# Patient Record
Sex: FEMALE | Race: ASIAN | NOT HISPANIC OR LATINO | Employment: OTHER | URBAN - METROPOLITAN AREA
[De-identification: names, ages, dates, MRNs, and addresses within clinical notes are randomized per-mention and may not be internally consistent; named-entity substitution may affect disease eponyms.]

---

## 2017-01-09 ENCOUNTER — HOSPITAL ENCOUNTER (OUTPATIENT)
Dept: CARDIOLOGY | Facility: MEDICAL CENTER | Age: 82
End: 2017-01-09
Attending: INTERNAL MEDICINE
Payer: MEDICARE

## 2017-01-09 DIAGNOSIS — I35.0 AORTIC STENOSIS: ICD-10-CM

## 2017-01-09 PROCEDURE — 93306 TTE W/DOPPLER COMPLETE: CPT

## 2017-01-09 PROCEDURE — 93306 TTE W/DOPPLER COMPLETE: CPT | Mod: 26 | Performed by: INTERNAL MEDICINE

## 2017-01-10 LAB
LV EJECT FRACT MOD 2C 99903: 55.54
LV EJECT FRACT MOD 4C 99902: 70.36
LV EJECT FRACT MOD BP 99901: 66.2

## 2017-01-13 ENCOUNTER — TELEPHONE (OUTPATIENT)
Dept: CARDIOLOGY | Facility: MEDICAL CENTER | Age: 82
End: 2017-01-13

## 2017-01-14 NOTE — TELEPHONE ENCOUNTER
----- Message from Alex Sharp M.D. sent at 1/12/2017  2:31 PM PST -----  Aortic stenosis has become moderate (barely). No changes, progressing as expected. Ok FU 1 year.    TW  ----- Message -----     From: Satnam Silvestre L.P.N.     Sent: 1/10/2017   5:30 PM       To: Alex Sharp M.D.    No f/u    Called patient with echo results.  She would like a copy for her records.  Copy mailed to patient.  She will follow up in one year. meghan

## 2017-01-14 NOTE — TELEPHONE ENCOUNTER
----- Message from Alex Sharp M.D. sent at 1/12/2017  2:31 PM PST -----  Aortic stenosis has become moderate (barely). No changes, progressing as expected. Ok FU 1 year.    TW  ----- Message -----     From: Satnam Silvestre L.P.N.     Sent: 1/10/2017   5:30 PM       To: Alex Sharp M.D.    No f/u

## 2017-01-16 ENCOUNTER — HOSPITAL ENCOUNTER (OUTPATIENT)
Dept: LAB | Facility: MEDICAL CENTER | Age: 82
End: 2017-01-16
Attending: FAMILY MEDICINE
Payer: MEDICARE

## 2017-01-16 LAB
INR PPP: 2.72 (ref 0.87–1.13)
PROTHROMBIN TIME: 29.7 SEC (ref 12–14.6)

## 2017-01-16 PROCEDURE — 85610 PROTHROMBIN TIME: CPT

## 2017-01-16 PROCEDURE — 36415 COLL VENOUS BLD VENIPUNCTURE: CPT

## 2017-01-23 ENCOUNTER — APPOINTMENT (OUTPATIENT)
Dept: RADIOLOGY | Facility: MEDICAL CENTER | Age: 82
End: 2017-01-23
Attending: FAMILY MEDICINE
Payer: MEDICARE

## 2017-02-07 ENCOUNTER — HOSPITAL ENCOUNTER (OUTPATIENT)
Dept: RADIOLOGY | Facility: MEDICAL CENTER | Age: 82
End: 2017-02-07
Attending: FAMILY MEDICINE
Payer: MEDICARE

## 2017-02-07 DIAGNOSIS — Z12.31 SCREENING MAMMOGRAM, ENCOUNTER FOR: ICD-10-CM

## 2017-02-07 PROCEDURE — 77063 BREAST TOMOSYNTHESIS BI: CPT

## 2017-02-15 ENCOUNTER — HOSPITAL ENCOUNTER (OUTPATIENT)
Dept: LAB | Facility: MEDICAL CENTER | Age: 82
End: 2017-02-15
Attending: FAMILY MEDICINE
Payer: MEDICARE

## 2017-02-15 LAB
INR PPP: 2.58 (ref 0.87–1.13)
PROTHROMBIN TIME: 28.5 SEC (ref 12–14.6)

## 2017-02-15 PROCEDURE — 85610 PROTHROMBIN TIME: CPT

## 2017-02-15 PROCEDURE — 36415 COLL VENOUS BLD VENIPUNCTURE: CPT

## 2017-02-21 DIAGNOSIS — I10 ESSENTIAL HYPERTENSION: ICD-10-CM

## 2017-02-21 RX ORDER — TELMISARTAN 40 MG/1
40 TABLET ORAL 2 TIMES DAILY
Qty: 60 TAB | Refills: 6 | OUTPATIENT
Start: 2017-02-21 | End: 2017-09-21 | Stop reason: SDUPTHER

## 2017-03-20 ENCOUNTER — HOSPITAL ENCOUNTER (OUTPATIENT)
Dept: LAB | Facility: MEDICAL CENTER | Age: 82
End: 2017-03-20
Attending: FAMILY MEDICINE
Payer: MEDICARE

## 2017-03-20 LAB
INR PPP: 2.41 (ref 0.87–1.13)
PROTHROMBIN TIME: 27 SEC (ref 12–14.6)

## 2017-03-20 PROCEDURE — 36415 COLL VENOUS BLD VENIPUNCTURE: CPT

## 2017-03-20 PROCEDURE — 85610 PROTHROMBIN TIME: CPT

## 2017-04-05 DIAGNOSIS — I10 ESSENTIAL HYPERTENSION: ICD-10-CM

## 2017-04-06 RX ORDER — CARVEDILOL 25 MG/1
25 TABLET ORAL 2 TIMES DAILY
Qty: 180 TAB | Refills: 2 | Status: ON HOLD | OUTPATIENT
Start: 2017-04-06 | End: 2017-05-12

## 2017-04-20 ENCOUNTER — HOSPITAL ENCOUNTER (OUTPATIENT)
Dept: LAB | Facility: MEDICAL CENTER | Age: 82
End: 2017-04-20
Attending: FAMILY MEDICINE
Payer: MEDICARE

## 2017-04-20 LAB
INR PPP: 2.55 (ref 0.87–1.13)
PROTHROMBIN TIME: 28.2 SEC (ref 12–14.6)

## 2017-04-20 PROCEDURE — 36415 COLL VENOUS BLD VENIPUNCTURE: CPT

## 2017-04-20 PROCEDURE — 85610 PROTHROMBIN TIME: CPT

## 2017-05-11 ENCOUNTER — RESOLUTE PROFESSIONAL BILLING HOSPITAL PROF FEE (OUTPATIENT)
Dept: HOSPITALIST | Facility: MEDICAL CENTER | Age: 82
End: 2017-05-11
Payer: MEDICARE

## 2017-05-11 ENCOUNTER — HOSPITAL ENCOUNTER (OUTPATIENT)
Facility: MEDICAL CENTER | Age: 82
End: 2017-05-12
Attending: EMERGENCY MEDICINE | Admitting: HOSPITALIST
Payer: MEDICARE

## 2017-05-11 ENCOUNTER — APPOINTMENT (OUTPATIENT)
Dept: RADIOLOGY | Facility: MEDICAL CENTER | Age: 82
End: 2017-05-11
Attending: EMERGENCY MEDICINE
Payer: MEDICARE

## 2017-05-11 PROBLEM — I16.0 HYPERTENSIVE URGENCY: Status: ACTIVE | Noted: 2017-05-11

## 2017-05-11 LAB
ALBUMIN SERPL BCP-MCNC: 4.3 G/DL (ref 3.2–4.9)
ALBUMIN/GLOB SERPL: 1.6 G/DL
ALP SERPL-CCNC: 35 U/L (ref 30–99)
ALT SERPL-CCNC: 36 U/L (ref 2–50)
ANION GAP SERPL CALC-SCNC: 12 MMOL/L (ref 0–11.9)
AST SERPL-CCNC: 25 U/L (ref 12–45)
BASOPHILS # BLD AUTO: 1 % (ref 0–1.8)
BASOPHILS # BLD: 0.08 K/UL (ref 0–0.12)
BILIRUB SERPL-MCNC: 0.4 MG/DL (ref 0.1–1.5)
BNP SERPL-MCNC: 33 PG/ML (ref 0–100)
BUN SERPL-MCNC: 11 MG/DL (ref 8–22)
CALCIUM SERPL-MCNC: 10 MG/DL (ref 8.5–10.5)
CHLORIDE SERPL-SCNC: 97 MMOL/L (ref 96–112)
CO2 SERPL-SCNC: 25 MMOL/L (ref 20–33)
CREAT SERPL-MCNC: 0.73 MG/DL (ref 0.5–1.4)
EKG IMPRESSION: NORMAL
EOSINOPHIL # BLD AUTO: 0.25 K/UL (ref 0–0.51)
EOSINOPHIL NFR BLD: 3.1 % (ref 0–6.9)
ERYTHROCYTE [DISTWIDTH] IN BLOOD BY AUTOMATED COUNT: 40.6 FL (ref 35.9–50)
GFR SERPL CREATININE-BSD FRML MDRD: >60 ML/MIN/1.73 M 2
GLOBULIN SER CALC-MCNC: 2.7 G/DL (ref 1.9–3.5)
GLUCOSE SERPL-MCNC: 129 MG/DL (ref 65–99)
HCT VFR BLD AUTO: 39.7 % (ref 37–47)
HGB BLD-MCNC: 13.9 G/DL (ref 12–16)
IMM GRANULOCYTES # BLD AUTO: 0.04 K/UL (ref 0–0.11)
IMM GRANULOCYTES NFR BLD AUTO: 0.5 % (ref 0–0.9)
INR PPP: 2.39 (ref 0.87–1.13)
LYMPHOCYTES # BLD AUTO: 2.98 K/UL (ref 1–4.8)
LYMPHOCYTES NFR BLD: 36.9 % (ref 22–41)
MCH RBC QN AUTO: 31.3 PG (ref 27–33)
MCHC RBC AUTO-ENTMCNC: 35 G/DL (ref 33.6–35)
MCV RBC AUTO: 89.4 FL (ref 81.4–97.8)
MONOCYTES # BLD AUTO: 0.62 K/UL (ref 0–0.85)
MONOCYTES NFR BLD AUTO: 7.7 % (ref 0–13.4)
NEUTROPHILS # BLD AUTO: 4.1 K/UL (ref 2–7.15)
NEUTROPHILS NFR BLD: 50.8 % (ref 44–72)
NRBC # BLD AUTO: 0 K/UL
NRBC BLD AUTO-RTO: 0 /100 WBC
PLATELET # BLD AUTO: 229 K/UL (ref 164–446)
PMV BLD AUTO: 9.1 FL (ref 9–12.9)
POTASSIUM SERPL-SCNC: 4.1 MMOL/L (ref 3.6–5.5)
PROT SERPL-MCNC: 7 G/DL (ref 6–8.2)
PROTHROMBIN TIME: 26.8 SEC (ref 12–14.6)
RBC # BLD AUTO: 4.44 M/UL (ref 4.2–5.4)
SODIUM SERPL-SCNC: 134 MMOL/L (ref 135–145)
TROPONIN I SERPL-MCNC: <0.01 NG/ML (ref 0–0.04)
WBC # BLD AUTO: 8.1 K/UL (ref 4.8–10.8)

## 2017-05-11 PROCEDURE — 71010 DX-CHEST-PORTABLE (1 VIEW): CPT

## 2017-05-11 PROCEDURE — 85610 PROTHROMBIN TIME: CPT

## 2017-05-11 PROCEDURE — 93005 ELECTROCARDIOGRAM TRACING: CPT | Performed by: EMERGENCY MEDICINE

## 2017-05-11 PROCEDURE — 70450 CT HEAD/BRAIN W/O DYE: CPT

## 2017-05-11 PROCEDURE — 84484 ASSAY OF TROPONIN QUANT: CPT

## 2017-05-11 PROCEDURE — A9270 NON-COVERED ITEM OR SERVICE: HCPCS | Performed by: HOSPITALIST

## 2017-05-11 PROCEDURE — 85025 COMPLETE CBC W/AUTO DIFF WBC: CPT

## 2017-05-11 PROCEDURE — G0378 HOSPITAL OBSERVATION PER HR: HCPCS

## 2017-05-11 PROCEDURE — 99220 PR INITIAL OBSERVATION CARE,LEVL III: CPT | Performed by: HOSPITALIST

## 2017-05-11 PROCEDURE — A9270 NON-COVERED ITEM OR SERVICE: HCPCS | Performed by: EMERGENCY MEDICINE

## 2017-05-11 PROCEDURE — 36415 COLL VENOUS BLD VENIPUNCTURE: CPT

## 2017-05-11 PROCEDURE — 94760 N-INVAS EAR/PLS OXIMETRY 1: CPT

## 2017-05-11 PROCEDURE — 700102 HCHG RX REV CODE 250 W/ 637 OVERRIDE(OP): Performed by: HOSPITALIST

## 2017-05-11 PROCEDURE — 99285 EMERGENCY DEPT VISIT HI MDM: CPT

## 2017-05-11 PROCEDURE — 83880 ASSAY OF NATRIURETIC PEPTIDE: CPT

## 2017-05-11 PROCEDURE — 80053 COMPREHEN METABOLIC PANEL: CPT

## 2017-05-11 PROCEDURE — 700102 HCHG RX REV CODE 250 W/ 637 OVERRIDE(OP): Performed by: EMERGENCY MEDICINE

## 2017-05-11 RX ORDER — ACETAMINOPHEN 325 MG/1
1000 TABLET ORAL ONCE
Status: COMPLETED | OUTPATIENT
Start: 2017-05-11 | End: 2017-05-11

## 2017-05-11 RX ORDER — BISACODYL 10 MG
10 SUPPOSITORY, RECTAL RECTAL
Status: DISCONTINUED | OUTPATIENT
Start: 2017-05-11 | End: 2017-05-12 | Stop reason: HOSPADM

## 2017-05-11 RX ORDER — LEVOTHYROXINE SODIUM 0.05 MG/1
50 TABLET ORAL EVERY MORNING
Status: DISCONTINUED | OUTPATIENT
Start: 2017-05-12 | End: 2017-05-12 | Stop reason: HOSPADM

## 2017-05-11 RX ORDER — TELMISARTAN 80 MG/1
40 TABLET ORAL 2 TIMES DAILY
Status: DISCONTINUED | OUTPATIENT
Start: 2017-05-11 | End: 2017-05-12 | Stop reason: HOSPADM

## 2017-05-11 RX ORDER — SODIUM CHLORIDE 9 MG/ML
1000 INJECTION, SOLUTION INTRAVENOUS CONTINUOUS
Status: DISPENSED | OUTPATIENT
Start: 2017-05-11 | End: 2017-05-12

## 2017-05-11 RX ORDER — AMOXICILLIN 250 MG
2 CAPSULE ORAL 2 TIMES DAILY
Status: DISCONTINUED | OUTPATIENT
Start: 2017-05-11 | End: 2017-05-12 | Stop reason: HOSPADM

## 2017-05-11 RX ORDER — SUMATRIPTAN 25 MG/1
25 TABLET, FILM COATED ORAL
Status: DISCONTINUED | OUTPATIENT
Start: 2017-05-11 | End: 2017-05-12 | Stop reason: HOSPADM

## 2017-05-11 RX ORDER — CARVEDILOL 25 MG/1
25 TABLET ORAL 2 TIMES DAILY
Status: DISCONTINUED | OUTPATIENT
Start: 2017-05-11 | End: 2017-05-12

## 2017-05-11 RX ORDER — LABETALOL HYDROCHLORIDE 5 MG/ML
10 INJECTION, SOLUTION INTRAVENOUS ONCE
Status: DISCONTINUED | OUTPATIENT
Start: 2017-05-11 | End: 2017-05-12 | Stop reason: HOSPADM

## 2017-05-11 RX ORDER — ONDANSETRON 4 MG/1
4 TABLET, ORALLY DISINTEGRATING ORAL EVERY 4 HOURS PRN
Status: DISCONTINUED | OUTPATIENT
Start: 2017-05-11 | End: 2017-05-12 | Stop reason: HOSPADM

## 2017-05-11 RX ORDER — POLYETHYLENE GLYCOL 3350 17 G/17G
1 POWDER, FOR SOLUTION ORAL
Status: DISCONTINUED | OUTPATIENT
Start: 2017-05-11 | End: 2017-05-12 | Stop reason: HOSPADM

## 2017-05-11 RX ORDER — ENALAPRILAT 1.25 MG/ML
1.25 INJECTION INTRAVENOUS EVERY 6 HOURS PRN
Status: DISCONTINUED | OUTPATIENT
Start: 2017-05-11 | End: 2017-05-12 | Stop reason: HOSPADM

## 2017-05-11 RX ORDER — PROMETHAZINE HYDROCHLORIDE 25 MG/1
25 TABLET ORAL EVERY 6 HOURS PRN
Status: DISCONTINUED | OUTPATIENT
Start: 2017-05-11 | End: 2017-05-12 | Stop reason: HOSPADM

## 2017-05-11 RX ORDER — ONDANSETRON 2 MG/ML
4 INJECTION INTRAMUSCULAR; INTRAVENOUS EVERY 4 HOURS PRN
Status: DISCONTINUED | OUTPATIENT
Start: 2017-05-11 | End: 2017-05-12 | Stop reason: HOSPADM

## 2017-05-11 RX ORDER — DEXTROSE MONOHYDRATE 25 G/50ML
25 INJECTION, SOLUTION INTRAVENOUS
Status: DISCONTINUED | OUTPATIENT
Start: 2017-05-11 | End: 2017-05-12 | Stop reason: HOSPADM

## 2017-05-11 RX ORDER — LABETALOL HYDROCHLORIDE 5 MG/ML
10 INJECTION, SOLUTION INTRAVENOUS EVERY 4 HOURS PRN
Status: DISCONTINUED | OUTPATIENT
Start: 2017-05-11 | End: 2017-05-12 | Stop reason: HOSPADM

## 2017-05-11 RX ADMIN — ACETAMINOPHEN 975 MG: 325 TABLET, FILM COATED ORAL at 18:25

## 2017-05-11 RX ADMIN — TELMISARTAN 40 MG: 80 TABLET ORAL at 23:39

## 2017-05-11 RX ADMIN — CARVEDILOL 25 MG: 25 TABLET, FILM COATED ORAL at 23:39

## 2017-05-11 RX ADMIN — METFORMIN HYDROCHLORIDE 500 MG: 500 TABLET, FILM COATED ORAL at 23:41

## 2017-05-11 ASSESSMENT — LIFESTYLE VARIABLES
ON A TYPICAL DAY WHEN YOU DRINK ALCOHOL HOW MANY DRINKS DO YOU HAVE: 1
HOW MANY TIMES IN THE PAST YEAR HAVE YOU HAD 5 OR MORE DRINKS IN A DAY: 0
TOTAL SCORE: 0
EVER HAD A DRINK FIRST THING IN THE MORNING TO STEADY YOUR NERVES TO GET RID OF A HANGOVER: NO
AVERAGE NUMBER OF DAYS PER WEEK YOU HAVE A DRINK CONTAINING ALCOHOL: 0
CONSUMPTION TOTAL: NEGATIVE
EVER FELT BAD OR GUILTY ABOUT YOUR DRINKING: NO
HAVE YOU EVER FELT YOU SHOULD CUT DOWN ON YOUR DRINKING: NO
HAVE PEOPLE ANNOYED YOU BY CRITICIZING YOUR DRINKING: NO
EVER_SMOKED: YES
ALCOHOL_USE: YES

## 2017-05-11 ASSESSMENT — PAIN SCALES - GENERAL
PAINLEVEL_OUTOF10: 0
PAINLEVEL_OUTOF10: 0

## 2017-05-11 NOTE — IP AVS SNAPSHOT
" <p align=\"LEFT\"><IMG SRC=\"//[email or web address]$/[email or web address]\" alt=\"Image\" WIDTH=\"50%\" HEIGHT=\"200\" BORDER=\"\"></p>                   Name:Shereen Dale  Medical Record Number:[id number]  CSN: [id number]    YOB: 1936   Age: 80 y.o.  Sex: female  HT:1.6 m (5' 2.99\") WT: 73.5 kg (162 lb 0.6 oz)          Admit Date: 5/11/2017     Discharge Date:   Today's Date: 5/12/2017  Attending Doctor:  Eugenio Jama M.D.                  Allergies:  Darvon; Iodine; Latex; Penicillins; Propoxyphene hcl; and Tetracycline          Your appointments     May 24, 2017  1:40 PM   HOSPITAL FOLLOW UP with JORGE A Flores   [facility] (--)    [address]  [facility] [address]   [phone]            Jun 07, 2017 11:00 AM   30 Minute with Kristin Cornell M.D.   [facility] (--)    [address] [place] [address] [address] [place] [address]   [phone]              Follow-up Information     1. Follow up with Kristin Cornell M.D.. Go on 6/7/2017.    Specialty:  Family Medicine    Why:  PLEASE ARRIVE AT 10:45AM FOR AN 11:00AM APPOINTMENT. THANK YOU    Contact information    [address] [facility] [address]  [place] [id number] [phone]           Medication List      Take these Medications        Instructions    acetaminophen 500 MG Tabs   Commonly known as:  TYLENOL    Take 1,000 mg by mouth every 6 hours as needed.   Dose:  1000 mg       amlodipine 5 MG Tabs   Commonly known as:  NORVASC    Take 1 Tab by mouth every day.   Dose:  5 mg       CALCIUM 500 PO    1 Tab every morning.   Dose:  1 Tab       COSOPT OP    Place 1 Drop in both eyes 2 Times a Day.   Dose:  1 Drop       docosahexanoic acid 1000 MG Caps   Commonly known as:  OMEGA 3 FA    Take 1,000 mg by mouth every morning.   Dose:  1000 mg       GLUCOSAMINE PO    Take 1 Tab by mouth every morning. Pt unsure of dose   Dose:  1 Tab       levothyroxine 50 MCG Tabs   Commonly known as:  SYNTHROID    Take 50 mcg by mouth every morning.   Dose:  " 50 mcg       metformin 500 MG Tabs   Commonly known as:  GLUCOPHAGE    Take 500 mg by mouth 3 times a day.   Dose:  500 mg       metoprolol SR 50 MG Tb24   Commonly known as:  TOPROL XL    Take 1 Tab by mouth every day.   Dose:  50 mg       PEPCID 20 MG Tabs   Generic drug:  famotidine    Take 20 mg by mouth 2 times a day.   Dose:  20 mg       SYSTANE OP    1-2 Drops by Ophthalmic route 2 Times a Day.   Dose:  1-2 Drop       telmisartan 40 MG Tabs   Commonly known as:  MICARDIS    Doctor's comments:  Called to pharmacy   Take 1 Tab by mouth 2 Times a Day.   Dose:  40 mg       tramadol 50 MG Tabs   Commonly known as:  ULTRAM    Take 1 Tab by mouth every 6 hours as needed for Moderate Pain.   Dose:  50 mg       VITAMIN D PO    Take 2,000 Units by mouth every morning.   Dose:  2000 Units       warfarin 3 MG Tabs   Commonly known as:  COUMADIN    Take 3 mg by mouth every bedtime.   Dose:  3 mg

## 2017-05-11 NOTE — IP AVS SNAPSHOT
" Home Care Instructions                                                                                                                  Name:Shereen Dale  Medical Record Number:[id number]  CSN: [id number]    YOB: 1936   Age: 80 y.o.  Sex: female  HT:1.6 m (5' 2.99\") WT: 73.5 kg (162 lb 0.6 oz)          Admit Date: 5/11/2017     Discharge Date:   Today's Date: 5/12/2017  Attending Doctor:  Eugenio Jama M.D.                  Allergies:  Darvon; Iodine; Latex; Penicillins; Propoxyphene hcl; and Tetracycline            Discharge Instructions       Discharge Instructions    Discharged to home by car with relative. Discharged via wheelchair, hospital escort: Yes.  Special equipment needed: Not Applicable    Be sure to schedule a follow-up appointment with your primary care doctor or any specialists as instructed.     Discharge Plan:   Diet Plan: Discussed  Activity Level: Discussed  Confirmed Follow up Appointment: Appointment Scheduled  Confirmed Symptoms Management: Discussed  Medication Reconciliation Updated: Yes  Influenza Vaccine Indication: Not indicated: Previously immunized this influenza season and > 8 years of age    I understand that a diet low in cholesterol, fat, and sodium is recommended for good health. Unless I have been given specific instructions below for another diet, I accept this instruction as my diet prescription.   Other diet: DIABETIC    Special Instructions: None    · Is patient discharged on Warfarin / Coumadin?   Yes    You are receiving the drug warfarin. Please understand the importance of monitoring warfarin with scheduled PT/INR blood draws.  Follow-up with a call to your personal Doctor's office in 3 days to schedule a PT/INR. .    IMPORTANT: HOW TO USE THIS INFORMATION:  This is a summary and does NOT have all possible information about this product. This information does not assure that this product is safe, effective, or appropriate for you. This information is not " "individual medical advice and does not substitute for the advice of your health care professional. Always ask your health care professional for complete information about this product and your specific health needs.      WARFARIN - ORAL (WARF-uh-rin)      COMMON BRAND NAME(S): Coumadin      WARNING:  Warfarin can cause very serious (possibly fatal) bleeding. This is more likely to occur when you first start taking this medication or if you take too much warfarin. To decrease your risk for bleeding, your doctor or other health care provider will monitor you closely and check your lab results (INR test) to make sure you are not taking too much warfarin. Keep all medical and laboratory appointments. Tell your doctor right away if you notice any signs of serious bleeding. See also Side Effects section.      USES:  This medication is used to treat blood clots (such as in deep vein thrombosis-DVT or pulmonary embolus-PE) and/or to prevent new clots from forming in your body. Preventing harmful blood clots helps to reduce the risk of a stroke or heart attack. Conditions that increase your risk of developing blood clots include a certain type of irregular heart rhythm (atrial fibrillation), heart valve replacement, recent heart attack, and certain surgeries (such as hip/knee replacement). Warfarin is commonly called a \"blood thinner,\" but the more correct term is \"anticoagulant.\" It helps to keep blood flowing smoothly in your body by decreasing the amount of certain substances (clotting proteins) in your blood.      HOW TO USE:  Read the Medication Guide provided by your pharmacist before you start taking warfarin and each time you get a refill. If you have any questions, ask your doctor or pharmacist. Take this medication by mouth with or without food as directed by your doctor or other health care professional, usually once a day. It is very important to take it exactly as directed. Do not increase the dose, take it more " frequently, or stop using it unless directed by your doctor. Dosage is based on your medical condition, laboratory tests (such as INR), and response to treatment. Your doctor or other health care provider will monitor you closely while you are taking this medication to determine the right dose for you. Use this medication regularly to get the most benefit from it. To help you remember, take it at the same time each day. It is important to eat a balanced, consistent diet while taking warfarin. Some foods can affect how warfarin works in your body and may affect your treatment and dose. Avoid sudden large increases or decreases in your intake of foods high in vitamin K (such as broccoli, cauliflower, cabbage, brussels sprouts, kale, spinach, and other green leafy vegetables, liver, green tea, certain vitamin supplements). If you are trying to lose weight, check with your doctor before you try to go on a diet. Cranberry products may also affect how your warfarin works. Limit the amount of cranberry juice (16 ounces/480 milliliters a day) or other cranberry products you may drink or eat.      SIDE EFFECTS:  Nausea, loss of appetite, or stomach/abdominal pain may occur. If any of these effects persist or worsen, tell your doctor or pharmacist promptly. Remember that your doctor has prescribed this medication because he or she has judged that the benefit to you is greater than the risk of side effects. Many people using this medication do not have serious side effects. This medication can cause serious bleeding if it affects your blood clotting proteins too much (shown by unusually high INR lab results). Even if your doctor stops your medication, this risk of bleeding can continue for up to a week. Tell your doctor right away if you have any signs of serious bleeding, including: unusual pain/swelling/discomfort, unusual/easy bruising, prolonged bleeding from cuts or gums, persistent/frequent nosebleeds, unusually  heavy/prolonged menstrual flow, pink/dark urine, coughing up blood, vomit that is bloody or looks like coffee grounds, severe headache, dizziness/fainting, unusual or persistent tiredness/weakness, bloody/black/tarry stools, chest pain, shortness of breath, difficulty swallowing. Tell your doctor right away if any of these unlikely but serious side effects occur: persistent nausea/vomiting, severe stomach/abdominal pain, yellowing eyes/skin. This drug rarely has caused very serious (possibly fatal) problems if its effects lead to small blood clots (usually at the beginning of treatment). This can lead to severe skin/tissue damage that may require surgery or amputation if left untreated. Patients with certain blood conditions (protein C or S deficiency) may be at greater risk. Get medical help right away if any of these rare but serious side effects occur: painful/red/purplish patches on the skin (such as on the toe, breast, abdomen), change in the amount of urine, vision changes, confusion, slurred speech, weakness on one side of the body. A very serious allergic reaction to this drug is rare. However, get medical help right away if you notice any symptoms of a serious allergic reaction, including: rash, itching/swelling (especially of the face/tongue/throat), severe dizziness, trouble breathing. This is not a complete list of possible side effects. If you notice other effects not listed above, contact your doctor or pharmacist. In the [place] - Call your doctor for medical advice about side effects. You may report side effects to FDA at 7-090-ZKU-4515. In [place] - Call your doctor for medical advice about side effects. You may report side effects to [facility] [place] at 1-161.951.7930.      PRECAUTIONS:  Before taking warfarin, tell your doctor or pharmacist if you are allergic to it; or if you have any other allergies. This product may contain inactive ingredients, which can cause allergic reactions or other problems. Talk  to your pharmacist for more details. Before using this medication, tell your doctor or pharmacist your medical history, especially of: blood disorders (such as anemia, hemophilia), bleeding problems (such as bleeding of the stomach/intestines, bleeding in the brain), blood vessel disorders (such as aneurysms), recent major injury/surgery, liver disease, alcohol use, mental/mood disorders (including memory problems), frequent falls/injuries. It is important that all your doctors and dentists know that you take warfarin. Before having surgery or any medical/dental procedures, tell your doctor or dentist that you are taking this medication and about all the products you use (including prescription drugs, nonprescription drugs, and herbal products). Avoid getting injections into the muscles. If you must have an injection into a muscle (for example, a flu shot), it should be given in the arm. This way, it will be easier to check for bleeding and/or apply pressure bandages. This medication may cause stomach bleeding. Daily use of alcohol while using this medicine will increase your risk for stomach bleeding and may also affect how this medication works. Limit or avoid alcoholic beverages. If you have not been eating well, if you have an illness or infection that causes fever, vomiting, or diarrhea for more than 2 days, or if you start using any antibiotic medications, contact your doctor or pharmacist immediately because these conditions can affect how warfarin works. This medication can cause heavy bleeding. To lower the chance of getting cut, bruised, or injured, use great caution with sharp objects like safety razors and nail cutters. Use an electric razor when shaving and a soft toothbrush when brushing your teeth. Avoid activities such as contact sports. If you fall or injure yourself, especially if you hit your head, call your doctor immediately. Your doctor may need to check you. The Food & Drug Administration has  "stated that generic warfarin products are interchangeable. However, consult your doctor or pharmacist before switching warfarin products. Be careful not to take more than one medication that contains warfarin unless specifically directed by the doctor or health care provider who is monitoring your warfarin treatment. Older adults may be at greater risk for bleeding while using this drug. This medication is not recommended for use during pregnancy because of serious (possibly fatal) harm to an unborn baby. Discuss the use of reliable forms of birth control with your doctor. If you become pregnant or think you may be pregnant, tell your doctor immediately. If you are planning pregnancy, discuss a plan for managing your condition with your doctor before you become pregnant. Your doctor may switch the type of medication you use during pregnancy. Very small amounts of this medication may pass into breast milk but is unlikely to harm a nursing infant. Consult your doctor before breast-feeding.      DRUG INTERACTIONS:  Drug interactions may change how your medications work or increase your risk for serious side effects. This document does not contain all possible drug interactions. Keep a list of all the products you use (including prescription/nonprescription drugs and herbal products) and share it with your doctor and pharmacist. Do not start, stop, or change the dosage of any medicines without your doctor's approval. Warfarin interacts with many prescription, nonprescription, vitamin, and herbal products. This includes medications that are applied to the skin or inside the vagina or rectum. The interactions with warfarin usually result in an increase or decrease in the \"blood-thinning\" (anticoagulant) effect. Your doctor or other health care professional should closely monitor you to prevent serious bleeding or clotting problems. While taking warfarin, it is very important to tell your doctor or pharmacist of any " changes in medications, vitamins, or herbal products that you are taking. Some products that may interact with this drug include: capecitabine, imatinib, mifepristone. Aspirin, aspirin-like drugs (salicylates), and nonsteroidal anti-inflammatory drugs (NSAIDs such as ibuprofen, naproxen, celecoxib) may have effects similar to warfarin. These drugs may increase the risk of bleeding problems if taken during treatment with warfarin. Carefully check all prescription/nonprescription product labels (including drugs applied to the skin such as pain-relieving creams) since the products may contain NSAIDs or salicylates. Talk to your doctor about using a different medication (such as acetaminophen) to treat pain/fever. Low-dose aspirin and related drugs (such as clopidogrel, ticlopidine) should be continued if prescribed by your doctor for specific medical reasons such as heart attack or stroke prevention. Consult your doctor or pharmacist for more details. Many herbal products interact with warfarin. Tell your doctor before taking any herbal products, especially bromelains, coenzyme Q10, cranberry, danshen, dong quai, fenugreek, garlic, ginkgo biloba, ginseng, and Blessing's wort, among others. This medication may interfere with a certain laboratory test to measure theophylline levels, possibly causing false test results. Make sure laboratory personnel and all your doctors know you use this drug.      OVERDOSE:  If overdose is suspected, contact a poison control center or emergency room immediately. [place] residents can call the [place] National Poison Hotline at 1-644.642.9340. [place] residents can call a provincial poison control center. Symptoms of overdose may include: bloody/black/tarry stools, pink/dark urine, unusual/prolonged bleeding.      NOTES:  Do not share this medication with others. Laboratory and/or medical tests (such as INR, complete blood count) must be performed periodically to monitor your progress or check for  side effects. Consult your doctor for more details.      MISSED DOSE:  For the best possible benefit, do not miss any doses. If you do miss a dose and remember on the same day, take it as soon as you remember. If you remember on the next day, skip the missed dose and resume your usual dosing schedule. Do not double the dose to catch up because this could increase your risk for bleeding. Keep a record of missed doses to give to your doctor or pharmacist. Contact your doctor or pharmacist if you miss 2 or more doses in a row.      STORAGE:  Store at room temperature away from light and moisture. Do not store in the bathroom. Keep all medications away from children and pets. Do not flush medications down the toilet or pour them into a drain unless instructed to do so. Properly discard this product when it is  or no longer needed. Consult your pharmacist or local waste disposal company for more details about how to safely discard your product.      MEDICAL ALERT:  Your condition and medication can cause complications in a medical emergency. For information about enrolling in MedicAlert, call 1-678.122.1513 ([place]) or 1-611.275.5685 ([place]).      Information last revised 2010 Copyright(c) 2010 [employer]             · Is patient Post Blood Transfusion?  No    Depression / Suicide Risk    As you are discharged from this [facility] facility, it is important to learn how to keep safe from harming yourself.    Recognize the warning signs:  · Abrupt changes in personality, positive or negative- including increase in energy   · Giving away possessions  · Change in eating patterns- significant weight changes-  positive or negative  · Change in sleeping patterns- unable to sleep or sleeping all the time   · Unwillingness or inability to communicate  · Depression  · Unusual sadness, discouragement and loneliness  · Talk of wanting to die  · Neglect of personal appearance   · Rebelliousness- reckless  behavior  · Withdrawal from people/activities they love  · Confusion- inability to concentrate     If you or a loved one observes any of these behaviors or has concerns about self-harm, here's what you can do:  · Talk about it- your feelings and reasons for harming yourself  · Remove any means that you might use to hurt yourself (examples: pills, rope, extension cords, firearm)  · Get professional help from the community (Mental Health, Substance Abuse, psychological counseling)  · Do not be alone:Call your Safe Contact- someone whom you trust who will be there for you.  · Call your local CRISIS HOTLINE 174-3856 or 881-666-4032  · Call your local Children's Mobile Crisis Response Team [place] (606) 159-2133 or www.[employer]  · Call the toll free National Suicide Prevention Hotlines   · National Suicide Prevention Lifeline 303-082-UIZZ (1138)  · National [employer] Line Network 800-SUICIDE (860-6855)        Your appointments     May 24, 2017  1:40 PM   HOSPITAL FOLLOW UP with FIDELINA Flores.   [facility] (--)    [address]  [facility] [id number] [phone]            Jun 07, 2017 11:00 AM   30 Minute with [facility] (--)    [address] [place] [address]   [phone]              Follow-up Information     1. Follow up with Kristin Cornell M.D.. Go on 6/7/2017.    Specialty:  Family Medicine    Why:  PLEASE ARRIVE AT 10:45AM FOR AN 11:00AM APPOINTMENT. THANK YOU    Contact information    [address]  [place] [id number] [phone]           Discharge Medication Instructions:    Below are the medications your physician expects you to take upon discharge:    Review all your home medications and newly ordered medications with your doctor and/or pharmacist. Follow medication instructions as directed by your doctor and/or pharmacist.    Please keep your medication list with you and share with your  physician.               Medication List      START taking these medications        Instructions    Morning Afternoon Evening Bedtime    amlodipine 5 MG Tabs   Last time this was given:  5 mg on 5/12/2017 10:42 AM   Commonly known as:  NORVASC        Take 1 Tab by mouth every day.   Dose:  5 mg                        metoprolol SR 50 MG Tb24   Last time this was given:  50 mg on 5/12/2017 10:42 AM   Commonly known as:  TOPROL XL        Take 1 Tab by mouth every day.   Dose:  50 mg                        tramadol 50 MG Tabs   Last time this was given:  25 mg on 5/12/2017 10:43 AM   Commonly known as:  ULTRAM        Take 1 Tab by mouth every 6 hours as needed for Moderate Pain.   Dose:  50 mg                          CONTINUE taking these medications        Instructions    Morning Afternoon Evening Bedtime    acetaminophen 500 MG Tabs   Last time this was given:  975 mg on 5/11/2017  6:25 PM   Commonly known as:  TYLENOL        Take 1,000 mg by mouth every 6 hours as needed.   Dose:  1000 mg                        CALCIUM 500 PO        1 Tab every morning.   Dose:  1 Tab                        COSOPT OP        Place 1 Drop in both eyes 2 Times a Day.   Dose:  1 Drop                        docosahexanoic acid 1000 MG Caps   Commonly known as:  OMEGA 3 FA        Take 1,000 mg by mouth every morning.   Dose:  1000 mg                        GLUCOSAMINE PO        Take 1 Tab by mouth every morning. Pt unsure of dose   Dose:  1 Tab                        levothyroxine 50 MCG Tabs   Last time this was given:  50 mcg on 5/12/2017  8:02 AM   Commonly known as:  SYNTHROID        Take 50 mcg by mouth every morning.   Dose:  50 mcg                        metformin 500 MG Tabs   Last time this was given:  500 mg on 5/12/2017  2:49 PM   Commonly known as:  GLUCOPHAGE        Take 500 mg by mouth 3 times a day.   Dose:  500 mg                        PEPCID 20 MG Tabs   Generic drug:  famotidine        Take 20 mg by mouth 2 times a  day.   Dose:  20 mg                        SYSTANE OP        1-2 Drops by Ophthalmic route 2 Times a Day.   Dose:  1-2 Drop                        telmisartan 40 MG Tabs   Last time this was given:  40 mg on 5/12/2017  8:02 AM   Commonly known as:  MICARDIS        Doctor's comments:  Called to pharmacy   Take 1 Tab by mouth 2 Times a Day.   Dose:  40 mg                        VITAMIN D PO        Take 2,000 Units by mouth every morning.   Dose:  2000 Units                        warfarin 3 MG Tabs   Commonly known as:  COUMADIN        Take 3 mg by mouth every bedtime.   Dose:  3 mg                          STOP taking these medications     carvedilol 25 MG Tabs   Commonly known as:  COREG                    Where to Get Your Medications      Information about where to get these medications is not yet available     ! Ask your nurse or doctor about these medications    - amlodipine 5 MG Tabs  - metoprolol SR 50 MG Tb24  - tramadol 50 MG Tabs            Instructions           Diet / Nutrition:    Follow any diet instructions given to you by your doctor or the dietician, including how much salt (sodium) you are allowed each day.    If you are overweight, talk to your doctor about a weight reduction plan.    Activity:    Remain physically active following your doctor's instructions about exercise and activity.    Rest often.     Any time you become even a little tired or short of breath, SIT DOWN and rest.    Worsening Symptoms:    Report any of the following signs and symptoms to the doctor's office immediately:    *Pain of jaw, arm, or neck  *Chest pain not relieved by medication                               *Dizziness or loss of consciousness  *Difficulty breathing even when at rest   *More tired than usual                                       *Bleeding drainage or swelling of surgical site  *Swelling of feet, ankles, legs or stomach                 *Fever (>100ºF)  *Pink or blood tinged sputum  *Weight gain  (3lbs/day or 5lbs /week)           *Shock from internal defibrillator (if applicable)  *Palpitations or irregular heartbeats                *Cool and/or numb extremities    Stroke Awareness    Common Risk Factors for Stroke include:    Age  Atrial Fibrillation  Carotid Artery Stenosis  Diabetes Mellitus  Excessive alcohol consumption  High blood pressure  Overweight   Physical inactivity  Smoking    Warning signs and symptoms of a stroke include:    *Sudden numbness or weakness of the face, arm or leg (especially on one side of the body).  *Sudden confusion, trouble speaking or understanding.  *Sudden trouble seeing in one or both eyes.  *Sudden trouble walking, dizziness, loss of balance or coordination.Sudden severe headache with no known cause.    It is very important to get treatment quickly when a stroke occurs. If you experience any of the above warning signs, call 911 immediately.                   Disclaimer         Quit Smoking / Tobacco Use:    I understand the use of any tobacco products increases my chance of suffering from future heart disease or stroke and could cause other illnesses which may shorten my life. Quitting the use of tobacco products is the single most important thing I can do to improve my health. For further information on smoking / tobacco cessation call a Toll Free Quit Line at 1-892.140.6429 (*National Cancer [place]) or 1-880.637.5631 (American Lung Association) or you can access the web based program at www.lungTellyo.org.    [place] Tobacco Users Help Line:  (952) 413-9649       Toll Free: 1-494.674.5077  Quit Tobacco Program [facility] (451)010-0224    Crisis Hotline:    [place] Crisis Hotline:  0-829-RUQZIBA or 1-398.692.2404    [place] Crisis Hotline:    1-813.749.7035 or 257-752-1095    Discharge Survey:   Thank you for choosing [facility]. We hope we did everything we could to make your hospital stay a pleasant one. You may be receiving a phone survey  and we would appreciate your time and participation in answering the questions. Your input is very valuable to us in our efforts to improve our service to our patients and their families.        My signature on this form indicates that:    1. I have reviewed and understand the above information.  2. My questions regarding this information have been answered to my satisfaction.  3. I have formulated a plan with my discharge nurse to obtain my prescribed medications for home.                  Disclaimer         __________________________________                     __________       ________                       Patient Signature                                                 Date                    Time

## 2017-05-11 NOTE — IP AVS SNAPSHOT
5/12/2017    Shereen Dale  [address] [place] [address]    Dear Shereen:    [facility] wants to ensure your discharge home is safe and you or your loved ones have had all of your questions answered regarding your care after you leave the hospital.    Below is a list of resources and contact information should you have any questions regarding your hospital stay, follow-up instructions, or active medical symptoms.    Questions or Concerns Regarding… Contact   Medical Questions Related to Your Discharge  (7 days a week, 8am-5pm) Contact a Nurse Care Coordinator   [phone]   Medical Questions Not Related to Your Discharge  (24 hours a day / 7 days a week)  Contact the Nurse Health Line   [phone]    Medications or Discharge Instructions Refer to your discharge packet   or contact your [facility] Primary Care Provider   [phone]   Follow-up Appointment(s) Schedule your appointment via Spotie   or contact Scheduling [phone]   Billing Review your statement via Spotie  or contact Billing [phone]   Medical Records Review your records via Spotie   or contact Medical Records [phone]     You may receive a telephone call within two days of discharge. This call is to make certain you understand your discharge instructions and have the opportunity to have any questions answered. You can also easily access your medical information, test results and upcoming appointments via the Spotie free online health management tool. You can learn more and sign up at [employer]/Spotie. For assistance setting up your Spotie account, please call [phone].    Once again, we want to ensure your discharge home is safe and that you have a clear understanding of any next steps in your care. If you have any questions or concerns, please do not hesitate to contact us, we are here for you. Thank you for choosing [facility] for your healthcare needs.    Sincerely,    Your [facility] Healthcare Team

## 2017-05-11 NOTE — IP AVS SNAPSHOT
[employer] Access Code: Activation code not generated  Current [employer] Status: Patient Declined    Your email address is not on file at [facility].  Email Addresses are required for you to sign up for [employer], please contact [phone] to verify your personal information and to provide your email address prior to attempting to register for [employer].    Shereen Dale  [address] [place] [address]    [employer]  A secure, online tool to manage your health information     [facility] [employer]’s [employer]® is a secure, online tool that connects you to your personalized health information from the privacy of your home -- day or night - making it very easy for you to manage your healthcare. Once the activation process is completed, you can even access your medical information using the [employer] [place], which is available for free in the [employer] [place] store or Google Play store.     To learn more about [employer], visit [email or web address]    There are two levels of access available (as shown below):   My Chart Features  [facility] Primary Care Doctor [facility]  Specialists [facility]  Urgent  Care Non-[facility] Primary Care Doctor   Email your healthcare team securely and privately 24/7 X X X    Manage appointments: schedule your next appointment; view details of past/upcoming appointments X      Request prescription refills. X      View recent personal medical records, including lab and immunizations X X X X   View health record, including health history, allergies, medications X X X X   Read reports about your outpatient visits, procedures, consult and ER notes X X X X   See your discharge summary, which is a recap of your hospital and/or ER visit that includes your diagnosis, lab results, and care plan X X  X     How to register for [employer]:  Once your e-mail address has been verified, follow the following steps to sign up for [employer].     1. Go to  [email or web address]  2. Click on the Sign Up Now box, which takes you to the New Member  Sign Up page. You will need to provide the following information:  a. Enter your [employer] Access Code exactly as it appears at the top of this page. (You will not need to use this code after you’ve completed the sign-up process. If you do not sign up before the expiration date, you must request a new code.)   b. Enter your date of birth.   c. Enter your home email address.   d. Click Submit, and follow the next screen’s instructions.  3. Create a [employer] ID. This will be your [employer] login ID and cannot be changed, so think of one that is secure and easy to remember.  4. Create a [employer] password. You can change your password at any time.  5. Enter your Password Reset Question and Answer. This can be used at a later time if you forget your password.   6. Enter your e-mail address. This allows you to receive e-mail notifications when new information is available in [employer].  7. Click Sign Up. You can now view your health information.    For assistance activating your [employer] account, call [phone]

## 2017-05-12 ENCOUNTER — PATIENT OUTREACH (OUTPATIENT)
Dept: HEALTH INFORMATION MANAGEMENT | Facility: OTHER | Age: 82
End: 2017-05-12

## 2017-05-12 VITALS
OXYGEN SATURATION: 94 % | TEMPERATURE: 97.1 F | RESPIRATION RATE: 18 BRPM | SYSTOLIC BLOOD PRESSURE: 143 MMHG | HEIGHT: 63 IN | BODY MASS INDEX: 28.71 KG/M2 | WEIGHT: 162.04 LBS | HEART RATE: 73 BPM | DIASTOLIC BLOOD PRESSURE: 70 MMHG

## 2017-05-12 LAB
ANION GAP SERPL CALC-SCNC: 9 MMOL/L (ref 0–11.9)
BUN SERPL-MCNC: 12 MG/DL (ref 8–22)
CALCIUM SERPL-MCNC: 9.5 MG/DL (ref 8.5–10.5)
CHLORIDE SERPL-SCNC: 100 MMOL/L (ref 96–112)
CO2 SERPL-SCNC: 22 MMOL/L (ref 20–33)
CREAT SERPL-MCNC: 0.58 MG/DL (ref 0.5–1.4)
ERYTHROCYTE [DISTWIDTH] IN BLOOD BY AUTOMATED COUNT: 40.4 FL (ref 35.9–50)
GFR SERPL CREATININE-BSD FRML MDRD: >60 ML/MIN/1.73 M 2
GLUCOSE BLD-MCNC: 117 MG/DL (ref 65–99)
GLUCOSE BLD-MCNC: 151 MG/DL (ref 65–99)
GLUCOSE SERPL-MCNC: 132 MG/DL (ref 65–99)
HCT VFR BLD AUTO: 38.6 % (ref 37–47)
HGB BLD-MCNC: 13.3 G/DL (ref 12–16)
MCH RBC QN AUTO: 31.1 PG (ref 27–33)
MCHC RBC AUTO-ENTMCNC: 34.5 G/DL (ref 33.6–35)
MCV RBC AUTO: 90.4 FL (ref 81.4–97.8)
PLATELET # BLD AUTO: 256 K/UL (ref 164–446)
PMV BLD AUTO: 9.8 FL (ref 9–12.9)
POTASSIUM SERPL-SCNC: 3.9 MMOL/L (ref 3.6–5.5)
RBC # BLD AUTO: 4.27 M/UL (ref 4.2–5.4)
SODIUM SERPL-SCNC: 131 MMOL/L (ref 135–145)
TROPONIN I SERPL-MCNC: <0.01 NG/ML (ref 0–0.04)
WBC # BLD AUTO: 7.7 K/UL (ref 4.8–10.8)

## 2017-05-12 PROCEDURE — A9270 NON-COVERED ITEM OR SERVICE: HCPCS | Performed by: HOSPITALIST

## 2017-05-12 PROCEDURE — 700105 HCHG RX REV CODE 258: Performed by: HOSPITALIST

## 2017-05-12 PROCEDURE — 82962 GLUCOSE BLOOD TEST: CPT

## 2017-05-12 PROCEDURE — 85027 COMPLETE CBC AUTOMATED: CPT

## 2017-05-12 PROCEDURE — 84484 ASSAY OF TROPONIN QUANT: CPT

## 2017-05-12 PROCEDURE — 99217 PR OBSERVATION CARE DISCHARGE: CPT | Performed by: HOSPITALIST

## 2017-05-12 PROCEDURE — 700102 HCHG RX REV CODE 250 W/ 637 OVERRIDE(OP): Performed by: HOSPITALIST

## 2017-05-12 PROCEDURE — 80048 BASIC METABOLIC PNL TOTAL CA: CPT

## 2017-05-12 PROCEDURE — G0378 HOSPITAL OBSERVATION PER HR: HCPCS

## 2017-05-12 PROCEDURE — 36415 COLL VENOUS BLD VENIPUNCTURE: CPT

## 2017-05-12 RX ORDER — AMLODIPINE BESYLATE 5 MG/1
5 TABLET ORAL
Status: DISCONTINUED | OUTPATIENT
Start: 2017-05-12 | End: 2017-05-12 | Stop reason: HOSPADM

## 2017-05-12 RX ORDER — AMLODIPINE BESYLATE 5 MG/1
5 TABLET ORAL DAILY
Qty: 30 TAB | Refills: 2 | Status: SHIPPED | OUTPATIENT
Start: 2017-05-12 | End: 2017-05-12

## 2017-05-12 RX ORDER — METOPROLOL SUCCINATE 50 MG/1
50 TABLET, EXTENDED RELEASE ORAL DAILY
Qty: 30 TAB | Refills: 2 | Status: SHIPPED | OUTPATIENT
Start: 2017-05-12 | End: 2017-08-07

## 2017-05-12 RX ORDER — AMLODIPINE BESYLATE 5 MG/1
5 TABLET ORAL DAILY
Qty: 30 TAB | Refills: 2 | Status: SHIPPED | OUTPATIENT
Start: 2017-05-12 | End: 2017-08-07 | Stop reason: SDUPTHER

## 2017-05-12 RX ORDER — METOPROLOL SUCCINATE 50 MG/1
50 TABLET, EXTENDED RELEASE ORAL
Status: DISCONTINUED | OUTPATIENT
Start: 2017-05-12 | End: 2017-05-12 | Stop reason: HOSPADM

## 2017-05-12 RX ORDER — TRAMADOL HYDROCHLORIDE 50 MG/1
50 TABLET ORAL EVERY 6 HOURS PRN
Status: DISCONTINUED | OUTPATIENT
Start: 2017-05-12 | End: 2017-05-12 | Stop reason: HOSPADM

## 2017-05-12 RX ORDER — TRAMADOL HYDROCHLORIDE 50 MG/1
50 TABLET ORAL EVERY 6 HOURS PRN
Qty: 30 TAB | Refills: 0 | Status: SHIPPED | OUTPATIENT
Start: 2017-05-12 | End: 2018-05-21

## 2017-05-12 RX ADMIN — TRAMADOL HYDROCHLORIDE 25 MG: 50 TABLET, COATED ORAL at 10:43

## 2017-05-12 RX ADMIN — CARVEDILOL 25 MG: 25 TABLET, FILM COATED ORAL at 08:02

## 2017-05-12 RX ADMIN — METFORMIN HYDROCHLORIDE 500 MG: 500 TABLET, FILM COATED ORAL at 14:49

## 2017-05-12 RX ADMIN — SODIUM CHLORIDE 1000 ML: 9 INJECTION, SOLUTION INTRAVENOUS at 00:51

## 2017-05-12 RX ADMIN — METOPROLOL SUCCINATE 50 MG: 50 TABLET, EXTENDED RELEASE ORAL at 10:42

## 2017-05-12 RX ADMIN — TELMISARTAN 40 MG: 80 TABLET ORAL at 08:02

## 2017-05-12 RX ADMIN — LEVOTHYROXINE SODIUM 50 MCG: 50 TABLET ORAL at 08:02

## 2017-05-12 RX ADMIN — METFORMIN HYDROCHLORIDE 500 MG: 500 TABLET, FILM COATED ORAL at 08:01

## 2017-05-12 RX ADMIN — AMLODIPINE BESYLATE 5 MG: 5 TABLET ORAL at 10:42

## 2017-05-12 ASSESSMENT — PAIN SCALES - GENERAL
PAINLEVEL_OUTOF10: 2
PAINLEVEL_OUTOF10: 7
PAINLEVEL_OUTOF10: 0

## 2017-05-12 ASSESSMENT — LIFESTYLE VARIABLES
EVER_SMOKED: NEVER
EVER_SMOKED: NEVER

## 2017-05-12 NOTE — ED PROVIDER NOTES
ED Provider Note    HPI: Patient is a 80-year-old female who presented to the emergency department by embolus transfer May 11, 2017 at 4:53 PM with a chief complaint of headache and high blood pressure.    Patient also notes some tenderness. She states her blood pressures been very high for the last 2 days. Today she checked her blood pressure at home and got a reading of 214/140. The patient took Coreg without improvement. Paramedics were contacted and the patient received a dose of IV beta blocker with some improvement in blood pressure. No chest pain or shortness of breath. The patient is concerned because she has a headache and some neck pain and is on Coumadin. No fever no chills no cough. He has nausea but no vomiting. No change in bladder or bowel habits. No other somatic complaints    Review of Systems: Positive for head and neck pain nausea negative for vomiting fever chills cough. Review of systems reviewed with patient, all other systems negative    Past medical/surgical history: Hypertension and glaucoma chronic anticoagulation breast cancer atrial fibrillation cholecystectomy hysterectomy O2 dependent at night diabetes    Medications: Coreg Micardis Tylenol Coumadin glucosamine Synthroid metformin timolol Pepcid    Allergies: Darvon and iodine Lasix penicillin propoxyphene tetracycline    Social History: Previous history of smoking occasional use of alcohol      Physical exam: Constitutional: Pleasant female awake and alert appeared somewhat anxious  Vital signs: Temperature 97.3 pulse 66 respirations 16 blood pressure 180/76 pulse oximetry 98%  EYES: PERRL, EOMI, Conjunctivae and sclera normal, eyelids normal bilaterally.  Neck: Trachea midline. No cervical masses seen or palpated. Normal range of motion, supple. No meningeal signs elicited.  Cardiac: Regular rate and rhythm. S1-S2 present. No S3 or S4 present. No murmurs, rubs, or gallops heard. No edema or varicosities were seen.   Lungs: Clear to  auscultation with good aeration throughout. No wheezes, rales, or rhonchi heard. Patient's chest wall moved symmetrically with each respiratory effort. Patient was not making use of accessory muscles of respiration in breathing.  Abdomen: Soft nontender to palpation. No rebound or guarding elicited. No organomegaly identified. No pulsatile abdominal masses identified.   Musculoskeletal:  no  pain with palpitation or movement of muscle, bone or joint , no obvious musculoskeletal deformities identified.  Neurologic: alert and awake answers questions appropriately. Moves all four extremities independently, no gross focal abnormalities identified. Normal strength and motor.  Skin: no rash or lesion seen, no palpable dermatologic lesions identified.  Psychiatric: Patient appeared to be somewhat anxious. She was not delusional or hallucinating.    Medical decision making: EKG obtained (interpretation) 12-lead EKG sinus rhythm rate 65. Morphology P waves QRS complexes unremarkable. Flattened T waves noted in leads aVL and 3 of uncertain significance. No evidence of ST elevation or depression. Interpretation abnormal EKG but not indicating acute ischemia or dysrhythmia.    Nursing staff given HI and order for Normodyne for diastolic pressure above 95     CT scan of the head obtained; no acute abnormalities were seen    Laboratory studies were obtained (please see lab sheet for all results) significant findings included a therapeutic INR 2.39 unremarkable troponin and BNP minimal hyperglycemia blood sugar 129 unremarkable CBC    Chest x-ray obtained; no acute abnormalities were seen.    Recheck showed the patient feeling somewhat better but still dizzy and with a mild headache. She'll be admitted for observation by hospitalist service for hypertensive urgency. Patient is no evidence of acute CVA intracranial bleed or other abnormality at this time    Impression hypertensive urgency

## 2017-05-12 NOTE — DISCHARGE INSTRUCTIONS
Discharge Instructions    Discharged to home by car with relative. Discharged via wheelchair, hospital escort: Yes.  Special equipment needed: Not Applicable    Be sure to schedule a follow-up appointment with your primary care doctor or any specialists as instructed.     Discharge Plan:   Diet Plan: Discussed  Activity Level: Discussed  Confirmed Follow up Appointment: Appointment Scheduled  Confirmed Symptoms Management: Discussed  Medication Reconciliation Updated: Yes  Influenza Vaccine Indication: Not indicated: Previously immunized this influenza season and > 8 years of age    I understand that a diet low in cholesterol, fat, and sodium is recommended for good health. Unless I have been given specific instructions below for another diet, I accept this instruction as my diet prescription.   Other diet: DIABETIC    Special Instructions: None    · Is patient discharged on Warfarin / Coumadin?   Yes    You are receiving the drug warfarin. Please understand the importance of monitoring warfarin with scheduled PT/INR blood draws.  Follow-up with a call to your personal Doctor's office in 3 days to schedule a PT/INR. .    IMPORTANT: HOW TO USE THIS INFORMATION:  This is a summary and does NOT have all possible information about this product. This information does not assure that this product is safe, effective, or appropriate for you. This information is not individual medical advice and does not substitute for the advice of your health care professional. Always ask your health care professional for complete information about this product and your specific health needs.      WARFARIN - ORAL (WARF-uh-rin)      COMMON BRAND NAME(S): Coumadin      WARNING:  Warfarin can cause very serious (possibly fatal) bleeding. This is more likely to occur when you first start taking this medication or if you take too much warfarin. To decrease your risk for bleeding, your doctor or other health care provider will monitor you  "closely and check your lab results (INR test) to make sure you are not taking too much warfarin. Keep all medical and laboratory appointments. Tell your doctor right away if you notice any signs of serious bleeding. See also Side Effects section.      USES:  This medication is used to treat blood clots (such as in deep vein thrombosis-DVT or pulmonary embolus-PE) and/or to prevent new clots from forming in your body. Preventing harmful blood clots helps to reduce the risk of a stroke or heart attack. Conditions that increase your risk of developing blood clots include a certain type of irregular heart rhythm (atrial fibrillation), heart valve replacement, recent heart attack, and certain surgeries (such as hip/knee replacement). Warfarin is commonly called a \"blood thinner,\" but the more correct term is \"anticoagulant.\" It helps to keep blood flowing smoothly in your body by decreasing the amount of certain substances (clotting proteins) in your blood.      HOW TO USE:  Read the Medication Guide provided by your pharmacist before you start taking warfarin and each time you get a refill. If you have any questions, ask your doctor or pharmacist. Take this medication by mouth with or without food as directed by your doctor or other health care professional, usually once a day. It is very important to take it exactly as directed. Do not increase the dose, take it more frequently, or stop using it unless directed by your doctor. Dosage is based on your medical condition, laboratory tests (such as INR), and response to treatment. Your doctor or other health care provider will monitor you closely while you are taking this medication to determine the right dose for you. Use this medication regularly to get the most benefit from it. To help you remember, take it at the same time each day. It is important to eat a balanced, consistent diet while taking warfarin. Some foods can affect how warfarin works in your body and may " affect your treatment and dose. Avoid sudden large increases or decreases in your intake of foods high in vitamin K (such as broccoli, cauliflower, cabbage, brussels sprouts, kale, spinach, and other green leafy vegetables, liver, green tea, certain vitamin supplements). If you are trying to lose weight, check with your doctor before you try to go on a diet. Cranberry products may also affect how your warfarin works. Limit the amount of cranberry juice (16 ounces/480 milliliters a day) or other cranberry products you may drink or eat.      SIDE EFFECTS:  Nausea, loss of appetite, or stomach/abdominal pain may occur. If any of these effects persist or worsen, tell your doctor or pharmacist promptly. Remember that your doctor has prescribed this medication because he or she has judged that the benefit to you is greater than the risk of side effects. Many people using this medication do not have serious side effects. This medication can cause serious bleeding if it affects your blood clotting proteins too much (shown by unusually high INR lab results). Even if your doctor stops your medication, this risk of bleeding can continue for up to a week. Tell your doctor right away if you have any signs of serious bleeding, including: unusual pain/swelling/discomfort, unusual/easy bruising, prolonged bleeding from cuts or gums, persistent/frequent nosebleeds, unusually heavy/prolonged menstrual flow, pink/dark urine, coughing up blood, vomit that is bloody or looks like coffee grounds, severe headache, dizziness/fainting, unusual or persistent tiredness/weakness, bloody/black/tarry stools, chest pain, shortness of breath, difficulty swallowing. Tell your doctor right away if any of these unlikely but serious side effects occur: persistent nausea/vomiting, severe stomach/abdominal pain, yellowing eyes/skin. This drug rarely has caused very serious (possibly fatal) problems if its effects lead to small blood clots (usually at  the beginning of treatment). This can lead to severe skin/tissue damage that may require surgery or amputation if left untreated. Patients with certain blood conditions (protein C or S deficiency) may be at greater risk. Get medical help right away if any of these rare but serious side effects occur: painful/red/purplish patches on the skin (such as on the toe, breast, abdomen), change in the amount of urine, vision changes, confusion, slurred speech, weakness on one side of the body. A very serious allergic reaction to this drug is rare. However, get medical help right away if you notice any symptoms of a serious allergic reaction, including: rash, itching/swelling (especially of the face/tongue/throat), severe dizziness, trouble breathing. This is not a complete list of possible side effects. If you notice other effects not listed above, contact your doctor or pharmacist. In the [place] - Call your doctor for medical advice about side effects. You may report side effects to FDA at 3-532-SDZ-2581. In [place] - Call your doctor for medical advice about side effects. You may report side effects to [facility] [place] at 1-301.280.8517.      PRECAUTIONS:  Before taking warfarin, tell your doctor or pharmacist if you are allergic to it; or if you have any other allergies. This product may contain inactive ingredients, which can cause allergic reactions or other problems. Talk to your pharmacist for more details. Before using this medication, tell your doctor or pharmacist your medical history, especially of: blood disorders (such as anemia, hemophilia), bleeding problems (such as bleeding of the stomach/intestines, bleeding in the brain), blood vessel disorders (such as aneurysms), recent major injury/surgery, liver disease, alcohol use, mental/mood disorders (including memory problems), frequent falls/injuries. It is important that all your doctors and dentists know that you take warfarin. Before having surgery or any  medical/dental procedures, tell your doctor or dentist that you are taking this medication and about all the products you use (including prescription drugs, nonprescription drugs, and herbal products). Avoid getting injections into the muscles. If you must have an injection into a muscle (for example, a flu shot), it should be given in the arm. This way, it will be easier to check for bleeding and/or apply pressure bandages. This medication may cause stomach bleeding. Daily use of alcohol while using this medicine will increase your risk for stomach bleeding and may also affect how this medication works. Limit or avoid alcoholic beverages. If you have not been eating well, if you have an illness or infection that causes fever, vomiting, or diarrhea for more than 2 days, or if you start using any antibiotic medications, contact your doctor or pharmacist immediately because these conditions can affect how warfarin works. This medication can cause heavy bleeding. To lower the chance of getting cut, bruised, or injured, use great caution with sharp objects like safety razors and nail cutters. Use an electric razor when shaving and a soft toothbrush when brushing your teeth. Avoid activities such as contact sports. If you fall or injure yourself, especially if you hit your head, call your doctor immediately. Your doctor may need to check you. The Food & Drug Administration has stated that generic warfarin products are interchangeable. However, consult your doctor or pharmacist before switching warfarin products. Be careful not to take more than one medication that contains warfarin unless specifically directed by the doctor or health care provider who is monitoring your warfarin treatment. Older adults may be at greater risk for bleeding while using this drug. This medication is not recommended for use during pregnancy because of serious (possibly fatal) harm to an unborn baby. Discuss the use of reliable forms of birth  "control with your doctor. If you become pregnant or think you may be pregnant, tell your doctor immediately. If you are planning pregnancy, discuss a plan for managing your condition with your doctor before you become pregnant. Your doctor may switch the type of medication you use during pregnancy. Very small amounts of this medication may pass into breast milk but is unlikely to harm a nursing infant. Consult your doctor before breast-feeding.      DRUG INTERACTIONS:  Drug interactions may change how your medications work or increase your risk for serious side effects. This document does not contain all possible drug interactions. Keep a list of all the products you use (including prescription/nonprescription drugs and herbal products) and share it with your doctor and pharmacist. Do not start, stop, or change the dosage of any medicines without your doctor's approval. Warfarin interacts with many prescription, nonprescription, vitamin, and herbal products. This includes medications that are applied to the skin or inside the vagina or rectum. The interactions with warfarin usually result in an increase or decrease in the \"blood-thinning\" (anticoagulant) effect. Your doctor or other health care professional should closely monitor you to prevent serious bleeding or clotting problems. While taking warfarin, it is very important to tell your doctor or pharmacist of any changes in medications, vitamins, or herbal products that you are taking. Some products that may interact with this drug include: capecitabine, imatinib, mifepristone. Aspirin, aspirin-like drugs (salicylates), and nonsteroidal anti-inflammatory drugs (NSAIDs such as ibuprofen, naproxen, celecoxib) may have effects similar to warfarin. These drugs may increase the risk of bleeding problems if taken during treatment with warfarin. Carefully check all prescription/nonprescription product labels (including drugs applied to the skin such as pain-relieving " creams) since the products may contain NSAIDs or salicylates. Talk to your doctor about using a different medication (such as acetaminophen) to treat pain/fever. Low-dose aspirin and related drugs (such as clopidogrel, ticlopidine) should be continued if prescribed by your doctor for specific medical reasons such as heart attack or stroke prevention. Consult your doctor or pharmacist for more details. Many herbal products interact with warfarin. Tell your doctor before taking any herbal products, especially bromelains, coenzyme Q10, cranberry, danshen, dong quai, fenugreek, garlic, ginkgo biloba, ginseng, and Lupton's wort, among others. This medication may interfere with a certain laboratory test to measure theophylline levels, possibly causing false test results. Make sure laboratory personnel and all your doctors know you use this drug.      OVERDOSE:  If overdose is suspected, contact a poison control center or emergency room immediately.  residents can call the [place] Poison Hotline at 1-641.115.8599. [place] residents can call a provincial poison control center. Symptoms of overdose may include: bloody/black/tarry stools, pink/dark urine, unusual/prolonged bleeding.      NOTES:  Do not share this medication with others. Laboratory and/or medical tests (such as INR, complete blood count) must be performed periodically to monitor your progress or check for side effects. Consult your doctor for more details.      MISSED DOSE:  For the best possible benefit, do not miss any doses. If you do miss a dose and remember on the same day, take it as soon as you remember. If you remember on the next day, skip the missed dose and resume your usual dosing schedule. Do not double the dose to catch up because this could increase your risk for bleeding. Keep a record of missed doses to give to your doctor or pharmacist. Contact your doctor or pharmacist if you miss 2 or more doses in a row.      STORAGE:  Store at room  temperature away from light and moisture. Do not store in the bathroom. Keep all medications away from children and pets. Do not flush medications down the toilet or pour them into a drain unless instructed to do so. Properly discard this product when it is  or no longer needed. Consult your pharmacist or local waste disposal company for more details about how to safely discard your product.      MEDICAL ALERT:  Your condition and medication can cause complications in a medical emergency. For information about enrolling in MedicAlert, call 1-921.215.1853 ([place]) or 1-918.108.2320 ([place]).      Information last revised 2010 Copyright(c)  [employer]             · Is patient Post Blood Transfusion?  No    Depression / Suicide Risk    As you are discharged from this [facility] facility, it is important to learn how to keep safe from harming yourself.    Recognize the warning signs:  · Abrupt changes in personality, positive or negative- including increase in energy   · Giving away possessions  · Change in eating patterns- significant weight changes-  positive or negative  · Change in sleeping patterns- unable to sleep or sleeping all the time   · Unwillingness or inability to communicate  · Depression  · Unusual sadness, discouragement and loneliness  · Talk of wanting to die  · Neglect of personal appearance   · Rebelliousness- reckless behavior  · Withdrawal from people/activities they love  · Confusion- inability to concentrate     If you or a loved one observes any of these behaviors or has concerns about self-harm, here's what you can do:  · Talk about it- your feelings and reasons for harming yourself  · Remove any means that you might use to hurt yourself (examples: pills, rope, extension cords, firearm)  · Get professional help from the community (Mental Health, Substance Abuse, psychological counseling)  · Do not be alone:Call your Safe Contact- someone whom you trust who will be  there for you.  · Call your local CRISIS HOTLINE 100-4210 or 014-500-5183  · Call your local Children's Mobile Crisis Response Team [place] (915) 266-8040 or www.Flickme  · Call the toll free National Suicide Prevention Hotlines   · National Suicide Prevention Lifeline 497-635-IXKU (1319)  · National [place] Line Network 800-SUICIDE (808-0219)

## 2017-05-12 NOTE — H&P
CHIEF COMPLAINT:  Headache and dizziness.    PRIMARY MEDICAL PHYSICIAN:  Kristin Cornell MD    HISTORY OF PRESENT ILLNESS:  This is a 80-year-old female with a history of   hypertension, hypothyroidism, diabetes mellitus, atrial fibrillation,   obstructive sleep apnea, and a previous history of migraine headaches, who   comes in with complaints of headache and dizziness.  The patient began to have   symptoms today.  She initially thought that this was from stress and did not   do anything immediately.  However, her daughter who is also a pharmacist,   checked her blood pressure and was elevated for her.  She typically runs in   the 130s to the 80s.  She denies any chest pain or shortness of breath.  She   denies any numbness, tingling, or weakness.  However, she has had some   phonophobia and nausea, which she has had in the past with migraine headaches.    She states that she has not had any migraines since she retired in 1999.    Otherwise, no fevers or chills.  No abdominal pain, diarrhea, or dysuria.    REVIEW OF SYSTEMS:  A full review of systems was completed and all pertinent   positives and negatives are included in the HPI above.    PAST MEDICAL HISTORY:  1.  Hypertension.  2.  Hypothyroidism.  3.  Diabetes mellitus.  4.  Valvular disease.  5.  Atrial fibrillation on chronic anticoagulation.  6.  Glaucoma.  7.  Breast cancer.  8.  MARQUEZ.    PAST SURGICAL HISTORY:  1.  Lumpectomy.  2.  Hysterectomy.  3.  Cholecystectomy.  4.  Cataract surgery.  5.  Right knee scope and meniscectomy.    SOCIAL HISTORY:  Patient quit smoking in 1964, but she carried a less than   2-pack-year history prior to this.  She has a very rare alcoholic drink.  She   denies any illicit drugs.  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    FAMILY HISTORY:  Mother with MI.  Father with heart disease.    ALLERGIES:  DARVON, IODINE, LATEX, PENICILLIN, PROPOXYPHENE, AND TETRACYCLINE.    HOME MEDICATIONS:  1.  Coreg 25 mg p.o. b.i.d.  2.  Micardis  40 mg p.o. b.i.d.  3.  Tylenol over-the-counter as needed.  4.  Coumadin 3 mg p.o. daily.  5.  Glucosamine.  6.  Systane eyedrops.  7.  Omega-3 fatty acid.  8.  Synthroid 50 mcg p.o. daily.  9.  Vitamin D.  10.  Cosopt eyedrops.  11.  Metformin 500 mg p.o. t.i.d.  12.  Calcium.  13.  Pepcid.    PHYSICAL EXAMINATION:  VITAL SIGNS:  Temperature 97.3, heart rate 66, respirations 16, blood pressure   189/97.  Patient is saturating at 98% on room air.  GENERAL:  This is an older  female, who is in no acute distress.  HEENT:  Normocephalic, atraumatic, EOMI, moist mucous membranes.  NECK:  Supple.  There is no JVD.  There is no supraclavicular adenopathy.  CARDIOVASCULAR:  Positive S1, S2, regular rate and rhythm.  No murmurs, rubs,   or gallops appreciated.  PULMONARY:  Clear to auscultation bilaterally.  No wheezes, rubs, or rhonchi   heard.  ABDOMEN:  Soft, nontender, nondistended.  Positive bowel sounds.  No   hepatosplenomegaly.  EXTREMITIES:  Warm, well-perfused:  No clubbing, cyanosis, or edema.    Capillary refill less than 2 seconds.  Distal pulses are intact.  NEUROLOGIC:  A and O x3.  Cranial nerves II-XII grossly intact.    LABORATORY STUDIES:  WBC 8.1, hemoglobin 13.9, hematocrit 39.7, platelets 229.    Sodium 134, potassium 4.1, chloride 97, CO2 of 25, glucose 129, BUN 11,   creatinine 0.73, GFR greater than 60.  Troponin less than 0.01.  BNP 33.    INR 2.39.    IMAGING STUDIES:  Chest x-ray:  No radiographic evidence of acute   cardiopulmonary process.    ASSESSMENT AND PLAN:  This is a 80-year-old female with multiple medical   issues who comes in with complaints of dizziness and headache.  Upon   assessment in the emergency room, she is noted to have hypertensive urgency.  1.  Hypertensive urgency:  There is no evidence of end organ damage at this   point.  She has some mild improvement with metoprolol given en route by   Emergency Medical Services.  I will resume her Micardis and her Coreg, but I    will also start her on p.r.n. labetalol and enalapril.  Should she have   continued need for p.r.n. medications, then we will either adjust her home   medications or add a third agent.  I am also going to trend her troponin   levels and monitor her closely on telemetry.  2.  Headache and dizziness:  The patient also reports some photophobia and   nausea, which she has had in the past when she has a migraine headache.    However, her symptoms could also be related to the above.  She does also   report some neck pain, but her neck is supple.  I will give her a trial of   sumatriptan.  3.  Atrial fibrillation.  Continue home Coreg and Warfarin, currently in sinus   rhythm.  4.  Diabetes mellitus.  Continue home metformin, monitor with Accu-Cheks and   cover with insulin sliding scale.  5.  Hypothyroidism.  Continue home Synthroid.  6.  Obstructive sleep apnea:  Continue nocturnal oxygen at 3 liters.    DISPOSITION:  Telemetry.    PROPHYLAXIS:  Sequential compression devices for DVT prophylaxis, no PPI   indicated, stool softeners ordered.    CODE:  Full.       ____________________________________     JOSE CALI MD    DTC / NTS    DD:  05/11/2017 21:36:15  DT:  05/11/2017 23:01:12    D#:  [id number]  Job#:  [id number]

## 2017-05-12 NOTE — DIETARY
Nutrition Services: Pt seen for poor po PTA per admit screen    Pt is a 79 yo female with dx of hypertensive urgency. PMHx includes HTN, hypothyroidism, diabetes, valvular disease, atrial fibrillation, glaucoma, breast cancer, MARQUEZ.     Pt is currently on a Diabetic diet. Upon interview, pt states she has had a great appetite with no recent changes. Per patient, she ate greater than 50% of her breakfast this morning. Pt stated she was feeling nauseous around lunch time and had not consumed her lunch upon visit. Encouraged pt to consume at least 50% of meal trays to ensure adequate nutrition, pt verbalized understanding.    Plan/Recommend: Encourage PO intake. Nutrition Rep to see patient daily for meal preferences. Please document PO intake as percentage of meals consumed. RD to monitor per dept policy.

## 2017-05-12 NOTE — ED NOTES
Chief Complaint   Patient presents with   • Blood Pressure Problem     x2 days. pt checked own BP at home=214/140. took coreg for bp but still unable to control BP. was given metoprolol 5mg iv enroute   • Head Ache     c/o head ache and pain in the back of neck.   • Dizziness     describes as room spinning   • Ringing in Ear     Denies chest pain/sob. Neuro intact. Pt ambulatory per ems.

## 2017-05-12 NOTE — PROGRESS NOTES
2 RN skin check done with Leonel FIELDS  All skin intact. Skin discoloration on right cheek and left elbow.

## 2017-05-12 NOTE — CARE PLAN
Problem: Safety  Goal: Will remain free from injury  Outcome: PROGRESSING AS EXPECTED  Discussed with patient the importance of calling for assistance prior to getting out of bed in order to help prevent falls. Patient accepting of information. All questions answered at this time.     Problem: Infection  Goal: Will remain free from infection  Outcome: PROGRESSING AS EXPECTED  Discussed with patient the importance of washing hands before and after eating or using the bathroom in order to help prevent infections. Patient very accepting of the information. All questions answered at this time.

## 2017-05-12 NOTE — ED NOTES
Pt tried to ambulate to the restroom w/1 person assist but unable to make it d/t pt started feeling dizzy and unsteady,  Assisted back to room, assisted in using the bsc.

## 2017-05-12 NOTE — PROGRESS NOTES
Paged Dr Schneider regarding patient order for NS at 100 mL/hr to verify order. Dr Schneider verified order and gave okay to start fluids.

## 2017-05-12 NOTE — DISCHARGE PLANNING
Care Transition Team Assessment    Information Source  Orientation : Oriented x 4  Information Given By: Patient  Who is responsible for making decisions for patient? : Patient    Readmission Evaluation  Is this a readmission?: No    Elopement Risk  Legal Hold: No  Ambulatory or Self Mobile in Wheelchair: Yes  Disoriented: No  Psychiatric Symptoms: None  History of Wandering: No  Elopement this Admit: No  Vocalizing Wanting to Leave: No  Displays Behaviors, Body Language Wanting to Leave: No-Not at Risk for Elopement  Elopement Risk: Not at Risk for Elopement    Interdisciplinary Discharge Planning  Does Admitting Nurse Feel This Could be a Complex Discharge?: No  Primary Care Physician: Kristin Chatterjee  Lives with - Patient's Self Care Capacity: Spouse  Patient or legal guardian wants to designate a caregiver (see row info): No  Support Systems: Family Member(s), Friends / Neighbors  Housing / Facility: 2 Story House  Do You Take your Prescribed Medications Regularly: Yes  Able to Return to Previous ADL's: Yes  Mobility Issues: No  Prior Services: None  Patient Expects to be Discharged to:: home  Assistance Needed: No  Durable Medical Equipment: Home Oxygen (O2 2 - 3 L N/C at night)    Discharge Preparedness  What is your plan after discharge?: Other (comment) (home )  What are your discharge supports?: Spouse  Prior Functional Level: Drives Self, Independent with Activities of Daily Living, Independent with Medication Management    Functional Assesment  Prior Functional Level: Drives Self, Independent with Activities of Daily Living, Independent with Medication Management    Finances  Financial Barriers to Discharge: No    Vision / Hearing Impairment  Vision Impairment : Yes  Right Eye Vision: Wears Glasses  Left Eye Vision: Wears Glasses  Hearing Impairment : No    Values / Beliefs / Concerns  Values / Beliefs Concerns : No    Advance Directive  Advance Directive?: Living Will, DPOA for Health Care  Durable Power of   Name and Contact :  (Giuliana Yoder (dtr) [phone])    Domestic Abuse  Have you ever been the victim of abuse or violence?: No  Physical Abuse or Sexual Abuse: No  Verbal Abuse or Emotional Abuse: No  Possible Abuse Reported to:: Not Applicable    Psychological Assessment  History of Substance Abuse: None  History of Psychiatric Problems: No    Discharge Risks or Barriers  Discharge risks or barriers?: No    Anticipated Discharge Information  Anticipated discharge disposition: Home  Discharge Address:  ([address] [place] [address])  Discharge Contact Phone Number:  ([phone])

## 2017-05-12 NOTE — PROGRESS NOTES
Patient arrived on floor by [facility] with transport. Patient on tele monitor. Monitor room notified. Patient is SR on monitor. Patient has no belongings with her. Patient declines pain at this time. No family present at bedside. Plan of care discussed with patient. Strip alarm in place. Bed locked and in lowest position.

## 2017-05-14 NOTE — DISCHARGE SUMMARY
DATE OF ADMISSION:  05/11/2017.    DATE OF DISCHARGE:  05/12/2017.    ADMISSION DIAGNOSES:  1.  Hypertensive urgency.  2.  Headaches, dizziness, possibly attributed to symptomatic hypertension,   migraines.  3.  Atrial fibrillation, paroxysmal, currently sinus rhythm.  4.  Diabetes mellitus.  5.  Hypothyroidism.  6.  Obstructive sleep apnea, O2 dependent, 3 liters at night.    DISCHARGE DIAGNOSES:  1.  Hypertensive urgency, resolved.  2.  Headaches, attributed to above, improved.  3.  Mild hyponatremia, asymptomatic.  4.  Chronic atrial fibrillation with therapeutic INR.  5.  Prior history as above.    IMAGING TESTS:  CT of the head without contrast on 05/09/2017 revealed no   acute intracranial findings.    Chest x-ray on 05/09/2017 revealed no acute cardiopulmonary process.    HOSPITAL COURSE:  Patient is a 80-year-old female with history of   hypertension; hypothyroidism; diabetes; valvular heart disease; AFib, on   warfarin; glaucoma; obstructive sleep apnea, O2 dependent at night; breast   cancer post-lumpectomy, was admitted with headaches and dizziness.  Patient   was found to have elevated blood pressure poorly in the 200s/140s.  She   normally runs in the 130s/80s.  She has no chest pain or shortness breath.    Had no localized weakness or numbness.  Does have headaches.  Does report   history of migraines, although it is felt different.    Patient in the ER had improved blood pressure of 189/97.  She was admitted   under observation status.  Neurological exam was nonfocal.  Following day,   patient's blood pressure did improve to 130s-140s systolic over 70s-80s   diastolic.  She was afebrile without symptoms of acute infection.  Her kidney   function was normal.  Creatinine was 0.58.  Did have mild low sodium of 130   range, was felt asymptomatic.  She had much improvement in her symptoms.    Tolerating her diet.  Patient will be tried on Ultram in addition to okay to   resume outpatient Imitrex as needed  for migraine headaches.  We will change   her Coreg, which daughter, who is a pharmacist, knows it does not appear to be   working over to Toprol.  She has tried thiazides in the past, reportedly not   effective.  In addition, add low-dose Norvasc.    DISCHARGE INSTRUCTIONS:  Discharge home.    DIET:  ADA 1800 kilocalorie.    DISCHARGE MEDICATIONS:  1.  Norvasc 5 mg daily.  2.  Toprol-XL 50 mg daily.  3.  Tramadol 50 mg q. 6 hours p.r.n. moderate pain.  4.  Okay to continue with Tylenol as previously recommended.  5.  Calcium 500 mg in the morning.  6.  Cosopt 1 drop, both eyes 2 times a day.  7.  Omega-3 fatty acids 1000 mg daily.  8.  Glucosamine daily.  9.  Synthroid 50 mcg daily.  10.  Metformin 500 mg t.i.d.  11.  Pepcid 20 mg b.i.d.  12.  Systane eyedrops, 1-2 drops two times a day.  13.  Micardis 40 mg b.i.d.  14.  Vitamin D 2000 units every morning.  15.  Warfarin 3 mg at bedtime.  16.  Discontinue Coreg.    FOLLOWUP:  Follow up with Dr. Kristin Cornell as previously scheduled.    Total discharge time approximately 40 minutes.  Discussed with daughter, plan   of care at bedside.       ____________________________________     ROCK BENNETT MD    QBV / NTS    DD:  05/13/2017 07:55:26  DT:  05/13/2017 20:58:55    D#:  [id number]  Job#:  [id number]

## 2017-05-15 ENCOUNTER — HOSPITAL ENCOUNTER (EMERGENCY)
Facility: MEDICAL CENTER | Age: 82
End: 2017-05-15
Attending: EMERGENCY MEDICINE
Payer: MEDICARE

## 2017-05-15 ENCOUNTER — TELEPHONE (OUTPATIENT)
Dept: CARDIOLOGY | Facility: MEDICAL CENTER | Age: 82
End: 2017-05-15

## 2017-05-15 VITALS
DIASTOLIC BLOOD PRESSURE: 82 MMHG | HEART RATE: 84 BPM | BODY MASS INDEX: 28.56 KG/M2 | WEIGHT: 155.2 LBS | TEMPERATURE: 97.6 F | HEIGHT: 62 IN | SYSTOLIC BLOOD PRESSURE: 163 MMHG | OXYGEN SATURATION: 93 % | RESPIRATION RATE: 16 BRPM

## 2017-05-15 DIAGNOSIS — I10 ESSENTIAL HYPERTENSION: ICD-10-CM

## 2017-05-15 PROCEDURE — 99283 EMERGENCY DEPT VISIT LOW MDM: CPT

## 2017-05-15 RX ORDER — HYDROCODONE BITARTRATE AND ACETAMINOPHEN 5; 325 MG/1; MG/1
1-2 TABLET ORAL EVERY 4 HOURS PRN
Qty: 20 TAB | Refills: 0 | Status: SHIPPED | OUTPATIENT
Start: 2017-05-15 | End: 2017-08-05

## 2017-05-15 ASSESSMENT — LIFESTYLE VARIABLES: DO YOU DRINK ALCOHOL: NO

## 2017-05-15 ASSESSMENT — PAIN SCALES - GENERAL: PAINLEVEL_OUTOF10: 8

## 2017-05-15 NOTE — TELEPHONE ENCOUNTER
----- Message from Dari Lucas sent at 5/15/2017  8:38 AM PDT -----  Regarding: issues with blood pressure  TW/Jan      Patient had issues with blood pressure over the weekend. Patient was discharged from hospital on 05/12. She can be reached at [phone].     Called patient,  She states that she has been having headaches.   Patient states that on Friday afternoon at home BP  148/86,76  On Saturday 8:00 Am before meds  158/101,70  And at 5:00 pm  BP  207/127,70   She states that she took Coreg 25 mg   Micardis 40 mg as well as her Metoprolol Xl 50 mg and Norvasc 5 mgs.   On Sunday 14th  8:00 am   171/109,76 before meds ( took the Coreg 25, Micardis 40, Metoprolol XL 50 and Norvasc 5 mg   and in evening very high again.  She called nurse at [facility] for advise. Advised to call ambulance.  She states this am before meds  /95.     Her Discharge summary noted discharged on Metoprolol XL 50 qd.  Norvasc 4 mg qd Micardis 40 mg bid.         3:30 pm   I had patient take her blood pressure now.   196/113,70?   She states she has a headache as well.  She is advised to the emergency room now to evaluate.    Patient states understanding.  She will take all of her medications to the hospital with her.   meghan

## 2017-05-15 NOTE — ED NOTES
79 y/o female ambulatory to triage with c/o inability to control her blood pressure accompanied by a headache. Pt was advised by the nurse at her MD's office to come to the ED.

## 2017-05-15 NOTE — ED AVS SNAPSHOT
5/15/2017    Shereen Dale  [address] [place] [address]    Dear Shereen:    [facility] wants to ensure your discharge home is safe and you or your loved ones have had all of your questions answered regarding your care after you leave the hospital.    Below is a list of resources and contact information should you have any questions regarding your hospital stay, follow-up instructions, or active medical symptoms.    Questions or Concerns Regarding… Contact   Medical Questions Related to Your Discharge  (7 days a week, 8am-5pm) Contact a Nurse Care Coordinator   [phone]   Medical Questions Not Related to Your Discharge  (24 hours a day / 7 days a week)  Contact the Nurse Health Line   [phone]    Medications or Discharge Instructions Refer to your discharge packet   or contact your [facility] Primary Care Provider   [phone]   Follow-up Appointment(s) Schedule your appointment via [employer]   or contact Scheduling [phone]   Billing Review your statement via [employer]  or contact Billing [phone]   Medical Records Review your records via [employer]   or contact Medical Records [phone]     You may receive a telephone call within two days of discharge. This call is to make certain you understand your discharge instructions and have the opportunity to have any questions answered. You can also easily access your medical information, test results and upcoming appointments via the [employer] free online health management tool. You can learn more and sign up at [employer]. For assistance setting up your [employer] account, please call [phone].    Once again, we want to ensure your discharge home is safe and that you have a clear understanding of any next steps in your care. If you have any questions or concerns, please do not hesitate to contact us, we are here for you. Thank you for choosing [facility] for your healthcare needs.    Sincerely,    Your [facility] Healthcare Team

## 2017-05-15 NOTE — ED AVS SNAPSHOT
Home Care Instructions                                                                                                                Shereen Dale   MRN: [id number]    Department:  [facility], Emergency Dept   Date of Visit:  5/15/2017            [facility], Emergency Dept    [address] [address]    Phone:  [phone]      You were seen by     Cuba Ponce M.D.      Your Diagnosis Was     Essential hypertension     I10       Follow-up Information     1. Follow up with [facility], Emergency Dept.    Specialty:  Emergency Medicine    Why:  If symptoms worsen    Contact information    [address] [address] [phone]        2. Follow up with Kristin Cornell M.D..    Specialty:  Family Medicine    Contact information    [address] [place] [address] [address]  [place] [address] [phone]        Medication Information     Review all of your home medications and newly ordered medications with your primary doctor and/or pharmacist as soon as possible. Follow medication instructions as directed by your doctor and/or pharmacist.     Please keep your complete medication list with you and share with your physician. Update the information when medications are discontinued, doses are changed, or new medications (including over-the-counter products) are added; and carry medication information at all times in the event of emergency situations.               Medication List      START taking these medications        Instructions    Morning Afternoon Evening Bedtime    hydrocodone-acetaminophen 5-325 MG Tabs per tablet   Commonly known as:  NORCO        Take 1-2 Tabs by mouth every four hours as needed.   Dose:  1-2 Tab                          ASK your doctor about these medications        Instructions    Morning Afternoon Evening Bedtime    acetaminophen 500 MG Tabs   Commonly known as:  TYLENOL        Take 1,000 mg by mouth every 6  hours as needed.   Dose:  1000 mg                        amlodipine 5 MG Tabs   Commonly known as:  NORVASC        Take 1 Tab by mouth every day.   Dose:  5 mg                        CALCIUM 500 PO        1 Tab every morning.   Dose:  1 Tab                        COSOPT OP        Place 1 Drop in both eyes 2 Times a Day.   Dose:  1 Drop                        docosahexanoic acid 1000 MG Caps   Commonly known as:  OMEGA 3 FA        Take 1,000 mg by mouth every morning.   Dose:  1000 mg                        GLUCOSAMINE PO        Take 1 Tab by mouth every morning. Pt unsure of dose   Dose:  1 Tab                        levothyroxine 50 MCG Tabs   Commonly known as:  SYNTHROID        Take 50 mcg by mouth every morning.   Dose:  50 mcg                        metformin 500 MG Tabs   Commonly known as:  GLUCOPHAGE        Take 500 mg by mouth 3 times a day.   Dose:  500 mg                        metoprolol SR 50 MG Tb24   Commonly known as:  TOPROL XL        Take 1 Tab by mouth every day.   Dose:  50 mg                        PEPCID 20 MG Tabs   Generic drug:  famotidine        Take 20 mg by mouth 2 times a day.   Dose:  20 mg                        SYSTANE OP        1-2 Drops by Ophthalmic route 2 Times a Day.   Dose:  1-2 Drop                        telmisartan 40 MG Tabs   Commonly known as:  MICARDIS        Doctor's comments:  Called to pharmacy   Take 1 Tab by mouth 2 Times a Day.   Dose:  40 mg                        tramadol 50 MG Tabs   Commonly known as:  ULTRAM        Take 1 Tab by mouth every 6 hours as needed for Moderate Pain.   Dose:  50 mg                        VITAMIN D PO        Take 2,000 Units by mouth every morning.   Dose:  2000 Units                        warfarin 3 MG Tabs   Commonly known as:  COUMADIN        Take 3 mg by mouth every bedtime.   Dose:  3 mg                             Where to Get Your Medications      You can get these medications from any pharmacy     Bring a paper prescription  for each of these medications    - hydrocodone-acetaminophen 5-325 MG Tabs per tablet              Discharge Instructions       Arterial Hypertension  Arterial hypertension (high blood pressure) is a condition of elevated pressure in your blood vessels. Hypertension over a long period of time is a risk factor for strokes, heart attacks, and heart failure. It is also the leading cause of kidney (renal) failure.   CAUSES   · In Adults -- Over 90% of all hypertension has no known cause. This is called essential or primary hypertension. In the other 10% of people with hypertension, the increase in blood pressure is caused by another disorder. This is called secondary hypertension. Important causes of secondary hypertension are:  · Heavy alcohol use.  · Obstructive sleep apnea.  · Hyperaldosterosim (Conn's syndrome).  · Steroid use.  · Chronic kidney failure.  · Hyperparathyroidism.  · Medications.  · Renal artery stenosis.  · Pheochromocytoma.  · Cushing's disease.  · Coarctation of the aorta.  · Scleroderma renal crisis.  · Licorice (in excessive amounts).  · Drugs (cocaine, methamphetamine).  Your caregiver can explain any items above that apply to you.  · In Children -- Secondary hypertension is more common and should always be considered.  · Pregnancy -- Few women of childbearing age have high blood pressure. However, up to 10% of them develop hypertension of pregnancy. Generally, this will not harm the woman. It may be a sign of 3 complications of pregnancy: preeclampsia, HELLP syndrome, and eclampsia. Follow up and control with medication is necessary.  SYMPTOMS   · This condition normally does not produce any noticeable symptoms. It is usually found during a routine exam.  · Malignant hypertension is a late problem of high blood pressure. It may have the following symptoms:  · Headaches.  · Blurred vision.  · End-organ damage (this means your kidneys, heart, lungs, and other organs are being  "damaged).  · Stressful situations can increase the blood pressure. If a person with normal blood pressure has their blood pressure go up while being seen by their caregiver, this is often termed \"white coat hypertension.\" Its importance is not known. It may be related with eventually developing hypertension or complications of hypertension.  · Hypertension is often confused with mental tension, stress, and anxiety.  DIAGNOSIS   The diagnosis is made by 3 separate blood pressure measurements. They are taken at least 1 week apart from each other. If there is organ damage from hypertension, the diagnosis may be made without repeat measurements.  Hypertension is usually identified by having blood pressure readings:  · Above 140/90 mmHg measured in both arms, at 3 separate times, over a couple weeks.  · Over 130/80 mmHg should be considered a risk factor and may require treatment in patients with diabetes.  Blood pressure readings over 120/80 mmHg are called \"pre-hypertension\" even in non-diabetic patients.  To get a true blood pressure measurement, use the following guidelines. Be aware of the factors that can alter blood pressure readings.  · Take measurements at least 1 hour after caffeine.  · Take measurements 30 minutes after smoking and without any stress. This is another reason to quit smoking  it raises your blood pressure.  · Use a proper cuff size. Ask your caregiver if you are not sure about your cuff size.  · Most home blood pressure cuffs are automatic. They will measure systolic and diastolic pressures. The systolic pressure is the pressure reading at the start of sounds. Diastolic pressure is the pressure at which the sounds disappear. If you are elderly, measure pressures in multiple postures. Try sitting, lying or standing.  · Sit at rest for a minimum of 5 minutes before taking measurements.  · You should not be on any medications like decongestants. These are found in many cold medications.  · Record " "your blood pressure readings and review them with your caregiver.  If you have hypertension:  · Your caregiver may do tests to be sure you do not have secondary hypertension (see \"causes\" above).  · Your caregiver may also look for signs of metabolic syndrome. This is also called Syndrome X or Insulin Resistance Syndrome. You may have this syndrome if you have type 2 diabetes, abdominal obesity, and abnormal blood lipids in addition to hypertension.  · Your caregiver will take your medical and family history and perform a physical exam.  · Diagnostic tests may include blood tests (for glucose, cholesterol, potassium, and kidney function), a urinalysis, or an EKG. Other tests may also be necessary depending on your condition.  PREVENTION   There are important lifestyle issues that you can adopt to reduce your chance of developing hypertension:  · Maintain a normal weight.  · Limit the amount of salt (sodium) in your diet.  · Exercise often.  · Limit alcohol intake.  · Get enough potassium in your diet. Discuss specific advice with your caregiver.  · Follow a DASH diet (dietary approaches to stop hypertension). This diet is rich in fruits, vegetables, and low-fat dairy products, and avoids certain fats.  PROGNOSIS   Essential hypertension cannot be cured. Lifestyle changes and medical treatment can lower blood pressure and reduce complications. The prognosis of secondary hypertension depends on the underlying cause. Many people whose hypertension is controlled with medicine or lifestyle changes can live a normal, healthy life.   RISKS AND COMPLICATIONS   While high blood pressure alone is not an illness, it often requires treatment due to its short- and long-term effects on many organs. Hypertension increases your risk for:  · CVAs or strokes (cerebrovascular accident).  · Heart failure due to chronically high blood pressure (hypertensive cardiomyopathy).  · Heart attack (myocardial infarction).  · Damage to the " "retina (hypertensive retinopathy).  · Kidney failure (hypertensive nephropathy).  Your caregiver can explain list items above that apply to you. Treatment of hypertension can significantly reduce the risk of complications.  TREATMENT   · For overweight patients, weight loss and regular exercise are recommended. Physical fitness lowers blood pressure.  · Mild hypertension is usually treated with diet and exercise. A diet rich in fruits and vegetables, fat-free dairy products, and foods low in fat and salt (sodium) can help lower blood pressure. Decreasing salt intake decreases blood pressure in a 1/3 of people.  · Stop smoking if you are a smoker.  The steps above are highly effective in reducing blood pressure. While these actions are easy to suggest, they are difficult to achieve. Most patients with moderate or severe hypertension end up requiring medications to bring their blood pressure down to a normal level. There are several classes of medications for treatment. Blood pressure pills (antihypertensives) will lower blood pressure by their different actions. Lowering the blood pressure by 10 mmHg may decrease the risk of complications by as much as 25%.  The goal of treatment is effective blood pressure control. This will reduce your risk for complications. Your caregiver will help you determine the best treatment for you according to your lifestyle. What is excellent treatment for one person, may not be for you.  HOME CARE INSTRUCTIONS   · Do not smoke.  · Follow the lifestyle changes outlined in the \"Prevention\" section.  · If you are on medications, follow the directions carefully. Blood pressure medications must be taken as prescribed. Skipping doses reduces their benefit. It also puts you at risk for problems.  · Follow up with your caregiver, as directed.  · If you are asked to monitor your blood pressure at home, follow the guidelines in the \"Diagnosis\" section above.  SEEK MEDICAL CARE IF:   · You think " you are having medication side effects.  · You have recurrent headaches or lightheadedness.  · You have swelling in your ankles.  · You have trouble with your vision.  SEEK IMMEDIATE MEDICAL CARE IF:   · You have sudden onset of chest pain or pressure, difficulty breathing, or other symptoms of a heart attack.  · You have a severe headache.  · You have symptoms of a stroke (such as sudden weakness, difficulty speaking, difficulty walking).  MAKE SURE YOU:   · Understand these instructions.  · Will watch your condition.  · Will get help right away if you are not doing well or get worse.  Document Released: 12/18/2006 Document Revised: 03/11/2013 Document Reviewed: 07/17/2008  ExitCare® Patient Information ©2014 [employer].            Patient Information     Patient Information    Following emergency treatment: all patient requiring follow-up care must return either to a private physician or a clinic if your condition worsens before you are able to obtain further medical attention, please return to the emergency room.     Billing Information    At [facility], we work to make the billing process streamlined for our patients.  Our Representatives are here to answer any questions you may have regarding your hospital bill.  If you have insurance coverage and have supplied your insurance information to us, we will submit a claim to your insurer on your behalf.  Should you have any questions regarding your bill, we can be reached online or by phone as follows:  Online: You are able pay your bills online or live chat with our representatives about any billing questions you may have. We are here to help Monday - Friday from 8:00am to 7:30pm and 9:00am - 12:00pm on Saturdays.  Please visit [email or web address]  for more information.   Phone:  [phone] or [phone]    Please note that your emergency physician, surgeon, pathologist, radiologist, anesthesiologist, and other  specialists are not employed by [facility] and will therefore bill separately for their services.  Please contact them directly for any questions concerning their bills at the numbers below:     Emergency Physician Services:  [phone]  [facility]:  [phone]  [facility]:  [phone]  [facility]:  [phone]    1. Your final bill may vary from the amount quoted upon discharge if all procedures are not complete at that time, or if your doctor has additional procedures of which we are not aware. You will receive an additional bill if you return to the Emergency Department at [facility] for suture removal regardless of the facility of which the sutures were placed.     2. Please arrange for settlement of this account at the emergency registration.    3. All self-pay accounts are due in full at the time of treatment.  If you are unable to meet this obligation then payment is expected within 4-5 days.     4. If you have had radiology studies (CT, X-ray, Ultrasound, MRI), you have received a preliminary result during your emergency department visit. Please contact the radiology department [phone] to receive a copy of your final result. Please discuss the Final result with your primary physician or with the follow up physician provided.     Crisis Hotline:  [place] Crisis Hotline:  7-936-HQYIDES or 1-349.680.5714  [place] Crisis Hotline:    1-329.840.4081 or 567-822-8980         ED Discharge Follow Up Questions    1. In order to provide you with very good care, we would like to follow up with a phone call in the next few days.  May we have your permission to contact you?     YES /  NO    2. What is the best phone number to call you? (       )_____-__________    3. What is the best time to call you?      Morning  /  Afternoon  /  Evening                   Patient Signature:  ____________________________________________________________    Date:   ____________________________________________________________      Your appointments     May 24, 2017  1:40 PM   HOSPITAL FOLLOW UP with JORGE A Flores   [facility] (--)    [address]  [place] [address] [phone]            Jun 07, 2017 11:00 AM   30 Minute with [facility] (--)    [address] [place] [address]   [phone]

## 2017-05-15 NOTE — ED AVS SNAPSHOT
Projectioneering Access Code: Activation code not generated  Current Projectioneering Status: Patient Declined    Your email address is not on file at [facility].  Email Addresses are required for you to sign up for Projectioneering, please contact [phone] to verify your personal information and to provide your email address prior to attempting to register for Projectioneering.    Shereen Dale  [address] [place] [address]    [employer]  A secure, online tool to manage your health information     [employer]’s Projectioneering® is a secure, online tool that connects you to your personalized health information from the privacy of your home -- day or night - making it very easy for you to manage your healthcare. Once the activation process is completed, you can even access your medical information using the Projectioneering [place], which is available for free in the [employer] [place] store or Google Play store.     To learn more about Projectioneering, visit [email or web address]    There are two levels of access available (as shown below):   My Chart Features  [facility] Primary Care Doctor [facility]  Specialists [facility]  Urgent  Care [facility] Primary Care Doctor   Email your healthcare team securely and privately 24/7 X X X    Manage appointments: schedule your next appointment; view details of past/upcoming appointments X      Request prescription refills. X      View recent personal medical records, including lab and immunizations X X X X   View health record, including health history, allergies, medications X X X X   Read reports about your outpatient visits, procedures, consult and ER notes X X X X   See your discharge summary, which is a recap of your hospital and/or ER visit that includes your diagnosis, lab results, and care plan X X  X     How to register for [employer]:  Once your e-mail address has been verified, follow the following steps to sign up for [employer].     1. Go to  [email or web address]  2. Click on the Sign Up Now box, which takes you to the New Member  Sign Up page. You will need to provide the following information:  a. Enter your [email or web address] Access Code exactly as it appears at the top of this page. (You will not need to use this code after you’ve completed the sign-up process. If you do not sign up before the expiration date, you must request a new code.)   b. Enter your date of birth.   c. Enter your home email address.   d. Click Submit, and follow the next screen’s instructions.  3. Create a [email or web address] ID. This will be your [email or web address] login ID and cannot be changed, so think of one that is secure and easy to remember.  4. Create a [email or web address] password. You can change your password at any time.  5. Enter your Password Reset Question and Answer. This can be used at a later time if you forget your password.   6. Enter your e-mail address. This allows you to receive e-mail notifications when new information is available in [email or web address].  7. Click Sign Up. You can now view your health information.    For assistance activating your [email or web address] account, call [phone]

## 2017-05-16 NOTE — DISCHARGE INSTRUCTIONS
Arterial Hypertension  Arterial hypertension (high blood pressure) is a condition of elevated pressure in your blood vessels. Hypertension over a long period of time is a risk factor for strokes, heart attacks, and heart failure. It is also the leading cause of kidney (renal) failure.   CAUSES   · In Adults -- Over 90% of all hypertension has no known cause. This is called essential or primary hypertension. In the other 10% of people with hypertension, the increase in blood pressure is caused by another disorder. This is called secondary hypertension. Important causes of secondary hypertension are:  · Heavy alcohol use.  · Obstructive sleep apnea.  · Hyperaldosterosim (Conn's syndrome).  · Steroid use.  · Chronic kidney failure.  · Hyperparathyroidism.  · Medications.  · Renal artery stenosis.  · Pheochromocytoma.  · Cushing's disease.  · Coarctation of the aorta.  · Scleroderma renal crisis.  · Licorice (in excessive amounts).  · Drugs (cocaine, methamphetamine).  Your caregiver can explain any items above that apply to you.  · In Children -- Secondary hypertension is more common and should always be considered.  · Pregnancy -- Few women of childbearing age have high blood pressure. However, up to 10% of them develop hypertension of pregnancy. Generally, this will not harm the woman. It may be a sign of 3 complications of pregnancy: preeclampsia, HELLP syndrome, and eclampsia. Follow up and control with medication is necessary.  SYMPTOMS   · This condition normally does not produce any noticeable symptoms. It is usually found during a routine exam.  · Malignant hypertension is a late problem of high blood pressure. It may have the following symptoms:  · Headaches.  · Blurred vision.  · End-organ damage (this means your kidneys, heart, lungs, and other organs are being damaged).  · Stressful situations can increase the blood pressure. If a person with normal blood pressure has their blood pressure go up while being  "seen by their caregiver, this is often termed \"white coat hypertension.\" Its importance is not known. It may be related with eventually developing hypertension or complications of hypertension.  · Hypertension is often confused with mental tension, stress, and anxiety.  DIAGNOSIS   The diagnosis is made by 3 separate blood pressure measurements. They are taken at least 1 week apart from each other. If there is organ damage from hypertension, the diagnosis may be made without repeat measurements.  Hypertension is usually identified by having blood pressure readings:  · Above 140/90 mmHg measured in both arms, at 3 separate times, over a couple weeks.  · Over 130/80 mmHg should be considered a risk factor and may require treatment in patients with diabetes.  Blood pressure readings over 120/80 mmHg are called \"pre-hypertension\" even in non-diabetic patients.  To get a true blood pressure measurement, use the following guidelines. Be aware of the factors that can alter blood pressure readings.  · Take measurements at least 1 hour after caffeine.  · Take measurements 30 minutes after smoking and without any stress. This is another reason to quit smoking  it raises your blood pressure.  · Use a proper cuff size. Ask your caregiver if you are not sure about your cuff size.  · Most home blood pressure cuffs are automatic. They will measure systolic and diastolic pressures. The systolic pressure is the pressure reading at the start of sounds. Diastolic pressure is the pressure at which the sounds disappear. If you are elderly, measure pressures in multiple postures. Try sitting, lying or standing.  · Sit at rest for a minimum of 5 minutes before taking measurements.  · You should not be on any medications like decongestants. These are found in many cold medications.  · Record your blood pressure readings and review them with your caregiver.  If you have hypertension:  · Your caregiver may do tests to be sure you do not have " "secondary hypertension (see \"causes\" above).  · Your caregiver may also look for signs of metabolic syndrome. This is also called Syndrome X or Insulin Resistance Syndrome. You may have this syndrome if you have type 2 diabetes, abdominal obesity, and abnormal blood lipids in addition to hypertension.  · Your caregiver will take your medical and family history and perform a physical exam.  · Diagnostic tests may include blood tests (for glucose, cholesterol, potassium, and kidney function), a urinalysis, or an EKG. Other tests may also be necessary depending on your condition.  PREVENTION   There are important lifestyle issues that you can adopt to reduce your chance of developing hypertension:  · Maintain a normal weight.  · Limit the amount of salt (sodium) in your diet.  · Exercise often.  · Limit alcohol intake.  · Get enough potassium in your diet. Discuss specific advice with your caregiver.  · Follow a DASH diet (dietary approaches to stop hypertension). This diet is rich in fruits, vegetables, and low-fat dairy products, and avoids certain fats.  PROGNOSIS   Essential hypertension cannot be cured. Lifestyle changes and medical treatment can lower blood pressure and reduce complications. The prognosis of secondary hypertension depends on the underlying cause. Many people whose hypertension is controlled with medicine or lifestyle changes can live a normal, healthy life.   RISKS AND COMPLICATIONS   While high blood pressure alone is not an illness, it often requires treatment due to its short- and long-term effects on many organs. Hypertension increases your risk for:  · CVAs or strokes (cerebrovascular accident).  · Heart failure due to chronically high blood pressure (hypertensive cardiomyopathy).  · Heart attack (myocardial infarction).  · Damage to the retina (hypertensive retinopathy).  · Kidney failure (hypertensive nephropathy).  Your caregiver can explain list items above that apply to you. Treatment " "of hypertension can significantly reduce the risk of complications.  TREATMENT   · For overweight patients, weight loss and regular exercise are recommended. Physical fitness lowers blood pressure.  · Mild hypertension is usually treated with diet and exercise. A diet rich in fruits and vegetables, fat-free dairy products, and foods low in fat and salt (sodium) can help lower blood pressure. Decreasing salt intake decreases blood pressure in a 1/3 of people.  · Stop smoking if you are a smoker.  The steps above are highly effective in reducing blood pressure. While these actions are easy to suggest, they are difficult to achieve. Most patients with moderate or severe hypertension end up requiring medications to bring their blood pressure down to a normal level. There are several classes of medications for treatment. Blood pressure pills (antihypertensives) will lower blood pressure by their different actions. Lowering the blood pressure by 10 mmHg may decrease the risk of complications by as much as 25%.  The goal of treatment is effective blood pressure control. This will reduce your risk for complications. Your caregiver will help you determine the best treatment for you according to your lifestyle. What is excellent treatment for one person, may not be for you.  HOME CARE INSTRUCTIONS   · Do not smoke.  · Follow the lifestyle changes outlined in the \"Prevention\" section.  · If you are on medications, follow the directions carefully. Blood pressure medications must be taken as prescribed. Skipping doses reduces their benefit. It also puts you at risk for problems.  · Follow up with your caregiver, as directed.  · If you are asked to monitor your blood pressure at home, follow the guidelines in the \"Diagnosis\" section above.  SEEK MEDICAL CARE IF:   · You think you are having medication side effects.  · You have recurrent headaches or lightheadedness.  · You have swelling in your ankles.  · You have trouble with " your vision.  SEEK IMMEDIATE MEDICAL CARE IF:   · You have sudden onset of chest pain or pressure, difficulty breathing, or other symptoms of a heart attack.  · You have a severe headache.  · You have symptoms of a stroke (such as sudden weakness, difficulty speaking, difficulty walking).  MAKE SURE YOU:   · Understand these instructions.  · Will watch your condition.  · Will get help right away if you are not doing well or get worse.  Document Released: 12/18/2006 Document Revised: 03/11/2013 Document Reviewed: 07/17/2008  [employer]® Patient Information ©2014 [employer].

## 2017-05-16 NOTE — ED PROVIDER NOTES
ED Provider Note    CHIEF COMPLAINT  Chief Complaint   Patient presents with   • Hypertension       [facility]  Shereen Vanessa Dale is a 80 y.o. female who presents to the emergency department with hypertension. The patient states she was recently seen here and had her medications adjusted for her hypertension is have not been effective. She is placed on a long-acting beta blocker as well as Norvasc 5 milligrams. The patient is also on angiotensin receptor blocker. Today the patient's blood pressure significantly elevated she called her primary care doctor who told her to come to the emergency room. She occasionally does develop occipital tenderness and some neck pain. At this time she says she only has mild discomfort. She does not have any nausea or vomiting. She denies visual changes. The patient states that she was on Norco in the past which is effective for her neck and headaches. The patient states she been taking some tramadol with no relief.    REVIEW OF SYSTEMS  See HPI for further details. All other systems are negative.     PAST MEDICAL HISTORY  Past Medical History   Diagnosis Date   • Hypothyroid    • Glaucoma    • Hypertension    • Heart murmur    • Backpain      Lower back pain   • Chronic anticoagulation 5/2/2012   • Cough 5/2/2012   • Edema 5/2/2012   • Breast cancer (CMS-HCC)    • Heart valve disease    • Heart burn    • Cancer (CMS-HCC) 1993     right breast   • Diabetes      oral medication   • Arrhythmia      A-fib   • Paroxysmal atrial fibrillation (CMS-HCC)    • Dental disorder      dentures   • Sleep apnea      no cpap   • tia      TIA x2   • CATARACT      celestine IOL   • Unspecified hemorrhagic conditions (CMS-HCC)      takes coumadin   • Breath shortness      uses 2-3 L O2 at night   • Oxygen deficiency      uses 2.5-3 l at night   • AF (atrial fibrillation) (CMS-HCC)        SOCIAL HISTORY  Social History     Social History   • Marital Status:      Spouse Name: N/A   • Number of Children: N/A   •  "Years of Education: N/A     Social History Main Topics   • Smoking status: Former Smoker -- 0.50 packs/day for 2.5 years     Types: Cigarettes     Quit date: 01/01/1964   • Smokeless tobacco: Never Used   • Alcohol Use: 0.0 oz/week     0 Standard drinks or equivalent per week      Comment: rarely   • Drug Use: No   • Sexual Activity: Not on file     Other Topics Concern   • Not on file     Social History Narrative           PHYSICAL EXAM  VITAL SIGNS: /82 mmHg  Pulse 84  Temp(Src) 36.4 °C (97.6 °F)  Resp 16  Ht 1.575 m (5' 2\")  Wt 70.4 kg (155 lb 3.3 oz)  BMI 28.38 kg/m2  SpO2 93%  LMP 01/01/1985  Constitutional: Well developed, Well nourished, No acute distress, Non-toxic appearance.   HENT: Occipital tenderness, tympanic membranes are intact and nonerythematous bilaterally, Oropharynx moist without exudates or erythema, Nose normal.   Eyes: PERRLA, EOMI, Conjunctiva normal.  Neck: Supple without meningismus  Lymphatic: No lymphadenopathy noted.   Cardiovascular: Normal heart rate, Normal rhythm, No murmurs, No rubs, No gallops.   Thorax & Lungs: Normal breath sounds, No respiratory distress, No wheezing, No chest tenderness.   Abdomen: Bowel sounds normal, Soft, No tenderness, no rebound, no guarding, no distention, No masses, No pulsatile masses.   Skin: Warm, Dry, No erythema, No rash.   Back: No tenderness, No CVA tenderness.   Extremities: Atraumatic with symmetric distal pulses, No edema, No tenderness, No cyanosis, No clubbing.   Neurologic: GCS of 15  Psychiatric: Affect normal, Judgment normal, Mood normal.     COURSE & MEDICAL DECISION MAKING  Pertinent Labs & Imaging studies reviewed. (See chart for details)  This a 80-year-old female who presents to the emergency department with hypertension. Her blood pressure is elevated but not critical at this time. As for her headache she does have some reproducible occipital tenderness suspect she developed a tension headache from were not her blood " pressure. I will give her a couple of Norco tablets to help her out for pain control and should also help lower her blood pressure. I don't want making acute changes to her blood pressure regimen as this was recently adjusted. The patient will continue her current medications and chart her blood pressures and follow up with her primary care provider.    FINAL IMPRESSION  1. Hypertension  2. Tension headache     Disposition  The patient will be discharged in stable condition    Electronically signed by: Cuba Ponce, 5/15/2017 6:09 PM

## 2017-05-16 NOTE — ED NOTES
Provided patient with discharge instructions and verbal education on prescriptions. Patient verbalized understanding of follow up and home care.

## 2017-05-19 ENCOUNTER — HOSPITAL ENCOUNTER (OUTPATIENT)
Dept: LAB | Facility: MEDICAL CENTER | Age: 82
End: 2017-05-19
Attending: FAMILY MEDICINE
Payer: MEDICARE

## 2017-05-19 LAB
INR PPP: 2 (ref 0.87–1.13)
PROTHROMBIN TIME: 23.3 SEC (ref 12–14.6)

## 2017-05-19 PROCEDURE — 85610 PROTHROMBIN TIME: CPT

## 2017-05-19 PROCEDURE — 36415 COLL VENOUS BLD VENIPUNCTURE: CPT

## 2017-05-24 ENCOUNTER — OFFICE VISIT (OUTPATIENT)
Dept: CARDIOLOGY | Facility: MEDICAL CENTER | Age: 82
End: 2017-05-24
Payer: MEDICARE

## 2017-05-24 VITALS
DIASTOLIC BLOOD PRESSURE: 80 MMHG | OXYGEN SATURATION: 94 % | SYSTOLIC BLOOD PRESSURE: 142 MMHG | HEIGHT: 62 IN | BODY MASS INDEX: 29.08 KG/M2 | HEART RATE: 84 BPM | WEIGHT: 158 LBS

## 2017-05-24 DIAGNOSIS — Z79.01 CHRONIC ANTICOAGULATION: Chronic | ICD-10-CM

## 2017-05-24 DIAGNOSIS — I10 ESSENTIAL HYPERTENSION: ICD-10-CM

## 2017-05-24 DIAGNOSIS — I35.0 NONRHEUMATIC AORTIC VALVE STENOSIS: ICD-10-CM

## 2017-05-24 DIAGNOSIS — I48.0 PAF (PAROXYSMAL ATRIAL FIBRILLATION) (HCC): ICD-10-CM

## 2017-05-24 LAB — EKG IMPRESSION: NORMAL

## 2017-05-24 PROCEDURE — 1101F PT FALLS ASSESS-DOCD LE1/YR: CPT | Mod: 8P | Performed by: NURSE PRACTITIONER

## 2017-05-24 PROCEDURE — 4040F PNEUMOC VAC/ADMIN/RCVD: CPT | Performed by: NURSE PRACTITIONER

## 2017-05-24 PROCEDURE — G8432 DEP SCR NOT DOC, RNG: HCPCS | Performed by: NURSE PRACTITIONER

## 2017-05-24 PROCEDURE — G8417 CALC BMI ABV UP PARAM F/U: HCPCS | Performed by: NURSE PRACTITIONER

## 2017-05-24 PROCEDURE — 93000 ELECTROCARDIOGRAM COMPLETE: CPT | Performed by: NURSE PRACTITIONER

## 2017-05-24 PROCEDURE — 99214 OFFICE O/P EST MOD 30 MIN: CPT | Performed by: NURSE PRACTITIONER

## 2017-05-24 PROCEDURE — 1036F TOBACCO NON-USER: CPT | Performed by: NURSE PRACTITIONER

## 2017-05-24 RX ORDER — CLONIDINE HYDROCHLORIDE 0.1 MG/1
0.1 TABLET ORAL PRN
COMMUNITY
End: 2018-11-29 | Stop reason: SDUPTHER

## 2017-05-24 ASSESSMENT — ENCOUNTER SYMPTOMS
SPEECH CHANGE: 0
BLURRED VISION: 0
DIARRHEA: 0
FEVER: 0
HEMOPTYSIS: 0
WEAKNESS: 0
TINGLING: 0
ORTHOPNEA: 0
MUSCULOSKELETAL NEGATIVE: 1
TREMORS: 0
CHILLS: 0
FOCAL WEAKNESS: 0
DOUBLE VISION: 0
ABDOMINAL PAIN: 0
SORE THROAT: 0
SPUTUM PRODUCTION: 0
BLOOD IN STOOL: 0
VOMITING: 0
WEIGHT LOSS: 0
SHORTNESS OF BREATH: 0
SENSORY CHANGE: 0
LOSS OF CONSCIOUSNESS: 0
PALPITATIONS: 0
DIZZINESS: 0
WHEEZING: 0
HEADACHES: 0
HEARTBURN: 0
PND: 0
COUGH: 0
NAUSEA: 0

## 2017-05-24 NOTE — Clinical Note
[facility]   [address]  [place] [address]  Phone: [phone]  Fax: [phone]              Shereen Dale  12/21/1936    Encounter Date: 5/24/2017    JORGE A Flores          PROGRESS NOTE:  Subjective:   Shereen Dale is a 80 y.o. female who presents today for follow up hypertension.  She was recently admitted to [facility] for hypertensive urgency.  She was discharged and back to the ED for continued elevated blood pressures and headache.     She is followed by Dr. Sharp.  Past history significant for aortic stenosis, paroxysmal afib, hypertension, anticoagulation, DM.      Today in follow up she states that she has been feeling much better since she last left the ER.  She believes that her new medications have had an opportunity to start working.  She additionally has seen Dr. Cornell in the interm and was given PRN clonidine.  She has been checking her blood pressure at home and states SBP 140s or less and DBP <90.    She has not had any chest pain or pressure, dizziness, pre syncope, syncope, dyspnea, orthopnea, PND,or peripheral edema.     She verbalizes that she has been following low sodium diet.     Past Medical History   Diagnosis Date   • Hypothyroid    • Glaucoma    • Hypertension    • Heart murmur    • Backpain      Lower back pain   • Chronic anticoagulation 5/2/2012   • Cough 5/2/2012   • Edema 5/2/2012   • Breast cancer (CMS-HCC)    • Heart valve disease    • Heart burn    • Cancer (CMS-HCC) 1993     right breast   • Diabetes      oral medication   • Arrhythmia      A-fib   • Paroxysmal atrial fibrillation (CMS-HCC)    • Dental disorder      dentures   • Sleep apnea      no cpap   • tia      TIA x2   • CATARACT      celestine IOL   • Unspecified hemorrhagic conditions      takes coumadin   • Breath shortness      uses 2-3 L O2 at night   • Oxygen deficiency      uses 2.5-3 l at night   • AF (atrial fibrillation) (CMS-[facility])           Past Surgical History   Procedure Laterality Date   • Lumpectomy       R breast   • Cholecystectomy     • Hysterectomy radical     • Pr radiation therapy plan simple     • Cataract phaco with iol  9/23/2013     Performed by Dominick Fernando M.D. at SURGERY [facility]   • Cataract phaco with iol  10/21/2013     Performed by Dominick Fernando M.D. at [facility] ORS   • Knee arthroscopy Right 5/21/2015     Procedure: KNEE ARTHROSCOPY;  Surgeon: Emerson Garcia M.D.;  Location: SURGERY [facility];  Service:    • Meniscectomy Right 5/21/2015     Procedure: LATERAL MENISCECTOMY;  Surgeon: Emerson Garcia M.D.;  Location: SURGERY [facility];  Service:      Family History   Problem Relation Age of Onset   • Heart Attack Mother    • Heart Disease Father      History   Smoking status   • Former Smoker -- 0.50 packs/day for 2.5 years   • Types: Cigarettes   • Quit date: 01/01/1964   Smokeless tobacco   • Never Used     Allergies   Allergen Reactions   • Darvon [Propoxyphene Hcl]      shock   • Iodine      Shock  - IV   • Latex      Swelling = hands with glove use   • Penicillins Anaphylaxis   • Propoxyphene Hcl Anaphylaxis   • Tetracycline Anaphylaxis     Outpatient Encounter Prescriptions as of 5/24/2017   Medication Sig Dispense Refill   • clonidine (CATAPRES) 0.1 MG Tab Take 0.1 mg by mouth as needed.     • metoprolol SR (TOPROL XL) 50 MG TABLET SR 24 HR Take 1 Tab by mouth every day. 30 Tab 2   • amlodipine (NORVASC) 5 MG Tab Take 1 Tab by mouth every day. 30 Tab 2   • telmisartan (MICARDIS) 40 MG Tab Take 1 Tab by mouth 2 Times a Day. 60 Tab 6   • acetaminophen (TYLENOL) 500 MG TABS Take 1,000 mg by mouth every 6 hours as needed.     • warfarin (COUMADIN) 3 MG TABS Take 3 mg by mouth every bedtime.     • Glucosamine HCl (GLUCOSAMINE PO) Take 1 Tab by mouth every morning. Pt unsure of dose     • Polyethyl Glycol-Propyl Glycol (SYSTANE OP) 1-2 Drops by Ophthalmic route 2 Times a Day.    "  • docosahexanoic acid (OMEGA 3 FA) 1000 MG CAPS Take 1,000 mg by mouth every morning.     • levothyroxine (SYNTHROID) 50 MCG TABS Take 50 mcg by mouth every morning.     • VITAMIN D PO Take 2,000 Units by mouth every morning.     • Dorzolamide-Timolol (COSOPT OP) Place 1 Drop in both eyes 2 Times a Day.     • METFORMIN  MG PO TABS Take 500 mg by mouth 3 times a day.     • CALCIUM 500 PO 1 Tab every morning.     • PEPCID 20 MG PO TABS Take 20 mg by mouth 2 times a day.     • hydrocodone-acetaminophen (NORCO) 5-325 MG Tab per tablet Take 1-2 Tabs by mouth every four hours as needed. 20 Tab 0   • tramadol (ULTRAM) 50 MG Tab Take 1 Tab by mouth every 6 hours as needed for Moderate Pain. (Patient not taking: Reported on 5/24/2017) 30 Tab 0     No facility-administered encounter medications on file as of 5/24/2017.     Review of Systems   Constitutional: Negative for fever, chills, weight loss and malaise/fatigue.   HENT: Negative for congestion, nosebleeds, sore throat and tinnitus.    Eyes: Negative for blurred vision and double vision.   Respiratory: Negative for cough, hemoptysis, sputum production, shortness of breath and wheezing.    Cardiovascular: Negative for chest pain, palpitations, orthopnea, leg swelling and PND.   Gastrointestinal: Negative for heartburn, nausea, vomiting, abdominal pain, diarrhea, blood in stool and melena.   Genitourinary: Negative.    Musculoskeletal: Negative.    Skin: Negative for rash.   Neurological: Negative for dizziness, tingling, tremors, sensory change, speech change, focal weakness, loss of consciousness, weakness and headaches.        Objective:   /80 mmHg  Pulse 84  Ht 1.575 m (5' 2.01\")  Wt 71.668 kg (158 lb)  BMI 28.89 kg/m2  SpO2 94%  LMP 01/01/1985    Physical Exam   Constitutional: She is oriented to person, place, and time. She appears well-developed and well-nourished.   HENT:   Head: Normocephalic and atraumatic.   Eyes: Conjunctivae are normal. "   Neck: Normal range of motion. Neck supple. No JVD present.   Cardiovascular: Normal rate, regular rhythm and intact distal pulses.  Exam reveals no gallop and no friction rub.    Murmur heard.   Systolic murmur is present with a grade of 3/6   Pulmonary/Chest: Effort normal and breath sounds normal. No respiratory distress. She has no wheezes. She has no rales.   Abdominal: Soft. Bowel sounds are normal. There is no tenderness.   Musculoskeletal: She exhibits no edema.   Neurological: She is alert and oriented to person, place, and time.   Skin: Skin is warm and dry.   Psychiatric: She has a normal mood and affect. Her behavior is normal.       Assessment:     1. PAF (paroxysmal atrial fibrillation) (CMS-[facility])  EKG   2. Essential hypertension  EKG   3. Chronic anticoagulation     4. Nonrheumatic aortic valve stenosis         Medical Decision Making:  Today's Assessment / Status / Plan:   1. HTN:   - Blood pressure under improved control today and within acceptable parameters.  She does not have her BP log with her today, but sounds as if well controlled most of the time.   - Continue current medication regimen.     2. Aortic Stenosis:  - Measured to be moderate on last echo (1/2017).  She denies any significant lightheadedness, presyncope or syncope.  No dyspnea.   - Follow up with echo in a year likely, or if develops symptoms.     3. Chronic Anticoagulation:  - Denies s/s of bleeding.     4. Afib:  - No reports of recurrence.  - Continue Coumadin/Toprol.     RTC in Sept for review, sooner if needed.  Collaborating MD: Erasto.         Kristin Cornell M.D.  [address]  [place] [address]  VIA Facsimile: [phone]

## 2017-05-24 NOTE — PROGRESS NOTES
Subjective:   Shereen Dale is a 80 y.o. female who presents today for follow up hypertension.  She was recently admitted to [facility] for hypertensive urgency.  She was discharged and back to the ED for continued elevated blood pressures and headache.     She is followed by Dr. Sharp.  Past history significant for aortic stenosis, paroxysmal afib, hypertension, anticoagulation, DM.      Today in follow up she states that she has been feeling much better since she last left the ER.  She believes that her new medications have had an opportunity to start working.  She additionally has seen Dr. Cornell in the interm and was given PRN clonidine.  She has been checking her blood pressure at home and states SBP 140s or less and DBP <90.    She has not had any chest pain or pressure, dizziness, pre syncope, syncope, dyspnea, orthopnea, PND,or peripheral edema.     She verbalizes that she has been following low sodium diet.     Past Medical History   Diagnosis Date   • Hypothyroid    • Glaucoma    • Hypertension    • Heart murmur    • Backpain      Lower back pain   • Chronic anticoagulation 5/2/2012   • Cough 5/2/2012   • Edema 5/2/2012   • Breast cancer (CMS-HCC)    • Heart valve disease    • Heart burn    • Cancer (CMS-HCC) 1993     right breast   • Diabetes      oral medication   • Arrhythmia      A-fib   • Paroxysmal atrial fibrillation (CMS-HCC)    • Dental disorder      dentures   • Sleep apnea      no cpap   • tia      TIA x2   • CATARACT      celestine IOL   • Unspecified hemorrhagic conditions      takes coumadin   • Breath shortness      uses 2-3 L O2 at night   • Oxygen deficiency      uses 2.5-3 l at night   • AF (atrial fibrillation) (CMS-HCC)      Past Surgical History   Procedure Laterality Date   • Lumpectomy       R breast   • Cholecystectomy     • Hysterectomy radical     • Pr radiation therapy plan simple     • Cataract phaco with iol  9/23/2013     Performed by Dominick Fernando M.D. at SURGERY SURGICAL  [facility] ORS   • Cataract phaco with iol  10/21/2013     Performed by Dominick Fernando M.D. at SURGERY SURGICAL [facility] ORS   • Knee arthroscopy Right 5/21/2015     Procedure: KNEE ARTHROSCOPY;  Surgeon: Emerson Garcia M.D.;  Location: SURGERY [facility];  Service:    • Meniscectomy Right 5/21/2015     Procedure: LATERAL MENISCECTOMY;  Surgeon: Emerson Garcia M.D.;  Location: SURGERY [facility];  Service:      Family History   Problem Relation Age of Onset   • Heart Attack Mother    • Heart Disease Father      History   Smoking status   • Former Smoker -- 0.50 packs/day for 2.5 years   • Types: Cigarettes   • Quit date: 01/01/1964   Smokeless tobacco   • Never Used     Allergies   Allergen Reactions   • Darvon [Propoxyphene Hcl]      shock   • Iodine      Shock  - IV   • Latex      Swelling = hands with glove use   • Penicillins Anaphylaxis   • Propoxyphene Hcl Anaphylaxis   • Tetracycline Anaphylaxis     Outpatient Encounter Prescriptions as of 5/24/2017   Medication Sig Dispense Refill   • clonidine (CATAPRES) 0.1 MG Tab Take 0.1 mg by mouth as needed.     • metoprolol SR (TOPROL XL) 50 MG TABLET SR 24 HR Take 1 Tab by mouth every day. 30 Tab 2   • amlodipine (NORVASC) 5 MG Tab Take 1 Tab by mouth every day. 30 Tab 2   • telmisartan (MICARDIS) 40 MG Tab Take 1 Tab by mouth 2 Times a Day. 60 Tab 6   • acetaminophen (TYLENOL) 500 MG TABS Take 1,000 mg by mouth every 6 hours as needed.     • warfarin (COUMADIN) 3 MG TABS Take 3 mg by mouth every bedtime.     • Glucosamine HCl (GLUCOSAMINE PO) Take 1 Tab by mouth every morning. Pt unsure of dose     • Polyethyl Glycol-Propyl Glycol (SYSTANE OP) 1-2 Drops by Ophthalmic route 2 Times a Day.     • docosahexanoic acid (OMEGA 3 FA) 1000 MG CAPS Take 1,000 mg by mouth every morning.     • levothyroxine (SYNTHROID) 50 MCG TABS Take 50 mcg by mouth every morning.     • VITAMIN D PO Take 2,000 Units by mouth every morning.     • Dorzolamide-Timolol (COSOPT OP) Place  "1 Drop in both eyes 2 Times a Day.     • METFORMIN  MG PO TABS Take 500 mg by mouth 3 times a day.     • CALCIUM 500 PO 1 Tab every morning.     • PEPCID 20 MG PO TABS Take 20 mg by mouth 2 times a day.     • hydrocodone-acetaminophen (NORCO) 5-325 MG Tab per tablet Take 1-2 Tabs by mouth every four hours as needed. 20 Tab 0   • tramadol (ULTRAM) 50 MG Tab Take 1 Tab by mouth every 6 hours as needed for Moderate Pain. (Patient not taking: Reported on 5/24/2017) 30 Tab 0     No facility-administered encounter medications on file as of 5/24/2017.     Review of Systems   Constitutional: Negative for fever, chills, weight loss and malaise/fatigue.   HENT: Negative for congestion, nosebleeds, sore throat and tinnitus.    Eyes: Negative for blurred vision and double vision.   Respiratory: Negative for cough, hemoptysis, sputum production, shortness of breath and wheezing.    Cardiovascular: Negative for chest pain, palpitations, orthopnea, leg swelling and PND.   Gastrointestinal: Negative for heartburn, nausea, vomiting, abdominal pain, diarrhea, blood in stool and melena.   Genitourinary: Negative.    Musculoskeletal: Negative.    Skin: Negative for rash.   Neurological: Negative for dizziness, tingling, tremors, sensory change, speech change, focal weakness, loss of consciousness, weakness and headaches.        Objective:   /80 mmHg  Pulse 84  Ht 1.575 m (5' 2.01\")  Wt 71.668 kg (158 lb)  BMI 28.89 kg/m2  SpO2 94%  LMP 01/01/1985    Physical Exam   Constitutional: She is oriented to person, place, and time. She appears well-developed and well-nourished.   HENT:   Head: Normocephalic and atraumatic.   Eyes: Conjunctivae are normal.   Neck: Normal range of motion. Neck supple. No JVD present.   Cardiovascular: Normal rate, regular rhythm and intact distal pulses.  Exam reveals no gallop and no friction rub.    Murmur heard.   Systolic murmur is present with a grade of 3/6   Pulmonary/Chest: Effort " normal and breath sounds normal. No respiratory distress. She has no wheezes. She has no rales.   Abdominal: Soft. Bowel sounds are normal. There is no tenderness.   Musculoskeletal: She exhibits no edema.   Neurological: She is alert and oriented to person, place, and time.   Skin: Skin is warm and dry.   Psychiatric: She has a normal mood and affect. Her behavior is normal.       Assessment:     1. PAF (paroxysmal atrial fibrillation) (CMS-[facility])  EKG   2. Essential hypertension  EKG   3. Chronic anticoagulation     4. Nonrheumatic aortic valve stenosis         Medical Decision Making:  Today's Assessment / Status / Plan:   1. HTN:   - Blood pressure under improved control today and within acceptable parameters.  She does not have her BP log with her today, but sounds as if well controlled most of the time.   - Continue current medication regimen.     2. Aortic Stenosis:  - Measured to be moderate on last echo (1/2017).  She denies any significant lightheadedness, presyncope or syncope.  No dyspnea.   - Follow up with echo in a year likely, or if develops symptoms.     3. Chronic Anticoagulation:  - Denies s/s of bleeding.     4. Afib:  - No reports of recurrence.  - Continue Coumadin/Toprol.     RTC in Sept for review, sooner if needed.  Collaborating MD: Erasto.

## 2017-05-24 NOTE — MR AVS SNAPSHOT
"        Shereen Dale   2017 1:40 PM   Office Visit   MRN: [id number]    Department:  [facility]   Dept Phone:  [phone]    Description:  Female : 1936   Provider:  JORGE A Flores           Reason for Visit     Hospital Follow-up           Allergies as of 2017     Allergen Noted Reactions    Darvon [Propoxyphene Hcl] 2008       shock    Iodine 2015       Shock  - IV    Latex 2013       Swelling = hands with glove use    Penicillins 2008   Anaphylaxis    Propoxyphene Hcl 2008   Anaphylaxis    Tetracycline 2008   Anaphylaxis      You were diagnosed with     PAF (paroxysmal atrial fibrillation) (CMS-HCC)   [374514]       Essential hypertension   [4731235]       Chronic anticoagulation   [353312]       Nonrheumatic aortic valve stenosis   [794048]         Vital Signs     Blood Pressure Pulse Height Weight Body Mass Index Oxygen Saturation    142/80 mmHg 84 1.575 m (5' 2.01\") 71.668 kg (158 lb) 28.89 kg/m2 94%    Last Menstrual Period Smoking Status                1985 Former Smoker          Basic Information     Date Of Birth Sex Race Ethnicity Preferred Language    1936 Female  Non- English      Problem List              ICD-10-CM Priority Class Noted - Resolved    Acute, but ill-defined, cerebrovascular disease (CMS-HCC) I67.89 High  2012 - Present    Chronic anticoagulation (Chronic) Z79.01 High  2012 - Present    Cough (Chronic) R05 Low  2012 - Present    Edema (Chronic) R60.9 Low  2012 - Present    HTN (hypertension) I10 High  2012 - Present    Aortic stenosis I35.0   2013 - Present    Diabetes (CMS-HCC) E11.9   2013 - Present    Dyspnea on effort R06.09   2014 - Present    PAF (paroxysmal atrial fibrillation) (CMS-HCC) I48.0   2015 - Present    Tear of lateral cartilage or meniscus of knee, current S83.289A   2015 - Present    Diastolic dysfunction I51.9   2016 - " Present    Hypertensive urgency I16.0   5/11/2017 - Present      Health Maintenance        Date Due Completion Dates    DIABETES MONOFILAMENT / LE EXAM 6/21/1938 ---    RETINAL SCREENING 12/21/1955 ---    IMM DTaP/Tdap/Td Vaccine (1 - Tdap) 12/21/1956 ---    PAP SMEAR 12/21/1958 ---    COLONOSCOPY 12/21/1987 ---    IMM ZOSTER VACCINE 12/21/1997 ---    BONE DENSITY 3/17/2009 3/17/2004    IMM PNEUMOCOCCAL 65+ (ADULT) LOW/MEDIUM RISK SERIES (2 of 2 - PPSV23) 9/30/2016 9/30/2015    A1C SCREENING 6/14/2017 12/14/2016, 6/13/2016, 1/20/2016, 7/30/2015, 4/9/2015, 12/5/2014, 6/9/2014, 12/31/2013, 3/7/2013, 7/18/2012, 3/12/2012, 7/11/2011, 11/17/2009    FASTING LIPID PROFILE 12/14/2017 12/14/2016, 6/13/2016, 1/20/2016, 7/30/2015, 12/5/2014, 6/9/2014, 12/31/2013, 3/7/2013, 7/18/2012, 5/1/2012, 3/12/2012, 7/11/2011    URINE ACR / MICROALBUMIN 12/14/2017 12/14/2016, 6/13/2016, 1/20/2016, 7/30/2015, 12/5/2014, 6/9/2014, 3/7/2013, 7/18/2012, 3/12/2012, 7/11/2011    MAMMOGRAM 2/7/2018 2/7/2017, 12/14/2015, 12/8/2014, 12/5/2013, 12/3/2012, 12/2/2011, 12/1/2010, 11/25/2009, 11/25/2009, 11/18/2008, 11/18/2008, 3/11/2008, 3/11/2008, 9/10/2007, 9/10/2007, 9/7/2006, 9/6/2005, 8/31/2004, 8/26/2004    SERUM CREATININE 5/12/2018 5/12/2017, 5/11/2017, 12/14/2016, 6/13/2016, 1/20/2016, 7/30/2015, 5/11/2015, 4/9/2015, 12/5/2014, 6/9/2014, 12/31/2013, 3/7/2013, 7/18/2012, 4/30/2012, 3/12/2012, 7/11/2011, 11/16/2009, 8/2/2008, 8/3/2007            Results       Current Immunizations     13-VALENT PCV PREVNAR 9/30/2015  4:11 PM    Influenza TIV (IM) 10/1/2011    Influenza Vaccine 10/1/2009    Influenza Vaccine Adult HD 9/28/2016  4:16 PM, 9/30/2015  4:09 PM    Influenza Vaccine Quad Inj (Pf) 9/23/2014    Pneumococcal Vaccine (UF)Historical Data 5/20/2009      Below and/or attached are the medications your provider expects you to take. Review all of your home medications and newly ordered medications with your provider and/or pharmacist. Follow  medication instructions as directed by your provider and/or pharmacist. Please keep your medication list with you and share with your provider. Update the information when medications are discontinued, doses are changed, or new medications (including over-the-counter products) are added; and carry medication information at all times in the event of emergency situations     Allergies:  DARVON - (reactions not documented)     IODINE - (reactions not documented)     LATEX - (reactions not documented)     PENICILLINS - Anaphylaxis     PROPOXYPHENE HCL - Anaphylaxis     TETRACYCLINE - Anaphylaxis               Medications  Valid as of: May 24, 2017 -  2:06 PM    Generic Name Brand Name Tablet Size Instructions for use    Acetaminophen (Tab) TYLENOL 500 MG Take 1,000 mg by mouth every 6 hours as needed.        AmLODIPine Besylate (Tab) NORVASC 5 MG Take 1 Tab by mouth every day.        Calcium-Magnesium-Vitamin D   1 Tab every morning.        Cholecalciferol   Take 2,000 Units by mouth every morning.        CloNIDine HCl (Tab) CATAPRES 0.1 MG Take 0.1 mg by mouth as needed.        Dorzolamide HCl-Timolol Mal   Place 1 Drop in both eyes 2 Times a Day.        Famotidine (Tab) PEPCID 20 MG Take 20 mg by mouth 2 times a day.        Glucosamine HCl   Take 1 Tab by mouth every morning. Pt unsure of dose        Hydrocodone-Acetaminophen (Tab) NORCO 5-325 MG Take 1-2 Tabs by mouth every four hours as needed.        Levothyroxine Sodium (Tab) SYNTHROID 50 MCG Take 50 mcg by mouth every morning.        MetFORMIN HCl (Tab) GLUCOPHAGE 500 MG Take 500 mg by mouth 3 times a day.        Metoprolol Succinate (TABLET SR 24 HR) TOPROL XL 50 MG Take 1 Tab by mouth every day.        Omega-3 Fatty Acids (Cap) OMEGA 3 FA 1000 MG Take 1,000 mg by mouth every morning.        Polyethyl Glycol-Propyl Glycol   1-2 Drops by Ophthalmic route 2 Times a Day.        Telmisartan (Tab) MICARDIS 40 MG Take 1 Tab by mouth 2 Times a Day.        TraMADol HCl  (Tab) ULTRAM 50 MG Take 1 Tab by mouth every 6 hours as needed for Moderate Pain.        Warfarin Sodium (Tab) COUMADIN 3 MG Take 3 mg by mouth every bedtime.        .                 Medicines prescribed today were sent to:     [facility] [place], [place] - [address] [facility]    [address] [place] [address] [place] [address]    Phone: [phone] Fax: [phone]    Open 24 Hours?: No      Medication refill instructions:       If your prescription bottle indicates you have medication refills left, it is not necessary to call your provider’s office. Please contact your pharmacy and they will refill your medication.    If your prescription bottle indicates you do not have any refills left, you may request refills at any time through one of the following ways: The online [employer] system (except Urgent Care), by calling your provider’s office, or by asking your pharmacy to contact your provider’s office with a refill request. Medication refills are processed only during regular business hours and may not be available until the next business day. Your provider may request additional information or to have a follow-up visit with you prior to refilling your medication.   *Please Note: Medication refills are assigned a new Rx number when refilled electronically. Your pharmacy may indicate that no refills were authorized even though a new prescription for the same medication is available at the pharmacy. Please request the medicine by name with the pharmacy before contacting your provider for a refill.           MyChart Status: Patient Declined

## 2017-06-15 ENCOUNTER — HOSPITAL ENCOUNTER (OUTPATIENT)
Dept: LAB | Facility: MEDICAL CENTER | Age: 82
End: 2017-06-15
Attending: FAMILY MEDICINE
Payer: MEDICARE

## 2017-06-15 LAB
INR PPP: 2.01 (ref 0.87–1.13)
PROTHROMBIN TIME: 23.4 SEC (ref 12–14.6)

## 2017-06-15 PROCEDURE — 85610 PROTHROMBIN TIME: CPT

## 2017-06-15 PROCEDURE — 36415 COLL VENOUS BLD VENIPUNCTURE: CPT

## 2017-07-14 ENCOUNTER — HOSPITAL ENCOUNTER (OUTPATIENT)
Dept: LAB | Facility: MEDICAL CENTER | Age: 82
End: 2017-07-14
Attending: FAMILY MEDICINE
Payer: MEDICARE

## 2017-07-14 LAB
INR PPP: 2.59 (ref 0.87–1.13)
PROTHROMBIN TIME: 28.6 SEC (ref 12–14.6)

## 2017-07-14 PROCEDURE — 36415 COLL VENOUS BLD VENIPUNCTURE: CPT

## 2017-07-14 PROCEDURE — 85610 PROTHROMBIN TIME: CPT

## 2017-07-24 ENCOUNTER — TELEPHONE (OUTPATIENT)
Dept: PULMONOLOGY | Facility: HOSPICE | Age: 82
End: 2017-07-24

## 2017-07-24 DIAGNOSIS — G47.33 OBSTRUCTIVE SLEEP APNEA: ICD-10-CM

## 2017-08-02 ENCOUNTER — HOSPITAL ENCOUNTER (EMERGENCY)
Facility: MEDICAL CENTER | Age: 82
End: 2017-08-03
Payer: MEDICARE

## 2017-08-02 VITALS
OXYGEN SATURATION: 97 % | WEIGHT: 150 LBS | RESPIRATION RATE: 16 BRPM | HEART RATE: 84 BPM | BODY MASS INDEX: 26.58 KG/M2 | HEIGHT: 63 IN | SYSTOLIC BLOOD PRESSURE: 154 MMHG | DIASTOLIC BLOOD PRESSURE: 72 MMHG | TEMPERATURE: 97.6 F

## 2017-08-02 PROCEDURE — 302449 STATCHG TRIAGE ONLY (STATISTIC)

## 2017-08-03 NOTE — ED NOTES
"Chief Complaint   Patient presents with   • Hypertension     took own klonidine at 1700, BP not responding    • Migraine     posterior of head      Pt BIB EMS to triage for above, Pt states her BP usually responds to her home meds in the afternoon, states today her BP increased following her medication to ~190 systolic. Pt subsequently developed a headache, is worried about a head bleed due to taking coumadin. Pt is A/O x4, PERRLA, no focal neurologic deficits noted. Pt states her last INR was 2.5. Pt placed back in lobby w/ SO at this time, told to alert staff to any changes in condition    Blood pressure 154/72, pulse 84, temperature 36.4 °C (97.6 °F), resp. rate 16, height 1.6 m (5' 3\"), weight 68.04 kg (150 lb), last menstrual period 01/01/1985, SpO2 97 %.    "

## 2017-08-05 ENCOUNTER — HOSPITAL ENCOUNTER (EMERGENCY)
Facility: MEDICAL CENTER | Age: 82
End: 2017-08-05
Attending: EMERGENCY MEDICINE
Payer: MEDICARE

## 2017-08-05 ENCOUNTER — APPOINTMENT (OUTPATIENT)
Dept: RADIOLOGY | Facility: MEDICAL CENTER | Age: 82
End: 2017-08-05
Attending: EMERGENCY MEDICINE
Payer: MEDICARE

## 2017-08-05 VITALS
WEIGHT: 150 LBS | HEART RATE: 61 BPM | DIASTOLIC BLOOD PRESSURE: 75 MMHG | SYSTOLIC BLOOD PRESSURE: 141 MMHG | BODY MASS INDEX: 27.6 KG/M2 | HEIGHT: 62 IN | TEMPERATURE: 98.4 F | RESPIRATION RATE: 15 BRPM | OXYGEN SATURATION: 92 %

## 2017-08-05 DIAGNOSIS — S06.0X0A CONCUSSION, WITHOUT LOC, INITIAL ENCOUNTER: ICD-10-CM

## 2017-08-05 DIAGNOSIS — T50.905A MEDICATION REACTION, INITIAL ENCOUNTER: ICD-10-CM

## 2017-08-05 DIAGNOSIS — R09.89 LABILE BLOOD PRESSURE: ICD-10-CM

## 2017-08-05 LAB — TROPONIN I SERPL-MCNC: <0.01 NG/ML (ref 0–0.04)

## 2017-08-05 PROCEDURE — 84484 ASSAY OF TROPONIN QUANT: CPT

## 2017-08-05 PROCEDURE — 71020 DX-CHEST-2 VIEWS: CPT

## 2017-08-05 PROCEDURE — 70450 CT HEAD/BRAIN W/O DYE: CPT

## 2017-08-05 PROCEDURE — 36415 COLL VENOUS BLD VENIPUNCTURE: CPT

## 2017-08-05 PROCEDURE — 93005 ELECTROCARDIOGRAM TRACING: CPT | Performed by: EMERGENCY MEDICINE

## 2017-08-05 PROCEDURE — 99284 EMERGENCY DEPT VISIT MOD MDM: CPT

## 2017-08-05 RX ORDER — HYDROCODONE BITARTRATE AND ACETAMINOPHEN 5; 325 MG/1; MG/1
1 TABLET ORAL EVERY 4 HOURS PRN
Qty: 12 TAB | Refills: 0 | Status: SHIPPED | OUTPATIENT
Start: 2017-08-05 | End: 2018-05-21

## 2017-08-05 ASSESSMENT — ENCOUNTER SYMPTOMS
WEAKNESS: 0
LOSS OF CONSCIOUSNESS: 0
HEADACHES: 1
SHORTNESS OF BREATH: 0

## 2017-08-05 ASSESSMENT — LIFESTYLE VARIABLES: DO YOU DRINK ALCOHOL: NO

## 2017-08-05 ASSESSMENT — PAIN SCALES - GENERAL: PAINLEVEL_OUTOF10: 8

## 2017-08-05 NOTE — ED NOTES
PT arrives to triage via wheelchair with c/c headache & high blood pressure.  Pt states her BP = 222/122 this morning.  Pt states her BP decreased after taking her medication but still complains of a headache.  A&ox4.  Pt & friend to lobby & advised to inform RN of any changes.    Pt checked in Wednesday for same complaint but left before being seen.

## 2017-08-05 NOTE — ED AVS SNAPSHOT
DigiSynd Access Code: Activation code not generated  Current DigiSynd Status: Patient Declined    Your email address is not on file at [facility].  Email Addresses are required for you to sign up for DigiSynd, please contact [phone] to verify your personal information and to provide your email address prior to attempting to register for DigiSynd.    Shereen Dale  [address] [place] [address]    DragonWavet  A secure, online tool to manage your health information     [facility]’s DigiSynd® is a secure, online tool that connects you to your personalized health information from the privacy of your home -- day or night - making it very easy for you to manage your healthcare. Once the activation process is completed, you can even access your medical information using the DigiSynd [place], which is available for free in the Apple [place] store or Google Play store.     To learn more about DigiSynd, visit [email or web address]    There are two levels of access available (as shown below):   My Chart Features  [facility] Primary Care Doctor [facility]  Specialists [facility]  Urgent  Care Non-[facility] Primary Care Doctor   Email your healthcare team securely and privately 24/7 X X X    Manage appointments: schedule your next appointment; view details of past/upcoming appointments X      Request prescription refills. X      View recent personal medical records, including lab and immunizations X X X X   View health record, including health history, allergies, medications X X X X   Read reports about your outpatient visits, procedures, consult and ER notes X X X X   See your discharge summary, which is a recap of your hospital and/or ER visit that includes your diagnosis, lab results, and care plan X X  X     How to register for DragonWavet:  Once your e-mail address has been verified, follow the following steps to sign up for DragonWavet.     1. Go to  [email or web address]  2. Click on the Sign Up Now box, which takes you to the New Member  Sign Up page. You will need to provide the following information:  a. Enter your [employer] Access Code exactly as it appears at the top of this page. (You will not need to use this code after you’ve completed the sign-up process. If you do not sign up before the expiration date, you must request a new code.)   b. Enter your date of birth.   c. Enter your home email address.   d. Click Submit, and follow the next screen’s instructions.  3. Create a [employer] ID. This will be your [employer] login ID and cannot be changed, so think of one that is secure and easy to remember.  4. Create a [employer] password. You can change your password at any time.  5. Enter your Password Reset Question and Answer. This can be used at a later time if you forget your password.   6. Enter your e-mail address. This allows you to receive e-mail notifications when new information is available in [employer].  7. Click Sign Up. You can now view your health information.    For assistance activating your [employer] account, call [phone]

## 2017-08-05 NOTE — ED NOTES
"Pt states took her clonidine at 1130,  Brought her blood pressure down.  She is here primarily for her neck pain 2nd to hit by door several days ago, \"i'm on coumadin\".  "

## 2017-08-05 NOTE — ED AVS SNAPSHOT
8/5/2017    Shereen Dale  [address] [place] [address]    Dear Shereen:    [facility] wants to ensure your discharge home is safe and you or your loved ones have had all of your questions answered regarding your care after you leave the hospital.    Below is a list of resources and contact information should you have any questions regarding your hospital stay, follow-up instructions, or active medical symptoms.    Questions or Concerns Regarding… Contact   Medical Questions Related to Your Discharge  (7 days a week, 8am-5pm) Contact a Nurse Care Coordinator   [phone]   Medical Questions Not Related to Your Discharge  (24 hours a day / 7 days a week)  Contact the Nurse Health Line   [phone]    Medications or Discharge Instructions Refer to your discharge packet   or contact your [facility] Primary Care Provider   [phone]   Follow-up Appointment(s) Schedule your appointment via Polyheal   or contact Scheduling [phone]   Billing Review your statement via [employer]al  or contact Billing [phone]   Medical Records Review your records via [employer]al   or contact Medical Records [phone]     You may receive a telephone call within two days of discharge. This call is to make certain you understand your discharge instructions and have the opportunity to have any questions answered. You can also easily access your medical information, test results and upcoming appointments via the Polyheal free online health management tool. You can learn more and sign up at [email or web address]. For assistance setting up your Polyheal account, please call [phone].    Once again, we want to ensure your discharge home is safe and that you have a clear understanding of any next steps in your care. If you have any questions or concerns, please do not hesitate to contact us, we are here for you. Thank you for choosing [facility] for your healthcare needs.    Sincerely,    Your [facility] Healthcare Team
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
## 2017-08-05 NOTE — ED AVS SNAPSHOT
Home Care Instructions                                                                                                                Shereen Dale   MRN: [id number]    Department:  [facility], Emergency Dept   Date of Visit:  8/5/2017            [facility], Emergency Dept    [address]    [place] [address]    Phone:  [phone]      You were seen by     Gerard Noguera M.D.      Your Diagnosis Was     Labile blood pressure     R09.89       Follow-up Information     1. Follow up with Kristin Cornell M.D.. Call in 1 day.    Specialty:  Family Medicine    Why:  to discuss changing her clonidine medication.    Contact information    [facility] [address]  [place] [address] [phone]          2. Schedule an appointment as soon as possible for a visit with Your cardiologist.      Medication Information     Review all of your home medications and newly ordered medications with your primary doctor and/or pharmacist as soon as possible. Follow medication instructions as directed by your doctor and/or pharmacist.     Please keep your complete medication list with you and share with your physician. Update the information when medications are discontinued, doses are changed, or new medications (including over-the-counter products) are added; and carry medication information at all times in the event of emergency situations.               Medication List      START taking these medications        Instructions    Morning Afternoon Evening Bedtime    hydrocodone-acetaminophen 5-325 MG Tabs per tablet   Commonly known as:  NORCO        Take 1 Tab by mouth every four hours as needed.   Dose:  1 Tab                          ASK your doctor about these medications        Instructions    Morning Afternoon Evening Bedtime    amlodipine 5 MG Tabs   Commonly known as:  NORVASC        Take 1 Tab by mouth every day.   Dose:  5 mg                        CALCIUM 500 PO        1 Tab  every morning.   Dose:  1 Tab                        clonidine 0.1 MG Tabs   Commonly known as:  CATAPRES        Take 0.1 mg by mouth as needed.   Dose:  0.1 mg                        COSOPT OP        Place 1 Drop in both eyes 2 Times a Day.   Dose:  1 Drop                        docosahexanoic acid 1000 MG Caps   Commonly known as:  OMEGA 3 FA        Take 1,000 mg by mouth every morning.   Dose:  1000 mg                        GLUCOSAMINE PO        Take 1 Tab by mouth every morning. Pt unsure of dose   Dose:  1 Tab                        levothyroxine 50 MCG Tabs   Commonly known as:  SYNTHROID        Take 50 mcg by mouth every morning.   Dose:  50 mcg                        metformin 500 MG Tabs   Commonly known as:  GLUCOPHAGE        Take 500 mg by mouth 3 times a day.   Dose:  500 mg                        metoprolol SR 50 MG Tb24   Commonly known as:  TOPROL XL        Take 1 Tab by mouth every day.   Dose:  50 mg                        PEPCID 20 MG Tabs   Generic drug:  famotidine        Take 20 mg by mouth 2 times a day.   Dose:  20 mg                        SYSTANE OP        1-2 Drops by Ophthalmic route 2 Times a Day.   Dose:  1-2 Drop                        telmisartan 40 MG Tabs   Commonly known as:  MICARDIS        Doctor's comments:  Called to pharmacy   Take 1 Tab by mouth 2 Times a Day.   Dose:  40 mg                        tramadol 50 MG Tabs   Commonly known as:  ULTRAM        Take 1 Tab by mouth every 6 hours as needed for Moderate Pain.   Dose:  50 mg                        VITAMIN D PO        Take 2,000 Units by mouth every morning.   Dose:  2000 Units                        warfarin 3 MG Tabs   Commonly known as:  COUMADIN        Take 3 mg by mouth every bedtime.   Dose:  3 mg                             Where to Get Your Medications      You can get these medications from any pharmacy     Bring a paper prescription for each of these medications    - hydrocodone-acetaminophen 5-325 MG Tabs per  tablet            Procedures and tests performed during your visit     CT-HEAD W/O    DX-CHEST-2 VIEWS    EKG (ER)    TROPONIN        Discharge Instructions       Your head CT today was negative. You may be experiencing the effects of a minor concussion. Please continue to take Tylenol for your pain. As for your blood pressure this is likely due to the effect of clonidine. Clonidine regularly increases her blood pressure once at its effects wear off. I believe that you are probably better suited for another medication, however since then an emergency doctor and not your primary care doctor and cannot follow up with you if you have any adverse effects I usually do not start new blood pressure medications or change of blood from her pressure medications. I would like you to call your primary care doctor either tomorrow or Monday to remedy this. Please follow up with cardiology on your regularly scheduled appointment          Patient Information     Patient Information    Following emergency treatment: all patient requiring follow-up care must return either to a private physician or a clinic if your condition worsens before you are able to obtain further medical attention, please return to the emergency room.     Billing Information    At [facility], we work to make the billing process streamlined for our patients.  Our Representatives are here to answer any questions you may have regarding your hospital bill.  If you have insurance coverage and have supplied your insurance information to us, we will submit a claim to your insurer on your behalf.  Should you have any questions regarding your bill, we can be reached online or by phone as follows:  Online: You are able pay your bills online or live chat with our representatives about any billing questions you may have. We are here to help Monday - Friday from 8:00am to 7:30pm and 9:00am - 12:00pm on Saturdays.  Please visit  [email or web address]  for more information.   Phone:  [phone] or [phone]    Please note that your emergency physician, surgeon, pathologist, radiologist, anesthesiologist, and other specialists are not employed by [facility] and will therefore bill separately for their services.  Please contact them directly for any questions concerning their bills at the numbers below:     Emergency Physician Services:  [phone]  [facility]:  [phone]  [facility]:  [phone]  [facility]:  [phone]    1. Your final bill may vary from the amount quoted upon discharge if all procedures are not complete at that time, or if your doctor has additional procedures of which we are not aware. You will receive an additional bill if you return to the Emergency Department at [facility] for suture removal regardless of the facility of which the sutures were placed.     2. Please arrange for settlement of this account at the emergency registration.    3. All self-pay accounts are due in full at the time of treatment.  If you are unable to meet this obligation then payment is expected within 4-5 days.     4. If you have had radiology studies (CT, X-ray, Ultrasound, MRI), you have received a preliminary result during your emergency department visit. Please contact the radiology department [phone] to receive a copy of your final result. Please discuss the Final result with your primary physician or with the follow up physician provided.     Crisis Hotline:  [place] Crisis Hotline:  0-968-FGGJRFI or 1-744.408.1162  [place] Crisis Hotline:    1-184.184.2280 or 010-443-4464         ED Discharge Follow Up Questions    1. In order to provide you with very good care, we would like to follow up with a phone call in the next few days.  May we have your permission to contact you?     YES /  NO    2. What is the best phone number to call  you? (       )_____-__________    3. What is the best time to call you?      Morning  /  Afternoon  /  Evening                   Patient Signature:  ____________________________________________________________    Date:  ____________________________________________________________      Your appointments     Aug 07, 2017 12:40 PM   PREVIOUS PATIENT with Brenna Mcclellan M.D.   [facility] (--)    [address]  [place] [address]   [phone]            Sep 27, 2017  2:00 PM   FOLLOW UP with Alex Sharp M.D.   [facility] (--)    [address]  [place] [address]   [phone]

## 2017-08-05 NOTE — DISCHARGE INSTRUCTIONS
Your head CT today was negative. You may be experiencing the effects of a minor concussion. Please continue to take Tylenol for your pain. As for your blood pressure this is likely due to the effect of clonidine. Clonidine regularly increases her blood pressure once at its effects wear off. I believe that you are probably better suited for another medication, however since then an emergency doctor and not your primary care doctor and cannot follow up with you if you have any adverse effects I usually do not start new blood pressure medications or change of blood from her pressure medications. I would like you to call your primary care doctor either tomorrow or Monday to remedy this. Please follow up with cardiology on your regularly scheduled appointment

## 2017-08-06 ENCOUNTER — PATIENT OUTREACH (OUTPATIENT)
Dept: HEALTH INFORMATION MANAGEMENT | Facility: OTHER | Age: 82
End: 2017-08-06

## 2017-08-06 NOTE — ED NOTES
Verbalizes understanding of dc and f/u instructions.  Will f/u w/ cardio.  rx given by ERP.  Released per w/c to self. Out w/ .

## 2017-08-07 ENCOUNTER — OFFICE VISIT (OUTPATIENT)
Dept: CARDIOLOGY | Facility: MEDICAL CENTER | Age: 82
End: 2017-08-07
Payer: MEDICARE

## 2017-08-07 VITALS
WEIGHT: 158 LBS | BODY MASS INDEX: 29.08 KG/M2 | HEART RATE: 76 BPM | SYSTOLIC BLOOD PRESSURE: 150 MMHG | HEIGHT: 62 IN | DIASTOLIC BLOOD PRESSURE: 78 MMHG | OXYGEN SATURATION: 95 %

## 2017-08-07 DIAGNOSIS — I48.0 PAF (PAROXYSMAL ATRIAL FIBRILLATION) (HCC): ICD-10-CM

## 2017-08-07 DIAGNOSIS — I10 ESSENTIAL HYPERTENSION: ICD-10-CM

## 2017-08-07 DIAGNOSIS — I35.0 NONRHEUMATIC AORTIC VALVE STENOSIS: ICD-10-CM

## 2017-08-07 PROCEDURE — 99215 OFFICE O/P EST HI 40 MIN: CPT | Performed by: INTERNAL MEDICINE

## 2017-08-07 RX ORDER — CARVEDILOL 25 MG/1
25 TABLET ORAL 2 TIMES DAILY
Qty: 60 TAB | Refills: 11 | Status: SHIPPED | OUTPATIENT
Start: 2017-08-07 | End: 2017-08-07 | Stop reason: SDUPTHER

## 2017-08-07 RX ORDER — AMLODIPINE BESYLATE 5 MG/1
TABLET ORAL
Qty: 45 TAB | Refills: 11 | Status: SHIPPED | OUTPATIENT
Start: 2017-08-07 | End: 2017-08-07 | Stop reason: SDUPTHER

## 2017-08-07 ASSESSMENT — ENCOUNTER SYMPTOMS
ABDOMINAL PAIN: 0
SHORTNESS OF BREATH: 1
DEPRESSION: 0
PND: 0
DIZZINESS: 0
DIAPHORESIS: 1
NERVOUS/ANXIOUS: 1
ORTHOPNEA: 0
PALPITATIONS: 0
BRUISES/BLEEDS EASILY: 0
LOSS OF CONSCIOUSNESS: 0
FALLS: 0

## 2017-08-07 NOTE — MR AVS SNAPSHOT
"        Shereen Dale   2017 12:40 PM   Office Visit   MRN: [id number]    Department:  [facility]   Dept Phone:  [phone]    Description:  Female : 1936   Provider:  Brenna Mcclellan M.D.           Reason for Visit     Follow-Up PP      Allergies as of 2017     Allergen Noted Reactions    Darvon [Propoxyphene Hcl] 2008       shock    Iodine 2015       Shock  - IV    Latex 2013       Swelling = hands with glove use    Penicillins 2008   Anaphylaxis    Propoxyphene Hcl 2008   Anaphylaxis    Tetracycline 2008   Anaphylaxis      You were diagnosed with     Essential hypertension   [1858467]       PAF (paroxysmal atrial fibrillation) (CMS-[facility])   [877958]       Nonrheumatic aortic valve stenosis   [289916]         Vital Signs     Blood Pressure Pulse Height Weight Body Mass Index Oxygen Saturation    150/78 mmHg 76 1.575 m (5' 2\") 71.668 kg (158 lb) 28.89 kg/m2 95%    Last Menstrual Period Smoking Status                1985 Former Smoker          Basic Information     Date Of Birth Sex Race Ethnicity Preferred Language    1936 Female  Non- English      Your appointments     Aug 11, 2017  9:15 AM   BLOOD PRESSURE CHECK with BLOOD PRESSURE [facility]   [facility] (--)    [address] [facility] [address]  [facility] [address]   [phone]            Sep 27, 2017  2:00 PM   FOLLOW UP with Alex Sharp M.D.   [facility] (--)    [address] [facility] [address]  [facility] [address]   [phone]              Problem List              ICD-10-CM Priority Class Noted - Resolved    Acute, but ill-defined, cerebrovascular disease (CMS-[facility]) I67.89 High  2012 - Present    Chronic anticoagulation (Chronic) Z79.01 High  2012 - Present    Cough (Chronic) R05 Low  2012 - Present    Edema (Chronic) R60.9 Low  2012 - Present    HTN (hypertension) I10 High  2012 - " Present    Aortic stenosis I35.0   7/2/2013 - Present    Diabetes (CMS-HCC) E11.9   12/26/2013 - Present    Dyspnea on effort R06.09   1/2/2014 - Present    PAF (paroxysmal atrial fibrillation) (CMS-HCC) I48.0   1/12/2015 - Present    Tear of lateral cartilage or meniscus of knee, current S83.289A   5/21/2015 - Present    Diastolic dysfunction I51.9   2/2/2016 - Present    Hypertensive urgency I16.0   5/11/2017 - Present      Health Maintenance        Date Due Completion Dates    DIABETES MONOFILAMENT / LE EXAM 6/21/1937 ---    RETINAL SCREENING 12/21/1955 ---    IMM DTaP/Tdap/Td Vaccine (1 - Tdap) 12/21/1956 ---    PAP SMEAR 12/21/1958 ---    COLONOSCOPY 12/21/1987 ---    IMM ZOSTER VACCINE 12/21/1997 ---    BONE DENSITY 3/17/2009 3/17/2004    IMM PNEUMOCOCCAL 65+ (ADULT) LOW/MEDIUM RISK SERIES (2 of 2 - PPSV23) 9/30/2016 9/30/2015    A1C SCREENING 6/14/2017 12/14/2016, 6/13/2016, 1/20/2016, 7/30/2015, 4/9/2015, 12/5/2014, 6/9/2014, 12/31/2013, 3/7/2013, 7/18/2012, 3/12/2012, 7/11/2011, 11/17/2009    IMM INFLUENZA (1) 9/1/2017 9/28/2016, 9/30/2015, 9/23/2014, 10/1/2011    FASTING LIPID PROFILE 12/14/2017 12/14/2016, 6/13/2016, 1/20/2016, 7/30/2015, 12/5/2014, 6/9/2014, 12/31/2013, 3/7/2013, 7/18/2012, 5/1/2012, 3/12/2012, 7/11/2011    URINE ACR / MICROALBUMIN 12/14/2017 12/14/2016, 6/13/2016, 1/20/2016, 7/30/2015, 12/5/2014, 6/9/2014, 3/7/2013, 7/18/2012, 3/12/2012, 7/11/2011    MAMMOGRAM 2/7/2018 2/7/2017, 12/14/2015, 12/8/2014, 12/5/2013, 12/3/2012, 12/2/2011, 12/1/2010, 11/25/2009, 11/25/2009, 11/18/2008, 11/18/2008, 3/11/2008, 3/11/2008, 9/10/2007, 9/10/2007, 9/7/2006, 9/6/2005, 8/31/2004, 8/26/2004    SERUM CREATININE 5/12/2018 5/12/2017, 5/11/2017, 12/14/2016, 6/13/2016, 1/20/2016, 7/30/2015, 5/11/2015, 4/9/2015, 12/5/2014, 6/9/2014, 12/31/2013, 3/7/2013, 7/18/2012, 4/30/2012, 3/12/2012, 7/11/2011, 11/16/2009, 8/2/2008, 8/3/2007            Current Immunizations     13-VALENT PCV PREVNAR 9/30/2015  4:11 PM     Influenza TIV (IM) 10/1/2011    Influenza Vaccine 10/1/2009    Influenza Vaccine Adult HD 9/28/2016  4:16 PM, 9/30/2015  4:09 PM    Influenza Vaccine Quad Inj (Pf) 9/23/2014    Pneumococcal Vaccine (UF)Historical Data 5/20/2009      Below and/or attached are the medications your provider expects you to take. Review all of your home medications and newly ordered medications with your provider and/or pharmacist. Follow medication instructions as directed by your provider and/or pharmacist. Please keep your medication list with you and share with your provider. Update the information when medications are discontinued, doses are changed, or new medications (including over-the-counter products) are added; and carry medication information at all times in the event of emergency situations     Allergies:  DARVON - (reactions not documented)     IODINE - (reactions not documented)     LATEX - (reactions not documented)     PENICILLINS - Anaphylaxis     PROPOXYPHENE HCL - Anaphylaxis     TETRACYCLINE - Anaphylaxis               Medications  Valid as of: August 07, 2017 -  1:52 PM    Generic Name Brand Name Tablet Size Instructions for use    AmLODIPine Besylate (Tab) NORVASC 5 MG Take 5mg in the morning and 2.5mg in the evening.        Calcium-Magnesium-Vitamin D   1 Tab every morning.        Carvedilol (Tab) COREG 25 MG Take 1 Tab by mouth 2 times a day.        Cholecalciferol   Take 2,000 Units by mouth every morning.        CloNIDine HCl (Tab) CATAPRES 0.1 MG Take 0.1 mg by mouth as needed.        Dorzolamide HCl-Timolol Mal   Place 1 Drop in both eyes 2 Times a Day.        Famotidine (Tab) PEPCID 20 MG Take 20 mg by mouth 2 times a day.        Glucosamine HCl   Take 1 Tab by mouth every morning. Pt unsure of dose        Hydrocodone-Acetaminophen (Tab) NORCO 5-325 MG Take 1 Tab by mouth every four hours as needed.        Levothyroxine Sodium (Tab) SYNTHROID 50 MCG Take 50 mcg by mouth every morning.        MetFORMIN HCl  (Tab) GLUCOPHAGE 500 MG Take 500 mg by mouth 3 times a day.        Omega-3 Fatty Acids (Cap) OMEGA 3 FA 1000 MG Take 1,000 mg by mouth every morning.        Polyethyl Glycol-Propyl Glycol   1-2 Drops by Ophthalmic route 2 Times a Day.        Telmisartan (Tab) MICARDIS 40 MG Take 1 Tab by mouth 2 Times a Day.        TraMADol HCl (Tab) ULTRAM 50 MG Take 1 Tab by mouth every 6 hours as needed for Moderate Pain.        Warfarin Sodium (Tab) COUMADIN 3 MG Take 3 mg by mouth every bedtime.        .                 Medicines prescribed today were sent to:     [id number] - [place], [place] - [address] [facility]    [address] [place] [address] [place] [address]    Phone: [phone] Fax: [phone]    Open 24 Hours?: No      Medication refill instructions:       If your prescription bottle indicates you have medication refills left, it is not necessary to call your provider’s office. Please contact your pharmacy and they will refill your medication.    If your prescription bottle indicates you do not have any refills left, you may request refills at any time through one of the following ways: The online [employer] system (except Urgent Care), by calling your provider’s office, or by asking your pharmacy to contact your provider’s office with a refill request. Medication refills are processed only during regular business hours and may not be available until the next business day. Your provider may request additional information or to have a follow-up visit with you prior to refilling your medication.   *Please Note: Medication refills are assigned a new Rx number when refilled electronically. Your pharmacy may indicate that no refills were authorized even though a new prescription for the same medication is available at the pharmacy. Please request the medicine by name with the pharmacy before contacting your provider for a refill.        Instructions    Please stop taking metoprolol  Start taking carvedilol 25mg twice a day.   Please increase  amlodipine to 5mg in AM and 2.5mg in PM.   Please take clonidine ONLY if systolic BP >180 or diastolic BP>110. This is the most important.  Please cut down on your salt intake and avoid eating out.   Please return for a blood pressure check in 1 week. Bring your blood pressure log with you.           MyChart Status: Patient Declined

## 2017-08-07 NOTE — PROGRESS NOTES
Subjective:   Shereen Dale is a 80 y.o. female with past medical history of hypertension, paroxysmal atrial fibrillation and moderate aortic stenosis who is presenting to clinic for follow-up. She is usually followed in clinic by Dr. Sharp. She was scheduled for this appointment due to concerns for her uncontrolled hypertension. In May 2017 she was hospitalized for hypertensive urgency and was seen in the urgent care a couple of days ago also for uncontrolled hypertension. She reports being well controlled on her blood pressure regimen of Micardis and carvedilol until she presented to the hospital in May 2017 at which time her carvedilol was discontinued and she was started on metoprolol XL and amlodipine.    She brings today with her an extensive log of her blood pressures. Her blood pressures have been extremely labile however trend suggests that in the morning her blood pressure is usually low with systolics about 100s and in the afternoon/evening her blood pressure is usually significantly higher, mostly over 160s systolic and she usually takes her clonidine when necessary in the afternoon. She also notes that when her blood pressure is less than 100 systolic, she holds her medications until later in the day. Her clonidine as currently prescribed to be taken if her systolic blood pressure is over 160 or diastolic blood pressures over 90.    When her blood pressure is uncontrolled, she reports having severe chest discomfort and shortness of breath. Her symptoms resolved when her blood pressure improves.    Patient is concerned that her uncontrolled hypertension is due to her ileus and that she needs aortic valve replacement.    Past Medical History   Diagnosis Date   • Hypothyroid    • Glaucoma    • Hypertension    • Heart murmur    • Backpain      Lower back pain   • Chronic anticoagulation 5/2/2012   • Cough 5/2/2012   • Edema 5/2/2012   • Breast cancer (CMS-HCC)    • Heart valve disease    • Heart  burn    • Cancer (CMS-HCC) 1993     right breast   • Diabetes      oral medication   • Arrhythmia      A-fib   • Paroxysmal atrial fibrillation (CMS-HCC)    • Dental disorder      dentures   • Sleep apnea      no cpap   • tia      TIA x2   • CATARACT      celestine IOL   • Unspecified hemorrhagic conditions      takes coumadin   • Breath shortness      uses 2-3 L O2 at night   • Oxygen deficiency      uses 2.5-3 l at night   • AF (atrial fibrillation) (CMS-HCC)      Past Surgical History   Procedure Laterality Date   • Lumpectomy       R breast   • Cholecystectomy     • Hysterectomy radical     • Pr radiation therapy plan simple     • Cataract phaco with iol  9/23/2013     Performed by Dominick [facility] M.D. at [facility]   • Cataract phaco with iol  10/21/2013     Performed by Dominick Fernando M.D. at [facility]   • Knee arthroscopy Right 5/21/2015     Procedure: KNEE ARTHROSCOPY;  Surgeon: Emerson Garcia M.D.;  Location: [facility];  Service:    • Meniscectomy Right 5/21/2015     Procedure: LATERAL MENISCECTOMY;  Surgeon: Emerson Garcia M.D.;  Location: SURGERY [facility];  Service:      Family History   Problem Relation Age of Onset   • Heart Attack Mother    • Heart Disease Father      History   Smoking status   • Former Smoker -- 0.50 packs/day for 2.5 years   • Types: Cigarettes   • Quit date: 01/01/1964   Smokeless tobacco   • Never Used     Allergies   Allergen Reactions   • Darvon [Propoxyphene Hcl]      shock   • Iodine      Shock  - IV   • Latex      Swelling = hands with glove use   • Penicillins Anaphylaxis   • Propoxyphene Hcl Anaphylaxis   • Tetracycline Anaphylaxis     Outpatient Encounter Prescriptions as of 8/7/2017   Medication Sig Dispense Refill   • carvedilol (COREG) 25 MG Tab Take 1 Tab by mouth 2 times a day. 60 Tab 11   • amlodipine (NORVASC) 5 MG Tab Take 5mg in the morning and 2.5mg in the evening. 45 Tab 11   • clonidine (CATAPRES) 0.1 MG  Tab Take 0.1 mg by mouth as needed.     • telmisartan (MICARDIS) 40 MG Tab Take 1 Tab by mouth 2 Times a Day. 60 Tab 6   • warfarin (COUMADIN) 3 MG TABS Take 3 mg by mouth every bedtime.     • Glucosamine HCl (GLUCOSAMINE PO) Take 1 Tab by mouth every morning. Pt unsure of dose     • Polyethyl Glycol-Propyl Glycol (SYSTANE OP) 1-2 Drops by Ophthalmic route 2 Times a Day.     • docosahexanoic acid (OMEGA 3 FA) 1000 MG CAPS Take 1,000 mg by mouth every morning.     • levothyroxine (SYNTHROID) 50 MCG TABS Take 50 mcg by mouth every morning.     • VITAMIN D PO Take 2,000 Units by mouth every morning.     • Dorzolamide-Timolol (COSOPT OP) Place 1 Drop in both eyes 2 Times a Day.     • METFORMIN  MG PO TABS Take 500 mg by mouth 3 times a day.     • CALCIUM 500 PO 1 Tab every morning.     • PEPCID 20 MG PO TABS Take 20 mg by mouth 2 times a day.     • hydrocodone-acetaminophen (NORCO) 5-325 MG Tab per tablet Take 1 Tab by mouth every four hours as needed. 12 Tab 0   • tramadol (ULTRAM) 50 MG Tab Take 1 Tab by mouth every 6 hours as needed for Moderate Pain. (Patient not taking: Reported on 5/24/2017) 30 Tab 0   • [DISCONTINUED] metoprolol SR (TOPROL XL) 50 MG TABLET SR 24 HR Take 1 Tab by mouth every day. 30 Tab 2   • [DISCONTINUED] amlodipine (NORVASC) 5 MG Tab Take 1 Tab by mouth every day. 30 Tab 2     No facility-administered encounter medications on file as of 8/7/2017.     Review of Systems   Constitutional: Positive for diaphoresis. Negative for malaise/fatigue.   HENT: Negative.    Respiratory: Positive for shortness of breath.    Cardiovascular: Positive for chest pain. Negative for palpitations, orthopnea, leg swelling and PND.   Gastrointestinal: Negative for abdominal pain.   Musculoskeletal: Negative for falls.   Skin: Negative.    Neurological: Negative for dizziness and loss of consciousness.   Endo/Heme/Allergies: Does not bruise/bleed easily.   Psychiatric/Behavioral: Negative for depression. The  "patient is nervous/anxious.    All other systems reviewed and are negative.       Objective:   /78 mmHg  Pulse 76  Ht 1.575 m (5' 2\")  Wt 71.668 kg (158 lb)  BMI 28.89 kg/m2  SpO2 95%  LMP 01/01/1985    Physical Exam   Constitutional: She is oriented to person, place, and time. No distress.   HENT:   Head: Normocephalic and atraumatic.   Eyes: Conjunctivae are normal.   Neck: Normal range of motion. Neck supple. No JVD present.   Cardiovascular: Normal rate and regular rhythm.  Exam reveals no gallop and no friction rub.    Murmur heard.   Crescendo decrescendo systolic murmur is present with a grade of 3/6   mid peaking   Pulmonary/Chest: Effort normal and breath sounds normal. No respiratory distress. She has no wheezes. She has no rales.   Abdominal: Soft. There is no tenderness.   Musculoskeletal: She exhibits no edema.   Neurological: She is alert and oriented to person, place, and time.   Skin: Skin is warm and dry. She is not diaphoretic.   Psychiatric: She has a normal mood and affect.   Nursing note and vitals reviewed.    Echocardiogram from January 2017 was reviewed  CONCLUSIONS  Compared to the images of the prior study done on 01/29/15 - The AS is   now moderate.  Normal left ventricular size and systolic function.  Left ventricular ejection fraction is visually estimated to be 65%.  Grade I diastolic dysfunction.  Moderate aortic stenosis.  Transvalvular gradients are - Peak: 37 mmHg, Mean: 21 mmHg.  Normal inferior vena cava size and inspiratory collapse.    Labs from May 2017 was reviewed. Normal hemoglobin. Normal creatinine. Sodium 131.    Assessment:     1. Essential hypertension  carvedilol (COREG) 25 MG Tab    amlodipine (NORVASC) 5 MG Tab   2. PAF (paroxysmal atrial fibrillation) (CMS-[facility])     3. Nonrheumatic aortic valve stenosis         Medical Decision Making:  Today's Assessment / Status / Plan:     Labile blood pressure-unclear why her blood pressures have been elevated since " "May, possibly due to her excessive salt intake. But her recent labile numbers are likely a combination of clonidine prn and holding medications when she is \"hypotensive\". She takes her clonidine in the evening and the next day she is \"hypotensive\".  I have strongly advised the patient to avoid excessive salt intake. She should avoid eating out as much as she does right now.  Unclear why her carvedilol was switched to metoprolol. Carvedilol is a better drug for blood pressure control. I will discontinue her metoprolol and restart her on carvedilol 25 mg twice a day.  Continue Micardis at current dose.  Increase amlodipine from 5 mg daily to 5 mg in the morning and 2.5 mg in the evening.  Patient will come back for a blood pressure and heart rate check in one week. We will adjust her medications as needed.  I have changed her clonidine instructions. She should only take her clonidine if her systolic blood pressures over 180 or diastolic is over 110. Currently her blood pressure readings are hard to interpret due to very consistent clonidine use in the evening followed by hypotension in the morning. She should also avoid holding her medications unless she is actually symptomatic with her \"hypotension\".    Patient is concerned that she does not want to go to the ER again. I advised her that there is no guarantee that her blood pressure will be controlled and she'll be asymptomatic. With the above medication changes her blood pressure should be more normalized however if she has severe symptoms she should go to the ER for reevaluation.    Patient is already scheduled to follow-up with Dr. Sharp in about 6 weeks.    Total 60 minutes face-to-face time spent with patient, with greater than 50% of the total time discussing patient's issues and symptoms as listed above in assessment and plan, as well as managing coordination of care for future evaluation and treatment.    Thank you for allowing me to participate in the " care of this patient. Please do not hesitate to contact me with any questions.    Brenna Mcclellan MD  Cardiologist  [facility]      PLEASE NOTE: This dictation was created using voice recognition software.

## 2017-08-07 NOTE — PATIENT INSTRUCTIONS
Please stop taking metoprolol  Start taking carvedilol 25mg twice a day.   Please increase amlodipine to 5mg in AM and 2.5mg in PM.   Please take clonidine ONLY if systolic BP >180 or diastolic BP>110. This is the most important.  Please cut down on your salt intake and avoid eating out.   Please return for a blood pressure check in 1 week. Bring your blood pressure log with you.

## 2017-08-07 NOTE — Clinical Note
[facility]   [address] [place] [address] [address]  MICHAEL Rodriguez [address]  Phone: [phone]  Fax: [phone]              Shereen Dale  12/21/1936    Encounter Date: 8/7/2017    Brenna Mcclellan M.D.          PROGRESS NOTE:  Subjective:   Shereen Dale is a 80 y.o. female with past medical history of hypertension, paroxysmal atrial fibrillation and moderate aortic stenosis who is presenting to clinic for follow-up. She is usually followed in clinic by Dr. Sharp. She was scheduled for this appointment due to concerns for her uncontrolled hypertension. In May 2017 she was hospitalized for hypertensive urgency and was seen in the urgent care a couple of days ago also for uncontrolled hypertension. She reports being well controlled on her blood pressure regimen of Micardis and carvedilol until she presented to the hospital in May 2017 at which time her carvedilol was discontinued and she was started on metoprolol XL and amlodipine.    She brings today with her an extensive log of her blood pressures. Her blood pressures have been extremely labile however trend suggests that in the morning her blood pressure is usually low with systolics about 100s and in the afternoon/evening her blood pressure is usually significantly higher, mostly over 160s systolic and she usually takes her clonidine when necessary in the afternoon. She also notes that when her blood pressure is less than 100 systolic, she holds her medications until later in the day. Her clonidine as currently prescribed to be taken if her systolic blood pressure is over 160 or diastolic blood pressures over 90.    When her blood pressure is uncontrolled, she reports having severe chest discomfort and shortness of breath. Her symptoms resolved when her blood pressure improves.    Patient is concerned that her uncontrolled hypertension is due to her ileus and that she needs aortic valve replacement.    Past Medical  History   Diagnosis Date   • Hypothyroid    • Glaucoma    • Hypertension    • Heart murmur    • Backpain      Lower back pain   • Chronic anticoagulation 5/2/2012   • Cough 5/2/2012   • Edema 5/2/2012   • Breast cancer (CMS-HCC)    • Heart valve disease    • Heart burn    • Cancer (CMS-HCC) 1993     right breast   • Diabetes      oral medication   • Arrhythmia      A-fib   • Paroxysmal atrial fibrillation (CMS-HCC)    • Dental disorder      dentures   • Sleep apnea      no cpap   • tia      TIA x2   • CATARACT      celestine IOL   • Unspecified hemorrhagic conditions      takes coumadin   • Breath shortness      uses 2-3 L O2 at night   • Oxygen deficiency      uses 2.5-3 l at night   • AF (atrial fibrillation) (CMS-HCC)      Past Surgical History   Procedure Laterality Date   • Lumpectomy       R breast   • Cholecystectomy     • Hysterectomy radical     • Pr radiation therapy plan simple     • Cataract phaco with iol  9/23/2013     Performed by Dominick Fernando M.D. at [facility]   • Cataract phaco with iol  10/21/2013     Performed by Dominick Fernando M.D. at [facility]   • Knee arthroscopy Right 5/21/2015     Procedure: KNEE ARTHROSCOPY;  Surgeon: Emerson Garcia M.D.;  Location: SURGERY [facility];  Service:    • Meniscectomy Right 5/21/2015     Procedure: LATERAL MENISCECTOMY;  Surgeon: Emerson Garcia M.D.;  Location: [facility];  Service:      Family History   Problem Relation Age of Onset   • Heart Attack Mother    • Heart Disease Father      History   Smoking status   • Former Smoker -- 0.50 packs/day for 2.5 years   • Types: Cigarettes   • Quit date: 01/01/1964   Smokeless tobacco   • Never Used     Allergies   Allergen Reactions   • Darvon [Propoxyphene Hcl]      shock   • Iodine      Shock  - IV   • Latex      Swelling = hands with glove use   • Penicillins Anaphylaxis   • Propoxyphene Hcl Anaphylaxis   • Tetracycline Anaphylaxis     Outpatient Encounter  Prescriptions as of 8/7/2017   Medication Sig Dispense Refill   • carvedilol (COREG) 25 MG Tab Take 1 Tab by mouth 2 times a day. 60 Tab 11   • amlodipine (NORVASC) 5 MG Tab Take 5mg in the morning and 2.5mg in the evening. 45 Tab 11   • clonidine (CATAPRES) 0.1 MG Tab Take 0.1 mg by mouth as needed.     • telmisartan (MICARDIS) 40 MG Tab Take 1 Tab by mouth 2 Times a Day. 60 Tab 6   • warfarin (COUMADIN) 3 MG TABS Take 3 mg by mouth every bedtime.     • Glucosamine HCl (GLUCOSAMINE PO) Take 1 Tab by mouth every morning. Pt unsure of dose     • Polyethyl Glycol-Propyl Glycol (SYSTANE OP) 1-2 Drops by Ophthalmic route 2 Times a Day.     • docosahexanoic acid (OMEGA 3 FA) 1000 MG CAPS Take 1,000 mg by mouth every morning.     • levothyroxine (SYNTHROID) 50 MCG TABS Take 50 mcg by mouth every morning.     • VITAMIN D PO Take 2,000 Units by mouth every morning.     • Dorzolamide-Timolol (COSOPT OP) Place 1 Drop in both eyes 2 Times a Day.     • METFORMIN  MG PO TABS Take 500 mg by mouth 3 times a day.     • CALCIUM 500 PO 1 Tab every morning.     • PEPCID 20 MG PO TABS Take 20 mg by mouth 2 times a day.     • hydrocodone-acetaminophen (NORCO) 5-325 MG Tab per tablet Take 1 Tab by mouth every four hours as needed. 12 Tab 0   • tramadol (ULTRAM) 50 MG Tab Take 1 Tab by mouth every 6 hours as needed for Moderate Pain. (Patient not taking: Reported on 5/24/2017) 30 Tab 0   • [DISCONTINUED] metoprolol SR (TOPROL XL) 50 MG TABLET SR 24 HR Take 1 Tab by mouth every day. 30 Tab 2   • [DISCONTINUED] amlodipine (NORVASC) 5 MG Tab Take 1 Tab by mouth every day. 30 Tab 2     No facility-administered encounter medications on file as of 8/7/2017.     Review of Systems   Constitutional: Positive for diaphoresis. Negative for malaise/fatigue.   HENT: Negative.    Respiratory: Positive for shortness of breath.    Cardiovascular: Positive for chest pain. Negative for palpitations, orthopnea, leg swelling and PND.    "  Gastrointestinal: Negative for abdominal pain.   Musculoskeletal: Negative for falls.   Skin: Negative.    Neurological: Negative for dizziness and loss of consciousness.   Endo/Heme/Allergies: Does not bruise/bleed easily.   Psychiatric/Behavioral: Negative for depression. The patient is nervous/anxious.    All other systems reviewed and are negative.       Objective:   /78 mmHg  Pulse 76  Ht 1.575 m (5' 2\")  Wt 71.668 kg (158 lb)  BMI 28.89 kg/m2  SpO2 95%  LMP 01/01/1985    Physical Exam   Constitutional: She is oriented to person, place, and time. No distress.   HENT:   Head: Normocephalic and atraumatic.   Eyes: Conjunctivae are normal.   Neck: Normal range of motion. Neck supple. No JVD present.   Cardiovascular: Normal rate and regular rhythm.  Exam reveals no gallop and no friction rub.    Murmur heard.   Crescendo decrescendo systolic murmur is present with a grade of 3/6   mid peaking   Pulmonary/Chest: Effort normal and breath sounds normal. No respiratory distress. She has no wheezes. She has no rales.   Abdominal: Soft. There is no tenderness.   Musculoskeletal: She exhibits no edema.   Neurological: She is alert and oriented to person, place, and time.   Skin: Skin is warm and dry. She is not diaphoretic.   Psychiatric: She has a normal mood and affect.   Nursing note and vitals reviewed.    Echocardiogram from January 2017 was reviewed  CONCLUSIONS  Compared to the images of the prior study done on 01/29/15 - The AS is   now moderate.  Normal left ventricular size and systolic function.  Left ventricular ejection fraction is visually estimated to be 65%.  Grade I diastolic dysfunction.  Moderate aortic stenosis.  Transvalvular gradients are - Peak: 37 mmHg, Mean: 21 mmHg.  Normal inferior vena cava size and inspiratory collapse.    Labs from May 2017 was reviewed. Normal hemoglobin. Normal creatinine. Sodium 131.    Assessment:     1. Essential hypertension  carvedilol (COREG) 25 MG Tab " "   amlodipine (NORVASC) 5 MG Tab   2. PAF (paroxysmal atrial fibrillation) (CMS-HCC)     3. Nonrheumatic aortic valve stenosis         Medical Decision Making:  Today's Assessment / Status / Plan:     Labile blood pressure-unclear why her blood pressures have been elevated since May, possibly due to her excessive salt intake. But her recent labile numbers are likely a combination of clonidine prn and holding medications when she is \"hypotensive\". She takes her clonidine in the evening and the next day she is \"hypotensive\".  I have strongly advised the patient to avoid excessive salt intake. She should avoid eating out as much as she does right now.  Unclear why her carvedilol was switched to metoprolol. Carvedilol is a better drug for blood pressure control. I will discontinue her metoprolol and restart her on carvedilol 25 mg twice a day.  Continue Micardis at current dose.  Increase amlodipine from 5 mg daily to 5 mg in the morning and 2.5 mg in the evening.  Patient will come back for a blood pressure and heart rate check in one week. We will adjust her medications as needed.  I have changed her clonidine instructions. She should only take her clonidine if her systolic blood pressures over 180 or diastolic is over 110. Currently her blood pressure readings are hard to interpret due to very consistent clonidine use in the evening followed by hypotension in the morning. She should also avoid holding her medications unless she is actually symptomatic with her \"hypotension\".    Patient is concerned that she does not want to go to the ER again. I advised her that there is no guarantee that her blood pressure will be controlled and she'll be asymptomatic. With the above medication changes her blood pressure should be more normalized however if she has severe symptoms she should go to the ER for reevaluation.    Patient is already scheduled to follow-up with Dr. Sharp in about 6 weeks.    Total 60 minutes " face-to-face time spent with patient, with greater than 50% of the total time discussing patient's issues and symptoms as listed above in assessment and plan, as well as managing coordination of care for future evaluation and treatment.    Thank you for allowing me to participate in the care of this patient. Please do not hesitate to contact me with any questions.    Brenna Mcclellan MD  Cardiologist  [facility]      PLEASE NOTE: This dictation was created using voice recognition software.         Kristin Cornell M.D.  [address] [facility] [address]  [facility] [address]  VIA Facsimile: [phone]

## 2017-08-08 LAB — EKG IMPRESSION: NORMAL

## 2017-08-10 ENCOUNTER — HOSPITAL ENCOUNTER (OUTPATIENT)
Dept: LAB | Facility: MEDICAL CENTER | Age: 82
End: 2017-08-10
Attending: FAMILY MEDICINE
Payer: MEDICARE

## 2017-08-10 LAB
BASOPHILS # BLD AUTO: 0.8 % (ref 0–1.8)
BASOPHILS # BLD: 0.08 K/UL (ref 0–0.12)
EOSINOPHIL # BLD AUTO: 0.32 K/UL (ref 0–0.51)
EOSINOPHIL NFR BLD: 3.3 % (ref 0–6.9)
ERYTHROCYTE [DISTWIDTH] IN BLOOD BY AUTOMATED COUNT: 39.8 FL (ref 35.9–50)
HCT VFR BLD AUTO: 36 % (ref 37–47)
HGB BLD-MCNC: 12.5 G/DL (ref 12–16)
IMM GRANULOCYTES # BLD AUTO: 0.06 K/UL (ref 0–0.11)
IMM GRANULOCYTES NFR BLD AUTO: 0.6 % (ref 0–0.9)
LYMPHOCYTES # BLD AUTO: 2.64 K/UL (ref 1–4.8)
LYMPHOCYTES NFR BLD: 27.6 % (ref 22–41)
MCH RBC QN AUTO: 30.3 PG (ref 27–33)
MCHC RBC AUTO-ENTMCNC: 34.7 G/DL (ref 33.6–35)
MCV RBC AUTO: 87.4 FL (ref 81.4–97.8)
MONOCYTES # BLD AUTO: 0.79 K/UL (ref 0–0.85)
MONOCYTES NFR BLD AUTO: 8.3 % (ref 0–13.4)
NEUTROPHILS # BLD AUTO: 5.68 K/UL (ref 2–7.15)
NEUTROPHILS NFR BLD: 59.4 % (ref 44–72)
NRBC # BLD AUTO: 0 K/UL
NRBC BLD AUTO-RTO: 0 /100 WBC
PLATELET # BLD AUTO: 263 K/UL (ref 164–446)
PMV BLD AUTO: 8.8 FL (ref 9–12.9)
RBC # BLD AUTO: 4.12 M/UL (ref 4.2–5.4)
WBC # BLD AUTO: 9.6 K/UL (ref 4.8–10.8)

## 2017-08-10 PROCEDURE — 36415 COLL VENOUS BLD VENIPUNCTURE: CPT

## 2017-08-10 PROCEDURE — 85025 COMPLETE CBC W/AUTO DIFF WBC: CPT

## 2017-08-11 ENCOUNTER — HOSPITAL ENCOUNTER (OUTPATIENT)
Dept: RADIOLOGY | Facility: MEDICAL CENTER | Age: 82
End: 2017-08-11
Attending: FAMILY MEDICINE
Payer: MEDICARE

## 2017-08-11 ENCOUNTER — NON-PROVIDER VISIT (OUTPATIENT)
Dept: CARDIOLOGY | Facility: MEDICAL CENTER | Age: 82
End: 2017-08-11

## 2017-08-11 VITALS — SYSTOLIC BLOOD PRESSURE: 128 MMHG | HEART RATE: 80 BPM | RESPIRATION RATE: 18 BRPM | DIASTOLIC BLOOD PRESSURE: 74 MMHG

## 2017-08-11 DIAGNOSIS — N64.4 BREAST PAIN: ICD-10-CM

## 2017-08-11 PROCEDURE — 76642 ULTRASOUND BREAST LIMITED: CPT | Mod: LT

## 2017-08-11 PROCEDURE — G0279 TOMOSYNTHESIS, MAMMO: HCPCS | Mod: LT

## 2017-08-11 NOTE — NON-PROVIDER
Patient came in today for a blood pressure check.  See vitals section for the results.  Notes to the nurse.

## 2017-08-11 NOTE — MR AVS SNAPSHOT
Shereen Dale   2017 9:15 AM   Non-Provider Visit   MRN: [id number]    Department:  [facility]   Dept Phone:  [phone]    Description:  Female : 1936   Provider:  [facility]           Reason for Visit     Hypertension BP check per AA      Allergies as of 2017     Allergen Noted Reactions    Darvon [Propoxyphene Hcl] 2008       shock    Iodine 2015       Shock  - IV    Latex 2013       Swelling = hands with glove use    Penicillins 2008   Anaphylaxis    Propoxyphene Hcl 2008   Anaphylaxis    Tetracycline 2008   Anaphylaxis      Vital Signs     Blood Pressure Pulse Respirations Last Menstrual Period Smoking Status       128/74 mmHg 80 18 1985 Former Smoker       Basic Information     Date Of Birth Sex Race Ethnicity Preferred Language    1936 Female  Non- English      Your appointments     Aug 11, 2017  2:30 PM   MA DX30 with S [place] MG 1   [facility] IMAGING [facility] ([place])    [address]  [place] [address]   [phone]            Sep 27, 2017  2:00 PM   FOLLOW UP with Alex Sharp M.D.   [facility] (--)    [address]  [place] [address]   [phone]              Problem List              ICD-10-CM Priority Class Noted - Resolved    Acute, but ill-defined, cerebrovascular disease (CMS-HCC) I67.89 High  2012 - Present    Chronic anticoagulation (Chronic) Z79.01 High  2012 - Present    Cough (Chronic) R05 Low  2012 - Present    Edema (Chronic) R60.9 Low  2012 - Present    HTN (hypertension) I10 High  2012 - Present    Aortic stenosis I35.0   2013 - Present    Diabetes (CMS-HCC) E11.9   2013 - Present    Dyspnea on effort R06.09   2014 - Present    PAF (paroxysmal atrial fibrillation) (CMS-HCC) I48.0   2015 - Present    Tear of lateral cartilage or meniscus of  knee, current S83.289A   5/21/2015 - Present    Diastolic dysfunction I51.9   2/2/2016 - Present    Hypertensive urgency I16.0   5/11/2017 - Present      Health Maintenance        Date Due Completion Dates    DIABETES MONOFILAMENT / LE EXAM 6/21/1937 ---    RETINAL SCREENING 12/21/1955 ---    IMM DTaP/Tdap/Td Vaccine (1 - Tdap) 12/21/1956 ---    PAP SMEAR 12/21/1958 ---    COLONOSCOPY 12/21/1987 ---    IMM ZOSTER VACCINE 12/21/1997 ---    BONE DENSITY 3/17/2009 3/17/2004    IMM PNEUMOCOCCAL 65+ (ADULT) LOW/MEDIUM RISK SERIES (2 of 2 - PPSV23) 9/30/2016 9/30/2015    A1C SCREENING 6/14/2017 12/14/2016, 6/13/2016, 1/20/2016, 7/30/2015, 4/9/2015, 12/5/2014, 6/9/2014, 12/31/2013, 3/7/2013, 7/18/2012, 3/12/2012, 7/11/2011, 11/17/2009    IMM INFLUENZA (1) 9/1/2017 9/28/2016, 9/30/2015, 9/23/2014, 10/1/2011    FASTING LIPID PROFILE 12/14/2017 12/14/2016, 6/13/2016, 1/20/2016, 7/30/2015, 12/5/2014, 6/9/2014, 12/31/2013, 3/7/2013, 7/18/2012, 5/1/2012, 3/12/2012, 7/11/2011    URINE ACR / MICROALBUMIN 12/14/2017 12/14/2016, 6/13/2016, 1/20/2016, 7/30/2015, 12/5/2014, 6/9/2014, 3/7/2013, 7/18/2012, 3/12/2012, 7/11/2011    MAMMOGRAM 2/7/2018 2/7/2017, 12/14/2015, 12/8/2014, 12/5/2013, 12/3/2012, 12/2/2011, 12/1/2010, 11/25/2009, 11/25/2009, 11/18/2008, 11/18/2008, 3/11/2008, 3/11/2008, 9/10/2007, 9/10/2007, 9/7/2006, 9/6/2005, 8/31/2004, 8/26/2004    SERUM CREATININE 5/12/2018 5/12/2017, 5/11/2017, 12/14/2016, 6/13/2016, 1/20/2016, 7/30/2015, 5/11/2015, 4/9/2015, 12/5/2014, 6/9/2014, 12/31/2013, 3/7/2013, 7/18/2012, 4/30/2012, 3/12/2012, 7/11/2011, 11/16/2009, 8/2/2008, 8/3/2007            Current Immunizations     13-VALENT PCV PREVNAR 9/30/2015  4:11 PM    Influenza TIV (IM) 10/1/2011    Influenza Vaccine 10/1/2009    Influenza Vaccine Adult HD 9/28/2016  4:16 PM, 9/30/2015  4:09 PM    Influenza Vaccine Quad Inj (Pf) 9/23/2014    Pneumococcal Vaccine (UF)Historical Data 5/20/2009      Below and/or attached are the medications  your provider expects you to take. Review all of your home medications and newly ordered medications with your provider and/or pharmacist. Follow medication instructions as directed by your provider and/or pharmacist. Please keep your medication list with you and share with your provider. Update the information when medications are discontinued, doses are changed, or new medications (including over-the-counter products) are added; and carry medication information at all times in the event of emergency situations     Allergies:  DARVON - (reactions not documented)     IODINE - (reactions not documented)     LATEX - (reactions not documented)     PENICILLINS - Anaphylaxis     PROPOXYPHENE HCL - Anaphylaxis     TETRACYCLINE - Anaphylaxis               Medications  Valid as of: August 11, 2017 -  9:38 AM    Generic Name Brand Name Tablet Size Instructions for use    AmLODIPine Besylate (Tab) NORVASC 5 MG Take 5mg in the morning and 2.5mg in the evening.        Calcium-Magnesium-Vitamin D   1 Tab every morning.        Carvedilol (Tab) COREG 25 MG Take 1 Tab by mouth 2 times a day.        Cholecalciferol   Take 2,000 Units by mouth every morning.        CloNIDine HCl (Tab) CATAPRES 0.1 MG Take 0.1 mg by mouth as needed.        Dorzolamide HCl-Timolol Mal   Place 1 Drop in both eyes 2 Times a Day.        Famotidine (Tab) PEPCID 20 MG Take 20 mg by mouth 2 times a day.        Glucosamine HCl   Take 1 Tab by mouth every morning. Pt unsure of dose        Hydrocodone-Acetaminophen (Tab) NORCO 5-325 MG Take 1 Tab by mouth every four hours as needed.        Levothyroxine Sodium (Tab) SYNTHROID 50 MCG Take 50 mcg by mouth every morning.        MetFORMIN HCl (Tab) GLUCOPHAGE 500 MG Take 500 mg by mouth 3 times a day.        Omega-3 Fatty Acids (Cap) OMEGA 3 FA 1000 MG Take 1,000 mg by mouth every morning.        Polyethyl Glycol-Propyl Glycol   1-2 Drops by Ophthalmic route 2 Times a Day.        Telmisartan (Tab) MICARDIS 40 MG  Take 1 Tab by mouth 2 Times a Day.        TraMADol HCl (Tab) ULTRAM 50 MG Take 1 Tab by mouth every 6 hours as needed for Moderate Pain.        Warfarin Sodium (Tab) COUMADIN 3 MG Take 3 mg by mouth every bedtime.        .                 Medicines prescribed today were sent to:     [facility] [address] [place], [place] - [address] [facility]    [address] [place] [address] [place] [address]    Phone: [phone] Fax: [phone]    Open 24 Hours?: No      Medication refill instructions:       If your prescription bottle indicates you have medication refills left, it is not necessary to call your provider’s office. Please contact your pharmacy and they will refill your medication.    If your prescription bottle indicates you do not have any refills left, you may request refills at any time through one of the following ways: The online [employer] system (except Urgent Care), by calling your provider’s office, or by asking your pharmacy to contact your provider’s office with a refill request. Medication refills are processed only during regular business hours and may not be available until the next business day. Your provider may request additional information or to have a follow-up visit with you prior to refilling your medication.   *Please Note: Medication refills are assigned a new Rx number when refilled electronically. Your pharmacy may indicate that no refills were authorized even though a new prescription for the same medication is available at the pharmacy. Please request the medicine by name with the pharmacy before contacting your provider for a refill.           [employer] Status: Patient Declined

## 2017-08-14 RX ORDER — AMLODIPINE BESYLATE 5 MG/1
TABLET ORAL
Qty: 45 TAB | Refills: 11 | OUTPATIENT
Start: 2017-08-14 | End: 2018-01-23 | Stop reason: SDUPTHER

## 2017-08-14 RX ORDER — CARVEDILOL 25 MG/1
25 TABLET ORAL 2 TIMES DAILY
Qty: 60 TAB | Refills: 11 | OUTPATIENT
Start: 2017-08-14 | End: 2018-01-23 | Stop reason: SDUPTHER

## 2017-09-20 ENCOUNTER — TELEPHONE (OUTPATIENT)
Dept: CARDIOLOGY | Facility: MEDICAL CENTER | Age: 82
End: 2017-09-20

## 2017-09-20 DIAGNOSIS — I35.0 NONRHEUMATIC AORTIC VALVE STENOSIS: ICD-10-CM

## 2017-09-20 DIAGNOSIS — E78.5 OTHER AND UNSPECIFIED HYPERLIPIDEMIA: ICD-10-CM

## 2017-09-20 DIAGNOSIS — I10 ESSENTIAL HYPERTENSION: ICD-10-CM

## 2017-09-20 DIAGNOSIS — I48.0 PAF (PAROXYSMAL ATRIAL FIBRILLATION) (HCC): ICD-10-CM

## 2017-09-20 DIAGNOSIS — Z79.01 CHRONIC ANTICOAGULATION: Chronic | ICD-10-CM

## 2017-09-20 NOTE — TELEPHONE ENCOUNTER
S/w pt about labs. Educated her that there are recent blood work from May 2016. Pt is requesting new orders be placed.   Okay per TW for CMP and Lipid.   Labs ordered and mailed to pt's home address per request.     ----- Message from Dari Lucas sent at 9/20/2017 11:33 AM PDT -----  Regarding: patient calling about labwork for 09/27 appt  TW/Hortensia    Patient has annual appt next week and wants to know if she needs lab work. She can be reached at [phone].

## 2017-09-21 DIAGNOSIS — I10 ESSENTIAL HYPERTENSION: ICD-10-CM

## 2017-09-22 RX ORDER — TELMISARTAN 40 MG/1
40 TABLET ORAL 2 TIMES DAILY
Qty: 60 TAB | Refills: 6 | Status: SHIPPED | OUTPATIENT
Start: 2017-09-22 | End: 2018-01-23 | Stop reason: SDUPTHER

## 2017-09-23 ENCOUNTER — APPOINTMENT (OUTPATIENT)
Dept: LAB | Facility: MEDICAL CENTER | Age: 82
End: 2017-09-23
Attending: FAMILY MEDICINE
Payer: MEDICARE

## 2017-09-23 ENCOUNTER — HOSPITAL ENCOUNTER (OUTPATIENT)
Dept: LAB | Facility: MEDICAL CENTER | Age: 82
End: 2017-09-23
Attending: INTERNAL MEDICINE
Payer: MEDICARE

## 2017-09-23 DIAGNOSIS — E78.5 OTHER AND UNSPECIFIED HYPERLIPIDEMIA: ICD-10-CM

## 2017-09-23 DIAGNOSIS — I48.0 PAF (PAROXYSMAL ATRIAL FIBRILLATION) (HCC): ICD-10-CM

## 2017-09-23 DIAGNOSIS — Z79.01 CHRONIC ANTICOAGULATION: Chronic | ICD-10-CM

## 2017-09-23 DIAGNOSIS — I35.0 NONRHEUMATIC AORTIC VALVE STENOSIS: ICD-10-CM

## 2017-09-23 DIAGNOSIS — I10 ESSENTIAL HYPERTENSION: ICD-10-CM

## 2017-09-23 LAB
ALBUMIN SERPL BCP-MCNC: 4.1 G/DL (ref 3.2–4.9)
ALBUMIN/GLOB SERPL: 1.4 G/DL
ALP SERPL-CCNC: 40 U/L (ref 30–99)
ALT SERPL-CCNC: 55 U/L (ref 2–50)
ANION GAP SERPL CALC-SCNC: 9 MMOL/L (ref 0–11.9)
AST SERPL-CCNC: 36 U/L (ref 12–45)
BILIRUB SERPL-MCNC: 0.5 MG/DL (ref 0.1–1.5)
BUN SERPL-MCNC: 13 MG/DL (ref 8–22)
CALCIUM SERPL-MCNC: 9.5 MG/DL (ref 8.5–10.5)
CHLORIDE SERPL-SCNC: 106 MMOL/L (ref 96–112)
CHOLEST SERPL-MCNC: 164 MG/DL (ref 100–199)
CO2 SERPL-SCNC: 23 MMOL/L (ref 20–33)
CREAT SERPL-MCNC: 0.68 MG/DL (ref 0.5–1.4)
GFR SERPL CREATININE-BSD FRML MDRD: >60 ML/MIN/1.73 M 2
GLOBULIN SER CALC-MCNC: 2.9 G/DL (ref 1.9–3.5)
GLUCOSE SERPL-MCNC: 147 MG/DL (ref 65–99)
HDLC SERPL-MCNC: 45 MG/DL
LDLC SERPL CALC-MCNC: 93 MG/DL
POTASSIUM SERPL-SCNC: 4.3 MMOL/L (ref 3.6–5.5)
PROT SERPL-MCNC: 7 G/DL (ref 6–8.2)
SODIUM SERPL-SCNC: 138 MMOL/L (ref 135–145)
TRIGL SERPL-MCNC: 129 MG/DL (ref 0–149)

## 2017-09-23 PROCEDURE — 80053 COMPREHEN METABOLIC PANEL: CPT

## 2017-09-23 PROCEDURE — 80061 LIPID PANEL: CPT

## 2017-09-23 PROCEDURE — 36415 COLL VENOUS BLD VENIPUNCTURE: CPT

## 2017-09-27 ENCOUNTER — OFFICE VISIT (OUTPATIENT)
Dept: CARDIOLOGY | Facility: MEDICAL CENTER | Age: 82
End: 2017-09-27
Payer: MEDICARE

## 2017-09-27 VITALS
WEIGHT: 151 LBS | BODY MASS INDEX: 27.79 KG/M2 | OXYGEN SATURATION: 92 % | HEART RATE: 82 BPM | DIASTOLIC BLOOD PRESSURE: 62 MMHG | HEIGHT: 62 IN | SYSTOLIC BLOOD PRESSURE: 102 MMHG

## 2017-09-27 DIAGNOSIS — I35.0 MODERATE AORTIC STENOSIS: ICD-10-CM

## 2017-09-27 DIAGNOSIS — I48.0 PAF (PAROXYSMAL ATRIAL FIBRILLATION) (HCC): ICD-10-CM

## 2017-09-27 DIAGNOSIS — I10 ESSENTIAL HYPERTENSION: ICD-10-CM

## 2017-09-27 DIAGNOSIS — Z79.01 CHRONIC ANTICOAGULATION: Chronic | ICD-10-CM

## 2017-09-27 DIAGNOSIS — I51.89 DIASTOLIC DYSFUNCTION: ICD-10-CM

## 2017-09-27 PROCEDURE — 99214 OFFICE O/P EST MOD 30 MIN: CPT | Performed by: INTERNAL MEDICINE

## 2017-09-27 NOTE — PROGRESS NOTES
Subjective:   Shereen Dale is a 80 y.o. female who presents today for routine follow-up of PAF, HTN and AS. She also has newly diagnosed sleep apnea intolerant of CPAP. She is on chronic oral anti-coagulation. She is a former patient of Dr. Cardona. She was told she has mild to moderate pulmonary hypertension, her RVSP is 40 which is mild best.     In the interim since her last visit she has had her blood pressure medications adjusted for intermittent hypertension, and now finds her blood pressures are symptomatically low in the 90s with lightheadedness and postural intolerance. Otherwise, she feels her dyspnea on exertion has improved after implementing exercise regimen discussed previously. Periodic stenosis has progressed to moderate.    Denies any other cardiovascular symptoms including chest pain, lightheadedness, syncope or presyncope, lower extremity edema, PND, orthopnea or palpitations.      Past Medical History:   Diagnosis Date   • AF (atrial fibrillation) (CMS-[facility])    • Arrhythmia     A-fib   • Backpain     Lower back pain   • Breast cancer (CMS-[facility])    • Breath shortness     uses 2-3 L O2 at night   • Cancer (CMS-[facility]) 1993    right breast   • CATARACT     celestine IOL   • Chronic anticoagulation 5/2/2012   • Cough 5/2/2012   • Dental disorder     dentures   • Diabetes     oral medication   • Edema 5/2/2012   • Glaucoma    • Heart burn    • Heart murmur    • Heart valve disease    • Hypertension    • Hypothyroid    • Oxygen deficiency     uses 2.5-3 l at night   • Paroxysmal atrial fibrillation (CMS-HCC)    • Sleep apnea     no cpap   • tia     TIA x2   • Unspecified hemorrhagic conditions     takes coumadin     Past Surgical History:   Procedure Laterality Date   • CATARACT PHACO WITH IOL  9/23/2013    Performed by Dominick Fernando M.D. at [facility]   • CATARACT PHACO WITH IOL  10/21/2013    Performed by Dominick Fernando M.D. at [facility]   • CHOLECYSTECTOMY     •  HYSTERECTOMY RADICAL     • KNEE ARTHROSCOPY Right 5/21/2015    Procedure: KNEE ARTHROSCOPY;  Surgeon: Emerson Garcia M.D.;  Location: SURGERY [facility];  Service:    • LUMPECTOMY      R breast   • MENISCECTOMY Right 5/21/2015    Procedure: LATERAL MENISCECTOMY;  Surgeon: Emerson Garcia M.D.;  Location: SURGERY [facility];  Service:    • PB RADIATION THERAPY PLAN SIMPLE       Family History   Problem Relation Age of Onset   • Heart Attack Mother    • Heart Disease Father      History   Smoking Status   • Former Smoker   • Packs/day: 0.50   • Years: 2.50   • Types: Cigarettes   • Quit date: 1/1/1964   Smokeless Tobacco   • Never Used     Allergies   Allergen Reactions   • Darvon [Propoxyphene Hcl]      shock   • Iodine      Shock  - IV   • Latex      Swelling = hands with glove use   • Penicillins Anaphylaxis   • Propoxyphene Hcl Anaphylaxis   • Tetracycline Anaphylaxis     Outpatient Encounter Prescriptions as of 9/27/2017   Medication Sig Dispense Refill   • telmisartan (MICARDIS) 40 MG Tab Take 1 Tab by mouth 2 Times a Day. 60 Tab 6   • amlodipine (NORVASC) 5 MG Tab Take 5mg in the morning and 2.5mg in the evening. 45 Tab 11   • carvedilol (COREG) 25 MG Tab Take 1 Tab by mouth 2 times a day. 60 Tab 11   • hydrocodone-acetaminophen (NORCO) 5-325 MG Tab per tablet Take 1 Tab by mouth every four hours as needed. 12 Tab 0   • clonidine (CATAPRES) 0.1 MG Tab Take 0.1 mg by mouth as needed.     • warfarin (COUMADIN) 3 MG TABS Take 3 mg by mouth every bedtime.     • Glucosamine HCl (GLUCOSAMINE PO) Take 1 Tab by mouth every morning. Pt unsure of dose     • Polyethyl Glycol-Propyl Glycol (SYSTANE OP) 1-2 Drops by Ophthalmic route 2 Times a Day.     • docosahexanoic acid (OMEGA 3 FA) 1000 MG CAPS Take 1,000 mg by mouth every morning.     • levothyroxine (SYNTHROID) 50 MCG TABS Take 50 mcg by mouth every morning.     • VITAMIN D PO Take 2,000 Units by mouth every morning.     • Dorzolamide-Timolol (COSOPT  "OP) Place 1 Drop in both eyes 2 Times a Day.     • METFORMIN  MG PO TABS Take 500 mg by mouth 3 times a day.     • CALCIUM 500 PO 1 Tab every morning.     • PEPCID 20 MG PO TABS Take 20 mg by mouth 2 times a day.     • tramadol (ULTRAM) 50 MG Tab Take 1 Tab by mouth every 6 hours as needed for Moderate Pain. (Patient not taking: Reported on 5/24/2017) 30 Tab 0     No facility-administered encounter medications on file as of 9/27/2017.      Review of Systems   All other systems reviewed and are negative.    Today I reviewed the medical, surgical, social and family histories with the patient. As per HPI, otherwise noncontributory.     Objective:   /62   Pulse 82   Ht 1.575 m (5' 2\")   Wt 68.5 kg (151 lb)   LMP 01/01/1985   SpO2 92%   BMI 27.62 kg/m²     Physical Exam   Constitutional: She is oriented to person, place, and time. She appears well-developed and well-nourished. No distress.   HENT:   Head: Normocephalic and atraumatic.   Mouth/Throat: Oropharynx is clear and moist. No oropharyngeal exudate.   Eyes: Conjunctivae are normal. Pupils are equal, round, and reactive to light. No scleral icterus.   Neck: Normal range of motion. Neck supple. No JVD present. No thyromegaly present.   Cardiovascular: Normal rate, regular rhythm, normal heart sounds and intact distal pulses.  Exam reveals no gallop and no friction rub.    No murmur heard.  Pulses:       Carotid pulses are 2+ on the right side, and 2+ on the left side.       Radial pulses are 2+ on the right side, and 2+ on the left side.        Popliteal pulses are 2+ on the right side, and 2+ on the left side.        Dorsalis pedis pulses are 2+ on the right side, and 2+ on the left side.        Posterior tibial pulses are 2+ on the right side, and 2+ on the left side.   Pulmonary/Chest: Effort normal and breath sounds normal. She has no wheezes. She has no rales.   Abdominal: Soft. Bowel sounds are normal. She exhibits no distension. There is no " tenderness.   Musculoskeletal: She exhibits no edema or tenderness.   Neurological: She is alert and oriented to person, place, and time. No cranial nerve deficit.   Skin: Skin is warm and dry. No rash noted. She is not diaphoretic. No erythema.   Psychiatric: She has a normal mood and affect. Her behavior is normal.   Vitals reviewed.    ECHO CONCLUSIONS (1/19/2017):  Compared to the images of the prior study done on 01/29/15 - The AS is   now moderate.  Normal left ventricular size and systolic function.  Left ventricular ejection fraction is visually estimated to be 65%.  Grade I diastolic dysfunction.  Moderate aortic stenosis.  Transvalvular gradients are - Peak: 37 mmHg, Mean: 21 mmHg.  Normal inferior vena cava size and inspiratory collapse.      Assessment:     1. Essential hypertension     2. Moderate aortic stenosis     3. PAF (paroxysmal atrial fibrillation) (CMS-HCC)     4. Chronic anticoagulation     5. Diastolic dysfunction         Medical Decision Making:  Today's Assessment / Status / Plan:     She is doing well. She hasModerate AS, mild diastolic dysfunction, mild pulmonary hypertension, and chronic deconditioning which accumulate to cause some exertional shortness of breath. Her symptoms have improved with improved exercise regimen at home. Medical therapy is good. She is in sinus rhythm. She is tolerating anticoagulation well. She is increasing her physical activity as directed going well.  Her blood pressure is somewhat low I recommended a gentle reduction of her amlodipine.    1. Reduce amlodipine to 2.5 mg twice a day from 5 mg a.m./2.5 mg p.m.  2. She can reduce further her blood pressure does not improve  3. She would require an echocardiogram in 1 year to follow-up her stenosis.    She would like to increase her follow-up to every 6 months.

## 2017-10-10 ENCOUNTER — APPOINTMENT (OUTPATIENT)
Dept: SOCIAL WORK | Facility: CLINIC | Age: 82
End: 2017-10-10
Payer: MEDICARE

## 2017-10-10 PROCEDURE — 90662 IIV NO PRSV INCREASED AG IM: CPT | Performed by: REGISTERED NURSE

## 2017-10-10 PROCEDURE — G0008 ADMIN INFLUENZA VIRUS VAC: HCPCS | Performed by: REGISTERED NURSE

## 2017-10-14 ENCOUNTER — HOSPITAL ENCOUNTER (OUTPATIENT)
Dept: LAB | Facility: MEDICAL CENTER | Age: 82
End: 2017-10-14
Attending: FAMILY MEDICINE
Payer: MEDICARE

## 2017-10-14 LAB
INR PPP: 2.58 (ref 0.87–1.13)
PROTHROMBIN TIME: 28.5 SEC (ref 12–14.6)

## 2017-10-14 PROCEDURE — 85610 PROTHROMBIN TIME: CPT

## 2017-10-14 PROCEDURE — 36415 COLL VENOUS BLD VENIPUNCTURE: CPT

## 2017-10-16 ENCOUNTER — HOSPITAL ENCOUNTER (OUTPATIENT)
Dept: RADIOLOGY | Facility: MEDICAL CENTER | Age: 82
End: 2017-10-16
Attending: SPECIALIST
Payer: MEDICARE

## 2017-10-16 DIAGNOSIS — Z85.3 PERSONAL HISTORY OF MALIGNANT NEOPLASM OF BREAST: ICD-10-CM

## 2017-10-16 PROCEDURE — 700117 HCHG RX CONTRAST REV CODE 255: Performed by: SPECIALIST

## 2017-10-16 PROCEDURE — A9579 GAD-BASE MR CONTRAST NOS,1ML: HCPCS | Performed by: SPECIALIST

## 2017-10-16 PROCEDURE — C8908 MRI W/O FOL W/CONT, BREAST,: HCPCS

## 2017-10-16 RX ADMIN — GADODIAMIDE 15 ML: 287 INJECTION INTRAVENOUS at 11:46

## 2017-11-08 ENCOUNTER — HOSPITAL ENCOUNTER (OUTPATIENT)
Dept: LAB | Facility: MEDICAL CENTER | Age: 82
End: 2017-11-08
Attending: FAMILY MEDICINE
Payer: MEDICARE

## 2017-11-08 LAB
INR PPP: 3.53 (ref 0.87–1.13)
PROTHROMBIN TIME: 35.1 SEC (ref 12–14.6)

## 2017-11-08 PROCEDURE — 85610 PROTHROMBIN TIME: CPT

## 2017-11-08 PROCEDURE — 36415 COLL VENOUS BLD VENIPUNCTURE: CPT

## 2017-11-19 ENCOUNTER — HOSPITAL ENCOUNTER (EMERGENCY)
Facility: MEDICAL CENTER | Age: 82
End: 2017-11-19
Attending: EMERGENCY MEDICINE
Payer: MEDICARE

## 2017-11-19 ENCOUNTER — APPOINTMENT (OUTPATIENT)
Dept: RADIOLOGY | Facility: MEDICAL CENTER | Age: 82
End: 2017-11-19
Attending: EMERGENCY MEDICINE
Payer: MEDICARE

## 2017-11-19 VITALS
DIASTOLIC BLOOD PRESSURE: 67 MMHG | HEART RATE: 85 BPM | WEIGHT: 155.2 LBS | SYSTOLIC BLOOD PRESSURE: 138 MMHG | OXYGEN SATURATION: 96 % | HEIGHT: 62 IN | BODY MASS INDEX: 28.56 KG/M2 | RESPIRATION RATE: 17 BRPM | TEMPERATURE: 98.5 F

## 2017-11-19 DIAGNOSIS — R09.A2 SENSATION OF FOREIGN BODY IN LARYNX: ICD-10-CM

## 2017-11-19 DIAGNOSIS — I65.23 ATHEROSCLEROSIS OF BOTH CAROTID ARTERIES: ICD-10-CM

## 2017-11-19 PROCEDURE — 70360 X-RAY EXAM OF NECK: CPT

## 2017-11-19 PROCEDURE — 99283 EMERGENCY DEPT VISIT LOW MDM: CPT

## 2017-11-19 PROCEDURE — 71010 DX-CHEST-PORTABLE (1 VIEW): CPT

## 2017-11-19 ASSESSMENT — PAIN SCALES - GENERAL: PAINLEVEL_OUTOF10: 4

## 2017-11-20 ENCOUNTER — PATIENT OUTREACH (OUTPATIENT)
Dept: HEALTH INFORMATION MANAGEMENT | Facility: OTHER | Age: 82
End: 2017-11-20

## 2017-11-20 ASSESSMENT — ENCOUNTER SYMPTOMS
SHORTNESS OF BREATH: 0
FATIGUE: 0
CHEST TIGHTNESS: 0
BACK PAIN: 0
FEVER: 0
ABDOMINAL PAIN: 0

## 2017-11-20 NOTE — ED NOTES
All lines and monitors DC'd.  Discharge instructions given, questions answered.  Ambulatory out of ER, escorted by RN.  Pt reports all belongings in possession.  Instructed not to drive after taking pain meds and pt verbalizes understanding.  Rx x Zero given.   Patient instructed to follow up with Dr. Cornell as soon as possible

## 2017-11-20 NOTE — ED PROVIDER NOTES
ED Provider Note    ED Provider Note          CHIEF COMPLAINT  Chief Complaint   Patient presents with   • Foreign Body     stuck in throat       HPI  Shereen Dale is a 80 y.o. female who presents to the Emergency DepartmentFor a foreign body sensation in her throat. She says earlier for lunch she was eating crab legs and cracking the crab and thinks that maybe she either has an injury to the back of her throat or something stuck. She says it's worse when she swallows. However she said she's eaten cereal since then and tolerated that just fine. She can drink water and eat without any difficulty. She does have a little bit of pain on the posterior aspect of her left side of her throat. No fevers or chills. No chest pain.    REVIEW OF SYSTEMS  Review of Systems   Constitutional: Negative for fatigue and fever.   HENT: Negative for drooling.    Respiratory: Negative for chest tightness and shortness of breath.    Cardiovascular: Negative for chest pain.   Gastrointestinal: Negative for abdominal pain.   Musculoskeletal: Negative for back pain.       PAST MEDICAL HISTORY   has a past medical history of AF (atrial fibrillation) (CMS-HCC); Arrhythmia; Backpain; Breast cancer (CMS-HCC); Breath shortness; Cancer (CMS-HCC) (1993); CATARACT; Chronic anticoagulation (5/2/2012); Cough (5/2/2012); Dental disorder; Diabetes; Edema (5/2/2012); Glaucoma; Heart burn; Heart murmur; Heart valve disease; Hypertension; Hypothyroid; Oxygen deficiency; Paroxysmal atrial fibrillation (CMS-HCC); Sleep apnea; tia; and Unspecified hemorrhagic conditions.    SURGICAL HISTORY   has a past surgical history that includes lumpectomy; cholecystectomy; hysterectomy radical; radiation therapy plan simple; cataract phaco with iol (9/23/2013); cataract phaco with iol (10/21/2013); knee arthroscopy (Right, 5/21/2015); and meniscectomy (Right, 5/21/2015).    SOCIAL HISTORY  Social History   Substance Use Topics   • Smoking status: Former Smoker      "Packs/day: 0.50     Years: 2.50     Types: Cigarettes     Quit date: 1/1/1964   • Smokeless tobacco: Never Used   • Alcohol use 0.0 oz/week      Comment: rarely      History   Drug Use No       FAMILY HISTORY  Family History   Problem Relation Age of Onset   • Heart Attack Mother    • Heart Disease Father        CURRENT MEDICATIONS  Reviewed.  See Encounter Summary.     ALLERGIES  Allergies   Allergen Reactions   • Darvon [Propoxyphene Hcl]      shock   • Iodine      Shock  - IV   • Latex      Swelling = hands with glove use   • Penicillins Anaphylaxis   • Propoxyphene Hcl Anaphylaxis   • Tetanus Antitoxin Myalgia   • Tetracycline Anaphylaxis       PHYSICAL EXAM  VITAL SIGNS: /67   Pulse 85   Temp 36.9 °C (98.5 °F)   Resp 17   Ht 1.575 m (5' 2\")   Wt 70.4 kg (155 lb 3.3 oz)   LMP 01/01/1985   SpO2 96%   BMI 28.39 kg/m²   Physical Exam   Constitutional: She is oriented to person, place, and time. No distress.   HENT:   Head: Normocephalic and atraumatic.   Mouth/Throat: Oropharynx is clear and moist. No oropharyngeal exudate.   Airway appears patent, no obvious foreign body or trauma   Eyes: Conjunctivae are normal. Pupils are equal, round, and reactive to light.   Neck: Normal range of motion.   Cardiovascular: Normal rate.    Pulmonary/Chest: Effort normal.   Abdominal: Soft.   Musculoskeletal: Normal range of motion.   Neurological: She is alert and oriented to person, place, and time.   Skin: Skin is warm. She is not diaphoretic.   Psychiatric: She has a normal mood and affect.           DIAGNOSTIC STUDIES / PROCEDURES       RADIOLOGY  DX-CHEST-PORTABLE (1 VIEW)   Final Result      No acute cardiopulmonary abnormality.      DX-NECK FOR SOFT TISSUE   Final Result      Unremarkable views of the cervical soft tissues. Incidental atherosclerotic calcification in both carotid bifurcations.        The radiologist's interpretation of all radiological studies have been reviewed by me.    COURSE & MEDICAL " DECISION MAKING  Pertinent Labs & Imaging studies reviewed. (See chart for details)    8:36 PM - Patient seen and examined at bedside.    Decision Making:  This is a 80 y.o. year old female who presents with concern of a foreign body sensation in the back of her throat. She presents here speaking full sentences, tolerating her secretions and no change is in her voice. On inspection again did not see any significant trauma, airway swelling or obvious foreign body. If she did in fact get a shell part of the crab leg stuck in the back of her throat this should she will but x-ray showed no foreign body and airway patent. She has been eating and drinking and tolerating her secretions. She does not seem to be having any sort of food impaction. I don't think given the risks, benefits that doing any further invasive inspections or endoscopy at this time is appropriate because aside from a little bit of pain in the back of her throat when she swallows she can eat, drink and tolerate secretions just fine. She was discharged home at this time however she has any difficulty with eating, drinking or tolerate her secretions, fever or worsening symptoms she is to return to emergency room. I did inform her of the atherosclerosis seen on x-ray as well as her carotids.    FINAL IMPRESSION  1. Sensation of foreign body in larynx    2. Atherosclerosis of both carotid arteries

## 2017-11-20 NOTE — PROGRESS NOTES
Placed discharge outreach phone call to patient s/p ER discharge 11/19/17.  Left voicemail providing my contact information and instructions to call with any questions or concerns.

## 2017-11-20 NOTE — ED NOTES
Pt aox4, breathing even and unlabored, nad. Pt c/o intermittent painful swallowing after eating a seafood. Pt is managing secretions well, denies sob.

## 2017-11-20 NOTE — ED NOTES
Shereen Dale  80 y.o.  female  Chief Complaint   Patient presents with   • Foreign Body     stuck in throat     Present to triage c/o possible food stuck in her throat after eating crab around 3 pm. Patient stated that every time she swallow she can feel something on her left side of the throat. No drooling noted. Airway clear.

## 2017-11-27 ENCOUNTER — HOSPITAL ENCOUNTER (OUTPATIENT)
Dept: LAB | Facility: MEDICAL CENTER | Age: 82
End: 2017-11-27
Attending: FAMILY MEDICINE
Payer: MEDICARE

## 2017-11-27 LAB
INR PPP: 2.93 (ref 0.87–1.13)
PROTHROMBIN TIME: 30.3 SEC (ref 12–14.6)

## 2017-11-27 PROCEDURE — 36415 COLL VENOUS BLD VENIPUNCTURE: CPT

## 2017-11-27 PROCEDURE — 85610 PROTHROMBIN TIME: CPT

## 2017-12-21 ENCOUNTER — HOSPITAL ENCOUNTER (OUTPATIENT)
Dept: LAB | Facility: MEDICAL CENTER | Age: 82
End: 2017-12-21
Attending: FAMILY MEDICINE
Payer: MEDICARE

## 2017-12-21 LAB
INR PPP: 3.2 (ref 0.87–1.13)
PROTHROMBIN TIME: 32.5 SEC (ref 12–14.6)

## 2017-12-21 PROCEDURE — 85610 PROTHROMBIN TIME: CPT

## 2017-12-21 PROCEDURE — 36415 COLL VENOUS BLD VENIPUNCTURE: CPT

## 2018-01-12 ENCOUNTER — HOSPITAL ENCOUNTER (OUTPATIENT)
Dept: LAB | Facility: MEDICAL CENTER | Age: 83
End: 2018-01-12
Attending: FAMILY MEDICINE
Payer: MEDICARE

## 2018-01-12 LAB
INR PPP: 2.38 (ref 0.87–1.13)
PROTHROMBIN TIME: 25.7 SEC (ref 12–14.6)

## 2018-01-12 PROCEDURE — 85610 PROTHROMBIN TIME: CPT

## 2018-01-12 PROCEDURE — 36415 COLL VENOUS BLD VENIPUNCTURE: CPT

## 2018-01-23 DIAGNOSIS — I10 ESSENTIAL HYPERTENSION: ICD-10-CM

## 2018-01-29 RX ORDER — AMLODIPINE BESYLATE 5 MG/1
2.5 TABLET ORAL 2 TIMES DAILY
Qty: 90 TAB | Refills: 2 | Status: SHIPPED | OUTPATIENT
Start: 2018-01-29 | End: 2018-11-09 | Stop reason: SDUPTHER

## 2018-01-29 RX ORDER — TELMISARTAN 40 MG/1
40 TABLET ORAL 2 TIMES DAILY
Qty: 180 TAB | Refills: 2 | Status: SHIPPED | OUTPATIENT
Start: 2018-01-29 | End: 2018-11-15 | Stop reason: SDUPTHER

## 2018-01-29 RX ORDER — CARVEDILOL 25 MG/1
25 TABLET ORAL 2 TIMES DAILY
Qty: 180 TAB | Refills: 2 | Status: SHIPPED | OUTPATIENT
Start: 2018-01-29 | End: 2018-12-14 | Stop reason: SDUPTHER

## 2018-02-08 ENCOUNTER — HOSPITAL ENCOUNTER (OUTPATIENT)
Dept: RADIOLOGY | Facility: MEDICAL CENTER | Age: 83
End: 2018-02-08
Attending: FAMILY MEDICINE
Payer: MEDICARE

## 2018-02-08 DIAGNOSIS — Z12.31 SCREENING MAMMOGRAM, ENCOUNTER FOR: ICD-10-CM

## 2018-02-08 PROCEDURE — 77067 SCR MAMMO BI INCL CAD: CPT

## 2018-02-22 ENCOUNTER — HOSPITAL ENCOUNTER (OUTPATIENT)
Dept: LAB | Facility: MEDICAL CENTER | Age: 83
End: 2018-02-22
Attending: FAMILY MEDICINE
Payer: MEDICARE

## 2018-02-22 LAB
ALBUMIN SERPL BCP-MCNC: 4.9 G/DL (ref 3.2–4.9)
ALBUMIN/GLOB SERPL: 1.8 G/DL
ALP SERPL-CCNC: 41 U/L (ref 30–99)
ALT SERPL-CCNC: 37 U/L (ref 2–50)
ANION GAP SERPL CALC-SCNC: 6 MMOL/L (ref 0–11.9)
AST SERPL-CCNC: 27 U/L (ref 12–45)
BASOPHILS # BLD AUTO: 1.1 % (ref 0–1.8)
BASOPHILS # BLD: 0.09 K/UL (ref 0–0.12)
BILIRUB SERPL-MCNC: 0.7 MG/DL (ref 0.1–1.5)
BUN SERPL-MCNC: 9 MG/DL (ref 8–22)
CALCIUM SERPL-MCNC: 9.6 MG/DL (ref 8.5–10.5)
CHLORIDE SERPL-SCNC: 103 MMOL/L (ref 96–112)
CO2 SERPL-SCNC: 28 MMOL/L (ref 20–33)
CREAT SERPL-MCNC: 0.65 MG/DL (ref 0.5–1.4)
CREAT UR-MCNC: 133 MG/DL
EOSINOPHIL # BLD AUTO: 0.22 K/UL (ref 0–0.51)
EOSINOPHIL NFR BLD: 2.6 % (ref 0–6.9)
ERYTHROCYTE [DISTWIDTH] IN BLOOD BY AUTOMATED COUNT: 41.8 FL (ref 35.9–50)
EST. AVERAGE GLUCOSE BLD GHB EST-MCNC: 151 MG/DL
GLOBULIN SER CALC-MCNC: 2.7 G/DL (ref 1.9–3.5)
GLUCOSE SERPL-MCNC: 152 MG/DL (ref 65–99)
HBA1C MFR BLD: 6.9 % (ref 0–5.6)
HCT VFR BLD AUTO: 41.7 % (ref 37–47)
HGB BLD-MCNC: 13.9 G/DL (ref 12–16)
IMM GRANULOCYTES # BLD AUTO: 0.04 K/UL (ref 0–0.11)
IMM GRANULOCYTES NFR BLD AUTO: 0.5 % (ref 0–0.9)
INR PPP: 1.87 (ref 0.87–1.13)
LYMPHOCYTES # BLD AUTO: 2.12 K/UL (ref 1–4.8)
LYMPHOCYTES NFR BLD: 25.4 % (ref 22–41)
MCH RBC QN AUTO: 30.5 PG (ref 27–33)
MCHC RBC AUTO-ENTMCNC: 33.3 G/DL (ref 33.6–35)
MCV RBC AUTO: 91.4 FL (ref 81.4–97.8)
MICROALBUMIN UR-MCNC: 1.3 MG/DL
MICROALBUMIN/CREAT UR: 10 MG/G (ref 0–30)
MONOCYTES # BLD AUTO: 0.65 K/UL (ref 0–0.85)
MONOCYTES NFR BLD AUTO: 7.8 % (ref 0–13.4)
NEUTROPHILS # BLD AUTO: 5.22 K/UL (ref 2–7.15)
NEUTROPHILS NFR BLD: 62.6 % (ref 44–72)
NRBC # BLD AUTO: 0 K/UL
NRBC BLD-RTO: 0 /100 WBC
PLATELET # BLD AUTO: 269 K/UL (ref 164–446)
PMV BLD AUTO: 9.5 FL (ref 9–12.9)
POTASSIUM SERPL-SCNC: 4 MMOL/L (ref 3.6–5.5)
PROT SERPL-MCNC: 7.6 G/DL (ref 6–8.2)
PROTHROMBIN TIME: 21.2 SEC (ref 12–14.6)
RBC # BLD AUTO: 4.56 M/UL (ref 4.2–5.4)
SODIUM SERPL-SCNC: 137 MMOL/L (ref 135–145)
T4 FREE SERPL-MCNC: 1.19 NG/DL (ref 0.53–1.43)
TSH SERPL DL<=0.005 MIU/L-ACNC: 2.1 UIU/ML (ref 0.38–5.33)
WBC # BLD AUTO: 8.3 K/UL (ref 4.8–10.8)

## 2018-02-22 PROCEDURE — 82043 UR ALBUMIN QUANTITATIVE: CPT

## 2018-02-22 PROCEDURE — 84439 ASSAY OF FREE THYROXINE: CPT

## 2018-02-22 PROCEDURE — 82570 ASSAY OF URINE CREATININE: CPT

## 2018-02-22 PROCEDURE — 85610 PROTHROMBIN TIME: CPT

## 2018-02-22 PROCEDURE — 84443 ASSAY THYROID STIM HORMONE: CPT

## 2018-02-22 PROCEDURE — 83036 HEMOGLOBIN GLYCOSYLATED A1C: CPT

## 2018-02-22 PROCEDURE — 36415 COLL VENOUS BLD VENIPUNCTURE: CPT

## 2018-02-22 PROCEDURE — 80053 COMPREHEN METABOLIC PANEL: CPT

## 2018-02-22 PROCEDURE — 85025 COMPLETE CBC W/AUTO DIFF WBC: CPT

## 2018-03-21 ENCOUNTER — OFFICE VISIT (OUTPATIENT)
Dept: CARDIOLOGY | Facility: MEDICAL CENTER | Age: 83
End: 2018-03-21
Payer: MEDICARE

## 2018-03-21 VITALS
WEIGHT: 150 LBS | DIASTOLIC BLOOD PRESSURE: 76 MMHG | HEIGHT: 62 IN | SYSTOLIC BLOOD PRESSURE: 104 MMHG | HEART RATE: 72 BPM | OXYGEN SATURATION: 96 % | BODY MASS INDEX: 27.6 KG/M2

## 2018-03-21 DIAGNOSIS — E11.9 TYPE 2 DIABETES MELLITUS WITHOUT COMPLICATION, WITHOUT LONG-TERM CURRENT USE OF INSULIN (HCC): ICD-10-CM

## 2018-03-21 DIAGNOSIS — Z79.01 CHRONIC ANTICOAGULATION: Chronic | ICD-10-CM

## 2018-03-21 DIAGNOSIS — I48.0 PAF (PAROXYSMAL ATRIAL FIBRILLATION) (HCC): ICD-10-CM

## 2018-03-21 DIAGNOSIS — I10 ESSENTIAL HYPERTENSION: ICD-10-CM

## 2018-03-21 DIAGNOSIS — I35.0 MODERATE AORTIC STENOSIS: ICD-10-CM

## 2018-03-21 DIAGNOSIS — I51.89 DIASTOLIC DYSFUNCTION: ICD-10-CM

## 2018-03-21 PROCEDURE — 99214 OFFICE O/P EST MOD 30 MIN: CPT | Performed by: INTERNAL MEDICINE

## 2018-03-21 RX ORDER — GLIPIZIDE 2.5 MG/1
2.5 TABLET, EXTENDED RELEASE ORAL PRN
COMMUNITY
End: 2018-11-27 | Stop reason: SDUPTHER

## 2018-03-21 NOTE — PROGRESS NOTES
Subjective:   Shereen Dale is a 81 y.o. female who presents today for follow-up of PAF, HTN and AS. She also has newly diagnosed sleep apnea intolerant of CPAP. She is on chronic oral anti-coagulation. She is a former patient of Dr. Cardona. She was told she has mild to moderate pulmonary hypertension, her RVSP is 40 which is mild best.     Since last visit we decreased one of her blood pressure medications for symptomatic hypotension. Blood pressure remains well-controlled but her orthostasis has resolved. She has had several episodes of palpitations which were brief and not associated with any other symptoms consistent with her PAF. She remains anticoagulated without any bleeding issues, and continues on her Coreg.    Denies any other cardiovascular symptoms including chest pain, lightheadedness, syncope or presyncope, lower extremity edema, PND, orthopnea.      Past Medical History:   Diagnosis Date   • AF (atrial fibrillation) (CMS-[facility])    • Arrhythmia     A-fib   • Backpain     Lower back pain   • Breast cancer (CMS-[facility])    • Breath shortness     uses 2-3 L O2 at night   • Cancer (CMS-HCC) 1993    right breast   • CATARACT     celestine IOL   • Chronic anticoagulation 5/2/2012   • Cough 5/2/2012   • Dental disorder     dentures   • Diabetes     oral medication   • Edema 5/2/2012   • Glaucoma    • Heart burn    • Heart murmur    • Heart valve disease    • Hypertension    • Hypothyroid    • Oxygen deficiency     uses 2.5-3 l at night   • Paroxysmal atrial fibrillation (CMS-HCC)    • Sleep apnea     no cpap   • tia     TIA x2   • Unspecified hemorrhagic conditions     takes coumadin     Past Surgical History:   Procedure Laterality Date   • CATARACT PHACO WITH IOL  9/23/2013    Performed by Dominick Fernando M.D. at [facility]   • CATARACT PHACO WITH IOL  10/21/2013    Performed by Dominick Fernando M.D. at [facility]   • CHOLECYSTECTOMY     • HYSTERECTOMY RADICAL     • KNEE ARTHROSCOPY  Right 5/21/2015    Procedure: KNEE ARTHROSCOPY;  Surgeon: Emerson Garcia M.D.;  Location: SURGERY [facility];  Service:    • LUMPECTOMY      R breast   • MENISCECTOMY Right 5/21/2015    Procedure: LATERAL MENISCECTOMY;  Surgeon: Emerson Garcia M.D.;  Location: SURGERY [facility];  Service:    • PB RADIATION THERAPY PLAN SIMPLE       Family History   Problem Relation Age of Onset   • Heart Attack Mother    • Heart Disease Father      History   Smoking Status   • Former Smoker   • Packs/day: 0.50   • Years: 2.50   • Types: Cigarettes   • Quit date: 1/1/1964   Smokeless Tobacco   • Never Used     Allergies   Allergen Reactions   • Darvon [Propoxyphene Hcl]      shock   • Iodine      Shock  - IV   • Latex      Swelling = hands with glove use   • Penicillins Anaphylaxis   • Propoxyphene Hcl Anaphylaxis   • Tetanus Antitoxin Myalgia   • Tetracycline Anaphylaxis     Outpatient Encounter Prescriptions as of 3/21/2018   Medication Sig Dispense Refill   • glipiZIDE SR (GLUCOTROL) 2.5 MG TABLET SR 24 HR Take 2.5 mg by mouth as needed.     • amlodipine (NORVASC) 5 MG Tab Take 0.5 Tabs by mouth 2 Times a Day. 90 Tab 2   • carvedilol (COREG) 25 MG Tab Take 1 Tab by mouth 2 times a day. 180 Tab 2   • telmisartan (MICARDIS) 40 MG Tab Take 1 Tab by mouth 2 Times a Day. 180 Tab 2   • clonidine (CATAPRES) 0.1 MG Tab Take 0.1 mg by mouth as needed.     • warfarin (COUMADIN) 3 MG TABS Take 3 mg by mouth every bedtime.     • Glucosamine HCl (GLUCOSAMINE PO) Take 1 Tab by mouth every morning. Pt unsure of dose     • Polyethyl Glycol-Propyl Glycol (SYSTANE OP) 1-2 Drops by Ophthalmic route 2 Times a Day.     • docosahexanoic acid (OMEGA 3 FA) 1000 MG CAPS Take 1,000 mg by mouth every morning.     • levothyroxine (SYNTHROID) 50 MCG TABS Take 50 mcg by mouth every morning.     • VITAMIN D PO Take 2,000 Units by mouth every morning.     • Dorzolamide-Timolol (COSOPT OP) Place 1 Drop in both eyes 2 Times a Day.     •  "METFORMIN  MG PO TABS Take 500 mg by mouth 3 times a day.     • CALCIUM 500 PO 1 Tab every morning.     • PEPCID 20 MG PO TABS Take 20 mg by mouth 2 times a day.     • hydrocodone-acetaminophen (NORCO) 5-325 MG Tab per tablet Take 1 Tab by mouth every four hours as needed. (Patient not taking: Reported on 3/21/2018) 12 Tab 0   • tramadol (ULTRAM) 50 MG Tab Take 1 Tab by mouth every 6 hours as needed for Moderate Pain. (Patient not taking: Reported on 5/24/2017) 30 Tab 0     No facility-administered encounter medications on file as of 3/21/2018.      Review of Systems   All other systems reviewed and are negative.    Today I reviewed the medical, surgical, social and family histories with the patient. As per HPI, otherwise noncontributory.     Objective:   /76   Pulse 72   Ht 1.575 m (5' 2\")   Wt 68 kg (150 lb)   LMP 01/01/1985   SpO2 96%   BMI 27.44 kg/m²     Physical Exam   Constitutional: She is oriented to person, place, and time. She appears well-developed and well-nourished. No distress.   HENT:   Head: Normocephalic and atraumatic.   Mouth/Throat: Oropharynx is clear and moist. No oropharyngeal exudate.   Eyes: Conjunctivae are normal. Pupils are equal, round, and reactive to light. No scleral icterus.   Neck: Normal range of motion. Neck supple. No JVD present. No thyromegaly present.   Cardiovascular: Normal rate, regular rhythm, normal heart sounds and intact distal pulses.  Exam reveals no gallop and no friction rub.    No murmur heard.  Pulses:       Carotid pulses are 2+ on the right side, and 2+ on the left side.       Radial pulses are 2+ on the right side, and 2+ on the left side.        Popliteal pulses are 2+ on the right side, and 2+ on the left side.        Dorsalis pedis pulses are 2+ on the right side, and 2+ on the left side.        Posterior tibial pulses are 2+ on the right side, and 2+ on the left side.   Pulmonary/Chest: Effort normal and breath sounds normal. She has no " wheezes. She has no rales.   Abdominal: Soft. Bowel sounds are normal. She exhibits no distension. There is no tenderness.   Musculoskeletal: She exhibits no edema or tenderness.   Neurological: She is alert and oriented to person, place, and time. No cranial nerve deficit.   Skin: Skin is warm and dry. No rash noted. She is not diaphoretic. No erythema.   Psychiatric: She has a normal mood and affect. Her behavior is normal.   Vitals reviewed.    ECHO CONCLUSIONS (1/19/2017):  Compared to the images of the prior study done on 01/29/15 - The AS is   now moderate.  Normal left ventricular size and systolic function.  Left ventricular ejection fraction is visually estimated to be 65%.  Grade I diastolic dysfunction.  Moderate aortic stenosis.  Transvalvular gradients are - Peak: 37 mmHg, Mean: 21 mmHg.  Normal inferior vena cava size and inspiratory collapse.      Assessment:     1. PAF (paroxysmal atrial fibrillation) (CMS-HCC)     2. Type 2 diabetes mellitus without complication, without long-term current use of insulin (CMS-HCC)     3. Diastolic dysfunction     4. Essential hypertension     5. Chronic anticoagulation     6. Moderate aortic stenosis  ECHOCARDIOGRAM COMP W/O CONT       Medical Decision Making:  Today's Assessment / Status / Plan:     She is doing well. She has moderate AS, mild diastolic dysfunction, mild pulmonary hypertension, and chronic deconditioning which accumulate to cause some exertional shortness of breath. She's been having more frequent episodes of paroxysmal atrial fibrillation but still infrequent. We discussed antiarrhythmic therapy and I think Multaq would be a reasonable choice for her.    1. Echocardiogram to follow aortic stenosis  2. Consider Multaq if she has an increase in prevalence of symptomatic PAF  3. Continue chronic oral anticoagulation with Coumadin    She would like to increase her follow-up to every 6 months.                                        Physical Exam    Constitutional: She is oriented to person, place, and time. She appears well-developed and well-nourished. No distress.   HENT:   Head: Normocephalic and atraumatic.   Mouth/Throat: Oropharynx is clear and moist. No oropharyngeal exudate.   Eyes: Conjunctivae are normal. Pupils are equal, round, and reactive to light. No scleral icterus.   Neck: Normal range of motion. Neck supple. No JVD present. No thyromegaly present.   Cardiovascular: Normal rate, regular rhythm, normal heart sounds and intact distal pulses.  Exam reveals no gallop and no friction rub.    No murmur heard.  Pulses:       Carotid pulses are 2+ on the right side, and 2+ on the left side.       Radial pulses are 2+ on the right side, and 2+ on the left side.        Popliteal pulses are 2+ on the right side, and 2+ on the left side.        Dorsalis pedis pulses are 2+ on the right side, and 2+ on the left side.        Posterior tibial pulses are 2+ on the right side, and 2+ on the left side.   Pulmonary/Chest: Effort normal and breath sounds normal. She has no wheezes. She has no rales.   Abdominal: Soft. Bowel sounds are normal. She exhibits no distension. There is no tenderness.   Musculoskeletal: She exhibits no edema or tenderness.   Neurological: She is alert and oriented to person, place, and time. No cranial nerve deficit.   Skin: Skin is warm and dry. No rash noted. She is not diaphoretic. No erythema.   Psychiatric: She has a normal mood and affect. Her behavior is normal.   Vitals reviewed.

## 2018-03-23 ENCOUNTER — HOSPITAL ENCOUNTER (OUTPATIENT)
Dept: LAB | Facility: MEDICAL CENTER | Age: 83
End: 2018-03-23
Attending: ORTHOPAEDIC SURGERY
Payer: MEDICARE

## 2018-03-23 LAB
INR PPP: 2.85 (ref 0.87–1.13)
PROTHROMBIN TIME: 29.6 SEC (ref 12–14.6)

## 2018-03-23 PROCEDURE — 36415 COLL VENOUS BLD VENIPUNCTURE: CPT

## 2018-03-23 PROCEDURE — 85610 PROTHROMBIN TIME: CPT

## 2018-04-04 ENCOUNTER — HOSPITAL ENCOUNTER (OUTPATIENT)
Dept: CARDIOLOGY | Facility: MEDICAL CENTER | Age: 83
End: 2018-04-04
Attending: INTERNAL MEDICINE
Payer: MEDICARE

## 2018-04-04 DIAGNOSIS — I35.0 MODERATE AORTIC STENOSIS: ICD-10-CM

## 2018-04-04 PROCEDURE — 93306 TTE W/DOPPLER COMPLETE: CPT | Mod: 26 | Performed by: INTERNAL MEDICINE

## 2018-04-04 PROCEDURE — 93306 TTE W/DOPPLER COMPLETE: CPT

## 2018-04-05 LAB
LV EJECT FRACT  99904: 75
LV EJECT FRACT MOD 2C 99903: 76.49
LV EJECT FRACT MOD 4C 99902: 82.02
LV EJECT FRACT MOD BP 99901: 81.67

## 2018-04-10 ENCOUNTER — TELEPHONE (OUTPATIENT)
Dept: CARDIOLOGY | Facility: MEDICAL CENTER | Age: 83
End: 2018-04-10

## 2018-05-01 ENCOUNTER — HOSPITAL ENCOUNTER (OUTPATIENT)
Dept: LAB | Facility: MEDICAL CENTER | Age: 83
End: 2018-05-01
Attending: FAMILY MEDICINE
Payer: MEDICARE

## 2018-05-01 LAB
INR PPP: 2.81 (ref 0.87–1.13)
PROTHROMBIN TIME: 29.3 SEC (ref 12–14.6)

## 2018-05-01 PROCEDURE — 36415 COLL VENOUS BLD VENIPUNCTURE: CPT

## 2018-05-01 PROCEDURE — 85610 PROTHROMBIN TIME: CPT

## 2018-05-21 ENCOUNTER — OFFICE VISIT (OUTPATIENT)
Dept: MEDICAL GROUP | Facility: MEDICAL CENTER | Age: 83
End: 2018-05-21
Payer: MEDICARE

## 2018-05-21 ENCOUNTER — TELEPHONE (OUTPATIENT)
Dept: VASCULAR LAB | Facility: MEDICAL CENTER | Age: 83
End: 2018-05-21

## 2018-05-21 VITALS
BODY MASS INDEX: 27.79 KG/M2 | OXYGEN SATURATION: 95 % | HEIGHT: 62 IN | SYSTOLIC BLOOD PRESSURE: 112 MMHG | DIASTOLIC BLOOD PRESSURE: 70 MMHG | TEMPERATURE: 96.7 F | HEART RATE: 71 BPM | WEIGHT: 151.01 LBS | RESPIRATION RATE: 16 BRPM

## 2018-05-21 DIAGNOSIS — Z00.00 HEALTHCARE MAINTENANCE: ICD-10-CM

## 2018-05-21 DIAGNOSIS — E11.9 DIABETES MELLITUS TYPE 2 IN NONOBESE (HCC): ICD-10-CM

## 2018-05-21 DIAGNOSIS — I48.0 PAF (PAROXYSMAL ATRIAL FIBRILLATION) (HCC): ICD-10-CM

## 2018-05-21 DIAGNOSIS — E03.4 HYPOTHYROIDISM DUE TO ACQUIRED ATROPHY OF THYROID: ICD-10-CM

## 2018-05-21 DIAGNOSIS — I51.89 DIASTOLIC DYSFUNCTION: ICD-10-CM

## 2018-05-21 DIAGNOSIS — I10 ESSENTIAL HYPERTENSION: ICD-10-CM

## 2018-05-21 DIAGNOSIS — I35.0 AORTIC VALVE STENOSIS, ETIOLOGY OF CARDIAC VALVE DISEASE UNSPECIFIED: ICD-10-CM

## 2018-05-21 PROCEDURE — 99204 OFFICE O/P NEW MOD 45 MIN: CPT | Performed by: FAMILY MEDICINE

## 2018-05-21 RX ORDER — LEVOTHYROXINE SODIUM 0.05 MG/1
50 TABLET ORAL EVERY MORNING
Qty: 90 TAB | Refills: 3 | Status: SHIPPED | OUTPATIENT
Start: 2018-05-21 | End: 2019-05-15 | Stop reason: SDUPTHER

## 2018-05-21 NOTE — PROGRESS NOTES
CC:New patient ( DM, HTN, A fib, AS, thyroid hypofunction)    HPI:  Shereen presents today to establish a new PCP. Was a patient of Dr Cornell.    Patient has been active and independent with all ADLs. Has the following medical issues:    Diabetes mellitus type 2 in nonobese ([facility])  Has been adequately controlled.her last A1C was 6.9 ( in 2/2018).Patient has been asymptomatic.Has been on Glipizide 2.5 mg daily.    Essential hypertension  BP has been adequately controlled on current medication. Denies headache, chest pain, and SOB.has been on Amlodipine 5 mg, Telmisartan 40 mg daily, Carvedilol 25 mg bid, and Clonidine as needed.    PAF (paroxysmal atrial fibrillation) ([facility])  Has beenasymptomatic.Has normal rate and rhythm.Has been on BB, and Coumadin.INR has been checked by previous PCP.    Diastolic dysfunction  She has been stable, and asymptomatic. Her last echo showed diastolic dysfunction grade one.    Aortic valve stenosis, etiology of cardiac valve disease unspecified  Last echo showed moderate AS, with normal EF. Denies SOB, and syncopal episodes.    Hypothyroidism due to acquired atrophy of thyroid  She has been tolerating Levothyroxine, no palpitation, no cold or heat intolerance, has been on levothyroxine 50 mcg daily    As per patient pneumonia vaccine is UTD.    Will discuss diabetes HM next visit.    Patient Active Problem List    Diagnosis Date Noted   • HTN (hypertension) 05/04/2012     Priority: High   • Chronic anticoagulation 05/02/2012     Priority: High   • Acute, but ill-defined, cerebrovascular disease 04/30/2012     Priority: High   • Cough 05/02/2012     Priority: Low   • Edema 05/02/2012     Priority: Low   • Hypertensive urgency 05/11/2017   • Diastolic dysfunction 02/02/2016   • Tear of lateral cartilage or meniscus of knee, current 05/21/2015   • PAF (paroxysmal atrial fibrillation) ([facility]) 01/12/2015   • Dyspnea on effort 01/02/2014   • Diabetes ([facility]) 12/26/2013   • Aortic stenosis 07/02/2013        Current Outpatient Prescriptions   Medication Sig Dispense Refill   • glipiZIDE SR (GLUCOTROL) 2.5 MG TABLET SR 24 HR Take 2.5 mg by mouth as needed.     • amlodipine (NORVASC) 5 MG Tab Take 0.5 Tabs by mouth 2 Times a Day. 90 Tab 2   • carvedilol (COREG) 25 MG Tab Take 1 Tab by mouth 2 times a day. 180 Tab 2   • telmisartan (MICARDIS) 40 MG Tab Take 1 Tab by mouth 2 Times a Day. 180 Tab 2   • clonidine (CATAPRES) 0.1 MG Tab Take 0.1 mg by mouth as needed.     • warfarin (COUMADIN) 3 MG TABS Take 3 mg by mouth every bedtime.     • Glucosamine HCl (GLUCOSAMINE PO) Take 1 Tab by mouth every morning. Pt unsure of dose     • Polyethyl Glycol-Propyl Glycol (SYSTANE OP) 1-2 Drops by Ophthalmic route 2 Times a Day.     • docosahexanoic acid (OMEGA 3 FA) 1000 MG CAPS Take 1,000 mg by mouth every morning.     • levothyroxine (SYNTHROID) 50 MCG TABS Take 50 mcg by mouth every morning.     • VITAMIN D PO Take 2,000 Units by mouth every morning.     • Dorzolamide-Timolol (COSOPT OP) Place 1 Drop in both eyes 2 Times a Day.     • METFORMIN  MG PO TABS Take 500 mg by mouth 3 times a day.     • CALCIUM 500 PO 1 Tab every morning.     • PEPCID 20 MG PO TABS Take 20 mg by mouth 2 times a day.       No current facility-administered medications for this visit.          Allergies as of 05/21/2018 - Reviewed 05/21/2018   Allergen Reaction Noted   • Darvon [propoxyphene hcl]  03/18/2008   • Iodine  05/11/2015   • Latex  09/19/2013   • Penicillins Anaphylaxis 08/02/2008   • Propoxyphene hcl Anaphylaxis 08/02/2008   • Tetanus antitoxin Myalgia 11/19/2017   • Tetracycline Anaphylaxis 08/02/2008        Social History     Social History   • Marital status:      Spouse name: N/A   • Number of children: N/A   • Years of education: N/A     Occupational History   • Not on file.     Social History Main Topics   • Smoking status: Former Smoker     Packs/day: 0.50     Years: 2.50     Types: Cigarettes     Quit date: 1/1/1964   •  "Smokeless tobacco: Never Used   • Alcohol use 0.0 oz/week      Comment: rarely   • Drug use: No   • Sexual activity: Not on file     Other Topics Concern   • Not on file     Social History Narrative   • No narrative on file       Family History   Problem Relation Age of Onset   • Heart Attack Mother    • Heart Disease Father        Past Surgical History:   Procedure Laterality Date   • KNEE ARTHROSCOPY Right 5/21/2015    Procedure: KNEE ARTHROSCOPY;  Surgeon: Emerson Garcia M.D.;  Location: SURGERY [facility];  Service:    • MENISCECTOMY Right 5/21/2015    Procedure: LATERAL MENISCECTOMY;  Surgeon: Emerson Garcia M.D.;  Location: SURGERY [facility];  Service:    • CATARACT PHACO WITH IOL  10/21/2013    Performed by Dominick Fernando M.D. at [facility]   • CATARACT PHACO WITH IOL  9/23/2013    Performed by Dominick Fernando M.D. at [facility]S   • CHOLECYSTECTOMY     • HYSTERECTOMY RADICAL     • LUMPECTOMY      R breast   • PB RADIATION THERAPY PLAN SIMPLE         ROS:  Denies any Headache, Blurred Vision, Confusion Chest pain,  Shortness of breath,  Abdominal pain, Changes of bowel or bladder, Lower ext edema, Fevers, Nights sweats, Weight Changes, Focal weakness or numbness.  All other systems are negative.    /70   Pulse 71   Temp 35.9 °C (96.7 °F)   Resp 16   Ht 1.575 m (5' 2\")   Wt 68.5 kg (151 lb 0.2 oz)   LMP 01/01/1985   SpO2 95%   BMI 27.62 kg/m²     Physical Exam:  Gen:         Alert and oriented, No apparent distress.  HEENT:   Perrla, TM clear,  Oralpharynx no erythema or exudates.  Neck:       No Jugular venous distension, Lymphadenopathy, Thyromegaly, Bruits.  Lungs:     Clear to auscultation bilaterally  CV:          Regular rate and rhythm. No murmurs, rubs or gallops.  Abd:         Soft non tender, non distended. Normal active bowel sounds. No                                        Hepatosplenomegaly, No pulsatile masses.  Ext:          No " clubbing, cyanosis, edema.      Assessment and Plan.   81 y.o. female     1. Diabetes mellitus type 2 in nonobese ([facility])  Adequately controlled.  Last A1C was 6.9 ( in 2/2018).  Continue on Glipizide 2.5 mg daily.    2. Essential hypertension  Has been adequately controlled on current medication. Denies headache, chest pain, and SOB.  Continue on Amlodipine 5 mg, Telmisartan 40 mg daily, Carvedilol 25 mg bid, and Clonidine as needed.    3. PAF (paroxysmal atrial fibrillation) ([facility])  Stable, asymptomatic.  Continue on BB, and Coumadin.  Will refer to coumadin clinic.    - REFERRAL TO ANTICOAGULATION MONITORING  - PROTHROMBIN TIME; Future    4. Diastolic dysfunction  Stable. Last echo showed diastolic dysfunction grade one.  Will continue watch.    5. Aortic valve stenosis, etiology of cardiac valve disease unspecified  Moderate AS  Asymptomatic.  Will continue watch.  Continue follow up with cardiology.    6. Hypothyroidism due to acquired atrophy of thyroid  He has been tolerating Levothyroxine, no palpitation, no cold or heat intolerance  Continue on levothyroxine 50 cg daily    7. Healthcare maintenance  As per patient pneumonia vaccine is UTD.    Will discuss diabetes HM next visit.

## 2018-05-22 ENCOUNTER — TELEPHONE (OUTPATIENT)
Dept: VASCULAR LAB | Facility: MEDICAL CENTER | Age: 83
End: 2018-05-22

## 2018-05-22 NOTE — TELEPHONE ENCOUNTER
Pt was returning call from CC to establish care. The pt was able to be scheduled for Friday, 25.    Apolinar Camilo. [facility]

## 2018-05-25 ENCOUNTER — ANTICOAGULATION VISIT (OUTPATIENT)
Dept: VASCULAR LAB | Facility: MEDICAL CENTER | Age: 83
End: 2018-05-25
Attending: INTERNAL MEDICINE
Payer: MEDICARE

## 2018-05-25 VITALS — HEART RATE: 78 BPM | SYSTOLIC BLOOD PRESSURE: 116 MMHG | DIASTOLIC BLOOD PRESSURE: 63 MMHG

## 2018-05-25 DIAGNOSIS — I48.0 PAF (PAROXYSMAL ATRIAL FIBRILLATION) (HCC): ICD-10-CM

## 2018-05-25 DIAGNOSIS — Z79.01 CHRONIC ANTICOAGULATION: ICD-10-CM

## 2018-05-25 LAB — INR PPP: 3.9 (ref 2–3.5)

## 2018-05-25 PROCEDURE — 85610 PROTHROMBIN TIME: CPT

## 2018-05-25 PROCEDURE — 99213 OFFICE O/P EST LOW 20 MIN: CPT

## 2018-05-25 NOTE — PROGRESS NOTES
Anticoagulation Summary  As of 5/25/2018    INR goal:   2.0-3.0   TTR:   --   Today's INR:   3.9!   Warfarin maintenance plan:   3 mg (3 mg x 1) every day   Weekly warfarin total:   21 mg   Plan last modified:   Ayush Pérez, PharmD (5/25/2018)   Next INR check:      Target end date:   Indefinite    Indications    Chronic anticoagulation [Z79.01]  PAF (paroxysmal atrial fibrillation) (HCC) [I48.0]             Anticoagulation Episode Summary     INR check location:       Preferred lab:       Send INR reminders to:       Comments:         Anticoagulation Care Providers     Provider Role Specialty Phone number    Ganesh Mckeon M.D. Referring Geriatrics [phone]    [facility] Responsible  [phone]        Anticoagulation Patient Findings    Pt is new to [facility] but not new to warfarin.  Discussed indication for warfarin therapy and INR goal range. Explained our services, hours of operation, warfarin therapy, potential SE, potential DI. Discussed diet at length, with an emphasis on foods rich in vitamin K.  Discussed monitoring parameters, such as blood in urine, blood in stool, discussed what to do if a dose is missed, or suspected as missed.  Emphasized importance of compliance including follow up. Discussed lifestyle choices of ETOH & smoking and its impact on therapy.      Pt confirms history of TIA x2.     Discussed DOAC's at length, pt does not want to switch because she is more familiar with warfarin despite advantages of DOAC therapy.      Pt denies any unusual s/s of bleeding, bruising, clotting or any changes to diet or medications.    CHADSVASC of at least 8 (CHF, HTN, Age, DMII, TIA, Gender)    Added [facility] to care team  Please have pt sign new patient contract at next visit.  Copy will be scanned into media.     Pt cannot return for 1 week.  Last INR was 3 weeks ago and was therapeutic.  Hold today then Pt is to continue with current warfarin dosing  regimen. Follow up in 1 week.    Ayush Pérez, PharmD     CC Dr Michael Bloch

## 2018-05-25 NOTE — Clinical Note
Pt hx significant for TIA x2.  Not on ASA?  Thoughts? She denies GIB or other serious bleeding.  She is unclear if TIA was when sub-therapeutic.

## 2018-05-29 ENCOUNTER — TELEPHONE (OUTPATIENT)
Dept: VASCULAR LAB | Facility: MEDICAL CENTER | Age: 83
End: 2018-05-29

## 2018-05-29 LAB — INR BLD: 3.9 (ref 0.9–1.2)

## 2018-05-29 NOTE — TELEPHONE ENCOUNTER
Initial anticoag note and most recent cards note reviewed.  Patient with afib and chads vasc = approx 8 including tia    Pending further recommendations, we will continue with indefinite anticoagulation with warfarin as directed by cards  Will defer all management of rhythm, rate, and other cv issues, aside from anticoagulation, to cards Michael Bloch, MD  Anticoagulation Clinic    Cc:  RADHA Mckeon

## 2018-06-01 ENCOUNTER — ANTICOAGULATION VISIT (OUTPATIENT)
Dept: VASCULAR LAB | Facility: MEDICAL CENTER | Age: 83
End: 2018-06-01
Attending: INTERNAL MEDICINE
Payer: MEDICARE

## 2018-06-01 DIAGNOSIS — I48.0 PAF (PAROXYSMAL ATRIAL FIBRILLATION) (HCC): ICD-10-CM

## 2018-06-01 DIAGNOSIS — Z79.01 CHRONIC ANTICOAGULATION: ICD-10-CM

## 2018-06-01 LAB
INR BLD: 2.7 (ref 0.9–1.2)
INR PPP: 2.7 (ref 2–3.5)

## 2018-06-01 PROCEDURE — 99211 OFF/OP EST MAY X REQ PHY/QHP: CPT | Performed by: PHARMACIST

## 2018-06-01 PROCEDURE — 85610 PROTHROMBIN TIME: CPT

## 2018-06-01 NOTE — PROGRESS NOTES
Anticoagulation Summary  As of 6/1/2018    INR goal:   2.0-3.0   TTR:   --   Today's INR:   2.7   Warfarin maintenance plan:   3 mg (3 mg x 1) every day   Weekly warfarin total:   21 mg   Plan last modified:   Ayush Pérez, PharmD (5/25/2018)   Next INR check:   6/29/2018   Target end date:   Indefinite    Indications    Chronic anticoagulation [Z79.01]  PAF (paroxysmal atrial fibrillation) (HCC) [I48.0]             Anticoagulation Episode Summary     INR check location:       Preferred lab:       Send INR reminders to:       Comments:         Anticoagulation Care Providers     Provider Role Specialty Phone number    Ganesh Mckeon M.D. Referring Geriatrics [phone]    [facility] Responsible  [phone]        Anticoagulation Patient Findings      HPI:  Shereen Dale seen in clinic today, on anticoagulation therapy with warfarin for PAF and TIA  Changes to current medical/health status since last appt: pt's been eating more greens again.  Denies signs/symptoms of bleeding and/or thrombosis since the last appt.    Denies any interval changes to diet  Denies any interval changes to medications since last appt.   Denies any complications or cost restrictions with current therapy.   BP declined      A/P   INR  is-therapeutic.   Will have pt continue with her same amount of greens and continue with the same warfarin dosing.     Follow up appointment in 4 week(s) per pt preference.     Suellen Benitez, PharmD

## 2018-06-29 ENCOUNTER — ANTICOAGULATION VISIT (OUTPATIENT)
Dept: VASCULAR LAB | Facility: MEDICAL CENTER | Age: 83
End: 2018-06-29
Attending: INTERNAL MEDICINE
Payer: MEDICARE

## 2018-06-29 VITALS — DIASTOLIC BLOOD PRESSURE: 67 MMHG | HEART RATE: 77 BPM | OXYGEN SATURATION: 96 % | SYSTOLIC BLOOD PRESSURE: 114 MMHG

## 2018-06-29 DIAGNOSIS — I48.0 PAF (PAROXYSMAL ATRIAL FIBRILLATION) (HCC): ICD-10-CM

## 2018-06-29 DIAGNOSIS — Z79.01 CHRONIC ANTICOAGULATION: ICD-10-CM

## 2018-06-29 LAB
INR BLD: 4.4 (ref 0.9–1.2)
INR PPP: 4.4 (ref 2–3.5)

## 2018-06-29 PROCEDURE — 99212 OFFICE O/P EST SF 10 MIN: CPT | Performed by: PHARMACIST

## 2018-06-29 PROCEDURE — 85610 PROTHROMBIN TIME: CPT

## 2018-06-29 NOTE — PROGRESS NOTES
Anticoagulation Summary  As of 6/29/2018    INR goal:   2.0-3.0   TTR:   7.8 % (3.6 wk)   Today's INR:   4.4!   Warfarin maintenance plan:   1.5 mg (3 mg x 0.5) on Mon, Fri; 3 mg (3 mg x 1) all other days   Weekly warfarin total:   18 mg   Plan last modified:   Suellen Benitez, PharmD (6/29/2018)   Next INR check:   7/6/2018   Target end date:   Indefinite    Indications    Chronic anticoagulation [Z79.01]  PAF (paroxysmal atrial fibrillation) (HCC) [I48.0]             Anticoagulation Episode Summary     INR check location:       Preferred lab:       Send INR reminders to:       Comments:         Anticoagulation Care Providers     Provider Role Specialty Phone number    Ganesh Mckeon M.D. Referring Geriatrics [phone]    [facility] Responsible  [phone]        Anticoagulation Patient Findings      HPI:  Shereen Dale seen in clinic today, on anticoagulation therapy with warfarin for PAF  Changes to current medical/health status since last appt: pt has been noticing more bruising  Denies signs/symptoms of bleeding and/or thrombosis since the last appt.    Denies any interval changes to diet  Denies any interval changes to medications since last appt.   Denies any complications or cost restrictions with current therapy.   BP recorded in vitals.      A/P   INR  is supra-therapeutic.   Will hold dose for 2 days and then reduce her weekly warfarin dosing.     Follow up appointment in 1 week(s).    Suellen Benitez, PharmD

## 2018-07-06 ENCOUNTER — ANTICOAGULATION VISIT (OUTPATIENT)
Dept: VASCULAR LAB | Facility: MEDICAL CENTER | Age: 83
End: 2018-07-06
Attending: INTERNAL MEDICINE
Payer: MEDICARE

## 2018-07-06 VITALS — HEART RATE: 86 BPM | SYSTOLIC BLOOD PRESSURE: 125 MMHG | DIASTOLIC BLOOD PRESSURE: 61 MMHG

## 2018-07-06 DIAGNOSIS — Z79.01 CHRONIC ANTICOAGULATION: ICD-10-CM

## 2018-07-06 DIAGNOSIS — I48.0 PAF (PAROXYSMAL ATRIAL FIBRILLATION) (HCC): ICD-10-CM

## 2018-07-06 LAB
INR BLD: 2.3 (ref 0.9–1.2)
INR PPP: 2.3 (ref 2–3.5)

## 2018-07-06 PROCEDURE — 85610 PROTHROMBIN TIME: CPT

## 2018-07-06 PROCEDURE — 99211 OFF/OP EST MAY X REQ PHY/QHP: CPT

## 2018-07-06 NOTE — PROGRESS NOTES
Anticoagulation Summary  As of 7/6/2018    INR goal:   2.0-3.0   TTR:   13.4 % (1.1 mo)   Today's INR:   2.3   Warfarin maintenance plan:   1.5 mg (3 mg x 0.5) on Mon, Fri; 3 mg (3 mg x 1) all other days   Weekly warfarin total:   18 mg   No change documented:   Emily Coulter   Plan last modified:   Suellen Benitez, PharmD (6/29/2018)   Next INR check:   7/20/2018   Target end date:   Indefinite    Indications    Chronic anticoagulation [Z79.01]  PAF (paroxysmal atrial fibrillation) (HCC) [I48.0]             Anticoagulation Episode Summary     INR check location:       Preferred lab:       Send INR reminders to:       Comments:         Anticoagulation Care Providers     Provider Role Specialty Phone number    Ganesh Mckeon M.D. Referring Geriatrics [phone]    [facility] Responsible  [phone]        Anticoagulation Patient Findings  Patient Findings     Negatives:   Signs/symptoms of thrombosis, Signs/symptoms of bleeding, Laboratory test error suspected, Change in health, Change in alcohol use, Change in activity, Upcoming invasive procedure, Emergency department visit, Upcoming dental procedure, Missed doses, Extra doses, Change in medications, Change in diet/appetite, Hospital admission, Bruising, Other complaints          HPI:  Shereen Dale seen in clinic today, on anticoagulation therapy with warfarin for PAF.  Changes to current medical/health status since last appt: none  Denies signs/symptoms of bleeding and/or thrombosis since the last appt.    Denies any interval changes to diet  Denies any interval changes to medications since last appt.   Denies any complications or cost restrictions with current therapy.   BP recorded in vitals.        A/P   INR is therapeutic today at 2.3.   Patient will continue with the current warfarin dosing regimen, and will follow up again in 2 weeks.    Next appt: Friday July 20, 2018 10:45am    Samson EspinozaD

## 2018-07-17 ENCOUNTER — TELEPHONE (OUTPATIENT)
Dept: CARDIOLOGY | Facility: MEDICAL CENTER | Age: 83
End: 2018-07-17

## 2018-07-20 ENCOUNTER — ANTICOAGULATION VISIT (OUTPATIENT)
Dept: VASCULAR LAB | Facility: MEDICAL CENTER | Age: 83
End: 2018-07-20
Attending: INTERNAL MEDICINE
Payer: MEDICARE

## 2018-07-20 VITALS — DIASTOLIC BLOOD PRESSURE: 73 MMHG | SYSTOLIC BLOOD PRESSURE: 119 MMHG | HEART RATE: 76 BPM

## 2018-07-20 DIAGNOSIS — I48.0 PAF (PAROXYSMAL ATRIAL FIBRILLATION) (HCC): ICD-10-CM

## 2018-07-20 DIAGNOSIS — Z79.01 CHRONIC ANTICOAGULATION: ICD-10-CM

## 2018-07-20 LAB — INR PPP: 2.6 (ref 2–3.5)

## 2018-07-20 PROCEDURE — 99211 OFF/OP EST MAY X REQ PHY/QHP: CPT

## 2018-07-20 PROCEDURE — 85610 PROTHROMBIN TIME: CPT

## 2018-07-20 NOTE — PROGRESS NOTES
Anticoagulation Summary  As of 7/20/2018    INR goal:   2.0-3.0   TTR:   13.4 % (1.1 mo)   Today's INR:      Plan last modified:   Suellen Benitez, PharmD (6/29/2018)   Next INR check:      Target end date:   Indefinite    Indications    Chronic anticoagulation [Z79.01]  PAF (paroxysmal atrial fibrillation) (HCC) [I48.0]             Anticoagulation Episode Summary     INR check location:       Preferred lab:       Send INR reminders to:       Comments:         Anticoagulation Care Providers     Provider Role Specialty Phone number    Ganesh Mckoen M.D. Referring Geriatrics [phone]    [facility] Responsible  [phone]        Anticoagulation Patient Findings    HPI:  Shereen Dale seen in clinic today, on anticoagulation therapy with warfarin for AF  Changes to current medical/health status since last appt: None  Denies signs/symptoms of bleeding and/or thrombosis since the last appt.    Denies any interval changes to diet  Denies any interval changes to medications since last appt.   Denies any complications or cost restrictions with current therapy.   BP recorded in vitals.    A/P   INR  therapeutic.   Pt is to continue with current warfarin dosing regimen.    Follow up appointment in 2 week(s) per pt preference.    Yahaira Lilly

## 2018-07-30 ENCOUNTER — HOSPITAL ENCOUNTER (OUTPATIENT)
Dept: LAB | Facility: MEDICAL CENTER | Age: 83
End: 2018-07-30
Attending: FAMILY MEDICINE
Payer: MEDICARE

## 2018-07-30 LAB
ALBUMIN SERPL BCP-MCNC: 4.6 G/DL (ref 3.2–4.9)
ALBUMIN/GLOB SERPL: 1.8 G/DL
ALP SERPL-CCNC: 36 U/L (ref 30–99)
ALT SERPL-CCNC: 38 U/L (ref 2–50)
ANION GAP SERPL CALC-SCNC: 12 MMOL/L (ref 0–11.9)
AST SERPL-CCNC: 27 U/L (ref 12–45)
BASOPHILS # BLD AUTO: 0.7 % (ref 0–1.8)
BASOPHILS # BLD: 0.04 K/UL (ref 0–0.12)
BILIRUB SERPL-MCNC: 0.4 MG/DL (ref 0.1–1.5)
BUN SERPL-MCNC: 16 MG/DL (ref 8–22)
CALCIUM SERPL-MCNC: 9.7 MG/DL (ref 8.5–10.5)
CHLORIDE SERPL-SCNC: 103 MMOL/L (ref 96–112)
CHOLEST SERPL-MCNC: 173 MG/DL (ref 100–199)
CO2 SERPL-SCNC: 21 MMOL/L (ref 20–33)
CREAT SERPL-MCNC: 0.64 MG/DL (ref 0.5–1.4)
CREAT UR-MCNC: 81.3 MG/DL
EOSINOPHIL # BLD AUTO: 0.26 K/UL (ref 0–0.51)
EOSINOPHIL NFR BLD: 4.3 % (ref 0–6.9)
ERYTHROCYTE [DISTWIDTH] IN BLOOD BY AUTOMATED COUNT: 42.2 FL (ref 35.9–50)
GLOBULIN SER CALC-MCNC: 2.6 G/DL (ref 1.9–3.5)
GLUCOSE SERPL-MCNC: 136 MG/DL (ref 65–99)
HCT VFR BLD AUTO: 39.6 % (ref 37–47)
HDLC SERPL-MCNC: 47 MG/DL
HGB BLD-MCNC: 13.4 G/DL (ref 12–16)
IMM GRANULOCYTES # BLD AUTO: 0.02 K/UL (ref 0–0.11)
IMM GRANULOCYTES NFR BLD AUTO: 0.3 % (ref 0–0.9)
INR PPP: 1.7 (ref 0.87–1.13)
LDLC SERPL CALC-MCNC: 99 MG/DL
LYMPHOCYTES # BLD AUTO: 1.89 K/UL (ref 1–4.8)
LYMPHOCYTES NFR BLD: 31.3 % (ref 22–41)
MCH RBC QN AUTO: 30.8 PG (ref 27–33)
MCHC RBC AUTO-ENTMCNC: 33.8 G/DL (ref 33.6–35)
MCV RBC AUTO: 91 FL (ref 81.4–97.8)
MICROALBUMIN UR-MCNC: <0.7 MG/DL
MICROALBUMIN/CREAT UR: NORMAL MG/G (ref 0–30)
MONOCYTES # BLD AUTO: 0.54 K/UL (ref 0–0.85)
MONOCYTES NFR BLD AUTO: 8.9 % (ref 0–13.4)
NEUTROPHILS # BLD AUTO: 3.29 K/UL (ref 2–7.15)
NEUTROPHILS NFR BLD: 54.5 % (ref 44–72)
NRBC # BLD AUTO: 0 K/UL
NRBC BLD-RTO: 0 /100 WBC
PLATELET # BLD AUTO: 247 K/UL (ref 164–446)
PMV BLD AUTO: 9.5 FL (ref 9–12.9)
POTASSIUM SERPL-SCNC: 4.5 MMOL/L (ref 3.6–5.5)
PROT SERPL-MCNC: 7.2 G/DL (ref 6–8.2)
PROTHROMBIN TIME: 19.7 SEC (ref 12–14.6)
RBC # BLD AUTO: 4.35 M/UL (ref 4.2–5.4)
SODIUM SERPL-SCNC: 136 MMOL/L (ref 135–145)
T4 FREE SERPL-MCNC: 1.16 NG/DL (ref 0.53–1.43)
TRIGL SERPL-MCNC: 134 MG/DL (ref 0–149)
TSH SERPL DL<=0.005 MIU/L-ACNC: 1.91 UIU/ML (ref 0.38–5.33)
WBC # BLD AUTO: 6 K/UL (ref 4.8–10.8)

## 2018-07-30 PROCEDURE — 85025 COMPLETE CBC W/AUTO DIFF WBC: CPT

## 2018-07-30 PROCEDURE — 84443 ASSAY THYROID STIM HORMONE: CPT

## 2018-07-30 PROCEDURE — 85610 PROTHROMBIN TIME: CPT

## 2018-07-30 PROCEDURE — 80053 COMPREHEN METABOLIC PANEL: CPT

## 2018-07-30 PROCEDURE — 36415 COLL VENOUS BLD VENIPUNCTURE: CPT

## 2018-07-30 PROCEDURE — 84439 ASSAY OF FREE THYROXINE: CPT

## 2018-07-30 PROCEDURE — 82570 ASSAY OF URINE CREATININE: CPT

## 2018-07-30 PROCEDURE — 82043 UR ALBUMIN QUANTITATIVE: CPT

## 2018-07-30 PROCEDURE — 83036 HEMOGLOBIN GLYCOSYLATED A1C: CPT

## 2018-07-30 PROCEDURE — 80061 LIPID PANEL: CPT

## 2018-07-31 LAB
EST. AVERAGE GLUCOSE BLD GHB EST-MCNC: 148 MG/DL
HBA1C MFR BLD: 6.8 % (ref 0–5.6)

## 2018-08-03 ENCOUNTER — ANTICOAGULATION VISIT (OUTPATIENT)
Dept: VASCULAR LAB | Facility: MEDICAL CENTER | Age: 83
End: 2018-08-03
Attending: INTERNAL MEDICINE
Payer: MEDICARE

## 2018-08-03 VITALS
WEIGHT: 151 LBS | BODY MASS INDEX: 27.62 KG/M2 | SYSTOLIC BLOOD PRESSURE: 100 MMHG | HEART RATE: 75 BPM | DIASTOLIC BLOOD PRESSURE: 64 MMHG

## 2018-08-03 DIAGNOSIS — I48.0 PAF (PAROXYSMAL ATRIAL FIBRILLATION) (HCC): ICD-10-CM

## 2018-08-03 DIAGNOSIS — Z79.01 CHRONIC ANTICOAGULATION: ICD-10-CM

## 2018-08-03 LAB
INR BLD: 1.2 (ref 0.9–1.2)
INR PPP: 1.2 (ref 2–3.5)

## 2018-08-03 PROCEDURE — 85610 PROTHROMBIN TIME: CPT

## 2018-08-03 PROCEDURE — 99212 OFFICE O/P EST SF 10 MIN: CPT

## 2018-08-03 NOTE — PROGRESS NOTES
Anticoagulation Summary  As of 8/3/2018    INR goal:   2.0-3.0   TTR:   41.9 % (2 mo)   Today's INR:   1.2!   Warfarin maintenance plan:   3 mg (3 mg x 1) every day   Weekly warfarin total:   21 mg   Plan last modified:   Guru Baldwin, PharmD (8/3/2018)   Next INR check:   8/9/2018   Target end date:   Indefinite    Indications    Chronic anticoagulation [Z79.01]  PAF (paroxysmal atrial fibrillation) (HCC) [I48.0]             Anticoagulation Episode Summary     INR check location:       Preferred lab:       Send INR reminders to:       Comments:         Anticoagulation Care Providers     Provider Role Specialty Phone number    Ganesh Mckeon M.D. Referring Geriatrics [phone]    [facility] Responsible  [phone]        Anticoagulation Patient Findings     INR  sub-therapeutic.   She declined the shots today and will get to the ER for any s/s of a tia  Denies signs/symptoms of bleeding and/or thrombosis.   Denies changes to diet or medications.   Follow up appointment in 6 days     6mg today then increase weekly warfarin dose as noted      Guru Baldwin, PharmD

## 2018-08-09 ENCOUNTER — ANTICOAGULATION VISIT (OUTPATIENT)
Dept: VASCULAR LAB | Facility: MEDICAL CENTER | Age: 83
End: 2018-08-09
Attending: INTERNAL MEDICINE
Payer: MEDICARE

## 2018-08-09 VITALS — HEART RATE: 76 BPM | DIASTOLIC BLOOD PRESSURE: 75 MMHG | SYSTOLIC BLOOD PRESSURE: 130 MMHG

## 2018-08-09 DIAGNOSIS — Z79.01 CHRONIC ANTICOAGULATION: ICD-10-CM

## 2018-08-09 DIAGNOSIS — I48.0 PAF (PAROXYSMAL ATRIAL FIBRILLATION) (HCC): ICD-10-CM

## 2018-08-09 LAB
INR BLD: 3.7 (ref 0.9–1.2)
INR PPP: 3.7 (ref 2–3.5)

## 2018-08-09 PROCEDURE — 85610 PROTHROMBIN TIME: CPT

## 2018-08-09 PROCEDURE — 99212 OFFICE O/P EST SF 10 MIN: CPT | Performed by: NURSE PRACTITIONER

## 2018-08-09 NOTE — PROGRESS NOTES
Anticoagulation Summary  As of 8/9/2018    INR goal:   2.0-3.0   TTR:   41.7 % (2.2 mo)   Today's INR:   3.7!   Warfarin maintenance plan:   3 mg (3 mg x 1) every day   Weekly warfarin total:   21 mg   Plan last modified:   Guru Baldwin, PharmD (8/3/2018)   Next INR check:   8/14/2018   Target end date:   Indefinite    Indications    Chronic anticoagulation [Z79.01]  PAF (paroxysmal atrial fibrillation) (HCC) [I48.0]             Anticoagulation Episode Summary     INR check location:       Preferred lab:       Send INR reminders to:       Comments:         Anticoagulation Care Providers     Provider Role Specialty Phone number    Ganesh Mckeon M.D. Referring Geriatrics [phone]    [facility] Responsible  [phone]        Anticoagulation Patient Findings      HPI:  Shereen Dale seen in clinic today for follow up on anticoagulation therapy in the presence of AF. She ate no greens this past week to help her INR. Noticed more bruising to her extremities. Denies any medication changes. No actual bleeding.    A/P:   INR supratherapeutic. She will resume her normal eating habits. Will HOLD tonight and decrease tomorrow. Pt is anxious her INR will continue to increase but I assured her it should lower with dose adjustment. BP recorded in vitals.    Follow up appointment on Tuesday.    Next Appointment: Tuesday, August 14, 2018 at 10:00 am.     Laura HAYES

## 2018-08-14 ENCOUNTER — ANTICOAGULATION VISIT (OUTPATIENT)
Dept: VASCULAR LAB | Facility: MEDICAL CENTER | Age: 83
End: 2018-08-14
Attending: INTERNAL MEDICINE
Payer: MEDICARE

## 2018-08-14 VITALS
SYSTOLIC BLOOD PRESSURE: 117 MMHG | BODY MASS INDEX: 27.62 KG/M2 | HEART RATE: 70 BPM | DIASTOLIC BLOOD PRESSURE: 69 MMHG | WEIGHT: 151 LBS

## 2018-08-14 DIAGNOSIS — Z79.01 CHRONIC ANTICOAGULATION: ICD-10-CM

## 2018-08-14 DIAGNOSIS — I48.0 PAF (PAROXYSMAL ATRIAL FIBRILLATION) (HCC): ICD-10-CM

## 2018-08-14 LAB — INR PPP: 2.2 (ref 2–3.5)

## 2018-08-14 PROCEDURE — 85610 PROTHROMBIN TIME: CPT

## 2018-08-14 PROCEDURE — 99211 OFF/OP EST MAY X REQ PHY/QHP: CPT

## 2018-08-14 NOTE — PROGRESS NOTES
Anticoagulation Summary  As of 8/14/2018    INR goal:   2.0-3.0   TTR:   42.5 % (2.4 mo)   Today's INR:   2.2   Warfarin maintenance plan:   1.5 mg (3 mg x 0.5) on Mon, Fri; 3 mg (3 mg x 1) all other days   Weekly warfarin total:   18 mg   Plan last modified:   Guru Baldwin PharmD (8/14/2018)   Next INR check:   8/21/2018   Target end date:   Indefinite    Indications    Chronic anticoagulation [Z79.01]  PAF (paroxysmal atrial fibrillation) (HCC) [I48.0]             Anticoagulation Episode Summary     INR check location:       Preferred lab:       Send INR reminders to:       Comments:         Anticoagulation Care Providers     Provider Role Specialty Phone number    Ganesh Mckeon M.D. Referring Geriatrics [phone]    [facility] Responsible  [phone]        Anticoagulation Patient Findings      Spoke to patient on the phone.   INR  therapeutic.   Denies signs/symptoms of bleeding and/or thrombosis.   Denies changes to diet or medications.   Follow up appointment in 1 week(s).    Continue weekly warfarin dose as noted      Guru Baldwin, Nikki

## 2018-08-21 ENCOUNTER — ANTICOAGULATION VISIT (OUTPATIENT)
Dept: VASCULAR LAB | Facility: MEDICAL CENTER | Age: 83
End: 2018-08-21
Attending: INTERNAL MEDICINE
Payer: MEDICARE

## 2018-08-21 VITALS
DIASTOLIC BLOOD PRESSURE: 77 MMHG | BODY MASS INDEX: 27.62 KG/M2 | WEIGHT: 151 LBS | SYSTOLIC BLOOD PRESSURE: 116 MMHG | HEART RATE: 78 BPM

## 2018-08-21 DIAGNOSIS — Z79.01 CHRONIC ANTICOAGULATION: ICD-10-CM

## 2018-08-21 DIAGNOSIS — I48.0 PAF (PAROXYSMAL ATRIAL FIBRILLATION) (HCC): ICD-10-CM

## 2018-08-21 LAB
INR BLD: 2.4 (ref 0.9–1.2)
INR PPP: 2.4 (ref 2–3.5)

## 2018-08-21 PROCEDURE — 85610 PROTHROMBIN TIME: CPT

## 2018-08-21 PROCEDURE — 99211 OFF/OP EST MAY X REQ PHY/QHP: CPT

## 2018-08-21 NOTE — PROGRESS NOTES
Anticoagulation Summary  As of 8/21/2018    INR goal:   2.0-3.0   TTR:   47.7 % (2.6 mo)   Today's INR:   2.4   Warfarin maintenance plan:   1.5 mg (3 mg x 0.5) on Mon, Fri; 3 mg (3 mg x 1) all other days   Weekly warfarin total:   18 mg   Plan last modified:   Guru Baldwin PharmD (8/14/2018)   Next INR check:   8/28/2018   Target end date:   Indefinite    Indications    Chronic anticoagulation [Z79.01]  PAF (paroxysmal atrial fibrillation) (HCC) [I48.0]             Anticoagulation Episode Summary     INR check location:       Preferred lab:       Send INR reminders to:       Comments:         Anticoagulation Care Providers     Provider Role Specialty Phone number    Ganesh Mckeon M.D. Referring Geriatrics [phone]    [facility] Responsible  [phone]        Anticoagulation Patient Findings    INR  -therapeutic.   Denies signs/symptoms of bleeding and/or thrombosis.   Denies changes to diet or medications.   Follow up appointment in 1 week(s).    Continue weekly warfarin dose as noted      Guru Baldwin, PharmD

## 2018-08-28 ENCOUNTER — ANTICOAGULATION VISIT (OUTPATIENT)
Dept: VASCULAR LAB | Facility: MEDICAL CENTER | Age: 83
End: 2018-08-28
Attending: INTERNAL MEDICINE
Payer: MEDICARE

## 2018-08-28 VITALS — DIASTOLIC BLOOD PRESSURE: 67 MMHG | HEART RATE: 75 BPM | SYSTOLIC BLOOD PRESSURE: 112 MMHG

## 2018-08-28 DIAGNOSIS — Z79.01 CHRONIC ANTICOAGULATION: ICD-10-CM

## 2018-08-28 DIAGNOSIS — I48.0 PAF (PAROXYSMAL ATRIAL FIBRILLATION) (HCC): ICD-10-CM

## 2018-08-28 LAB
INR BLD: 3 (ref 0.9–1.2)
INR PPP: 3 (ref 2–3.5)

## 2018-08-28 PROCEDURE — 85610 PROTHROMBIN TIME: CPT

## 2018-08-28 PROCEDURE — 99212 OFFICE O/P EST SF 10 MIN: CPT

## 2018-08-28 NOTE — PROGRESS NOTES
Anticoagulation Summary  As of 8/28/2018    INR goal:   2.0-3.0   TTR:   52.0 % (2.8 mo)   Today's INR:   3.0   Warfarin maintenance plan:   1.5 mg (3 mg x 0.5) on Mon, Fri; 3 mg (3 mg x 1) all other days   Weekly warfarin total:   18 mg   Plan last modified:   Guru Baldwin, PharmD (8/14/2018)   Next INR check:   9/4/2018   Target end date:   Indefinite    Indications    Chronic anticoagulation [Z79.01]  PAF (paroxysmal atrial fibrillation) (HCC) [I48.0]             Anticoagulation Episode Summary     INR check location:       Preferred lab:       Send INR reminders to:       Comments:         Anticoagulation Care Providers     Provider Role Specialty Phone number    Ganesh Mckeon M.D. Referring Geriatrics [phone]    [facility] Responsible  [phone]        Anticoagulation Patient Findings  Patient Findings     Negatives:   Signs/symptoms of thrombosis, Signs/symptoms of bleeding, Laboratory test error suspected, Change in health, Change in alcohol use, Change in activity, Upcoming invasive procedure, Emergency department visit, Upcoming dental procedure, Missed doses, Extra doses, Change in medications, Change in diet/appetite, Hospital admission, Bruising, Other complaints          HPI:  Shereen Dale seen in clinic today, on anticoagulation therapy with warfarin for PAF.  Changes to current medical/health status since last appt: none  Denies signs/symptoms of bleeding and/or thrombosis since the last appt.    Denies any interval changes to diet  Denies any interval changes to medications since last appt.   Denies any complications or cost restrictions with current therapy.   BP recorded in vitals.    A/P   INR was therapeutic at 3.0.    Patient does not want to get supratherapeutic so will have her take 1.5mg tonight, then resume her current regimen. Will consider making it a change in regimen depending on next reading.  Patient will follow up again in 1 week. (per pt  preference)    Next appt: Tuesday Sept 4, 2018 10am    Samson EspinozaD

## 2018-09-04 ENCOUNTER — ANTICOAGULATION VISIT (OUTPATIENT)
Dept: VASCULAR LAB | Facility: MEDICAL CENTER | Age: 83
End: 2018-09-04
Attending: INTERNAL MEDICINE
Payer: MEDICARE

## 2018-09-04 VITALS
DIASTOLIC BLOOD PRESSURE: 72 MMHG | WEIGHT: 151 LBS | HEART RATE: 77 BPM | BODY MASS INDEX: 27.62 KG/M2 | SYSTOLIC BLOOD PRESSURE: 125 MMHG

## 2018-09-04 DIAGNOSIS — Z79.01 CHRONIC ANTICOAGULATION: ICD-10-CM

## 2018-09-04 DIAGNOSIS — I48.0 PAF (PAROXYSMAL ATRIAL FIBRILLATION) (HCC): ICD-10-CM

## 2018-09-04 LAB
INR BLD: 2.1 (ref 0.9–1.2)
INR PPP: 2.1 (ref 2–3.5)

## 2018-09-04 PROCEDURE — 99211 OFF/OP EST MAY X REQ PHY/QHP: CPT

## 2018-09-04 PROCEDURE — 85610 PROTHROMBIN TIME: CPT

## 2018-09-04 NOTE — PROGRESS NOTES
Anticoagulation Summary  As of 9/4/2018    INR goal:   2.0-3.0   TTR:   55.7 % (3.1 mo)   Today's INR:   2.1   Warfarin maintenance plan:   1.5 mg (3 mg x 0.5) on Mon, Fri; 3 mg (3 mg x 1) all other days   Weekly warfarin total:   18 mg   Plan last modified:   Guru Baldwin PharmD (9/4/2018)   Next INR check:   9/18/2018   Target end date:   Indefinite    Indications    Chronic anticoagulation [Z79.01]  PAF (paroxysmal atrial fibrillation) (HCC) [I48.0]             Anticoagulation Episode Summary     INR check location:       Preferred lab:       Send INR reminders to:       Comments:         Anticoagulation Care Providers     Provider Role Specialty Phone number    Ganesh Mckeon M.D. Referring Geriatrics [phone]    [facility] Anticoagulation Services Responsible  [phone]        Anticoagulation Patient Findings      INR  therapeutic.   Denies signs/symptoms of bleeding and/or thrombosis.   Denies changes to diet or medications.   Follow up appointment in 2 week(s).    Continue weekly warfarin dose as noted    Guru Baldwin, PharmD

## 2018-09-12 ENCOUNTER — OFFICE VISIT (OUTPATIENT)
Dept: MEDICAL GROUP | Facility: MEDICAL CENTER | Age: 83
End: 2018-09-12
Payer: MEDICARE

## 2018-09-12 VITALS
SYSTOLIC BLOOD PRESSURE: 110 MMHG | RESPIRATION RATE: 16 BRPM | HEIGHT: 62 IN | WEIGHT: 154.1 LBS | OXYGEN SATURATION: 93 % | TEMPERATURE: 97.7 F | HEART RATE: 79 BPM | BODY MASS INDEX: 28.36 KG/M2 | DIASTOLIC BLOOD PRESSURE: 60 MMHG

## 2018-09-12 DIAGNOSIS — E11.9 DIABETES MELLITUS TYPE 2 IN NONOBESE (HCC): ICD-10-CM

## 2018-09-12 DIAGNOSIS — I10 ESSENTIAL HYPERTENSION: ICD-10-CM

## 2018-09-12 DIAGNOSIS — E03.4 HYPOTHYROIDISM DUE TO ACQUIRED ATROPHY OF THYROID: ICD-10-CM

## 2018-09-12 DIAGNOSIS — I48.0 PAF (PAROXYSMAL ATRIAL FIBRILLATION) (HCC): ICD-10-CM

## 2018-09-12 PROCEDURE — 99214 OFFICE O/P EST MOD 30 MIN: CPT | Performed by: FAMILY MEDICINE

## 2018-09-12 ASSESSMENT — PATIENT HEALTH QUESTIONNAIRE - PHQ9: CLINICAL INTERPRETATION OF PHQ2 SCORE: 0

## 2018-09-12 NOTE — PROGRESS NOTES
CC: DM, HTN, A fib, low thyroid function.    HPI:   Shereen presents today to discuss the following medical issues:    Diabetes mellitus type 2 in nonobese ([facility])  Has been adequately controlled.her last A1C was 6.8.Patient has been asymptomatic.Has been on Glipizide 2.5 mg daily.She is due for monofilament foot exam.     Essential hypertension  BP has been adequately controlled on current medication. Denies headache, chest pain, and SOB.has been on Amlodipine 5 mg, Telmisartan 40 mg daily, Carvedilol 25 mg bid, and Clonidine as needed.     PAF (paroxysmal atrial fibrillation) ([facility])  Has beenasymptomatic.Has normal rate and rhythm.Has been on BB, and Coumadin.She follows up with coumadin clinic.      Hypothyroidism due to acquired atrophy of thyroid  She has been tolerating Levothyroxine, no palpitation, no cold or heat intolerance, has been on levothyroxine 50 mcg daily.    Patient Active Problem List    Diagnosis Date Noted   • HTN (hypertension) 05/04/2012     Priority: High   • Chronic anticoagulation 05/02/2012     Priority: High   • Acute, but ill-defined, cerebrovascular disease 04/30/2012     Priority: High   • Cough 05/02/2012     Priority: Low   • Edema 05/02/2012     Priority: Low   • Hypertensive urgency 05/11/2017   • Diastolic dysfunction 02/02/2016   • Tear of lateral cartilage or meniscus of knee, current 05/21/2015   • PAF (paroxysmal atrial fibrillation) ([facility]) 01/12/2015   • Dyspnea on effort 01/02/2014   • Diabetes ([facility]) 12/26/2013   • Aortic stenosis 07/02/2013       Current Outpatient Prescriptions   Medication Sig Dispense Refill   • metFORMIN (GLUCOPHAGE) 500 MG Tab Take 1 Tab by mouth 3 times a day. 180 Tab 3   • levothyroxine (SYNTHROID) 50 MCG Tab Take 1 Tab by mouth every morning. 90 Tab 3   • glipiZIDE SR (GLUCOTROL) 2.5 MG TABLET SR 24 HR Take 2.5 mg by mouth as needed.     • amlodipine (NORVASC) 5 MG Tab Take 0.5 Tabs by mouth 2 Times a Day. 90 Tab 2   • carvedilol (COREG) 25 MG Tab Take 1  "Tab by mouth 2 times a day. 180 Tab 2   • telmisartan (MICARDIS) 40 MG Tab Take 1 Tab by mouth 2 Times a Day. 180 Tab 2   • clonidine (CATAPRES) 0.1 MG Tab Take 0.1 mg by mouth as needed.     • warfarin (COUMADIN) 3 MG TABS Take 3 mg by mouth every bedtime.     • Glucosamine HCl (GLUCOSAMINE PO) Take 1 Tab by mouth every morning. Pt unsure of dose     • Polyethyl Glycol-Propyl Glycol (SYSTANE OP) 1-2 Drops by Ophthalmic route 2 Times a Day.     • docosahexanoic acid (OMEGA 3 FA) 1000 MG CAPS Take 1,000 mg by mouth every morning.     • VITAMIN D PO Take 2,000 Units by mouth every morning.     • Dorzolamide-Timolol (COSOPT OP) Place 1 Drop in both eyes 2 Times a Day.     • CALCIUM 500 PO 1 Tab every morning.     • PEPCID 20 MG PO TABS Take 20 mg by mouth 2 times a day.       No current facility-administered medications for this visit.          Allergies as of 09/12/2018 - Reviewed 09/12/2018   Allergen Reaction Noted   • Darvon [propoxyphene hcl]  03/18/2008   • Iodine  05/11/2015   • Latex  09/19/2013   • Penicillins Anaphylaxis 08/02/2008   • Propoxyphene hcl Anaphylaxis 08/02/2008   • Tetanus antitoxin Myalgia 11/19/2017   • Tetracycline Anaphylaxis 08/02/2008        ROS: Denies any chest pain, Shortness of breath, Changes bowel or bladder, Lower extremity edema.    Physical Exam:  /60   Pulse 79   Temp 36.5 °C (97.7 °F)   Resp 16   Ht 1.575 m (5' 2\") Comment: patient stated   Wt 69.9 kg (154 lb 1.6 oz)   LMP 01/01/1985   SpO2 93%   BMI 28.19 kg/m²   Gen.: Well-developed, well-nourished, no apparent distress,pleasant and cooperative with the examination  Skin:  Warm and dry with good turgor. No rashes or suspicious lesions in visible areas  HEENT:Sinuses nontender with palpation, TMs clear, nares patent with pink mucosa and clear rhinorrhea,no septal deviation ,polyps or lesions. lips without lesions, oropharynx clear.  Neck: Trachea midline,no masses or adenopathy. No JVD.  Cor: irregular irregular " rate and rhythm , with systolic murmur, no gallop or rub.  Lungs: Respirations unlabored.Clear to auscultation with equal breath sounds bilaterally. No wheezes, rhonchi.  Extremities: No cyanosis, clubbing or edema.    Monofilament: done  Monofilament testing with a 10 gram force: sensation intact: intact bilaterally  Visual Inspection: Feet without maceration, ulcers, fissures.  Pedal pulses: intact bilaterally    Assessment and Plan.   81 y.o. female     1. Diabetes mellitus type 2 in nonobese ([facility])  Adequately controlled.  Last A1C was 6.8   Continue on Glipizide 2.5 mg daily.   Monofilament foot exam showed no sign of neuropathy.    - Diabetic Monofilament Lower Extremity Exam    2. Essential hypertension  Has been adequately controlled on current medication. Denies headache, chest pain, and SOB.  Continue on Amlodipine 5 mg, Telmisartan 40 mg daily, Carvedilol 25 mg bid, and Clonidine as needed.     3. PAF (paroxysmal atrial fibrillation) ([facility])  Stable, asymptomatic.  Continue on BB, and Coumadin.  Will refer to coumadin clinic.    4. Hypothyroidism due to acquired atrophy of thyroid  He has been tolerating Levothyroxine, no palpitation, no cold or heat intolerance  Continue on levothyroxine 50 cg daily

## 2018-09-18 ENCOUNTER — ANTICOAGULATION VISIT (OUTPATIENT)
Dept: VASCULAR LAB | Facility: MEDICAL CENTER | Age: 83
End: 2018-09-18
Attending: INTERNAL MEDICINE
Payer: MEDICARE

## 2018-09-18 VITALS
SYSTOLIC BLOOD PRESSURE: 119 MMHG | DIASTOLIC BLOOD PRESSURE: 74 MMHG | BODY MASS INDEX: 28.17 KG/M2 | WEIGHT: 154 LBS | HEART RATE: 75 BPM

## 2018-09-18 DIAGNOSIS — Z79.01 CHRONIC ANTICOAGULATION: ICD-10-CM

## 2018-09-18 DIAGNOSIS — I48.0 PAF (PAROXYSMAL ATRIAL FIBRILLATION) (HCC): ICD-10-CM

## 2018-09-18 LAB
INR BLD: 2.2 (ref 0.9–1.2)
INR PPP: 2.2 (ref 2–3.5)

## 2018-09-18 PROCEDURE — 85610 PROTHROMBIN TIME: CPT

## 2018-09-18 PROCEDURE — 99211 OFF/OP EST MAY X REQ PHY/QHP: CPT

## 2018-09-18 NOTE — PROGRESS NOTES
Anticoagulation Summary  As of 9/18/2018    INR goal:   2.0-3.0   TTR:   61.5 % (3.5 mo)   Today's INR:   2.2   Warfarin maintenance plan:   1.5 mg (3 mg x 0.5) on Mon, Fri; 3 mg (3 mg x 1) all other days   Weekly warfarin total:   18 mg   Plan last modified:   Guru Baldwin PharmD (9/4/2018)   Next INR check:   10/2/2018   Target end date:   Indefinite    Indications    Chronic anticoagulation [Z79.01]  PAF (paroxysmal atrial fibrillation) (HCC) [I48.0]             Anticoagulation Episode Summary     INR check location:       Preferred lab:       Send INR reminders to:       Comments:         Anticoagulation Care Providers     Provider Role Specialty Phone number    Ganesh Mckeon M.D. Referring Geriatrics [phone]    [facility] Anticoagulation Services Responsible  [phone]        Anticoagulation Patient Findings      INR  -therapeutic.   Denies signs/symptoms of bleeding and/or thrombosis.   Denies changes to diet or medications.   Follow up appointment in 2 week(s) per pt    Continue weekly warfarin dose as noted    Guru Baldwin, PharmD

## 2018-09-25 ENCOUNTER — IMMUNIZATION (OUTPATIENT)
Dept: SOCIAL WORK | Facility: CLINIC | Age: 83
End: 2018-09-25
Payer: MEDICARE

## 2018-09-25 DIAGNOSIS — Z23 NEED FOR VACCINATION: ICD-10-CM

## 2018-09-25 PROCEDURE — G0008 ADMIN INFLUENZA VIRUS VAC: HCPCS | Performed by: REGISTERED NURSE

## 2018-09-25 PROCEDURE — 90662 IIV NO PRSV INCREASED AG IM: CPT | Performed by: REGISTERED NURSE

## 2018-09-26 ENCOUNTER — OFFICE VISIT (OUTPATIENT)
Dept: CARDIOLOGY | Facility: MEDICAL CENTER | Age: 83
End: 2018-09-26
Payer: MEDICARE

## 2018-09-26 VITALS
SYSTOLIC BLOOD PRESSURE: 120 MMHG | HEART RATE: 76 BPM | DIASTOLIC BLOOD PRESSURE: 80 MMHG | OXYGEN SATURATION: 94 % | HEIGHT: 62 IN | RESPIRATION RATE: 14 BRPM | BODY MASS INDEX: 28.16 KG/M2 | WEIGHT: 153 LBS

## 2018-09-26 DIAGNOSIS — R06.09 DYSPNEA ON EFFORT: ICD-10-CM

## 2018-09-26 DIAGNOSIS — Z71.89 ADVANCED DIRECTIVES, COUNSELING/DISCUSSION: ICD-10-CM

## 2018-09-26 DIAGNOSIS — I48.0 PAF (PAROXYSMAL ATRIAL FIBRILLATION) (HCC): ICD-10-CM

## 2018-09-26 DIAGNOSIS — I10 ESSENTIAL HYPERTENSION: ICD-10-CM

## 2018-09-26 DIAGNOSIS — I35.0 NONRHEUMATIC AORTIC VALVE STENOSIS: ICD-10-CM

## 2018-09-26 DIAGNOSIS — Z79.01 CHRONIC ANTICOAGULATION: Chronic | ICD-10-CM

## 2018-09-26 PROCEDURE — 99214 OFFICE O/P EST MOD 30 MIN: CPT | Performed by: INTERNAL MEDICINE

## 2018-09-26 NOTE — PROGRESS NOTES
Subjective:   Shereen Dale is a 81 y.o. female who presents today for follow-up of PAF, HTN and AS. She also has newly diagnosed sleep apnea intolerant of CPAP. She is on chronic oral anti-coagulation.    Blood pressure remains on the lower side and she is decreased her medication slightly further.  Her echocardiogram reveals that her aortic stenosis is approaching severe status.  Indeed over the past 6 months she has noticed slowly progressive dyspnea on exertion and worsening fatigue.  She has no syncope or chest pain.      Past Medical History:   Diagnosis Date   • AF (atrial fibrillation) (HCC)    • Arrhythmia     A-fib   • Backpain     Lower back pain   • Breast cancer (HCC)    • Breath shortness     uses 2-3 L O2 at night   • Cancer (HCC) 1993    right breast   • CATARACT     celestine IOL   • Chronic anticoagulation 5/2/2012   • Cough 5/2/2012   • Dental disorder     dentures   • Diabetes     oral medication   • Edema 5/2/2012   • Glaucoma    • Heart burn    • Heart murmur    • Heart valve disease    • Hypertension    • Hypothyroid    • Oxygen deficiency     uses 2.5-3 l at night   • Paroxysmal atrial fibrillation (HCC)    • Sleep apnea     no cpap   • tia     TIA x2   • Unspecified hemorrhagic conditions     takes coumadin     Past Surgical History:   Procedure Laterality Date   • KNEE ARTHROSCOPY Right 5/21/2015    Procedure: KNEE ARTHROSCOPY;  Surgeon: Emerson Garcia M.D.;  Location: [facility];  Service:    • MENISCECTOMY Right 5/21/2015    Procedure: LATERAL MENISCECTOMY;  Surgeon: Emerson Garcia M.D.;  Location: [facility];  Service:    • CATARACT PHACO WITH IOL  10/21/2013    Performed by Dominick Fernando M.D. at [facility]   • CATARACT PHACO WITH IOL  9/23/2013    Performed by Dominick Fernando M.D. at [facility]   • CHOLECYSTECTOMY     • HYSTERECTOMY RADICAL     • LUMPECTOMY      R breast   • PB RADIATION THERAPY PLAN SIMPLE       Family  History   Problem Relation Age of Onset   • Heart Attack Mother    • Heart Disease Father      History   Smoking Status   • Former Smoker   • Packs/day: 0.50   • Years: 2.50   • Types: Cigarettes   • Quit date: 1/1/1964   Smokeless Tobacco   • Never Used     Allergies   Allergen Reactions   • Darvon [Propoxyphene Hcl]      shock   • Iodine      Shock  - IV   • Latex      Swelling = hands with glove use   • Penicillins Anaphylaxis   • Propoxyphene Hcl Anaphylaxis   • Tetanus Antitoxin Myalgia   • Tetracycline Anaphylaxis     Outpatient Encounter Prescriptions as of 9/26/2018   Medication Sig Dispense Refill   • metFORMIN (GLUCOPHAGE) 500 MG Tab Take 1 Tab by mouth 3 times a day. 180 Tab 3   • levothyroxine (SYNTHROID) 50 MCG Tab Take 1 Tab by mouth every morning. 90 Tab 3   • glipiZIDE SR (GLUCOTROL) 2.5 MG TABLET SR 24 HR Take 2.5 mg by mouth as needed.     • amlodipine (NORVASC) 5 MG Tab Take 0.5 Tabs by mouth 2 Times a Day. 90 Tab 2   • carvedilol (COREG) 25 MG Tab Take 1 Tab by mouth 2 times a day. 180 Tab 2   • telmisartan (MICARDIS) 40 MG Tab Take 1 Tab by mouth 2 Times a Day. 180 Tab 2   • clonidine (CATAPRES) 0.1 MG Tab Take 0.1 mg by mouth as needed.     • warfarin (COUMADIN) 3 MG TABS Take 3 mg by mouth every bedtime. 9/26/48 current dose 1 tab all days, except Wed/Fri 1/2 tab.     • Glucosamine HCl (GLUCOSAMINE PO) Take 1 Tab by mouth every morning. Pt unsure of dose     • Polyethyl Glycol-Propyl Glycol (SYSTANE OP) 1-2 Drops by Ophthalmic route 2 Times a Day.     • docosahexanoic acid (OMEGA 3 FA) 1000 MG CAPS Take 1,000 mg by mouth every morning.     • VITAMIN D PO Take 2,000 Units by mouth every morning.     • Dorzolamide-Timolol (COSOPT OP) Place 1 Drop in both eyes 2 Times a Day.     • CALCIUM 500 PO 1 Tab every morning.     • PEPCID 20 MG PO TABS Take 20 mg by mouth 2 times a day.       No facility-administered encounter medications on file as of 9/26/2018.      Review of Systems   All other systems  "reviewed and are negative.  Today I reviewed the medical, surgical, social and family histories with the patient. As per HPI, otherwise noncontributory.       Objective:   /80 (BP Location: Left arm, Patient Position: Sitting, BP Cuff Size: Adult)   Pulse 76   Resp 14   Ht 1.575 m (5' 2\")   Wt 69.4 kg (153 lb)   LMP 01/01/1985   SpO2 94%   BMI 27.98 kg/m²     Physical Exam   Constitutional: She is oriented to person, place, and time. She appears well-developed and well-nourished. No distress.   HENT:   Head: Normocephalic and atraumatic.   Mouth/Throat: Oropharynx is clear and moist. No oropharyngeal exudate.   Eyes: Pupils are equal, round, and reactive to light. Conjunctivae are normal. No scleral icterus.   Neck: Normal range of motion. Neck supple. No JVD present. No thyromegaly present.   Cardiovascular: Normal rate, regular rhythm and intact distal pulses.  Exam reveals no gallop and no friction rub.    Murmur ( 3/6 systolic ejection murmur impinging upon S2) heard.  Pulses:       Carotid pulses are 2+ on the right side, and 2+ on the left side.       Radial pulses are 2+ on the right side, and 2+ on the left side.        Popliteal pulses are 2+ on the right side, and 2+ on the left side.        Dorsalis pedis pulses are 2+ on the right side, and 2+ on the left side.        Posterior tibial pulses are 2+ on the right side, and 2+ on the left side.   Pulmonary/Chest: Effort normal and breath sounds normal. She has no wheezes. She has no rales.   Abdominal: Soft. Bowel sounds are normal. She exhibits no distension. There is no tenderness.   Musculoskeletal: She exhibits no edema or tenderness.   Neurological: She is alert and oriented to person, place, and time. No cranial nerve deficit.   Skin: Skin is warm and dry. No rash noted. She is not diaphoretic. No erythema.   Psychiatric: She has a normal mood and affect. Her behavior is normal.   Vitals reviewed.    LABS:  Lab Results   Component Value " Date/Time    CHOLSTRLTOT 173 07/30/2018 06:53 AM    LDL 99 07/30/2018 06:53 AM    HDL 47 07/30/2018 06:53 AM    TRIGLYCERIDE 134 07/30/2018 06:53 AM       Lab Results   Component Value Date/Time    WBC 6.0 07/30/2018 06:53 AM    RBC 4.35 07/30/2018 06:53 AM    HEMOGLOBIN 13.4 07/30/2018 06:53 AM    HEMATOCRIT 39.6 07/30/2018 06:53 AM    MCV 91.0 07/30/2018 06:53 AM    NEUTSPOLYS 54.50 07/30/2018 06:53 AM    LYMPHOCYTES 31.30 07/30/2018 06:53 AM    MONOCYTES 8.90 07/30/2018 06:53 AM    EOSINOPHILS 4.30 07/30/2018 06:53 AM    BASOPHILS 0.70 07/30/2018 06:53 AM     Lab Results   Component Value Date/Time    SODIUM 136 07/30/2018 06:53 AM    POTASSIUM 4.5 07/30/2018 06:53 AM    CHLORIDE 103 07/30/2018 06:53 AM    CO2 21 07/30/2018 06:53 AM    GLUCOSE 136 (H) 07/30/2018 06:53 AM    BUN 16 07/30/2018 06:53 AM    CREATININE 0.64 07/30/2018 06:53 AM    CREATININE 0.7 08/02/2008 08:30 AM     Lab Results   Component Value Date    HBA1C 6.8 (H) 07/30/2018      Lab Results   Component Value Date/Time    ALKPHOSPHAT 36 07/30/2018 06:53 AM    ASTSGOT 27 07/30/2018 06:53 AM    ALTSGPT 38 07/30/2018 06:53 AM    TBILIRUBIN 0.4 07/30/2018 06:53 AM      Lab Results   Component Value Date/Time    BNPBTYPENAT 33 05/11/2017 05:05 PM      No results found for: TSH  Lab Results   Component Value Date/Time    PROTHROMBTM 19.7 (H) 07/30/2018 06:51 AM    INR 2.2 09/18/2018        ECHO CONCLUSIONS (4/4/2018):  Prior echo 1/9/2017.  Moderate left ventricular hypertrophy.  Left ventricular ejection fraction is visually estimated to be greater   than 75%.  Grade I diastolic dysfunction.  Moderate aortic stenosis.  Vmax is 3.79  m/s. Transvalvular gradients are - Peak: 58 mmHg, Mean:    35 mmHg.  Compared to the report of the study done - there has been a slight   progression of aortic stenosis, still moderate in severity.     ECHO CONCLUSIONS (1/19/2017):  Compared to the images of the prior study done on 01/29/15 - The AS is   now  moderate.  Normal left ventricular size and systolic function.  Left ventricular ejection fraction is visually estimated to be 65%.  Grade I diastolic dysfunction.  Moderate aortic stenosis.  Transvalvular gradients are - Peak: 37 mmHg, Mean: 21 mmHg.  Normal inferior vena cava size and inspiratory collapse.      Assessment:     1. PAF (paroxysmal atrial fibrillation) ([facility])  COMP METABOLIC PANEL    LIPID PROFILE    CBC WITHOUT DIFFERENTIAL    FREE THYROXINE    TSH    HEMOGLOBIN A1C   2. Nonrheumatic aortic valve stenosis  EC-ECHOCARDIOGRAM COMPLETE W/O CONT   3. Dyspnea on effort     4. Essential hypertension  LIPID PROFILE   5. Chronic anticoagulation  LIPID PROFILE   6. Advanced directives, counseling/discussion         Medical Decision Making:  Today's Assessment / Status / Plan:     Overall she is doing well but does have progressive dyspnea on exertion is NYHA class II now with progressive fatigue.  Her aortic stenosis is nearly severe as of last check.  She has not had any symptomatic atrial fibrillation.  She is tolerating her anticoagulation well.  Her A1c is reasonable.  We discussed consideration of aortic valve replacement and she expressed an upfront preference for conservative treatment without intervention even if it should become severe.  If it does become severe she is willing to listen to the options including T AVR.    1. Echocardiogram to follow aortic stenosis  2. Consider Multaq if she has an increase in prevalence of symptomatic PAF  3. Continue chronic oral anticoagulation with Coumadin  4.  Advanced directive counseling and discussion were had was had today.  She filled out a [employer] [facility].    She would like to increase her follow-up to every 6 months.                                        Physical Exam   Constitutional: She is oriented to person, place, and time. She appears well-developed and well-nourished. No distress.   HENT:   Head: Normocephalic and atraumatic.   Mouth/Throat: Oropharynx is  clear and moist. No oropharyngeal exudate.   Eyes: Pupils are equal, round, and reactive to light. Conjunctivae are normal. No scleral icterus.   Neck: Normal range of motion. Neck supple. No JVD present. No thyromegaly present.   Cardiovascular: Normal rate, regular rhythm and intact distal pulses.  Exam reveals no gallop and no friction rub.    Murmur ( 3/6 systolic ejection murmur impinging upon S2) heard.  Pulses:       Carotid pulses are 2+ on the right side, and 2+ on the left side.       Radial pulses are 2+ on the right side, and 2+ on the left side.        Popliteal pulses are 2+ on the right side, and 2+ on the left side.        Dorsalis pedis pulses are 2+ on the right side, and 2+ on the left side.        Posterior tibial pulses are 2+ on the right side, and 2+ on the left side.   Pulmonary/Chest: Effort normal and breath sounds normal. She has no wheezes. She has no rales.   Abdominal: Soft. Bowel sounds are normal. She exhibits no distension. There is no tenderness.   Musculoskeletal: She exhibits no edema or tenderness.   Neurological: She is alert and oriented to person, place, and time. No cranial nerve deficit.   Skin: Skin is warm and dry. No rash noted. She is not diaphoretic. No erythema.   Psychiatric: She has a normal mood and affect. Her behavior is normal.   Vitals reviewed.

## 2018-10-02 ENCOUNTER — ANTICOAGULATION VISIT (OUTPATIENT)
Dept: VASCULAR LAB | Facility: MEDICAL CENTER | Age: 83
End: 2018-10-02
Attending: INTERNAL MEDICINE
Payer: MEDICARE

## 2018-10-02 VITALS — SYSTOLIC BLOOD PRESSURE: 105 MMHG | HEART RATE: 73 BPM | DIASTOLIC BLOOD PRESSURE: 65 MMHG

## 2018-10-02 DIAGNOSIS — I48.0 PAF (PAROXYSMAL ATRIAL FIBRILLATION) (HCC): ICD-10-CM

## 2018-10-02 DIAGNOSIS — Z79.01 CHRONIC ANTICOAGULATION: ICD-10-CM

## 2018-10-02 LAB — INR PPP: 2.7 (ref 2–3.5)

## 2018-10-02 PROCEDURE — 85610 PROTHROMBIN TIME: CPT

## 2018-10-02 PROCEDURE — 99211 OFF/OP EST MAY X REQ PHY/QHP: CPT

## 2018-10-02 NOTE — PROGRESS NOTES
Anticoagulation Summary  As of 10/2/2018    INR goal:   2.0-3.0   TTR:   66.0 % (4 mo)   Today's INR:   2.7   Warfarin maintenance plan:   1.5 mg (3 mg x 0.5) on Mon, Fri; 3 mg (3 mg x 1) all other days   Weekly warfarin total:   18 mg   No change documented:   Emily Coulter   Plan last modified:   Guru Baldwin, PharmD (9/4/2018)   Next INR check:   10/16/2018   Target end date:   Indefinite    Indications    Chronic anticoagulation [Z79.01]  PAF (paroxysmal atrial fibrillation) (HCC) [I48.0]             Anticoagulation Episode Summary     INR check location:       Preferred lab:       Send INR reminders to:       Comments:         Anticoagulation Care Providers     Provider Role Specialty Phone number    Ganesh Mckeon M.D. Referring Geriatrics [phone]    [facility]tico[facility] Responsible  [phone]        Anticoagulation Patient Findings  Patient Findings     Negatives:   Signs/symptoms of thrombosis, Signs/symptoms of bleeding, Laboratory test error suspected, Change in health, Change in alcohol use, Change in activity, Upcoming invasive procedure, Emergency department visit, Upcoming dental procedure, Missed doses, Extra doses, Change in medications, Change in diet/appetite, Hospital admission, Bruising, Other complaints          HPI:  Shereen Dlae seen in clinic today, on anticoagulation therapy with warfarin for PAF.  Changes to current medical/health status since last appt: none  Denies signs/symptoms of bleeding and/or thrombosis since the last appt.    Denies any interval changes to diet  Denies any interval changes to medications since last appt.   Denies any complications or cost restrictions with current therapy.   BP recorded in vitals.    A/P   INR is therapeutic today at 2.7.  Patient instructed to continue with the current warfarin dosing regimen, and asked to follow up again in 2 weeks (per pt preference).    Next appt: Tues Oct 16, 2018 10:15am    Samson Coulter   PharmD

## 2018-10-04 LAB — INR BLD: 2.7 (ref 0.9–1.2)

## 2018-10-16 ENCOUNTER — ANTICOAGULATION VISIT (OUTPATIENT)
Dept: VASCULAR LAB | Facility: MEDICAL CENTER | Age: 83
End: 2018-10-16
Attending: INTERNAL MEDICINE
Payer: MEDICARE

## 2018-10-16 VITALS — DIASTOLIC BLOOD PRESSURE: 73 MMHG | HEART RATE: 74 BPM | SYSTOLIC BLOOD PRESSURE: 119 MMHG

## 2018-10-16 DIAGNOSIS — Z79.01 CHRONIC ANTICOAGULATION: ICD-10-CM

## 2018-10-16 DIAGNOSIS — I48.0 PAF (PAROXYSMAL ATRIAL FIBRILLATION) (HCC): ICD-10-CM

## 2018-10-16 LAB — INR PPP: 1.9 (ref 2–3.5)

## 2018-10-16 PROCEDURE — 85610 PROTHROMBIN TIME: CPT

## 2018-10-16 PROCEDURE — 99212 OFFICE O/P EST SF 10 MIN: CPT

## 2018-10-16 NOTE — PROGRESS NOTES
Anticoagulation Summary  As of 10/16/2018    INR goal:   2.0-3.0   TTR:   68.2 % (4.5 mo)   Today's INR:   1.9!   Warfarin maintenance plan:   1.5 mg (3 mg x 0.5) on Mon, Fri; 3 mg (3 mg x 1) all other days   Weekly warfarin total:   18 mg   Plan last modified:   Guru Baldwin, PharmD (9/4/2018)   Next INR check:   10/30/2018   Target end date:   Indefinite    Indications    Chronic anticoagulation [Z79.01]  PAF (paroxysmal atrial fibrillation) (HCC) [I48.0]             Anticoagulation Episode Summary     INR check location:       Preferred lab:       Send INR reminders to:       Comments:         Anticoagulation Care Providers     Provider Role Specialty Phone number    Ganesh Mckeon M.D. Referring Geriatrics [phone]    [facility] Anticoagulation Services Responsible  [phone]        Anticoagulation Patient Findings  Patient Findings     Negatives:   Signs/symptoms of thrombosis, Signs/symptoms of bleeding, Laboratory test error suspected, Change in health, Change in alcohol use, Change in activity, Upcoming invasive procedure, Emergency department visit, Upcoming dental procedure, Missed doses, Extra doses, Change in medications, Change in diet/appetite, Hospital admission, Bruising, Other complaints          HPI:  Shereen Starken Chito seen in clinic today, on anticoagulation therapy with warfarin for PAF.  Changes to current medical/health status since last appt: none  Patient reports that she has been eating more greens (Vit K) in diet than per her usual.  Denies signs/symptoms of bleeding and/or thrombosis since the last appt.    Denies any interval changes to medications since last appt.   Denies any complications or cost restrictions with current therapy.   BP recorded in vitals.    Patient's previous INR was therapeutic at 2.7 on 10/2/18, at which time patient was instructed to continue with current warfarin regimen. She returns to clinic today to recheck INR to ensure it is therapeutic and thus  preventing possible clotting and/or bleeding/bruising complications.    A/P   INR is SUB-therapeutic today at 1.9.  Patient will bolus with 4.5mg TONIGHT ONLY, then will resume her current dosing regimen.   Patient will follow up again in 2 weeks.    Next appt: Tues Oct 30, 2018 10am    Samson Coulter PharmD

## 2018-10-17 LAB — INR BLD: 1.9 (ref 0.9–1.2)

## 2018-10-18 ENCOUNTER — HOSPITAL ENCOUNTER (OUTPATIENT)
Dept: CARDIOLOGY | Facility: MEDICAL CENTER | Age: 83
End: 2018-10-18
Attending: INTERNAL MEDICINE
Payer: MEDICARE

## 2018-10-18 DIAGNOSIS — I35.0 NONRHEUMATIC AORTIC VALVE STENOSIS: ICD-10-CM

## 2018-10-18 PROCEDURE — 93308 TTE F-UP OR LMTD: CPT | Mod: 26 | Performed by: INTERNAL MEDICINE

## 2018-10-18 PROCEDURE — 93306 TTE W/DOPPLER COMPLETE: CPT

## 2018-10-19 ENCOUNTER — TELEPHONE (OUTPATIENT)
Dept: CARDIOLOGY | Facility: MEDICAL CENTER | Age: 83
End: 2018-10-19

## 2018-10-19 LAB
LV EJECT FRACT  99904: 65
LV EJECT FRACT MOD 2C 99903: 74.5
LV EJECT FRACT MOD 4C 99902: 67.45
LV EJECT FRACT MOD BP 99901: 69.2

## 2018-10-19 NOTE — TELEPHONE ENCOUNTER
Pt called back. Echo results explained to pt. Instructions given to pt on s/s to watch for and call with. Pt states understanding.

## 2018-10-19 NOTE — TELEPHONE ENCOUNTER
Echocardiography Laboratory    CONCLUSIONS  Prior echo 04/04/18.  Left ventricle is small in size.  Left ventricular ejection fraction is visually estimated to be 65%.  Moderate concentric left ventricular hypertrophy.  Calcified aortic valve leaflets.  Moderate aortic stenosis.  Vmax is 3.7 m/s.  Normal inferior vena cava size and inspiratory collapse.  Compared to the report of the study done - there has been no   significant change.   ========================    Attempted to call pt with echo results. No answer, voicemail left to call back.

## 2018-10-30 ENCOUNTER — ANTICOAGULATION VISIT (OUTPATIENT)
Dept: VASCULAR LAB | Facility: MEDICAL CENTER | Age: 83
End: 2018-10-30
Attending: INTERNAL MEDICINE
Payer: MEDICARE

## 2018-10-30 VITALS — SYSTOLIC BLOOD PRESSURE: 117 MMHG | DIASTOLIC BLOOD PRESSURE: 67 MMHG | HEART RATE: 75 BPM

## 2018-10-30 DIAGNOSIS — Z79.01 CHRONIC ANTICOAGULATION: ICD-10-CM

## 2018-10-30 DIAGNOSIS — I48.0 PAF (PAROXYSMAL ATRIAL FIBRILLATION) (HCC): ICD-10-CM

## 2018-10-30 LAB
INR BLD: 1.7 (ref 0.9–1.2)
INR PPP: 1.7 (ref 2–3.5)

## 2018-10-30 PROCEDURE — 85610 PROTHROMBIN TIME: CPT

## 2018-10-30 PROCEDURE — 99212 OFFICE O/P EST SF 10 MIN: CPT | Performed by: PHARMACIST

## 2018-10-30 NOTE — PROGRESS NOTES
"Anticoagulation Summary  As of 10/30/2018    INR goal:   2.0-3.0   TTR:   61.8 % (4.9 mo)   Today's INR:   1.7!   Warfarin maintenance plan:   1.5 mg (3 mg x 0.5) on Mon; 3 mg (3 mg x 1) all other days   Weekly warfarin total:   19.5 mg   Plan last modified:   Suellen Benitez, PharmD (10/30/2018)   Next INR check:   11/2/2018   Target end date:   Indefinite    Indications    Chronic anticoagulation [Z79.01]  PAF (paroxysmal atrial fibrillation) (HCC) [I48.0]             Anticoagulation Episode Summary     INR check location:       Preferred lab:       Send INR reminders to:       Comments:         Anticoagulation Care Providers     Provider Role Specialty Phone number    Ganesh Mckeon M.D. Referring Geriatrics [phone]    [facility] Responsible  [phone]        Anticoagulation Patient Findings      HPI:  Shereen Wattsa seen in clinic today, on anticoagulation therapy with warfarin for Hx TIA  Changes to current medical/health status since last appt: denies, has some bruising on arms  Denies signs/symptoms of bleeding and/or thrombosis since the last appt.    Denies any interval changes to diet  Denies any interval changes to medications since last appt.   Denies any complications or cost restrictions with current therapy.   BP recorded in vitals.      A/P   INR  is sub-therapeutic.   Discussed using lovenox with pt, which I strongly recommended as pt even noted \" I had TIA when my INR was 1.7 before\", however pt wishes to increase warfarin dose and check INR sooner. Will have pt take a boost dose of 4.5mg x1 today and then start a 8% increased warfarin dose.    Follow up appointment in 3 day(s).    Suellen Benitez, PharmD         "

## 2018-11-02 ENCOUNTER — ANTICOAGULATION VISIT (OUTPATIENT)
Dept: VASCULAR LAB | Facility: MEDICAL CENTER | Age: 83
End: 2018-11-02
Attending: INTERNAL MEDICINE
Payer: MEDICARE

## 2018-11-02 VITALS — SYSTOLIC BLOOD PRESSURE: 111 MMHG | DIASTOLIC BLOOD PRESSURE: 68 MMHG | HEART RATE: 70 BPM

## 2018-11-02 DIAGNOSIS — I48.0 PAF (PAROXYSMAL ATRIAL FIBRILLATION) (HCC): ICD-10-CM

## 2018-11-02 DIAGNOSIS — Z79.01 CHRONIC ANTICOAGULATION: ICD-10-CM

## 2018-11-02 LAB — INR PPP: 2.4 (ref 2–3.5)

## 2018-11-02 PROCEDURE — 99211 OFF/OP EST MAY X REQ PHY/QHP: CPT

## 2018-11-02 PROCEDURE — 85610 PROTHROMBIN TIME: CPT

## 2018-11-02 NOTE — PROGRESS NOTES
Anticoagulation Summary  As of 11/2/2018    INR goal:   2.0-3.0   TTR:   61.7 % (5 mo)   Today's INR:   2.4   Warfarin maintenance plan:   3 mg (3 mg x 1) every day   Weekly warfarin total:   21 mg   Plan last modified:   Emily Coulter (11/2/2018)   Next INR check:   11/13/2018   Target end date:   Indefinite    Indications    Chronic anticoagulation [Z79.01]  PAF (paroxysmal atrial fibrillation) (HCC) [I48.0]             Anticoagulation Episode Summary     INR check location:       Preferred lab:       Send INR reminders to:       Comments:         Anticoagulation Care Providers     Provider Role Specialty Phone number    Ganesh Mckeon M.D. Referring Geriatrics [phone]    [facility] Responsible  [phone]        Anticoagulation Patient Findings      HPI:  Shereen Dale seen in clinic today, on anticoagulation therapy with warfarin for PAF.  Changes to current medical/health status since last appt: none  Denies signs/symptoms of bleeding and/or thrombosis since the last appt.    Denies any interval changes to diet  Denies any interval changes to medications since last appt.   Denies any complications or cost restrictions with current therapy.   BP recorded in vitals.    Patient's previous INR was subtherapeutic at 1.7 on 10/30/18, at which time patient was instructed to bolus, then continue with current warfarin regimen. She returns to clinic today to recheck INR to ensure it is therapeutic and thus preventing possible clotting and/or bleeding/bruising complications.    A/P   INR was therapeutic at 2.4.    Patient will continue with 3mg QD to keep her from falling below her therapeutic range. Patient will follow up again in 2 weeks.    Next appt: Tues Nov 13, 2018 10:30am    Samson Coulter  PharmD

## 2018-11-05 LAB — INR BLD: 2.4 (ref 0.9–1.2)

## 2018-11-09 DIAGNOSIS — I10 ESSENTIAL HYPERTENSION: ICD-10-CM

## 2018-11-09 RX ORDER — AMLODIPINE BESYLATE 5 MG/1
2.5 TABLET ORAL 2 TIMES DAILY
Qty: 90 TAB | Refills: 2 | Status: SHIPPED | OUTPATIENT
Start: 2018-11-09 | End: 2019-03-25 | Stop reason: CLARIF

## 2018-11-13 ENCOUNTER — ANTICOAGULATION VISIT (OUTPATIENT)
Dept: VASCULAR LAB | Facility: MEDICAL CENTER | Age: 83
End: 2018-11-13
Attending: INTERNAL MEDICINE
Payer: MEDICARE

## 2018-11-13 VITALS — HEART RATE: 75 BPM | SYSTOLIC BLOOD PRESSURE: 117 MMHG | DIASTOLIC BLOOD PRESSURE: 65 MMHG

## 2018-11-13 DIAGNOSIS — I48.0 PAF (PAROXYSMAL ATRIAL FIBRILLATION) (HCC): ICD-10-CM

## 2018-11-13 DIAGNOSIS — Z79.01 CHRONIC ANTICOAGULATION: ICD-10-CM

## 2018-11-13 LAB
INR BLD: 2.6 (ref 0.9–1.2)
INR PPP: 2.6 (ref 2–3.5)

## 2018-11-13 PROCEDURE — 85610 PROTHROMBIN TIME: CPT

## 2018-11-13 PROCEDURE — 99211 OFF/OP EST MAY X REQ PHY/QHP: CPT

## 2018-11-13 NOTE — PROGRESS NOTES
Anticoagulation Summary  As of 11/13/2018    INR goal:   2.0-3.0   TTR:   64.3 % (5.4 mo)   Today's INR:   2.6   Warfarin maintenance plan:   3 mg (3 mg x 1) every day   Weekly warfarin total:   21 mg   Plan last modified:   Emily Coulter (11/2/2018)   Next INR check:   11/27/2018   Target end date:   Indefinite    Indications    Chronic anticoagulation [Z79.01]  PAF (paroxysmal atrial fibrillation) (HCC) [I48.0]             Anticoagulation Episode Summary     INR check location:       Preferred lab:       Send INR reminders to:       Comments:         Anticoagulation Care Providers     Provider Role Specialty Phone number    Ganesh Mckeon M.D. Referring Geriatrics [phone]    [facility] Responsible  [phone]        Anticoagulation Patient Findings      HPI:  Shereen Dale seen in clinic today, on anticoagulation therapy with warfarin for PAF>   Changes to current medical/health status since last appt: none  Pt reports more bruising, but it is improving.  She did take Aleve, understands to avoid this medication in the future.  .    Denies any interval changes to diet  Denies any interval changes to medications since last appt.   Denies any complications or cost restrictions with current therapy.   BP recorded in vitals.  Confirmed dosing regimen.     A/P   INR  therapeutic.   Pt is to continue with current warfarin dosing regimen.     Follow up appointment in 2 week(s).    Ayush Pérez, PharmD

## 2018-11-15 DIAGNOSIS — I10 ESSENTIAL HYPERTENSION: ICD-10-CM

## 2018-11-15 RX ORDER — TELMISARTAN 40 MG/1
40 TABLET ORAL 2 TIMES DAILY
Qty: 180 TAB | Refills: 2 | Status: SHIPPED | OUTPATIENT
Start: 2018-11-15 | End: 2019-08-23 | Stop reason: SDUPTHER

## 2018-11-27 ENCOUNTER — ANTICOAGULATION VISIT (OUTPATIENT)
Dept: VASCULAR LAB | Facility: MEDICAL CENTER | Age: 83
End: 2018-11-27
Attending: INTERNAL MEDICINE
Payer: MEDICARE

## 2018-11-27 DIAGNOSIS — Z79.01 CHRONIC ANTICOAGULATION: ICD-10-CM

## 2018-11-27 DIAGNOSIS — I48.0 PAF (PAROXYSMAL ATRIAL FIBRILLATION) (HCC): ICD-10-CM

## 2018-11-27 LAB — INR PPP: 3 (ref 2–3.5)

## 2018-11-27 PROCEDURE — 99211 OFF/OP EST MAY X REQ PHY/QHP: CPT

## 2018-11-27 PROCEDURE — 85610 PROTHROMBIN TIME: CPT

## 2018-11-27 RX ORDER — GLIPIZIDE 2.5 MG/1
TABLET, EXTENDED RELEASE ORAL
Qty: 90 TAB | Refills: 3 | Status: SHIPPED | OUTPATIENT
Start: 2018-11-27 | End: 2019-03-25 | Stop reason: CLARIF

## 2018-11-29 RX ORDER — FAMOTIDINE 20 MG/1
20 TABLET, FILM COATED ORAL 2 TIMES DAILY
Qty: 60 TAB | Status: CANCELLED | OUTPATIENT
Start: 2018-11-29

## 2018-11-29 RX ORDER — CLONIDINE HYDROCHLORIDE 0.1 MG/1
0.1 TABLET ORAL PRN
Qty: 60 TAB | Refills: 3 | Status: SHIPPED | OUTPATIENT
Start: 2018-11-29 | End: 2018-11-30 | Stop reason: SDUPTHER

## 2018-11-29 RX ORDER — FAMOTIDINE 20 MG/1
20 TABLET, FILM COATED ORAL 2 TIMES DAILY
Qty: 60 TAB | Refills: 3 | Status: SHIPPED | OUTPATIENT
Start: 2018-11-29 | End: 2019-03-25 | Stop reason: CLARIF

## 2018-11-30 RX ORDER — CLONIDINE HYDROCHLORIDE 0.1 MG/1
0.1 TABLET ORAL 2 TIMES DAILY
Qty: 60 TAB | Refills: 1 | Status: SHIPPED | OUTPATIENT
Start: 2018-11-30 | End: 2019-03-25 | Stop reason: CLARIF

## 2018-12-06 ENCOUNTER — OFFICE VISIT (OUTPATIENT)
Dept: URGENT CARE | Facility: CLINIC | Age: 83
End: 2018-12-06
Payer: MEDICARE

## 2018-12-06 VITALS
RESPIRATION RATE: 16 BRPM | HEIGHT: 62 IN | OXYGEN SATURATION: 94 % | HEART RATE: 79 BPM | WEIGHT: 147 LBS | BODY MASS INDEX: 27.05 KG/M2 | TEMPERATURE: 98.3 F | DIASTOLIC BLOOD PRESSURE: 70 MMHG | SYSTOLIC BLOOD PRESSURE: 124 MMHG

## 2018-12-06 DIAGNOSIS — K11.20 SALIVARY GLAND INFECTION: ICD-10-CM

## 2018-12-06 PROCEDURE — 99214 OFFICE O/P EST MOD 30 MIN: CPT | Performed by: PHYSICIAN ASSISTANT

## 2018-12-06 RX ORDER — CEPHALEXIN 500 MG/1
500 CAPSULE ORAL 4 TIMES DAILY
Qty: 28 CAP | Refills: 0 | Status: SHIPPED | OUTPATIENT
Start: 2018-12-06 | End: 2018-12-13

## 2018-12-06 ASSESSMENT — ENCOUNTER SYMPTOMS
SORE THROAT: 0
FEVER: 0
WHEEZING: 0
SPUTUM PRODUCTION: 0
COUGH: 0
CHILLS: 0
SHORTNESS OF BREATH: 0
PALPITATIONS: 0
HEMOPTYSIS: 0

## 2018-12-06 NOTE — PATIENT INSTRUCTIONS
Sialadenitis  Sialadenitis is an inflammation (soreness) of the salivary glands. The parotid is the main salivary gland. It lies behind the angle of the jaw below the ear. The saliva produced comes out of a tiny opening (duct) inside the cheek on either side. This is usually at the level of the upper back teeth. If it is swollen, the ear is pushed up and out. This helps tell this condition apart from a simple lymph gland infection (swollen glands) in the same area. Mumps has mostly disappeared since the start of immunization against mumps. Now the most common cause of parotitis is germ (bacterial) infection or inflammation of the lymphatics (the lymph channels). The other major salivary gland is located in the floor of the mouth. Smaller salivary glands are located in the mouth. This includes the:  · Lips.   · Lining of the mouth.   · Pharynx.   · Hard palate (front part of the roof of the mouth).   The salivary glands do many things, including:  · Lubrication.   · Breaking down food.   · Production of hormones and antibodies (to protect against germs which may cause illness).   · Help with the sense of taste.   ACUTE BACTERIAL SIALADENITIS  This is a sudden inflammatory response to bacterial infection. This causes redness, pain, swelling and tenderness over the infected gland. In the past, it was common in dehydrated and debilitated patients often following an operation. It is now more commonly seen:  · After radiotherapy.   · In patients with poor immune systems.   Treatment is:  · The correction of fluid balance (rehydration).   · Medicine that kill germs (antibiotics).   · Pain relief.   CHRONIC RECURRENT SIALADENITIS  This refers to repeated episodes of discomfort and swelling of one of the salivary glands. It often occurs after eating. Chronic sialadenitis is usually less painful. It is associated with recurrent enlargement of a salivary gland, often following meals, and typically with an absence of redness.  "The chronic form of the disease often is associated with conditions linked to decreased salivary flow, rather than dehydration (loss of body fluids). These conditions include:  · A stone, or concretion, formed in the gallbladder, kidneys, or other parts of the body (calculi).   · Salivary stasis.   · A change in the fluid and electrolyte (the salts in your body fluids) makeup of the gland.   It is treated with:  · Gland massage.   · Methods to stimulate the flow of saliva, (for example, lemon juice).   · Antibiotics if required.   Surgery to remove the gland is possible, but its benefits need to be balanced against risks.   VIRAL SIALADENITIS  Several viruses infect the salivary glands. Some of these include the mumps virus that commonly infects the parotid gland. Other viruses causing problems are:  · The HIV virus.   · Herpes.   · Some of the influenza (\"flu\") viruses.   RECURRENT SIALADENITIS IN CHILDREN  This condition is thought to be due to swelling or ballooning of the ducts. It results in the same symptoms as acute bacterial parotitis. It is usually caused by germs (bacteria). It is often treated using penicillin. It may get well without treatment. Surgery is usually not required.  TUBERCULOUS SIALADENITIS  The salivary glands may become infected with the same bacteria causing tuberculosis (\"TB\"). Treatment is with anti-tuberculous antibiotic therapy.  OTHER UNCOMMON CAUSES OF SIALADENITIS   · Sjogren's syndrome is a condition in which arthritis is associated with a decrease in activity of the glands of the body that produce saliva and tears. The diagnosis is made with blood tests or by examination of a piece of tissue from the inside of the lip. Some people with this condition are bothered by:   · A dry mouth.   · Intermittent salivary gland enlargement.   · Atypical mycobacteria is a germ similar to tuberculosis. It often infects children. It is often resistant to antibiotic treatment. It may require " surgical treatment to remove the infected salivary gland.   · Actinomycosis is an infection of the parotid gland that may also involve the overlying skin. The diagnosis is made by detecting granules of sulphur produced by the bacteria on microscopic examination. Treatment is a prolonged course of penicillin for up to one year.   · Nutritional causes include vitamin deficiencies and bulimia.   · Diabetes and problems with your thyroid.   · Obesity, cirrhosis, and malabsorption are some metabolic causes.   HOME CARE INSTRUCTIONS   · Apply ice bags every 2 hours for 15-20 minutes, while awake, to the sore gland for 24 hours, then as directed by your caregiver. Place the ice in a plastic bag with a towel around it to prevent frostbite to the skin.   · Only take over-the-counter or prescription medicines for pain, discomfort, or fever as directed by your caregiver.   SEEK IMMEDIATE MEDICAL CARE IF:   · There is increased pain or swelling in your gland that is not controlled with medicine.   · An oral temperature above 102° F (38.9° C) develops, not controlled by medicine.   · You develop difficulty opening your mouth, swallowing, or speaking.   Document Released: 06/09/2003 Document Revised: 03/11/2013 Document Reviewed: 08/03/2009  [employer]® Patient Information ©2013 [employer].

## 2018-12-07 NOTE — PROGRESS NOTES
Subjective:              HPI  Shereen Dale is a 81 y.o. female who states she has a nodule under tounge and glands feel swollen for several days.  Pus is coming out of her salivary gland.    Review of Systems   Constitutional: Negative for chills, fever and malaise/fatigue.   HENT: Negative for congestion, ear pain and sore throat.         Mouth pain   Respiratory: Negative for cough, hemoptysis, sputum production, shortness of breath and wheezing.    Cardiovascular: Negative for chest pain and palpitations.   All other systems reviewed and are negative.    PMH:  has a past medical history of AF (atrial fibrillation) (HCC); Arrhythmia; Backpain; Breast cancer (HCC); Breath shortness; Cancer (HCC) (1993); CATARACT; Chronic anticoagulation (5/2/2012); Cough (5/2/2012); Dental disorder; Diabetes; Edema (5/2/2012); Glaucoma; Heart burn; Heart murmur; Heart valve disease; Hypertension; Hypothyroid; Oxygen deficiency; Paroxysmal atrial fibrillation (HCC); Sleep apnea; tia; and Unspecified hemorrhagic conditions.  MEDS:   Current Outpatient Prescriptions:   •  cephALEXin (KEFLEX) 500 MG Cap, Take 1 Cap by mouth 4 times a day for 7 days., Disp: 28 Cap, Rfl: 0  •  cloNIDine (CATAPRES) 0.1 MG Tab, Take 1 Tab by mouth 2 times a day., Disp: 60 Tab, Rfl: 1  •  famotidine (PEPCID) 20 MG Tab, Take 1 Tab by mouth 2 times a day., Disp: 60 Tab, Rfl: 3  •  glipiZIDE SR (GLUCOTROL) 2.5 MG TABLET SR 24 HR, Take 2.5 mg daily., Disp: 90 Tab, Rfl: 3  •  telmisartan (MICARDIS) 40 MG Tab, Take 1 Tab by mouth 2 Times a Day., Disp: 180 Tab, Rfl: 2  •  amLODIPine (NORVASC) 5 MG Tab, Take 0.5 Tabs by mouth 2 Times a Day., Disp: 90 Tab, Rfl: 2  •  metFORMIN (GLUCOPHAGE) 500 MG Tab, Take 1 Tab by mouth 3 times a day., Disp: 180 Tab, Rfl: 3  •  levothyroxine (SYNTHROID) 50 MCG Tab, Take 1 Tab by mouth every morning., Disp: 90 Tab, Rfl: 3  •  carvedilol (COREG) 25 MG Tab, Take 1 Tab by mouth 2 times a day., Disp: 180 Tab, Rfl: 2  •  warfarin  (COUMADIN) 3 MG TABS, Take 3 mg by mouth every bedtime. 9/26/48 current dose 1 tab all days, except Wed/Fri 1/2 tab., Disp: , Rfl:   •  Glucosamine HCl (GLUCOSAMINE PO), Take 1 Tab by mouth every morning. Pt unsure of dose, Disp: , Rfl:   •  Polyethyl Glycol-Propyl Glycol (SYSTANE OP), 1-2 Drops by Ophthalmic route 2 Times a Day., Disp: , Rfl:   •  docosahexanoic acid (OMEGA 3 FA) 1000 MG CAPS, Take 1,000 mg by mouth every morning., Disp: , Rfl:   •  VITAMIN D PO, Take 2,000 Units by mouth every morning., Disp: , Rfl:   •  Dorzolamide-Timolol (COSOPT OP), Place 1 Drop in both eyes 2 Times a Day., Disp: , Rfl:   •  CALCIUM 500 PO, 1 Tab every morning., Disp: , Rfl:   ALLERGIES:   Allergies   Allergen Reactions   • Darvon [Propoxyphene Hcl]      shock   • Iodine      Shock  - IV   • Latex      Swelling = hands with glove use   • Penicillins Anaphylaxis   • Propoxyphene Hcl Anaphylaxis   • Tetanus Antitoxin Myalgia   • Tetracycline Anaphylaxis     SURGHX:   Past Surgical History:   Procedure Laterality Date   • KNEE ARTHROSCOPY Right 5/21/2015    Procedure: KNEE ARTHROSCOPY;  Surgeon: Emerson Garcia M.D.;  Location: [facility];  Service:    • MENISCECTOMY Right 5/21/2015    Procedure: LATERAL MENISCECTOMY;  Surgeon: Emerson Garcia M.D.;  Location: [facility];  Service:    • CATARACT PHACO WITH IOL  10/21/2013    Performed by Dominick Fernando M.D. at SURGERY [facility] ORS   • CATARACT PHACO WITH IOL  9/23/2013    Performed by Dominick Fernando M.D. at SURGERY [facility]   • CHOLECYSTECTOMY     • HYSTERECTOMY RADICAL     • LUMPECTOMY      R breast   • PB RADIATION THERAPY PLAN SIMPLE       SOCHX:  reports that she quit smoking about 54 years ago. Her smoking use included Cigarettes. She has a 1.25 pack-year smoking history. She has never used smokeless tobacco. She reports that she drinks alcohol. She reports that she does not use drugs.  FH: Family history was reviewed, no  "pertinent findings to report  Medications, Allergies, and current problem list reviewed today in Epic       Objective:     /70 (BP Location: Left arm, Patient Position: Sitting, BP Cuff Size: Adult)   Pulse 79   Temp 36.8 °C (98.3 °F) (Temporal)   Resp 16   Ht 1.575 m (5' 2\")   Wt 66.7 kg (147 lb)   LMP 01/01/1985   SpO2 94%   BMI 26.89 kg/m²      Physical Exam   Constitutional: She is oriented to person, place, and time. She appears well-developed and well-nourished. She is active.  Non-toxic appearance. She does not have a sickly appearance. She does not appear ill. No distress. She is not intubated.   HENT:   Head: Normocephalic and atraumatic.   Right Ear: Hearing, tympanic membrane, external ear and ear canal normal.   Left Ear: Hearing, tympanic membrane, external ear and ear canal normal.   Nose: Nose normal.   Mouth/Throat: Uvula is midline, oropharynx is clear and moist and mucous membranes are normal.       Eyes: Conjunctivae, EOM and lids are normal.   Neck: Normal range of motion and full passive range of motion without pain. Neck supple.   Cardiovascular: Regular rhythm, S1 normal, S2 normal and normal heart sounds.  Exam reveals no gallop and no friction rub.    No murmur heard.  Pulmonary/Chest: Effort normal and breath sounds normal. No accessory muscle usage. No apnea, no tachypnea and no bradypnea. She is not intubated. No respiratory distress. She has no decreased breath sounds. She has no wheezes. She has no rhonchi. She has no rales. She exhibits no tenderness.   Musculoskeletal: Normal range of motion.   Neurological: She is alert and oriented to person, place, and time.   Skin: Skin is warm and dry.   Psychiatric: She has a normal mood and affect. Her speech is normal and behavior is normal. Judgment and thought content normal.   Vitals reviewed.              Assessment/Plan:     1. Salivary gland infection  - sour candies/warm salt water gargle  - cephALEXin (KEFLEX) 500 MG Cap; " Take 1 Cap by mouth 4 times a day for 7 days.  Dispense: 28 Cap; Refill: 0    Differential diagnosis, natural history, supportive care discussed. Follow-up with primary care provider within 7-10 days, emergency room precautions discussed.  Patient and/or family appears understanding of information.  Handout and review of patients diagnosis and treatment was discussed extensively.

## 2018-12-12 ENCOUNTER — OFFICE VISIT (OUTPATIENT)
Dept: MEDICAL GROUP | Facility: MEDICAL CENTER | Age: 83
End: 2018-12-12
Payer: MEDICARE

## 2018-12-12 ENCOUNTER — HOSPITAL ENCOUNTER (OUTPATIENT)
Dept: RADIOLOGY | Facility: MEDICAL CENTER | Age: 83
End: 2018-12-12
Attending: FAMILY MEDICINE
Payer: MEDICARE

## 2018-12-12 VITALS
BODY MASS INDEX: 28.89 KG/M2 | HEIGHT: 62 IN | SYSTOLIC BLOOD PRESSURE: 118 MMHG | TEMPERATURE: 97.9 F | RESPIRATION RATE: 16 BRPM | OXYGEN SATURATION: 97 % | DIASTOLIC BLOOD PRESSURE: 70 MMHG | WEIGHT: 157 LBS | HEART RATE: 74 BPM

## 2018-12-12 DIAGNOSIS — E11.9 DIABETES MELLITUS TYPE 2 IN NONOBESE (HCC): ICD-10-CM

## 2018-12-12 DIAGNOSIS — K21.9 GASTROESOPHAGEAL REFLUX DISEASE WITHOUT ESOPHAGITIS: ICD-10-CM

## 2018-12-12 DIAGNOSIS — M79.601 ARM PAIN, ANTERIOR, RIGHT: ICD-10-CM

## 2018-12-12 DIAGNOSIS — I48.20 CHRONIC ATRIAL FIBRILLATION (HCC): ICD-10-CM

## 2018-12-12 DIAGNOSIS — I35.0 NONRHEUMATIC AORTIC VALVE STENOSIS: ICD-10-CM

## 2018-12-12 DIAGNOSIS — I10 ESSENTIAL HYPERTENSION: ICD-10-CM

## 2018-12-12 DIAGNOSIS — B37.31 VAGINAL CANDIDIASIS: ICD-10-CM

## 2018-12-12 DIAGNOSIS — E03.9 ACQUIRED HYPOTHYROIDISM: ICD-10-CM

## 2018-12-12 PROCEDURE — 99214 OFFICE O/P EST MOD 30 MIN: CPT | Performed by: FAMILY MEDICINE

## 2018-12-12 PROCEDURE — 72040 X-RAY EXAM NECK SPINE 2-3 VW: CPT

## 2018-12-12 RX ORDER — FLUCONAZOLE 150 MG/1
TABLET ORAL
Qty: 1 TAB | Refills: 0 | Status: SHIPPED | OUTPATIENT
Start: 2018-12-12 | End: 2019-03-25 | Stop reason: CLARIF

## 2018-12-12 NOTE — PROGRESS NOTES
CC: Diabetes, hypertension, A. fib, hypothyroidism, GERD, vaginal itching and discharge, right arm pain, aortic valve stenosis.    HPI:   Shereen presents today to discuss the following medical issues:    Diabetes mellitus type 2 in nonobese (HCC)  Has been adequately controlled.her last A1C was 6.8.Patient has been asymptomatic.Has been on Glipizide 2.5 mg daily.She is due for monofilament foot exam.     Essential hypertension  BP has been adequately controlled on current medication. Denies headache, chest pain, and SOB.has been on Amlodipine 5 mg, Telmisartan 40 mg daily, Carvedilol 25 mg bid, and Clonidine as needed.     Chronic atrial fibrillation  Has beenasymptomatic.Has normal rate and rhythm.Has been on BB, and Coumadin.She follows up with coumadin clinic.    Acquired hypothyroidism  She has been tolerating Levothyroxine, no palpitation, no cold or heat intolerance, has been on levothyroxine 50 mcg daily.    Gastroesophageal reflux disease without esophagitis  Denies epigastric pain, heartburn, nausea, and vomiting.  Patient has been doing fine on famotidine 20 mg daily.    Vaginal candidiasis  Patient was treated recently for infection of salivary gland with oral antibiotics, she still on the antibiotics(she was given Keflex for a week).  She develops vaginal itching and white cheese like discharge.  Denies any pelvic pain.    Arm pain, anterior, right  Patient has been having pain in the right arm and goes down all the way to the hand.  Denies any neck pain.  Pain increase this when she lay on the right side of her body.  Has been associated with numbness.  Denies any lower extremity pain or numbness.  Has been having normal control of her urine and bowel movement.    Nonrheumatic aortic valve stenosis  Last echocardiogram was done in October 2018 showed moderate aortic stenosis.  However patient has been asymptomatic.  Denies any chest pain, shortness of breath, syncopal episodes, and dizziness.  She follows  up with cardiology.      Patient Active Problem List    Diagnosis Date Noted   • HTN (hypertension) 05/04/2012     Priority: High   • Chronic anticoagulation 05/02/2012     Priority: High   • Acute, but ill-defined, cerebrovascular disease 04/30/2012     Priority: High   • Cough 05/02/2012     Priority: Low   • Edema 05/02/2012     Priority: Low   • Advanced directives, counseling/discussion 09/26/2018   • Hypertensive urgency 05/11/2017   • Diastolic dysfunction 02/02/2016   • Tear of lateral cartilage or meniscus of knee, current 05/21/2015   • PAF (paroxysmal atrial fibrillation) ([facility]) 01/12/2015   • Dyspnea on effort 01/02/2014   • Diabetes ([facility]) 12/26/2013   • Aortic stenosis 07/02/2013       Current Outpatient Prescriptions   Medication Sig Dispense Refill   • cephALEXin (KEFLEX) 500 MG Cap Take 1 Cap by mouth 4 times a day for 7 days. 28 Cap 0   • cloNIDine (CATAPRES) 0.1 MG Tab Take 1 Tab by mouth 2 times a day. 60 Tab 1   • famotidine (PEPCID) 20 MG Tab Take 1 Tab by mouth 2 times a day. 60 Tab 3   • glipiZIDE SR (GLUCOTROL) 2.5 MG TABLET SR 24 HR Take 2.5 mg daily. 90 Tab 3   • telmisartan (MICARDIS) 40 MG Tab Take 1 Tab by mouth 2 Times a Day. 180 Tab 2   • amLODIPine (NORVASC) 5 MG Tab Take 0.5 Tabs by mouth 2 Times a Day. 90 Tab 2   • metFORMIN (GLUCOPHAGE) 500 MG Tab Take 1 Tab by mouth 3 times a day. 180 Tab 3   • levothyroxine (SYNTHROID) 50 MCG Tab Take 1 Tab by mouth every morning. 90 Tab 3   • carvedilol (COREG) 25 MG Tab Take 1 Tab by mouth 2 times a day. 180 Tab 2   • warfarin (COUMADIN) 3 MG TABS Take 3 mg by mouth every bedtime. 9/26/48 current dose 1 tab all days, except Wed/Fri 1/2 tab.     • Glucosamine HCl (GLUCOSAMINE PO) Take 1 Tab by mouth every morning. Pt unsure of dose     • Polyethyl Glycol-Propyl Glycol (SYSTANE OP) 1-2 Drops by Ophthalmic route 2 Times a Day.     • docosahexanoic acid (OMEGA 3 FA) 1000 MG CAPS Take 1,000 mg by mouth every morning.     • VITAMIN D PO Take 2,000  "Units by mouth every morning.     • Dorzolamide-Timolol (COSOPT OP) Place 1 Drop in both eyes 2 Times a Day.     • CALCIUM 500 PO 1 Tab every morning.       No current facility-administered medications for this visit.          Allergies as of 12/12/2018 - Reviewed 12/06/2018   Allergen Reaction Noted   • Darvon [propoxyphene hcl]  03/18/2008   • Iodine  05/11/2015   • Latex  09/19/2013   • Penicillins Anaphylaxis 08/02/2008   • Propoxyphene hcl Anaphylaxis 08/02/2008   • Tetanus antitoxin Myalgia 11/19/2017   • Tetracycline Anaphylaxis 08/02/2008        ROS: Denies any chest pain, Shortness of breath, Changes bowel or bladder, Lower extremity edema.    Physical Exam:  /70 (BP Location: Right arm, Patient Position: Sitting, BP Cuff Size: Adult)   Pulse 74   Temp 36.6 °C (97.9 °F)   Resp 16   Ht 1.575 m (5' 2\")   Wt 71.2 kg (157 lb)   LMP 01/01/1985   SpO2 97%   BMI 28.72 kg/m²   Gen.: Well-developed, well-nourished, no apparent distress,pleasant and cooperative with the examination  Skin:  Warm and dry with good turgor. No rashes or suspicious lesions in visible areas  HEENT:Sinuses nontender with palpation, TMs clear, nares patent with pink mucosa and clear rhinorrhea,no septal deviation ,polyps or lesions. lips without lesions, oropharynx clear.  Neck: Trachea midline,no masses or adenopathy. No JVD.  Cor: Regular rate and rhythm with systolic murmur murmur, gallop or rub.  Lungs: Respirations unlabored.Clear to auscultation with equal breath sounds bilaterally. No wheezes, rhonchi.  Extremities: No cyanosis, clubbing or edema.      Assessment and Plan.   81 y.o. female     1. Diabetes mellitus type 2 in nonobese (HCC)  Adequately controlled.  Last A1c was 6.8.  Continue on glipizide 2.5 mg daily.    2. Essential hypertension  Adequately controlled.  Continue on amlodipine 5 mg, telmisartan 40 mg daily, carvedilol 25 mg twice daily, and clonidine as needed.    3. Chronic atrial fibrillation " (HCC)  Asymptomatic.  Continue carvedilol, and Coumadin.  Continue follow-up with Coumadin clinic.    4. Acquired hypothyroidism  He has been tolerating Levothyroxine, no palpitation, no cold or heat intolerance  Continue on levothyroxine 50 mcg daily.    5. Gastroesophageal reflux disease without esophagitis  Patient has been doing fine on famotidine 20 mg daily.    6. Vaginal candidiasis  Patient was treated recently for infection of salivary gland with oral antibiotics, she still on the antibiotics.  She develops vaginal itching and discharge.  Which is probably fungal infection.  Will treat patient with Diflucan 150 mg once.    7. Arm pain, anterior, right  Rule out cervical radiculopathy.    - DX-CERVICAL SPINE-2 OR 3 VIEWS; Future    8. Nonrheumatic aortic valve stenosis  Last echocardiogram showed moderate aortic stenosis.  However patient has been asymptomatic.  Continue follow-up with cardiology.

## 2018-12-13 ENCOUNTER — TELEPHONE (OUTPATIENT)
Dept: MEDICAL GROUP | Facility: MEDICAL CENTER | Age: 83
End: 2018-12-13

## 2018-12-13 NOTE — PROGRESS NOTES
Patient requesting to see Dr Dali Fontanez first. She has seen him in the past and would like to see him first before neurosurgery

## 2018-12-14 DIAGNOSIS — M79.2 NEUROPATHIC PAIN: ICD-10-CM

## 2018-12-14 DIAGNOSIS — I10 ESSENTIAL HYPERTENSION: ICD-10-CM

## 2018-12-14 RX ORDER — CARVEDILOL 25 MG/1
25 TABLET ORAL 2 TIMES DAILY
Qty: 180 TAB | Refills: 3 | Status: SHIPPED | OUTPATIENT
Start: 2018-12-14 | End: 2020-03-03

## 2018-12-14 NOTE — PROGRESS NOTES
Patient wanted to inquire about her referral she would like to see Dr Fontanez since she has seen him in the past

## 2018-12-18 ENCOUNTER — ANTICOAGULATION VISIT (OUTPATIENT)
Dept: VASCULAR LAB | Facility: MEDICAL CENTER | Age: 83
End: 2018-12-18
Attending: INTERNAL MEDICINE
Payer: MEDICARE

## 2018-12-18 DIAGNOSIS — Z79.01 CHRONIC ANTICOAGULATION: ICD-10-CM

## 2018-12-18 DIAGNOSIS — I48.0 PAF (PAROXYSMAL ATRIAL FIBRILLATION) (HCC): ICD-10-CM

## 2018-12-18 LAB
INR BLD: 2.5 (ref 0.9–1.2)
INR PPP: 2.5 (ref 2–3.5)

## 2018-12-18 PROCEDURE — 85610 PROTHROMBIN TIME: CPT

## 2018-12-18 PROCEDURE — 99211 OFF/OP EST MAY X REQ PHY/QHP: CPT

## 2018-12-18 NOTE — PROGRESS NOTES
Anticoagulation Summary  As of 12/18/2018    INR goal:   2.0-3.0   TTR:   70.6 % (6.6 mo)   Today's INR:   2.5   Warfarin maintenance plan:   3 mg (3 mg x 1) every day   Weekly warfarin total:   21 mg   No change documented:   Emily Coulter   Plan last modified:   Emily Coulter (11/2/2018)   Next INR check:   1/8/2019   Target end date:   Indefinite    Indications    Chronic anticoagulation [Z79.01]  PAF (paroxysmal atrial fibrillation) ([facility]) [I48.0]             Anticoagulation Episode Summary     INR check location:       Preferred lab:       Send INR reminders to:       Comments:         Anticoagulation Care Providers     Provider Role Specialty Phone number    Ganesh Mckeon M.D. Referring Geriatrics [phone]    [facility] Responsible  [phone]        Anticoagulation Patient Findings  Patient Findings     Negatives:   Signs/symptoms of thrombosis, Signs/symptoms of bleeding, Laboratory test error suspected, Change in health, Change in alcohol use, Change in activity, Upcoming invasive procedure, Emergency department visit, Upcoming dental procedure, Missed doses, Extra doses, Change in medications, Change in diet/appetite, Hospital admission, Bruising, Other complaints          HPI:  Shereen Starken Chito seen in clinic today, on anticoagulation therapy with warfarin for PAF.  Changes to current medical/health status since last appt: none  Denies signs/symptoms of bleeding and/or thrombosis since the last appt.    Denies any interval changes to diet  Denies any interval changes to medications since last appt.   Denies any complications or cost restrictions with current therapy.   BP declined today.  Confirmed current dosing regimen.     Patient's previous INR was therapeutic at 3.0 on 11/27/18, at which time patient was instructed to continue with current warfarin regimen. She returns to clinic today to recheck INR to ensure it is therapeutic and thus preventing  possible clotting and/or bleeding/bruising complications.    A/P   INR is therapeutic today at 2.5.  Patient instructed to continue with the current warfarin dosing regimen, and asked to follow up again in 3 weeks.    Next appt: Tuesday, Jan 8, 2018  10:15am    Samson Coulter PharmD

## 2019-01-08 ENCOUNTER — ANTICOAGULATION VISIT (OUTPATIENT)
Dept: VASCULAR LAB | Facility: MEDICAL CENTER | Age: 84
End: 2019-01-08
Attending: INTERNAL MEDICINE
Payer: MEDICARE

## 2019-01-08 VITALS — DIASTOLIC BLOOD PRESSURE: 62 MMHG | HEART RATE: 75 BPM | SYSTOLIC BLOOD PRESSURE: 98 MMHG

## 2019-01-08 DIAGNOSIS — I48.0 PAF (PAROXYSMAL ATRIAL FIBRILLATION) (HCC): ICD-10-CM

## 2019-01-08 DIAGNOSIS — Z79.01 CHRONIC ANTICOAGULATION: ICD-10-CM

## 2019-01-08 LAB — INR PPP: 2.7 (ref 2–3.5)

## 2019-01-08 PROCEDURE — 99211 OFF/OP EST MAY X REQ PHY/QHP: CPT

## 2019-01-08 PROCEDURE — 85610 PROTHROMBIN TIME: CPT

## 2019-01-08 NOTE — PROGRESS NOTES
Anticoagulation Summary  As of 1/8/2019    INR goal:   2.0-3.0   TTR:   73.5 % (7.3 mo)   Today's INR:   2.7   Warfarin maintenance plan:   3 mg (3 mg x 1) every day   Weekly warfarin total:   21 mg   Plan last modified:   Emily Coulter (11/2/2018)   Next INR check:   1/29/2019   Target end date:   Indefinite    Indications    Chronic anticoagulation [Z79.01]  PAF (paroxysmal atrial fibrillation) ([facility]) [I48.0]             Anticoagulation Episode Summary     INR check location:       Preferred lab:       Send INR reminders to:       Comments:         Anticoagulation Care Providers     Provider Role Specialty Phone number    Ganesh Mckeon M.D. Referring Geriatrics [phone]    [facility] Responsible  [phone]        Anticoagulation Patient Findings  Patient Findings     Negatives:   Signs/symptoms of thrombosis, Signs/symptoms of bleeding, Laboratory test error suspected, Change in health, Change in alcohol use, Change in activity, Upcoming invasive procedure, Emergency department visit, Upcoming dental procedure, Missed doses, Extra doses, Change in medications, Change in diet/appetite, Hospital admission, Bruising, Other complaints          HPI:  Shereen Vanessa Chito seen in clinic today, on anticoagulation therapy with warfarin for PAF.  Changes to current medical/health status since last appt: none  Denies signs/symptoms of bleeding and/or thrombosis since the last appt.    Denies any interval changes to diet  Denies any interval changes to medications since last appt.   Denies any complications or cost restrictions with current therapy.   BP recorded in vitals.  Confirmed current dosing regimen.     Patient's previous INR was therapeutic at 2.5 on 12/18/18, at which time patient was instructed to continue with current warfarin regimen. She returns to clinic today to recheck INR to ensure it is therapeutic and thus preventing possible clotting and/or bleeding/bruising  complications.    A/P   INR is therapeutic today at 2.7.  Patient instructed to continue with the current warfarin dosing regimen, and asked to follow up again in 3 weeks.    Next appt: Tues, Jan 29, 2019  10:30am    Samson Coulter PharmD

## 2019-01-09 LAB — INR BLD: 2.7 (ref 0.9–1.2)

## 2019-01-23 ENCOUNTER — ANTICOAGULATION VISIT (OUTPATIENT)
Dept: VASCULAR LAB | Facility: MEDICAL CENTER | Age: 84
End: 2019-01-23
Attending: INTERNAL MEDICINE
Payer: MEDICARE

## 2019-01-23 VITALS — HEART RATE: 79 BPM | SYSTOLIC BLOOD PRESSURE: 117 MMHG | DIASTOLIC BLOOD PRESSURE: 65 MMHG

## 2019-01-23 DIAGNOSIS — Z79.01 CHRONIC ANTICOAGULATION: ICD-10-CM

## 2019-01-23 DIAGNOSIS — I48.0 PAF (PAROXYSMAL ATRIAL FIBRILLATION) (HCC): ICD-10-CM

## 2019-01-23 LAB — INR PPP: 3.2 (ref 2–3.5)

## 2019-01-23 PROCEDURE — 85610 PROTHROMBIN TIME: CPT

## 2019-01-23 PROCEDURE — 99212 OFFICE O/P EST SF 10 MIN: CPT | Performed by: NURSE PRACTITIONER

## 2019-01-23 NOTE — PROGRESS NOTES
Anticoagulation Summary  As of 1/23/2019    INR goal:   2.0-3.0   TTR:   72.6 % (7.8 mo)   Today's INR:   3.2!   Warfarin maintenance plan:   3 mg (3 mg x 1) every day   Weekly warfarin total:   21 mg   Plan last modified:   Emily Coulter (11/2/2018)   Next INR check:   1/29/2019   Target end date:   Indefinite    Indications    Chronic anticoagulation [Z79.01]  PAF (paroxysmal atrial fibrillation) ([facility]) [I48.0]             Anticoagulation Episode Summary     INR check location:       Preferred lab:       Send INR reminders to:       Comments:         Anticoagulation Care Providers     Provider Role Specialty Phone number    Ganesh Mckeon M.D. Referring Geriatrics [phone]    [facility] Responsible  [phone]        Anticoagulation Patient Findings      HPI:  Shereen Mendez Dale seen in clinic today for follow up on anticoagulation therapy in the presence of AF. Denies any changes to current medical/health status since last appointment. Denies any medication or diet changes. No current symptoms of bleeding or thrombosis reported. Scheduled for a dental procedure on 1/31/19 but doesn't need to stop warfarin. She is worried her INR may be high.    A/P:   INR supratherapeutic. HOLD tonight then continue current regimen. Will recheck INR 48 hours prior to dental work. BP recorded in vitals.    Follow up appointment in 1 week(s).    Next Appointment: Tuesday, January 29, 2019 at 10:30 am.     Laura HAYES

## 2019-01-24 LAB — INR BLD: 3.2 (ref 0.9–1.2)

## 2019-01-29 ENCOUNTER — ANTICOAGULATION VISIT (OUTPATIENT)
Dept: VASCULAR LAB | Facility: MEDICAL CENTER | Age: 84
End: 2019-01-29
Attending: INTERNAL MEDICINE
Payer: MEDICARE

## 2019-01-29 DIAGNOSIS — Z79.01 CHRONIC ANTICOAGULATION: ICD-10-CM

## 2019-01-29 DIAGNOSIS — I48.0 PAF (PAROXYSMAL ATRIAL FIBRILLATION) (HCC): ICD-10-CM

## 2019-01-29 LAB — INR PPP: 1.6 (ref 2–3.5)

## 2019-01-29 PROCEDURE — 99212 OFFICE O/P EST SF 10 MIN: CPT

## 2019-01-29 PROCEDURE — 85610 PROTHROMBIN TIME: CPT

## 2019-01-29 NOTE — PROGRESS NOTES
Anticoagulation Summary  As of 1/29/2019    INR goal:   2.0-3.0   TTR:   72.3 % (8 mo)   Today's INR:   1.6!   Warfarin maintenance plan:   3 mg (3 mg x 1) every day   Weekly warfarin total:   21 mg   Plan last modified:   Emily Coulter (11/2/2018)   Next INR check:   2/5/2019   Target end date:   Indefinite    Indications    Chronic anticoagulation [Z79.01]  PAF (paroxysmal atrial fibrillation) ([facility]) [I48.0]             Anticoagulation Episode Summary     INR check location:       Preferred lab:       Send INR reminders to:       Comments:         Anticoagulation Care Providers     Provider Role Specialty Phone number    Ganesh Mckeon M.D. Referring Geriatrics [phone]    [facility] Responsible  [phone]        Anticoagulation Patient Findings      HPI:  Shereen Dale seen in clinic today, on anticoagulation therapy with warfarin for Afib  Changes to current medical/health status since last appt: Patient reports being on clindamycin and probiotics for the last week, and is getting a tooth removed tomorrow 1/30/19  Denies signs/symptoms of bleeding and/or thrombosis since the last appt.    Denies any interval changes to diet  Denies any interval changes to medications since last appt.   Denies any complications or cost restrictions with current therapy.   BP denied in vitals.  Confirmed current dosing regimen.       A/P   INR is SUB-therapeutic today at 1.6    Patient to take 4.5 mg total bolus dose tonight 1/29/19. Although procedure the next day, patient concerned with low INR and appropriate to bolus with this dose day before procedure.    Next appt: 1 week, 2/5/19    Vipin Alvarez pharmacy intern  Samson Coulter  PharmD

## 2019-01-30 LAB — INR BLD: 1.6 (ref 0.9–1.2)

## 2019-01-31 ENCOUNTER — ANTICOAGULATION VISIT (OUTPATIENT)
Dept: VASCULAR LAB | Facility: MEDICAL CENTER | Age: 84
End: 2019-01-31
Attending: INTERNAL MEDICINE
Payer: MEDICARE

## 2019-01-31 VITALS — DIASTOLIC BLOOD PRESSURE: 61 MMHG | HEART RATE: 66 BPM | SYSTOLIC BLOOD PRESSURE: 123 MMHG

## 2019-01-31 DIAGNOSIS — Z79.01 CHRONIC ANTICOAGULATION: ICD-10-CM

## 2019-01-31 DIAGNOSIS — I48.0 PAF (PAROXYSMAL ATRIAL FIBRILLATION) (HCC): ICD-10-CM

## 2019-01-31 LAB
INR BLD: 2.3 (ref 0.9–1.2)
INR PPP: 2.3 (ref 2–3.5)

## 2019-01-31 PROCEDURE — 85610 PROTHROMBIN TIME: CPT

## 2019-01-31 PROCEDURE — 99211 OFF/OP EST MAY X REQ PHY/QHP: CPT | Performed by: NURSE PRACTITIONER

## 2019-01-31 NOTE — PROGRESS NOTES
Anticoagulation Summary  As of 2019    INR goal:   2.0-3.0   TTR:   72.1 % (8 mo)   INR used for dosin.3 (2019)   Warfarin maintenance plan:   3 mg (3 mg x 1) every day   Weekly warfarin total:   21 mg   Plan last modified:   Emily Coulter (2018)   Next INR check:   2019   Target end date:   Indefinite    Indications    Chronic anticoagulation [Z79.01]  PAF (paroxysmal atrial fibrillation) ([facility]) [I48.0]             Anticoagulation Episode Summary     INR check location:       Preferred lab:       Send INR reminders to:       Comments:         Anticoagulation Care Providers     Provider Role Specialty Phone number    Ganesh Mckeon M.D. Referring Geriatrics [phone]    [facility] Responsible  [phone]        Anticoagulation Patient Findings      HPI:  Shereen Vanessa Chito seen in clinic today for follow up on anticoagulation therapy in the presence of AF. Denies any changes to current medical/health status since last appointment. Denies any medication or diet changes. No current symptoms of bleeding or thrombosis reported.    A/P:   INR therapeutic. INR up from 1.6 with loading dose. Dental work rescheduled for 19. Continue current regimen. BP recorded in vitals.    Follow up appointment in 1 week(s).    Next Appointment:  at 2:00 pm.     Laura HAYES

## 2019-02-07 ENCOUNTER — ANTICOAGULATION VISIT (OUTPATIENT)
Dept: VASCULAR LAB | Facility: MEDICAL CENTER | Age: 84
End: 2019-02-07
Attending: INTERNAL MEDICINE
Payer: MEDICARE

## 2019-02-07 DIAGNOSIS — Z79.01 CHRONIC ANTICOAGULATION: ICD-10-CM

## 2019-02-07 DIAGNOSIS — I48.0 PAF (PAROXYSMAL ATRIAL FIBRILLATION) (HCC): ICD-10-CM

## 2019-02-07 LAB
INR BLD: 2.5 (ref 0.9–1.2)
INR PPP: 2.5 (ref 2–3.5)

## 2019-02-07 PROCEDURE — 99211 OFF/OP EST MAY X REQ PHY/QHP: CPT | Performed by: NURSE PRACTITIONER

## 2019-02-07 PROCEDURE — 85610 PROTHROMBIN TIME: CPT

## 2019-02-07 NOTE — PROGRESS NOTES
Anticoagulation Summary  As of 2019    INR goal:   2.0-3.0   TTR:   72.9 % (8.3 mo)   INR used for dosin.5 (2019)   Warfarin maintenance plan:   3 mg (3 mg x 1) every day   Weekly warfarin total:   21 mg   Plan last modified:   Emily Coulter (2018)   Next INR check:   2019   Target end date:   Indefinite    Indications    Chronic anticoagulation [Z79.01]  PAF (paroxysmal atrial fibrillation) ([facility]) [I48.0]             Anticoagulation Episode Summary     INR check location:       Preferred lab:       Send INR reminders to:       Comments:         Anticoagulation Care Providers     Provider Role Specialty Phone number    Ganesh Mckeon M.D. Referring Geriatrics [phone]    [facility] Responsible  [phone]        Anticoagulation Patient Findings      HPI:  Shereen Starken Chito seen in clinic today for follow up on anticoagulation therapy in the presence of AF. Uneventful tooth extraction last week. Mild bleeding. Denies any medication or diet changes. No current symptoms of bleeding or thrombosis reported.    A/P:   INR therapeutic. Continue current regimen. BP declined.    Follow up appointment in 2.5 week(s).    Next Appointment: 2019 at 11:00 am.    Laura HAYES

## 2019-02-11 ENCOUNTER — HOSPITAL ENCOUNTER (OUTPATIENT)
Dept: RADIOLOGY | Facility: MEDICAL CENTER | Age: 84
End: 2019-02-11
Attending: FAMILY MEDICINE
Payer: MEDICARE

## 2019-02-11 DIAGNOSIS — Z12.31 VISIT FOR SCREENING MAMMOGRAM: ICD-10-CM

## 2019-02-11 PROCEDURE — 77063 BREAST TOMOSYNTHESIS BI: CPT

## 2019-02-26 ENCOUNTER — ANTICOAGULATION VISIT (OUTPATIENT)
Dept: VASCULAR LAB | Facility: MEDICAL CENTER | Age: 84
End: 2019-02-26
Attending: INTERNAL MEDICINE
Payer: MEDICARE

## 2019-02-26 VITALS — HEART RATE: 77 BPM | DIASTOLIC BLOOD PRESSURE: 55 MMHG | SYSTOLIC BLOOD PRESSURE: 101 MMHG

## 2019-02-26 DIAGNOSIS — I48.0 PAF (PAROXYSMAL ATRIAL FIBRILLATION) (HCC): ICD-10-CM

## 2019-02-26 DIAGNOSIS — Z79.01 CHRONIC ANTICOAGULATION: ICD-10-CM

## 2019-02-26 LAB
INR BLD: 2.4 (ref 0.9–1.2)
INR PPP: 2.4 (ref 2–3.5)

## 2019-02-26 PROCEDURE — 85610 PROTHROMBIN TIME: CPT

## 2019-02-26 PROCEDURE — 99211 OFF/OP EST MAY X REQ PHY/QHP: CPT

## 2019-02-26 NOTE — PROGRESS NOTES
Anticoagulation Summary  As of 2019    INR goal:   2.0-3.0   TTR:   74.8 % (8.9 mo)   INR used for dosin.4 (2019)   Warfarin maintenance plan:   3 mg (3 mg x 1) every day   Weekly warfarin total:   21 mg   Plan last modified:   Emily Couletr (2018)   Next INR check:   3/12/2019   Target end date:   Indefinite    Indications    Chronic anticoagulation [Z79.01]  PAF (paroxysmal atrial fibrillation) ([facility]) [I48.0]             Anticoagulation Episode Summary     INR check location:       Preferred lab:       Send INR reminders to:       Comments:         Anticoagulation Care Providers     Provider Role Specialty Phone number    Ganesh Mckeon M.D. Referring Geriatrics [phone]    [facility] Responsible  [phone]        Anticoagulation Patient Findings  Patient Findings     Negatives:   Signs/symptoms of thrombosis, Signs/symptoms of bleeding, Laboratory test error suspected, Change in health, Change in alcohol use, Change in activity, Upcoming invasive procedure, Emergency department visit, Upcoming dental procedure, Missed doses, Extra doses, Change in medications, Change in diet/appetite, Hospital admission, Bruising, Other complaints          HPI:  Shereen Xavieroza seen in clinic today, on anticoagulation therapy with warfarin for AF.  Changes to current medical/health status since last appt: none  Denies signs/symptoms of bleeding and/or thrombosis since the last appt.    Denies any interval changes to diet  Denies any interval changes to medications since last appt.   Denies any complications or cost restrictions with current therapy.   BP recorded in vitals.  Confirmed current dosing regimen.     Patient's previous INR was therapeutic at 2.5 on 19, at which time patient was instructed to continue with current warfarin regimen. She returns to clinic today to recheck INR to ensure it is therapeutic and thus preventing possible clotting and/or  bleeding/bruising complications.    A/P   INR is therapeutic today at 2.4.  Patient instructed to continue with the current warfarin dosing regimen, and asked to follow up again in 2 weeks (per pt preference).    Next appt: Tuesday, Feb 26, 2019  10:45am    Samson Coulter PharmD

## 2019-03-11 ENCOUNTER — OFFICE VISIT (OUTPATIENT)
Dept: MEDICAL GROUP | Facility: MEDICAL CENTER | Age: 84
End: 2019-03-11
Payer: MEDICARE

## 2019-03-11 VITALS
WEIGHT: 157.19 LBS | TEMPERATURE: 97.3 F | HEART RATE: 69 BPM | BODY MASS INDEX: 28.93 KG/M2 | SYSTOLIC BLOOD PRESSURE: 110 MMHG | HEIGHT: 62 IN | RESPIRATION RATE: 16 BRPM | DIASTOLIC BLOOD PRESSURE: 60 MMHG | OXYGEN SATURATION: 98 %

## 2019-03-11 DIAGNOSIS — E11.9 CONTROLLED TYPE 2 DIABETES MELLITUS WITHOUT COMPLICATION, WITHOUT LONG-TERM CURRENT USE OF INSULIN (HCC): ICD-10-CM

## 2019-03-11 DIAGNOSIS — I51.89 DIASTOLIC DYSFUNCTION: ICD-10-CM

## 2019-03-11 DIAGNOSIS — I10 ESSENTIAL HYPERTENSION: ICD-10-CM

## 2019-03-11 DIAGNOSIS — I35.0 MODERATE AORTIC STENOSIS: ICD-10-CM

## 2019-03-11 DIAGNOSIS — I48.0 PAF (PAROXYSMAL ATRIAL FIBRILLATION) (HCC): ICD-10-CM

## 2019-03-11 PROCEDURE — 99214 OFFICE O/P EST MOD 30 MIN: CPT | Performed by: FAMILY MEDICINE

## 2019-03-11 ASSESSMENT — PATIENT HEALTH QUESTIONNAIRE - PHQ9: CLINICAL INTERPRETATION OF PHQ2 SCORE: 0

## 2019-03-11 NOTE — PROGRESS NOTES
CC: Diabetes, hypertension, A. fib, diastolic dysfunction, aortic stenosis, hypothyroidism    HPI:   Shereen presents today discuss the following medical issues:    Diabetes mellitus type 2 in nonobese ([facility])  Has been adequately controlled.A1c today 6.4.  Patient has been asymptomatic.Has been on Glipizide 2.5 mg daily.     Essential hypertension  BP has been adequately controlled on current medication. Denies headache, chest pain, and SOB.has been on Amlodipine 5 mg, Telmisartan 40 mg daily, Carvedilol 25 mg bid, and Clonidine as needed.     PAF (paroxysmal atrial fibrillation) ([facility])  Has been asymptomatic.Has normal rate and rhythm.Has been on BB, and Coumadin.     Diastolic dysfunction  She has been stable, and asymptomatic.  Previous echo in 4/2018 ,showed diastolic dysfunction grade one.     Aortic valve stenosis,   Last echo showed moderate AS, with normal EF.  Echocardiogram showed   Left ventricular ejection fraction is visually estimated to be 65%.  Moderate concentric left ventricular hypertrophy.  Calcified aortic valve leaflets.  Moderate aortic stenosis.  Vmax is 3.7 m/s.    However patient denies SOB, and syncopal episodes.     Hypothyroidism due to acquired atrophy of thyroid  She has been tolerating Levothyroxine, no palpitation, no cold or heat intolerance, has been on levothyroxine 50 mcg daily       Patient Active Problem List    Diagnosis Date Noted   • HTN (hypertension) 05/04/2012     Priority: High   • Chronic anticoagulation 05/02/2012     Priority: High   • Acute, but ill-defined, cerebrovascular disease 04/30/2012     Priority: High   • Cough 05/02/2012     Priority: Low   • Edema 05/02/2012     Priority: Low   • Advanced directives, counseling/discussion 09/26/2018   • Hypertensive urgency 05/11/2017   • Diastolic dysfunction 02/02/2016   • Tear of lateral cartilage or meniscus of knee, current 05/21/2015   • PAF (paroxysmal atrial fibrillation) ([facility]) 01/12/2015   • Dyspnea on effort  01/02/2014   • Diabetes ([facility]CC) 12/26/2013   • Aortic stenosis 07/02/2013       Current Outpatient Prescriptions   Medication Sig Dispense Refill   • metFORMIN (GLUCOPHAGE) 500 MG Tab Take 1 Tab by mouth 3 times a day. 180 Tab 3   • carvedilol (COREG) 25 MG Tab Take 1 Tab by mouth 2 times a day. 180 Tab 3   • fluconazole (DIFLUCAN) 150 MG tablet Take 1 tablet(150 mg) once 1 Tab 0   • cloNIDine (CATAPRES) 0.1 MG Tab Take 1 Tab by mouth 2 times a day. 60 Tab 1   • famotidine (PEPCID) 20 MG Tab Take 1 Tab by mouth 2 times a day. 60 Tab 3   • glipiZIDE SR (GLUCOTROL) 2.5 MG TABLET SR 24 HR Take 2.5 mg daily. 90 Tab 3   • telmisartan (MICARDIS) 40 MG Tab Take 1 Tab by mouth 2 Times a Day. 180 Tab 2   • amLODIPine (NORVASC) 5 MG Tab Take 0.5 Tabs by mouth 2 Times a Day. 90 Tab 2   • levothyroxine (SYNTHROID) 50 MCG Tab Take 1 Tab by mouth every morning. 90 Tab 3   • warfarin (COUMADIN) 3 MG TABS Take 3 mg by mouth every bedtime. 9/26/48 current dose 1 tab all days, except Wed/Fri 1/2 tab.     • Glucosamine HCl (GLUCOSAMINE PO) Take 1 Tab by mouth every morning. Pt unsure of dose     • Polyethyl Glycol-Propyl Glycol (SYSTANE OP) 1-2 Drops by Ophthalmic route 2 Times a Day.     • docosahexanoic acid (OMEGA 3 FA) 1000 MG CAPS Take 1,000 mg by mouth every morning.     • VITAMIN D PO Take 2,000 Units by mouth every morning.     • Dorzolamide-Timolol (COSOPT OP) Place 1 Drop in both eyes 2 Times a Day.     • CALCIUM 500 PO 1 Tab every morning.       No current facility-administered medications for this visit.          Allergies as of 03/11/2019 - Reviewed 03/11/2019   Allergen Reaction Noted   • Darvon [propoxyphene hcl]  03/18/2008   • Iodine  05/11/2015   • Latex  09/19/2013   • Penicillins Anaphylaxis 08/02/2008   • Propoxyphene hcl Anaphylaxis 08/02/2008   • Tetanus antitoxin Myalgia 11/19/2017   • Tetracycline Anaphylaxis 08/02/2008        ROS: Denies any chest pain, Shortness of breath, Changes bowel or bladder, Lower  extremity edema.    Physical Exam:  Gen.: Well-developed, well-nourished, no apparent distress,pleasant and cooperative with the examination  Skin:  Warm and dry with good turgor. No rashes or suspicious lesions in visible areas  Eye: PERRLA, conjunctiva and sclera clear, lids normal  HEENT: Normocephalic/atraumatic, sinuses nontender with palpation, TMs clear, nares patent with pink mucosa and clear rhinorrhea, lips without lesions, oropharynx clear.  Neck: Trachea midline,no masses or adenopathy  Thyroid: normal consistency and size. No masses or nodules. Not tender with palpation.  Cor: Regular rate and rhythm without murmur, gallop or rub.  Lungs: Respirations unlabored.Clear to auscultation with equal breath sounds bilaterally. No wheezes, rhonchi.  Abdomen: Soft nontender without hepatosplenomegaly or masses appreciated, normoactive bowel sounds. No hernias.  Extremities: No cyanosis, clubbing or edema, Symmetrical without deformities or malformations. Pulses 2+ and symmetrical both upper and lower extremities  Lymphatic: No abnormal adenopathy of the neck groin or axillae.  Psych: Alert and oriented x 3.Normal affect, judgement,insight and memory.        Assessment and Plan.   82 y.o. female     1. Essential hypertension  Has been adequately controlled on current medication. Denies headache, chest pain, and SOB.  Continue on Amlodipine 5 mg, Telmisartan 40 mg daily, Carvedilol 25 mg bid, and Clonidine as needed.    2. Controlled type 2 diabetes mellitus without complication, without long-term current use of insulin ([facility])  Adequately controlled.  A1c today 6.4.  Continue on Glipizide 2.5 mg daily.     3. PAF (paroxysmal atrial fibrillation) ([facility])  Stable, asymptomatic.  Continue on BB, and Coumadin.  Continue follow-up with Coumadin clinic.    4. Moderate aortic stenosis  Moderate AS  Asymptomatic.  Will continue watch.  Continue follow up with cardiology.     5. Diastolic dysfunction  Stable. Last echo showed  diastolic dysfunction grade one.  Will continue watch.     6. Hypothyroidism due to acquired atrophy of thyroid  He has been tolerating Levothyroxine, no palpitation, no cold or heat intolerance  Continue on levothyroxine 50 cg daily

## 2019-03-12 ENCOUNTER — ANTICOAGULATION VISIT (OUTPATIENT)
Dept: VASCULAR LAB | Facility: MEDICAL CENTER | Age: 84
End: 2019-03-12
Attending: INTERNAL MEDICINE
Payer: MEDICARE

## 2019-03-12 VITALS — HEART RATE: 79 BPM | SYSTOLIC BLOOD PRESSURE: 134 MMHG | DIASTOLIC BLOOD PRESSURE: 68 MMHG

## 2019-03-12 DIAGNOSIS — Z79.01 CHRONIC ANTICOAGULATION: ICD-10-CM

## 2019-03-12 DIAGNOSIS — I48.0 PAF (PAROXYSMAL ATRIAL FIBRILLATION) (HCC): ICD-10-CM

## 2019-03-12 LAB
INR BLD: 2.5 (ref 0.9–1.2)
INR PPP: 2.5 (ref 2–3.5)

## 2019-03-12 PROCEDURE — 85610 PROTHROMBIN TIME: CPT

## 2019-03-12 PROCEDURE — 99211 OFF/OP EST MAY X REQ PHY/QHP: CPT

## 2019-03-12 NOTE — PROGRESS NOTES
Anticoagulation Summary  As of 3/12/2019    INR goal:   2.0-3.0   TTR:   76.1 % (9.4 mo)   INR used for dosin.5 (3/12/2019)   Warfarin maintenance plan:   3 mg (3 mg x 1) every day   Weekly warfarin total:   21 mg   Plan last modified:   Emily Coulter (2018)   Next INR check:      Target end date:   Indefinite    Indications    Chronic anticoagulation [Z79.01]  PAF (paroxysmal atrial fibrillation) ([facility]) [I48.0]             Anticoagulation Episode Summary     INR check location:       Preferred lab:       Send INR reminders to:       Comments:         Anticoagulation Care Providers     Provider Role Specialty Phone number    Ganesh Mckeon M.D. Referring Geriatrics [phone]    [facility] Responsible  [phone]        Anticoagulation Patient Findings    History of Present Illness: follow up appointment for chronic anticoagulation with the high risk medication, warfarin for atrial fibrillation    Pt remains therapeutic today, however does not wish to increase the interval to recheck INR. Continue current dosing regimen.  Follow up in 2 weeks, to reduce the risk of adverse events related to this high risk medication, warfarin.    Alysa Molina, Clinical Pharmacist

## 2019-03-22 ENCOUNTER — TELEPHONE (OUTPATIENT)
Dept: MEDICAL GROUP | Facility: MEDICAL CENTER | Age: 84
End: 2019-03-22

## 2019-03-22 DIAGNOSIS — E11.9 TYPE 2 DIABETES MELLITUS WITHOUT COMPLICATION, WITHOUT LONG-TERM CURRENT USE OF INSULIN (HCC): ICD-10-CM

## 2019-03-22 NOTE — TELEPHONE ENCOUNTER
1. Caller Name: Shereen Dale                        Call Back Number: [phone] (home)     2. Message: pt called would like [employer] meter     3. Patient approves office to leave a detailed voicemail/MyChart message: yes

## 2019-03-25 ENCOUNTER — APPOINTMENT (OUTPATIENT)
Dept: RADIOLOGY | Facility: MEDICAL CENTER | Age: 84
DRG: 813 | End: 2019-03-25
Attending: HOSPITALIST
Payer: MEDICARE

## 2019-03-25 ENCOUNTER — HOSPITAL ENCOUNTER (INPATIENT)
Facility: MEDICAL CENTER | Age: 84
LOS: 1 days | DRG: 813 | End: 2019-03-28
Attending: EMERGENCY MEDICINE | Admitting: INTERNAL MEDICINE
Payer: MEDICARE

## 2019-03-25 DIAGNOSIS — R04.0 EPISTAXIS: ICD-10-CM

## 2019-03-25 DIAGNOSIS — E87.1 HYPONATREMIA: ICD-10-CM

## 2019-03-25 DIAGNOSIS — I48.0 PAF (PAROXYSMAL ATRIAL FIBRILLATION) (HCC): ICD-10-CM

## 2019-03-25 LAB
ANION GAP SERPL CALC-SCNC: 8 MMOL/L (ref 0–11.9)
APPEARANCE UR: CLEAR
APTT PPP: 43.2 SEC (ref 24.7–36)
BACTERIA #/AREA URNS HPF: NEGATIVE /HPF
BASOPHILS # BLD AUTO: 1 % (ref 0–1.8)
BASOPHILS # BLD: 0.07 K/UL (ref 0–0.12)
BILIRUB UR QL STRIP.AUTO: NEGATIVE
BUN SERPL-MCNC: 14 MG/DL (ref 8–22)
CALCIUM SERPL-MCNC: 9 MG/DL (ref 8.5–10.5)
CHLORIDE SERPL-SCNC: 95 MMOL/L (ref 96–112)
CHLORIDE UR-SCNC: 60 MMOL/L
CO2 SERPL-SCNC: 23 MMOL/L (ref 20–33)
COLOR UR: YELLOW
CREAT SERPL-MCNC: 0.69 MG/DL (ref 0.5–1.4)
CREAT UR-MCNC: 22.9 MG/DL
EKG IMPRESSION: NORMAL
EOSINOPHIL # BLD AUTO: 0.23 K/UL (ref 0–0.51)
EOSINOPHIL NFR BLD: 3.3 % (ref 0–6.9)
EPI CELLS #/AREA URNS HPF: NEGATIVE /HPF
ERYTHROCYTE [DISTWIDTH] IN BLOOD BY AUTOMATED COUNT: 40.3 FL (ref 35.9–50)
GLUCOSE BLD-MCNC: 149 MG/DL (ref 65–99)
GLUCOSE SERPL-MCNC: 205 MG/DL (ref 65–99)
GLUCOSE UR STRIP.AUTO-MCNC: 250 MG/DL
HCT VFR BLD AUTO: 37 % (ref 37–47)
HGB BLD-MCNC: 13 G/DL (ref 12–16)
HYALINE CASTS #/AREA URNS LPF: ABNORMAL /LPF
IMM GRANULOCYTES # BLD AUTO: 0.04 K/UL (ref 0–0.11)
IMM GRANULOCYTES NFR BLD AUTO: 0.6 % (ref 0–0.9)
INR PPP: 3.36 (ref 0.87–1.13)
KETONES UR STRIP.AUTO-MCNC: NEGATIVE MG/DL
LEUKOCYTE ESTERASE UR QL STRIP.AUTO: NEGATIVE
LYMPHOCYTES # BLD AUTO: 1.79 K/UL (ref 1–4.8)
LYMPHOCYTES NFR BLD: 25.5 % (ref 22–41)
MCH RBC QN AUTO: 32.1 PG (ref 27–33)
MCHC RBC AUTO-ENTMCNC: 35.1 G/DL (ref 33.6–35)
MCV RBC AUTO: 91.4 FL (ref 81.4–97.8)
MICRO URNS: ABNORMAL
MONOCYTES # BLD AUTO: 0.6 K/UL (ref 0–0.85)
MONOCYTES NFR BLD AUTO: 8.6 % (ref 0–13.4)
NEUTROPHILS # BLD AUTO: 4.28 K/UL (ref 2–7.15)
NEUTROPHILS NFR BLD: 61 % (ref 44–72)
NITRITE UR QL STRIP.AUTO: NEGATIVE
NRBC # BLD AUTO: 0 K/UL
NRBC BLD-RTO: 0 /100 WBC
OSMOLALITY SERPL: 275 MOSM/KG H2O (ref 278–298)
OSMOLALITY UR: 253 MOSM/KG H2O (ref 300–900)
PH UR STRIP.AUTO: 6.5 [PH]
PLATELET # BLD AUTO: 223 K/UL (ref 164–446)
PMV BLD AUTO: 8.9 FL (ref 9–12.9)
POTASSIUM SERPL-SCNC: 4.4 MMOL/L (ref 3.6–5.5)
POTASSIUM UR-SCNC: 29.1 MMOL/L
PROT UR QL STRIP: NEGATIVE MG/DL
PROT UR-MCNC: <4 MG/DL (ref 0–15)
PROTHROMBIN TIME: 34 SEC (ref 12–14.6)
RBC # BLD AUTO: 4.05 M/UL (ref 4.2–5.4)
RBC # URNS HPF: ABNORMAL /HPF
RBC UR QL AUTO: ABNORMAL
SODIUM SERPL-SCNC: 126 MMOL/L (ref 135–145)
SODIUM SERPL-SCNC: 131 MMOL/L (ref 135–145)
SODIUM UR-SCNC: 46 MMOL/L
SP GR UR STRIP.AUTO: 1.01
TROPONIN I SERPL-MCNC: <0.01 NG/ML (ref 0–0.04)
TSH SERPL DL<=0.005 MIU/L-ACNC: 1.39 UIU/ML (ref 0.38–5.33)
UROBILINOGEN UR STRIP.AUTO-MCNC: 0.2 MG/DL
WBC # BLD AUTO: 7 K/UL (ref 4.8–10.8)
WBC #/AREA URNS HPF: ABNORMAL /HPF

## 2019-03-25 PROCEDURE — A9270 NON-COVERED ITEM OR SERVICE: HCPCS | Performed by: HOSPITALIST

## 2019-03-25 PROCEDURE — 36415 COLL VENOUS BLD VENIPUNCTURE: CPT

## 2019-03-25 PROCEDURE — G0378 HOSPITAL OBSERVATION PER HR: HCPCS

## 2019-03-25 PROCEDURE — 85610 PROTHROMBIN TIME: CPT

## 2019-03-25 PROCEDURE — 82570 ASSAY OF URINE CREATININE: CPT

## 2019-03-25 PROCEDURE — 82436 ASSAY OF URINE CHLORIDE: CPT

## 2019-03-25 PROCEDURE — 84156 ASSAY OF PROTEIN URINE: CPT

## 2019-03-25 PROCEDURE — 83935 ASSAY OF URINE OSMOLALITY: CPT

## 2019-03-25 PROCEDURE — 82962 GLUCOSE BLOOD TEST: CPT

## 2019-03-25 PROCEDURE — 84443 ASSAY THYROID STIM HORMONE: CPT

## 2019-03-25 PROCEDURE — 700105 HCHG RX REV CODE 258: Performed by: EMERGENCY MEDICINE

## 2019-03-25 PROCEDURE — 83930 ASSAY OF BLOOD OSMOLALITY: CPT

## 2019-03-25 PROCEDURE — 84300 ASSAY OF URINE SODIUM: CPT

## 2019-03-25 PROCEDURE — 99285 EMERGENCY DEPT VISIT HI MDM: CPT

## 2019-03-25 PROCEDURE — 84484 ASSAY OF TROPONIN QUANT: CPT

## 2019-03-25 PROCEDURE — 700102 HCHG RX REV CODE 250 W/ 637 OVERRIDE(OP): Performed by: HOSPITALIST

## 2019-03-25 PROCEDURE — 99220 PR INITIAL OBSERVATION CARE,LEVL III: CPT | Performed by: HOSPITALIST

## 2019-03-25 PROCEDURE — 84133 ASSAY OF URINE POTASSIUM: CPT

## 2019-03-25 PROCEDURE — 700102 HCHG RX REV CODE 250 W/ 637 OVERRIDE(OP): Performed by: EMERGENCY MEDICINE

## 2019-03-25 PROCEDURE — 71045 X-RAY EXAM CHEST 1 VIEW: CPT

## 2019-03-25 PROCEDURE — 85730 THROMBOPLASTIN TIME PARTIAL: CPT

## 2019-03-25 PROCEDURE — 81001 URINALYSIS AUTO W/SCOPE: CPT

## 2019-03-25 PROCEDURE — 84295 ASSAY OF SERUM SODIUM: CPT

## 2019-03-25 PROCEDURE — 700105 HCHG RX REV CODE 258: Performed by: HOSPITALIST

## 2019-03-25 PROCEDURE — 93005 ELECTROCARDIOGRAM TRACING: CPT | Performed by: EMERGENCY MEDICINE

## 2019-03-25 PROCEDURE — 85025 COMPLETE CBC W/AUTO DIFF WBC: CPT

## 2019-03-25 PROCEDURE — 80048 BASIC METABOLIC PNL TOTAL CA: CPT

## 2019-03-25 RX ORDER — DORZOLAMIDE HYDROCHLORIDE AND TIMOLOL MALEATE 20; 5 MG/ML; MG/ML
1 SOLUTION/ DROPS OPHTHALMIC 2 TIMES DAILY
Status: DISCONTINUED | OUTPATIENT
Start: 2019-03-25 | End: 2019-03-28 | Stop reason: HOSPADM

## 2019-03-25 RX ORDER — TELMISARTAN 40 MG/1
40 TABLET ORAL 2 TIMES DAILY
Status: DISCONTINUED | OUTPATIENT
Start: 2019-03-25 | End: 2019-03-28 | Stop reason: HOSPADM

## 2019-03-25 RX ORDER — HYDROCODONE BITARTRATE AND ACETAMINOPHEN 5; 325 MG/1; MG/1
1 TABLET ORAL EVERY 4 HOURS PRN
COMMUNITY
End: 2019-04-01

## 2019-03-25 RX ORDER — AMLODIPINE BESYLATE 5 MG/1
2.5 TABLET ORAL
Status: DISCONTINUED | OUTPATIENT
Start: 2019-03-25 | End: 2019-03-28 | Stop reason: HOSPADM

## 2019-03-25 RX ORDER — BISACODYL 10 MG
10 SUPPOSITORY, RECTAL RECTAL
Status: DISCONTINUED | OUTPATIENT
Start: 2019-03-25 | End: 2019-03-28 | Stop reason: HOSPADM

## 2019-03-25 RX ORDER — SODIUM CHLORIDE 9 MG/ML
500 INJECTION, SOLUTION INTRAVENOUS ONCE
Status: COMPLETED | OUTPATIENT
Start: 2019-03-25 | End: 2019-03-25

## 2019-03-25 RX ORDER — ACETAMINOPHEN 325 MG/1
650 TABLET ORAL EVERY 6 HOURS PRN
Status: DISCONTINUED | OUTPATIENT
Start: 2019-03-25 | End: 2019-03-28 | Stop reason: HOSPADM

## 2019-03-25 RX ORDER — CLONIDINE HYDROCHLORIDE 0.1 MG/1
0.1 TABLET ORAL 2 TIMES DAILY PRN
COMMUNITY
End: 2019-12-02 | Stop reason: SDUPTHER

## 2019-03-25 RX ORDER — CARVEDILOL 25 MG/1
25 TABLET ORAL 2 TIMES DAILY
Status: DISCONTINUED | OUTPATIENT
Start: 2019-03-25 | End: 2019-03-28 | Stop reason: HOSPADM

## 2019-03-25 RX ORDER — GLIPIZIDE 2.5 MG/1
2.5 TABLET, EXTENDED RELEASE ORAL
Status: DISCONTINUED | OUTPATIENT
Start: 2019-03-26 | End: 2019-03-28 | Stop reason: HOSPADM

## 2019-03-25 RX ORDER — FAMOTIDINE 20 MG/1
20 TABLET, FILM COATED ORAL DAILY
Status: DISCONTINUED | OUTPATIENT
Start: 2019-03-26 | End: 2019-03-28 | Stop reason: HOSPADM

## 2019-03-25 RX ORDER — LEVOTHYROXINE SODIUM 0.05 MG/1
50 TABLET ORAL EVERY MORNING
Status: DISCONTINUED | OUTPATIENT
Start: 2019-03-26 | End: 2019-03-28 | Stop reason: HOSPADM

## 2019-03-25 RX ORDER — DEXTROSE MONOHYDRATE 25 G/50ML
25 INJECTION, SOLUTION INTRAVENOUS
Status: DISCONTINUED | OUTPATIENT
Start: 2019-03-25 | End: 2019-03-28 | Stop reason: HOSPADM

## 2019-03-25 RX ORDER — AMLODIPINE BESYLATE 5 MG/1
5 TABLET ORAL DAILY
Status: DISCONTINUED | OUTPATIENT
Start: 2019-03-26 | End: 2019-03-28 | Stop reason: HOSPADM

## 2019-03-25 RX ORDER — AMOXICILLIN 250 MG
2 CAPSULE ORAL 2 TIMES DAILY
Status: DISCONTINUED | OUTPATIENT
Start: 2019-03-25 | End: 2019-03-28 | Stop reason: HOSPADM

## 2019-03-25 RX ORDER — SODIUM CHLORIDE 9 MG/ML
INJECTION, SOLUTION INTRAVENOUS CONTINUOUS
Status: DISCONTINUED | OUTPATIENT
Start: 2019-03-25 | End: 2019-03-28 | Stop reason: HOSPADM

## 2019-03-25 RX ORDER — HYDROCODONE BITARTRATE AND ACETAMINOPHEN 5; 325 MG/1; MG/1
1 TABLET ORAL EVERY 4 HOURS PRN
Status: DISCONTINUED | OUTPATIENT
Start: 2019-03-25 | End: 2019-03-28 | Stop reason: HOSPADM

## 2019-03-25 RX ORDER — EUCALYPTUS/PEPPERMINT OIL
10 SOLUTION, NON-ORAL NASAL 3 TIMES DAILY
Status: DISCONTINUED | OUTPATIENT
Start: 2019-03-25 | End: 2019-03-28 | Stop reason: HOSPADM

## 2019-03-25 RX ORDER — WARFARIN SODIUM 3 MG/1
3 TABLET ORAL
Status: DISCONTINUED | OUTPATIENT
Start: 2019-03-26 | End: 2019-03-26

## 2019-03-25 RX ORDER — POLYETHYLENE GLYCOL 3350 17 G/17G
1 POWDER, FOR SOLUTION ORAL
Status: DISCONTINUED | OUTPATIENT
Start: 2019-03-25 | End: 2019-03-28 | Stop reason: HOSPADM

## 2019-03-25 RX ORDER — OXYMETAZOLINE HYDROCHLORIDE 0.05 G/100ML
2 SPRAY NASAL ONCE
Status: COMPLETED | OUTPATIENT
Start: 2019-03-25 | End: 2019-03-25

## 2019-03-25 RX ORDER — OXYMETAZOLINE HYDROCHLORIDE 0.05 G/100ML
2 SPRAY NASAL 2 TIMES DAILY
Status: DISPENSED | OUTPATIENT
Start: 2019-03-25 | End: 2019-03-27

## 2019-03-25 RX ORDER — FAMOTIDINE 20 MG/1
20 TABLET, FILM COATED ORAL DAILY
COMMUNITY
End: 2019-06-17 | Stop reason: SDUPTHER

## 2019-03-25 RX ORDER — AMLODIPINE BESYLATE 5 MG/1
2.5-5 TABLET ORAL 2 TIMES DAILY
COMMUNITY
End: 2019-08-07

## 2019-03-25 RX ORDER — DORZOLAMIDE HYDROCHLORIDE AND TIMOLOL MALEATE 20; 5 MG/ML; MG/ML
1 SOLUTION/ DROPS OPHTHALMIC 2 TIMES DAILY
COMMUNITY
End: 2021-04-04

## 2019-03-25 RX ORDER — WARFARIN SODIUM 1 MG/1
1 TABLET ORAL
Status: DISCONTINUED | OUTPATIENT
Start: 2019-03-25 | End: 2019-03-26

## 2019-03-25 RX ORDER — GLIPIZIDE 2.5 MG/1
2.5 TABLET, EXTENDED RELEASE ORAL DAILY
COMMUNITY
End: 2019-09-11 | Stop reason: SDUPTHER

## 2019-03-25 RX ADMIN — AMLODIPINE BESYLATE 2.5 MG: 5 TABLET ORAL at 21:17

## 2019-03-25 RX ADMIN — SODIUM CHLORIDE 500 ML: 9 INJECTION, SOLUTION INTRAVENOUS at 14:20

## 2019-03-25 RX ADMIN — DORZOLAMIDE HYDROCHLORIDE AND TIMOLOL MALEATE 1 DROP: 20; 5 SOLUTION/ DROPS OPHTHALMIC at 19:03

## 2019-03-25 RX ADMIN — OXYMETAZOLINE HYDROCHLORIDE 2 SPRAY: 5 SPRAY NASAL at 11:24

## 2019-03-25 RX ADMIN — TELMISARTAN 40 MG: 40 TABLET ORAL at 19:02

## 2019-03-25 RX ADMIN — SODIUM CHLORIDE: 9 INJECTION, SOLUTION INTRAVENOUS at 17:57

## 2019-03-25 RX ADMIN — CARVEDILOL 25 MG: 25 TABLET, FILM COATED ORAL at 19:01

## 2019-03-25 ASSESSMENT — COGNITIVE AND FUNCTIONAL STATUS - GENERAL
MOBILITY SCORE: 24
SUGGESTED CMS G CODE MODIFIER DAILY ACTIVITY: CH
DAILY ACTIVITIY SCORE: 24
SUGGESTED CMS G CODE MODIFIER MOBILITY: CH

## 2019-03-25 ASSESSMENT — PATIENT HEALTH QUESTIONNAIRE - PHQ9
1. LITTLE INTEREST OR PLEASURE IN DOING THINGS: NOT AT ALL
2. FEELING DOWN, DEPRESSED, IRRITABLE, OR HOPELESS: NOT AT ALL
SUM OF ALL RESPONSES TO PHQ9 QUESTIONS 1 AND 2: 0

## 2019-03-25 NOTE — ED TRIAGE NOTES
84 y/o female ambulatory to triage with c/o intermittent nosebleed x 1 week. Pt states that when she holds pressure and places an ice pack on her nose, the bleeding will stop for a while but then restarts. Pt states she takes coumadin, her last INR was 2.5. Pt also c/o headache and dizzy feeling that began today.

## 2019-03-25 NOTE — ED PROVIDER NOTES
ED Provider Note        CHIEF COMPLAINT  Chief Complaint   Patient presents with   • Nose Bleed       HPI    Shereen Dale is a 83 y.o. female presenting with epistaxis.  Has had intermittent nosebleeds that resolved with simple pressure over the last week.  Today's nosebleed lasted 1 hour.  She is concerned as she is on Coumadin, once her INR checked.  She denies any recent trauma.  Has been slightly dizzy with this.    REVIEW OF SYSTEMS  Positives as above. Pertinent negatives include no headache, chest pain, shortness of breath, abdominal pain, blood in stool  All other review of systems are negative    PAST MEDICAL HISTORY   has a past medical history of AF (atrial fibrillation) (HCC); Arrhythmia; Backpain; Breast cancer (HCC); Breath shortness; Cancer (HCC) (1993); CATARACT; Chronic anticoagulation (5/2/2012); Cough (5/2/2012); Dental disorder; Diabetes; Edema (5/2/2012); Glaucoma; Heart burn; Heart murmur; Heart valve disease; Hypertension; Hypothyroid; Oxygen deficiency; Paroxysmal atrial fibrillation (HCC); Sleep apnea; tia; and Unspecified hemorrhagic conditions.    SOCIAL HISTORY  Social History     Social History Main Topics   • Smoking status: Former Smoker     Packs/day: 0.50     Years: 2.50     Types: Cigarettes     Quit date: 1/1/1964   • Smokeless tobacco: Never Used   • Alcohol use 0.0 oz/week      Comment: rarely   • Drug use: No   • Sexual activity: Not on file       SURGICAL HISTORY   has a past surgical history that includes lumpectomy; cholecystectomy; hysterectomy radical; radiation therapy plan simple; cataract phaco with iol (9/23/2013); cataract phaco with iol (10/21/2013); knee arthroscopy (Right, 5/21/2015); and meniscectomy (Right, 5/21/2015).    CURRENT MEDICATIONS    Current Facility-Administered Medications:   •  [START ON 3/26/2019] amLODIPine (NORVASC) tablet 5 mg, 5 mg, Oral, DAILY, Praveen Jeffries M.D.  •  carvedilol (COREG) tablet 25 mg, 25 mg, Oral, BID, Praveen Nguyen  ADITHYA Jeffries  •  dorzolamide-timolol (COSOPT) 22.3-6.8 MG/ML ophthalmic drops 1 Drop, 1 Drop, Both Eyes, BID, Praveen Jeffries M.D.  •  [START ON 3/26/2019] famotidine (PEPCID) tablet 20 mg, 20 mg, Oral, DAILY, Praveen Jeffries M.D.  •  [START ON 3/26/2019] glipiZIDE SR (GLUCOTROL) tablet 2.5 mg, 2.5 mg, Oral, QAM AC, Praveen Jeffries M.D.  •  HYDROcodone-acetaminophen (NORCO) 5-325 MG per tablet 1 Tab, 1 Tab, Oral, Q4HRS PRN, Praveen Jeffries M.D.  •  [START ON 3/26/2019] levothyroxine (SYNTHROID) tablet 50 mcg, 50 mcg, Oral, QAM, Praveen Jeffries M.D.  •  telmisartan (MICARDIS) tablet 40 mg, 40 mg, Oral, BID, Praveen Jeffries M.D.  •  MD Alert...Warfarin per Pharmacy, , Other, PHARMACY TO DOSE, Praveen Jeffries M.D.  •  oxymetazoline (AFRIN) 0.05 % nasal spray 2 Spray, 2 Spray, Nasal, BID, Praveen Jeffries M.D.  •  eucalyptus/peppermint oil (PONARIS NASAL) nasal emollient 0.5 mL, 10 Drop, Nasal, TID, Praveen Jeffries M.D.  •  senna-docusate (PERICOLACE or SENOKOT S) 8.6-50 MG per tablet 2 Tab, 2 Tab, Oral, BID **AND** polyethylene glycol/lytes (MIRALAX) PACKET 1 Packet, 1 Packet, Oral, QDAY PRN **AND** magnesium hydroxide (MILK OF MAGNESIA) suspension 30 mL, 30 mL, Oral, QDAY PRN **AND** bisacodyl (DULCOLAX) suppository 10 mg, 10 mg, Rectal, QDAY PRN, Praveen Jeffries M.D.  •  NS infusion, , Intravenous, Continuous, Praveen Jeffries M.D., Last Rate: 50 mL/hr at 03/25/19 5747  •  acetaminophen (TYLENOL) tablet 650 mg, 650 mg, Oral, Q6HRS PRN, Praveen Jeffries M.D.  •  insulin regular (HUMULIN R) injection 2-9 Units, 2-9 Units, Subcutaneous, 4X/DAY ACHS **AND** Accu-Chek ACHS, , , Q AC AND BEDTIME(S) **AND** NOTIFY MD and PharmD, , , Once **AND** glucose 4 g chewable tablet 16 g, 16 g, Oral, Q15 MIN PRN **AND** dextrose 50% (D50W) injection 25 mL, 25 mL, Intravenous, Q15 MIN PRN, Praveen Jeffries M.D.  •  amLODIPine (NORVASC) tablet 2.5 mg, 2.5 mg, Oral, QHS, Seng  GENET Amaral M.D.  •  warfarin (COUMADIN) tablet 1 mg, 1 mg, Oral, COUMADIN-ONCE **FOLLOWED BY** [START ON 3/26/2019] warfarin (COUMADIN) tablet 3 mg, 3 mg, Oral, COUMADIN-DAILY, Seng Amaral M.D.    Current Outpatient Prescriptions:   •  amLODIPine (NORVASC) 5 MG Tab, Take 2.5-5 mg by mouth 2 Times a Day. 5 mg in the AM 2.5 mg in the PM, Disp: , Rfl:   •  cloNIDine (CATAPRES) 0.1 MG Tab, Take 0.1 mg by mouth 2 times a day as needed., Disp: , Rfl:   •  glipiZIDE SR (GLUCOTROL) 2.5 MG TABLET SR 24 HR, Take 2.5 mg by mouth every day., Disp: , Rfl:   •  HYDROcodone-acetaminophen (NORCO) 5-325 MG Tab per tablet, Take 1 Tab by mouth every four hours as needed., Disp: , Rfl:   •  dorzolamide-timolol (COSOPT) 22.3-6.8 MG/ML Solution, Place 1 Drop in both eyes 2 times a day., Disp: , Rfl:   •  famotidine (PEPCID) 20 MG Tab, Take 20 mg by mouth every day., Disp: , Rfl:   •  metFORMIN (GLUCOPHAGE) 500 MG Tab, Take 1 Tab by mouth 3 times a day., Disp: 180 Tab, Rfl: 3  •  carvedilol (COREG) 25 MG Tab, Take 1 Tab by mouth 2 times a day., Disp: 180 Tab, Rfl: 3  •  telmisartan (MICARDIS) 40 MG Tab, Take 1 Tab by mouth 2 Times a Day., Disp: 180 Tab, Rfl: 2  •  levothyroxine (SYNTHROID) 50 MCG Tab, Take 1 Tab by mouth every morning., Disp: 90 Tab, Rfl: 3  •  warfarin (COUMADIN) 3 MG TABS, Take 3 mg by mouth every day., Disp: , Rfl:   •  Glucosamine HCl (GLUCOSAMINE PO), Take 1 Tab by mouth every morning. Pt unsure of dose, Disp: , Rfl:   •  Polyethyl Glycol-Propyl Glycol (SYSTANE OP), 1-2 Drops by Ophthalmic route 4 times a day as needed., Disp: , Rfl:   •  docosahexanoic acid (OMEGA 3 FA) 1000 MG CAPS, Take 4,000 mg by mouth every morning., Disp: , Rfl:   •  VITAMIN D PO, Take 2,000 Units by mouth every morning., Disp: , Rfl:   •  CALCIUM 500 PO, Take 1 Tab by mouth every morning., Disp: , Rfl:     ALLERGIES  Allergies   Allergen Reactions   • Darvon [Propoxyphene Hcl]      shock   • Iodine      Shock  - IV   • Latex       Swelling = hands with glove use   • Penicillins Anaphylaxis   • Propoxyphene Hcl Anaphylaxis   • Tetanus Antitoxin Myalgia   • Tetracycline Anaphylaxis         PHYSICAL EXAM  VITAL SIGNS: /83   Pulse 79   Temp 36.8 °C (98.2 °F) (Temporal)   Resp 18   Wt 71.8 kg (158 lb 4.6 oz)   LMP 01/01/1985   SpO2 96%   BMI 28.95 kg/m²    SpO2: I interpret this pulse oximetry as normal  Constitutional: Alert in no apparent distress.  HENT: Normocephalic, Atraumatic, MMM.  No blood in posterior oropharynx.  Very slight oozing in left nares, otherwise clear.  Right nares clear.  Eyes: PERRL. Conjunctiva normal, non-icteric.   Heart: Regular rate and rhythm, systolic murmur noted.  Lungs: Clear to auscultation bilaterally. No resp distress, breath sounds equal  Abdomen: Soft, No tenderness, No pulsatile masses. No peritoneal signs.  Skin: Warm, Dry, No erythema, No rash.   Neurologic: Alert and oriented, Grossly non-focal.     DIAGNOSTIC STUDIES / PROCEDURES    LABORATORY  Labs Reviewed   CBC WITH DIFFERENTIAL - Abnormal; Notable for the following:        Result Value    RBC 4.05 (*)     MCHC 35.1 (*)     MPV 8.9 (*)     All other components within normal limits    Narrative:     Indicate which anticoagulants the patient is on:->UNKNOWN   BASIC METABOLIC PANEL - Abnormal; Notable for the following:     Sodium 126 (*)     Chloride 95 (*)     Glucose 205 (*)     All other components within normal limits    Narrative:     Indicate which anticoagulants the patient is on:->UNKNOWN   APTT - Abnormal; Notable for the following:     APTT 43.2 (*)     All other components within normal limits    Narrative:     Indicate which anticoagulants the patient is on:->UNKNOWN   PROTHROMBIN TIME - Abnormal; Notable for the following:     PT 34.0 (*)     INR 3.36 (*)     All other components within normal limits    Narrative:     Indicate which anticoagulants the patient is on:->UNKNOWN   URINALYSIS,CULTURE IF INDICATED - Abnormal;  Notable for the following:     Glucose 250 (*)     Occult Blood Small (*)     All other components within normal limits   OSMOLALITY URINE - Abnormal; Notable for the following:     Osmolality Urine 253 (*)     All other components within normal limits    Narrative:     Urine Test:->Random   OSMOLALITY SERUM - Abnormal; Notable for the following:     Osmolality Serum 275 (*)     All other components within normal limits   URINE MICROSCOPIC (W/UA) - Abnormal; Notable for the following:     RBC 5-10 (*)     All other components within normal limits   TROPONIN    Narrative:     Indicate which anticoagulants the patient is on:->UNKNOWN   ESTIMATED GFR    Narrative:     Indicate which anticoagulants the patient is on:->UNKNOWN   URINE SODIUM RANDOM    Narrative:     Urine Test:->Random   URINE POTASSIUM RANDOM    Narrative:     Urine Test:->Random   URINE CHLORIDE RANDOM    Narrative:     Urine Test:->Random   URINE CREATININE RANDOM    Narrative:     Urine Test:->Random   URINE PROTEIN    Narrative:     Urine Test:->Random   TSH   SODIUM SERUM (NA)       EKG  Results for orders placed or performed during the hospital encounter of 19   EKG (NOW)   Result Value Ref Range    Report       [facility] Emergency Dept.    Test Date:  2019  Pt Name:    LIAM ROTH                 Department: ER  MRN:        [id number]                      Room:       [facility]  Gender:     Female                       Technician: EMT STUDENT  :        [id number]                   Requested By:FLOWER OH  Order #:    [id number]                    Reading MD: Flower Oh MD    Measurements  Intervals                                Axis  Rate:       73                           P:          42  PA:         184                          QRS:        21  QRSD:       92                           T:          51  QT:         368  QTc:        406    Interpretive Statements  EKG (my interpretation): Normal sinus  rhythm, rate 73, axis normal, no ST or  T-wave abnormalities,  normal EKG, is similar to prior.  Relevant clinical issues: Dizziness  Electronically Signed On 3- 13:24:55 PDT by Reggie Page MD                 CHART REVIEW  The patient's previous medical records were reviewed and revealed the following pertinent information: No recent pertinent encounters    Medications   amLODIPine (NORVASC) tablet 5 mg (not administered)   carvedilol (COREG) tablet 25 mg (not administered)   dorzolamide-timolol (COSOPT) 22.3-6.8 MG/ML ophthalmic drops 1 Drop (not administered)   famotidine (PEPCID) tablet 20 mg (not administered)   glipiZIDE SR (GLUCOTROL) tablet 2.5 mg (not administered)   HYDROcodone-acetaminophen (NORCO) 5-325 MG per tablet 1 Tab (not administered)   levothyroxine (SYNTHROID) tablet 50 mcg (not administered)   telmisartan (MICARDIS) tablet 40 mg (not administered)   MD Alert...Warfarin per Pharmacy (not administered)   oxymetazoline (AFRIN) 0.05 % nasal spray 2 Spray (not administered)   eucalyptus/peppermint oil (PONARIS NASAL) nasal emollient 0.5 mL (not administered)   senna-docusate (PERICOLACE or SENOKOT S) 8.6-50 MG per tablet 2 Tab (not administered)     And   polyethylene glycol/lytes (MIRALAX) PACKET 1 Packet (not administered)     And   magnesium hydroxide (MILK OF MAGNESIA) suspension 30 mL (not administered)     And   bisacodyl (DULCOLAX) suppository 10 mg (not administered)   NS infusion ( Intravenous New Bag 3/25/19 2762)   acetaminophen (TYLENOL) tablet 650 mg (not administered)   insulin regular (HUMULIN R) injection 2-9 Units (not administered)     And   glucose 4 g chewable tablet 16 g (not administered)     And   dextrose 50% (D50W) injection 25 mL (not administered)   amLODIPine (NORVASC) tablet 2.5 mg (not administered)   warfarin (COUMADIN) tablet 1 mg (not administered)     Followed by   warfarin (COUMADIN) tablet 3 mg (not administered)   oxymetazoline (AFRIN) 0.05 % nasal  spray 2 Spray (2 Sprays Nasal Patient/Family Admin 3/25/19 1124)   NS infusion 500 mL (0 mL Intravenous Stopped 3/25/19 0634)         COURSE & MEDICAL DECISION MAKING  Pertinent Labs & Imaging studies reviewed. (See chart for details)    1:26 PM Paged hospitalist as [facility] is capped.     1:49 PM I discussed the patient's case and the above findings with Dr. Patrick Jeffries (hospitalist) who agrees to admit the patient. He also would like that she be treated with 500 mL NS infusion.     Differential diagnosis includes, but not limited to:  Supratherapeutic INR, simple epistaxis, mass, arrhythmia, electrolyte abnormality    Vitals:    03/25/19 1500 03/25/19 1600 03/25/19 1630 03/25/19 1700   BP:       Pulse: 81 79 85 80   Resp: 16 16 16 16   Temp:       TempSrc:       SpO2: 94% 95% 97% 95%   Weight:             HYDRATION: Based on the patient's presentation of Other Hyponatremia the patient was given IV fluids. IV Hydration was used because oral hydration was not adequate alone. Upon recheck following hydration, the patient was Stable, repeat labs pending.    83-year-old female with history of atrial fibrillation on Coumadin presenting for epistaxis.  Multiple episodes this week that resolved with simple pressure, today lasting 1 hour.  On arrival, patient appears to have resolved her nosebleed on her own, given Afrin and now without any bleeding.  Exam is otherwise unremarkable, vital signs reassuring.  With dizziness associated, lab work sent.  INR slightly elevated however within therapeutic range.  Hemoglobin hematocrit stable.  Noted to have hyponatremia with sodium of 126, does not correct for low level hyperglycemia, previous labs show normal sodium levels.  Urine electrolytes sent, indicate euvolemic cause such as SIADH or possibly hypervolemic cause.  Patient however has no evidence of renal, liver, heart failure and no concerning medications for SIADH.  Patient without any neurological symptoms or  other symptoms of hypernatremia.  Consulted medicine for admission, recommended small fluid bolus in the meantime.  Patient hemodynamically stable and comfortable at time of admission.    DISPOSITION  Patient will be admitted to Dr. Patrick Jeffries in guarded condition.     FINAL IMPRESSION  Visit Diagnoses     ICD-10-CM   1. Epistaxis R04.0   2. Hyponatremia E87.1            Electronically signed by: Reggie Page MD, BENNETT 3/25/2019    This dictation has been created using voice recognition software and/or scribes. The accuracy of the dictation is limited by the abilities of the software and the expertise of the scribes. I expect there may be some errors of grammar and possibly content. I made every attempt to manually correct the errors within my dictation. However, errors related to voice recognition software and/or scribes may still exist and should be interpreted within the appropriate context.

## 2019-03-25 NOTE — ED NOTES
Med Rec completed per patient and med list (returned)  Allergies reviewed  No ORAL antibiotics in last 30 days    Pt takes 3 mg of Warfarin daily

## 2019-03-25 NOTE — PROGRESS NOTES
Inpatient Anticoagulation Service Note    Date: 3/25/2019  Reason for Anticoagulation: Atrial Fibrillation   Hemoglobin Value: 13  Hematocrit Value: 37  Lab Platelet Value: 223  Target INR: 2.0 to 3.0    INR from last 7 days     Date/Time INR Value    03/25/19 1058 (!)  3.36        Dose from last 7 days     Date/Time Dose (mg)    03/25/19 1600  1        Average Dose (mg):  (Home dose: 3 mg PO daily)  Significant Interactions: Not Applicable  Bridge Therapy: No     Comments: Patient being admitted for observation for hyponatremia. No new DDI. No bleeding. INBR high today, will give reduced dose today then restart home dose tomorrow. INR in AM.    Education Material Provided?: No (chronic therapy)  Pharmacist suggested discharge dosing: likely restart home dose of Warfarin 3 mg PO daily     Zeyad Duong, PharmD, BCPS

## 2019-03-26 DIAGNOSIS — E11.9 TYPE 2 DIABETES MELLITUS WITHOUT COMPLICATION, WITHOUT LONG-TERM CURRENT USE OF INSULIN (HCC): ICD-10-CM

## 2019-03-26 LAB
ANION GAP SERPL CALC-SCNC: 7 MMOL/L (ref 0–11.9)
BUN SERPL-MCNC: 13 MG/DL (ref 8–22)
CALCIUM SERPL-MCNC: 9.1 MG/DL (ref 8.5–10.5)
CHLORIDE SERPL-SCNC: 101 MMOL/L (ref 96–112)
CO2 SERPL-SCNC: 23 MMOL/L (ref 20–33)
CREAT SERPL-MCNC: 0.61 MG/DL (ref 0.5–1.4)
GLUCOSE BLD-MCNC: 103 MG/DL (ref 65–99)
GLUCOSE BLD-MCNC: 153 MG/DL (ref 65–99)
GLUCOSE BLD-MCNC: 162 MG/DL (ref 65–99)
GLUCOSE BLD-MCNC: 172 MG/DL (ref 65–99)
GLUCOSE SERPL-MCNC: 100 MG/DL (ref 65–99)
HCT VFR BLD AUTO: 39.2 % (ref 37–47)
HGB BLD-MCNC: 13.6 G/DL (ref 12–16)
INR PPP: 3.6 (ref 0.87–1.13)
POTASSIUM SERPL-SCNC: 4.4 MMOL/L (ref 3.6–5.5)
PROTHROMBIN TIME: 35.9 SEC (ref 12–14.6)
SODIUM SERPL-SCNC: 126 MMOL/L (ref 135–145)
SODIUM SERPL-SCNC: 129 MMOL/L (ref 135–145)
SODIUM SERPL-SCNC: 131 MMOL/L (ref 135–145)
SODIUM SERPL-SCNC: 133 MMOL/L (ref 135–145)

## 2019-03-26 PROCEDURE — 700102 HCHG RX REV CODE 250 W/ 637 OVERRIDE(OP): Performed by: HOSPITALIST

## 2019-03-26 PROCEDURE — A9270 NON-COVERED ITEM OR SERVICE: HCPCS | Performed by: HOSPITALIST

## 2019-03-26 PROCEDURE — 99226 PR SUBSEQUENT OBSERVATION CARE,LEVEL III: CPT | Performed by: INTERNAL MEDICINE

## 2019-03-26 PROCEDURE — 700105 HCHG RX REV CODE 258: Performed by: HOSPITALIST

## 2019-03-26 PROCEDURE — 85018 HEMOGLOBIN: CPT

## 2019-03-26 PROCEDURE — 84295 ASSAY OF SERUM SODIUM: CPT | Mod: 91

## 2019-03-26 PROCEDURE — 700102 HCHG RX REV CODE 250 W/ 637 OVERRIDE(OP): Performed by: INTERNAL MEDICINE

## 2019-03-26 PROCEDURE — 80048 BASIC METABOLIC PNL TOTAL CA: CPT

## 2019-03-26 PROCEDURE — 85014 HEMATOCRIT: CPT

## 2019-03-26 PROCEDURE — 36415 COLL VENOUS BLD VENIPUNCTURE: CPT

## 2019-03-26 PROCEDURE — G0378 HOSPITAL OBSERVATION PER HR: HCPCS

## 2019-03-26 PROCEDURE — 85610 PROTHROMBIN TIME: CPT

## 2019-03-26 PROCEDURE — 82962 GLUCOSE BLOOD TEST: CPT | Mod: 91

## 2019-03-26 PROCEDURE — A9270 NON-COVERED ITEM OR SERVICE: HCPCS | Performed by: INTERNAL MEDICINE

## 2019-03-26 RX ORDER — GABAPENTIN 100 MG/1
200 CAPSULE ORAL EVERY EVENING
Status: DISCONTINUED | OUTPATIENT
Start: 2019-03-26 | End: 2019-03-28 | Stop reason: HOSPADM

## 2019-03-26 RX ORDER — GABAPENTIN 100 MG/1
100 CAPSULE ORAL DAILY
Status: DISCONTINUED | OUTPATIENT
Start: 2019-03-27 | End: 2019-03-28 | Stop reason: HOSPADM

## 2019-03-26 RX ADMIN — SENNOSIDES AND DOCUSATE SODIUM 2 TABLET: 8.6; 5 TABLET ORAL at 09:41

## 2019-03-26 RX ADMIN — CARVEDILOL 25 MG: 25 TABLET, FILM COATED ORAL at 20:00

## 2019-03-26 RX ADMIN — DORZOLAMIDE HYDROCHLORIDE AND TIMOLOL MALEATE 1 DROP: 20; 5 SOLUTION/ DROPS OPHTHALMIC at 18:47

## 2019-03-26 RX ADMIN — AMLODIPINE BESYLATE 2.5 MG: 5 TABLET ORAL at 21:00

## 2019-03-26 RX ADMIN — AMLODIPINE BESYLATE 5 MG: 5 TABLET ORAL at 09:40

## 2019-03-26 RX ADMIN — TELMISARTAN 40 MG: 40 TABLET ORAL at 22:24

## 2019-03-26 RX ADMIN — LEVOTHYROXINE SODIUM 50 MCG: 50 TABLET ORAL at 07:54

## 2019-03-26 RX ADMIN — GABAPENTIN 200 MG: 100 CAPSULE ORAL at 21:00

## 2019-03-26 RX ADMIN — SODIUM CHLORIDE: 9 INJECTION, SOLUTION INTRAVENOUS at 10:52

## 2019-03-26 RX ADMIN — TELMISARTAN 40 MG: 40 TABLET ORAL at 09:43

## 2019-03-26 RX ADMIN — DORZOLAMIDE HYDROCHLORIDE AND TIMOLOL MALEATE 1 DROP: 20; 5 SOLUTION/ DROPS OPHTHALMIC at 09:41

## 2019-03-26 RX ADMIN — CARVEDILOL 25 MG: 25 TABLET, FILM COATED ORAL at 09:41

## 2019-03-26 RX ADMIN — FAMOTIDINE 20 MG: 20 TABLET ORAL at 09:41

## 2019-03-26 ASSESSMENT — ENCOUNTER SYMPTOMS
ABDOMINAL PAIN: 0
FALLS: 0
NAUSEA: 0
DIARRHEA: 0
TREMORS: 0
SHORTNESS OF BREATH: 0
WHEEZING: 0
VOMITING: 0
HEADACHES: 0
LOSS OF CONSCIOUSNESS: 0
SEIZURES: 0
EYE REDNESS: 0
CONSTIPATION: 0
WEAKNESS: 0
PALPITATIONS: 0
INSOMNIA: 0
EYE PAIN: 0
COUGH: 0
BLOOD IN STOOL: 0
HEMOPTYSIS: 0
CHILLS: 0
NERVOUS/ANXIOUS: 0
DIZZINESS: 0
FEVER: 0
FOCAL WEAKNESS: 0
MYALGIAS: 0

## 2019-03-26 NOTE — PROGRESS NOTES
Received bedside report from RN, pt care assumed, VSS, pt assessment complete. Pt AAOx4, pt  c/o doesn't pain at this time. No signs of acute distress noted at this time. POC discussed with pt and verbalizes no questions. Pt denies any additional needs at this time. Bed in lowest position, bed alarm on, pt educated on fall risk and verbalized understanding, call light within reach, hourly rounding initiated.

## 2019-03-26 NOTE — DISCHARGE PLANNING
Care Transition Team Assessment    Information Source  Orientation : Oriented x 4  Information Given By: Patient, Spouse, Relative  Informant's Name: Nick Regan dtr Christina  Who is responsible for making decisions for patient? : Patient         Elopement Risk  Legal Hold: No  Ambulatory or Self Mobile in Wheelchair: Yes  Disoriented: No  Psychiatric Symptoms: None  History of Wandering: No  Elopement this Admit: No  Vocalizing Wanting to Leave: No  Displays Behaviors, Body Language Wanting to Leave: No-Not at Risk for Elopement  Elopement Risk: Not at Risk for Elopement    Interdisciplinary Discharge Planning  Primary Care Physician: Dr. Mckeon  Lives with - Patient's Self Care Capacity: Spouse  Patient or legal guardian wants to designate a caregiver (see row info): No  Support Systems: Children, Spouse / Significant Other  Housing / Facility: [address]  Do You Take your Prescribed Medications Regularly: Yes  Able to Return to Previous ADL's: Other (unknown at this time)  Mobility Issues: Yes  Prior Services: None  Patient Expects to be Discharged to:: home  Assistance Needed: Unknown at this Time  Durable Medical Equipment: Walker, Home Oxygen  DME Provider / Phone: Preferred    Discharge Preparedness  What is your plan after discharge?: Home with help  What are your discharge supports?: Spouse  Prior Functional Level: Ambulatory, Independent with Activities of Daily Living, Independent with Medication Management, Uses Walker  Difficulity with ADLs: Walking    Functional Assesment  Prior Functional Level: Ambulatory, Independent with Activities of Daily Living, Independent with Medication Management, Uses Walker    Finances  Financial Barriers to Discharge: No  Prescription Coverage: Yes         Values / Beliefs / Concerns  Values / Beliefs Concerns : No         Domestic Abuse  Have you ever been the victim of abuse or violence?: No         Discharge Risks or Barriers  Discharge risks or barriers?:  No    Anticipated Discharge Information  Anticipated discharge disposition: Home, [facility]

## 2019-03-26 NOTE — PROGRESS NOTES
Pt transported from ED to floor on Zoll monitor. Pt is AOx4, denies pain, SOB, N/V, dizziness at this time. Pt ambulated to bed with steady gait. Pt placed on tele monitor, monitor room notified, pt is currently SR 80. Pt oriented to unit, POC updated and questions answered. Safety precautions in place and call light in reach. Will continue to monitor.

## 2019-03-26 NOTE — ASSESSMENT & PLAN NOTE
Resolved  We will start her on topical Afrin for 4 doses  We will start her on Ponaris nasal spray  ON 3/26, she still has recurrent epistaxis  Patient pretty anxious.  I called and consulted Dr. Rhoades ENT  I educated patient that excessive changing of packing will make the bleeding worse especially if INR elevated  Urged her to use the Afrin spray as she is declining.  Prefer to have INR 2-2.5.  On 3/27, Dr. Rhoades ENT to see. Since bleeding has stopped no indication for acute cautery.

## 2019-03-26 NOTE — PROGRESS NOTES
RN notified Dr. Oates that pt is getting more anxious. Pt c/o oozing nose bleed to both nostrils and choking down her throat. Pt still refusing Afrin stating that the ED MD doesn't want her to use it. Pt requesting to speak with Dr. Oates. MD Sincere, to stop changing the tissue paper in her nose to disrupt the clot, keeping holding pressure, and MD will consult with ENT.

## 2019-03-26 NOTE — PROGRESS NOTES
Inpatient Anticoagulation Service Note    Date: 3/26/2019  Reason for Anticoagulation: Atrial Fibrillation            Hemoglobin Value: 13.6  Hematocrit Value: 39.2  Lab Platelet Value: 223  Target INR: 2.0 to 3.0    INR from last 7 days     Date/Time INR Value    03/26/19 0224 (!)  3.6    03/25/19 1058 (!)  3.36        Dose from last 7 days     Date/Time Dose (mg)    03/26/19 0821  0    03/25/19 1600  0        Average Dose (mg):  (Home dose: 3 mg PO daily)  Significant Interactions: Not Applicable  Bridge Therapy: No    Reversal Agent Administered: Not Applicable  Comments: RN held dose of warfarin last night. Epistaxis has resolved. INR still trending up. Will hold dose tonight and check INR again in AM. No overt S/S bleeding noted. Will follow.    Plan:  Hold dose tonight, INR tomorrow  Education Material Provided?: No (chronic therapy)  Pharmacist suggested discharge dosing: likely home dose of 3 mg daily     Vanita Allen, PHARMd

## 2019-03-26 NOTE — PROGRESS NOTES
2 RN Skin check complete with DIAMOND Marin.  Devices in place none.  Skin assessed under devices N/A.  Confirmed pressure ulcers found on N/A.   New potential pressure ulcers noted on N/A.   The following interventions in place pillows in place for support and positioning.     Pt skin is intact. Bruise on left elbow and right wrist/forarm.

## 2019-03-26 NOTE — PROGRESS NOTES
MS at bedside for rounds. RN notified MD that pt is requesting to be DNR/DNI. MD states he will enter in orders.

## 2019-03-26 NOTE — PROGRESS NOTES
Dr. Oates's RN at bedside to speak with pt. Right nostril stopped bleeding. A layer of gauze placed under nose and secured with tape. Staff instructed pt to not changed it herself. The RN will retrieve a nose clamp for pt to use. Ice pack placed over nose again. Staff educated pt again on using Afrin to stop bleeding. Will continue to monitor.

## 2019-03-26 NOTE — ASSESSMENT & PLAN NOTE
Continue warfarin and monitor INR  On 3/26, currently supratherapeutic   Trend down to INR 2-2.5  On 3/27 discussed with Pharmacy., INR goal 1.8-2.5  Discussed with family

## 2019-03-26 NOTE — H&P
Hospital Medicine History & Physical Note    Date of Service  3/25/2019    Primary Care Physician  Ganesh Mckeon M.D.    Consultants  None    Code Status  Full    Chief Complaint  Epistaxis and dizziness    History of Presenting Illness  83 y.o. female who presented 3/25/2019 with history of atrial fibrillation on chronic anticoagulation with warfarin presented with epistaxis and dizziness.  She was noted to have low sodium of 126.  No loss of consciousness no vertigo.  She denies any fever or chills.  No melena or rectal bleeding.  No nausea vomiting no diarrhea.  She does not take any diuretics.  She reports that she drinks 3-4 glasses of water before each meal and is on a low-sodium diet for history of hypertension.  She has a known history of aortic stenosis.  She denies chest pain or loss of consciousness    Review of Systems  Review of Systems   All other systems reviewed and are negative.      Past Medical History   has a past medical history of AF (atrial fibrillation) (HCC); Arrhythmia; Backpain; Breast cancer (HCC); Breath shortness; Cancer ([facility]) (1993); CATARACT; Chronic anticoagulation (5/2/2012); Cough (5/2/2012); Dental disorder; Diabetes; Edema (5/2/2012); Glaucoma; Heart burn; Heart murmur; Heart valve disease; Hypertension; Hypothyroid; Oxygen deficiency; Paroxysmal atrial fibrillation (HCC); Sleep apnea; tia; and Unspecified hemorrhagic conditions.    Surgical History   has a past surgical history that includes lumpectomy; cholecystectomy; hysterectomy radical; pr radiation therapy plan simple; cataract phaco with iol (9/23/2013); cataract phaco with iol (10/21/2013); knee arthroscopy (Right, 5/21/2015); and meniscectomy (Right, 5/21/2015).     Family History  family history includes Heart Attack in her mother; Heart Disease in her father.     Social History   reports that she quit smoking about 55 years ago. Her smoking use included Cigarettes. She has a 1.25 pack-year smoking history.  She has never used smokeless tobacco. She reports that she drinks alcohol. She reports that she does not use drugs.    Allergies  Allergies   Allergen Reactions   • Darvon [Propoxyphene Hcl]      shock   • Iodine      Shock  - IV   • Latex      Swelling = hands with glove use   • Penicillins Anaphylaxis   • Propoxyphene Hcl Anaphylaxis   • Tetanus Antitoxin Myalgia   • Tetracycline Anaphylaxis       Medications  Prior to Admission Medications   Prescriptions Last Dose Informant Patient Reported? Taking?   CALCIUM 500 PO 3/25/2019 at AM Patient Yes No   Sig: Take 1 Tab by mouth every morning.   Glucosamine HCl (GLUCOSAMINE PO) 3/25/2019 at AM Patient Yes No   Sig: Take 1 Tab by mouth every morning. Pt unsure of dose   HYDROcodone-acetaminophen (NORCO) 5-325 MG Tab per tablet PRN at PRN Patient Yes Yes   Sig: Take 1 Tab by mouth every four hours as needed.   Polyethyl Glycol-Propyl Glycol (SYSTANE OP) PRN at PRN Patient Yes No   Si-2 Drops by Ophthalmic route 4 times a day as needed.   VITAMIN D PO 3/25/2019 at AM Patient Yes No   Sig: Take 2,000 Units by mouth every morning.   amLODIPine (NORVASC) 5 MG Tab 3/25/2019 at AM Patient Yes Yes   Sig: Take 2.5-5 mg by mouth 2 Times a Day. 5 mg in the AM  2.5 mg in the PM   carvedilol (COREG) 25 MG Tab 3/25/2019 at AM Patient No No   Sig: Take 1 Tab by mouth 2 times a day.   cloNIDine (CATAPRES) 0.1 MG Tab PRN at PRN Patient Yes Yes   Sig: Take 0.1 mg by mouth 2 times a day as needed.   docosahexanoic acid (OMEGA 3 FA) 1000 MG CAPS 3/25/2019 at AM Patient Yes No   Sig: Take 4,000 mg by mouth every morning.   dorzolamide-timolol (COSOPT) 22.3-6.8 MG/ML Solution 3/25/2019 at AM Patient Yes Yes   Sig: Place 1 Drop in both eyes 2 times a day.   famotidine (PEPCID) 20 MG Tab 3/25/2019 at AM Patient Yes Yes   Sig: Take 20 mg by mouth every day.   glipiZIDE SR (GLUCOTROL) 2.5 MG TABLET SR 24 HR 3/25/2019 at AM Patient Yes Yes   Sig: Take 2.5 mg by mouth every day.    levothyroxine (SYNTHROID) 50 MCG Tab 3/25/2019 at AM Patient No No   Sig: Take 1 Tab by mouth every morning.   metFORMIN (GLUCOPHAGE) 500 MG Tab 3/25/2019 at AM Patient No No   Sig: Take 1 Tab by mouth 3 times a day.   telmisartan (MICARDIS) 40 MG Tab 3/25/2019 at AM Patient No No   Sig: Take 1 Tab by mouth 2 Times a Day.   warfarin (COUMADIN) 3 MG TABS 3/24/2019 at PM Patient Yes No   Sig: Take 3 mg by mouth every day.      Facility-Administered Medications: None       Physical Exam  Temp:  [36.8 °C (98.2 °F)] 36.8 °C (98.2 °F)  Pulse:  [72-87] 80  Resp:  [16-18] 16  BP: (147)/(83) 147/83  SpO2:  [92 %-100 %] 95 %    Physical Exam   Constitutional: She is oriented to person, place, and time. She appears well-developed and well-nourished.   HENT:   Head: Normocephalic and atraumatic.   Right Ear: External ear normal.   Left Ear: External ear normal.   Mouth/Throat: No oropharyngeal exudate.   Eyes: Conjunctivae are normal. Right eye exhibits no discharge. Left eye exhibits no discharge. No scleral icterus.   Neck: Neck supple. No JVD present. No tracheal deviation present.   Cardiovascular: Normal rate and regular rhythm.  Exam reveals no gallop and no friction rub.    Murmur heard.  Pulmonary/Chest: Effort normal and breath sounds normal. No stridor. No respiratory distress. She has no wheezes. She has no rales. She exhibits no tenderness.   Abdominal: Soft. Bowel sounds are normal. She exhibits no distension and no mass. There is no tenderness. There is no rebound and no guarding.   Musculoskeletal: She exhibits no edema or tenderness.   Neurological: She is alert and oriented to person, place, and time. No cranial nerve deficit. She exhibits normal muscle tone.   Skin: Skin is warm and dry. She is not diaphoretic. No cyanosis. Nails show no clubbing.   Psychiatric: She has a normal mood and affect. Her behavior is normal. Thought content normal.   Nursing note and vitals reviewed.      Laboratory:  Recent Labs       03/25/19   1058   WBC  7.0   RBC  4.05*   HEMOGLOBIN  13.0   HEMATOCRIT  37.0   MCV  91.4   MCH  32.1   MCHC  35.1*   RDW  40.3   PLATELETCT  223   MPV  8.9*     Recent Labs      03/25/19   1058   SODIUM  126*   POTASSIUM  4.4   CHLORIDE  95*   CO2  23   GLUCOSE  205*   BUN  14   CREATININE  0.69   CALCIUM  9.0     Recent Labs      03/25/19   1058   GLUCOSE  205*     Recent Labs      03/25/19   1058   APTT  43.2*   INR  3.36*             Recent Labs      03/25/19   1058   TROPONINI  <0.01       Urinalysis:    Recent Labs      03/25/19   1225   SPECGRAVITY  1.008   GLUCOSEUR  250*   KETONES  Negative   NITRITE  Negative   LEUKESTERAS  Negative   WBCURINE  0-2   RBCURINE  5-10*   BACTERIA  Negative   EPITHELCELL  Negative        Imaging:  DX-CHEST-PORTABLE (1 VIEW)    (Results Pending)         Assessment/Plan:  I anticipate this patient is appropriate for observation status at this time.    * Hyponatremia   Assessment & Plan    Her dizziness could be related to her hyponatremia she otherwise has no symptoms  Likely SIADH  We will restrict her free fluids  We will start on low-dose normal saline and monitor her sodium levels  We will check chest x-ray   Monitor sodium levels to avoid rapid correction  Discussed with patient restricting her free fluid     HTN (hypertension)- (present on admission)   Assessment & Plan    Continue amlodipine and carvedilol and Micardis  Monitor blood pressure and adjust according     Chronic anticoagulation- (present on admission)   Assessment & Plan    Continue warfarin and monitor INR     Epistaxis   Assessment & Plan    Resolved  We will start her on topical Afrin for 4 doses  We will start her on Ponaris nasal spray     PAF (paroxysmal atrial fibrillation) (HCC)- (present on admission)   Assessment & Plan    Stable continue carvedilol and anticoagulation and adjust warfarin dose     Diabetes (HCC)- (present on admission)   Assessment & Plan    Continue glipizide  We will hold her  metformin  Sliding scale insulin monitor CBGs     Aortic stenosis- (present on admission)   Assessment & Plan    Chronic and asymptomatic         VTE prophylaxis: warfarin

## 2019-03-26 NOTE — PROGRESS NOTES
Pt refusing medications at this time. Pt states she will only take her medications right before breakfast. Pt offered snack along with medications now. Pt still declined.

## 2019-03-26 NOTE — PROGRESS NOTES
"RN notified Dr. Oates of pt's current nose bleed at this time. Pt described it as \"continuously oozing small amounts. I can't stop it completely.\" MD to see pt shortly.     "

## 2019-03-26 NOTE — ASSESSMENT & PLAN NOTE
Her dizziness could be related to her hyponatremia she otherwise has no symptoms  Likely SIADH  We will restrict her free fluids  We will start on low-dose normal saline and monitor her sodium levels  We will check chest x-ray   Monitor sodium levels to avoid rapid correction  Discussed with patient restricting her free fluid  On 3/26, hyponatremia is at mild category. Discussed with her to not drink too much free and hypotnic fluids (such as soda, which she admitted drinking excessively).

## 2019-03-26 NOTE — CARE PLAN
Problem: Communication  Goal: The ability to communicate needs accurately and effectively will improve  Outcome: PROGRESSING AS EXPECTED    Intervention: [place] patient and significant other/support system to call light to alert staff of needs   03/25/19 2108   OTHER   Oriented to: All of the Following : Location of Bathroom, Visiting Policy, Unit Routine, Call Light and Bedside Controls, Bedside Rail Policy, Smoking Policy, Rights and Responsibilities, Bedside Report, and Patient Education Notebook         Problem: Safety  Goal: Will remain free from falls  Outcome: PROGRESSING AS EXPECTED  Pt fall risk assessed and appropriate interventions in place. Pt educated on fall risk and precautions. Pt verbalized understanding.

## 2019-03-26 NOTE — THERAPY
Physical Therapy Contact Note    PT consult received. Per RN, patient up self but asked to hold PT eval for today given patient with nosebleed all morning and pending ENT consult. Will continue to monitor for appropriateness and re attempt as able.    Catrina Mccormick, PT, DPT  [phone]

## 2019-03-27 LAB
ANION GAP SERPL CALC-SCNC: 7 MMOL/L (ref 0–11.9)
BUN SERPL-MCNC: 14 MG/DL (ref 8–22)
CALCIUM SERPL-MCNC: 8.9 MG/DL (ref 8.5–10.5)
CHLORIDE SERPL-SCNC: 101 MMOL/L (ref 96–112)
CO2 SERPL-SCNC: 24 MMOL/L (ref 20–33)
CREAT SERPL-MCNC: 0.59 MG/DL (ref 0.5–1.4)
ERYTHROCYTE [DISTWIDTH] IN BLOOD BY AUTOMATED COUNT: 39.8 FL (ref 35.9–50)
GLUCOSE BLD-MCNC: 110 MG/DL (ref 65–99)
GLUCOSE BLD-MCNC: 138 MG/DL (ref 65–99)
GLUCOSE SERPL-MCNC: 136 MG/DL (ref 65–99)
HCT VFR BLD AUTO: 35.3 % (ref 37–47)
HGB BLD-MCNC: 12.2 G/DL (ref 12–16)
INR PPP: 1.82 (ref 0.87–1.13)
MCH RBC QN AUTO: 31.4 PG (ref 27–33)
MCHC RBC AUTO-ENTMCNC: 34.6 G/DL (ref 33.6–35)
MCV RBC AUTO: 90.7 FL (ref 81.4–97.8)
PLATELET # BLD AUTO: 225 K/UL (ref 164–446)
PMV BLD AUTO: 8.7 FL (ref 9–12.9)
POTASSIUM SERPL-SCNC: 4.3 MMOL/L (ref 3.6–5.5)
PROTHROMBIN TIME: 21.1 SEC (ref 12–14.6)
RBC # BLD AUTO: 3.89 M/UL (ref 4.2–5.4)
SODIUM SERPL-SCNC: 132 MMOL/L (ref 135–145)
WBC # BLD AUTO: 8.2 K/UL (ref 4.8–10.8)

## 2019-03-27 PROCEDURE — 770020 HCHG ROOM/CARE - TELE (206)

## 2019-03-27 PROCEDURE — 770006 HCHG ROOM/CARE - MED/SURG/GYN SEMI*

## 2019-03-27 PROCEDURE — 700102 HCHG RX REV CODE 250 W/ 637 OVERRIDE(OP): Performed by: INTERNAL MEDICINE

## 2019-03-27 PROCEDURE — 96372 THER/PROPH/DIAG INJ SC/IM: CPT

## 2019-03-27 PROCEDURE — 700102 HCHG RX REV CODE 250 W/ 637 OVERRIDE(OP): Performed by: HOSPITALIST

## 2019-03-27 PROCEDURE — 82962 GLUCOSE BLOOD TEST: CPT

## 2019-03-27 PROCEDURE — 80048 BASIC METABOLIC PNL TOTAL CA: CPT

## 2019-03-27 PROCEDURE — 36415 COLL VENOUS BLD VENIPUNCTURE: CPT

## 2019-03-27 PROCEDURE — A9270 NON-COVERED ITEM OR SERVICE: HCPCS | Performed by: HOSPITALIST

## 2019-03-27 PROCEDURE — 85610 PROTHROMBIN TIME: CPT

## 2019-03-27 PROCEDURE — 99232 SBSQ HOSP IP/OBS MODERATE 35: CPT | Performed by: INTERNAL MEDICINE

## 2019-03-27 PROCEDURE — A9270 NON-COVERED ITEM OR SERVICE: HCPCS | Performed by: INTERNAL MEDICINE

## 2019-03-27 PROCEDURE — 85027 COMPLETE CBC AUTOMATED: CPT

## 2019-03-27 PROCEDURE — 700105 HCHG RX REV CODE 258: Performed by: HOSPITALIST

## 2019-03-27 RX ORDER — WARFARIN SODIUM 3 MG/1
3 TABLET ORAL
Status: DISCONTINUED | OUTPATIENT
Start: 2019-03-27 | End: 2019-03-28 | Stop reason: HOSPADM

## 2019-03-27 RX ADMIN — LEVOTHYROXINE SODIUM 50 MCG: 50 TABLET ORAL at 10:19

## 2019-03-27 RX ADMIN — WARFARIN SODIUM 3 MG: 3 TABLET ORAL at 17:43

## 2019-03-27 RX ADMIN — CARVEDILOL 25 MG: 25 TABLET, FILM COATED ORAL at 10:19

## 2019-03-27 RX ADMIN — DORZOLAMIDE HYDROCHLORIDE AND TIMOLOL MALEATE 1 DROP: 20; 5 SOLUTION/ DROPS OPHTHALMIC at 10:20

## 2019-03-27 RX ADMIN — TELMISARTAN 40 MG: 40 TABLET ORAL at 18:38

## 2019-03-27 RX ADMIN — GABAPENTIN 100 MG: 100 CAPSULE ORAL at 10:21

## 2019-03-27 RX ADMIN — GLIPIZIDE 2.5 MG: 2.5 TABLET, EXTENDED RELEASE ORAL at 10:21

## 2019-03-27 RX ADMIN — TELMISARTAN 40 MG: 40 TABLET ORAL at 10:22

## 2019-03-27 RX ADMIN — GABAPENTIN 200 MG: 100 CAPSULE ORAL at 17:43

## 2019-03-27 RX ADMIN — AMLODIPINE BESYLATE 5 MG: 5 TABLET ORAL at 10:19

## 2019-03-27 RX ADMIN — INSULIN HUMAN 3 UNITS: 100 INJECTION, SOLUTION PARENTERAL at 13:04

## 2019-03-27 RX ADMIN — SODIUM CHLORIDE: 9 INJECTION, SOLUTION INTRAVENOUS at 07:53

## 2019-03-27 RX ADMIN — FAMOTIDINE 20 MG: 20 TABLET ORAL at 10:20

## 2019-03-27 RX ADMIN — DORZOLAMIDE HYDROCHLORIDE AND TIMOLOL MALEATE 1 DROP: 20; 5 SOLUTION/ DROPS OPHTHALMIC at 17:43

## 2019-03-27 ASSESSMENT — ENCOUNTER SYMPTOMS
NERVOUS/ANXIOUS: 0
TREMORS: 0
EYE PAIN: 0
BLOOD IN STOOL: 0
DIARRHEA: 0
CHILLS: 0
HEADACHES: 0
INSOMNIA: 0
NAUSEA: 0
DIZZINESS: 0
CONSTIPATION: 0
FOCAL WEAKNESS: 0
LOSS OF CONSCIOUSNESS: 0
WHEEZING: 0
PALPITATIONS: 0
ABDOMINAL PAIN: 0
MYALGIAS: 0
FALLS: 0
VOMITING: 0
SEIZURES: 0
FEVER: 0
SHORTNESS OF BREATH: 0
EYE REDNESS: 0
WEAKNESS: 0
COUGH: 0
HEMOPTYSIS: 0

## 2019-03-27 NOTE — CARE PLAN
Problem: Safety  Goal: Will remain free from injury    Intervention: Provide assistance with mobility  Help pt get up to bedside commode safely.      Problem: Infection  Goal: Will remain free from infection  Outcome: PROGRESSING AS EXPECTED      Problem: Knowledge Deficit  Goal: Knowledge of disease process/condition, treatment plan, diagnostic tests, and medications will improve  Outcome: PROGRESSING AS EXPECTED  Pt is aware that INR is high and that we are changing med doses to help lower that on its own without giving vitamin K

## 2019-03-27 NOTE — PROGRESS NOTES
Bedside report received from NOC RN. Pt resting comfortably, states no pain. POC was discussed with pt, call light within reach, educated when to call.

## 2019-03-27 NOTE — PROGRESS NOTES
Hospital Medicine Daily Progress Note    Date of Service  3/26/2019    Chief Complaint  83 y.o. female admitted 3/25/2019 with Nose Bleed    Hospital Course    History of atrial fibrillation on anticoagulation.   Presents with recurrent epistaxis.  Hb currently stable.  Supratherapeutic INR on labs  Dr. Rhoades, ENT consulted       Interval Problem Update  3/26. Anxious Keeps changing the packing herself. Feels she is swallowing mor eblood With the packing, bleeding is not profuse.  at bedside.    Consultants/Specialty  ENT    Code Status  Full    Disposition  Pretty functional for her age, likely home or [facility]    Review of Systems  Review of Systems   Constitutional: Negative for chills and fever.   HENT: Positive for nosebleeds. Negative for congestion and hearing loss.    Eyes: Negative for pain and redness.   Respiratory: Negative for cough, hemoptysis, shortness of breath and wheezing.    Cardiovascular: Negative for chest pain and palpitations.   Gastrointestinal: Negative for abdominal pain, blood in stool, constipation, diarrhea, nausea and vomiting.   Genitourinary: Negative for dysuria, frequency and hematuria.   Musculoskeletal: Negative for falls, joint pain and myalgias.   Skin: Negative for rash.   Neurological: Negative for dizziness, tremors, focal weakness, seizures, loss of consciousness, weakness and headaches.   Psychiatric/Behavioral: The patient is not nervous/anxious and does not have insomnia.    All other systems reviewed and are negative.       Physical Exam  Temp:  [36.3 °C (97.3 °F)-36.4 °C (97.6 °F)] 36.4 °C (97.5 °F)  Pulse:  [68-81] 81  Resp:  [17-18] 18  BP: (107-140)/(52-89) 140/89  SpO2:  [94 %-95 %] 94 %    Physical Exam   Constitutional: She appears well-developed.   HENT:   Head: Normocephalic and atraumatic.   Nasal packing in place, soaked with blood   Eyes: Conjunctivae are normal. No scleral icterus.   Neck: Normal range of motion. Neck supple.   Cardiovascular: Normal  rate and regular rhythm.  Exam reveals no gallop and no friction rub.    No murmur heard.  Currently sinus   Pulmonary/Chest: Effort normal and breath sounds normal. No respiratory distress. She has no wheezes. She has no rales.   Abdominal: Soft. Bowel sounds are normal. She exhibits no distension. There is no tenderness. There is no rebound and no guarding.   Musculoskeletal: She exhibits no edema or tenderness.   Neurological: She is alert.   Skin: Skin is warm.   Psychiatric: She has a normal mood and affect. Her behavior is normal.   Anxious       Fluids    Intake/Output Summary (Last 24 hours) at 03/26/19 2208  Last data filed at 03/26/19 1300   Gross per 24 hour   Intake              360 ml   Output              200 ml   Net              160 ml       Laboratory  Recent Labs      03/25/19   1058  03/26/19   0821   WBC  7.0   --    RBC  4.05*   --    HEMOGLOBIN  13.0  13.6   HEMATOCRIT  37.0  39.2   MCV  91.4   --    MCH  32.1   --    MCHC  35.1*   --    RDW  40.3   --    PLATELETCT  223   --    MPV  8.9*   --      Recent Labs      03/25/19   1058 03/26/19 0224 03/26/19   0652  03/26/19   1429   SODIUM  126*   < >  131*  133*  126*   POTASSIUM  4.4   --   4.4   --    --    CHLORIDE  95*   --   101   --    --    CO2  23   --   23   --    --    GLUCOSE  205*   --   100*   --    --    BUN  14   --   13   --    --    CREATININE  0.69   --   0.61   --    --    CALCIUM  9.0   --   9.1   --    --     < > = values in this interval not displayed.     Recent Labs      03/25/19 1058 03/26/19 0224   APTT  43.2*   --    INR  3.36*  3.60*               Imaging  DX-CHEST-PORTABLE (1 VIEW)   Final Result      No acute cardiopulmonary abnormality identified.           Assessment/Plan  * Epistaxis   Assessment & Plan    Resolved  We will start her on topical Afrin for 4 doses  We will start her on Ponaris nasal spray  ON 3/26, she still has recurrent epistaxis  Patient pretty anxious.  I called and consulted Dr. Rhoades  ENT  I educated patient that excessive changing of packing will make the bleeding worse especially if INR elevated  Urged her to use the Afrin spray as she is declining.  Prefer to have INR 2-2.5.     HTN (hypertension)- (present on admission)   Assessment & Plan    Continue amlodipine and carvedilol and Micardis  Monitor blood pressure and adjust according     Hemorrhagic disorder due to extrinsic circulating anticoagulants (HCC)- (present on admission)   Assessment & Plan    Continue warfarin and monitor INR  On 3/26, currently supratherapeutic   Trend down to INR 2-2.5     Hyponatremia   Assessment & Plan    Her dizziness could be related to her hyponatremia she otherwise has no symptoms  Likely SIADH  We will restrict her free fluids  We will start on low-dose normal saline and monitor her sodium levels  We will check chest x-ray   Monitor sodium levels to avoid rapid correction  Discussed with patient restricting her free fluid  On 3/26, hyponatremia is at mild category.     PAF (paroxysmal atrial fibrillation) (HCC)- (present on admission)   Assessment & Plan    Stable continue carvedilol and anticoagulation and adjust warfarin dose     Diabetes (HCC)- (present on admission)   Assessment & Plan    Continue glipizide  We will hold her metformin  Sliding scale insulin monitor CBGs     Aortic stenosis- (present on admission)   Assessment & Plan    Chronic and asymptomatic          VTE prophylaxis: SCDs, was on warfarin

## 2019-03-27 NOTE — DISCHARGE PLANNING
Per rounds this afternoon, pt will most likely be discharged tomorrow. MD states that he does not anticipate pt will have any needs.

## 2019-03-27 NOTE — THERAPY
Physical Therapy Contact Note    PT eval attempted. Per RN and patient, patient up self and steady without AD. Patient reported no concerns regarding mobility and no concerns regarding return home following medical clearance. All questions answered. No acute PT indicated.    Catrina Mccormick, PT, DPT  [phone]

## 2019-03-27 NOTE — CONSULTS
DATE OF SERVICE:  03/27/2019    HISTORY OF PRESENT ILLNESS:  The patient presented to the emergency department   last night with nasal epistaxis.  She was noted at that time to have an   elevated INR, so was admitted for further treatment of this.  She was clamped   in the emergency department and apparently was hemostatic from this and so, no   packing was placed.  I was called because she had some continued oozing last   night, but since then, she has had her INR brought back into the normal range.    She has not had any difficulty with bleeding since last night and is doing   well at this time.  She does have occasional difficulty with her INR   increasing and decreasing as well as a history of hypertension, but she has   not had nosebleed like this in the past.    PAST MEDICAL HISTORY:  1.  Atrial fibrillation.  2.  Back pain.  3.  Diabetes.  4.  Heart murmur.  5.  Hypertension.  6.  Hypothyroidism.  7.  Transient ischemic attack.  8.  Sleep apnea.    PAST SURGICAL HISTORY:  1.  Cholecystectomy.  2.  Hysterectomy.  3.  Orthopedic surgery on her knee.  4.  Cataract surgery.    SOCIAL HISTORY:  Quit smoking 55 years ago.  She does drink occasional ETOH.    ALLERGIES:  1.  DARVON.  2.  IODINE.  3.  LATEX.  4.  PENICILLIN.  5.  TETANUS.  6.  TETRACYCLINE.  7.  PROPOXYPHENE.    HOME MEDICATIONS:  1.  Calcium.  2.  Norco.  3.  Polyethylene glycol.  4.  Vitamin D.  5.  Amlodipine.  6.  Carvedilol.  7.  Clonidine.  8.  Cosopt.  9.  Pepcid.  10.  Levothyroxine.  11.  Metformin.  12.  Telmisartan.  13.  Coumadin.    PHYSICAL EXAMINATION:  VITAL SIGNS:  Patient is afebrile with stable vital signs.  GENERAL:  No acute distress, sitting in bed, talking with her .  HEAD:  Normocephalic, atraumatic.  EYES:  PERRLA, EOMI.  EARS:  External canals and pinna without drainage discount charge.  NOSE:  Nasal cavity has a small amount of crusting in the left nostril, but no   active epistaxis.  Oral cavity and oropharynx is  clear without any posterior   epistaxis.  NECK:  Without any masses or lymphadenopathy.  Trachea is in the midline.  RESPIRATORY:  Normal quiet respirations, no stridor, no stertor, normal voice.  ABDOMEN:  Soft, nontender, nondistended.  LYMPHATICS:  No pedal edema.  EXTREMITIES:  Moving all extremities well.    ASSESSMENT:  Left nasal epistaxis.  With elevated INR, responded well to   treatment with a current INR at 1.82.    PLAN:  Recommendation is for continued monitoring of INR.  If she does   continue with any epistaxis, I have offered my clinic in case cautery is   needed.  Otherwise, she should have represented to the emergency department.    If it is after hours, consideration for nasal packing.  I think as long as her   INR is in good condition and her blood pressure is controlled, she will   likely not have any significant events.       ____________________________________     MD GERALDO Tate / TEMITOPE    DD:  03/27/2019 16:05:07  DT:  03/27/2019 16:25:06    D#:  [id number]  Job#:  [id number]

## 2019-03-27 NOTE — PROGRESS NOTES
Pt sitting in bed with call light in hand. Knows to call for help when she needs the bedside commode. Pt anxious about nose bleed and how much blood she is losing. Nursing staff instructed to dress her nose and wait for it to clot. ENT also called for consult and waiting for them to round on the patient. INR 3.6 and will recheck on morning labs.

## 2019-03-27 NOTE — PROGRESS NOTES
Inpatient Anticoagulation Service Note    Date: 3/27/2019  Reason for Anticoagulation: Atrial Fibrillation            Hemoglobin Value: 12.2  Hematocrit Value: (!) 35.3  Lab Platelet Value: 225  Target INR: 2.0 to 3.0    INR from last 7 days     Date/Time INR Value    03/27/19 0641 (!)  1.82    03/26/19 0224 (!)  3.6    03/25/19 1058 (!)  3.36        Dose from last 7 days     Date/Time Dose (mg)    03/27/19 1100  3    03/26/19 0821  0    03/25/19 1600  0        Average Dose (mg):  (Home dose: 3 mg PO daily)  Significant Interactions: Not Applicable  Bridge Therapy: No     Reversal Agent Administered: Not Applicable  Comments: INR now slightly below therapeutic range. Okay to resume home dose. No further instances of epistaxis. Will continue to follow.    Plan:  3 mg tonight, INR tomorrow  Education Material Provided?: No (chronic therapy)  Pharmacist suggested discharge dosing: home dose of 3 mg daily     Vanita Allen, ABAD

## 2019-03-28 ENCOUNTER — PATIENT OUTREACH (OUTPATIENT)
Dept: HEALTH INFORMATION MANAGEMENT | Facility: OTHER | Age: 84
End: 2019-03-28

## 2019-03-28 ENCOUNTER — ANTICOAGULATION MONITORING (OUTPATIENT)
Dept: VASCULAR LAB | Facility: MEDICAL CENTER | Age: 84
End: 2019-03-28

## 2019-03-28 VITALS
SYSTOLIC BLOOD PRESSURE: 153 MMHG | OXYGEN SATURATION: 91 % | BODY MASS INDEX: 26.09 KG/M2 | DIASTOLIC BLOOD PRESSURE: 75 MMHG | HEIGHT: 63 IN | RESPIRATION RATE: 18 BRPM | WEIGHT: 147.27 LBS | TEMPERATURE: 97 F | HEART RATE: 94 BPM

## 2019-03-28 DIAGNOSIS — I48.0 PAF (PAROXYSMAL ATRIAL FIBRILLATION) (HCC): ICD-10-CM

## 2019-03-28 DIAGNOSIS — D68.32 HEMORRHAGIC DISORDER DUE TO EXTRINSIC CIRCULATING ANTICOAGULANTS (HCC): ICD-10-CM

## 2019-03-28 LAB
GLUCOSE BLD-MCNC: 140 MG/DL (ref 65–99)
INR PPP: 1.64 (ref 0.87–1.13)
PROTHROMBIN TIME: 19.5 SEC (ref 12–14.6)

## 2019-03-28 PROCEDURE — 99239 HOSP IP/OBS DSCHRG MGMT >30: CPT | Performed by: INTERNAL MEDICINE

## 2019-03-28 PROCEDURE — A9270 NON-COVERED ITEM OR SERVICE: HCPCS | Performed by: INTERNAL MEDICINE

## 2019-03-28 PROCEDURE — 82962 GLUCOSE BLOOD TEST: CPT

## 2019-03-28 PROCEDURE — A9270 NON-COVERED ITEM OR SERVICE: HCPCS | Performed by: HOSPITALIST

## 2019-03-28 PROCEDURE — 36415 COLL VENOUS BLD VENIPUNCTURE: CPT

## 2019-03-28 PROCEDURE — 85610 PROTHROMBIN TIME: CPT

## 2019-03-28 PROCEDURE — 700105 HCHG RX REV CODE 258: Performed by: HOSPITALIST

## 2019-03-28 PROCEDURE — 700102 HCHG RX REV CODE 250 W/ 637 OVERRIDE(OP): Performed by: INTERNAL MEDICINE

## 2019-03-28 PROCEDURE — 700102 HCHG RX REV CODE 250 W/ 637 OVERRIDE(OP): Performed by: HOSPITALIST

## 2019-03-28 RX ORDER — GABAPENTIN 100 MG/1
200 CAPSULE ORAL EVERY EVENING
Qty: 90 CAP | Refills: 0
Start: 2019-03-28 | End: 2020-10-07

## 2019-03-28 RX ORDER — GABAPENTIN 100 MG/1
100 CAPSULE ORAL DAILY
Qty: 90 CAP | Refills: 0
Start: 2019-03-28 | End: 2021-04-04 | Stop reason: SDUPTHER

## 2019-03-28 RX ORDER — EUCALYPTUS/PEPPERMINT OIL
10 SOLUTION, NON-ORAL NASAL 3 TIMES DAILY
COMMUNITY
Start: 2019-03-28 | End: 2019-04-01

## 2019-03-28 RX ORDER — POLYETHYLENE GLYCOL 3350 17 G/17G
POWDER, FOR SOLUTION ORAL
Refills: 3 | COMMUNITY
Start: 2019-03-28 | End: 2019-04-01

## 2019-03-28 RX ORDER — ACETAMINOPHEN 325 MG/1
650 TABLET ORAL EVERY 6 HOURS PRN
Qty: 30 TAB | Refills: 0 | Status: SHIPPED | OUTPATIENT
Start: 2019-03-28 | End: 2021-04-04

## 2019-03-28 RX ORDER — AMOXICILLIN 250 MG
2 CAPSULE ORAL 2 TIMES DAILY
Qty: 30 TAB | Refills: 0 | Status: SHIPPED | OUTPATIENT
Start: 2019-03-28 | End: 2019-04-01

## 2019-03-28 RX ADMIN — GLIPIZIDE 2.5 MG: 2.5 TABLET, EXTENDED RELEASE ORAL at 09:27

## 2019-03-28 RX ADMIN — SODIUM CHLORIDE: 9 INJECTION, SOLUTION INTRAVENOUS at 04:33

## 2019-03-28 RX ADMIN — FAMOTIDINE 20 MG: 20 TABLET ORAL at 09:28

## 2019-03-28 RX ADMIN — TELMISARTAN 40 MG: 40 TABLET ORAL at 09:27

## 2019-03-28 RX ADMIN — AMLODIPINE BESYLATE 5 MG: 5 TABLET ORAL at 09:28

## 2019-03-28 RX ADMIN — DORZOLAMIDE HYDROCHLORIDE AND TIMOLOL MALEATE 1 DROP: 20; 5 SOLUTION/ DROPS OPHTHALMIC at 09:29

## 2019-03-28 RX ADMIN — GABAPENTIN 100 MG: 100 CAPSULE ORAL at 09:27

## 2019-03-28 RX ADMIN — CARVEDILOL 25 MG: 25 TABLET, FILM COATED ORAL at 09:29

## 2019-03-28 RX ADMIN — LEVOTHYROXINE SODIUM 50 MCG: 50 TABLET ORAL at 07:04

## 2019-03-28 NOTE — DISCHARGE SUMMARY
Discharge Summary    CHIEF COMPLAINT ON ADMISSION  Chief Complaint   Patient presents with   • Nose Bleed       Reason for Admission  Nose Bleed     Admission Date  3/25/2019    CODE STATUS  Full Code    HPI & HOSPITAL COURSE  This is a 83 y.o. female here with Nose Bleed      History of atrial fibrillation on anticoagulation.   Presents with recurrent epistaxis.  Hb currently stable.  Supratherapeutic INR on labs  Dr. Rhoades, ENT consulted     Please review Dr. Patrick Jin. notes for further details of history of present illness, past medical/social/family histories, allergies and medications. Please review Dr. Rhoades consultation notes.    Nasal packing was done. Her INR trended down to therapeutic range. She was restarted on anticoagulation with INR target 2-2.5. She no longer had epistaxis. Dr. Rhoades, ENT came by and recommended follow up.     Blood pressures remained stable. Advise Shereen Dale to check blood pressure at home at least twice a day and have a log for primary provider to review. Hyponatremia is mild and this can be followed up by primary provider. Continue her glipizide and restart metformin. Advise Shereen Dale to check blood glucoses at home and have a log for primary provider to review    At discharge date, Shereen Dale afebrile and hemodynamically stable.  Shereen Vanessa Wattsa wanted to be discharged today.    Discharge Physical Exam  Constitutional: She appears well-developed.   HENT:   Head: Normocephalic and atraumatic.   Nasal packing in place, no bleeding.  Eyes: Conjunctivae are normal. No scleral icterus.   Neck: Normal range of motion. Neck supple.   Cardiovascular: Normal rate and regular rhythm.  Exam reveals no gallop and no friction rub.    No murmur heard.  Currently sinus   Pulmonary/Chest: Effort normal and breath sounds normal. No respiratory distress. She has no wheezes. She has no rales.   Abdominal: Soft. Bowel sounds are normal. She exhibits no distension. There is  no tenderness. There is no rebound and no guarding.   Musculoskeletal: She exhibits no edema or tenderness.   Neurological: She is alert.   Skin: Skin is warm.   Psychiatric: She has a normal mood and affect. Her behavior is normal.        Imaging  DX-CHEST-PORTABLE (1 VIEW)   Final Result      No acute cardiopulmonary abnormality identified.                Therefore, she is discharged in good and stable condition to home with close outpatient follow-up.    The patient met 2-midnight criteria for an inpatient stay at the time of discharge.    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 [facility] None   6/12/2019 2:00 PM Ganesh Mckeon M.D. [address]     No follow-up provider specified.  Follow up to Ganesh Mckeon M.D. In 1 week  Follow up with Dr. Rhoades, ENT if nosebleeds recur... PRN  Follow up to Dr. Mabry in 1 week  Arrange for her to see anticoagulation clinic  MEDICATIONS ON DISCHARGE     Medication List      START taking these medications      Instructions   acetaminophen 325 MG Tabs  Commonly known as:  TYLENOL   Take 2 Tabs by mouth every 6 hours as needed (Mild Pain; (Pain scale 1-3); Temp greater than 100.5 F).  Dose:  650 mg     eucalyptus/peppermint oil Soln   Spray 10 Drops in nose 3 times a day.  Dose:  10 Drop     * gabapentin 100 MG Caps  Commonly known as:  NEURONTIN   Doctor's comments:  This is a home med that she has per patient.  Take 1 Cap by mouth every day.  Dose:  100 mg      * gabapentin 100 MG Caps  Commonly known as:  NEURONTIN   Doctor's comments:  This is a home med that she has per patient  Take 2 Caps by mouth every evening.  Dose:  200 mg     polyethylene glycol/lytes Pack  Commonly known as:  MIRALAX   Take  by mouth 1 time daily as needed (if sennosides and docusate ineffective after 24 hours).     senna-docusate 8.6-50 MG Tabs  Commonly known as:  PERICOLACE or SENOKOT S   Doctor's comments:  Stop if diarrhea  Take 2 Tabs by mouth 2 Times a Day.  Dose:  2 Tab        * This list has 2 medication(s) that are the same as other medications prescribed for you. Read the directions carefully, and ask your doctor or other care provider to review them with you.            CHANGE how you take these medications      Instructions   Blood Glucose Monitoring Suppl Teresa  What changed:  additional instructions   Meter: Dispense free style meter. Sig. Use 2 times daily as directed for blood sugar monitoring. dx e11.9        CONTINUE taking these medications      Instructions   amLODIPine 5 MG Tabs  Commonly known as:  NORVASC   Take 2.5-5 mg by mouth 2 Times a Day. 5 mg in the AM 2.5 mg in the PM  Dose:  2.5-5 mg     CALCIUM 500 PO   Take 1 Tab by mouth every morning.  Dose:  1 Tab     carvedilol 25 MG Tabs  Commonly known as:  COREG   Doctor's comments:  Requesting a 90 day supply  Take 1 Tab by mouth 2 times a day.  Dose:  25 mg     cloNIDine 0.1 MG Tabs  Commonly known as:  CATAPRES   Take 0.1 mg by mouth 2 times a day as needed.  Dose:  0.1 mg     docosahexanoic acid 1000 MG Caps  Commonly known as:  OMEGA 3 FA   Take 4,000 mg by mouth every morning.  Dose:  4000 mg     dorzolamide-timolol 22.3-6.8 MG/ML Soln  Commonly known as:  COSOPT   Place 1 Drop in both eyes 2 times a day.  Dose:  1 Drop     famotidine 20 MG Tabs  Commonly known as:  PEPCID   Take 20 mg by mouth every day.  Dose:  20 mg     glipiZIDE SR 2.5 MG Tb24  Commonly known as:  GLUCOTROL   Take 2.5 mg by mouth every  day.  Dose:  2.5 mg     GLUCOSAMINE PO   Take 1 Tab by mouth every morning. Pt unsure of dose  Dose:  1 Tab     HYDROcodone-acetaminophen 5-325 MG Tabs per tablet  Commonly known as:  NORCO   Take 1 Tab by mouth every four hours as needed.  Dose:  1 Tab     levothyroxine 50 MCG Tabs  Commonly known as:  SYNTHROID   Take 1 Tab by mouth every morning.  Dose:  50 mcg     metFORMIN 500 MG Tabs  Commonly known as:  GLUCOPHAGE   Take 1 Tab by mouth 3 times a day.  Dose:  500 mg     SYSTANE OP   1-2 Drops by Ophthalmic route 4 times a day as needed.  Dose:  1-2 Drop     telmisartan 40 MG Tabs  Commonly known as:  MICARDIS   Take 1 Tab by mouth 2 Times a Day.  Dose:  40 mg     VITAMIN D PO   Take 2,000 Units by mouth every morning.  Dose:  2000 Units     warfarin 3 MG Tabs  Commonly known as:  COUMADIN   Take 3 mg by mouth every day.  Dose:  3 mg            Allergies  Allergies   Allergen Reactions   • Darvon [Propoxyphene Hcl]      shock   • Iodine      Shock  - IV   • Latex      Swelling = hands with glove use   • Penicillins Anaphylaxis   • Propoxyphene Hcl Anaphylaxis   • Tetanus Antitoxin Myalgia   • Tetracycline Anaphylaxis       DIET  Orders Placed This Encounter   Procedures   • Diet Order Diabetic     Standing Status:   Standing     Number of Occurrences:   1     Order Specific Question:   Diet:     Answer:   Diabetic [3]     Order Specific Question:   Consistency/Fluid modifications:     Answer:   1800 ml Fluid Restriction [10]       ACTIVITY  No heavy lifting or strenuous activity.    CONSULTATIONS  ENT    PROCEDURES  Dx-chest-portable (1 View)    Result Date: 3/25/2019  3/25/2019 7:16 PM HISTORY/REASON FOR EXAM:  Hyponatremia. Dizziness TECHNIQUE/EXAM DESCRIPTION AND NUMBER OF VIEWS: Single portable view of the chest. COMPARISON: 1 view chest 11/19/2017 FINDINGS: The lungs are clear. Cardiac silhouette: enlarged. Pleura: There are no pleural effusion or pneumothoraces. Osseous structures: No significant bony  abnormality is present.     No acute cardiopulmonary abnormality identified.      LABORATORY  Lab Results   Component Value Date    SODIUM 132 (L) 03/27/2019    POTASSIUM 4.3 03/27/2019    CHLORIDE 101 03/27/2019    CO2 24 03/27/2019    GLUCOSE 136 (H) 03/27/2019    BUN 14 03/27/2019    CREATININE 0.59 03/27/2019    CREATININE 0.7 08/02/2008        Lab Results   Component Value Date    WBC 8.2 03/27/2019    HEMOGLOBIN 12.2 03/27/2019    HEMATOCRIT 35.3 (L) 03/27/2019    PLATELETCT 225 03/27/2019        Total time of the discharge process exceeds 30 minutes.

## 2019-03-28 NOTE — CARE PLAN
Problem: Communication  Goal: The ability to communicate needs accurately and effectively will improve    Intervention: Educate patient and significant other/support system about the plan of care, procedures, treatments, medications and allow for questions  Pt aware of plan of care for the day.       Problem: Infection  Goal: Will remain free from infection    Intervention: Implement standard precautions and perform hand washing before and after patient contact  Standard precautions in place.

## 2019-03-28 NOTE — PROGRESS NOTES
Hospital Medicine Daily Progress Note    Date of Service  3/27/2019    Chief Complaint  83 y.o. female admitted 3/25/2019 with Nose Bleed    Hospital Course    History of atrial fibrillation on anticoagulation.   Presents with recurrent epistaxis.  Hb currently stable.  Supratherapeutic INR on labs  Dr. Rhoades, ENT consulted       Interval Problem Update  3/26. Anxious Keeps changing the packing herself. Feels she is swallowing mor eblood With the packing, bleeding is not profuse.  at bedside.  3/27. Bleeding has stopped. She however wants to see Dr. Rhoades ENT.     Consultants/Specialty  ENT    Code Status  Full    Disposition  Pretty functional for her age, likely home    Review of Systems  Review of Systems   Constitutional: Negative for chills and fever.   HENT: Positive for nosebleeds. Negative for congestion and hearing loss.    Eyes: Negative for pain and redness.   Respiratory: Negative for cough, hemoptysis, shortness of breath and wheezing.    Cardiovascular: Negative for chest pain and palpitations.   Gastrointestinal: Negative for abdominal pain, blood in stool, constipation, diarrhea, nausea and vomiting.   Genitourinary: Negative for dysuria, frequency and hematuria.   Musculoskeletal: Negative for falls, joint pain and myalgias.   Skin: Negative for rash.   Neurological: Negative for dizziness, tremors, focal weakness, seizures, loss of consciousness, weakness and headaches.   Psychiatric/Behavioral: The patient is not nervous/anxious and does not have insomnia.    All other systems reviewed and are negative.       Physical Exam  Temp:  [35.9 °C (96.6 °F)-36.6 °C (97.8 °F)] 36.6 °C (97.8 °F)  Pulse:  [72-85] 74  Resp:  [18] 18  BP: (102-148)/(51-82) 109/59  SpO2:  [92 %-97 %] 97 %    Physical Exam   Constitutional: She appears well-developed.   HENT:   Head: Normocephalic and atraumatic.   Nasal packing in place, no bleeding, less soaked.   Eyes: Conjunctivae are normal. No scleral icterus.    Neck: Normal range of motion. Neck supple.   Cardiovascular: Normal rate and regular rhythm.  Exam reveals no gallop and no friction rub.    No murmur heard.  Currently sinus   Pulmonary/Chest: Effort normal and breath sounds normal. No respiratory distress. She has no wheezes. She has no rales.   Abdominal: Soft. Bowel sounds are normal. She exhibits no distension. There is no tenderness. There is no rebound and no guarding.   Musculoskeletal: She exhibits no edema or tenderness.   Neurological: She is alert.   Skin: Skin is warm.   Psychiatric: She has a normal mood and affect. Her behavior is normal.   Less anxious       Fluids    Intake/Output Summary (Last 24 hours) at 03/27/19 1916  Last data filed at 03/27/19 1657   Gross per 24 hour   Intake           2312.5 ml   Output              750 ml   Net           1562.5 ml       Laboratory  Recent Labs      03/25/19   1058  03/26/19   0821  03/27/19   0641   WBC  7.0   --   8.2   RBC  4.05*   --   3.89*   HEMOGLOBIN  13.0  13.6  12.2   HEMATOCRIT  37.0  39.2  35.3*   MCV  91.4   --   90.7   MCH  32.1   --   31.4   MCHC  35.1*   --   34.6   RDW  40.3   --   39.8   PLATELETCT  223   --   225   MPV  8.9*   --   8.7*     Recent Labs      03/25/19   1058   03/26/19   0224   03/26/19   1429  03/26/19   2218  03/27/19   0641   SODIUM  126*   < >  131*   < >  126*  129*  132*   POTASSIUM  4.4   --   4.4   --    --    --   4.3   CHLORIDE  95*   --   101   --    --    --   101   CO2  23   --   23   --    --    --   24   GLUCOSE  205*   --   100*   --    --    --   136*   BUN  14   --   13   --    --    --   14   CREATININE  0.69   --   0.61   --    --    --   0.59   CALCIUM  9.0   --   9.1   --    --    --   8.9    < > = values in this interval not displayed.     Recent Labs      03/25/19   1058  03/26/19   0224 03/27/19   0641   APTT  43.2*   --    --    INR  3.36*  3.60*  1.82*               Imaging  DX-CHEST-PORTABLE (1 VIEW)   Final Result      No acute cardiopulmonary  abnormality identified.           Assessment/Plan  * Epistaxis   Assessment & Plan    Resolved  We will start her on topical Afrin for 4 doses  We will start her on Ponaris nasal spray  ON 3/26, she still has recurrent epistaxis  Patient pretty anxious.  I called and consulted Dr. Rhoades ENT  I educated patient that excessive changing of packing will make the bleeding worse especially if INR elevated  Urged her to use the Afrin spray as she is declining.  Prefer to have INR 2-2.5.  On 3/27, Dr. Rhoades ENT to see. Since bleeding has stopped no indication for acute cautery.     HTN (hypertension)- (present on admission)   Assessment & Plan    Continue amlodipine and carvedilol and Micardis  Monitor blood pressure and adjust according     Hemorrhagic disorder due to extrinsic circulating anticoagulants (HCC)- (present on admission)   Assessment & Plan    Continue warfarin and monitor INR  On 3/26, currently supratherapeutic   Trend down to INR 2-2.5  On 3/27 discussed with Pharmacy., INR goal 1.8-2.5  Discussed with family     Hyponatremia   Assessment & Plan    Her dizziness could be related to her hyponatremia she otherwise has no symptoms  Likely SIADH  We will restrict her free fluids  We will start on low-dose normal saline and monitor her sodium levels  We will check chest x-ray   Monitor sodium levels to avoid rapid correction  Discussed with patient restricting her free fluid  On 3/26, hyponatremia is at mild category. Discussed with her to not drink too much free and hypotnic fluids (such as soda, which she admitted drinking excessively).     PAF (paroxysmal atrial fibrillation) (HCC)- (present on admission)   Assessment & Plan    Stable continue carvedilol and anticoagulation and adjust warfarin dose     Diabetes (HCC)- (present on admission)   Assessment & Plan    Continue glipizide  We will hold her metformin  Sliding scale insulin monitor CBGs     Aortic stenosis- (present on admission)   Assessment & Plan     Chronic and asymptomatic          VTE prophylaxis: SCDs, was on warfarin  Reviewed vitals, labs, imaging, staff notes.  Discussed assessment and plan with Shereen Dale  Discussed with Dr. Rhoades, RN, SW.

## 2019-03-28 NOTE — PROGRESS NOTES
Pt discharged to home with daughter. Discharge instructions reviewed and signed. Pt escorted out in wheelchair with RNs.

## 2019-03-28 NOTE — CARE PLAN
Problem: Communication  Goal: The ability to communicate needs accurately and effectively will improve    Intervention: Reorient patient to environment as needed   03/28/19 0137   OTHER   Oriented to: All of the Following : Location of Bathroom, Visiting Policy, Unit Routine, Call Light and Bedside Controls, Bedside Rail Policy, Smoking Policy, Rights and Responsibilities, Bedside Report, and Patient Education Notebook         Problem: Bowel/Gastric:  Goal: Normal bowel function is maintained or improved  Outcome: PROGRESSING AS EXPECTED   03/27/19 6973   OTHER   Last BM 03/26/19   Number of Times Stooled 1   Pt reports no issues with bowel function. Normoactive bowel sounds heard in all four quadrants.

## 2019-03-28 NOTE — DISCHARGE INSTRUCTIONS
Discharge Instructions    Discharged to home by car with relative. Discharged via wheelchair, hospital escort: Yes.  Special equipment needed: Not Applicable    Be sure to schedule a follow-up appointment with your primary care doctor or any specialists as instructed.     Discharge Plan:   Diet Plan: Discussed  Activity Level: Discussed  Confirmed Follow up Appointment: Appointment Scheduled  Confirmed Symptoms Management: Discussed  Medication Reconciliation Updated: Yes  Influenza Vaccine Indication: Not indicated: Previously immunized this influenza season and > 8 years of age    I understand that a diet low in cholesterol, fat, and sodium is recommended for good health. Unless I have been given specific instructions below for another diet, I accept this instruction as my diet prescription.   Other diet: heart healthy    Special Instructions: None    · Is patient discharged on Warfarin / Coumadin?   Yes    You are receiving the drug warfarin. Please understand the importance of monitoring warfarin with scheduled PT/INR blood draws.  Follow-up with the [facility] in one week for INR lab.    IMPORTANT: HOW TO USE THIS INFORMATION:  This is a summary and does NOT have all possible information about this product. This information does not assure that this product is safe, effective, or appropriate for you. This information is not individual medical advice and does not substitute for the advice of your health care professional. Always ask your health care professional for complete information about this product and your specific health needs.      WARFARIN - ORAL (WARF-uh-rin)      COMMON BRAND NAME(S): Coumadin      WARNING:  Warfarin can cause very serious (possibly fatal) bleeding. This is more likely to occur when you first start taking this medication or if you take too much warfarin. To decrease your risk for bleeding, your doctor or other health care provider will monitor you closely and check your lab  "results (INR test) to make sure you are not taking too much warfarin. Keep all medical and laboratory appointments. Tell your doctor right away if you notice any signs of serious bleeding. See also Side Effects section.      USES:  This medication is used to treat blood clots (such as in deep vein thrombosis-DVT or pulmonary embolus-PE) and/or to prevent new clots from forming in your body. Preventing harmful blood clots helps to reduce the risk of a stroke or heart attack. Conditions that increase your risk of developing blood clots include a certain type of irregular heart rhythm (atrial fibrillation), heart valve replacement, recent heart attack, and certain surgeries (such as hip/knee replacement). Warfarin is commonly called a \"blood thinner,\" but the more correct term is \"anticoagulant.\" It helps to keep blood flowing smoothly in your body by decreasing the amount of certain substances (clotting proteins) in your blood.      HOW TO USE:  Read the Medication Guide provided by your pharmacist before you start taking warfarin and each time you get a refill. If you have any questions, ask your doctor or pharmacist. Take this medication by mouth with or without food as directed by your doctor or other health care professional, usually once a day. It is very important to take it exactly as directed. Do not increase the dose, take it more frequently, or stop using it unless directed by your doctor. Dosage is based on your medical condition, laboratory tests (such as INR), and response to treatment. Your doctor or other health care provider will monitor you closely while you are taking this medication to determine the right dose for you. Use this medication regularly to get the most benefit from it. To help you remember, take it at the same time each day. It is important to eat a balanced, consistent diet while taking warfarin. Some foods can affect how warfarin works in your body and may affect your treatment and " dose. Avoid sudden large increases or decreases in your intake of foods high in vitamin K (such as broccoli, cauliflower, cabbage, brussels sprouts, kale, spinach, and other green leafy vegetables, liver, green tea, certain vitamin supplements). If you are trying to lose weight, check with your doctor before you try to go on a diet. Cranberry products may also affect how your warfarin works. Limit the amount of cranberry juice (16 ounces/480 milliliters a day) or other cranberry products you may drink or eat.      SIDE EFFECTS:  Nausea, loss of appetite, or stomach/abdominal pain may occur. If any of these effects persist or worsen, tell your doctor or pharmacist promptly. Remember that your doctor has prescribed this medication because he or she has judged that the benefit to you is greater than the risk of side effects. Many people using this medication do not have serious side effects. This medication can cause serious bleeding if it affects your blood clotting proteins too much (shown by unusually high INR lab results). Even if your doctor stops your medication, this risk of bleeding can continue for up to a week. Tell your doctor right away if you have any signs of serious bleeding, including: unusual pain/swelling/discomfort, unusual/easy bruising, prolonged bleeding from cuts or gums, persistent/frequent nosebleeds, unusually heavy/prolonged menstrual flow, pink/dark urine, coughing up blood, vomit that is bloody or looks like coffee grounds, severe headache, dizziness/fainting, unusual or persistent tiredness/weakness, bloody/black/tarry stools, chest pain, shortness of breath, difficulty swallowing. Tell your doctor right away if any of these unlikely but serious side effects occur: persistent nausea/vomiting, severe stomach/abdominal pain, yellowing eyes/skin. This drug rarely has caused very serious (possibly fatal) problems if its effects lead to small blood clots (usually at the beginning of treatment).  This can lead to severe skin/tissue damage that may require surgery or amputation if left untreated. Patients with certain blood conditions (protein C or S deficiency) may be at greater risk. Get medical help right away if any of these rare but serious side effects occur: painful/red/purplish patches on the skin (such as on the toe, breast, abdomen), change in the amount of urine, vision changes, confusion, slurred speech, weakness on one side of the body. A very serious allergic reaction to this drug is rare. However, get medical help right away if you notice any symptoms of a serious allergic reaction, including: rash, itching/swelling (especially of the face/tongue/throat), severe dizziness, trouble breathing. This is not a complete list of possible side effects. If you notice other effects not listed above, contact your doctor or pharmacist. In the [place] - Call your doctor for medical advice about side effects. You may report side effects to FDA at 5-847-AGR-5860. In [place] - Call your doctor for medical advice about side effects. You may report side effects to [facility] [place] at [phone].      PRECAUTIONS:  Before taking warfarin, tell your doctor or pharmacist if you are allergic to it; or if you have any other allergies. This product may contain inactive ingredients, which can cause allergic reactions or other problems. Talk to your pharmacist for more details. Before using this medication, tell your doctor or pharmacist your medical history, especially of: blood disorders (such as anemia, hemophilia), bleeding problems (such as bleeding of the stomach/intestines, bleeding in the brain), blood vessel disorders (such as aneurysms), recent major injury/surgery, liver disease, alcohol use, mental/mood disorders (including memory problems), frequent falls/injuries. It is important that all your doctors and dentists know that you take warfarin. Before having surgery or any medical/dental procedures, tell your  doctor or dentist that you are taking this medication and about all the products you use (including prescription drugs, nonprescription drugs, and herbal products). Avoid getting injections into the muscles. If you must have an injection into a muscle (for example, a flu shot), it should be given in the arm. This way, it will be easier to check for bleeding and/or apply pressure bandages. This medication may cause stomach bleeding. Daily use of alcohol while using this medicine will increase your risk for stomach bleeding and may also affect how this medication works. Limit or avoid alcoholic beverages. If you have not been eating well, if you have an illness or infection that causes fever, vomiting, or diarrhea for more than 2 days, or if you start using any antibiotic medications, contact your doctor or pharmacist immediately because these conditions can affect how warfarin works. This medication can cause heavy bleeding. To lower the chance of getting cut, bruised, or injured, use great caution with sharp objects like safety razors and nail cutters. Use an electric razor when shaving and a soft toothbrush when brushing your teeth. Avoid activities such as contact sports. If you fall or injure yourself, especially if you hit your head, call your doctor immediately. Your doctor may need to check you. The Food & Drug Administration has stated that generic warfarin products are interchangeable. However, consult your doctor or pharmacist before switching warfarin products. Be careful not to take more than one medication that contains warfarin unless specifically directed by the doctor or health care provider who is monitoring your warfarin treatment. Older adults may be at greater risk for bleeding while using this drug. This medication is not recommended for use during pregnancy because of serious (possibly fatal) harm to an unborn baby. Discuss the use of reliable forms of birth control with your doctor. If you  "become pregnant or think you may be pregnant, tell your doctor immediately. If you are planning pregnancy, discuss a plan for managing your condition with your doctor before you become pregnant. Your doctor may switch the type of medication you use during pregnancy. Very small amounts of this medication may pass into breast milk but is unlikely to harm a nursing infant. Consult your doctor before breast-feeding.      DRUG INTERACTIONS:  Drug interactions may change how your medications work or increase your risk for serious side effects. This document does not contain all possible drug interactions. Keep a list of all the products you use (including prescription/nonprescription drugs and herbal products) and share it with your doctor and pharmacist. Do not start, stop, or change the dosage of any medicines without your doctor's approval. Warfarin interacts with many prescription, nonprescription, vitamin, and herbal products. This includes medications that are applied to the skin or inside the vagina or rectum. The interactions with warfarin usually result in an increase or decrease in the \"blood-thinning\" (anticoagulant) effect. Your doctor or other health care professional should closely monitor you to prevent serious bleeding or clotting problems. While taking warfarin, it is very important to tell your doctor or pharmacist of any changes in medications, vitamins, or herbal products that you are taking. Some products that may interact with this drug include: capecitabine, imatinib, mifepristone. Aspirin, aspirin-like drugs (salicylates), and nonsteroidal anti-inflammatory drugs (NSAIDs such as ibuprofen, naproxen, celecoxib) may have effects similar to warfarin. These drugs may increase the risk of bleeding problems if taken during treatment with warfarin. Carefully check all prescription/nonprescription product labels (including drugs applied to the skin such as pain-relieving creams) since the products may " contain NSAIDs or salicylates. Talk to your doctor about using a different medication (such as acetaminophen) to treat pain/fever. Low-dose aspirin and related drugs (such as clopidogrel, ticlopidine) should be continued if prescribed by your doctor for specific medical reasons such as heart attack or stroke prevention. Consult your doctor or pharmacist for more details. Many herbal products interact with warfarin. Tell your doctor before taking any herbal products, especially bromelains, coenzyme Q10, cranberry, danshen, dong quai, fenugreek, garlic, ginkgo biloba, ginseng, and Zephyr's wort, among others. This medication may interfere with a certain laboratory test to measure theophylline levels, possibly causing false test results. Make sure laboratory personnel and all your doctors know you use this drug.      OVERDOSE:  If overdose is suspected, contact a poison control center or emergency room immediately. [place] residents can call the [place] Poison Hotline at 1-553.125.8318. [place] residents can call a provincial poison control center. Symptoms of overdose may include: bloody/black/tarry stools, pink/dark urine, unusual/prolonged bleeding.      NOTES:  Do not share this medication with others. Laboratory and/or medical tests (such as INR, complete blood count) must be performed periodically to monitor your progress or check for side effects. Consult your doctor for more details.      MISSED DOSE:  For the best possible benefit, do not miss any doses. If you do miss a dose and remember on the same day, take it as soon as you remember. If you remember on the next day, skip the missed dose and resume your usual dosing schedule. Do not double the dose to catch up because this could increase your risk for bleeding. Keep a record of missed doses to give to your doctor or pharmacist. Contact your doctor or pharmacist if you miss 2 or more doses in a row.      STORAGE:  Store at room temperature away from light  and moisture. Do not store in the bathroom. Keep all medications away from children and pets. Do not flush medications down the toilet or pour them into a drain unless instructed to do so. Properly discard this product when it is  or no longer needed. Consult your pharmacist or local waste disposal company for more details about how to safely discard your product.      MEDICAL ALERT:  Your condition and medication can cause complications in a medical emergency. For information about enrolling in MedicAlert, call 1-878.214.5876 ([place]) or 1-629.631.9557 ([place]).      Information last revised 2010 Copyright(c)  [employer]             Depression / Suicide Risk    As you are discharged from this [facility] facility, it is important to learn how to keep safe from harming yourself.    Recognize the warning signs:  · Abrupt changes in personality, positive or negative- including increase in energy   · Giving away possessions  · Change in eating patterns- significant weight changes-  positive or negative  · Change in sleeping patterns- unable to sleep or sleeping all the time   · Unwillingness or inability to communicate  · Depression  · Unusual sadness, discouragement and loneliness  · Talk of wanting to die  · Neglect of personal appearance   · Rebelliousness- reckless behavior  · Withdrawal from people/activities they love  · Confusion- inability to concentrate     If you or a loved one observes any of these behaviors or has concerns about self-harm, here's what you can do:  · Talk about it- your feelings and reasons for harming yourself  · Remove any means that you might use to hurt yourself (examples: pills, rope, extension cords, firearm)  · Get professional help from the community (Mental Health, Substance Abuse, psychological counseling)  · Do not be alone:Call your Safe Contact- someone whom you trust who will be there for you.  · Call your local CRISIS HOTLINE 571-0876 or  524-350-8131  · Call your local Children's Mobile Crisis Response Team [place] (571) 723-1551 or www.[employer] [place].[employer]  · Call the toll free National Suicide Prevention Hotlines   · National Suicide Prevention Lifeline 965-061-MGLW (8170)  · National [facility] Line Network 800-SUICIDE (153-8297)

## 2019-03-28 NOTE — PROGRESS NOTES
Bedside report received, assumed care of pt. Pt sitting EOB, no s/s of distress, and denies pain. Pt is AOx4, updated on POC. Pt verbalizes no additional needs at this time. Safety precautions in place and call light in reach, will continue to monitor.

## 2019-03-28 NOTE — DOCUMENTATION QUERY
"                                                                         [facility]                                                                         Query Response Note      PATIENT:               LIAM ROTH  ACCT #:                  [id number]  MRN:                       [id number]  :                       1935  ADMIT DATE:       3/25/2019 9:58 AM  DISCH DATE:        3/28/2019 10:25 AM  RESPONDING  PROVIDER #:        [id number]           RESPONSE TEXT:    Epistaxis is due to or associated with Hemorrhagic Disorder d/t Extrinsic Circulating Anticoagulants] [[Unrelated to each other    QUERY TEXT:    Cause and Effect Relationship [id number]    Please clarify in documentation the relationship, if any, between Epistaxis and Hemorrhagic Disorder d/t Extrinsic Circulating Anticoagulants.    The patient's Clinical Indicators include:  PT/INR: 34.0/3.36 w/aPTT of 43.2 on admission. \"Supratherapeutic INR on labs\" noted in the progress notes.     Treatments include: ENT Consultation, Afrin, Ponaris, and PT/INR draws.    Risk factors include: hx PAF on Long term use of anticoagulants, dx L-Nasal Epistaxis, and dx Hemorrhagic disorder d/t extrinsic circulating anticoagulants  Query created by: Ayush Mccurdy on 3/28/2019 9:08 AM        Electronically signed by:  MARLI BARNETT MD 3/28/2019 11:27 AM         "

## 2019-03-28 NOTE — PROGRESS NOTES
Inpatient Anticoagulation Service Note    Date: 3/28/2019  Reason for Anticoagulation: Atrial Fibrillation            Hemoglobin Value: 12.2  Hematocrit Value: (!) 35.3  Lab Platelet Value: 225  Target INR:  (1.8-2.5 per MD due to epistaxis )    INR from last 7 days     Date/Time INR Value    03/28/19 0241 (!)  1.64    03/27/19 0641 (!)  1.82    03/26/19 0224 (!)  3.6    03/25/19 1058 (!)  3.36        Dose from last 7 days     Date/Time Dose (mg)    03/28/19 0800  3    03/27/19 1100  3    03/26/19 0821  0    03/25/19 1600  0        Average Dose (mg):  (Home dose: 3 mg PO daily)  Significant Interactions: Not Applicable  Bridge Therapy: No     Reversal Agent Administered: Not Applicable  Comments: INR still trending down. No further instances of bleeding. Will continue with home dose tonight. ENT has consulted, continuation of warfarin appropriate at this time. Will continue to follow.    Plan:  3 mg tonight, INR tomorrow  Education Material Provided?: No (chronic therapy)  Pharmacist suggested discharge dosing: home dose of 3 mg daily, INR within 72 hours of discharge     Vanita Allen, PHARMD

## 2019-03-28 NOTE — PROGRESS NOTES
Anticoagulation Summary  As of 3/28/2019    INR goal:   2.0-3.0   TTR:   74.8 % (9.9 mo)   INR used for dosin.64! 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 Referring Geriatrics [phone]    [facility] Anticoagulation Services Responsible  [phone]        Anticoagulation Patient Findings    Spoke with Shereen to report a sub therapeutic INR of 1.6.  Will bolus dose with 6mg tonight, and recheck at regular appointment in rene Molina Clinical Pharmacist

## 2019-03-28 NOTE — PROGRESS NOTES
Bedside report received. Assumed care of pt. Pt sitting up in bed, A&O X4. No signs of distress, no complaints of pain at this time.Call light and belongings within reach. Safety precautions in place.

## 2019-03-29 ENCOUNTER — PATIENT OUTREACH (OUTPATIENT)
Dept: HEALTH INFORMATION MANAGEMENT | Facility: OTHER | Age: 84
End: 2019-03-29

## 2019-03-29 ENCOUNTER — ANTICOAGULATION VISIT (OUTPATIENT)
Dept: VASCULAR LAB | Facility: MEDICAL CENTER | Age: 84
End: 2019-03-29
Attending: INTERNAL MEDICINE
Payer: MEDICARE

## 2019-03-29 VITALS — DIASTOLIC BLOOD PRESSURE: 59 MMHG | HEART RATE: 80 BPM | SYSTOLIC BLOOD PRESSURE: 106 MMHG

## 2019-03-29 DIAGNOSIS — I48.0 PAF (PAROXYSMAL ATRIAL FIBRILLATION) (HCC): ICD-10-CM

## 2019-03-29 DIAGNOSIS — D68.32 HEMORRHAGIC DISORDER DUE TO EXTRINSIC CIRCULATING ANTICOAGULANTS (HCC): ICD-10-CM

## 2019-03-29 LAB — INR PPP: 2.2 (ref 2–3.5)

## 2019-03-29 PROCEDURE — 85610 PROTHROMBIN TIME: CPT

## 2019-03-29 PROCEDURE — 99211 OFF/OP EST MAY X REQ PHY/QHP: CPT

## 2019-03-29 NOTE — PROGRESS NOTES
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 Referring Geriatrics [phone]    [facility] Responsible  [phone]        Anticoagulation Patient Findings  Patient Findings     Positives:   Hospital admission (Admitted 3/25-28 for epistaxis)    Negatives:   Signs/symptoms of thrombosis, Signs/symptoms of bleeding, Laboratory test error suspected, Change in health, Change in alcohol use, Change in activity, Upcoming invasive procedure, Emergency department visit, Upcoming dental procedure, Missed doses, Extra doses, Change in medications, Change in diet/appetite, Bruising, Other complaints          HPI:  Shereen Dale seen in clinic today, on anticoagulation therapy with warfarin for pAF/TIA  Changes to current medical/health status since last appt: none  Denies signs/symptoms of bleeding and/or thrombosis since the last appt.    Denies any interval changes to diet  Denies any interval changes to medications since last appt.   Denies any complications or cost restrictions with current therapy.   BP recorded in vitals.      A/P   INR  therapeutic.   Pt is to continue with current warfarin dosing regimen.    Follow up appointment in 1 week.    Yahaira Lilly, AlexisD

## 2019-04-01 ENCOUNTER — PATIENT OUTREACH (OUTPATIENT)
Dept: HEALTH INFORMATION MANAGEMENT | Facility: OTHER | Age: 84
End: 2019-04-01

## 2019-04-01 ENCOUNTER — TELEPHONE (OUTPATIENT)
Dept: MEDICAL GROUP | Facility: MEDICAL CENTER | Age: 84
End: 2019-04-01

## 2019-04-01 LAB — INR BLD: 2.2 (ref 0.9–1.2)

## 2019-04-01 NOTE — PROGRESS NOTES
"Outbound call to Shereen for post discharge medication review.[facility] post discharge med rec completed. No clinically significant medication issues noted. Reviewed indication, dose, and timing of each medication. Reconciled med list in [facility]. Patient denies any barriers to accessing medications or trouble with adherence.     Patient reports that she experienced another nose bleed on Herm[place] 3/31/19 and this has been happening \"on and off.\" She is trying to reach coumadin clinic.  Patient reports having some confusion this morning that has since resolved. She reports blood sugar of 125 this morning and BP of 116/73. She states she was told by ENT to use ocean nasal saline spray; she is not currently using Afrin or Ponaris. She has follow-up with ENT on 4/5/19 and has follow-up with coumadin clinic 4/5/19.     Patient reports taking clonidine when her SBP> 160. She states she took a dose yesterday as her BP was 175/118. She reports reading of 116/73 this morning. Patient reports keeping log of both blood sugars and blood pressures to bring to provider visits.     Patient denies further medication questions/ concerns but would like to speak with coumadin clinic pharmacist regarding INR and recent nose bleed. Left VM with coumadin clinic requesting return call to patient.    Yamel Ding, PharmD             "

## 2019-04-01 NOTE — TELEPHONE ENCOUNTER
Spoke with pt on the phone. She had another nose bleed and got it stopped. She reports she has a hx of TIA and is concerned this will happen again. She also has appt on 4-5-2019 and would like to schedule to  Be se seen sooner, scheduled for tomorrow AM for pt/INR    Suzanne Ortiz, Pharm D

## 2019-04-02 ENCOUNTER — ANTICOAGULATION VISIT (OUTPATIENT)
Dept: VASCULAR LAB | Facility: MEDICAL CENTER | Age: 84
End: 2019-04-02
Attending: INTERNAL MEDICINE
Payer: MEDICARE

## 2019-04-02 VITALS — HEART RATE: 79 BPM | SYSTOLIC BLOOD PRESSURE: 116 MMHG | DIASTOLIC BLOOD PRESSURE: 72 MMHG

## 2019-04-02 DIAGNOSIS — D68.32 HEMORRHAGIC DISORDER DUE TO EXTRINSIC CIRCULATING ANTICOAGULANTS (HCC): ICD-10-CM

## 2019-04-02 DIAGNOSIS — I48.0 PAF (PAROXYSMAL ATRIAL FIBRILLATION) (HCC): ICD-10-CM

## 2019-04-02 LAB — INR PPP: 2.3 (ref 2–3.5)

## 2019-04-02 PROCEDURE — 85610 PROTHROMBIN TIME: CPT

## 2019-04-02 PROCEDURE — 99211 OFF/OP EST MAY X REQ PHY/QHP: CPT

## 2019-04-02 NOTE — PROGRESS NOTES
Anticoagulation Summary  As of 2019    INR goal:   2.0-3.0   TTR:   75.0 % (10 mo)   INR used for dosin.3 (2019)   Warfarin maintenance plan:   3 mg (3 mg x 1) every day   Weekly warfarin total:   21 mg   Plan last modified:   Emily Coulter (2018)   Next INR check:   2019   Target end date:   Indefinite    Indications    Hemorrhagic disorder due to extrinsic circulating anticoagulants (HCC) [D68.32]  PAF (paroxysmal atrial fibrillation) (HCC) [I48.0]             Anticoagulation Episode Summary     INR check location:       Preferred lab:       Send INR reminders to:       Comments:         Anticoagulation Care Providers     Provider Role Specialty Phone number    Ganesh Mckeon M.D. Referring Geriatrics [phone]    [facility] Anticoagulation Services Responsible  [phone]        Anticoagulation Patient Findings    History of Present Illness: follow up appointment for chronic anticoagulation with the high risk medication, warfarin for atrial fibrillation.  Pt was recently hospitalized for epistaxis.    Last INR was out of range, dosage adjusted: pt is at goal.  Will increase the interval to recheck INR.  Follow up in 1 weeks, to reduce the risk of adverse events related to this high risk medication, warfarin.    Alysa Molina, Clinical Pharmacist

## 2019-04-03 LAB — INR BLD: 2.3 (ref 0.9–1.2)

## 2019-04-03 RX ORDER — WARFARIN SODIUM 3 MG/1
3 TABLET ORAL DAILY
Qty: 90 TAB | Refills: 3 | Status: SHIPPED | OUTPATIENT
Start: 2019-04-03 | End: 2020-06-01

## 2019-04-04 ENCOUNTER — TELEPHONE (OUTPATIENT)
Dept: CARDIOLOGY | Facility: MEDICAL CENTER | Age: 84
End: 2019-04-04

## 2019-04-05 ENCOUNTER — HOSPITAL ENCOUNTER (OUTPATIENT)
Dept: LAB | Facility: MEDICAL CENTER | Age: 84
End: 2019-04-05
Attending: INTERNAL MEDICINE
Payer: MEDICARE

## 2019-04-05 DIAGNOSIS — I48.0 PAF (PAROXYSMAL ATRIAL FIBRILLATION) (HCC): ICD-10-CM

## 2019-04-05 DIAGNOSIS — I35.0 NONRHEUMATIC AORTIC VALVE STENOSIS: ICD-10-CM

## 2019-04-05 DIAGNOSIS — I10 ESSENTIAL HYPERTENSION: ICD-10-CM

## 2019-04-05 DIAGNOSIS — Z79.01 CHRONIC ANTICOAGULATION: Chronic | ICD-10-CM

## 2019-04-05 LAB
ALBUMIN SERPL BCP-MCNC: 4.4 G/DL (ref 3.2–4.9)
ALBUMIN/GLOB SERPL: 1.6 G/DL
ALP SERPL-CCNC: 37 U/L (ref 30–99)
ALT SERPL-CCNC: 25 U/L (ref 2–50)
ANION GAP SERPL CALC-SCNC: 7 MMOL/L (ref 0–11.9)
AST SERPL-CCNC: 20 U/L (ref 12–45)
BASOPHILS # BLD AUTO: 0.8 % (ref 0–1.8)
BASOPHILS # BLD: 0.06 K/UL (ref 0–0.12)
BILIRUB SERPL-MCNC: 0.5 MG/DL (ref 0.1–1.5)
BUN SERPL-MCNC: 12 MG/DL (ref 8–22)
CALCIUM SERPL-MCNC: 9.3 MG/DL (ref 8.5–10.5)
CHLORIDE SERPL-SCNC: 97 MMOL/L (ref 96–112)
CHOLEST SERPL-MCNC: 160 MG/DL (ref 100–199)
CO2 SERPL-SCNC: 25 MMOL/L (ref 20–33)
CREAT SERPL-MCNC: 0.71 MG/DL (ref 0.5–1.4)
EOSINOPHIL # BLD AUTO: 0.27 K/UL (ref 0–0.51)
EOSINOPHIL NFR BLD: 3.7 % (ref 0–6.9)
ERYTHROCYTE [DISTWIDTH] IN BLOOD BY AUTOMATED COUNT: 45.2 FL (ref 35.9–50)
EST. AVERAGE GLUCOSE BLD GHB EST-MCNC: 143 MG/DL
FASTING STATUS PATIENT QL REPORTED: NORMAL
GLOBULIN SER CALC-MCNC: 2.7 G/DL (ref 1.9–3.5)
GLUCOSE SERPL-MCNC: 126 MG/DL (ref 65–99)
HBA1C MFR BLD: 6.6 % (ref 0–5.6)
HCT VFR BLD AUTO: 37 % (ref 37–47)
HDLC SERPL-MCNC: 54 MG/DL
HGB BLD-MCNC: 12.5 G/DL (ref 12–16)
IMM GRANULOCYTES # BLD AUTO: 0.06 K/UL (ref 0–0.11)
IMM GRANULOCYTES NFR BLD AUTO: 0.8 % (ref 0–0.9)
LDLC SERPL CALC-MCNC: 86 MG/DL
LYMPHOCYTES # BLD AUTO: 2.03 K/UL (ref 1–4.8)
LYMPHOCYTES NFR BLD: 28 % (ref 22–41)
MCH RBC QN AUTO: 31.7 PG (ref 27–33)
MCHC RBC AUTO-ENTMCNC: 33.8 G/DL (ref 33.6–35)
MCV RBC AUTO: 93.9 FL (ref 81.4–97.8)
MONOCYTES # BLD AUTO: 0.68 K/UL (ref 0–0.85)
MONOCYTES NFR BLD AUTO: 9.4 % (ref 0–13.4)
NEUTROPHILS # BLD AUTO: 4.14 K/UL (ref 2–7.15)
NEUTROPHILS NFR BLD: 57.3 % (ref 44–72)
NRBC # BLD AUTO: 0 K/UL
NRBC BLD-RTO: 0 /100 WBC
PLATELET # BLD AUTO: 273 K/UL (ref 164–446)
PMV BLD AUTO: 9.3 FL (ref 9–12.9)
POTASSIUM SERPL-SCNC: 4.4 MMOL/L (ref 3.6–5.5)
PROT SERPL-MCNC: 7.1 G/DL (ref 6–8.2)
RBC # BLD AUTO: 3.94 M/UL (ref 4.2–5.4)
SODIUM SERPL-SCNC: 129 MMOL/L (ref 135–145)
TRIGL SERPL-MCNC: 102 MG/DL (ref 0–149)
TSH SERPL DL<=0.005 MIU/L-ACNC: 2.31 UIU/ML (ref 0.38–5.33)
WBC # BLD AUTO: 7.2 K/UL (ref 4.8–10.8)

## 2019-04-05 PROCEDURE — 80061 LIPID PANEL: CPT

## 2019-04-05 PROCEDURE — 85025 COMPLETE CBC W/AUTO DIFF WBC: CPT

## 2019-04-05 PROCEDURE — 83036 HEMOGLOBIN GLYCOSYLATED A1C: CPT

## 2019-04-05 PROCEDURE — 80053 COMPREHEN METABOLIC PANEL: CPT

## 2019-04-05 PROCEDURE — 36415 COLL VENOUS BLD VENIPUNCTURE: CPT

## 2019-04-05 PROCEDURE — 84443 ASSAY THYROID STIM HORMONE: CPT

## 2019-04-08 ENCOUNTER — ANTICOAGULATION VISIT (OUTPATIENT)
Dept: VASCULAR LAB | Facility: MEDICAL CENTER | Age: 84
End: 2019-04-08
Attending: INTERNAL MEDICINE
Payer: MEDICARE

## 2019-04-08 DIAGNOSIS — D68.32 HEMORRHAGIC DISORDER DUE TO EXTRINSIC CIRCULATING ANTICOAGULANTS (HCC): ICD-10-CM

## 2019-04-08 DIAGNOSIS — I48.0 PAF (PAROXYSMAL ATRIAL FIBRILLATION) (HCC): ICD-10-CM

## 2019-04-08 LAB — INR PPP: 3.3 (ref 2–3.5)

## 2019-04-08 PROCEDURE — 99212 OFFICE O/P EST SF 10 MIN: CPT | Performed by: PHARMACIST

## 2019-04-08 PROCEDURE — 85610 PROTHROMBIN TIME: CPT

## 2019-04-08 NOTE — PROGRESS NOTES
Anticoagulation Summary  As of 4/8/2019    INR goal:   2.0-3.0   TTR:   74.9 % (10.2 mo)   INR used for dosing:   3.3! (4/8/2019)   Warfarin maintenance plan:   3 mg (3 mg x 1) every day   Weekly warfarin total:   21 mg   Plan last modified:   Emily Coulter (11/2/2018)   Next INR check:   4/15/2019   Target end date:   Indefinite    Indications    Hemorrhagic disorder due to extrinsic circulating anticoagulants (HCC) [D68.32]  PAF (paroxysmal atrial fibrillation) (HCC) [I48.0]             Anticoagulation Episode Summary     INR check location:       Preferred lab:       Send INR reminders to:       Comments:         Anticoagulation Care Providers     Provider Role Specialty Phone number    Ganesh Mckeon M.D. Referring Geriatrics [phone]    [facility]ticoagulation [facility] Responsible  [phone]        Anticoagulation Patient Findings    HPI:  Shereen Dale seen in clinic today, on anticoagulation therapy with warfarin for afib and hemorrhagic disorder.  Changes to current medical/health status since last appt: none  Denies signs/symptoms of bleeding and/or thrombosis since the last appt.    Denies any interval changes to:   -Diet   OR   -Medications since last visit  Denies any complications or cost restrictions with current therapy.   BP declined.    Nose bleed cauterized on 4/5/19 by ENT.    Patient had some cranberry juice this weekend.    A/P   INR SUPRA-therapeutic.     Patient will HOLD tonight then continue weekly regimen.    Follow up appointment in 1 week(s).    Naima Rowland, AlexisD

## 2019-04-09 ENCOUNTER — OFFICE VISIT (OUTPATIENT)
Dept: CARDIOLOGY | Facility: MEDICAL CENTER | Age: 84
End: 2019-04-09
Payer: MEDICARE

## 2019-04-09 VITALS
SYSTOLIC BLOOD PRESSURE: 120 MMHG | HEART RATE: 80 BPM | DIASTOLIC BLOOD PRESSURE: 64 MMHG | OXYGEN SATURATION: 96 % | BODY MASS INDEX: 27.18 KG/M2 | WEIGHT: 153.4 LBS | HEIGHT: 63 IN

## 2019-04-09 DIAGNOSIS — I48.0 PAF (PAROXYSMAL ATRIAL FIBRILLATION) (HCC): ICD-10-CM

## 2019-04-09 DIAGNOSIS — I10 ESSENTIAL HYPERTENSION: ICD-10-CM

## 2019-04-09 DIAGNOSIS — I35.0 NONRHEUMATIC AORTIC VALVE STENOSIS: ICD-10-CM

## 2019-04-09 DIAGNOSIS — Z79.01 ON CONTINUOUS ORAL ANTICOAGULATION: ICD-10-CM

## 2019-04-09 PROCEDURE — 99215 OFFICE O/P EST HI 40 MIN: CPT | Performed by: INTERNAL MEDICINE

## 2019-04-09 NOTE — PROGRESS NOTES
Subjective:   Shereen Dale is a 83 y.o. female who presents today for follow-up of PAF, HTN and moderately severe AS. She also has newly diagnosed sleep apnea intolerant of CPAP. She is on chronic oral anti-coagulation.    Pressures have been less labile recently.  Echocardiogram shows progression of her aortic stenosis last visit.  She does get dyspneic climbing stairs and is NYHA class II bordering on class III.  She has no chest pain syncope or presyncope.  She presents with her  again today.  She has hyponatremia likely related to free water intake and sodium restriction.    Past Medical History:   Diagnosis Date   • AF (atrial fibrillation) (HCC)    • Arrhythmia     A-fib   • Backpain     Lower back pain   • Breast cancer (HCC)    • Breath shortness     uses 2-3 L O2 at night   • Cancer (HCC) 1993    right breast   • CATARACT     celestine IOL   • Chronic anticoagulation 5/2/2012   • Cough 5/2/2012   • Dental disorder     dentures   • Diabetes     oral medication   • Edema 5/2/2012   • Glaucoma    • Heart burn    • Heart murmur    • Heart valve disease    • Hypertension    • Hypothyroid    • Oxygen deficiency     uses 2.5-3 l at night   • Paroxysmal atrial fibrillation (HCC)    • Sleep apnea     no cpap   • tia     TIA x2   • Unspecified hemorrhagic conditions     takes coumadin     Past Surgical History:   Procedure Laterality Date   • KNEE ARTHROSCOPY Right 5/21/2015    Procedure: KNEE ARTHROSCOPY;  Surgeon: Emerson Garcia M.D.;  Location: [facility];  Service:    • MENISCECTOMY Right 5/21/2015    Procedure: LATERAL MENISCECTOMY;  Surgeon: Emerson Garcia M.D.;  Location: [facility];  Service:    • CATARACT PHACO WITH IOL  10/21/2013    Performed by Dominick Fernando M.D. at [facility]   • CATARACT PHACO WITH IOL  9/23/2013    Performed by Dominick Fernando M.D. at [facility]   • CHOLECYSTECTOMY     • HYSTERECTOMY RADICAL     • LUMPECTOMY       R breast   • PB RADIATION THERAPY PLAN SIMPLE       Family History   Problem Relation Age of Onset   • Heart Attack Mother    • Heart Disease Father      History   Smoking Status   • Former Smoker   • Packs/day: 0.50   • Years: 2.50   • Types: Cigarettes   • Quit date: 1/1/1964   Smokeless Tobacco   • Never Used     Allergies   Allergen Reactions   • Darvon [Propoxyphene Hcl]      shock   • Iodine      Shock  - IV   • Latex      Swelling = hands with glove use   • Penicillins Anaphylaxis   • Propoxyphene Hcl Anaphylaxis   • Tetanus Antitoxin Myalgia   • Tetracycline Anaphylaxis     Outpatient Encounter Prescriptions as of 4/9/2019   Medication Sig Dispense Refill   • warfarin (COUMADIN) 3 MG Tab Take 1 Tab by mouth every day. 90 Tab 3   • gabapentin (NEURONTIN) 100 MG Cap Take 1 Cap by mouth every day. 90 Cap 0   • Blood Glucose Monitoring Suppl Device Meter: Dispense free style meter. Sig. Use 2 times daily as directed for blood sugar monitoring. dx e11.9 1 Device 0   • amLODIPine (NORVASC) 5 MG Tab Take 2.5-5 mg by mouth 2 Times a Day. 5 mg in the AM  2.5 mg in the PM     • cloNIDine (CATAPRES) 0.1 MG Tab Take 0.1 mg by mouth 2 times a day as needed. If SBP > 160     • glipiZIDE SR (GLUCOTROL) 2.5 MG TABLET SR 24 HR Take 2.5 mg by mouth every day.     • dorzolamide-timolol (COSOPT) 22.3-6.8 MG/ML Solution Place 1 Drop in both eyes 2 times a day.     • famotidine (PEPCID) 20 MG Tab Take 20 mg by mouth every day.     • metFORMIN (GLUCOPHAGE) 500 MG Tab Take 1 Tab by mouth 3 times a day. 180 Tab 3   • carvedilol (COREG) 25 MG Tab Take 1 Tab by mouth 2 times a day. 180 Tab 3   • telmisartan (MICARDIS) 40 MG Tab Take 1 Tab by mouth 2 Times a Day. 180 Tab 2   • levothyroxine (SYNTHROID) 50 MCG Tab Take 1 Tab by mouth every morning. 90 Tab 3   • Glucosamine HCl (GLUCOSAMINE PO) Take 1 Tab by mouth every morning. Pt unsure of dose     • Polyethyl Glycol-Propyl Glycol (SYSTANE OP) 1-2 Drops by Ophthalmic route 4 times a  "day as needed.     • docosahexanoic acid (OMEGA 3 FA) 1000 MG CAPS Take 1 Cap by mouth every morning.     • VITAMIN D PO Take 2,000 Units by mouth every morning.     • CALCIUM 500 PO Take 1 Tab by mouth every morning.     • acetaminophen (TYLENOL) 325 MG Tab Take 2 Tabs by mouth every 6 hours as needed (Mild Pain; (Pain scale 1-3); Temp greater than 100.5 F). 30 Tab 0   • gabapentin (NEURONTIN) 100 MG Cap Take 2 Caps by mouth every evening. 90 Cap 0     No facility-administered encounter medications on file as of 4/9/2019.      Review of Systems   All other systems reviewed and are negative.  Today I reviewed the medical, surgical, social and family histories with the patient. As per HPI, otherwise noncontributory.       Objective:   /64 (BP Location: Left arm, Patient Position: Sitting, BP Cuff Size: Adult)   Pulse 80   Ht 1.6 m (5' 3\")   Wt 69.6 kg (153 lb 6.4 oz)   LMP 01/01/1985   SpO2 96%   BMI 27.17 kg/m²     Physical Exam   Constitutional: She is oriented to person, place, and time. She appears well-developed and well-nourished. No distress.   HENT:   Head: Normocephalic and atraumatic.   Mouth/Throat: Oropharynx is clear and moist. No oropharyngeal exudate.   Eyes: Pupils are equal, round, and reactive to light. Conjunctivae are normal. No scleral icterus.   Neck: Normal range of motion. Neck supple. No JVD present. No thyromegaly present.   Cardiovascular: Normal rate, regular rhythm and intact distal pulses.  Exam reveals no gallop and no friction rub.    Murmur ( 3/6 systolic ejection murmur impinging upon S2) heard.  Pulses:       Carotid pulses are 2+ on the right side, and 2+ on the left side.       Radial pulses are 2+ on the right side, and 2+ on the left side.        Popliteal pulses are 2+ on the right side, and 2+ on the left side.        Dorsalis pedis pulses are 2+ on the right side, and 2+ on the left side.        Posterior tibial pulses are 2+ on the right side, and 2+ on the left " side.   Pulmonary/Chest: Effort normal and breath sounds normal. She has no wheezes. She has no rales.   Abdominal: Soft. Bowel sounds are normal. She exhibits no distension. There is no tenderness.   Musculoskeletal: She exhibits no edema or tenderness.   Neurological: She is alert and oriented to person, place, and time. No cranial nerve deficit.   Skin: Skin is warm and dry. No rash noted. She is not diaphoretic. No erythema.   Psychiatric: She has a normal mood and affect. Her behavior is normal.   Vitals reviewed.    LABS:  Lab Results   Component Value Date/Time    CHOLSTRLTOT 160 04/05/2019 07:42 AM    LDL 86 04/05/2019 07:42 AM    HDL 54 04/05/2019 07:42 AM    TRIGLYCERIDE 102 04/05/2019 07:42 AM       Lab Results   Component Value Date/Time    WBC 7.2 04/05/2019 07:42 AM    RBC 3.94 (L) 04/05/2019 07:42 AM    HEMOGLOBIN 12.5 04/05/2019 07:42 AM    HEMATOCRIT 37.0 04/05/2019 07:42 AM    MCV 93.9 04/05/2019 07:42 AM    NEUTSPOLYS 57.30 04/05/2019 07:42 AM    LYMPHOCYTES 28.00 04/05/2019 07:42 AM    MONOCYTES 9.40 04/05/2019 07:42 AM    EOSINOPHILS 3.70 04/05/2019 07:42 AM    BASOPHILS 0.80 04/05/2019 07:42 AM     Lab Results   Component Value Date/Time    SODIUM 129 (L) 04/05/2019 07:42 AM    POTASSIUM 4.4 04/05/2019 07:42 AM    CHLORIDE 97 04/05/2019 07:42 AM    CO2 25 04/05/2019 07:42 AM    GLUCOSE 126 (H) 04/05/2019 07:42 AM    BUN 12 04/05/2019 07:42 AM    CREATININE 0.71 04/05/2019 07:42 AM    CREATININE 0.7 08/02/2008 08:30 AM     Lab Results   Component Value Date    HBA1C 6.6 (H) 04/05/2019      Lab Results   Component Value Date/Time    ALKPHOSPHAT 37 04/05/2019 07:42 AM    ASTSGOT 20 04/05/2019 07:42 AM    ALTSGPT 25 04/05/2019 07:42 AM    TBILIRUBIN 0.5 04/05/2019 07:42 AM      Lab Results   Component Value Date/Time    BNPBTYPENAT 33 05/11/2017 05:05 PM      No results found for: TSH  Lab Results   Component Value Date/Time    PROTHROMBTM 19.5 (H) 03/28/2019 02:41 AM    INR 3.3 04/08/2019       ECHO CONCLUSIONS (10/18/2018):  Prior echo 04/04/18.  Left ventricle is small in size.  Left ventricular ejection fraction is visually estimated to be 65%.  Moderate concentric left ventricular hypertrophy.  Calcified aortic valve leaflets.  Moderate aortic stenosis.  Vmax is 3.7 m/s.  Normal inferior vena cava size and inspiratory collapse.  Compared to the report of the study done - there has been no   significant change.     ECHO CONCLUSIONS (4/4/2018):  Prior echo 1/9/2017.  Moderate left ventricular hypertrophy.  Left ventricular ejection fraction is visually estimated to be greater   than 75%.  Grade I diastolic dysfunction.  Moderate aortic stenosis.  Vmax is 3.79  m/s. Transvalvular gradients are - Peak: 58 mmHg, Mean:    35 mmHg.  Compared to the report of the study done - there has been a slight   progression of aortic stenosis, still moderate in severity.     ECHO CONCLUSIONS (1/19/2017):  Compared to the images of the prior study done on 01/29/15 - The AS is   now moderate.  Normal left ventricular size and systolic function.  Left ventricular ejection fraction is visually estimated to be 65%.  Grade I diastolic dysfunction.  Moderate aortic stenosis.  Transvalvular gradients are - Peak: 37 mmHg, Mean: 21 mmHg.  Normal inferior vena cava size and inspiratory collapse.      Assessment:     1. Nonrheumatic aortic valve stenosis  EC-ECHOCARDIOGRAM COMPLETE W/O CONT   2. PAF (paroxysmal atrial fibrillation) ([facility])     3. Essential hypertension     4. On continuous oral anticoagulation         Medical Decision Making:  Today's Assessment / Status / Plan:     She is doing well but continues to have progressive dyspnea on exertion most likely related to a combination of deconditioning weight age and of course her progressive aortic stenosis.  She does not quite meet criteria for severity and she is DNR but would reverse this for procedure she will consider it.  We discussed transcatheter aortic valve  replacement at length today.  Recommend following aortic stenosis and should it become severe proceeding with valve replacement evaluation.  Consider her other medical therapy including her anticoagulation which she is tolerating well.  She is having no symptoms with her PAF.  Blood pressure is well controlled.    Follow up in 6 months or sooner if her echocardiogram shows progression of her AS.                                  Physical Exam   Constitutional: She is oriented to person, place, and time. She appears well-developed and well-nourished. No distress.   HENT:   Head: Normocephalic and atraumatic.   Mouth/Throat: Oropharynx is clear and moist. No oropharyngeal exudate.   Eyes: Pupils are equal, round, and reactive to light. Conjunctivae are normal. No scleral icterus.   Neck: Normal range of motion. Neck supple. No JVD present. No thyromegaly present.   Cardiovascular: Normal rate, regular rhythm and intact distal pulses.  Exam reveals no gallop and no friction rub.    Murmur ( 3/6 systolic ejection murmur impinging upon S2) heard.  Pulses:       Carotid pulses are 2+ on the right side, and 2+ on the left side.       Radial pulses are 2+ on the right side, and 2+ on the left side.        Popliteal pulses are 2+ on the right side, and 2+ on the left side.        Dorsalis pedis pulses are 2+ on the right side, and 2+ on the left side.        Posterior tibial pulses are 2+ on the right side, and 2+ on the left side.   Pulmonary/Chest: Effort normal and breath sounds normal. She has no wheezes. She has no rales.   Abdominal: Soft. Bowel sounds are normal. She exhibits no distension. There is no tenderness.   Musculoskeletal: She exhibits no edema or tenderness.   Neurological: She is alert and oriented to person, place, and time. No cranial nerve deficit.   Skin: Skin is warm and dry. No rash noted. She is not diaphoretic. No erythema.   Psychiatric: She has a normal mood and affect. Her behavior is normal.    Vitals reviewed.

## 2019-04-10 ENCOUNTER — OFFICE VISIT (OUTPATIENT)
Dept: MEDICAL GROUP | Facility: MEDICAL CENTER | Age: 84
End: 2019-04-10
Payer: MEDICARE

## 2019-04-10 VITALS
OXYGEN SATURATION: 95 % | DIASTOLIC BLOOD PRESSURE: 60 MMHG | TEMPERATURE: 97.4 F | RESPIRATION RATE: 16 BRPM | HEIGHT: 63 IN | SYSTOLIC BLOOD PRESSURE: 98 MMHG | BODY MASS INDEX: 27.7 KG/M2 | WEIGHT: 156.31 LBS | HEART RATE: 74 BPM

## 2019-04-10 DIAGNOSIS — G47.33 OBSTRUCTIVE SLEEP APNEA SYNDROME: ICD-10-CM

## 2019-04-10 DIAGNOSIS — E87.1 HYPONATREMIA: ICD-10-CM

## 2019-04-10 DIAGNOSIS — J96.11 CHRONIC RESPIRATORY FAILURE WITH HYPOXIA (HCC): ICD-10-CM

## 2019-04-10 DIAGNOSIS — I48.0 PAROXYSMAL ATRIAL FIBRILLATION (HCC): ICD-10-CM

## 2019-04-10 DIAGNOSIS — R04.0 EPISTAXIS: ICD-10-CM

## 2019-04-10 DIAGNOSIS — Z09 HOSPITAL DISCHARGE FOLLOW-UP: ICD-10-CM

## 2019-04-10 DIAGNOSIS — E11.9 TYPE 2 DIABETES MELLITUS WITHOUT COMPLICATION, WITHOUT LONG-TERM CURRENT USE OF INSULIN (HCC): ICD-10-CM

## 2019-04-10 LAB — INR BLD: 3.3 (ref 0.9–1.2)

## 2019-04-10 PROCEDURE — 99495 TRANSJ CARE MGMT MOD F2F 14D: CPT | Performed by: FAMILY MEDICINE

## 2019-04-10 NOTE — PROGRESS NOTES
Annual Health Assessment Questions:    1.  Are you currently engaging in any exercise or physical activity? Yes    2.  How would you describe your mood or emotional well-being today? good    3.  Have you had any falls in the last year? No    4.  Have you noticed any problems with your balance or had difficulty walking? No    5.  In the last six months have you experienced any leakage of urine? No    6. DPA/Advanced Directive: Patient has Advanced Directive on file.      POST DISCHARGE CALL:  Discharge Date:3/28/2019   Date of Outreach Call: 4/1/2019  4:33 PM  Now that you're home, how are you doing? Good  Comment:Patient's spouse answered telephone. Reports  patient is scheduled to see ENT provider this week. Spouse  states pt currently in the shower.  Do you have questions about your medications? No    Did you fill your medications? No  Comment:CM didn't speak to patient. SPouse states they  will  meds tomorrow.    Do you have a follow-up appointment scheduled?Yes    Discharging Department: Telemetry 7    Number of Attempts: 1  Current or previous attempts completed within two business days of discharge? Yes  Provided education regarding treatment plan, medication, self-management, ADLs? Yes  Has patient completed Advance Directive? If yes, advise them to bring to appointment. Yes  Care Manager phone number provided? Yes  Is there anything else I can help you with? No    CC: Hospital discharge follow-up    HPI:   Shereen presents today posthospitalization follow-up, 3/25-3/28/2019.  Patient presented to ER with epistaxis, was found to have stable hemoglobin, and supratherapeutic INR.  ENT was consulted.  Nasal packing was done. Her INR trended down to therapeutic range. She was restarted on anticoagulation with INR target 2-2.5. She no longer had epistaxis. Dr. Rhoades, ENT came by and recommended follow up.  Patient was found to have hyponatremia, however she was a symptomatic.  Patient was eventually discharged  on 3/28/2019 in good condition.    Came today for follow-up.  Denies any nasal bleeds since discharge from the hospital.  Has been following up with Coumadin clinic for INR and Coumadin adjustment.  However last sodium level was 129.  Denies headache, nausea, vomiting.  Blood glucose has been adequately controlled, last A1c was 6.6, patient has been on glipizide 2.5 mg daily.  He has been having slightly low blood sugar pressure today, however she has been asymptomatic.  She is currently on Amlodipine 5 mg, Telmisartan 40 mg daily, Carvedilol 25 mg bid.  Has history of A. fib, currently has normal heart rate and rhythm, she has been on carvedilol and Coumadin she follows up with Coumadin clinic.  Of thyroid functions has been adequately controlled levothyroxine 50 mcg.      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) 05/02/2012     Priority: High   • Acute, but ill-defined, cerebrovascular disease 04/30/2012     Priority: High   • Cough 05/02/2012     Priority: Low   • Edema 05/02/2012     Priority: Low   • Hyponatremia 03/25/2019   • Advanced directives, counseling/discussion 09/26/2018   • Hypertensive urgency 05/11/2017   • Diastolic dysfunction 02/02/2016   • Tear of lateral cartilage or meniscus of knee, current 05/21/2015   • PAF (paroxysmal atrial fibrillation) ([facility]) 01/12/2015   • Dyspnea on effort 01/02/2014   • Diabetes ([facility]) 12/26/2013   • Aortic stenosis 07/02/2013       Current Outpatient Prescriptions   Medication Sig Dispense Refill   • warfarin (COUMADIN) 3 MG Tab Take 1 Tab by mouth every day. 90 Tab 3   • acetaminophen (TYLENOL) 325 MG Tab Take 2 Tabs by mouth every 6 hours as needed (Mild Pain; (Pain scale 1-3); Temp greater than 100.5 F). 30 Tab 0   • gabapentin (NEURONTIN) 100 MG Cap Take 1 Cap by mouth every day. 90  Cap 0   • gabapentin (NEURONTIN) 100 MG Cap Take 2 Caps by mouth every evening. 90 Cap 0   • Blood Glucose Monitoring Suppl Device Meter: Dispense free style meter. Sig. Use 2 times daily as directed for blood sugar monitoring. dx e11.9 1 Device 0   • amLODIPine (NORVASC) 5 MG Tab Take 2.5-5 mg by mouth 2 Times a Day. 5 mg in the AM  2.5 mg in the PM     • cloNIDine (CATAPRES) 0.1 MG Tab Take 0.1 mg by mouth 2 times a day as needed. If SBP > 160     • glipiZIDE SR (GLUCOTROL) 2.5 MG TABLET SR 24 HR Take 2.5 mg by mouth every day.     • dorzolamide-timolol (COSOPT) 22.3-6.8 MG/ML Solution Place 1 Drop in both eyes 2 times a day.     • famotidine (PEPCID) 20 MG Tab Take 20 mg by mouth every day.     • metFORMIN (GLUCOPHAGE) 500 MG Tab Take 1 Tab by mouth 3 times a day. 180 Tab 3   • carvedilol (COREG) 25 MG Tab Take 1 Tab by mouth 2 times a day. 180 Tab 3   • telmisartan (MICARDIS) 40 MG Tab Take 1 Tab by mouth 2 Times a Day. 180 Tab 2   • levothyroxine (SYNTHROID) 50 MCG Tab Take 1 Tab by mouth every morning. 90 Tab 3   • Glucosamine HCl (GLUCOSAMINE PO) Take 1 Tab by mouth every morning. Pt unsure of dose     • Polyethyl Glycol-Propyl Glycol (SYSTANE OP) 1-2 Drops by Ophthalmic route 4 times a day as needed.     • docosahexanoic acid (OMEGA 3 FA) 1000 MG CAPS Take 1 Cap by mouth every morning.     • VITAMIN D PO Take 2,000 Units by mouth every morning.     • CALCIUM 500 PO Take 1 Tab by mouth every morning.       No current facility-administered medications for this visit.          Allergies as of 04/10/2019 - Reviewed 04/10/2019   Allergen Reaction Noted   • Darvon [propoxyphene hcl]  03/18/2008   • Iodine  05/11/2015   • Latex  09/19/2013   • Penicillins Anaphylaxis 08/02/2008   • Propoxyphene hcl Anaphylaxis 08/02/2008   • Tetanus antitoxin Myalgia 11/19/2017   • Tetracycline Anaphylaxis 08/02/2008        ROS: Denies any chest pain, Shortness of breath, Changes bowel or bladder, Lower extremity edema.    Physical  "Exam:  BP (!) 98/60 (BP Location: Right arm, Patient Position: Sitting, BP Cuff Size: Adult)   Pulse 74   Temp 36.3 °C (97.4 °F) (Temporal)   Resp 16   Ht 1.6 m (5' 3\")   Wt 70.9 kg (156 lb 4.9 oz)   LMP 01/01/1985   SpO2 95%   BMI 27.69 kg/m²   Gen.: Well-developed, well-nourished, no apparent distress,pleasant and cooperative with the examination  Skin:  Warm and dry with good turgor. No rashes or suspicious lesions in visible areas  HEENT:Sinuses nontender with palpation, TMs clear, nares patent with pink mucosa and clear rhinorrhea,no septal deviation ,polyps or lesions. lips without lesions, oropharynx clear.  Neck: Trachea midline,no masses or adenopathy. No JVD.  Cor: Regular rate and rhythm without murmur, gallop or rub.  Lungs: Respirations unlabored.Clear to auscultation with equal breath sounds bilaterally. No wheezes, rhonchi.  Extremities: No cyanosis, clubbing or edema.      Assessment and Plan.   83 y.o. female     1. Hospital discharge follow-up  Hospital discharge summary reviewed.    2. Epistaxis  Resolved.  On Coumadin, she currently has supratherapeutic INR(3.3), continue follow-up with Coumadin clinic for Coumadin adjustment.    3. Diabetes mellitus type 2 in nonobese ([facility])  Adequately controlled.  Last A1c was 6.6.  Continue current medication as mentioned above.    4. PAF (paroxysmal atrial fibrillation) ([facility])  Stable.  Asymptomatic.  Continue on carvedilol, and Coumadin.  Lately her INR is slightly supratherapeutic (3.3 ), continue follow-up with Coumadin clinic for Coumadin adjustment.    6. Obstructive sleep apnea syndrome  Patient has sleep apnea could not tolerate CPAP however he is using oxygen while sleep.    7. Chronic respiratory failure with hypoxia ([facility])  No history of COPD.  Could not tolerate CPAP for sleep apnea, has been using oxygen while asleep.        "

## 2019-04-15 ENCOUNTER — ANTICOAGULATION VISIT (OUTPATIENT)
Dept: VASCULAR LAB | Facility: MEDICAL CENTER | Age: 84
End: 2019-04-15
Attending: INTERNAL MEDICINE
Payer: MEDICARE

## 2019-04-15 VITALS — SYSTOLIC BLOOD PRESSURE: 116 MMHG | HEART RATE: 80 BPM | DIASTOLIC BLOOD PRESSURE: 73 MMHG

## 2019-04-15 DIAGNOSIS — I48.0 PAF (PAROXYSMAL ATRIAL FIBRILLATION) (HCC): ICD-10-CM

## 2019-04-15 DIAGNOSIS — D68.32 HEMORRHAGIC DISORDER DUE TO EXTRINSIC CIRCULATING ANTICOAGULANTS (HCC): ICD-10-CM

## 2019-04-15 LAB — INR PPP: 2.9 (ref 2–3.5)

## 2019-04-15 PROCEDURE — 85610 PROTHROMBIN TIME: CPT

## 2019-04-15 PROCEDURE — 99211 OFF/OP EST MAY X REQ PHY/QHP: CPT

## 2019-04-15 NOTE — PROGRESS NOTES
Anticoagulation Summary  As of 4/15/2019    INR goal:   2.0-3.0   TTR:   73.8 % (10.5 mo)   INR used for dosin.9 (4/15/2019)   Warfarin maintenance plan:   3 mg (3 mg x 1) every day   Weekly warfarin total:   21 mg   Plan last modified:   Emily Coulter (2018)   Next INR check:   2019   Target end date:   Indefinite    Indications    Hemorrhagic disorder due to extrinsic circulating anticoagulants (HCC) [D68.32]  PAF (paroxysmal atrial fibrillation) (HCC) [I48.0]             Anticoagulation Episode Summary     INR check location:       Preferred lab:       Send INR reminders to:       Comments:         Anticoagulation Care Providers     Provider Role Specialty Phone number    Ganesh Mckeon M.D. Referring Geriatrics [phone]    [facility] Responsible  [phone]        Anticoagulation Patient Findings      HPI:  Shereen Mendez Chito seen in clinic today, on anticoagulation therapy with warfarin for afib  Changes to current medical/health status since last appt: none  Patient had one episode of nose bleed on , unsure of how long bled but self-resolved  Denies any interval changes to diet  Denies any interval changes to medications since last appt.   Denies any complications or cost restrictions with current therapy.   BP recorded in vitals. 116/73, 80      A/P   INR  -therapeutic.   Instructed pt to continue current dose    Follow up appointment in 1 week(s).    Nat Law, PharmD

## 2019-04-18 ENCOUNTER — HOSPITAL ENCOUNTER (OUTPATIENT)
Dept: LAB | Facility: MEDICAL CENTER | Age: 84
End: 2019-04-18
Attending: FAMILY MEDICINE
Payer: MEDICARE

## 2019-04-18 DIAGNOSIS — E87.1 HYPONATREMIA: ICD-10-CM

## 2019-04-18 LAB
ANION GAP SERPL CALC-SCNC: 10 MMOL/L (ref 0–11.9)
BUN SERPL-MCNC: 11 MG/DL (ref 8–22)
CALCIUM SERPL-MCNC: 9.4 MG/DL (ref 8.5–10.5)
CHLORIDE SERPL-SCNC: 99 MMOL/L (ref 96–112)
CO2 SERPL-SCNC: 26 MMOL/L (ref 20–33)
CREAT SERPL-MCNC: 0.71 MG/DL (ref 0.5–1.4)
FASTING STATUS PATIENT QL REPORTED: NORMAL
GLUCOSE SERPL-MCNC: 112 MG/DL (ref 65–99)
POTASSIUM SERPL-SCNC: 4.2 MMOL/L (ref 3.6–5.5)
SODIUM SERPL-SCNC: 135 MMOL/L (ref 135–145)

## 2019-04-18 PROCEDURE — 80048 BASIC METABOLIC PNL TOTAL CA: CPT

## 2019-04-18 PROCEDURE — 36415 COLL VENOUS BLD VENIPUNCTURE: CPT

## 2019-04-19 LAB — INR BLD: 2.9 (ref 0.9–1.2)

## 2019-04-22 ENCOUNTER — ANTICOAGULATION VISIT (OUTPATIENT)
Dept: VASCULAR LAB | Facility: MEDICAL CENTER | Age: 84
End: 2019-04-22
Attending: INTERNAL MEDICINE
Payer: MEDICARE

## 2019-04-22 VITALS — DIASTOLIC BLOOD PRESSURE: 58 MMHG | HEART RATE: 80 BPM | SYSTOLIC BLOOD PRESSURE: 112 MMHG

## 2019-04-22 DIAGNOSIS — D68.32 HEMORRHAGIC DISORDER DUE TO EXTRINSIC CIRCULATING ANTICOAGULANTS (HCC): ICD-10-CM

## 2019-04-22 DIAGNOSIS — I48.0 PAF (PAROXYSMAL ATRIAL FIBRILLATION) (HCC): ICD-10-CM

## 2019-04-22 LAB
INR BLD: 3 (ref 0.9–1.2)
INR PPP: 3 (ref 2–3.5)

## 2019-04-22 PROCEDURE — 99212 OFFICE O/P EST SF 10 MIN: CPT | Performed by: NURSE PRACTITIONER

## 2019-04-22 PROCEDURE — 85610 PROTHROMBIN TIME: CPT

## 2019-04-22 NOTE — PROGRESS NOTES
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 Referring Geriatrics [phone]    [facility] Responsible  [phone]        Anticoagulation Patient Findings      HPI:  Shereen Dale seen in clinic today for follow up on anticoagulation therapy in the presence of AF. Denies any changes to current medical/health status since last appointment. In the hospital last month with epistaxis. Denies any medication or diet changes. No current symptoms of bleeding or thrombosis reported.    A/P:   INR therapeutic. INR trending upper limit of therapeutic. Will decrease regimen. BP recorded in vitals.    Follow up appointment in 1 week(s).    Next Appointment: Monday, April 29, 2019 at 11:000 am.     Laura HAYES

## 2019-04-26 ENCOUNTER — PATIENT OUTREACH (OUTPATIENT)
Dept: HEALTH INFORMATION MANAGEMENT | Facility: OTHER | Age: 84
End: 2019-04-26

## 2019-04-29 ENCOUNTER — ANTICOAGULATION VISIT (OUTPATIENT)
Dept: VASCULAR LAB | Facility: MEDICAL CENTER | Age: 84
End: 2019-04-29
Attending: INTERNAL MEDICINE
Payer: MEDICARE

## 2019-04-29 DIAGNOSIS — D68.32 HEMORRHAGIC DISORDER DUE TO EXTRINSIC CIRCULATING ANTICOAGULANTS (HCC): ICD-10-CM

## 2019-04-29 DIAGNOSIS — I48.0 PAF (PAROXYSMAL ATRIAL FIBRILLATION) (HCC): ICD-10-CM

## 2019-04-29 LAB — INR PPP: 2.4 (ref 2–3.5)

## 2019-04-29 PROCEDURE — 85610 PROTHROMBIN TIME: CPT

## 2019-04-29 PROCEDURE — 99211 OFF/OP EST MAY X REQ PHY/QHP: CPT

## 2019-04-29 NOTE — PROGRESS NOTES
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 Referring Geriatrics [phone]    [facility] Responsible  [phone]        Anticoagulation Patient Findings      HPI:  Shereen Starken Chito seen in clinic today, on anticoagulation therapy with warfrain for Afib  Changes to current medical/health status since last appt: none  Denies signs/symptoms of bleeding and/or thrombosis since the last appt.    Denies any interval changes to diet  Denies any interval changes to medications since last appt.   Denies any complications or cost restrictions with current therapy.   Pt denied      A/P   INR  therapeutic.   Instructed pt to continue current dose    Follow up appointment in 1 week(s) per pt preference    Nat Law, PharmD

## 2019-04-30 LAB — INR BLD: 2.4 (ref 0.9–1.2)

## 2019-05-06 ENCOUNTER — ANTICOAGULATION VISIT (OUTPATIENT)
Dept: VASCULAR LAB | Facility: MEDICAL CENTER | Age: 84
End: 2019-05-06
Attending: INTERNAL MEDICINE
Payer: MEDICARE

## 2019-05-06 VITALS — SYSTOLIC BLOOD PRESSURE: 108 MMHG | HEART RATE: 75 BPM | DIASTOLIC BLOOD PRESSURE: 56 MMHG

## 2019-05-06 DIAGNOSIS — D68.32 HEMORRHAGIC DISORDER DUE TO EXTRINSIC CIRCULATING ANTICOAGULANTS (HCC): ICD-10-CM

## 2019-05-06 DIAGNOSIS — I48.0 PAF (PAROXYSMAL ATRIAL FIBRILLATION) (HCC): ICD-10-CM

## 2019-05-06 LAB — INR PPP: 2.6 (ref 2–3.5)

## 2019-05-06 PROCEDURE — 85610 PROTHROMBIN TIME: CPT

## 2019-05-06 PROCEDURE — 99212 OFFICE O/P EST SF 10 MIN: CPT | Performed by: NURSE PRACTITIONER

## 2019-05-06 NOTE — PROGRESS NOTES
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 Referring Geriatrics [phone]    [facility] Responsible  [phone]        Anticoagulation Patient Findings      HPI:  Shereen Dale seen in clinic today for follow up on anticoagulation therapy in the presence of AF. Denies any changes to current medical/health status since last appointment. Starting bactrim for 3-5 days per her urologist for UTI. Denies any diet changes. No current symptoms of bleeding or thrombosis reported.    A/P:   INR therapeutic. Will decrease regimen to account for DDI. BP recorded in vitals.    Follow up appointment in 1 week(s).    Next Appointment: Monday, May 13, 2019 at 10:30 am.     Laura HAYES

## 2019-05-07 LAB — INR BLD: 2.6 (ref 0.9–1.2)

## 2019-05-09 ENCOUNTER — HOSPITAL ENCOUNTER (OUTPATIENT)
Dept: CARDIOLOGY | Facility: MEDICAL CENTER | Age: 84
End: 2019-05-09
Attending: INTERNAL MEDICINE
Payer: MEDICARE

## 2019-05-09 DIAGNOSIS — I35.0 NONRHEUMATIC AORTIC VALVE STENOSIS: ICD-10-CM

## 2019-05-09 PROCEDURE — 93306 TTE W/DOPPLER COMPLETE: CPT | Mod: 26 | Performed by: INTERNAL MEDICINE

## 2019-05-09 PROCEDURE — 93306 TTE W/DOPPLER COMPLETE: CPT

## 2019-05-10 ENCOUNTER — TELEPHONE (OUTPATIENT)
Dept: CARDIOLOGY | Facility: MEDICAL CENTER | Age: 84
End: 2019-05-10

## 2019-05-10 DIAGNOSIS — E78.5 HYPERLIPIDEMIA, UNSPECIFIED HYPERLIPIDEMIA TYPE: ICD-10-CM

## 2019-05-10 DIAGNOSIS — I48.0 PAF (PAROXYSMAL ATRIAL FIBRILLATION) (HCC): ICD-10-CM

## 2019-05-10 DIAGNOSIS — E87.1 HYPONATREMIA: ICD-10-CM

## 2019-05-10 LAB
LV EJECT FRACT  99904: 65
LV EJECT FRACT MOD 2C 99903: 62.13
LV EJECT FRACT MOD 4C 99902: 62.67
LV EJECT FRACT MOD BP 99901: 62.81

## 2019-05-10 NOTE — TELEPHONE ENCOUNTER
No Reading Provider Prelim  Alex Sharp M.D. 5/9/2019  5/10/2019    Narrative     Transthoracic  Echo Report      Echocardiography Laboratory    CONCLUSIONS  Compared to the prior study done 10/18/18 - the aortic stenosis is now   severe.  Normal left ventricular chamber size.  Left ventricular ejection fraction is visually estimated to be 65%.  Grade I diastolic dysfunction.  Moderate left ventricular hypertrophy.  Severe aortic stenosis. Vmax is 4.04 m/s. Aortic valve area calculated   from the continuity equation is 0.47cm2.  Right ventricular systolic pressure is estimated to be 30 mmHg.     =======================================  Do you want a valve referral or any other changes?

## 2019-05-13 ENCOUNTER — ANTICOAGULATION VISIT (OUTPATIENT)
Dept: VASCULAR LAB | Facility: MEDICAL CENTER | Age: 84
End: 2019-05-13
Attending: INTERNAL MEDICINE
Payer: MEDICARE

## 2019-05-13 VITALS — SYSTOLIC BLOOD PRESSURE: 107 MMHG | HEART RATE: 80 BPM | DIASTOLIC BLOOD PRESSURE: 52 MMHG

## 2019-05-13 DIAGNOSIS — D68.32 HEMORRHAGIC DISORDER DUE TO EXTRINSIC CIRCULATING ANTICOAGULANTS (HCC): ICD-10-CM

## 2019-05-13 DIAGNOSIS — I48.0 PAF (PAROXYSMAL ATRIAL FIBRILLATION) (HCC): ICD-10-CM

## 2019-05-13 LAB — INR PPP: 2 (ref 2–3.5)

## 2019-05-13 PROCEDURE — 99211 OFF/OP EST MAY X REQ PHY/QHP: CPT | Performed by: NURSE PRACTITIONER

## 2019-05-13 PROCEDURE — 85610 PROTHROMBIN TIME: CPT

## 2019-05-13 NOTE — PROGRESS NOTES
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 Referring Geriatrics [phone]    [facility] Responsible  [phone]        Anticoagulation Patient Findings      HPI:  Shereen Dale seen in clinic today for follow up on anticoagulation therapy in the presence of AF. Denies any changes to current medical/health status since last appointment. Denies any medication or diet changes. No current symptoms of bleeding or thrombosis reported.    A/P:   INR therapeutic. Continue current regimen. BP recorded in vitals.    Follow up appointment in 1 week(s).    Next Appointment: Monday, May 20, 2019 at 10:45 am.      Laura HAYES

## 2019-05-13 NOTE — TELEPHONE ENCOUNTER
Alex Sharp M.D.   You 2 hours ago (11:38 AM)      Please inform her that her valve has gotten to the severe stage and refer her to the valve program for consideration of TAVR.  If she would like to discuss this with me first she is welcome to come back to an office visit with me prior. (Routing comment)      =================================  Called pt. She would not like to be referred to the valve program at this time. She prefers to discuss

## 2019-05-14 LAB — INR BLD: 2 (ref 0.9–1.2)

## 2019-05-15 RX ORDER — LEVOTHYROXINE SODIUM 0.05 MG/1
50 TABLET ORAL EVERY MORNING
Qty: 90 TAB | Refills: 3 | Status: SHIPPED | OUTPATIENT
Start: 2019-05-15 | End: 2020-05-04 | Stop reason: SDUPTHER

## 2019-05-20 ENCOUNTER — ANTICOAGULATION VISIT (OUTPATIENT)
Dept: VASCULAR LAB | Facility: MEDICAL CENTER | Age: 84
End: 2019-05-20
Attending: INTERNAL MEDICINE
Payer: MEDICARE

## 2019-05-20 DIAGNOSIS — I48.0 PAF (PAROXYSMAL ATRIAL FIBRILLATION) (HCC): ICD-10-CM

## 2019-05-20 DIAGNOSIS — D68.32 HEMORRHAGIC DISORDER DUE TO EXTRINSIC CIRCULATING ANTICOAGULANTS (HCC): ICD-10-CM

## 2019-05-20 LAB
INR BLD: 2.8 (ref 0.9–1.2)
INR PPP: 2.8 (ref 2–3.5)

## 2019-05-20 PROCEDURE — 99211 OFF/OP EST MAY X REQ PHY/QHP: CPT

## 2019-05-20 PROCEDURE — 85610 PROTHROMBIN TIME: CPT

## 2019-05-20 NOTE — PROGRESS NOTES
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 Referring Geriatrics [phone]    [facility] Responsible  [phone]        Anticoagulation Patient Findings  Patient Findings     Negatives:   Signs/symptoms of thrombosis, Signs/symptoms of bleeding, Laboratory test error suspected, Change in health, Change in alcohol use, Change in activity, Upcoming invasive procedure, Emergency department visit, Upcoming dental procedure, Missed doses, Extra doses, Change in medications, Change in diet/appetite, Hospital admission, Bruising, Other complaints          HPI:  Shereen Dale seen in clinic today, on anticoagulation therapy with warfarin for PAF.  Changes to current medical/health status since last appt: none  Denies signs/symptoms of bleeding and/or thrombosis since the last appt.    Denies any interval changes to diet  Denies any interval changes to medications since last appt.   Denies any complications or cost restrictions with current therapy.   BP declined today.  Confirmed current dosing regimen.     Patient's previous INR was therapeutic at 2.0 on 19, at which time patient was instructed to continue with current warfarin regimen. She returns to clinic today to  recheck INR to ensure it is therapeutic and thus preventing possible clotting and/or bleeding/bruising complications.    A/P   INR is therapeutic today at 2.8.  Patient will reduce regimen slightly to 1.5mg on Wed and 3mg ROW. Patient will follow up again in 1 week.     Next appt: Thursday, May 30, 2019  10:30am    Samson EspinozaD

## 2019-05-28 ENCOUNTER — OFFICE VISIT (OUTPATIENT)
Dept: CARDIOLOGY | Facility: MEDICAL CENTER | Age: 84
End: 2019-05-28
Payer: MEDICARE

## 2019-05-28 VITALS
DIASTOLIC BLOOD PRESSURE: 70 MMHG | HEIGHT: 63 IN | SYSTOLIC BLOOD PRESSURE: 124 MMHG | OXYGEN SATURATION: 96 % | HEART RATE: 80 BPM | WEIGHT: 155 LBS | BODY MASS INDEX: 27.46 KG/M2

## 2019-05-28 DIAGNOSIS — I35.0 SEVERE AORTIC STENOSIS: ICD-10-CM

## 2019-05-28 DIAGNOSIS — R06.09 DYSPNEA ON EFFORT: ICD-10-CM

## 2019-05-28 DIAGNOSIS — I10 ESSENTIAL HYPERTENSION: ICD-10-CM

## 2019-05-28 DIAGNOSIS — I16.0 HYPERTENSIVE URGENCY: ICD-10-CM

## 2019-05-28 DIAGNOSIS — D68.32 HEMORRHAGIC DISORDER DUE TO EXTRINSIC CIRCULATING ANTICOAGULANTS (HCC): ICD-10-CM

## 2019-05-28 DIAGNOSIS — I48.0 PAF (PAROXYSMAL ATRIAL FIBRILLATION) (HCC): ICD-10-CM

## 2019-05-28 PROCEDURE — 99215 OFFICE O/P EST HI 40 MIN: CPT | Performed by: INTERNAL MEDICINE

## 2019-05-28 NOTE — PROGRESS NOTES
Subjective:   Shereen Dale is a 83 y.o. female who presents today for follow-up of PAF, HTN and Severe AS. She also has newly diagnosed sleep apnea intolerant of CPAP. She is on chronic oral anti-coagulation.    She returns today to discuss her aortic stenosis which has progressed to severe.  She has NYHA class II-III dyspnea on exertion which is multifactorial and at least partially related to her aortic stenosis.  She is very reluctant to undergo any procedures.  No syncope or heart failure.    Past Medical History:   Diagnosis Date   • AF (atrial fibrillation) (HCC)    • Arrhythmia     A-fib   • Backpain     Lower back pain   • Breast cancer (HCC)    • Breath shortness     uses 2-3 L O2 at night   • Cancer (HCC) 1993    right breast   • CATARACT     celestine IOL   • Chronic anticoagulation 5/2/2012   • Cough 5/2/2012   • Dental disorder     dentures   • Diabetes     oral medication   • Edema 5/2/2012   • Glaucoma    • Heart burn    • Heart murmur    • Heart valve disease    • Hypertension    • Hypothyroid    • Oxygen deficiency     uses 2.5-3 l at night   • Paroxysmal atrial fibrillation (HCC)    • Sleep apnea     no cpap   • tia     TIA x2   • Unspecified hemorrhagic conditions     takes coumadin     Past Surgical History:   Procedure Laterality Date   • KNEE ARTHROSCOPY Right 5/21/2015    Procedure: KNEE ARTHROSCOPY;  Surgeon: Emerson Garcia M.D.;  Location: [facility];  Service:    • MENISCECTOMY Right 5/21/2015    Procedure: LATERAL MENISCECTOMY;  Surgeon: Emerson Garcia M.D.;  Location: [facility];  Service:    • CATARACT PHACO WITH IOL  10/21/2013    Performed by Dominick Fernando M.D. at [facility]   • CATARACT PHACO WITH IOL  9/23/2013    Performed by Dominick Fernando M.D. at [facility]   • CHOLECYSTECTOMY     • HYSTERECTOMY RADICAL     • LUMPECTOMY      R breast   • PB RADIATION THERAPY PLAN SIMPLE       Family History   Problem Relation Age  of Onset   • Heart Attack Mother    • Heart Disease Father      History   Smoking Status   • Former Smoker   • Packs/day: 0.50   • Years: 2.50   • Types: Cigarettes   • Quit date: 1/1/1964   Smokeless Tobacco   • Never Used     Allergies   Allergen Reactions   • Darvon [Propoxyphene Hcl]      shock   • Iodine      Shock  - IV   • Latex      Swelling = hands with glove use   • Penicillins Anaphylaxis   • Propoxyphene Hcl Anaphylaxis   • Tetanus Antitoxin Myalgia   • Tetracycline Anaphylaxis     Outpatient Encounter Prescriptions as of 5/28/2019   Medication Sig Dispense Refill   • levothyroxine (SYNTHROID) 50 MCG Tab Take 1 Tab by mouth every morning. 90 Tab 3   • metFORMIN (GLUCOPHAGE) 500 MG Tab Take 1 Tab by mouth 3 times a day. 300 Tab 3   • warfarin (COUMADIN) 3 MG Tab Take 1 Tab by mouth every day. 90 Tab 3   • gabapentin (NEURONTIN) 100 MG Cap Take 1 Cap by mouth every day. 90 Cap 0   • Blood Glucose Monitoring Suppl Device Meter: Dispense free style meter. Sig. Use 2 times daily as directed for blood sugar monitoring. dx e11.9 1 Device 0   • amLODIPine (NORVASC) 5 MG Tab Take 2.5-5 mg by mouth 2 Times a Day. 5 mg in the AM  2.5 mg in the PM     • glipiZIDE SR (GLUCOTROL) 2.5 MG TABLET SR 24 HR Take 2.5 mg by mouth every day.     • dorzolamide-timolol (COSOPT) 22.3-6.8 MG/ML Solution Place 1 Drop in both eyes 2 times a day.     • carvedilol (COREG) 25 MG Tab Take 1 Tab by mouth 2 times a day. 180 Tab 3   • telmisartan (MICARDIS) 40 MG Tab Take 1 Tab by mouth 2 Times a Day. 180 Tab 2   • Glucosamine HCl (GLUCOSAMINE PO) Take 1 Tab by mouth every morning. Pt unsure of dose     • Polyethyl Glycol-Propyl Glycol (SYSTANE OP) 1-2 Drops by Ophthalmic route 4 times a day as needed.     • docosahexanoic acid (OMEGA 3 FA) 1000 MG CAPS Take 1 Cap by mouth every morning.     • VITAMIN D PO Take 2,000 Units by mouth every morning.     • CALCIUM 500 PO Take 1 Tab by mouth every morning.     • acetaminophen (TYLENOL) 325 MG  "Tab Take 2 Tabs by mouth every 6 hours as needed (Mild Pain; (Pain scale 1-3); Temp greater than 100.5 F). 30 Tab 0   • gabapentin (NEURONTIN) 100 MG Cap Take 2 Caps by mouth every evening. 90 Cap 0   • cloNIDine (CATAPRES) 0.1 MG Tab Take 0.1 mg by mouth 2 times a day as needed. If SBP > 160     • famotidine (PEPCID) 20 MG Tab Take 20 mg by mouth every day.       No facility-administered encounter medications on file as of 5/28/2019.      Review of Systems   All other systems reviewed and are negative.  Today I reviewed the medical, surgical, social and family histories with the patient. As per HPI, otherwise noncontributory.       Objective:   /70 (BP Location: Left arm, Patient Position: Sitting, BP Cuff Size: Adult)   Pulse 80   Ht 1.6 m (5' 3\")   Wt 70.3 kg (155 lb)   LMP 01/01/1985   SpO2 96%   BMI 27.46 kg/m²     Physical Exam   Constitutional: She is oriented to person, place, and time. She appears well-developed and well-nourished. No distress.   HENT:   Head: Normocephalic and atraumatic.   Mouth/Throat: Oropharynx is clear and moist. No oropharyngeal exudate.   Eyes: Pupils are equal, round, and reactive to light. Conjunctivae are normal. No scleral icterus.   Neck: Normal range of motion. Neck supple. No JVD present. No thyromegaly present.   Cardiovascular: Normal rate, regular rhythm and intact distal pulses.  Exam reveals no gallop and no friction rub.    Murmur ( 3/6 systolic ejection murmur impinging upon S2) heard.  Pulses:       Carotid pulses are 2+ on the right side, and 2+ on the left side.       Radial pulses are 2+ on the right side, and 2+ on the left side.        Popliteal pulses are 2+ on the right side, and 2+ on the left side.        Dorsalis pedis pulses are 2+ on the right side, and 2+ on the left side.        Posterior tibial pulses are 2+ on the right side, and 2+ on the left side.   Pulmonary/Chest: Effort normal and breath sounds normal. She has no wheezes. She has no " rales.   Abdominal: Soft. Bowel sounds are normal. She exhibits no distension. There is no tenderness.   Musculoskeletal: She exhibits no edema or tenderness.   Neurological: She is alert and oriented to person, place, and time. No cranial nerve deficit.   Skin: Skin is warm and dry. No rash noted. She is not diaphoretic. No erythema.   Psychiatric: She has a normal mood and affect. Her behavior is normal.   Vitals reviewed.    LABS:  Lab Results   Component Value Date/Time    CHOLSTRLTOT 160 04/05/2019 07:42 AM    LDL 86 04/05/2019 07:42 AM    HDL 54 04/05/2019 07:42 AM    TRIGLYCERIDE 102 04/05/2019 07:42 AM       Lab Results   Component Value Date/Time    WBC 7.2 04/05/2019 07:42 AM    RBC 3.94 (L) 04/05/2019 07:42 AM    HEMOGLOBIN 12.5 04/05/2019 07:42 AM    HEMATOCRIT 37.0 04/05/2019 07:42 AM    MCV 93.9 04/05/2019 07:42 AM    NEUTSPOLYS 57.30 04/05/2019 07:42 AM    LYMPHOCYTES 28.00 04/05/2019 07:42 AM    MONOCYTES 9.40 04/05/2019 07:42 AM    EOSINOPHILS 3.70 04/05/2019 07:42 AM    BASOPHILS 0.80 04/05/2019 07:42 AM     Lab Results   Component Value Date/Time    SODIUM 135 04/18/2019 07:37 AM    POTASSIUM 4.2 04/18/2019 07:37 AM    CHLORIDE 99 04/18/2019 07:37 AM    CO2 26 04/18/2019 07:37 AM    GLUCOSE 112 (H) 04/18/2019 07:37 AM    BUN 11 04/18/2019 07:37 AM    CREATININE 0.71 04/18/2019 07:37 AM    CREATININE 0.7 08/02/2008 08:30 AM     Lab Results   Component Value Date    HBA1C 6.6 (H) 04/05/2019      Lab Results   Component Value Date/Time    ALKPHOSPHAT 37 04/05/2019 07:42 AM    ASTSGOT 20 04/05/2019 07:42 AM    ALTSGPT 25 04/05/2019 07:42 AM    TBILIRUBIN 0.5 04/05/2019 07:42 AM      Lab Results   Component Value Date/Time    BNPBTYPENAT 33 05/11/2017 05:05 PM      No results found for: TSH  Lab Results   Component Value Date/Time    PROTHROMBTM 19.5 (H) 03/28/2019 02:41 AM    INR 2.80 05/20/2019      ECHO CONCLUSIONS (5/9/2019):  Compared to the prior study done 10/18/18 - the aortic stenosis is now    severe.  Normal left ventricular chamber size.  Left ventricular ejection fraction is visually estimated to be 65%.  Grade I diastolic dysfunction.  Moderate left ventricular hypertrophy.  Severe aortic stenosis. Vmax is 4.04 m/s. Aortic valve area calculated   from the continuity equation is 0.47cm2.  Right ventricular systolic pressure is estimated to be 30 mmHg.    ECHO CONCLUSIONS (10/18/2018):  Prior echo 04/04/18.  Left ventricle is small in size.  Left ventricular ejection fraction is visually estimated to be 65%.  Moderate concentric left ventricular hypertrophy.  Calcified aortic valve leaflets.  Moderate aortic stenosis.  Vmax is 3.7 m/s.  Normal inferior vena cava size and inspiratory collapse.  Compared to the report of the study done - there has been no   significant change.     ECHO CONCLUSIONS (4/4/2018):  Prior echo 1/9/2017.  Moderate left ventricular hypertrophy.  Left ventricular ejection fraction is visually estimated to be greater   than 75%.  Grade I diastolic dysfunction.  Moderate aortic stenosis.  Vmax is 3.79  m/s. Transvalvular gradients are - Peak: 58 mmHg, Mean:    35 mmHg.  Compared to the report of the study done - there has been a slight   progression of aortic stenosis, still moderate in severity.     ECHO CONCLUSIONS (1/19/2017):  Compared to the images of the prior study done on 01/29/15 - The AS is   now moderate.  Normal left ventricular size and systolic function.  Left ventricular ejection fraction is visually estimated to be 65%.  Grade I diastolic dysfunction.  Moderate aortic stenosis.  Transvalvular gradients are - Peak: 37 mmHg, Mean: 21 mmHg.  Normal inferior vena cava size and inspiratory collapse.      Assessment:     1. Severe aortic stenosis     2. Essential hypertension     3. Hemorrhagic disorder due to extrinsic circulating anticoagulants (HCC)  CBC WITHOUT DIFFERENTIAL   4. Hypertensive urgency     5. PAF (paroxysmal atrial fibrillation) (HCC)     6. Dyspnea on  effort         Medical Decision Making:  Today's Assessment / Status / Plan:     She has severe symptomatic aortic stenosis.  She also has multiple other medical comorbidities which contribute to her dyspnea on exertion.  We had a carol discussion regarding severe aortic stenosis, the reduction in life expectancy associated with medical management versus mechanical replacement.  I feel she would be an excellent transcatheter aortic valve replacement candidate and we discussed this and materials were administered.  She does not wish to see the TAVR team at this time were discussed with the surgeon.  She would like to think about this.  I did discuss that it is much preferable to proceed in elective fashion rather than an urgent fashion when she becomes more symptomatic and we discussed the expected progression and that there is no medical therapy to delay or alter the natural history of her aortic stenosis.  She understands this.  Otherwise she should continue her oral anticoagulation for atrial fibrillation and other medical treatment.  She will follow-up in October.                    Physical Exam   Constitutional: She is oriented to person, place, and time. She appears well-developed and well-nourished. No distress.   HENT:   Head: Normocephalic and atraumatic.   Mouth/Throat: Oropharynx is clear and moist. No oropharyngeal exudate.   Eyes: Pupils are equal, round, and reactive to light. Conjunctivae are normal. No scleral icterus.   Neck: Normal range of motion. Neck supple. No JVD present. No thyromegaly present.   Cardiovascular: Normal rate, regular rhythm and intact distal pulses.  Exam reveals no gallop and no friction rub.    Murmur ( 3/6 systolic ejection murmur impinging upon S2) heard.  Pulses:       Carotid pulses are 2+ on the right side, and 2+ on the left side.       Radial pulses are 2+ on the right side, and 2+ on the left side.        Popliteal pulses are 2+ on the right side, and 2+ on the left  side.        Dorsalis pedis pulses are 2+ on the right side, and 2+ on the left side.        Posterior tibial pulses are 2+ on the right side, and 2+ on the left side.   Pulmonary/Chest: Effort normal and breath sounds normal. She has no wheezes. She has no rales.   Abdominal: Soft. Bowel sounds are normal. She exhibits no distension. There is no tenderness.   Musculoskeletal: She exhibits no edema or tenderness.   Neurological: She is alert and oriented to person, place, and time. No cranial nerve deficit.   Skin: Skin is warm and dry. No rash noted. She is not diaphoretic. No erythema.   Psychiatric: She has a normal mood and affect. Her behavior is normal.   Vitals reviewed.

## 2019-05-30 ENCOUNTER — ANTICOAGULATION VISIT (OUTPATIENT)
Dept: VASCULAR LAB | Facility: MEDICAL CENTER | Age: 84
End: 2019-05-30
Attending: INTERNAL MEDICINE
Payer: MEDICARE

## 2019-05-30 DIAGNOSIS — I48.0 PAF (PAROXYSMAL ATRIAL FIBRILLATION) (HCC): ICD-10-CM

## 2019-05-30 DIAGNOSIS — D68.32 HEMORRHAGIC DISORDER DUE TO EXTRINSIC CIRCULATING ANTICOAGULANTS (HCC): ICD-10-CM

## 2019-05-30 LAB — INR PPP: 2 (ref 2–3.5)

## 2019-05-30 PROCEDURE — 99211 OFF/OP EST MAY X REQ PHY/QHP: CPT

## 2019-05-30 PROCEDURE — 85610 PROTHROMBIN TIME: CPT

## 2019-05-30 NOTE — PROGRESS NOTES
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 Referring Geriatrics [phone]    [facility] Responsible  [phone]        Anticoagulation Patient Findings      HPI:  Shereen Dale seen in clinic today, on anticoagulation therapy with warfarin for PAF.   Changes to current medical/health status since last appt: none  Denies signs/symptoms of bleeding and/or thrombosis since the last appt.    Denies any interval changes to diet  Denies any interval changes to medications since last appt.   Denies any complications or cost restrictions with current therapy.   Declines vitals.     A/P   INR  therapeutic.   Pt is to continue with current warfarin dosing regimen.     Follow up appointment in 1 week(s).    Ayush Pérez, AlexisD

## 2019-05-31 LAB — INR BLD: 2 (ref 0.9–1.2)

## 2019-06-07 ENCOUNTER — ANTICOAGULATION VISIT (OUTPATIENT)
Dept: VASCULAR LAB | Facility: MEDICAL CENTER | Age: 84
End: 2019-06-07
Attending: INTERNAL MEDICINE
Payer: MEDICARE

## 2019-06-07 DIAGNOSIS — D68.32 HEMORRHAGIC DISORDER DUE TO EXTRINSIC CIRCULATING ANTICOAGULANTS (HCC): ICD-10-CM

## 2019-06-07 DIAGNOSIS — I48.0 PAF (PAROXYSMAL ATRIAL FIBRILLATION) (HCC): ICD-10-CM

## 2019-06-07 LAB — INR PPP: 2.7 (ref 2–3.5)

## 2019-06-07 PROCEDURE — 85610 PROTHROMBIN TIME: CPT

## 2019-06-07 PROCEDURE — 99211 OFF/OP EST MAY X REQ PHY/QHP: CPT

## 2019-06-07 NOTE — PROGRESS NOTES
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 Referring Geriatrics [phone]    [facility] Responsible  [phone]        Anticoagulation Patient Findings  Patient Findings     Negatives:   Signs/symptoms of thrombosis, Signs/symptoms of bleeding, Laboratory test error suspected, Change in health, Change in alcohol use, Change in activity, Upcoming invasive procedure, Emergency department visit, Upcoming dental procedure, Missed doses, Extra doses, Change in medications, Change in diet/appetite, Hospital admission, Bruising, Other complaints              History of Present Illness: follow up appointment for chronic anticoagulation with the high risk medication, warfarin for atrial fibrillatoin    Pt remains therapeutic today. Continue current dosing regimen.  Follow up in 1 weeks, to reduce the risk of adverse events related to this high risk medication, warfarin.    Alysa Molina Clinical Pharmacist  Pt remains therapeutic today.

## 2019-06-10 LAB — INR BLD: 2.7 (ref 0.9–1.2)

## 2019-06-12 ENCOUNTER — OFFICE VISIT (OUTPATIENT)
Dept: MEDICAL GROUP | Facility: MEDICAL CENTER | Age: 84
End: 2019-06-12
Payer: MEDICARE

## 2019-06-12 VITALS
OXYGEN SATURATION: 94 % | DIASTOLIC BLOOD PRESSURE: 64 MMHG | SYSTOLIC BLOOD PRESSURE: 130 MMHG | WEIGHT: 156.09 LBS | HEIGHT: 60 IN | HEART RATE: 77 BPM | BODY MASS INDEX: 30.64 KG/M2 | TEMPERATURE: 96.8 F

## 2019-06-12 DIAGNOSIS — Z00.00 MEDICARE ANNUAL WELLNESS VISIT, INITIAL: ICD-10-CM

## 2019-06-12 DIAGNOSIS — E03.9 ACQUIRED HYPOTHYROIDISM: ICD-10-CM

## 2019-06-12 DIAGNOSIS — I48.0 PAROXYSMAL ATRIAL FIBRILLATION (HCC): ICD-10-CM

## 2019-06-12 DIAGNOSIS — E11.69 DIABETES MELLITUS TYPE 2 IN OBESE (HCC): ICD-10-CM

## 2019-06-12 DIAGNOSIS — E66.9 DIABETES MELLITUS TYPE 2 IN OBESE (HCC): ICD-10-CM

## 2019-06-12 DIAGNOSIS — I10 ESSENTIAL HYPERTENSION: ICD-10-CM

## 2019-06-12 DIAGNOSIS — I35.0 SEVERE AORTIC STENOSIS: ICD-10-CM

## 2019-06-12 DIAGNOSIS — Z12.11 COLON CANCER SCREENING: ICD-10-CM

## 2019-06-12 DIAGNOSIS — Z00.00 HEALTH CARE MAINTENANCE: ICD-10-CM

## 2019-06-12 DIAGNOSIS — E66.9 OBESITY (BMI 30.0-34.9): ICD-10-CM

## 2019-06-12 PROCEDURE — G0439 PPPS, SUBSEQ VISIT: HCPCS | Performed by: FAMILY MEDICINE

## 2019-06-12 ASSESSMENT — ACTIVITIES OF DAILY LIVING (ADL): BATHING_REQUIRES_ASSISTANCE: 0

## 2019-06-12 ASSESSMENT — ENCOUNTER SYMPTOMS: GENERAL WELL-BEING: FAIR

## 2019-06-12 ASSESSMENT — PATIENT HEALTH QUESTIONNAIRE - PHQ9: CLINICAL INTERPRETATION OF PHQ2 SCORE: 0

## 2019-06-12 NOTE — PROGRESS NOTES
Chief Complaint   Patient presents with   • Annual Wellness Visit         HPI:  Shereen is a 83 y.o. here for Medicare Annual Wellness Visit    Medicare annual wellness visit, initial  Preventive measures and chronic medical issues reviewed    Essential hypertension  Has been adequately controlled on current medication. Denies headache, chest pain, and SOB.  Patient has been on amlodipine 5 mg daily, Micardis 40 mg daily, carvedilol 25 mg twice a day.    Paroxysmal atrial fibrillation (HCC)  Patient has been asymptomatic.  Denies chest pain, and palpitation.  Has been on carvedilol 25 mg twice a day,  Coumadin, she follows up regularly with his Coumadin clinic.    Severe aortic stenosis  Denies dizziness, chest pain, syncopal episodes.Patient declined theTVAR procedure(patient discussed it with cardiologist).     Acquired hypothyroidism  She has been tolerating Levothyroxine, no palpitation, no cold or heat intolerance. Patient  levothyroxine 50 mcg daily.    Diabetes mellitus type 2 in obese (HCC)  Blood glucose has been adequate controlled.  Last A1c was 6.6. on metformin 500 mg 3 times a day, and glipizide SR 0.5 mg daily.  Denies polyuria, polydipsia and hypoglycemic episodes.    Obesity (BMI 30.0-34.9)  BMI is 30. Patient is counseled about the medical rationale for weight loss in obese individuals is that obesity is associated with a significant increase in mortality, and many health risks including type 2 diabetes mellitus, hypertension, dyslipidemia, and coronary heart disease. The benefits of weight loss include a reduction in the rate of progression from impaired glucose tolerance to diabetes, blood pressure in hypertensive patients, and lipid levels in higher risk patients. Other noncardiac benefits of weight loss include reductions in urinary incontinence, sleep apnea, and depression, and improvements in quality of life, physical functioning, and mobility. Recommend lifestyle modification: exercise 30  minutes per day 5 days per week. Recommend also portion control.    Patient stated that are all vaccination up-to-date, however daughter is a pharmacist she has been taking care of that.      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) 05/02/2012     Priority: High   • Acute, but ill-defined, cerebrovascular disease 04/30/2012     Priority: High   • Cough 05/02/2012     Priority: Low   • Edema 05/02/2012     Priority: Low   • Hyponatremia 03/25/2019   • Advanced directives, counseling/discussion 09/26/2018   • Hypertensive urgency 05/11/2017   • Diastolic dysfunction 02/02/2016   • Tear of lateral cartilage or meniscus of knee, current 05/21/2015   • PAF (paroxysmal atrial fibrillation) ([facility]) 01/12/2015   • Dyspnea on effort 01/02/2014   • Diabetes ([facility]) 12/26/2013   • Aortic stenosis 07/02/2013       Current Outpatient Prescriptions   Medication Sig Dispense Refill   • levothyroxine (SYNTHROID) 50 MCG Tab Take 1 Tab by mouth every morning. 90 Tab 3   • metFORMIN (GLUCOPHAGE) 500 MG Tab Take 1 Tab by mouth 3 times a day. 300 Tab 3   • warfarin (COUMADIN) 3 MG Tab Take 1 Tab by mouth every day. 90 Tab 3   • acetaminophen (TYLENOL) 325 MG Tab Take 2 Tabs by mouth every 6 hours as needed (Mild Pain; (Pain scale 1-3); Temp greater than 100.5 F). 30 Tab 0   • gabapentin (NEURONTIN) 100 MG Cap Take 1 Cap by mouth every day. 90 Cap 0   • Blood Glucose Monitoring Suppl Device Meter: Dispense free style meter. Sig. Use 2 times daily as directed for blood sugar monitoring. dx e11.9 1 Device 0   • amLODIPine (NORVASC) 5 MG Tab Take 2.5-5 mg by mouth 2 Times a Day. 5 mg in the AM  2.5 mg in the PM     • cloNIDine (CATAPRES) 0.1 MG Tab Take 0.1 mg by mouth 2 times a day as needed. If SBP > 160     • glipiZIDE SR (GLUCOTROL) 2.5 MG TABLET SR 24 HR Take  2.5 mg by mouth every day.     • dorzolamide-timolol (COSOPT) 22.3-6.8 MG/ML Solution Place 1 Drop in both eyes 2 times a day.     • famotidine (PEPCID) 20 MG Tab Take 20 mg by mouth every day.     • carvedilol (COREG) 25 MG Tab Take 1 Tab by mouth 2 times a day. 180 Tab 3   • telmisartan (MICARDIS) 40 MG Tab Take 1 Tab by mouth 2 Times a Day. 180 Tab 2   • Glucosamine HCl (GLUCOSAMINE PO) Take 1 Tab by mouth every morning. Pt unsure of dose     • Polyethyl Glycol-Propyl Glycol (SYSTANE OP) 1-2 Drops by Ophthalmic route 4 times a day as needed.     • docosahexanoic acid (OMEGA 3 FA) 1000 MG CAPS Take 1 Cap by mouth every morning.     • VITAMIN D PO Take 2,000 Units by mouth every morning.     • CALCIUM 500 PO Take 1 Tab by mouth every morning.     • gabapentin (NEURONTIN) 100 MG Cap Take 2 Caps by mouth every evening. (Patient not taking: Reported on 6/12/2019) 90 Cap 0     No current facility-administered medications for this visit.         Patient is taking medications as noted in medication list.  Current supplements as per medication list.     Allergies: Darvon [propoxyphene hcl]; Iodine; Latex; Penicillins; Propoxyphene hcl; Tetanus antitoxin; and Tetracycline    Current social contact/activities: PT reports living with wife, visits with family and friends, Jewish, casino      Is patient current with immunizations? No, due for HEPATITIS B, PNEUMOVAX (PPSV23) and TDAP. Patient is interested in receiving NONE today.    She  reports that she quit smoking about 55 years ago. Her smoking use included Cigarettes. She has a 5.50 pack-year smoking history. She has never used smokeless tobacco. She reports that she drinks alcohol. She reports that she does not use drugs.  Counseling given: Not Answered        DPA/Advanced directive: Patient has Advanced Directive on file.     ROS:    Gait: Uses no assistive device   Ostomy: No   Other tubes: No   Amputations: No   Chronic oxygen use Yes   Last eye exam 6/10/19   Wears  hearing aids: No   : Reports urinary leakage during the last 6 months that has not interfered at all with their daily activities or sleep.     Screening:    DIABETES    Has patient ever had diabetes education? Yes, and is NOT interested in more at this time.    Depression Screening    Little interest or pleasure in doing things?  0 - not at all  Feeling down, depressed, or hopeless? 0 - not at all  Patient Health Questionnaire Score: 0    If depressive symptoms identified deferred to follow up visit unless specifically addressed in assessment and plan.    Interpretation of PHQ-9 Total Score   Score Severity   1-4 No Depression   5-9 Mild Depression   10-14 Moderate Depression   15-19 Moderately Severe Depression   20-27 Severe Depression    Screening for Cognitive Impairment    Three Minute Recall (village, kitchen, baby)  2/3    Mark Anthony clock face with all 12 numbers and set the hands to show 10 past 10.  Yes 5/5  If cognitive concerns identified, deferred for follow up unless specifically addressed in assessment and plan.    Fall Risk Assessment    Has the patient had two or more falls in the last year or any fall with injury in the last year?  No  If fall risk identified, deferred for follow up unless specifically addressed in assessment and plan.    Safety Assessment    Throw rugs on floor.  No  Handrails on all stairs.  Yes  Good lighting in all hallways.  Yes  Difficulty hearing.  No  Patient counseled about all safety risks that were identified.    Functional Assessment ADLs    Are there any barriers preventing you from cooking for yourself or meeting nutritional needs?  No.    Are there any barriers preventing you from driving safely or obtaining transportation?  No.    Are there any barriers preventing you from using a telephone or calling for help?  No.    Are there any barriers preventing you from shopping?  No.    Are there any barriers preventing you from taking care of your own finances?  No.    Are  there any barriers preventing you from managing your medications?  No.    Are there any barriers preventing you from showering, bathing or dressing yourself?  No.    Are you currently engaging in any exercise or physical activity?  Yes.     What is your perception of your health?  Fair.    Health Maintenance Summary                Annual Wellness Visit Overdue 1935     IMM HEP B VACCINE Overdue 12/21/1955     IMM DTaP/Tdap/Td Vaccine Overdue 12/21/1955     PAP SMEAR Overdue 12/21/1957     COLONOSCOPY Overdue 12/21/1986     BONE DENSITY Overdue 3/17/2009      Done 3/17/2004 DS-BONE DENSITY STUDY (DEXA)    IMM PNEUMOCOCCAL 65+ (ADULT) LOW/MEDIUM RISK SERIES Overdue 9/30/2016      Done 9/30/2015 Imm Admin: Pneumococcal Conjugate Vaccine (Prevnar/PCV-13)    RETINAL SCREENING Next Due 9/10/2019      Done 9/10/2018 REFERRAL FOR RETINAL SCREENING EXAM    DIABETES MONOFILAMENT / LE EXAM Next Due 9/12/2019      Done 9/12/2018 AMB DIABETIC MONOFILAMENT LOWER EXTREMITY EXAM    A1C SCREENING Next Due 10/5/2019      Done 4/5/2019 HEMOGLOBIN A1C      Patient has more history with this topic...    MAMMOGRAM Next Due 2/11/2020      Done 2/11/2019 MA-SCREENING MAMMO BILAT W/TOMOSYNTHESIS W/CAD     Patient has more history with this topic...    URINE ACR / MICROALBUMIN Next Due 3/25/2020      Done 3/25/2019 URINE PROTEIN     Patient has more history with this topic...    FASTING LIPID PROFILE Next Due 4/5/2020      Done 4/5/2019 LIPID PROFILE     Patient has more history with this topic...    SERUM CREATININE Next Due 4/18/2020      Done 4/18/2019 BASIC METABOLIC PANEL      Patient has more history with this topic...          Patient Care Team:  Ganesh Mckeon M.D. as PCP - General (Geriatrics)  [facility] as Pharmacist  Kristin Cornell M.D. as Consulting Physician (Family Medicine)    Social History   Substance Use Topics   • Smoking status: Former Smoker     Packs/day: 0.50     Years: 11.00      "Types: Cigarettes     Quit date: 1/1/1964   • Smokeless tobacco: Never Used      Comment: Started at    • Alcohol use 0.0 oz/week      Comment: rarely     Family History   Problem Relation Age of Onset   • Heart Attack Mother    • Hypertension Mother    • Heart Disease Father    • Hypertension Father    • No Known Problems Sister    • No Known Problems Brother    • Leukemia Sister    • Sleep Apnea Sister    • No Known Problems Sister    • No Known Problems Sister      She  has a past medical history of AF (atrial fibrillation) (HCC); Arrhythmia; Backpain; Breast cancer (HCC); Breath shortness; Cancer (HCC) (1993); CATARACT; Chronic anticoagulation (5/2/2012); Cough (5/2/2012); Dental disorder; Diabetes; Edema (5/2/2012); Glaucoma; Heart burn; Heart murmur; Heart valve disease; Hypertension; Hypothyroid; Oxygen deficiency; Paroxysmal atrial fibrillation (HCC); Sleep apnea; tia; and Unspecified hemorrhagic conditions.   Past Surgical History:   Procedure Laterality Date   • KNEE ARTHROSCOPY Right 5/21/2015    Procedure: KNEE ARTHROSCOPY;  Surgeon: Emerson Garcia M.D.;  Location: SURGERY [facility];  Service:    • MENISCECTOMY Right 5/21/2015    Procedure: LATERAL MENISCECTOMY;  Surgeon: Emerson Garcia M.D.;  Location: SURGERY [facility];  Service:    • CATARACT PHACO WITH IOL  10/21/2013    Performed by Dominick Fernando M.D. at [facility]   • CATARACT PHACO WITH IOL  9/23/2013    Performed by Dominick Fernando M.D. at [facility]   • CHOLECYSTECTOMY     • HYSTERECTOMY RADICAL     • LUMPECTOMY      R breast   • PB RADIATION THERAPY PLAN SIMPLE             Exam:     /64 (BP Location: Left arm, Patient Position: Sitting, BP Cuff Size: Adult)   Pulse 77   Temp 36 °C (96.8 °F) (Temporal)   Ht 1.535 m (5' 0.43\")   Wt 70.8 kg (156 lb 1.4 oz)   SpO2 94%  Body mass index is 30.05 kg/m².    Hearing, good.    Dentition, good  Alert, oriented in no acute distress.  Eye " contact is good, speech goal directed, affect calm      Assessment and Plan. The following treatment and monitoring plan is recommended:    83 y.o. female     1. Medicare annual wellness visit, initial  Preventive measures and chronic medical issues reviewed    - Initial Annual Wellness Visit - Includes PPPS ()    2. Essential hypertension  Adequately controlled.  Continue on amlodipine 5 mg daily, Micardis 40 mg daily, carvedilol 25 mg twice a day.    - Initial Annual Wellness Visit - Includes PPPS ()    3. Paroxysmal atrial fibrillation (HCC)  Stable.  Asymptomatic.  Continue on carvedilol 25 mg twice a day, continue Coumadin, continue follow-up with his Coumadin clinic.    - Initial Annual Wellness Visit - Includes PPPS ()    4. Severe aortic stenosis  Patient declined theTVAR procedure.  Denies dizziness, chest pain, syncopal episodes.    - Initial Annual Wellness Visit - Includes PPPS ()    5. Acquired hypothyroidism  He has been tolerating Levothyroxine, no palpitation, no cold or heat intolerance  Continue levothyroxine 50 mcg daily.    - Initial Annual Wellness Visit - Includes PPPS ()    6. Diabetes mellitus type 2 in obese (HCC)  Adequate controlled.  Last A1c was 6.6.  Continue on metformin 500 mg 3 times a day, and glipizide SR 0.5 mg daily.    - Initial Annual Wellness Visit - Includes PPPS ()    7. Obesity (BMI 30.0-34.9)  BMI is 30.  Patient is counseled about lifestyle modification    - Initial Annual Wellness Visit - Includes PPPS ()  - Patient identified as having weight management issue.  Appropriate orders and counseling given.    8. Colon cancer screening    - OCCULT BLOOD FECES IMMUNOASSAY (FIT); Future  - Initial Annual Wellness Visit - Includes PPPS ()    9. Health care maintenance  Patient stated that are all vaccination up-to-date, however daughter is a pharmacist she has been taking care of that.    - Initial Annual Wellness Visit - Includes PPPS  ()        Services suggested:   Health Care Screening recommendations as per orders if indicated.  Referrals offered: PT/OT/Nutrition counseling/Behavioral Health/Smoking cessation as per orders if indicated.    Discussion today about general wellness and lifestyle habits:    · Prevent falls and reduce trip hazards; Cautioned about securing or removing rugs.  · Have a working fire alarm and carbon monoxide detector;   · Engage in regular physical activity and social activities       Follow-up: No Follow-up on file.

## 2019-06-14 ENCOUNTER — ANTICOAGULATION VISIT (OUTPATIENT)
Dept: VASCULAR LAB | Facility: MEDICAL CENTER | Age: 84
End: 2019-06-14
Attending: INTERNAL MEDICINE
Payer: MEDICARE

## 2019-06-14 DIAGNOSIS — I48.0 PAF (PAROXYSMAL ATRIAL FIBRILLATION) (HCC): ICD-10-CM

## 2019-06-14 DIAGNOSIS — D68.32 HEMORRHAGIC DISORDER DUE TO EXTRINSIC CIRCULATING ANTICOAGULANTS (HCC): ICD-10-CM

## 2019-06-14 LAB — INR PPP: 2.2 (ref 2–3.5)

## 2019-06-14 PROCEDURE — 85610 PROTHROMBIN TIME: CPT

## 2019-06-14 PROCEDURE — 99211 OFF/OP EST MAY X REQ PHY/QHP: CPT

## 2019-06-14 NOTE — PROGRESS NOTES
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 Referring Geriatrics [phone]    [facility] Responsible  [phone]        Anticoagulation Patient Findings  Patient Findings     Negatives:   Signs/symptoms of thrombosis, Signs/symptoms of bleeding, Laboratory test error suspected, Change in health, Change in alcohol use, Change in activity, Upcoming invasive procedure, Emergency department visit, Upcoming dental procedure, Missed doses, Extra doses, Change in medications, Change in diet/appetite, Hospital admission, Bruising, Other complaints          HPI:  Shereen Dale seen in clinic today, on anticoagulation therapy with warfarin for PAF.  Changes to current medical/health status since last appt: none  Denies signs/symptoms of bleeding and/or thrombosis since the last appt.    Denies any interval changes to diet  Denies any interval changes to medications since last appt.   Denies any complications or cost restrictions with current therapy.   BP declined today.  Confirmed current dosing regimen.     Patient's previous INR was therapeutic at 2.7 on 19, at which time patient was instructed to continue with current  warfarin regimen. She returns to clinic today to recheck INR to ensure it is therapeutic and thus preventing possible clotting and/or bleeding/bruising complications.    A/P   INR is therapeutic today at 2.2.   Patient instructed to continue with the current warfarin dosing regimen, and asked to follow up again in 1 week.    Next appt: Friday, June 21, 2019  10:15    aSmson EspinozaD

## 2019-06-17 RX ORDER — FAMOTIDINE 20 MG/1
20 TABLET, FILM COATED ORAL DAILY
Qty: 90 TAB | Refills: 3 | Status: SHIPPED | OUTPATIENT
Start: 2019-06-17 | End: 2020-04-13 | Stop reason: SDUPTHER

## 2019-06-17 RX ORDER — FAMOTIDINE 20 MG/1
TABLET, FILM COATED ORAL
Qty: 60 TAB | Refills: 3 | Status: SHIPPED | OUTPATIENT
Start: 2019-06-17 | End: 2021-03-31

## 2019-06-21 ENCOUNTER — ANTICOAGULATION VISIT (OUTPATIENT)
Dept: VASCULAR LAB | Facility: MEDICAL CENTER | Age: 84
End: 2019-06-21
Attending: INTERNAL MEDICINE
Payer: MEDICARE

## 2019-06-21 DIAGNOSIS — D68.32 HEMORRHAGIC DISORDER DUE TO EXTRINSIC CIRCULATING ANTICOAGULANTS (HCC): ICD-10-CM

## 2019-06-21 DIAGNOSIS — I48.0 PAF (PAROXYSMAL ATRIAL FIBRILLATION) (HCC): ICD-10-CM

## 2019-06-21 LAB
INR BLD: 2.5 (ref 0.9–1.2)
INR PPP: 2.5 (ref 2–3.5)

## 2019-06-21 PROCEDURE — 99211 OFF/OP EST MAY X REQ PHY/QHP: CPT

## 2019-06-21 PROCEDURE — 85610 PROTHROMBIN TIME: CPT

## 2019-06-21 NOTE — PROGRESS NOTES
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 Referring Geriatrics [phone]    [facility] Responsible  [phone]        Anticoagulation Patient Findings  Patient Findings     Negatives:   Signs/symptoms of thrombosis, Signs/symptoms of bleeding, Laboratory test error suspected, Change in health, Change in alcohol use, Change in activity, Upcoming invasive procedure, Emergency department visit, Upcoming dental procedure, Missed doses, Extra doses, Change in medications, Change in diet/appetite, Hospital admission, Bruising, Other complaints          HPI:  Shereen Dale seen in clinic today, on anticoagulation therapy with warfarin for PAF.  Changes to current medical/health status since last appt: none  Denies signs/symptoms of bleeding and/or thrombosis since the last appt.    Denies any interval changes to diet  Denies any interval changes to medications since last appt.   Denies any complications or cost restrictions with current therapy.   BP declined today.  Confirmed current dosing regimen.     Patient's previous INR was therapeutic at 2.2 on 19, at which time patient was instructed to continue with current  warfarin regimen. She returns to clinic today to recheck INR to ensure it is therapeutic and thus preventing possible clotting and/or bleeding/bruising complications.    A/P   INR is therapeutic today at 2.5.  Patient instructed to continue with the current warfarin dosing regimen, and asked to follow up again in 1 week.    Next appt: Friday, June 28, 2019  1:15pm    Samson Coulter PharmD

## 2019-06-28 ENCOUNTER — ANTICOAGULATION VISIT (OUTPATIENT)
Dept: VASCULAR LAB | Facility: MEDICAL CENTER | Age: 84
End: 2019-06-28
Attending: INTERNAL MEDICINE
Payer: MEDICARE

## 2019-06-28 DIAGNOSIS — D68.32 HEMORRHAGIC DISORDER DUE TO EXTRINSIC CIRCULATING ANTICOAGULANTS (HCC): ICD-10-CM

## 2019-06-28 DIAGNOSIS — I48.0 PAF (PAROXYSMAL ATRIAL FIBRILLATION) (HCC): ICD-10-CM

## 2019-06-28 LAB — INR PPP: 3.3 (ref 2–3.5)

## 2019-06-28 PROCEDURE — 85610 PROTHROMBIN TIME: CPT

## 2019-06-28 PROCEDURE — 99212 OFFICE O/P EST SF 10 MIN: CPT

## 2019-06-28 NOTE — PROGRESS NOTES
Anticoagulation Summary  As of 6/28/2019    INR goal:   2.0-3.0   TTR:   78.1 % (1.1 y)   INR used for dosing:   3.30! 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 Referring Geriatrics [phone]    [facility] Responsible  [phone]        Anticoagulation Patient Findings      HPI:  Shereen Dale seen in clinic today for follow up on anticoagulation therapy in the presence of Afib.   Denies any changes to current medical/health status since last appointment.   Denies any medication or diet changes.   No current symptoms of bleeding or thrombosis reported.    A/P:   INR is supra-therapeutic at 3.3.   Pt to take 1.5 mg tonight only then return continue current regimen.     Follow up appointment in 5 day(s).    Next Appointment: 07/03/2019, 10.15am    Paulette Rowe, Pharm.D

## 2019-07-03 ENCOUNTER — ANTICOAGULATION VISIT (OUTPATIENT)
Dept: VASCULAR LAB | Facility: MEDICAL CENTER | Age: 84
End: 2019-07-03
Attending: INTERNAL MEDICINE
Payer: MEDICARE

## 2019-07-03 VITALS — DIASTOLIC BLOOD PRESSURE: 77 MMHG | SYSTOLIC BLOOD PRESSURE: 120 MMHG | HEART RATE: 83 BPM

## 2019-07-03 DIAGNOSIS — I48.0 PAF (PAROXYSMAL ATRIAL FIBRILLATION) (HCC): ICD-10-CM

## 2019-07-03 DIAGNOSIS — D68.32 HEMORRHAGIC DISORDER DUE TO EXTRINSIC CIRCULATING ANTICOAGULANTS (HCC): ICD-10-CM

## 2019-07-03 LAB
INR BLD: 2.5 (ref 0.9–1.2)
INR PPP: 2.5 (ref 2–3.5)

## 2019-07-03 PROCEDURE — 99211 OFF/OP EST MAY X REQ PHY/QHP: CPT

## 2019-07-03 PROCEDURE — 85610 PROTHROMBIN TIME: CPT

## 2019-07-03 NOTE — PROGRESS NOTES
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 Referring Geriatrics [phone]    [facility] Responsible  [phone]        Anticoagulation Patient Findings      HPI:  Shereen Dale seen in clinic today, on anticoagulation therapy with warfarin for PAF.   Changes to current medical/health status since last appt: none  Denies signs/symptoms of bleeding and/or thrombosis since the last appt.    Denies any interval changes to diet  Denies any interval changes to medications since last appt.   Denies any complications or cost restrictions with current therapy.   BP recorded in vitals.  Confirmed dosing regimen.     A/P   INR  therapeutic.   Pt is to continue with current warfarin dosing regimen.     Follow up appointment in 2 week(s).    Ayush Pérez, PharmD

## 2019-07-15 DIAGNOSIS — I10 ESSENTIAL HYPERTENSION: ICD-10-CM

## 2019-07-16 RX ORDER — AMLODIPINE BESYLATE 5 MG/1
TABLET ORAL
Qty: 100 TAB | Refills: 3 | Status: SHIPPED | OUTPATIENT
Start: 2019-07-16 | End: 2019-08-07

## 2019-07-19 ENCOUNTER — ANTICOAGULATION VISIT (OUTPATIENT)
Dept: VASCULAR LAB | Facility: MEDICAL CENTER | Age: 84
End: 2019-07-19
Attending: INTERNAL MEDICINE
Payer: MEDICARE

## 2019-07-19 VITALS — DIASTOLIC BLOOD PRESSURE: 59 MMHG | HEART RATE: 70 BPM | SYSTOLIC BLOOD PRESSURE: 104 MMHG

## 2019-07-19 DIAGNOSIS — D68.32 HEMORRHAGIC DISORDER DUE TO EXTRINSIC CIRCULATING ANTICOAGULANTS (HCC): ICD-10-CM

## 2019-07-19 DIAGNOSIS — I48.0 PAF (PAROXYSMAL ATRIAL FIBRILLATION) (HCC): ICD-10-CM

## 2019-07-19 LAB
INR BLD: 2.7 (ref 0.9–1.2)
INR PPP: 2.7 (ref 2–3.5)

## 2019-07-19 PROCEDURE — 85610 PROTHROMBIN TIME: CPT

## 2019-07-19 PROCEDURE — 99211 OFF/OP EST MAY X REQ PHY/QHP: CPT

## 2019-07-19 NOTE — PROGRESS NOTES
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 Referring Geriatrics [phone]    [facility] Anticoagulation Services Responsible  [phone]        Anticoagulation Patient Findings      HPI:  Shereen Dale seen in clinic today, on anticoagulation therapy with warfarin for PAF.   Changes to current medical/health status since last appt: none  Denies signs/symptoms of bleeding and/or thrombosis since the last appt.    Denies any interval changes to diet  Reports she used Bactrim but stopped it many days ago.    Denies any complications or cost restrictions with current therapy.   BP recorded in vitals.  Confirmed dosing regimen.     A/P   INR  therapeutic.   Pt is to continue with current warfarin dosing regimen.     Follow up appointment in 2 week(s).    Ayush Pérez, PharmD

## 2019-08-02 ENCOUNTER — ANTICOAGULATION VISIT (OUTPATIENT)
Dept: MEDICAL GROUP | Facility: PHYSICIAN GROUP | Age: 84
End: 2019-08-02
Payer: MEDICARE

## 2019-08-02 DIAGNOSIS — D68.32 HEMORRHAGIC DISORDER DUE TO EXTRINSIC CIRCULATING ANTICOAGULANTS (HCC): ICD-10-CM

## 2019-08-02 DIAGNOSIS — Z79.01 CHRONIC ANTICOAGULATION: Primary | ICD-10-CM

## 2019-08-02 DIAGNOSIS — I48.0 PAF (PAROXYSMAL ATRIAL FIBRILLATION) (HCC): ICD-10-CM

## 2019-08-02 LAB — INR PPP: 3.1 (ref 2–3.5)

## 2019-08-02 PROCEDURE — 85610 PROTHROMBIN TIME: CPT | Performed by: INTERNAL MEDICINE

## 2019-08-02 PROCEDURE — 99211 OFF/OP EST MAY X REQ PHY/QHP: CPT | Performed by: INTERNAL MEDICINE

## 2019-08-02 NOTE — PROGRESS NOTES
Anticoagulation Summary  As of 8/2/2019    INR goal:   2.0-3.0   TTR:   78.7 % (1.2 y)   INR used for dosing:   3.10! (8/2/2019)   Warfarin maintenance plan:   1.5 mg (3 mg x 0.5) every Wed; 3 mg (3 mg x 1) all other days   Weekly warfarin total:   19.5 mg   Plan last modified:   Emily Coulter (5/20/2019)   Next INR check:   8/9/2019   Target end date:   Indefinite    Indications    Chronic anticoagulation [Z79.01]  PAF (paroxysmal atrial fibrillation) ([facility]) [I48.0]             Anticoagulation Episode Summary     INR check location:       Preferred lab:       Send INR reminders to:       Comments:         Anticoagulation Care Providers     Provider Role Specialty Phone number    Ganesh Mckeon M.D. Referring Geriatrics [phone]    [facility] Anticoagulation Services Responsible  [phone]        Anticoagulation Patient Findings  Patient Findings     Negatives:   Signs/symptoms of thrombosis, Signs/symptoms of bleeding, Laboratory test error suspected, Change in health, Change in alcohol use, Change in activity, Upcoming invasive procedure, Emergency department visit, Upcoming dental procedure, Missed doses, Extra doses, Change in medications, Change in diet/appetite, Hospital admission, Bruising, Other complaints        HPI:   Shereen Dale seen in clinic today, on anticoagulation therapy with warfarin for stroke prevention due to history of PAF.    Patient's previous INR was therapeutic at 2.7 on 7-19-19, at which time patient was instructed to continue with current warfarin regimen.  She returns to clinic today to recheck INR to ensure it is therapeutic and thus preventing possible clotting and/or bleeding/bruising complications.    CHADS-VASc = at least 5  (unadjusted ischemic stroke risk/year:  7.2%, which is moderate risk)    Does patient have any changes to current medical/health status since last appt (Y/N):  NO  Does patient have any signs/symptoms of bleeding and/or thrombosis since  the last appt (Y/N):  NO  Does patient have any interval changes to diet or medications since last appt (Y/N):  NO  Are there any complications or cost restrictions with current therapy (Y/N):  NO     Does patient have [facility] PCP? YES, Dr Ganesh Mckeon (If not, please document discussion that patient must be seen at [facility])       Vitals:  BP check declined today     Weight  declines   Height   declines     Asssessment:      INR slightly supratherapeutic at 3.1, therefore increasing patient's risk of bleeding complications.   Reason(s) for out of range INR today:  Etiology unknown      Plan:  Instructed patient to decrease today's dose to 1.5mg, then resume current warfarin regimen in order to bring INR to therapeutic range.     Follow up:  Because warfarin is a high risk medication and current CHEST guidelines recommend regular monitoring intervals (few days up to 12 weeks), will have patient return to clinic in 2 weeks to recheck INR.    Brady Piña, PharmD, BCACP

## 2019-08-07 ENCOUNTER — TELEPHONE (OUTPATIENT)
Dept: CARDIOLOGY | Facility: MEDICAL CENTER | Age: 84
End: 2019-08-07

## 2019-08-07 DIAGNOSIS — I10 ESSENTIAL HYPERTENSION: ICD-10-CM

## 2019-08-07 RX ORDER — AMLODIPINE BESYLATE 5 MG/1
7.5 TABLET ORAL DAILY
Qty: 150 TAB | Refills: 2 | Status: SHIPPED | OUTPATIENT
Start: 2019-08-07 | End: 2019-08-07

## 2019-08-07 RX ORDER — AMLODIPINE BESYLATE 5 MG/1
TABLET ORAL
Qty: 150 TAB | Refills: 2 | Status: SHIPPED | OUTPATIENT
Start: 2019-08-07 | End: 2020-07-28

## 2019-08-09 ENCOUNTER — ANTICOAGULATION VISIT (OUTPATIENT)
Dept: MEDICAL GROUP | Facility: PHYSICIAN GROUP | Age: 84
End: 2019-08-09
Payer: MEDICARE

## 2019-08-09 VITALS — SYSTOLIC BLOOD PRESSURE: 111 MMHG | HEART RATE: 74 BPM | DIASTOLIC BLOOD PRESSURE: 72 MMHG

## 2019-08-09 DIAGNOSIS — Z79.01 CHRONIC ANTICOAGULATION: Primary | ICD-10-CM

## 2019-08-09 DIAGNOSIS — I48.0 PAF (PAROXYSMAL ATRIAL FIBRILLATION) (HCC): ICD-10-CM

## 2019-08-09 LAB — INR PPP: 2.6 (ref 2–3.5)

## 2019-08-09 PROCEDURE — 93793 ANTICOAG MGMT PT WARFARIN: CPT | Performed by: INTERNAL MEDICINE

## 2019-08-09 PROCEDURE — 85610 PROTHROMBIN TIME: CPT | Performed by: INTERNAL MEDICINE

## 2019-08-09 NOTE — PROGRESS NOTES
Anticoagulation Summary  As of 2019    INR goal:   2.0-3.0   TTR:   78.7 % (1.2 y)   INR used for dosin.60 (2019)   Warfarin maintenance plan:   1.5 mg (3 mg x 0.5) every Mon, Fri; 3 mg (3 mg x 1) all other days   Weekly warfarin total:   18 mg   Plan last modified:   Brady Piña, PharmD (2019)   Next INR check:   2019   Target end date:   Indefinite    Indications    Chronic anticoagulation [Z79.01]  PAF (paroxysmal atrial fibrillation) ([facility]) [I48.0]             Anticoagulation Episode Summary     INR check location:       Preferred lab:       Send INR reminders to:       Comments:         Anticoagulation Care Providers     Provider Role Specialty Phone number    Ganesh Mckeon M.D. Referring Geriatrics [phone]    [facility] Anticoagulation Services Responsible  [phone]        Anticoagulation Patient Findings  Patient Findings     Positives:   Bruising    Negatives:   Signs/symptoms of thrombosis, Signs/symptoms of bleeding, Laboratory test error suspected, Change in health, Change in alcohol use, Change in activity, Upcoming invasive procedure, Emergency department visit, Upcoming dental procedure, Missed doses, Extra doses, Change in medications, Change in diet/appetite, Hospital admission, Other complaints        HPI:   Shereen Dale seen in clinic today, on anticoagulation therapy with warfarin for stroke prevention due to history of PAF.    Patient's previous INR was supratherapeutic at 3.1 on 19, at which time patient was instructed to decrease one dose, then resume current warfarin regimen.  She returns to clinic today to recheck INR to ensure it is therapeutic and thus preventing possible clotting and/or bleeding/bruising complications.    CHADS-VASc = at least 5  (unadjusted ischemic stroke risk/year:  7.2%, which is moderate risk)    Does patient have any changes to current medical/health status since last appt (Y/N):  No  Does patient have any signs/symptoms  "of bleeding and/or thrombosis since the last appt (Y/N):  Mild bruises on arm, possible bumped into something   Does patient have any interval changes to diet or medications since last appt (Y/N):  NO  Are there any complications or cost restrictions with current therapy (Y/N):  NO     Does patient have [facility] PCP? YES, Dr Ganesh Mckeon (If not, please document discussion that patient must be seen at [facility])       Vitals:  /72  HR 74    Weight  declines   Height   5' 4\"     Asssessment:      INR therapeutic at 2.6, therefore decreasing patient's risk of stroke and/or bleeding complications.   Reason(s) for out of range INR today:  n/a      Plan:  Instructed patient to decrease weekly warfarin regimen to reflect dosing over the last week as patient is more comfortable with INR toward bottom end of range.     Follow up:  Because warfarin is a high risk medication and current CHEST guidelines recommend regular monitoring intervals (few days up to 12 weeks), will have patient return to clinic in 1 weeks to recheck INR.    Brady Piña, PharmD, BCACP    "

## 2019-08-14 ENCOUNTER — HOSPITAL ENCOUNTER (OUTPATIENT)
Dept: RADIOLOGY | Facility: MEDICAL CENTER | Age: 84
End: 2019-08-14
Attending: PHYSICIAN ASSISTANT
Payer: MEDICARE

## 2019-08-14 ENCOUNTER — OFFICE VISIT (OUTPATIENT)
Dept: URGENT CARE | Facility: PHYSICIAN GROUP | Age: 84
End: 2019-08-14
Payer: MEDICARE

## 2019-08-14 VITALS
SYSTOLIC BLOOD PRESSURE: 144 MMHG | RESPIRATION RATE: 14 BRPM | OXYGEN SATURATION: 94 % | TEMPERATURE: 98.5 F | DIASTOLIC BLOOD PRESSURE: 86 MMHG | HEART RATE: 84 BPM

## 2019-08-14 DIAGNOSIS — L03.116 CELLULITIS OF LEFT LOWER EXTREMITY: ICD-10-CM

## 2019-08-14 DIAGNOSIS — S89.92XA INJURY OF LEFT KNEE, INITIAL ENCOUNTER: ICD-10-CM

## 2019-08-14 PROCEDURE — 99214 OFFICE O/P EST MOD 30 MIN: CPT | Performed by: PHYSICIAN ASSISTANT

## 2019-08-14 PROCEDURE — 73560 X-RAY EXAM OF KNEE 1 OR 2: CPT | Mod: LT

## 2019-08-14 RX ORDER — FLUCONAZOLE 100 MG/1
100 TABLET ORAL DAILY
Qty: 1 TAB | Refills: 0 | Status: SHIPPED | OUTPATIENT
Start: 2019-08-14 | End: 2020-10-09

## 2019-08-14 RX ORDER — HYDROCODONE BITARTRATE AND ACETAMINOPHEN 5; 325 MG/1; MG/1
1 TABLET ORAL EVERY 8 HOURS PRN
Qty: 2 TAB | Refills: 0 | Status: SHIPPED | OUTPATIENT
Start: 2019-08-14 | End: 2019-08-15

## 2019-08-14 RX ORDER — CEPHALEXIN 500 MG/1
500 CAPSULE ORAL 4 TIMES DAILY
Qty: 20 CAP | Refills: 0 | Status: SHIPPED | OUTPATIENT
Start: 2019-08-14 | End: 2019-08-19

## 2019-08-14 RX ORDER — FLUCONAZOLE 100 MG/1
100 TABLET ORAL DAILY
Qty: 1 TAB | Refills: 0 | Status: SHIPPED | OUTPATIENT
Start: 2019-08-14 | End: 2019-08-14

## 2019-08-14 RX ORDER — CEPHALEXIN 500 MG/1
500 CAPSULE ORAL 4 TIMES DAILY
Qty: 20 CAP | Refills: 0 | Status: SHIPPED | OUTPATIENT
Start: 2019-08-14 | End: 2019-08-14

## 2019-08-14 ASSESSMENT — ENCOUNTER SYMPTOMS
BRUISES/BLEEDS EASILY: 1
DIZZINESS: 0
COUGH: 0
NECK PAIN: 0
CHILLS: 0
NAUSEA: 0
SHORTNESS OF BREATH: 0
FALLS: 1
HEADACHES: 0
ABDOMINAL PAIN: 0
FEVER: 0
EYE PAIN: 0
VOMITING: 0
BACK PAIN: 0
MYALGIAS: 1

## 2019-08-14 NOTE — PROGRESS NOTES
Subjective:      Shereen Dale is a 83 y.o. female who presents with Knee Injury (bilat knee injury x2 days ago)            83-year-old female patient presents to urgent care complaining of left knee pain, redness and swelling.  Patient injured knee 2 days ago secondary to fall.  Patient states she landed in gravel. FB noted in wound, patient states she did not wash out her wound very well after fall.  The pain is worse with ambulation.  Patient also complaining of abrasion over her left knee.  No deformities.  Mild swelling left lower extremity.  Patient denies fevers.  She has taken over-the-counter medications for pain with some relief.  Left knee pain does not radiate.  Mild bruising over left lateral lower leg.  Patient is on blood thinners.  Denies head injury.      Review of Systems   Constitutional: Negative for chills and fever.   HENT: Negative for tinnitus.    Eyes: Negative for pain.   Respiratory: Negative for cough and shortness of breath.    Cardiovascular: Positive for leg swelling. Negative for chest pain.   Gastrointestinal: Negative for abdominal pain, nausea and vomiting.   Genitourinary: Negative for dysuria.   Musculoskeletal: Positive for falls, joint pain and myalgias. Negative for back pain and neck pain.   Skin: Negative for rash.   Neurological: Negative for dizziness and headaches.   Endo/Heme/Allergies: Negative for environmental allergies. Bruises/bleeds easily.       Past Medical History:   Diagnosis Date   • AF (atrial fibrillation) (HCC)    • Arrhythmia     A-fib   • Backpain     Lower back pain   • Breast cancer (HCC)    • Breath shortness     uses 2-3 L O2 at night   • Cancer (HCC) 1993    right breast   • CATARACT     celestine IOL   • Chronic anticoagulation 5/2/2012   • Cough 5/2/2012   • Dental disorder     dentures   • Diabetes     oral medication   • Edema 5/2/2012   • Glaucoma    • Heart burn    • Heart murmur    • Heart valve disease    • Hypertension    • Hypothyroid    •  Oxygen deficiency     uses 2.5-3 l at night   • Paroxysmal atrial fibrillation (HCC)    • Sleep apnea     no cpap   • tia     TIA x2   • Unspecified hemorrhagic conditions     takes coumadin     Current Outpatient Medications on File Prior to Visit   Medication Sig Dispense Refill   • amLODIPine (NORVASC) 5 MG Tab Take 1 Tab by mouth every morning AND 0.5 Tabs every evening. Take 5 mg in the AM and 2.5 mg in the  Tab 2   • metFORMIN (GLUCOPHAGE) 500 MG Tab Take 1 Tab by mouth 3 times a day. 300 Tab 3   • famotidine (PEPCID) 20 MG Tab TAKE ONE TABLET BY MOUTH TWICE A DAY 60 Tab 3   • famotidine (PEPCID) 20 MG Tab Take 1 Tab by mouth every day. 90 Tab 3   • levothyroxine (SYNTHROID) 50 MCG Tab Take 1 Tab by mouth every morning. 90 Tab 3   • warfarin (COUMADIN) 3 MG Tab Take 1 Tab by mouth every day. 90 Tab 3   • acetaminophen (TYLENOL) 325 MG Tab Take 2 Tabs by mouth every 6 hours as needed (Mild Pain; (Pain scale 1-3); Temp greater than 100.5 F). 30 Tab 0   • gabapentin (NEURONTIN) 100 MG Cap Take 1 Cap by mouth every day. 90 Cap 0   • gabapentin (NEURONTIN) 100 MG Cap Take 2 Caps by mouth every evening. (Patient not taking: Reported on 6/12/2019) 90 Cap 0   • Blood Glucose Monitoring Suppl Device Meter: Dispense free style meter. Sig. Use 2 times daily as directed for blood sugar monitoring. dx e11.9 1 Device 0   • cloNIDine (CATAPRES) 0.1 MG Tab Take 0.1 mg by mouth 2 times a day as needed. If SBP > 160     • glipiZIDE SR (GLUCOTROL) 2.5 MG TABLET SR 24 HR Take 2.5 mg by mouth every day.     • dorzolamide-timolol (COSOPT) 22.3-6.8 MG/ML Solution Place 1 Drop in both eyes 2 times a day.     • carvedilol (COREG) 25 MG Tab Take 1 Tab by mouth 2 times a day. 180 Tab 3   • telmisartan (MICARDIS) 40 MG Tab Take 1 Tab by mouth 2 Times a Day. 180 Tab 2   • Glucosamine HCl (GLUCOSAMINE PO) Take 1 Tab by mouth every morning. Pt unsure of dose     • Polyethyl Glycol-Propyl Glycol (SYSTANE OP) 1-2 Drops by Ophthalmic  route 4 times a day as needed.     • docosahexanoic acid (OMEGA 3 FA) 1000 MG CAPS Take 1 Cap by mouth every morning.     • VITAMIN D PO Take 2,000 Units by mouth every morning.     • CALCIUM 500 PO Take 1 Tab by mouth every morning.       No current facility-administered medications on file prior to visit.      Allergies   Allergen Reactions   • Darvon [Propoxyphene Hcl]      shock   • Iodine      Shock  - IV   • Latex      Swelling = hands with glove use   • Penicillins Anaphylaxis   • Propoxyphene Hcl Anaphylaxis   • Tetanus Antitoxin Myalgia   • Tetracycline Anaphylaxis     Social History     Tobacco Use   • Smoking status: Former Smoker     Packs/day: 0.50     Years: 11.00     Pack years: 5.50     Types: Cigarettes     Last attempt to quit: 1964     Years since quittin.6   • Smokeless tobacco: Never Used   • Tobacco comment: Started at    Substance Use Topics   • Alcohol use: Yes     Alcohol/week: 0.0 oz     Comment: rarely        Objective:     /86 (BP Location: Right arm, Patient Position: Sitting, BP Cuff Size: Small adult)   Pulse 84   Temp 36.9 °C (98.5 °F) (Temporal)   Resp 14   LMP 1985   SpO2 94%      Physical Exam   Constitutional: She is oriented to person, place, and time.   Cardiovascular: Normal rate. An irregularly irregular rhythm present.   Murmur heard.   Systolic murmur is present.  Pulmonary/Chest: Effort normal and breath sounds normal. No respiratory distress.   Abdominal: Soft.   Neurological: She is alert and oriented to person, place, and time.   Skin:        Procedure:   2% lidocaine used for local anesthetic.  Wound debrided using normal saline. Wound cleaned and dressed. Patient tolerated well            Assessment/Plan:     1. Cellulitis of left lower extremity  fluconazole (DIFLUCAN) 100 MG Tab    cephALEXin (KEFLEX) 500 MG Cap           2. Injury of left knee, initial encounter  DX-KNEE 2- LEFT    HYDROcodone-acetaminophen (NORCO) 5-325 MG Tab per tablet              Impression       Tricompartment degenerative change. No evidence of fracture.          P should follow up with PCP in 1-2 days for re-evaluation if symptoms have not improved.  Discussed red flags and reasons to return to UC or ED including worsening of symptoms or development of fevers.  Pt and family verbalized understanding of diagnosis and follow up instructions and was offered informational handout on diagnosis.  PT discharged.     Patient given #2 tabs of norco for pain.   In prescribing controlled substances to this patient, I certify that I have obtained and reviewed the medical history of substance abuse. I have also made a good lizett effort to obtain applicable records from other providers who have treated the patient and records did not demonstrate any increased risk of substance abuse that would prevent me from prescribing controlled substances.   I have conducted a physical exam and documented it. I have reviewed her prescription history as maintained by the [place] Prescription Monitoring Program.   I have assessed the patient’s risk for abuse, dependency, and addiction using the validated Opioid Risk Tool available at https://www.mdcalc.com/coxorz-ejux-pvri-ort-narcotic-abuse.   Given the above, I believe the benefits of controlled substance therapy outweigh the risks. The reasons for prescribing controlled substances include non-narcotic, oral analgesic alternatives have been inadequate for pain control and in my professional opinion, controlled substances are the only reasonable choice for this patient because of risk of other medications. She is tolerating relatively low doses of opioids without side effects and allowing for palliative management that allows her to maintain her current level of function. Accordingly, I have discussed the risk and benefits, treatment plan, and alternative therapies with the patient.

## 2019-08-16 ENCOUNTER — ANTICOAGULATION VISIT (OUTPATIENT)
Dept: MEDICAL GROUP | Facility: PHYSICIAN GROUP | Age: 84
End: 2019-08-16
Payer: MEDICARE

## 2019-08-16 DIAGNOSIS — Z79.01 CHRONIC ANTICOAGULATION: Primary | ICD-10-CM

## 2019-08-16 DIAGNOSIS — I48.0 PAF (PAROXYSMAL ATRIAL FIBRILLATION) (HCC): ICD-10-CM

## 2019-08-16 LAB — INR PPP: 2.8 (ref 2–3.5)

## 2019-08-16 PROCEDURE — 93793 ANTICOAG MGMT PT WARFARIN: CPT | Performed by: FAMILY MEDICINE

## 2019-08-16 PROCEDURE — 85610 PROTHROMBIN TIME: CPT | Performed by: FAMILY MEDICINE

## 2019-08-16 NOTE — PROGRESS NOTES
Anticoagulation Summary  As of 2019    INR goal:   2.0-3.0   TTR:   79.0 % (1.2 y)   INR used for dosin.80 (2019)   Warfarin maintenance plan:   1.5 mg (3 mg x 0.5) every Mon, Fri; 3 mg (3 mg x 1) all other days   Weekly warfarin total:   18 mg   Plan last modified:   Brady Piña, PharmD (2019)   Next INR check:   2019   Target end date:   Indefinite    Indications    Chronic anticoagulation [Z79.01]  PAF (paroxysmal atrial fibrillation) (HCC) [I48.0]             Anticoagulation Episode Summary     INR check location:       Preferred lab:       Send INR reminders to:       Comments:         Anticoagulation Care Providers     Provider Role Specialty Phone number    aGnesh Mckeon M.D. Referring Geriatrics [phone]    [facility] Anticoagulation Services Responsible  [phone]        Anticoagulation Patient Findings  Patient Findings     Positives:   Change in medications    Negatives:   Signs/symptoms of thrombosis, Signs/symptoms of bleeding, Laboratory test error suspected, Change in health, Change in alcohol use, Change in activity, Upcoming invasive procedure, Emergency department visit, Upcoming dental procedure, Missed doses, Extra doses, Change in diet/appetite, Hospital admission, Bruising, Other complaints    Comments:   Five day course of cephalexin   One dose of fluconazole        HPI:   Shereen Dale seen in clinic today, on anticoagulation therapy with warfarin for stroke prevention due to history of PAF.    Patient's previous INR was therapeutic at 2.6 on 19, at which time patient was instructed to decrease weekly warfarin regimen.  She returns to clinic today to recheck INR to ensure it is therapeutic and thus preventing possible clotting and/or bleeding/bruising complications.    CHADS-VASc = at least 5  (unadjusted ischemic stroke risk/year:  7.2%, which is moderate risk)    Does patient have any changes to current medical/health status since last appt  "(Y/N):  NO  Does patient have any signs/symptoms of bleeding and/or thrombosis since the last appt (Y/N):  NO  Does patient have any interval changes to diet or medications since last appt (Y/N):  Started five day course of cephalexin, took one dose of fluconazole   Are there any complications or cost restrictions with current therapy (Y/N):  NO     Does patient have [facility] PCP? YES, Dr Ganesh Mckeon (If not, please document discussion that patient must be seen at [facility])       Vitals:  BP check declined today    Weight  declines   Height   5' 4\"     Asssessment:      INR remains therapeutic at 2.8, therefore decreasing patient's risk of stroke and/or bleeding complications.   Reason(s) for out of range INR today:  n/a      Plan:  Pt is to continue with current warfarin dosing regimen in order to maintain INR in therapeutic range.     Follow up:  Because warfarin is a high risk medication and current CHEST guidelines recommend regular monitoring intervals (few days up to 12 weeks), will have patient return to clinic in 1 weeks to recheck INR.    Brady Piña, PharmD, BCACP    "

## 2019-08-17 ENCOUNTER — HOSPITAL ENCOUNTER (EMERGENCY)
Facility: MEDICAL CENTER | Age: 84
End: 2019-08-17
Attending: EMERGENCY MEDICINE
Payer: MEDICARE

## 2019-08-17 VITALS
BODY MASS INDEX: 28.07 KG/M2 | SYSTOLIC BLOOD PRESSURE: 120 MMHG | OXYGEN SATURATION: 94 % | HEIGHT: 62 IN | RESPIRATION RATE: 16 BRPM | DIASTOLIC BLOOD PRESSURE: 73 MMHG | WEIGHT: 152.56 LBS | TEMPERATURE: 97.5 F | HEART RATE: 82 BPM

## 2019-08-17 DIAGNOSIS — M79.605 LEFT LEG PAIN: ICD-10-CM

## 2019-08-17 PROCEDURE — 99282 EMERGENCY DEPT VISIT SF MDM: CPT

## 2019-08-17 RX ORDER — SULFAMETHOXAZOLE AND TRIMETHOPRIM 800; 160 MG/1; MG/1
1 TABLET ORAL 2 TIMES DAILY
Qty: 14 TAB | Refills: 0 | Status: SHIPPED | OUTPATIENT
Start: 2019-08-17 | End: 2019-08-24

## 2019-08-17 ASSESSMENT — LIFESTYLE VARIABLES
DO YOU DRINK ALCOHOL: NO
DOES PATIENT WANT TO STOP DRINKING: NO

## 2019-08-17 NOTE — ED TRIAGE NOTES
Pt ambulated to triage with   Chief Complaint   Patient presents with   • Leg Pain     seen at  on wednesday, pt had fall with left knee pain, wound cleaned and started on Abx, has been taking for 3 days now, and now having swelling to left leg, h/o dm -  at home      Pt Informed regarding triage process and verbalized understanding to inform triage tech or RN for any changes in condition. Placed in lobby.

## 2019-08-17 NOTE — ED PROVIDER NOTES
ED Provider Note    Scribed for Hillary Montaño M.D. by Isidro Lemons. 8/17/2019, 11:30 AM.    Primary care provider: Ganesh Mckeon M.D.  Means of arrival: walk in  History obtained from: Patient  History limited by: None     CHIEF COMPLAINT  Chief Complaint   Patient presents with   • Leg Pain     seen at  on wednesday, pt had fall with left knee pain, wound cleaned and started on Abx, has been taking for 3 days now, and now having swelling to left leg, h/o dm -  at home       HPI  Shereen Dale is a 83 y.o. female who presents to the Emergency Department for left leg pain onset 3 days ago. The patient states that she was walking when she fell into gravel causing an abrasion to her left knee. She then went to urgent care when they debrided the wound and gave her Keflex. She was prompted to come into the ED as she though she might have Gangrene.The patient endorses pain to the area, but denies any fever. She has a history of diabetes and is anticoagulated on Coumadin.     REVIEW OF SYSTEMS  Pertinent positives include left leg pain. Pertinent negatives include no fever.     PAST MEDICAL HISTORY   has a past medical history of AF (atrial fibrillation) (HCC), Arrhythmia, Backpain, Breast cancer (HCC), Breath shortness, Cancer (HCC) (1993), CATARACT, Chronic anticoagulation (5/2/2012), Cough (5/2/2012), Dental disorder, Diabetes, Edema (5/2/2012), Glaucoma, Heart burn, Heart murmur, Heart valve disease, Hypertension, Hypothyroid, Oxygen deficiency, Paroxysmal atrial fibrillation (HCC), Sleep apnea, tia, and Unspecified hemorrhagic conditions.    SURGICAL HISTORY   has a past surgical history that includes lumpectomy; cholecystectomy; hysterectomy radical; radiation therapy plan simple; cataract phaco with iol (9/23/2013); cataract phaco with iol (10/21/2013); knee arthroscopy (Right, 5/21/2015); and meniscectomy (Right, 5/21/2015).    SOCIAL HISTORY  Social History     Tobacco Use   • Smoking  status: Former Smoker     Packs/day: 0.50     Years: 11.00     Pack years: 5.50     Types: Cigarettes     Last attempt to quit: 1964     Years since quittin.6   • Smokeless tobacco: Never Used   • Tobacco comment: Started at    Substance Use Topics   • Alcohol use: Yes     Alcohol/week: 0.0 oz     Comment: rarely   • Drug use: No      Social History     Substance and Sexual Activity   Drug Use No       FAMILY HISTORY  Family History   Problem Relation Age of Onset   • Heart Attack Mother    • Hypertension Mother    • Heart Disease Father    • Hypertension Father    • No Known Problems Sister    • No Known Problems Brother    • Leukemia Sister    • Sleep Apnea Sister    • No Known Problems Sister    • No Known Problems Sister        CURRENT MEDICATIONS  Home Medications     Reviewed by Enedelia Bennett R.N. (Registered Nurse) on 19 at 1050  Med List Status: Partial   Medication Last Dose Status   acetaminophen (TYLENOL) 325 MG Tab prn Active   amLODIPine (NORVASC) 5 MG Tab 2019 Active   Blood Glucose Monitoring Suppl Device  Active   CALCIUM 500 PO 2019 Active   carvedilol (COREG) 25 MG Tab 2019 Active   cephALEXin (KEFLEX) 500 MG Cap 2019 Active   cloNIDine (CATAPRES) 0.1 MG Tab prn Active   docosahexanoic acid (OMEGA 3 FA) 1000 MG CAPS 2019 Active   dorzolamide-timolol (COSOPT) 22.3-6.8 MG/ML Solution 2019 Active   famotidine (PEPCID) 20 MG Tab 2019 Active   famotidine (PEPCID) 20 MG Tab 2019 Active   fluconazole (DIFLUCAN) 100 MG Tab  Active   gabapentin (NEURONTIN) 100 MG Cap 2019 Active   gabapentin (NEURONTIN) 100 MG Cap 2019 Active   glipiZIDE SR (GLUCOTROL) 2.5 MG TABLET SR 24 HR 2019 Active   Glucosamine HCl (GLUCOSAMINE PO) 2019 Active   levothyroxine (SYNTHROID) 50 MCG Tab 2019 Active   metFORMIN (GLUCOPHAGE) 500 MG Tab 2019 Active   Polyethyl Glycol-Propyl Glycol (SYSTANE OP) 2019 Active   telmisartan (MICARDIS) 40  "MG Tab 8/17/2019 Active   VITAMIN D PO 8/17/2019 Active   warfarin (COUMADIN) 3 MG Tab 8/17/2019 Active                ALLERGIES  Allergies   Allergen Reactions   • Darvon [Propoxyphene Hcl]      shock   • Iodine      Shock  - IV   • Latex      Swelling = hands with glove use   • Penicillins Anaphylaxis   • Propoxyphene Hcl Anaphylaxis   • Tetanus Antitoxin Myalgia   • Tetracycline Anaphylaxis       PHYSICAL EXAM  VITAL SIGNS: /73   Pulse 82   Temp 36.4 °C (97.5 °F) (Temporal)   Resp 16   Ht 1.575 m (5' 2\")   Wt 69.2 kg (152 lb 8.9 oz)   LMP 01/01/1985   SpO2 94%   BMI 27.90 kg/m²      Constitutional: Elderly appearing, Alert and in no apparent distress.  HENT: Normocephalic, Bilateral external ears normal. Nose normal.   Eyes: Pupils are equal and reactive. Conjunctiva normal, non-icteric.   Cardiovascular:  Good Perfusion  Thorax & Lungs: Easy unlabored respirations  Abdomen:  No gross signs of peritonitis, no pain with movement   Skin: Peeling wound under the knee with 1 cm of erythema and ecchymosis down to her ankle.  Visualized skin is  Dry, No erythema, No rash.   Extremities:   Moves all extremities   Neurologic: Alert, Grossly non-focal.   Psychiatric: Appropriate Mood     COURSE & MEDICAL DECISION MAKING  Nursing notes and vital signs were reviewed. (See chart for details)  The patient's  records were reviewed  history was obtained from the patient and her ;     The patient presents with led wound and pain in LE , and the differential diagnosis includes but is not limited to healing wound with tracking eccymosis.       11:30 AM I informed that patient that I will I will be prescribing her another antibiotic since it does appear to be red and infected around wound. She would also need to watch her Coumadin levels with the antibiotic prescribed, bactrim can elevate IRN. Patient verbally understands and agrees to plan of care.  I do not think this represents significant cellulitis and " rather represents tracking of the blood secondary to gravity.  She is concerned about gangrene and there is no evidence of gangrene on exam    The patient was discharged home with an information sheet on wound care and told to return immediately for any signs or symptoms listed, but specifically if fever increased erythema or drainage.  The patient agreed to the discharge precautions and follow-up plan which is documented in EPIC.        DISPOSITION:  Patient will be discharged home in stable condition.    FOLLOW UP:  Ganesh Mckeon M.D.  [address] [place] [id number]  [phone]            OUTPATIENT MEDICATIONS:  Discharge Medication List as of 8/17/2019 11:43 AM      START taking these medications    Details   sulfamethoxazole-trimethoprim (BACTRIM DS) 800-160 MG tablet Take 1 Tab by mouth 2 times a day for 7 days., Disp-14 Tab, R-0, Print Rx Paper               FINAL IMPRESSION  1. Left leg pain     2.     Left knee wound       IIsidro (Scribe), am scribing for, and in the presence of, Hillary Montaño M.D..    Electronically signed by: Isidro Lemons (Scribe), 8/17/2019    IHillary M.D. personally performed the services described in this documentation, as scribed by Isidro Lemons in my presence, and it is both accurate and complete.  E  The note accurately reflects work and decisions made by me.  Hillary Montaño  8/17/2019  3:33 PM

## 2019-08-19 ENCOUNTER — TELEPHONE (OUTPATIENT)
Dept: VASCULAR LAB | Facility: MEDICAL CENTER | Age: 84
End: 2019-08-19

## 2019-08-19 NOTE — TELEPHONE ENCOUNTER
[facility]    Spoke with Shereen today and she reported that she went to ED over the weekend because the wound on her knee appeared to be worsening. Patient was prescribed Bactrim DS BID x 7 days and will begin that medication today.  Due to DDI with warfarin, patient will decrease dose on Wednesday to 1.5mg. And will follow up with close monitoring. Patient has appt scheduled for clinic visit on Friday, Aug 23, 2019 and further adjustments can be made at that time.     Samson Coulter  PharmD

## 2019-08-20 ENCOUNTER — OFFICE VISIT (OUTPATIENT)
Dept: MEDICAL GROUP | Facility: MEDICAL CENTER | Age: 84
End: 2019-08-20
Payer: MEDICARE

## 2019-08-20 ENCOUNTER — PATIENT OUTREACH (OUTPATIENT)
Dept: HEALTH INFORMATION MANAGEMENT | Facility: OTHER | Age: 84
End: 2019-08-20

## 2019-08-20 VITALS
WEIGHT: 156 LBS | TEMPERATURE: 98.7 F | RESPIRATION RATE: 16 BRPM | BODY MASS INDEX: 30.63 KG/M2 | HEART RATE: 78 BPM | HEIGHT: 60 IN | OXYGEN SATURATION: 98 % | SYSTOLIC BLOOD PRESSURE: 124 MMHG | DIASTOLIC BLOOD PRESSURE: 72 MMHG

## 2019-08-20 DIAGNOSIS — T14.8XXA SKIN ABRASION: ICD-10-CM

## 2019-08-20 DIAGNOSIS — M79.89 LEG SWELLING: ICD-10-CM

## 2019-08-20 PROCEDURE — 99213 OFFICE O/P EST LOW 20 MIN: CPT | Performed by: FAMILY MEDICINE

## 2019-08-20 NOTE — PROGRESS NOTES
CC: Skin abrasion/infected wound    HPI:   Shereen presents today discuss the following  Left leg skin abrasion/swelling  Patient came in with left leg pain for a week . The patient states that she was walking when she fell into gravel causing an abrasion to her left knee. She then went to urgent care when they debrided the wound and gave her Keflex.   Her legs get swollen and the wound getting infected so she went to the ER where she was started on Bactrim.  Stated that she gets a little bit better and swelling has improved, the wound now is covered with a black scab and has been very itchy which is probably due to healing.  Denies any calf tenderness has been having mild slightly black skin anterior left leg and the left foot.  However she has been walking normally.      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) 05/02/2012     Priority: High   • Acute, but ill-defined, cerebrovascular disease 04/30/2012     Priority: High   • Cough 05/02/2012     Priority: Low   • Edema 05/02/2012     Priority: Low   • Skin abrasion 08/20/2019   • Chronic anticoagulation 08/02/2019   • Hyponatremia 03/25/2019   • Advanced directives, counseling/discussion 09/26/2018   • Hypertensive urgency 05/11/2017   • Diastolic dysfunction 02/02/2016   • Tear of lateral cartilage or meniscus of knee, current 05/21/2015   • PAF (paroxysmal atrial fibrillation) ([facility]) 01/12/2015   • Dyspnea on effort 01/02/2014   • Diabetes ([facility]) 12/26/2013   • Aortic stenosis 07/02/2013       Current Outpatient Medications   Medication Sig Dispense Refill   • sulfamethoxazole-trimethoprim (BACTRIM DS) 800-160 MG tablet Take 1 Tab by mouth 2 times a day for 7 days. 14 Tab 0   • fluconazole (DIFLUCAN) 100 MG Tab Take 1 Tab by mouth every day. 1 Tab 0   • amLODIPine (NORVASC) 5 MG Tab Take  1 Tab by mouth every morning AND 0.5 Tabs every evening. Take 5 mg in the AM and 2.5 mg in the  Tab 2   • metFORMIN (GLUCOPHAGE) 500 MG Tab Take 1 Tab by mouth 3 times a day. 300 Tab 3   • famotidine (PEPCID) 20 MG Tab TAKE ONE TABLET BY MOUTH TWICE A DAY 60 Tab 3   • levothyroxine (SYNTHROID) 50 MCG Tab Take 1 Tab by mouth every morning. 90 Tab 3   • warfarin (COUMADIN) 3 MG Tab Take 1 Tab by mouth every day. 90 Tab 3   • acetaminophen (TYLENOL) 325 MG Tab Take 2 Tabs by mouth every 6 hours as needed (Mild Pain; (Pain scale 1-3); Temp greater than 100.5 F). 30 Tab 0   • gabapentin (NEURONTIN) 100 MG Cap Take 1 Cap by mouth every day. 90 Cap 0   • gabapentin (NEURONTIN) 100 MG Cap Take 2 Caps by mouth every evening. 90 Cap 0   • Blood Glucose Monitoring Suppl Device Meter: Dispense free style meter. Sig. Use 2 times daily as directed for blood sugar monitoring. dx e11.9 1 Device 0   • cloNIDine (CATAPRES) 0.1 MG Tab Take 0.1 mg by mouth 2 times a day as needed. If SBP > 160     • glipiZIDE SR (GLUCOTROL) 2.5 MG TABLET SR 24 HR Take 2.5 mg by mouth every day.     • dorzolamide-timolol (COSOPT) 22.3-6.8 MG/ML Solution Place 1 Drop in both eyes 2 times a day.     • carvedilol (COREG) 25 MG Tab Take 1 Tab by mouth 2 times a day. 180 Tab 3   • telmisartan (MICARDIS) 40 MG Tab Take 1 Tab by mouth 2 Times a Day. 180 Tab 2   • Glucosamine HCl (GLUCOSAMINE PO) Take 1 Tab by mouth every morning. Pt unsure of dose     • Polyethyl Glycol-Propyl Glycol (SYSTANE OP) 1-2 Drops by Ophthalmic route 4 times a day as needed.     • docosahexanoic acid (OMEGA 3 FA) 1000 MG CAPS Take 1 Cap by mouth every morning.     • VITAMIN D PO Take 2,000 Units by mouth every morning.     • CALCIUM 500 PO Take 1 Tab by mouth every morning.     • famotidine (PEPCID) 20 MG Tab Take 1 Tab by mouth every day. 90 Tab 3     No current facility-administered medications for this visit.          Allergies as of 08/20/2019 - Reviewed 08/20/2019    Allergen Reaction Noted   • Darvon [propoxyphene hcl]  03/18/2008   • Iodine  05/11/2015   • Latex  09/19/2013   • Penicillins Anaphylaxis 08/02/2008   • Propoxyphene hcl Anaphylaxis 08/02/2008   • Tetanus antitoxin Myalgia 11/19/2017   • Tetracycline Anaphylaxis 08/02/2008        ROS: Denies any chest pain, Shortness of breath, Changes bowel or bladder, Lower extremity edema.    Physical Exam:  /72 (BP Location: Left arm)   Pulse 78   Temp 37.1 °C (98.7 °F)   Resp 16   Ht 1.524 m (5')   Wt 70.8 kg (156 lb)   LMP 01/01/1985   SpO2 98%   BMI 30.47 kg/m²   Gen.: Well-developed, well-nourished, no apparent distress,pleasant and cooperative with the examination  Skin:  Warm and dry with good turgor. No rashes or suspicious lesions in visible areas  Extremities: Mild swelling of the left leg, with mild hematoma, no calf tenderness, normal peripheral pulses.    Assessment and Plan.   83 y.o. female     1. Skin abrasion  With infected wound that has improved on antibiotics.  Continue on Bactrim twice a day for a week.    2. Leg swelling  Mild hematoma the leg in the feet.  As a result of the fall.  No cough tenderness  Continue watch.  Advised to raise the neck when she is in sitting position.  Advised to return to the clinic if swelling gets worse .

## 2019-08-20 NOTE — PROGRESS NOTES
Outcome: Intro to SCP PA but pt requested call back tomorrow to complete intro. Pt requested call back at 10 AM.     Please transfer to Patient Outreach Team at [phone] when patient returns call.      [facility] Verified: yes    Attempt # 1

## 2019-08-21 NOTE — PROGRESS NOTES
Outcome: Returned pt's call as requested yesterday. I completed the introduction to SCP . Pt did not have any questions or concerns at the moment. I gave pt my contact information.     Please transfer to Patient Outreach Team at [phone] when patient returns call.      [facility] Verified: yes    Attempt # 1

## 2019-08-23 ENCOUNTER — ANTICOAGULATION VISIT (OUTPATIENT)
Dept: MEDICAL GROUP | Facility: PHYSICIAN GROUP | Age: 84
End: 2019-08-23
Payer: MEDICARE

## 2019-08-23 DIAGNOSIS — I10 ESSENTIAL HYPERTENSION: ICD-10-CM

## 2019-08-23 DIAGNOSIS — I48.0 PAF (PAROXYSMAL ATRIAL FIBRILLATION) (HCC): ICD-10-CM

## 2019-08-23 DIAGNOSIS — Z79.01 CHRONIC ANTICOAGULATION: Primary | ICD-10-CM

## 2019-08-23 LAB — INR PPP: 2.3 (ref 2–3.5)

## 2019-08-23 PROCEDURE — 93793 ANTICOAG MGMT PT WARFARIN: CPT | Performed by: FAMILY MEDICINE

## 2019-08-23 PROCEDURE — 85610 PROTHROMBIN TIME: CPT | Performed by: FAMILY MEDICINE

## 2019-08-23 NOTE — PROGRESS NOTES
Anticoagulation Summary  As of 2019    INR goal:   2.0-3.0   TTR:   79.4 % (1.2 y)   INR used for dosin.30 (2019)   Warfarin maintenance plan:   1.5 mg (3 mg x 0.5) every Mon, Fri; 3 mg (3 mg x 1) all other days   Weekly warfarin total:   18 mg   Plan last modified:   Brady Piña, PharmD (2019)   Next INR check:   2019   Target end date:   Indefinite    Indications    Chronic anticoagulation [Z79.01]  PAF (paroxysmal atrial fibrillation) (HCC) [I48.0]             Anticoagulation Episode Summary     INR check location:       Preferred lab:       Send INR reminders to:       Comments:         Anticoagulation Care Providers     Provider Role Specialty Phone number    Ganesh Mckeon M.D. Referring Geriatrics [phone]    [facility] Responsible  [phone]        Anticoagulation Patient Findings  Patient Findings     Positives:   Emergency department visit, Change in medications    Negatives:   Signs/symptoms of thrombosis, Signs/symptoms of bleeding, Laboratory test error suspected, Change in health, Change in alcohol use, Change in activity, Upcoming invasive procedure, Upcoming dental procedure, Missed doses, Extra doses, Change in diet/appetite, Hospital admission, Bruising, Other complaints    Comments:   ED visit as cellulitis was not resolving  Prescribed seven day course of bactrim        HPI:   Shereen Dale seen in clinic today, on anticoagulation therapy with warfarin for stroke prevention due to history of PAF.    Patient's previous INR was therapeutic at 2.8 on 19, at which time patient was instructed to continue with current warfarin regimen.  She returns to clinic today to recheck INR to ensure it is therapeutic and thus preventing possible clotting and/or bleeding/bruising complications.    CHADS-VASc = at least 5  (unadjusted ischemic stroke risk/year:  7.2%, which is moderate risk)    Does patient have any changes to current  "medical/health status since last appt (Y/N):  No  Does patient have any signs/symptoms of bleeding and/or thrombosis since the last appt (Y/N):  NO  Does patient have any interval changes to diet or medications since last appt (Y/N):  Seven day course of bactrim, started four days ago, dose of warfarin adjusted accordingly.  Are there any complications or cost restrictions with current therapy (Y/N):  NO     Does patient have [facility] PCP? YES, Dr Ganesh Mckeon (If not, please document discussion that patient must be seen at [facility])       Vitals:  BP check declined today    Weight  declines   Height   5' 4\"     Asssessment:      INR remains therapeutic at 2.3, therefore decreasing patient's risk of stroke and/or bleeding complications.   Reason(s) for out of range INR today:  n/a      Plan:  Patient has three days left of bactrim dosing, will have her continue with decreased regimen as dosed this week in order to maintain INR in therapeutic range.     Follow up:  Because warfarin is a high risk medication and current CHEST guidelines recommend regular monitoring intervals (few days up to 12 weeks), will have patient return to clinic in 1 weeks to recheck INR.    Brady Piña, PharmD, BCACP    "

## 2019-08-26 RX ORDER — TELMISARTAN 40 MG/1
TABLET ORAL
Qty: 200 TAB | Refills: 3 | Status: SHIPPED | OUTPATIENT
Start: 2019-08-26 | End: 2020-09-03

## 2019-08-29 ENCOUNTER — OFFICE VISIT (OUTPATIENT)
Dept: MEDICAL GROUP | Facility: MEDICAL CENTER | Age: 84
End: 2019-08-29
Payer: MEDICARE

## 2019-08-29 VITALS
HEIGHT: 60 IN | WEIGHT: 154 LBS | RESPIRATION RATE: 16 BRPM | SYSTOLIC BLOOD PRESSURE: 124 MMHG | DIASTOLIC BLOOD PRESSURE: 82 MMHG | BODY MASS INDEX: 30.23 KG/M2 | HEART RATE: 75 BPM | OXYGEN SATURATION: 93 % | TEMPERATURE: 97.8 F

## 2019-08-29 DIAGNOSIS — T14.8XXA INFECTED WOUND: ICD-10-CM

## 2019-08-29 DIAGNOSIS — L08.9 INFECTED WOUND: ICD-10-CM

## 2019-08-29 PROCEDURE — 99213 OFFICE O/P EST LOW 20 MIN: CPT | Performed by: FAMILY MEDICINE

## 2019-08-29 NOTE — PROGRESS NOTES
CC: Wound over the left knee    HPI:   Shereen presents today because she is concerned about the wound over her left knee.  Patient has history of a fall 2 weeks ago, she has had hematoma skin abrasion.  Hematomas on her legs has already resolved.  The wound over the left knee is covered by a black scab, but lately it has been a little bit painful but she describes it as when she was showering.  However annual black scab formed, there is no sign of infection, she has mild serosanguinous discharge comes out with pressure.  No tenderness, skin around the wound is peeling out as a sign of a healing process.  Denies any fever, chills, redness of tenderness of the leg.      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) 01/12/2015   • Dyspnea on effort 01/02/2014   • Diabetes ([facility]) 12/26/2013   • Aortic stenosis 07/02/2013       Current Outpatient Medications   Medication Sig Dispense Refill   • telmisartan (MICARDIS) 40 MG Tab TAKE ONE TABLET BY MOUTH TWICE A  Tab 3   • fluconazole (DIFLUCAN) 100 MG Tab Take 1 Tab by mouth every day. 1 Tab 0   • amLODIPine (NORVASC) 5 MG Tab Take 1 Tab by mouth every morning AND 0.5 Tabs every evening. Take 5 mg in the AM and 2.5 mg in the  Tab 2   • metFORMIN (GLUCOPHAGE) 500 MG  Tab Take 1 Tab by mouth 3 times a day. 300 Tab 3   • famotidine (PEPCID) 20 MG Tab TAKE ONE TABLET BY MOUTH TWICE A DAY 60 Tab 3   • famotidine (PEPCID) 20 MG Tab Take 1 Tab by mouth every day. 90 Tab 3   • levothyroxine (SYNTHROID) 50 MCG Tab Take 1 Tab by mouth every morning. 90 Tab 3   • warfarin (COUMADIN) 3 MG Tab Take 1 Tab by mouth every day. 90 Tab 3   • acetaminophen (TYLENOL) 325 MG Tab Take 2 Tabs by mouth every 6 hours as needed (Mild Pain; (Pain scale 1-3); Temp greater than 100.5 F). 30 Tab 0   • gabapentin (NEURONTIN) 100 MG Cap Take 1 Cap by mouth every day. 90 Cap 0   • gabapentin (NEURONTIN) 100 MG Cap Take 2 Caps by mouth every evening. 90 Cap 0   • Blood Glucose Monitoring Suppl Device Meter: Dispense free style meter. Sig. Use 2 times daily as directed for blood sugar monitoring. dx e11.9 1 Device 0   • cloNIDine (CATAPRES) 0.1 MG Tab Take 0.1 mg by mouth 2 times a day as needed. If SBP > 160     • glipiZIDE SR (GLUCOTROL) 2.5 MG TABLET SR 24 HR Take 2.5 mg by mouth every day.     • dorzolamide-timolol (COSOPT) 22.3-6.8 MG/ML Solution Place 1 Drop in both eyes 2 times a day.     • carvedilol (COREG) 25 MG Tab Take 1 Tab by mouth 2 times a day. 180 Tab 3   • Glucosamine HCl (GLUCOSAMINE PO) Take 1 Tab by mouth every morning. Pt unsure of dose     • Polyethyl Glycol-Propyl Glycol (SYSTANE OP) 1-2 Drops by Ophthalmic route 4 times a day as needed.     • docosahexanoic acid (OMEGA 3 FA) 1000 MG CAPS Take 1 Cap by mouth every morning.     • VITAMIN D PO Take 2,000 Units by mouth every morning.     • CALCIUM 500 PO Take 1 Tab by mouth every morning.       No current facility-administered medications for this visit.          Allergies as of 08/29/2019 - Reviewed 08/29/2019   Allergen Reaction Noted   • Darvon [propoxyphene hcl]  03/18/2008   • Iodine  05/11/2015   • Latex  09/19/2013   • Penicillins Anaphylaxis 08/02/2008   • Propoxyphene hcl Anaphylaxis 08/02/2008   • Tetanus antitoxin Myalgia  11/19/2017   • Tetracycline Anaphylaxis 08/02/2008        ROS: Denies any chest pain, Shortness of breath, Changes bowel or bladder, Lower extremity edema.    Physical Exam:  /82 (BP Location: Right arm, Patient Position: Sitting, BP Cuff Size: Adult)   Pulse 75   Temp 36.6 °C (97.8 °F) (Temporal)   Resp 16   Ht 1.524 m (5')   Wt 69.9 kg (154 lb)   LMP 01/01/1985   SpO2 93%   BMI 30.08 kg/m²   Gen.: Well-developed, well-nourished, no apparent distress,pleasant and cooperative with the examination  Skin:  Warm and dry with good turgor.  A wound covered with a black scab on the skin over the left knee.  It oozes serosanguinous discharge.            Assessment and Plan.   83 y.o. female     1. Infected wound  With normal healing process, however it she has mild serosanguineous discharge, which is a part of her healing process.  Patient advised the black scab over the wound and do not scrub it out .  For now she does not need antibiotics, however she can apply topical Polysporin 1 to twice a day.  Return to the clinic if it gets worse.

## 2019-08-30 ENCOUNTER — ANTICOAGULATION VISIT (OUTPATIENT)
Dept: MEDICAL GROUP | Facility: PHYSICIAN GROUP | Age: 84
End: 2019-08-30
Payer: MEDICARE

## 2019-08-30 VITALS — DIASTOLIC BLOOD PRESSURE: 82 MMHG | SYSTOLIC BLOOD PRESSURE: 115 MMHG | HEART RATE: 71 BPM

## 2019-08-30 DIAGNOSIS — I48.0 PAF (PAROXYSMAL ATRIAL FIBRILLATION) (HCC): ICD-10-CM

## 2019-08-30 DIAGNOSIS — Z79.01 CHRONIC ANTICOAGULATION: Primary | ICD-10-CM

## 2019-08-30 LAB — INR PPP: 1.8 (ref 2–3.5)

## 2019-08-30 PROCEDURE — 99211 OFF/OP EST MAY X REQ PHY/QHP: CPT | Performed by: FAMILY MEDICINE

## 2019-08-30 PROCEDURE — 85610 PROTHROMBIN TIME: CPT | Performed by: FAMILY MEDICINE

## 2019-08-30 NOTE — PROGRESS NOTES
Anticoagulation Summary  As of 2019    INR goal:   2.0-3.0   TTR:   79.1 % (1.2 y)   INR used for dosin.80! (2019)   Warfarin maintenance plan:   1.5 mg (3 mg x 0.5) every Mon, Wed; 3 mg (3 mg x 1) all other days   Weekly warfarin total:   18 mg   Plan last modified:   Brady Piña, PharmD (2019)   Next INR check:   2019   Target end date:   Indefinite    Indications    Chronic anticoagulation [Z79.01]  PAF (paroxysmal atrial fibrillation) ([facility]) [I48.0]             Anticoagulation Episode Summary     INR check location:       Preferred lab:       Send INR reminders to:       Comments:         Anticoagulation Care Providers     Provider Role Specialty Phone number    Ganesh Mckeon M.D. Referring Geriatrics [phone]    [facility] Anticoagulation Services Responsible  [phone]        Anticoagulation Patient Findings  Patient Findings     Positives:   Change in diet/appetite    Negatives:   Signs/symptoms of thrombosis, Signs/symptoms of bleeding, Laboratory test error suspected, Change in health, Change in alcohol use, Change in activity, Upcoming invasive procedure, Emergency department visit, Upcoming dental procedure, Missed doses, Extra doses, Change in medications, Hospital admission, Bruising, Other complaints    Comments:   More spinach and lower dose at her request        HPI:   Shereen Dale seen in clinic today, on anticoagulation therapy with warfarin for stroke prevention due to history of PAF.    Patient's previous INR was therapeutic at 2.3 on 19, at which time patient was instructed to continue with current warfarin regimen.  She returns to clinic today to recheck INR to ensure it is therapeutic and thus preventing possible clotting and/or bleeding/bruising complications.    CHADS-VASc = at least 5  (unadjusted ischemic stroke risk/year:  7.2%, which is moderate risk)    Does patient have any changes to current medical/health status since last appt (Y/N):   "NO  Does patient have any signs/symptoms of bleeding and/or thrombosis since the last appt (Y/N):  NO  Does patient have any interval changes to diet or medications since last appt (Y/N):  Admits to having more spinach over the course of this week.  Are there any complications or cost restrictions with current therapy (Y/N):  NO     Does patient have [facility] PCP? YES, Dr (If not, please document discussion that patient must be seen at [facility])       Vitals:  /82  HR 71    Weight  declines   Height   5' 14\"     Asssessment:      INR subtherapeutic at 1.8, therefore increasing patient's stroke risk.   Reason(s) for out of range INR today:  Dose too low.      Plan:  Instructed patient to increase weekly warfarin regimen as detailed above in order to bring INR to therapeutic range.     Follow up:  Because warfarin is a high risk medication and current CHEST guidelines recommend regular monitoring intervals (few days up to 12 weeks), will have patient return to clinic in 2 weeks to recheck INR.    Brady Piña, PharmD, BCACP    "

## 2019-09-09 ENCOUNTER — HOSPITAL ENCOUNTER (OUTPATIENT)
Dept: LAB | Facility: MEDICAL CENTER | Age: 84
End: 2019-09-09
Attending: INTERNAL MEDICINE
Payer: MEDICARE

## 2019-09-09 DIAGNOSIS — D68.32 HEMORRHAGIC DISORDER DUE TO EXTRINSIC CIRCULATING ANTICOAGULANTS (HCC): ICD-10-CM

## 2019-09-09 DIAGNOSIS — E87.1 HYPONATREMIA: ICD-10-CM

## 2019-09-09 DIAGNOSIS — E78.5 HYPERLIPIDEMIA, UNSPECIFIED HYPERLIPIDEMIA TYPE: ICD-10-CM

## 2019-09-09 DIAGNOSIS — I48.0 PAF (PAROXYSMAL ATRIAL FIBRILLATION) (HCC): ICD-10-CM

## 2019-09-09 LAB
ALBUMIN SERPL BCP-MCNC: 4.5 G/DL (ref 3.2–4.9)
ALBUMIN/GLOB SERPL: 1.7 G/DL
ALP SERPL-CCNC: 37 U/L (ref 30–99)
ALT SERPL-CCNC: 32 U/L (ref 2–50)
ANION GAP SERPL CALC-SCNC: 11 MMOL/L (ref 0–11.9)
AST SERPL-CCNC: 25 U/L (ref 12–45)
BILIRUB SERPL-MCNC: 0.6 MG/DL (ref 0.1–1.5)
BUN SERPL-MCNC: 12 MG/DL (ref 8–22)
CALCIUM SERPL-MCNC: 9.2 MG/DL (ref 8.5–10.5)
CHLORIDE SERPL-SCNC: 101 MMOL/L (ref 96–112)
CHOLEST SERPL-MCNC: 170 MG/DL (ref 100–199)
CO2 SERPL-SCNC: 24 MMOL/L (ref 20–33)
CREAT SERPL-MCNC: 0.7 MG/DL (ref 0.5–1.4)
ERYTHROCYTE [DISTWIDTH] IN BLOOD BY AUTOMATED COUNT: 44 FL (ref 35.9–50)
FASTING STATUS PATIENT QL REPORTED: NORMAL
GLOBULIN SER CALC-MCNC: 2.6 G/DL (ref 1.9–3.5)
GLUCOSE SERPL-MCNC: 132 MG/DL (ref 65–99)
HCT VFR BLD AUTO: 39.9 % (ref 37–47)
HDLC SERPL-MCNC: 58 MG/DL
HGB BLD-MCNC: 13.6 G/DL (ref 12–16)
LDLC SERPL CALC-MCNC: 87 MG/DL
MCH RBC QN AUTO: 31.2 PG (ref 27–33)
MCHC RBC AUTO-ENTMCNC: 34.1 G/DL (ref 33.6–35)
MCV RBC AUTO: 91.5 FL (ref 81.4–97.8)
PLATELET # BLD AUTO: 235 K/UL (ref 164–446)
PMV BLD AUTO: 9.4 FL (ref 9–12.9)
POTASSIUM SERPL-SCNC: 4.4 MMOL/L (ref 3.6–5.5)
PROT SERPL-MCNC: 7.1 G/DL (ref 6–8.2)
RBC # BLD AUTO: 4.36 M/UL (ref 4.2–5.4)
SODIUM SERPL-SCNC: 136 MMOL/L (ref 135–145)
TRIGL SERPL-MCNC: 124 MG/DL (ref 0–149)
WBC # BLD AUTO: 7.1 K/UL (ref 4.8–10.8)

## 2019-09-09 PROCEDURE — 80053 COMPREHEN METABOLIC PANEL: CPT

## 2019-09-09 PROCEDURE — 85027 COMPLETE CBC AUTOMATED: CPT

## 2019-09-09 PROCEDURE — 80061 LIPID PANEL: CPT

## 2019-09-09 PROCEDURE — 36415 COLL VENOUS BLD VENIPUNCTURE: CPT

## 2019-09-11 RX ORDER — GLIPIZIDE 2.5 MG/1
2.5 TABLET, EXTENDED RELEASE ORAL DAILY
Qty: 100 TAB | Refills: 3 | Status: SHIPPED | OUTPATIENT
Start: 2019-09-11 | End: 2020-10-08

## 2019-09-12 ENCOUNTER — OFFICE VISIT (OUTPATIENT)
Dept: MEDICAL GROUP | Facility: MEDICAL CENTER | Age: 84
End: 2019-09-12
Payer: MEDICARE

## 2019-09-12 VITALS
HEART RATE: 73 BPM | SYSTOLIC BLOOD PRESSURE: 120 MMHG | BODY MASS INDEX: 30.23 KG/M2 | WEIGHT: 154 LBS | OXYGEN SATURATION: 94 % | HEIGHT: 60 IN | RESPIRATION RATE: 16 BRPM | DIASTOLIC BLOOD PRESSURE: 78 MMHG | TEMPERATURE: 98.2 F

## 2019-09-12 DIAGNOSIS — E03.9 ACQUIRED HYPOTHYROIDISM: ICD-10-CM

## 2019-09-12 DIAGNOSIS — I48.0 PAF (PAROXYSMAL ATRIAL FIBRILLATION) (HCC): ICD-10-CM

## 2019-09-12 DIAGNOSIS — I35.0 NONRHEUMATIC AORTIC VALVE STENOSIS: ICD-10-CM

## 2019-09-12 DIAGNOSIS — M81.0 POSTMENOPAUSAL BONE LOSS: ICD-10-CM

## 2019-09-12 DIAGNOSIS — I10 ESSENTIAL HYPERTENSION: ICD-10-CM

## 2019-09-12 DIAGNOSIS — E11.69 DIABETES MELLITUS TYPE 2 IN OBESE (HCC): ICD-10-CM

## 2019-09-12 DIAGNOSIS — Z12.11 COLON CANCER SCREENING: ICD-10-CM

## 2019-09-12 DIAGNOSIS — E66.9 DIABETES MELLITUS TYPE 2 IN OBESE (HCC): ICD-10-CM

## 2019-09-12 PROCEDURE — 99214 OFFICE O/P EST MOD 30 MIN: CPT | Performed by: FAMILY MEDICINE

## 2019-09-12 NOTE — PROGRESS NOTES
CC: Neurological stenosis, hypertension, A. fib, hypothyroidism, diabetes    HPI:   Shereen presents today discuss following medical issues:    Nonrheumatic severe aortic valve stenosis  Patient has been mostly asymptomatic.  She denies dizziness, chest pain, syncopal episodes.however sometimes she develops shortness of breath with exertion.  It has never affected her quality of life.  Patient has been declining  theTVAR procedure(patient discussed it with cardiologist before).     Essential hypertension  Has been adequately controlled on current medication. Denies headache, chest pain, and SOB.  Patient has been on amlodipine 5 mg daily, Micardis 40 mg daily, carvedilol 25 mg twice a day.     Paroxysmal atrial fibrillation (HCC)  Patient has been asymptomatic.  Denies chest pain, and palpitation.  Has been on carvedilol 25 mg twice a day,  Coumadin, she follows up regularly with his Coumadin clinic.     Acquired hypothyroidism  She has been tolerating Levothyroxine, no palpitation, no cold or heat intolerance. Patient  levothyroxine 50 mcg daily.     Diabetes mellitus type 2 in obese (HCC)  Blood glucose has been adequate controlled.  Last A1c was 6.6. on metformin 500 mg 3 times a day, and glipizide SR 0.5 mg daily.  Denies polyuria, polydipsia and hypoglycemic episodes.       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) 05/02/2012     Priority: High   • Acute, but ill-defined, cerebrovascular disease 04/30/2012     Priority: High   • Cough 05/02/2012     Priority: Low   • Edema 05/02/2012     Priority: Low   • Acquired hypothyroidism 09/12/2019   • Diabetes mellitus type 2 in obese (HCC) 09/12/2019   • Skin abrasion 08/20/2019   • Chronic anticoagulation 08/02/2019   • Hyponatremia 03/25/2019   • Advanced directives, counseling/discussion  09/26/2018   • Hypertensive urgency 05/11/2017   • Diastolic dysfunction 02/02/2016   • Tear of lateral cartilage or meniscus of knee, current 05/21/2015   • PAF (paroxysmal atrial fibrillation) ([facility]) 01/12/2015   • Dyspnea on effort 01/02/2014   • Diabetes ([facility]) 12/26/2013   • Aortic stenosis 07/02/2013       Current Outpatient Medications   Medication Sig Dispense Refill   • glipiZIDE SR (GLUCOTROL) 2.5 MG TABLET SR 24 HR Take 1 Tab by mouth every day. 100 Tab 3   • telmisartan (MICARDIS) 40 MG Tab TAKE ONE TABLET BY MOUTH TWICE A  Tab 3   • fluconazole (DIFLUCAN) 100 MG Tab Take 1 Tab by mouth every day. 1 Tab 0   • amLODIPine (NORVASC) 5 MG Tab Take 1 Tab by mouth every morning AND 0.5 Tabs every evening. Take 5 mg in the AM and 2.5 mg in the  Tab 2   • metFORMIN (GLUCOPHAGE) 500 MG Tab Take 1 Tab by mouth 3 times a day. 300 Tab 3   • famotidine (PEPCID) 20 MG Tab TAKE ONE TABLET BY MOUTH TWICE A DAY 60 Tab 3   • famotidine (PEPCID) 20 MG Tab Take 1 Tab by mouth every day. 90 Tab 3   • levothyroxine (SYNTHROID) 50 MCG Tab Take 1 Tab by mouth every morning. 90 Tab 3   • warfarin (COUMADIN) 3 MG Tab Take 1 Tab by mouth every day. 90 Tab 3   • acetaminophen (TYLENOL) 325 MG Tab Take 2 Tabs by mouth every 6 hours as needed (Mild Pain; (Pain scale 1-3); Temp greater than 100.5 F). 30 Tab 0   • gabapentin (NEURONTIN) 100 MG Cap Take 1 Cap by mouth every day. 90 Cap 0   • gabapentin (NEURONTIN) 100 MG Cap Take 2 Caps by mouth every evening. 90 Cap 0   • Blood Glucose Monitoring Suppl Device Meter: Dispense free style meter. Sig. Use 2 times daily as directed for blood sugar monitoring. dx e11.9 1 Device 0   • cloNIDine (CATAPRES) 0.1 MG Tab Take 0.1 mg by mouth 2 times a day as needed. If SBP > 160     • dorzolamide-timolol (COSOPT) 22.3-6.8 MG/ML Solution Place 1 Drop in both eyes 2 times a day.     • carvedilol (COREG) 25 MG Tab Take 1 Tab by mouth 2 times a day. 180 Tab 3   • Glucosamine HCl  (GLUCOSAMINE PO) Take 1 Tab by mouth every morning. Pt unsure of dose     • Polyethyl Glycol-Propyl Glycol (SYSTANE OP) 1-2 Drops by Ophthalmic route 4 times a day as needed.     • docosahexanoic acid (OMEGA 3 FA) 1000 MG CAPS Take 1 Cap by mouth every morning.     • VITAMIN D PO Take 2,000 Units by mouth every morning.     • CALCIUM 500 PO Take 1 Tab by mouth every morning.       No current facility-administered medications for this visit.          Allergies as of 09/12/2019 - Reviewed 09/12/2019   Allergen Reaction Noted   • Darvon [propoxyphene hcl]  03/18/2008   • Iodine  05/11/2015   • Latex  09/19/2013   • Penicillins Anaphylaxis 08/02/2008   • Propoxyphene hcl Anaphylaxis 08/02/2008   • Tetanus antitoxin Myalgia 11/19/2017   • Tetracycline Anaphylaxis 08/02/2008        ROS: Denies any chest pain, Shortness of breath, Changes bowel or bladder, Lower extremity edema.    Physical Exam:  /78 (BP Location: Right arm, Patient Position: Sitting, BP Cuff Size: Adult)   Pulse 73   Temp 36.8 °C (98.2 °F) (Temporal)   Resp 16   Ht 1.524 m (5')   Wt 69.9 kg (154 lb)   LMP 01/01/1985   SpO2 94%   BMI 30.08 kg/m²   Gen.: Well-developed, well-nourished, no apparent distress,pleasant and cooperative with the examination  Skin:  Warm and dry with good turgor. No rashes or suspicious lesions in visible areas  HEENT:Sinuses nontender with palpation, TMs clear, nares patent with pink mucosa and clear rhinorrhea,no septal deviation ,polyps or lesions. lips without lesions, oropharynx clear.  Neck: Trachea midline,no masses or adenopathy. No JVD.  Cor: Regular rate and rhythm, systolic murmur, no gallop or rub.  Lungs: Respirations unlabored.Clear to auscultation with equal breath sounds bilaterally. No wheezes, rhonchi.  Extremities: No cyanosis, clubbing or edema.      Assessment and Plan.   83 y.o. female     1. Nonrheumatic aortic valve stenosis  Mostly asymptomatic sometimes get shortness of breath with  exertion.  Patient declined the TVAR procedure or any other procedure to replace the aortic valve stenosis.    2. Essential hypertension  Adequately controlled.  Continue amlodipine 5 mg daily, Micardis 40 mg daily, and carvedilol 25 mg twice a day.    3. PAF (paroxysmal atrial fibrillation) ([facility])  Stable.  No RVR.  Continue on carvedilol 25 mg twice a day, and Coumadin    4. Acquired hypothyroidism  He has been tolerating Levothyroxine, no palpitation, no cold or heat intolerance  Continue on levothyroxine 50 mcg daily.    5. Diabetes mellitus type 2 in obese ([facility])  Adequately controlled.  Continue on metformin 500 mg 3 times a day, and glipizide  Due for monofilament foot exam we will do it next visit.

## 2019-09-13 ENCOUNTER — ANTICOAGULATION VISIT (OUTPATIENT)
Dept: MEDICAL GROUP | Facility: PHYSICIAN GROUP | Age: 84
End: 2019-09-13
Payer: MEDICARE

## 2019-09-13 VITALS — SYSTOLIC BLOOD PRESSURE: 110 MMHG | HEART RATE: 76 BPM | DIASTOLIC BLOOD PRESSURE: 74 MMHG

## 2019-09-13 DIAGNOSIS — I48.0 PAF (PAROXYSMAL ATRIAL FIBRILLATION) (HCC): ICD-10-CM

## 2019-09-13 DIAGNOSIS — Z79.01 CHRONIC ANTICOAGULATION: Primary | ICD-10-CM

## 2019-09-13 LAB — INR PPP: 2.1 (ref 2–3.5)

## 2019-09-13 PROCEDURE — 93793 ANTICOAG MGMT PT WARFARIN: CPT | Performed by: INTERNAL MEDICINE

## 2019-09-13 PROCEDURE — 85610 PROTHROMBIN TIME: CPT | Performed by: INTERNAL MEDICINE

## 2019-09-13 NOTE — PROGRESS NOTES
Anticoagulation Summary  As of 2019    INR goal:   2.0-3.0   TTR:   77.7 % (1.3 y)   INR used for dosin.10 (2019)   Warfarin maintenance plan:   1.5 mg (3 mg x 0.5) every Mon, Wed; 3 mg (3 mg x 1) all other days   Weekly warfarin total:   18 mg   Plan last modified:   Brady Piña, PharmD (2019)   Next INR check:   2019   Target end date:   Indefinite    Indications    Chronic anticoagulation [Z79.01]  PAF (paroxysmal atrial fibrillation) ([facility]) [I48.0]             Anticoagulation Episode Summary     INR check location:       Preferred lab:       Send INR reminders to:       Comments:         Anticoagulation Care Providers     Provider Role Specialty Phone number    Ganesh Mckeon M.D. Referring Geriatrics [phone]    [facility] Responsible  [phone]        Anticoagulation Patient Findings  Patient Findings     Negatives:   Signs/symptoms of thrombosis, Signs/symptoms of bleeding, Laboratory test error suspected, Change in health, Change in alcohol use, Change in activity, Upcoming invasive procedure, Emergency department visit, Upcoming dental procedure, Missed doses, Extra doses, Change in medications, Change in diet/appetite, Hospital admission, Bruising, Other complaints        HPI:   Shereen Dale seen in clinic today, on anticoagulation therapy with warfarin for stroke prevention due to history of PAF.    Patient's previous INR was subtherapeutic at 1.8 on 19, at which time patient was instructed to increase weekly warfarin regimen.  She returns to clinic today to recheck INR to ensure it is therapeutic and thus preventing possible clotting and/or bleeding/bruising complications.    CHADS-VASc = at least 5  (unadjusted ischemic stroke risk/year:  7.2%, which is moderate risk)    Does patient have any changes to current medical/health status since last appt (Y/N):  NO  Does patient have any signs/symptoms of bleeding and/or thrombosis since  the last appt (Y/N):  NO  Does patient have any interval changes to diet or medications since last appt (Y/N):  NO  Are there any complications or cost restrictions with current therapy (Y/N):  NO     Does patient have [facility] PCP? YES, Dr Ganesh Mckeon (If not, please document discussion that patient must be seen at [facility])       Vitals:      Vitals:    09/13/19 1048   BP: 110/74   BP Location: Left arm   Patient Position: Sitting   BP Cuff Size: Adult   Pulse: 76        Asssessment:      INR therapeutic at 2.1, therefore decreasing patient's risk of stroke and/or bleeding complications.   Reason(s) for out of range INR today:  n/a      Plan:  Pt is to continue with current warfarin dosing regimen in order to maintain INR in therapeutic range.     Follow up:  Because warfarin is a high risk medication and current CHEST guidelines recommend regular monitoring intervals (few days up to 12 weeks), will have patient return to clinic in 2 weeks to recheck INR.    Brady Piña, PharmD, BCACP

## 2019-09-27 ENCOUNTER — ANTICOAGULATION VISIT (OUTPATIENT)
Dept: MEDICAL GROUP | Facility: PHYSICIAN GROUP | Age: 84
End: 2019-09-27
Payer: MEDICARE

## 2019-09-27 VITALS — SYSTOLIC BLOOD PRESSURE: 107 MMHG | HEART RATE: 87 BPM | DIASTOLIC BLOOD PRESSURE: 75 MMHG

## 2019-09-27 DIAGNOSIS — I48.0 PAF (PAROXYSMAL ATRIAL FIBRILLATION) (HCC): ICD-10-CM

## 2019-09-27 DIAGNOSIS — Z79.01 CHRONIC ANTICOAGULATION: Primary | ICD-10-CM

## 2019-09-27 LAB — INR PPP: 2.4 (ref 2–3.5)

## 2019-09-27 PROCEDURE — 85610 PROTHROMBIN TIME: CPT | Performed by: INTERNAL MEDICINE

## 2019-09-27 PROCEDURE — 93793 ANTICOAG MGMT PT WARFARIN: CPT | Performed by: INTERNAL MEDICINE

## 2019-09-27 NOTE — PROGRESS NOTES
Anticoagulation Summary  As of 2019    INR goal:   2.0-3.0   TTR:   78.3 % (1.3 y)   INR used for dosin.40 (2019)   Warfarin maintenance plan:   1.5 mg (3 mg x 0.5) every Mon, Wed; 3 mg (3 mg x 1) all other days   Weekly warfarin total:   18 mg   Plan last modified:   Brady Piña, PharmD (2019)   Next INR check:   10/11/2019   Target end date:   Indefinite    Indications    Chronic anticoagulation [Z79.01]  PAF (paroxysmal atrial fibrillation) (HCC) [I48.0]             Anticoagulation Episode Summary     INR check location:       Preferred lab:       Send INR reminders to:       Comments:         Anticoagulation Care Providers     Provider Role Specialty Phone number    Ganesh Mckeon M.D. Referring Geriatrics [phone]    [facility]tico[facility] Responsible  [phone]        Anticoagulation Patient Findings  Patient Findings     Negatives:   Signs/symptoms of thrombosis, Signs/symptoms of bleeding, Laboratory test error suspected, Change in health, Change in alcohol use, Change in activity, Upcoming invasive procedure, Emergency department visit, Upcoming dental procedure, Missed doses, Extra doses, Change in medications, Change in diet/appetite, Hospital admission, Bruising, Other complaints        HPI:   Shereen Dale seen in clinic today, on anticoagulation therapy with warfarin for stroke prevention due to history of atrial fibrillation.    Patient's previous INR was therapeutic at 2.1 on 19, at which time patient was instructed to continue with current warfarin regimen.  She returns to clinic today to recheck INR to ensure it is therapeutic and thus preventing possible clotting and/or bleeding/bruising complications.    CHADS-VASc = at least 5  (unadjusted ischemic stroke risk/year:  7.2%, which is moderate risk)    Does patient have any changes to current medical/health status since last appt (Y/N):  NO  Does patient have any signs/symptoms of bleeding  and/or thrombosis since the last appt (Y/N):  NO  Does patient have any interval changes to diet or medications since last appt (Y/N):  NO  Are there any complications or cost restrictions with current therapy (Y/N):  NO     Does patient have [facility] PCP? YES, Dr Ganesh Mckeon (If not, please document discussion that patient must be seen at [facility])       Vitals:      Vitals:    09/27/19 1041   BP: 107/75   BP Location: Left arm   Patient Position: Sitting   BP Cuff Size: Adult   Pulse: 87        Asssessment:      INR remains therapeutic at 2.4, therefore decreasing patient's risk of stroke and/or bleeding complications.   Reason(s) for out of range INR today:  n/a      Plan:  Pt is to continue with current warfarin dosing regimen in order to maintain INR in therapeutic range.     Follow up:  Because warfarin is a high risk medication and current CHEST guidelines recommend regular monitoring intervals (few days up to 12 weeks), will have patient return to clinic in 2 weeks to recheck INR.    Brady Piña, PharmD, BCACP

## 2019-10-05 ENCOUNTER — IMMUNIZATION (OUTPATIENT)
Dept: SOCIAL WORK | Facility: CLINIC | Age: 84
End: 2019-10-05
Payer: MEDICARE

## 2019-10-05 DIAGNOSIS — Z23 NEED FOR VACCINATION: ICD-10-CM

## 2019-10-05 PROCEDURE — G0009 ADMIN PNEUMOCOCCAL VACCINE: HCPCS | Performed by: REGISTERED NURSE

## 2019-10-05 PROCEDURE — G0008 ADMIN INFLUENZA VIRUS VAC: HCPCS | Performed by: REGISTERED NURSE

## 2019-10-05 PROCEDURE — 90662 IIV NO PRSV INCREASED AG IM: CPT | Performed by: REGISTERED NURSE

## 2019-10-05 PROCEDURE — 90732 PPSV23 VACC 2 YRS+ SUBQ/IM: CPT | Performed by: REGISTERED NURSE

## 2019-10-11 ENCOUNTER — ANTICOAGULATION VISIT (OUTPATIENT)
Dept: MEDICAL GROUP | Facility: PHYSICIAN GROUP | Age: 84
End: 2019-10-11
Payer: MEDICARE

## 2019-10-11 DIAGNOSIS — Z79.01 CHRONIC ANTICOAGULATION: ICD-10-CM

## 2019-10-11 DIAGNOSIS — I48.0 PAF (PAROXYSMAL ATRIAL FIBRILLATION) (HCC): ICD-10-CM

## 2019-10-11 LAB — INR PPP: 2 (ref 2–3.5)

## 2019-10-11 PROCEDURE — 93793 ANTICOAG MGMT PT WARFARIN: CPT | Performed by: INTERNAL MEDICINE

## 2019-10-11 NOTE — PROGRESS NOTES
Anticoagulation Summary  As of 10/11/2019    INR goal:   2.0-3.0   TTR:   78.9 % (1.4 y)   INR used for dosin.00 (10/11/2019)   Warfarin maintenance plan:   1.5 mg (3 mg x 0.5) every Mon, Wed; 3 mg (3 mg x 1) all other days   Weekly warfarin total:   18 mg   Plan last modified:   Brady Piña, PharmD (2019)   Next INR check:   10/18/2019   Target end date:   Indefinite    Indications    Chronic anticoagulation [Z79.01]  PAF (paroxysmal atrial fibrillation) ([facility]) [I48.0]             Anticoagulation Episode Summary     INR check location:       Preferred lab:       Send INR reminders to:       Comments:         Anticoagulation Care Providers     Provider Role Specialty Phone number    Ganesh Mckeon M.D. Referring Geriatrics [phone]    [facility] Anticoagulation Services Responsible  [phone]        Anticoagulation Patient Findings  Patient Findings     Negatives:   Signs/symptoms of thrombosis, Signs/symptoms of bleeding, Laboratory test error suspected, Change in health, Change in alcohol use, Change in activity, Upcoming invasive procedure, Emergency department visit, Upcoming dental procedure, Missed doses, Extra doses, Change in medications, Change in diet/appetite, Hospital admission, Bruising, Other complaints            HPI:   Pt seen in clinic today, on anticoagulation therapy with warfarin for stroke prevention due to history of atrial fib    Patient's previous INR was therapeutic at 2.4 on 19, at which time patient was instructed to continue regimen.  She returns to clinic today to recheck INR to ensure it is therapeutic and thus preventing possible clotting and/or bleeding/bruising complications.    CHADS-VASc = 5  (unadjusted ischemic stroke risk/year:  6.7%, which is moderate risk)    Does patient have any changes to current medical/health status since last appt (Y/N):  n  Does patient have any signs/symptoms of bleeding and/or thrombosis since the last appt (Y/N):   "n  Does patient have any interval changes to diet or medications since last appt (Y/N):  n  Are there any complications or cost restrictions with current therapy (Y/N):  n       Vitals:  BP check declined    Weight  declined   Height   5'4\"     Asssessment:      INR therapeutic at 2.0, therefore decreasing pt's risk of clotting and/or bleeding complications   Reason(s) for out of range INR today:  n/a      Plan:  Pt is to continue with current warfarin dosing regimen.     Follow up:  Because warfarin is a high risk medication and current CHEST guidelines recommend regular monitoring intervals (few days up to 12 weeks), will have patient return to clinic in 1 week per pt preference to recheck INR.    Pt's [facility] PCP is Ganesh Mckeon M.D.  Referral due 03/2020    Yahaira Lilly, PharmD    "

## 2019-10-18 ENCOUNTER — ANTICOAGULATION VISIT (OUTPATIENT)
Dept: MEDICAL GROUP | Facility: PHYSICIAN GROUP | Age: 84
End: 2019-10-18
Payer: MEDICARE

## 2019-10-18 VITALS — DIASTOLIC BLOOD PRESSURE: 71 MMHG | HEART RATE: 71 BPM | SYSTOLIC BLOOD PRESSURE: 103 MMHG

## 2019-10-18 DIAGNOSIS — I48.0 PAF (PAROXYSMAL ATRIAL FIBRILLATION) (HCC): ICD-10-CM

## 2019-10-18 DIAGNOSIS — Z79.01 CHRONIC ANTICOAGULATION: Primary | ICD-10-CM

## 2019-10-18 LAB — INR PPP: 1.8 (ref 2–3.5)

## 2019-10-18 PROCEDURE — 85610 PROTHROMBIN TIME: CPT | Performed by: FAMILY MEDICINE

## 2019-10-18 PROCEDURE — 99211 OFF/OP EST MAY X REQ PHY/QHP: CPT | Performed by: FAMILY MEDICINE

## 2019-10-18 NOTE — PROGRESS NOTES
Anticoagulation Summary  As of 10/18/2019    INR goal:   2.0-3.0   TTR:   77.7 % (1.4 y)   INR used for dosin.80! (10/18/2019)   Warfarin maintenance plan:   1.5 mg (3 mg x 0.5) every Mon, Wed; 3 mg (3 mg x 1) all other days   Weekly warfarin total:   18 mg   Plan last modified:   Brady Piña, PharmD (2019)   Next INR check:   10/28/2019   Target end date:   Indefinite    Indications    Chronic anticoagulation [Z79.01]  PAF (paroxysmal atrial fibrillation) ([facility]) [I48.0]             Anticoagulation Episode Summary     INR check location:       Preferred lab:       Send INR reminders to:       Comments:         Anticoagulation Care Providers     Provider Role Specialty Phone number    Ganesh Mckeon M.D. Referring Geriatrics [phone]    [facility] Anticoagulation Services Responsible  [phone]        Anticoagulation Patient Findings  Patient Findings     Negatives:   Signs/symptoms of thrombosis, Signs/symptoms of bleeding, Laboratory test error suspected, Change in health, Change in alcohol use, Change in activity, Upcoming invasive procedure, Emergency department visit, Upcoming dental procedure, Missed doses, Extra doses, Change in medications, Change in diet/appetite, Hospital admission, Bruising, Other complaints        HPI:   Shereen Dale seen in clinic today, on anticoagulation therapy with warfarin for stroke prevention due to history of PAF.    Patient's previous INR was therapeutic at 2.0 on 10-11-19, at which time patient was instructed to continue with current warfarin regimen.  she returns to clinic today to recheck INR to ensure it is therapeutic and thus preventing possible clotting and/or bleeding/bruising complications.    CHADS-VASc = at least 5  (unadjusted ischemic stroke risk/year:  7.2%, which is moderate risk)    Does patient have any changes to current medical/health status since last appt (Y/N):  NO  Does patient have any signs/symptoms of bleeding and/or thrombosis  since the last appt (Y/N):  NO  Does patient have any interval changes to diet or medications since last appt (Y/N):  NO  Are there any complications or cost restrictions with current therapy (Y/N):  NO     Does patient have [facility] PCP? YES, Dr Ganesh Mckeon (If not, please document discussion that patient must be seen at [facility])       Vitals:      There were no vitals filed for this visit.     Asssessment:      INR subtherapeutic at 1.8, therefore increasing patient's risk of stroke due to a-fib.   Reason(s) for out of range INR today:  Etiology unknown.      Plan:  Instructed patient to bolus with 4.5mg X 1, then resume current warfarin regimen in order to bring INR to therapeutic range.     Follow up:  Because warfarin is a high risk medication and current CHEST guidelines recommend regular monitoring intervals (few days up to 12 weeks), will have patient return to clinic in 4 days to recheck INR.    Brady Piña, PharmD, BCACP

## 2019-10-22 ENCOUNTER — ANTICOAGULATION VISIT (OUTPATIENT)
Dept: MEDICAL GROUP | Facility: PHYSICIAN GROUP | Age: 84
End: 2019-10-22
Payer: MEDICARE

## 2019-10-22 VITALS — DIASTOLIC BLOOD PRESSURE: 74 MMHG | SYSTOLIC BLOOD PRESSURE: 112 MMHG | HEART RATE: 72 BPM

## 2019-10-22 DIAGNOSIS — I48.0 PAF (PAROXYSMAL ATRIAL FIBRILLATION) (HCC): ICD-10-CM

## 2019-10-22 DIAGNOSIS — Z79.01 CHRONIC ANTICOAGULATION: Primary | ICD-10-CM

## 2019-10-22 LAB — INR PPP: 2.8 (ref 2–3.5)

## 2019-10-22 PROCEDURE — 93793 ANTICOAG MGMT PT WARFARIN: CPT | Performed by: FAMILY MEDICINE

## 2019-10-22 PROCEDURE — 85610 PROTHROMBIN TIME: CPT | Performed by: FAMILY MEDICINE

## 2019-10-22 NOTE — PROGRESS NOTES
Anticoagulation Summary  As of 10/22/2019    INR goal:   2.0-3.0   TTR:   77.8 % (1.4 y)   INR used for dosin.80 (10/22/2019)   Warfarin maintenance plan:   1.5 mg (3 mg x 0.5) every Mon, Wed; 3 mg (3 mg x 1) all other days   Weekly warfarin total:   18 mg   Plan last modified:   Brady Piña, PharmD (2019)   Next INR check:   2019   Target end date:   Indefinite    Indications    Chronic anticoagulation [Z79.01]  PAF (paroxysmal atrial fibrillation) ([facility]) [I48.0]             Anticoagulation Episode Summary     INR check location:       Preferred lab:       Send INR reminders to:       Comments:         Anticoagulation Care Providers     Provider Role Specialty Phone number    Ganesh Mckeon M.D. Referring Geriatrics [phone]    [facility] Responsible  [phone]        Anticoagulation Patient Findings  Patient Findings     Negatives:   Signs/symptoms of thrombosis, Signs/symptoms of bleeding, Laboratory test error suspected, Change in health, Change in alcohol use, Change in activity, Upcoming invasive procedure, Emergency department visit, Upcoming dental procedure, Missed doses, Extra doses, Change in medications, Change in diet/appetite, Hospital admission, Bruising, Other complaints        HPI:   Shereen Dale seen in clinic today, on anticoagulation therapy with warfarin for stroke prevention due to history of PAF.    Patient's previous INR was subtherapeutic at 1.8 on 10-18-19, at which time patient was instructed to continue with current warfarin regimen.  She returns to clinic today to recheck INR to ensure it is therapeutic and thus preventing possible clotting and/or bleeding/bruising complications.    CHADS-VASc = at least 5  (unadjusted ischemic stroke risk/year:  7.2%, which is moderate risk)    Does patient have any changes to current medical/health status since last appt (Y/N):  NO  Does patient have any signs/symptoms of bleeding and/or  thrombosis since the last appt (Y/N):  NO  Does patient have any interval changes to diet or medications since last appt (Y/N):  NO  Are there any complications or cost restrictions with current therapy (Y/N):  NO     Does patient have [facility] PCP? YES, Dr Ganesh Mckeon  (If not, please document discussion that patient must be seen at [facility])       Vitals:      Vitals:    10/22/19 1117   BP: 112/74   BP Location: Left arm   Patient Position: Sitting   BP Cuff Size: Adult   Pulse: 72        Asssessment:      INR therapeutic at 2.8, therefore decreasing patient's risk of stroke and/or bleeding complications.   Reason(s) for out of range INR today:  n/a      Plan:  Pt is to continue with current warfarin dosing regimen in order to maintain INR in therapeutic range.     Follow up:  Because warfarin is a high risk medication and current CHEST guidelines recommend regular monitoring intervals (few days up to 12 weeks), will have patient return to clinic in 1.5 weeks to recheck INR.    Brady Piña, PharmD, BCACP

## 2019-11-01 ENCOUNTER — ANTICOAGULATION VISIT (OUTPATIENT)
Dept: MEDICAL GROUP | Facility: PHYSICIAN GROUP | Age: 84
End: 2019-11-01
Payer: MEDICARE

## 2019-11-01 DIAGNOSIS — Z79.01 CHRONIC ANTICOAGULATION: Primary | ICD-10-CM

## 2019-11-01 DIAGNOSIS — I48.0 PAF (PAROXYSMAL ATRIAL FIBRILLATION) (HCC): ICD-10-CM

## 2019-11-01 LAB — INR PPP: 2.6 (ref 2–3.5)

## 2019-11-01 PROCEDURE — 85610 PROTHROMBIN TIME: CPT | Performed by: FAMILY MEDICINE

## 2019-11-01 NOTE — PROGRESS NOTES
Anticoagulation Summary  As of 2019    INR goal:   2.0-3.0   TTR:   78.2 % (1.4 y)   INR used for dosin.60 (2019)   Warfarin maintenance plan:   1.5 mg (3 mg x 0.5) every Mon, Wed; 3 mg (3 mg x 1) all other days   Weekly warfarin total:   18 mg   Plan last modified:   Brady Piña, PharmD (2019)   Next INR check:   2019   Target end date:   Indefinite    Indications    Chronic anticoagulation [Z79.01]  PAF (paroxysmal atrial fibrillation) ([facility]) [I48.0]             Anticoagulation Episode Summary     INR check location:       Preferred lab:       Send INR reminders to:       Comments:         Anticoagulation Care Providers     Provider Role Specialty Phone number    Ganesh Mckeon M.D. Referring Geriatrics [phone]    [facility]ticoagulation [facility] Responsible  [phone]        Anticoagulation Patient Findings  Patient Findings     Positives:   Bruising    Negatives:   Signs/symptoms of thrombosis, Signs/symptoms of bleeding, Laboratory test error suspected, Change in health, Change in alcohol use, Change in activity, Upcoming invasive procedure, Emergency department visit, Upcoming dental procedure, Missed doses, Extra doses, Change in medications, Change in diet/appetite, Hospital admission, Other complaints    Comments:   Unexplained bruising to legs and abdomen         HPI:   Shereen Dale seen in clinic today, on anticoagulation therapy with warfarin for stroke prevention due to history of PAF.    Patient's previous INR was therapeutic at 2.8 on 10-22-19, at which time patient was instructed to continue with current warfarin regimen.  She returns to clinic today to recheck INR to ensure it is therapeutic and thus preventing possible clotting and/or bleeding/bruising complications.    CHADS-VASc = at least 5  (unadjusted ischemic stroke risk/year:  7.2%, which is moderate risk)    Does patient have any changes to current medical/health status since last appt (Y/N):   NO  Does patient have any signs/symptoms of bleeding and/or thrombosis since the last appt (Y/N):  NO  Does patient have any interval changes to diet or medications since last appt (Y/N):  NO  Are there any complications or cost restrictions with current therapy (Y/N):  NO     Does patient have [facility] PCP? YES, Dr Ganesh Mckeon (If not, please document discussion that patient must be seen at [facility])       Vitals:      There were no vitals filed for this visit.     Asssessment:      INR remains therapeutic at 2.6, therefore decreasing patient's risk of stroke and/or bleeding complications.   Reason(s) for out of range INR today:  n/a      Plan:  Pt is to continue with current warfarin dosing regimen in order to maintain INR in therapeutic range.     Follow up:  Because warfarin is a high risk medication and current CHEST guidelines recommend regular monitoring intervals (few days up to 12 weeks), will have patient return to clinic in 1 weeks to recheck INR.    Brady Piña, PharmD, BCACP

## 2019-11-08 ENCOUNTER — ANTICOAGULATION VISIT (OUTPATIENT)
Dept: MEDICAL GROUP | Facility: PHYSICIAN GROUP | Age: 84
End: 2019-11-08
Payer: MEDICARE

## 2019-11-08 DIAGNOSIS — I48.0 PAF (PAROXYSMAL ATRIAL FIBRILLATION) (HCC): ICD-10-CM

## 2019-11-08 DIAGNOSIS — Z79.01 CHRONIC ANTICOAGULATION: Primary | ICD-10-CM

## 2019-11-08 LAB — INR PPP: 2.1 (ref 2–3.5)

## 2019-11-08 PROCEDURE — 85610 PROTHROMBIN TIME: CPT | Performed by: FAMILY MEDICINE

## 2019-11-08 PROCEDURE — 93793 ANTICOAG MGMT PT WARFARIN: CPT | Performed by: FAMILY MEDICINE

## 2019-11-08 NOTE — PROGRESS NOTES
Anticoagulation Summary  As of 2019    INR goal:   2.0-3.0   TTR:   78.5 % (1.4 y)   INR used for dosin.10 (2019)   Warfarin maintenance plan:   1.5 mg (3 mg x 0.5) every Mon, Wed; 3 mg (3 mg x 1) all other days   Weekly warfarin total:   18 mg   Plan last modified:   Brady Piña, PharmD (2019)   Next INR check:   2019   Target end date:   Indefinite    Indications    Chronic anticoagulation [Z79.01]  PAF (paroxysmal atrial fibrillation) ([facility]) [I48.0]             Anticoagulation Episode Summary     INR check location:       Preferred lab:       Send INR reminders to:       Comments:         Anticoagulation Care Providers     Provider Role Specialty Phone number    Ganesh Mckeon M.D. Referring Geriatrics [phone]    [facility] Responsible  [phone]        Anticoagulation Patient Findings  Patient Findings     Negatives:   Signs/symptoms of thrombosis, Signs/symptoms of bleeding, Laboratory test error suspected, Change in health, Change in alcohol use, Change in activity, Upcoming invasive procedure, Emergency department visit, Upcoming dental procedure, Missed doses, Extra doses, Change in medications, Change in diet/appetite, Hospital admission, Bruising, Other complaints        HPI:   Shereen Dale seen in clinic today, on anticoagulation therapy with warfarin for stroke prevention due to history of PAF.    Patient's previous INR was therapeutic at 2.6 on 19, at which time patient was instructed to continue with current warfarin regimen.  She returns to clinic today to recheck INR to ensure it is therapeutic and thus preventing possible clotting and/or bleeding/bruising complications.    CHADS-VASc = at least 5  (unadjusted ischemic stroke risk/year:  7.2%, which is moderate risk)    Does patient have any changes to current medical/health status since last appt (Y/N):  NO  Does patient have any signs/symptoms of bleeding and/or thrombosis  "since the last appt (Y/N):  NO  Does patient have any interval changes to diet or medications since last appt (Y/N):  NO  Are there any complications or cost restrictions with current therapy (Y/N):  NO     Does patient have [facility] PCP? YES, Dr Ganesh Mckeon (If not, please document discussion that patient must be seen at [facility])       Vitals:  patient declines BP check, states her morning reading was \"looking good\"    There were no vitals filed for this visit.     Asssessment:      INR remains therapeutic at 2.1, therefore decreasing patient's risk of stroke and/or bleeding complications.   Reason(s) for out of range INR today:  n/a      Plan:  Pt is to continue with current warfarin dosing regimen in order to maintain INR in therapeutic range.     Follow up:  Because warfarin is a high risk medication and current CHEST guidelines recommend regular monitoring intervals (few days up to 12 weeks), will have patient return to clinic in 1.5 weeks to recheck INR.    Brady Piña, PharmD, BCACP    "

## 2019-11-18 ENCOUNTER — ANTICOAGULATION VISIT (OUTPATIENT)
Dept: MEDICAL GROUP | Facility: PHYSICIAN GROUP | Age: 84
End: 2019-11-18
Payer: MEDICARE

## 2019-11-18 DIAGNOSIS — I48.0 PAF (PAROXYSMAL ATRIAL FIBRILLATION) (HCC): ICD-10-CM

## 2019-11-18 DIAGNOSIS — Z79.01 CHRONIC ANTICOAGULATION: Primary | ICD-10-CM

## 2019-11-18 LAB — INR PPP: 3 (ref 2–3.5)

## 2019-11-18 PROCEDURE — 85610 PROTHROMBIN TIME: CPT | Performed by: FAMILY MEDICINE

## 2019-11-18 PROCEDURE — 93793 ANTICOAG MGMT PT WARFARIN: CPT | Performed by: FAMILY MEDICINE

## 2019-11-18 NOTE — PROGRESS NOTES
Anticoagulation Summary  As of 11/18/2019    INR goal:   2.0-3.0   TTR:   78.9 % (1.5 y)   INR used for dosing:   3.00 (11/18/2019)   Warfarin maintenance plan:   1.5 mg (3 mg x 0.5) every Mon, Wed; 3 mg (3 mg x 1) all other days   Weekly warfarin total:   18 mg   Plan last modified:   Brady Piña, PharmD (8/30/2019)   Next INR check:   11/25/2019   Target end date:   Indefinite    Indications    Chronic anticoagulation [Z79.01]  PAF (paroxysmal atrial fibrillation) ([facility]) [I48.0]             Anticoagulation Episode Summary     INR check location:       Preferred lab:       Send INR reminders to:       Comments:         Anticoagulation Care Providers     Provider Role Specialty Phone number    Ganesh Mckeon M.D. Referring Geriatrics [phone]    [facility] Anticoagulation [facility] Responsible  [phone]        Anticoagulation Patient Findings      HPI:   Pt seen in clinic today, on anticoagulation therapy with warfarin for stroke prevention due to history of atrial fibrillation    Patient's previous INR was therapeutic at 2.1 on 11/8, at which time patient was instructed to continue regimen.  She returns to clinic today to recheck INR to ensure it is therapeutic and thus preventing possible clotting and/or bleeding/bruising complications.    CHADS-VASc = 5  (unadjusted ischemic stroke risk/year:  7.2%, which is moderate risk)    Does patient have any changes to current medical/health status since last appt (Y/N):  no  Does patient have any signs/symptoms of bleeding and/or thrombosis since the last appt (Y/N):  no  Does patient have any interval changes to diet or medications since last appt (Y/N):  no  Are there any complications or cost restrictions with current therapy (Y/N):  no       Vitals declined    Asssessment:      INR therapeutic at 3.0, therefore decreasing pt's risk of clotting and/or bleeding complications     Plan:  Pt is concerned INR will continue to increase; Instructed pt to reduce  dose x 1 day, then continue regimen to ensure INR stays in therapeutic range.     Follow up:  Because warfarin is a high risk medication and current CHEST guidelines recommend regular monitoring intervals (few days up to 12 weeks), will have patient return to clinic in 1 weeks to recheck INR.    Pt's [facility] PCP is Ganesh Mckeon M.D.    Referral due 03/20    Alexis TorresD

## 2019-11-25 ENCOUNTER — ANTICOAGULATION VISIT (OUTPATIENT)
Dept: MEDICAL GROUP | Facility: PHYSICIAN GROUP | Age: 84
End: 2019-11-25
Payer: MEDICARE

## 2019-11-25 DIAGNOSIS — Z79.01 CHRONIC ANTICOAGULATION: Primary | ICD-10-CM

## 2019-11-25 DIAGNOSIS — I48.0 PAF (PAROXYSMAL ATRIAL FIBRILLATION) (HCC): ICD-10-CM

## 2019-11-25 LAB — INR PPP: 2.9 (ref 2–3.5)

## 2019-11-25 PROCEDURE — 93793 ANTICOAG MGMT PT WARFARIN: CPT | Performed by: FAMILY MEDICINE

## 2019-11-25 PROCEDURE — 85610 PROTHROMBIN TIME: CPT | Performed by: FAMILY MEDICINE

## 2019-11-25 NOTE — PROGRESS NOTES
Anticoagulation Summary  As of 2019    INR goal:   2.0-3.0   TTR:   79.2 % (1.5 y)   INR used for dosin.90 (2019)   Warfarin maintenance plan:   1.5 mg (3 mg x 0.5) every Mon, Wed, Fri; 3 mg (3 mg x 1) all other days   Weekly warfarin total:   16.5 mg   Plan last modified:   Brady Piña, PharmD (2019)   Next INR check:   2019   Target end date:   Indefinite    Indications    Chronic anticoagulation [Z79.01]  PAF (paroxysmal atrial fibrillation) ([facility]) [I48.0]             Anticoagulation Episode Summary     INR check location:       Preferred lab:       Send INR reminders to:       Comments:         Anticoagulation Care Providers     Provider Role Specialty Phone number    Ganesh Mckeon M.D. Referring Geriatrics [phone]    [facility] Responsible  [phone]        Anticoagulation Patient Findings  Patient Findings     Negatives:   Signs/symptoms of thrombosis, Signs/symptoms of bleeding, Laboratory test error suspected, Change in health, Change in alcohol use, Change in activity, Upcoming invasive procedure, Emergency department visit, Upcoming dental procedure, Missed doses, Extra doses, Change in medications, Change in diet/appetite, Hospital admission, Bruising, Other complaints        HPI:   Shereen Dale seen in clinic today, on anticoagulation therapy with warfarin for stroke prevention due to history of PAF.    Patient's previous INR was therapeutic at 3.0 on 19, at which time patient was instructed to decrease weekly warfarin regimen.  She returns to clinic today to recheck INR to ensure it is therapeutic and thus preventing possible clotting and/or bleeding/bruising complications.    CHADS-VASc = at least 5  (unadjusted ischemic stroke risk/year:  7.2%, which is moderate risk)    Does patient have any changes to current medical/health status since last appt (Y/N):  NO  Does patient have any signs/symptoms of bleeding and/or thrombosis  since the last appt (Y/N):  NO  Does patient have any interval changes to diet or medications since last appt (Y/N):  NO  Are there any complications or cost restrictions with current therapy (Y/N):  NO     Does patient have [facility] PCP? YES, Dr Ganesh Mckeon (If not, please document discussion that patient must be seen at [facility])       Vitals:  declined by patient at today's visit    There were no vitals filed for this visit.     Asssessment:      INR remains therapeutic at 2.9, therefore decreasing patient's risk of stroke and/or bleeding complications.   Reason(s) for out of range INR today:  n/a      Plan:  Pt is to continue with current warfarin dosing regimen in order to maintain INR in therapeutic range.     Follow up:  Because warfarin is a high risk medication and current CHEST guidelines recommend regular monitoring intervals (few days up to 12 weeks), will have patient return to clinic in 1 weeks to recheck INR.    Brady Piña, PharmD, BCACP

## 2019-12-02 ENCOUNTER — ANTICOAGULATION VISIT (OUTPATIENT)
Dept: MEDICAL GROUP | Facility: PHYSICIAN GROUP | Age: 84
End: 2019-12-02
Payer: MEDICARE

## 2019-12-02 DIAGNOSIS — I48.0 PAF (PAROXYSMAL ATRIAL FIBRILLATION) (HCC): ICD-10-CM

## 2019-12-02 DIAGNOSIS — Z79.01 CHRONIC ANTICOAGULATION: ICD-10-CM

## 2019-12-02 LAB — INR PPP: 2.3 (ref 2–3.5)

## 2019-12-02 PROCEDURE — 85610 PROTHROMBIN TIME: CPT | Performed by: FAMILY MEDICINE

## 2019-12-02 PROCEDURE — 93793 ANTICOAG MGMT PT WARFARIN: CPT | Performed by: FAMILY MEDICINE

## 2019-12-02 RX ORDER — CLONIDINE HYDROCHLORIDE 0.1 MG/1
0.1 TABLET ORAL 2 TIMES DAILY PRN
Qty: 60 TAB | Refills: 2 | Status: SHIPPED | OUTPATIENT
Start: 2019-12-02 | End: 2021-04-04

## 2019-12-02 NOTE — PROGRESS NOTES
Anticoagulation Summary  As of 12/2/2019    INR goal:   2.0-3.0   TTR:   79.2 % (1.5 y)   INR used for dosing:      Warfarin maintenance plan:   1.5 mg (3 mg x 0.5) every Mon, Wed, Fri; 3 mg (3 mg x 1) all other days   Weekly warfarin total:   16.5 mg   Plan last modified:   Brady Piña, PharmD (11/25/2019)   Next INR check:      Target end date:   Indefinite    Indications    Chronic anticoagulation [Z79.01]  PAF (paroxysmal atrial fibrillation) ([facility]) [I48.0]             Anticoagulation Episode Summary     INR check location:       Preferred lab:       Send INR reminders to:       Comments:         Anticoagulation Care Providers     Provider Role Specialty Phone number    Ganesh Mckeon M.D. Referring Geriatrics [phone]    [facility] Anticoagulation [facility] Responsible  [phone]        Anticoagulation Patient Findings        HPI:   Pt seen in clinic today, on anticoagulation therapy with warfarin for stroke prevention due to history of atrial fib    Patient's previous INR was therapeutic at 2.9 on 11/25, at which time patient was instructed to reduce regimen.  She returns to clinic today to recheck INR to ensure it is therapeutic and thus preventing possible clotting and/or bleeding/bruising complications.    CHADS-VASc = 5  (unadjusted ischemic stroke risk/year:  7.2%, which is moderate risk)    Does patient have any changes to current medical/health status since last appt (Y/N):  no  Does patient have any signs/symptoms of bleeding and/or thrombosis since the last appt (Y/N):  no  Does patient have any interval changes to diet or medications since last appt (Y/N):  no  Are there any complications or cost restrictions with current therapy (Y/N):  no       Vitals declined    Asssessment:      INR therapeutic at 2.3, therefore decreasing pt's risk of clotting and/or bleeding complications   Reason(s) for out of range INR today:  n/a      Plan:  Pt is to continue with current warfarin dosing  regimen.     Follow up:  Because warfarin is a high risk medication and current CHEST guidelines recommend regular monitoring intervals (few days up to 12 weeks), will have patient return to clinic in 1 weeks to recheck INR.    Alexis TorresD

## 2019-12-09 ENCOUNTER — ANTICOAGULATION VISIT (OUTPATIENT)
Dept: MEDICAL GROUP | Facility: PHYSICIAN GROUP | Age: 84
End: 2019-12-09
Payer: MEDICARE

## 2019-12-09 DIAGNOSIS — Z79.01 CHRONIC ANTICOAGULATION: Primary | ICD-10-CM

## 2019-12-09 DIAGNOSIS — I48.0 PAF (PAROXYSMAL ATRIAL FIBRILLATION) (HCC): ICD-10-CM

## 2019-12-09 LAB — INR PPP: 2.3 (ref 2–3.5)

## 2019-12-09 PROCEDURE — 85610 PROTHROMBIN TIME: CPT | Performed by: FAMILY MEDICINE

## 2019-12-09 PROCEDURE — 93793 ANTICOAG MGMT PT WARFARIN: CPT | Performed by: FAMILY MEDICINE

## 2019-12-09 NOTE — PROGRESS NOTES
Anticoagulation Summary  As of 2019    INR goal:   2.0-3.0   TTR:   79.7 % (1.5 y)   INR used for dosin.30 (2019)   Warfarin maintenance plan:   1.5 mg (3 mg x 0.5) every Mon, Wed, Fri; 3 mg (3 mg x 1) all other days   Weekly warfarin total:   16.5 mg   Plan last modified:   Brady Piña, PharmD (2019)   Next INR check:   2019   Target end date:   Indefinite    Indications    Chronic anticoagulation [Z79.01]  PAF (paroxysmal atrial fibrillation) ([facility]) [I48.0]             Anticoagulation Episode Summary     INR check location:       Preferred lab:       Send INR reminders to:       Comments:         Anticoagulation Care Providers     Provider Role Specialty Phone number    Ganesh Mckeon M.D. Referring Geriatrics [phone]    [facility] Responsible  [phone]        Anticoagulation Patient Findings  Patient Findings     Negatives:   Signs/symptoms of thrombosis, Signs/symptoms of bleeding, Laboratory test error suspected, Change in health, Change in alcohol use, Change in activity, Upcoming invasive procedure, Emergency department visit, Upcoming dental procedure, Missed doses, Extra doses, Change in medications, Change in diet/appetite, Hospital admission, Bruising, Other complaints        HPI:   Shereen Dale seen in clinic today, on anticoagulation therapy with warfarin for stroke prevention due to history of PAF.    Patient's previous INR was therapeutic at 2.3 on 19, at which time patient was instructed to continue with current warfarin regimen.  She returns to clinic today to recheck INR to ensure it is therapeutic and thus preventing possible clotting and/or bleeding/bruising complications.    CHADS-VASc = at least 5  (unadjusted ischemic stroke risk/year:  7.2%, which is moderate risk)    Does patient have any changes to current medical/health status since last appt (Y/N):  NO  Does patient have any signs/symptoms of bleeding and/or  thrombosis since the last appt (Y/N):  NO  Does patient have any interval changes to diet or medications since last appt (Y/N):  NO  Are there any complications or cost restrictions with current therapy (Y/N):  NO     Does patient have [facility] PCP? YES, Dr Ganesh Mckeon (If not, please document discussion that patient must be seen at [facility])       Vitals:  declined by patient at today's visit.    There were no vitals filed for this visit.     Asssessment:      INR remains therapeutic at 2.3, therefore decreasing patient's risk of stroke and/or bleeding complications.   Reason(s) for out of range INR today:  n/a      Plan:  Pt is to continue with current warfarin dosing regimen in order to maintain INR in therapeutic range.     Follow up:  Because warfarin is a high risk medication and current CHEST guidelines recommend regular monitoring intervals (few days up to 12 weeks), will have patient return to clinic in 2 weeks to recheck INR.    Brady Piña, PharmD, BCACP

## 2019-12-12 ENCOUNTER — OFFICE VISIT (OUTPATIENT)
Dept: MEDICAL GROUP | Facility: MEDICAL CENTER | Age: 84
End: 2019-12-12
Payer: MEDICARE

## 2019-12-12 VITALS
HEART RATE: 76 BPM | HEIGHT: 61 IN | SYSTOLIC BLOOD PRESSURE: 110 MMHG | DIASTOLIC BLOOD PRESSURE: 60 MMHG | RESPIRATION RATE: 16 BRPM | BODY MASS INDEX: 28.51 KG/M2 | OXYGEN SATURATION: 97 % | WEIGHT: 151.01 LBS | TEMPERATURE: 97.7 F

## 2019-12-12 DIAGNOSIS — I48.0 PAF (PAROXYSMAL ATRIAL FIBRILLATION) (HCC): ICD-10-CM

## 2019-12-12 DIAGNOSIS — E66.9 DIABETES MELLITUS TYPE 2 IN OBESE (HCC): ICD-10-CM

## 2019-12-12 DIAGNOSIS — I35.0 SEVERE AORTIC STENOSIS: ICD-10-CM

## 2019-12-12 DIAGNOSIS — E11.69 DIABETES MELLITUS TYPE 2 IN OBESE (HCC): ICD-10-CM

## 2019-12-12 DIAGNOSIS — I10 ESSENTIAL HYPERTENSION: ICD-10-CM

## 2019-12-12 LAB
HBA1C MFR BLD: 6.3 % (ref 0–5.6)
INT CON NEG: NEGATIVE
INT CON POS: POSITIVE

## 2019-12-12 PROCEDURE — 83036 HEMOGLOBIN GLYCOSYLATED A1C: CPT | Performed by: FAMILY MEDICINE

## 2019-12-12 PROCEDURE — 99214 OFFICE O/P EST MOD 30 MIN: CPT | Performed by: FAMILY MEDICINE

## 2019-12-12 NOTE — PROGRESS NOTES
CC: Diabetes, hypertension, A. fib, severe aortic stenosis    HPI:   Shereen presents today discuss the following medical issues:    Diabetes mellitus type 2 in obese (HCC)  Blood glucose has been adequate controlled.  Her A1c today is 6.3.  Patient has been on metformin 500 mg 3 times a day, and glipizide SR 0.5 mg daily.  Denies polyuria, polydipsia and hypoglycemic episodes.  Patient is due for monofilament foot exam.     Essential hypertension  Has been adequately controlled on current medication. Denies headache, chest pain, and SOB.  Patient has been on amlodipine 5 mg daily, Micardis 40 mg daily, carvedilol 25 mg twice a day.     Paroxysmal atrial fibrillation (HCC)  Patient has been asymptomatic.  Denies chest pain, and palpitation.  Has been on carvedilol 25 mg twice a day,  Coumadin, she follows up regularly with his Coumadin clinic.     Severe aortic valve stenosis  Patient has been mostly asymptomatic.  She denies dizziness, chest pain, syncopal episodes.however sometimes she develops shortness of breath with exertion.  It has never affected her quality of life.  Patient has been declining   any intervention including theTVAR procedure.    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) 05/02/2012     Priority: High   • Acute, but ill-defined, cerebrovascular disease 04/30/2012     Priority: High   • Cough 05/02/2012     Priority: Low   • Edema 05/02/2012     Priority: Low   • Acquired hypothyroidism 09/12/2019   • Diabetes mellitus type 2 in obese (HCC) 09/12/2019   • Skin abrasion 08/20/2019   • Chronic anticoagulation 08/02/2019   • Hyponatremia 03/25/2019   • Advanced directives, counseling/discussion 09/26/2018   • Hypertensive urgency 05/11/2017   • Diastolic dysfunction 02/02/2016   • Tear of lateral cartilage or meniscus of  knee, current 05/21/2015   • PAF (paroxysmal atrial fibrillation) ([facility]) 01/12/2015   • Dyspnea on effort 01/02/2014   • Diabetes ([facility]) 12/26/2013   • Aortic stenosis 07/02/2013       Current Outpatient Medications   Medication Sig Dispense Refill   • cloNIDine (CATAPRES) 0.1 MG Tab Take 1 Tab by mouth 2 times a day as needed. If SBP > 160 60 Tab 2   • glipiZIDE SR (GLUCOTROL) 2.5 MG TABLET SR 24 HR Take 1 Tab by mouth every day. 100 Tab 3   • telmisartan (MICARDIS) 40 MG Tab TAKE ONE TABLET BY MOUTH TWICE A  Tab 3   • fluconazole (DIFLUCAN) 100 MG Tab Take 1 Tab by mouth every day. 1 Tab 0   • amLODIPine (NORVASC) 5 MG Tab Take 1 Tab by mouth every morning AND 0.5 Tabs every evening. Take 5 mg in the AM and 2.5 mg in the  Tab 2   • metFORMIN (GLUCOPHAGE) 500 MG Tab Take 1 Tab by mouth 3 times a day. 300 Tab 3   • famotidine (PEPCID) 20 MG Tab TAKE ONE TABLET BY MOUTH TWICE A DAY 60 Tab 3   • famotidine (PEPCID) 20 MG Tab Take 1 Tab by mouth every day. 90 Tab 3   • levothyroxine (SYNTHROID) 50 MCG Tab Take 1 Tab by mouth every morning. 90 Tab 3   • warfarin (COUMADIN) 3 MG Tab Take 1 Tab by mouth every day. 90 Tab 3   • acetaminophen (TYLENOL) 325 MG Tab Take 2 Tabs by mouth every 6 hours as needed (Mild Pain; (Pain scale 1-3); Temp greater than 100.5 F). 30 Tab 0   • gabapentin (NEURONTIN) 100 MG Cap Take 1 Cap by mouth every day. 90 Cap 0   • gabapentin (NEURONTIN) 100 MG Cap Take 2 Caps by mouth every evening. 90 Cap 0   • Blood Glucose Monitoring Suppl Device Meter: Dispense free style meter. Sig. Use 2 times daily as directed for blood sugar monitoring. dx e11.9 1 Device 0   • dorzolamide-timolol (COSOPT) 22.3-6.8 MG/ML Solution Place 1 Drop in both eyes 2 times a day.     • carvedilol (COREG) 25 MG Tab Take 1 Tab by mouth 2 times a day. 180 Tab 3   • Glucosamine HCl (GLUCOSAMINE PO) Take 1 Tab by mouth every morning. Pt unsure of dose     • Polyethyl Glycol-Propyl Glycol (SYSTANE OP) 1-2 Drops by  "Ophthalmic route 4 times a day as needed.     • docosahexanoic acid (OMEGA 3 FA) 1000 MG CAPS Take 1 Cap by mouth every morning.     • VITAMIN D PO Take 2,000 Units by mouth every morning.     • CALCIUM 500 PO Take 1 Tab by mouth every morning.       No current facility-administered medications for this visit.          Allergies as of 12/12/2019 - Reviewed 12/12/2019   Allergen Reaction Noted   • Darvon [propoxyphene hcl]  03/18/2008   • Iodine  05/11/2015   • Latex  09/19/2013   • Penicillins Anaphylaxis 08/02/2008   • Propoxyphene hcl Anaphylaxis 08/02/2008   • Tetanus antitoxin Myalgia 11/19/2017   • Tetracycline Anaphylaxis 08/02/2008        ROS: Denies any chest pain, Shortness of breath, Changes bowel or bladder, Lower extremity edema.    Physical Exam:  /60 (BP Location: Right arm, Patient Position: Sitting, BP Cuff Size: Adult long)   Pulse 76   Temp 36.5 °C (97.7 °F) (Temporal)   Resp 16   Ht 1.549 m (5' 1\") Comment: patient stated  Wt 68.5 kg (151 lb 0.2 oz)   LMP 01/01/1985   SpO2 97%   BMI 28.53 kg/m²   Gen.: Well-developed, well-nourished, no apparent distress,pleasant and cooperative with the examination  Skin:  Warm and dry with good turgor. No rashes or suspicious lesions in visible areas  HEENT:Sinuses nontender with palpation, TMs clear, nares patent with pink mucosa and clear rhinorrhea,no septal deviation ,polyps or lesions. lips without lesions, oropharynx clear.  Neck: Trachea midline,no masses or adenopathy. No JVD.  Cor: Regular rate and rhythm without murmur, gallop or rub.  Lungs: Respirations unlabored.Clear to auscultation with equal breath sounds bilaterally. No wheezes, rhonchi.  Extremities: No cyanosis, clubbing or edema.      Monofilament: done  Monofilament testing with a 10 gram force: sensation intact: intact bilaterally  Visual Inspection: Feet without maceration, ulcers, fissures.  Pedal pulses: intact bilaterally    Assessment and Plan.   83 y.o. female     1. " Diabetes mellitus type 2 in obese ([facility])  A1c today 6.3.  Continue metformin 500 mg 3 times a day.  Monofilament foot exam showed no sign of neuropathy.    - POCT  A1C    2. Essential hypertension  Controlled.  Continue on amlodipine 5 mg daily, Micardis 40 mg daily, carvedilol 25 mg daily.    3. PAF (paroxysmal atrial fibrillation) ([facility])  Stable.  No RVR.  Continue on Coumadin and carvedilol.  Continue follow-up with Coumadin clinic.    4. Severe aortic stenosis   Stable.  Asymptomatic.  Declined any intervention including TAVR procedure.

## 2019-12-13 ENCOUNTER — OFFICE VISIT (OUTPATIENT)
Dept: CARDIOLOGY | Facility: MEDICAL CENTER | Age: 84
End: 2019-12-13
Payer: MEDICARE

## 2019-12-13 VITALS
OXYGEN SATURATION: 95 % | HEIGHT: 61 IN | BODY MASS INDEX: 28.51 KG/M2 | HEART RATE: 78 BPM | WEIGHT: 151 LBS | SYSTOLIC BLOOD PRESSURE: 134 MMHG | DIASTOLIC BLOOD PRESSURE: 70 MMHG

## 2019-12-13 DIAGNOSIS — E66.9 DIABETES MELLITUS TYPE 2 IN OBESE: ICD-10-CM

## 2019-12-13 DIAGNOSIS — I48.0 PAF (PAROXYSMAL ATRIAL FIBRILLATION) (HCC): ICD-10-CM

## 2019-12-13 DIAGNOSIS — E11.69 DIABETES MELLITUS TYPE 2 IN OBESE: ICD-10-CM

## 2019-12-13 DIAGNOSIS — R06.09 DYSPNEA ON EFFORT: ICD-10-CM

## 2019-12-13 DIAGNOSIS — Z79.01 CHRONIC ANTICOAGULATION: ICD-10-CM

## 2019-12-13 DIAGNOSIS — I35.0 NONRHEUMATIC AORTIC VALVE STENOSIS: ICD-10-CM

## 2019-12-13 PROCEDURE — 99214 OFFICE O/P EST MOD 30 MIN: CPT | Performed by: INTERNAL MEDICINE

## 2019-12-23 ENCOUNTER — ANTICOAGULATION VISIT (OUTPATIENT)
Dept: MEDICAL GROUP | Facility: PHYSICIAN GROUP | Age: 84
End: 2019-12-23
Payer: MEDICARE

## 2019-12-23 DIAGNOSIS — I48.0 PAF (PAROXYSMAL ATRIAL FIBRILLATION) (HCC): ICD-10-CM

## 2019-12-23 DIAGNOSIS — Z79.01 CHRONIC ANTICOAGULATION: Primary | ICD-10-CM

## 2019-12-23 LAB — INR PPP: 2 (ref 2–3.5)

## 2019-12-23 PROCEDURE — 85610 PROTHROMBIN TIME: CPT | Performed by: NURSE PRACTITIONER

## 2019-12-23 PROCEDURE — 93793 ANTICOAG MGMT PT WARFARIN: CPT | Performed by: NURSE PRACTITIONER

## 2019-12-23 NOTE — PROGRESS NOTES
Anticoagulation Summary  As of 2019    INR goal:   2.0-3.0   TTR:   80.2 % (1.6 y)   INR used for dosin.00 (2019)   Warfarin maintenance plan:   1.5 mg (3 mg x 0.5) every Mon, Wed, Fri; 3 mg (3 mg x 1) all other days   Weekly warfarin total:   16.5 mg   Plan last modified:   Brady Piña, PharmD (2019)   Next INR check:   1/3/2020   Target end date:   Indefinite    Indications    Chronic anticoagulation [Z79.01]  PAF (paroxysmal atrial fibrillation) ([facility]) [I48.0]             Anticoagulation Episode Summary     INR check location:       Preferred lab:       Send INR reminders to:       Comments:         Anticoagulation Care Providers     Provider Role Specialty Phone number    Ganesh Mckeon M.D. Referring Geriatrics [phone]    [facility] Responsible  [phone]        Anticoagulation Patient Findings  Patient Findings     Negatives:   Signs/symptoms of thrombosis, Signs/symptoms of bleeding, Laboratory test error suspected, Change in health, Change in alcohol use, Change in activity, Upcoming invasive procedure, Emergency department visit, Upcoming dental procedure, Missed doses, Extra doses, Change in medications, Change in diet/appetite, Hospital admission, Bruising, Other complaints        HPI:   Shereen Dale seen in clinic today, on anticoagulation therapy with warfarin for stroke prevention due to history of PAF.    Patient's previous INR was therapeutic at 2.3 on 19, at which time patient was instructed to continue with current warfarin regimen.  She returns to clinic today to recheck INR to ensure it is therapeutic and thus preventing possible clotting and/or bleeding/bruising complications.    CHADS-VASc = at least 5  (unadjusted ischemic stroke risk/year:  7.2%, which is moderate risk)    Does patient have any changes to current medical/health status since last appt (Y/N):  NO  Does patient have any signs/symptoms of bleeding and/or  thrombosis since the last appt (Y/N):  NO  Does patient have any interval changes to diet or medications since last appt (Y/N):  NO  Are there any complications or cost restrictions with current therapy (Y/N):  NO     Does patient have [facility] PCP? YES, Dr Ganesh Mckeon (If not, please document discussion that patient must be seen at [facility])       Vitals:  declined by patient at today's visit.    There were no vitals filed for this visit.     Asssessment:      INR remains therapeutic at 2.0, therefore decreasing patient's risk of stroke and/or bleeding complications.   Reason(s) for out of range INR today:  n/a      Plan:  Although INR therapeutic, patient worried about further decrease.  Will have her bolus with 3mg X 1, then resume current warfarin regimen in order to maintain INR in lower half of therapeutic range.     Follow up:  Because warfarin is a high risk medication and current CHEST guidelines recommend regular monitoring intervals (few days up to 12 weeks), will have patient return to clinic in 1.5 weeks to recheck INR.    Brady Piña, PharmD, BCACP

## 2020-01-02 NOTE — PROGRESS NOTES
Subjective:   Shereen Dale is an 84 y.o. female who presents today for follow-up of PAF, HTN and Severe AS. She also has newly diagnosed sleep apnea intolerant of CPAP. She is on chronic oral anti-coagulation.    In the interim she has slowly progressive dyspnea on exertion.  She has severe aortic stenosis.  She would qualify for an overnight procedure to fix this in the form of TAVR.  She declines further evaluation or management for this despite extensive discussions on several occasions.  She understands that this will shorten and eventually end her life.  She understands that this is completely avoidable at this point with a straightforward procedure.    Past Medical History:   Diagnosis Date   • AF (atrial fibrillation) (HCC)    • Arrhythmia     A-fib   • Backpain     Lower back pain   • Breast cancer (HCC)    • Breath shortness     uses 2-3 L O2 at night   • Cancer (HCC) 1993    right breast   • CATARACT     celestine IOL   • Chronic anticoagulation 5/2/2012   • Cough 5/2/2012   • Dental disorder     dentures   • Diabetes     oral medication   • Edema 5/2/2012   • Glaucoma    • Heart burn    • Heart murmur    • Heart valve disease    • Hypertension    • Hypothyroid    • Oxygen deficiency     uses 2.5-3 l at night   • Paroxysmal atrial fibrillation (HCC)    • Sleep apnea     no cpap   • tia     TIA x2   • Unspecified hemorrhagic conditions     takes coumadin     Past Surgical History:   Procedure Laterality Date   • KNEE ARTHROSCOPY Right 5/21/2015    Procedure: KNEE ARTHROSCOPY;  Surgeon: Emerson Garcia M.D.;  Location: [facility];  Service:    • MENISCECTOMY Right 5/21/2015    Procedure: LATERAL MENISCECTOMY;  Surgeon: Emerson Garcia M.D.;  Location: [facility];  Service:    • CATARACT PHACO WITH IOL  10/21/2013    Performed by Dominick Fernando M.D. at [facility]   • CATARACT PHACO WITH IOL  9/23/2013    Performed by Dominick Fernando M.D. at [facility]  [facility] ORS   • CHOLECYSTECTOMY     • HYSTERECTOMY RADICAL     • LUMPECTOMY      R breast   • PB RADIATION THERAPY PLAN SIMPLE       Family History   Problem Relation Age of Onset   • Heart Attack Mother    • Hypertension Mother    • Heart Disease Father    • Hypertension Father    • No Known Problems Sister    • No Known Problems Brother    • Leukemia Sister    • Sleep Apnea Sister    • No Known Problems Sister    • No Known Problems Sister      Social History     Tobacco Use   Smoking Status Former Smoker   • Packs/day: 0.50   • Years: 11.00   • Pack years: 5.50   • Types: Cigarettes   • Last attempt to quit: 1964   • Years since quittin.0   Smokeless Tobacco Never Used   Tobacco Comment    Started at      Allergies   Allergen Reactions   • Darvon [Propoxyphene Hcl]      shock   • Iodine      Shock  - IV   • Latex      Swelling = hands with glove use   • Penicillins Anaphylaxis   • Propoxyphene Hcl Anaphylaxis   • Tetanus Antitoxin Myalgia   • Tetracycline Anaphylaxis     Outpatient Encounter Medications as of 2019   Medication Sig Dispense Refill   • cloNIDine (CATAPRES) 0.1 MG Tab Take 1 Tab by mouth 2 times a day as needed. If SBP > 160 60 Tab 2   • glipiZIDE SR (GLUCOTROL) 2.5 MG TABLET SR 24 HR Take 1 Tab by mouth every day. 100 Tab 3   • telmisartan (MICARDIS) 40 MG Tab TAKE ONE TABLET BY MOUTH TWICE A  Tab 3   • fluconazole (DIFLUCAN) 100 MG Tab Take 1 Tab by mouth every day. 1 Tab 0   • amLODIPine (NORVASC) 5 MG Tab Take 1 Tab by mouth every morning AND 0.5 Tabs every evening. Take 5 mg in the AM and 2.5 mg in the  Tab 2   • metFORMIN (GLUCOPHAGE) 500 MG Tab Take 1 Tab by mouth 3 times a day. 300 Tab 3   • famotidine (PEPCID) 20 MG Tab TAKE ONE TABLET BY MOUTH TWICE A DAY 60 Tab 3   • famotidine (PEPCID) 20 MG Tab Take 1 Tab by mouth every day. 90 Tab 3   • levothyroxine (SYNTHROID) 50 MCG Tab Take 1 Tab by mouth every morning. 90 Tab 3   • warfarin (COUMADIN) 3 MG Tab Take 1 Tab  "by mouth every day. 90 Tab 3   • acetaminophen (TYLENOL) 325 MG Tab Take 2 Tabs by mouth every 6 hours as needed (Mild Pain; (Pain scale 1-3); Temp greater than 100.5 F). 30 Tab 0   • gabapentin (NEURONTIN) 100 MG Cap Take 1 Cap by mouth every day. 90 Cap 0   • gabapentin (NEURONTIN) 100 MG Cap Take 2 Caps by mouth every evening. 90 Cap 0   • Blood Glucose Monitoring Suppl Device Meter: Dispense free style meter. Sig. Use 2 times daily as directed for blood sugar monitoring. dx e11.9 1 Device 0   • dorzolamide-timolol (COSOPT) 22.3-6.8 MG/ML Solution Place 1 Drop in both eyes 2 times a day.     • carvedilol (COREG) 25 MG Tab Take 1 Tab by mouth 2 times a day. 180 Tab 3   • Glucosamine HCl (GLUCOSAMINE PO) Take 1 Tab by mouth every morning. Pt unsure of dose     • Polyethyl Glycol-Propyl Glycol (SYSTANE OP) 1-2 Drops by Ophthalmic route 4 times a day as needed.     • docosahexanoic acid (OMEGA 3 FA) 1000 MG CAPS Take 1 Cap by mouth every morning.     • VITAMIN D PO Take 2,000 Units by mouth every morning.     • CALCIUM 500 PO Take 1 Tab by mouth every morning.       No facility-administered encounter medications on file as of 12/13/2019.      Review of Systems   All other systems reviewed and are negative.  Today I reviewed the medical, surgical, social and family histories with the patient. As per HPI, otherwise noncontributory.       Objective:   /70 (BP Location: Left arm, Patient Position: Sitting, BP Cuff Size: Adult)   Pulse 78   Ht 1.549 m (5' 1\")   Wt 68.5 kg (151 lb)   LMP 01/01/1985   SpO2 95%   BMI 28.53 kg/m²     Physical Exam   Constitutional: She is oriented to person, place, and time. She appears well-developed and well-nourished. No distress.   HENT:   Head: Normocephalic and atraumatic.   Mouth/Throat: Oropharynx is clear and moist. No oropharyngeal exudate.   Eyes: Pupils are equal, round, and reactive to light. Conjunctivae are normal. No scleral icterus.   Neck: Normal range of motion. " Neck supple. No JVD present. No thyromegaly present.   Cardiovascular: Normal rate, regular rhythm and intact distal pulses. Exam reveals no gallop and no friction rub.   Murmur ( 3/6 systolic ejection murmur impinging upon S2) heard.  Pulses:       Carotid pulses are 2+ on the right side and 2+ on the left side.       Radial pulses are 2+ on the right side and 2+ on the left side.        Popliteal pulses are 2+ on the right side and 2+ on the left side.        Dorsalis pedis pulses are 2+ on the right side and 2+ on the left side.        Posterior tibial pulses are 2+ on the right side and 2+ on the left side.   Pulmonary/Chest: Effort normal and breath sounds normal. She has no wheezes. She has no rales.   Abdominal: Soft. Bowel sounds are normal. She exhibits no distension. There is no tenderness.   Musculoskeletal:         General: No tenderness or edema.   Neurological: She is alert and oriented to person, place, and time. No cranial nerve deficit.   Skin: Skin is warm and dry. No rash noted. She is not diaphoretic. No erythema.   Psychiatric: She has a normal mood and affect. Her behavior is normal.   Vitals reviewed.    LABS:  Lab Results   Component Value Date/Time    CHOLSTRLTOT 170 09/09/2019 07:33 AM    LDL 87 09/09/2019 07:33 AM    HDL 58 09/09/2019 07:33 AM    TRIGLYCERIDE 124 09/09/2019 07:33 AM       Lab Results   Component Value Date/Time    WBC 7.1 09/09/2019 07:33 AM    RBC 4.36 09/09/2019 07:33 AM    HEMOGLOBIN 13.6 09/09/2019 07:33 AM    HEMATOCRIT 39.9 09/09/2019 07:33 AM    MCV 91.5 09/09/2019 07:33 AM    NEUTSPOLYS 57.30 04/05/2019 07:42 AM    LYMPHOCYTES 28.00 04/05/2019 07:42 AM    MONOCYTES 9.40 04/05/2019 07:42 AM    EOSINOPHILS 3.70 04/05/2019 07:42 AM    BASOPHILS 0.80 04/05/2019 07:42 AM     Lab Results   Component Value Date/Time    SODIUM 136 09/09/2019 07:33 AM    POTASSIUM 4.4 09/09/2019 07:33 AM    CHLORIDE 101 09/09/2019 07:33 AM    CO2 24 09/09/2019 07:33 AM    GLUCOSE 132 (H)  09/09/2019 07:33 AM    BUN 12 09/09/2019 07:33 AM    CREATININE 0.70 09/09/2019 07:33 AM    CREATININE 0.7 08/02/2008 08:30 AM     Lab Results   Component Value Date    HBA1C 6.3 (A) 12/12/2019      Lab Results   Component Value Date/Time    ALKPHOSPHAT 37 09/09/2019 07:33 AM    ASTSGOT 25 09/09/2019 07:33 AM    ALTSGPT 32 09/09/2019 07:33 AM    TBILIRUBIN 0.6 09/09/2019 07:33 AM      Lab Results   Component Value Date/Time    BNPBTYPENAT 33 05/11/2017 05:05 PM      No results found for: TSH  Lab Results   Component Value Date/Time    PROTHROMBTM 19.5 (H) 03/28/2019 02:41 AM    INR 2.00 12/23/2019 10:36 AM      ECHO CONCLUSIONS (5/9/2019):  Compared to the prior study done 10/18/18 - the aortic stenosis is now   severe.  Normal left ventricular chamber size.  Left ventricular ejection fraction is visually estimated to be 65%.  Grade I diastolic dysfunction.  Moderate left ventricular hypertrophy.  Severe aortic stenosis. Vmax is 4.04 m/s. Aortic valve area calculated   from the continuity equation is 0.47cm2.  Right ventricular systolic pressure is estimated to be 30 mmHg.    ECHO CONCLUSIONS (10/18/2018):  Prior echo 04/04/18.  Left ventricle is small in size.  Left ventricular ejection fraction is visually estimated to be 65%.  Moderate concentric left ventricular hypertrophy.  Calcified aortic valve leaflets.  Moderate aortic stenosis.  Vmax is 3.7 m/s.  Normal inferior vena cava size and inspiratory collapse.  Compared to the report of the study done - there has been no   significant change.     ECHO CONCLUSIONS (4/4/2018):  Prior echo 1/9/2017.  Moderate left ventricular hypertrophy.  Left ventricular ejection fraction is visually estimated to be greater   than 75%.  Grade I diastolic dysfunction.  Moderate aortic stenosis.  Vmax is 3.79  m/s. Transvalvular gradients are - Peak: 58 mmHg, Mean:    35 mmHg.  Compared to the report of the study done - there has been a slight   progression of aortic stenosis, still  moderate in severity.     ECHO CONCLUSIONS (1/19/2017):  Compared to the images of the prior study done on 01/29/15 - The AS is   now moderate.  Normal left ventricular size and systolic function.  Left ventricular ejection fraction is visually estimated to be 65%.  Grade I diastolic dysfunction.  Moderate aortic stenosis.  Transvalvular gradients are - Peak: 37 mmHg, Mean: 21 mmHg.  Normal inferior vena cava size and inspiratory collapse.      Assessment:     1. Nonrheumatic aortic valve stenosis     2. PAF (paroxysmal atrial fibrillation) ([facility])     3. Dyspnea on effort     4. Chronic anticoagulation     5. Diabetes mellitus type 2 in obese ([facility])         Medical Decision Making:  Today's Assessment / Status / Plan:     She has severe symptomatic aortic stenosis.  She also has multiple other medical comorbidities which contribute to her dyspnea on exertion.  We had a carol discussion regarding severe aortic stenosis, the reduction in life expectancy associated with medical management versus mechanical replacement.  I feel she would be an excellent transcatheter aortic valve replacement candidate and we discussed this and materials were administered.      She understands that her severe arctic stenosis will shorten her life dramatically and is an eminently reversible unresolvable condition with an overnight procedure in the form of TAVR.  She still declines to undergo evaluation.  We did discuss that should she change her mind in the future it may be too late to perform this safely.  She indicates she want.  I indicated to her that if heart failure is her presenting symptom she may indeed not feel like suffocating to death and wish to change her mind, at which point it may be too late to effectively perform this procedure which is not an inpatient procedure.  She remains adamant that she would like to be DNR/DNI.  She remains adamant she does not want this procedure.  I recommend no further cardiac evaluation or  monitoring unless she decides that she should want to undergo this procedure.  She can follow-up with cardiology for further discussions as she would like.

## 2020-01-03 ENCOUNTER — ANTICOAGULATION VISIT (OUTPATIENT)
Dept: MEDICAL GROUP | Facility: PHYSICIAN GROUP | Age: 85
End: 2020-01-03
Payer: MEDICARE

## 2020-01-03 DIAGNOSIS — Z79.01 CHRONIC ANTICOAGULATION: Primary | ICD-10-CM

## 2020-01-03 DIAGNOSIS — I48.0 PAF (PAROXYSMAL ATRIAL FIBRILLATION) (HCC): ICD-10-CM

## 2020-01-03 LAB — INR PPP: 1.8 (ref 2–3.5)

## 2020-01-03 PROCEDURE — 99211 OFF/OP EST MAY X REQ PHY/QHP: CPT | Performed by: FAMILY MEDICINE

## 2020-01-03 PROCEDURE — 85610 PROTHROMBIN TIME: CPT | Performed by: FAMILY MEDICINE

## 2020-01-03 NOTE — PROGRESS NOTES
Anticoagulation Summary  As of 1/3/2020    INR goal:   2.0-3.0   TTR:   78.7 % (1.6 y)   INR used for dosin.80! (1/3/2020)   Warfarin maintenance plan:   1.5 mg (3 mg x 0.5) every Mon, Wed, Fri; 3 mg (3 mg x 1) all other days   Weekly warfarin total:   16.5 mg   Plan last modified:   Brady Piña, PharmD (2019)   Next INR check:   1/10/2020   Target end date:   Indefinite    Indications    Chronic anticoagulation [Z79.01]  PAF (paroxysmal atrial fibrillation) (HCC) [I48.0]             Anticoagulation Episode Summary     INR check location:       Preferred lab:       Send INR reminders to:       Comments:         Anticoagulation Care Providers     Provider Role Specialty Phone number    Ganesh Mckeon M.D. Referring Geriatrics [phone]    [facility] Responsible  [phone]        Anticoagulation Patient Findings  Patient Findings     Positives:   Change in health    Negatives:   Signs/symptoms of thrombosis, Signs/symptoms of bleeding, Laboratory test error suspected, Change in alcohol use, Change in activity, Upcoming invasive procedure, Emergency department visit, Upcoming dental procedure, Missed doses, Extra doses, Change in medications, Change in diet/appetite, Hospital admission, Bruising, Other complaints    Comments:   C/o mild dizziness today         HPI:   Shereen Dale seen in clinic today, on anticoagulation therapy with warfarin for stroke prevention due to history of PAF.    Patient's previous INR was therapeutic at 2.0 on 19, at which time patient was instructed to bolus with one dose, then resume current warfarin regimen.  She returns to clinic today to recheck INR to ensure it is therapeutic and thus preventing possible clotting and/or bleeding/bruising complications.    CHADS-VASc = at least 5  (unadjusted ischemic stroke risk/year:  7.2%, which is moderate risk)    Does patient have any changes to current medical/health status since last appt  (Y/N):  NO  Does patient have any signs/symptoms of bleeding and/or thrombosis since the last appt (Y/N):  No  Does patient have any interval changes to diet or medications since last appt (Y/N):  NO  Are there any complications or cost restrictions with current therapy (Y/N):  NO     Does patient have [facility] PCP? YES, Dr Ganesh Mckeon (If not, please document discussion that patient must be seen at [facility])       Vitals:  declined at today's visit.    There were no vitals filed for this visit.     Asssessment:      INR subtherapeutic at 1.8, therefore increasing patient's stroke risk.   Reason(s) for out of range INR today:  Dose too low?      Plan:  Because patient is concerned with low number today, will bolus with larger dose of 4.5mg X 1, then resume current warfarin regimen in order to bring INR to therapeutic range.     Follow up:  Because warfarin is a high risk medication and current CHEST guidelines recommend regular monitoring intervals (few days up to 12 weeks), will have patient return to clinic in 1 weeks to recheck INR.    Brady Piña, PharmD, BCACP

## 2020-01-08 DIAGNOSIS — Z79.01 CHRONIC ANTICOAGULATION: ICD-10-CM

## 2020-01-08 DIAGNOSIS — E78.5 HYPERLIPIDEMIA, UNSPECIFIED HYPERLIPIDEMIA TYPE: ICD-10-CM

## 2020-01-08 DIAGNOSIS — Z79.01 ON CONTINUOUS ORAL ANTICOAGULATION: ICD-10-CM

## 2020-01-08 DIAGNOSIS — I48.0 PAF (PAROXYSMAL ATRIAL FIBRILLATION) (HCC): ICD-10-CM

## 2020-01-08 DIAGNOSIS — E87.1 HYPONATREMIA: ICD-10-CM

## 2020-01-08 DIAGNOSIS — I35.0 NONRHEUMATIC AORTIC VALVE STENOSIS: ICD-10-CM

## 2020-01-10 ENCOUNTER — ANTICOAGULATION VISIT (OUTPATIENT)
Dept: MEDICAL GROUP | Facility: PHYSICIAN GROUP | Age: 85
End: 2020-01-10
Payer: MEDICARE

## 2020-01-10 DIAGNOSIS — I48.0 PAF (PAROXYSMAL ATRIAL FIBRILLATION) (HCC): ICD-10-CM

## 2020-01-10 DIAGNOSIS — Z79.01 CHRONIC ANTICOAGULATION: ICD-10-CM

## 2020-01-10 LAB — INR PPP: 2.3 (ref 2–3.5)

## 2020-01-10 PROCEDURE — 93793 ANTICOAG MGMT PT WARFARIN: CPT | Performed by: FAMILY MEDICINE

## 2020-01-10 PROCEDURE — 85610 PROTHROMBIN TIME: CPT | Performed by: FAMILY MEDICINE

## 2020-01-10 NOTE — PROGRESS NOTES
Anticoagulation Summary  As of 1/10/2020    INR goal:   2.0-3.0   TTR:   78.4 % (1.6 y)   INR used for dosin.30 (1/10/2020)   Warfarin maintenance plan:   1.5 mg (3 mg x 0.5) every Mon, Wed, Fri; 3 mg (3 mg x 1) all other days   Weekly warfarin total:   16.5 mg   Plan last modified:   Brady Piña, PharmD (2019)   Next INR check:   2020   Target end date:   Indefinite    Indications    Chronic anticoagulation [Z79.01]  PAF (paroxysmal atrial fibrillation) (HCC) [I48.0]             Anticoagulation Episode Summary     INR check location:       Preferred lab:       Send INR reminders to:       Comments:         Anticoagulation Care Providers     Provider Role Specialty Phone number    Ganesh Mckeon M.D. Referring Geriatrics [phone]    [facility] Responsible  [phone]        Anticoagulation Patient Findings  Patient Findings     Negatives:   Signs/symptoms of thrombosis, Signs/symptoms of bleeding, Laboratory test error suspected, Change in health, Change in alcohol use, Change in activity, Upcoming invasive procedure, Emergency department visit, Upcoming dental procedure, Missed doses, Extra doses, Change in medications, Change in diet/appetite, Hospital admission, Bruising, Other complaints            HPI:   Pt seen in clinic today, on anticoagulation therapy with warfarin for stroke prevention due to history of atrial fibrillation    Patient's previous INR was subtherapeutic at 1.8 on 1/3/2020, at which time patient was instructed to bolus, then continue regimen.  She returns to clinic today to recheck INR to ensure it is therapeutic and thus preventing possible clotting and/or bleeding/bruising complications.    CHADS-VASc = 5  (unadjusted ischemic stroke risk/year:  7.2%, which is moderate risk)    Does patient have any changes to current medical/health status since last appt (Y/N):  no  Does patient have any signs/symptoms of bleeding and/or thrombosis  since the last appt (Y/N):  no  Does patient have any interval changes to diet or medications since last appt (Y/N):  no  Are there any complications or cost restrictions with current therapy (Y/N):  no       Vitals declined    Asssessment:      INR therapeutic at 2.3, therefore decreasing pt's risk of clotting and/or bleeding complications   Reason(s) for out of range INR today:  n/a      Plan:  Pt is to continue with current warfarin dosing regimen.     Follow up:  Because warfarin is a high risk medication and current CHEST guidelines recommend regular monitoring intervals (few days up to 12 weeks), will have patient return to clinic in 1 weeks to recheck INR.    Pt's [facility] PCP is Ganesh Mckeon M.D.  Referral due 03/2020    Yahaira Lilly, AlexisD

## 2020-01-17 ENCOUNTER — ANTICOAGULATION VISIT (OUTPATIENT)
Dept: MEDICAL GROUP | Facility: PHYSICIAN GROUP | Age: 85
End: 2020-01-17
Payer: MEDICARE

## 2020-01-17 DIAGNOSIS — Z79.01 CHRONIC ANTICOAGULATION: Primary | ICD-10-CM

## 2020-01-17 DIAGNOSIS — I48.0 PAF (PAROXYSMAL ATRIAL FIBRILLATION) (HCC): ICD-10-CM

## 2020-01-17 LAB — INR PPP: 2.1 (ref 2–3.5)

## 2020-01-17 NOTE — PROGRESS NOTES
Anticoagulation Summary  As of 2020    INR goal:   2.0-3.0   TTR:   78.7 % (1.6 y)   INR used for dosin.10 (2020)   Warfarin maintenance plan:   1.5 mg (3 mg x 0.5) every Mon, Wed, Fri; 3 mg (3 mg x 1) all other days   Weekly warfarin total:   16.5 mg   Plan last modified:   Brady Piña, PharmD (2019)   Next INR check:   2020   Target end date:   Indefinite    Indications    Chronic anticoagulation [Z79.01]  PAF (paroxysmal atrial fibrillation) (formerly [facility]) [I48.0]             Anticoagulation Episode Summary     INR check location:       Preferred lab:       Send INR reminders to:       Comments:         Anticoagulation Care Providers     Provider Role Specialty Phone number    Ganesh Mckeon M.D. Referring Geriatrics [phone]    [facility] Responsible  [phone]        Anticoagulation Patient Findings  Patient Findings     Negatives:   Signs/symptoms of thrombosis, Signs/symptoms of bleeding, Laboratory test error suspected, Change in health, Change in alcohol use, Change in activity, Upcoming invasive procedure, Emergency department visit, Upcoming dental procedure, Missed doses, Extra doses, Change in medications, Change in diet/appetite, Hospital admission, Bruising, Other complaints        HPI:   Shereen Dale seen in clinic today, on anticoagulation therapy with warfarin for stroke prevention due to history of PAF.    Patient's previous INR was therapeutic at 2.3 on 1-10-20, at which time patient was instructed to continue with current warfarin regimen.  She returns to clinic today to recheck INR to ensure it is therapeutic and thus preventing possible clotting and/or bleeding/bruising complications.    CHADS-VASc = at least 5  (unadjusted ischemic stroke risk/year:  7.2%, which is moderate risk)    Does patient have any changes to current medical/health status since last appt (Y/N):  NO  Does patient have any signs/symptoms of bleeding and/or  thrombosis since the last appt (Y/N):  NO  Does patient have any interval changes to diet or medications since last appt (Y/N):  NO  Are there any complications or cost restrictions with current therapy (Y/N):  NO     Does patient have [facility] PCP? YES, Dr Ganesh Mckeon (If not, please document discussion that patient must be seen at [facility])       Vitals:  declined today, did not wish to remove heavy coat.    There were no vitals filed for this visit.     Asssessment:      INR remains therapeutic at 2.1, therefore decreasing patient's stroke and/or bleeding risk.   Reason(s) for out of range INR today:  n/a      Plan:  Pt is to continue with current warfarin dosing regimen in order to maintain INR in therapeutic range.     Follow up:  Because warfarin is a high risk medication and current CHEST guidelines recommend regular monitoring intervals (few days up to 12 weeks), will have patient return to clinic in 1 weeks to recheck INR.    Brady Piña, PharmD, BCACP

## 2020-01-27 ENCOUNTER — ANTICOAGULATION VISIT (OUTPATIENT)
Dept: MEDICAL GROUP | Facility: PHYSICIAN GROUP | Age: 85
End: 2020-01-27
Payer: MEDICARE

## 2020-01-27 DIAGNOSIS — Z79.01 CHRONIC ANTICOAGULATION: Primary | ICD-10-CM

## 2020-01-27 DIAGNOSIS — I48.0 PAF (PAROXYSMAL ATRIAL FIBRILLATION) (HCC): ICD-10-CM

## 2020-01-27 LAB — INR PPP: 2 (ref 2–3.5)

## 2020-01-27 PROCEDURE — 99211 OFF/OP EST MAY X REQ PHY/QHP: CPT | Performed by: NURSE PRACTITIONER

## 2020-01-27 PROCEDURE — 99999 PR NO CHARGE: CPT | Performed by: NURSE PRACTITIONER

## 2020-01-27 PROCEDURE — 85610 PROTHROMBIN TIME: CPT | Performed by: NURSE PRACTITIONER

## 2020-01-27 NOTE — PROGRESS NOTES
Anticoagulation Summary  As of 2020    INR goal:   2.0-3.0   TTR:   79.0 % (1.6 y)   INR used for dosin.00 (2020)   Warfarin maintenance plan:   1.5 mg (3 mg x 0.5) every Mon, Wed, Fri; 3 mg (3 mg x 1) all other days   Weekly warfarin total:   16.5 mg   Plan last modified:   Brady Piña, PharmD (2019)   Next INR check:   2020   Target end date:   Indefinite    Indications    Chronic anticoagulation [Z79.01]  PAF (paroxysmal atrial fibrillation) (HCC) [I48.0]             Anticoagulation Episode Summary     INR check location:       Preferred lab:       Send INR reminders to:       Comments:         Anticoagulation Care Providers     Provider Role Specialty Phone number    Ganesh Mckeon M.D. Referring Geriatrics [phone]    [facility] Respons[facility]  [phone]        Anticoagulation Patient Findings  Patient Findings     Negatives:   Signs/symptoms of thrombosis, Signs/symptoms of bleeding, Laboratory test error suspected, Change in health, Change in alcohol use, Change in activity, Upcoming invasive procedure, Emergency department visit, Upcoming dental procedure, Missed doses, Extra doses, Change in medications, Change in diet/appetite, Hospital admission, Bruising, Other complaints            HPI:   Pt seen in clinic today, on anticoagulation therapy with warfarin for stroke prevention due to history of atrial fibrillation    Patient's previous INR was therapeutic at 2.1 on 2020, at which time patient was instructed to continue regimen.  She returns to clinic today to recheck INR to ensure it is therapeutic and thus preventing possible clotting and/or bleeding/bruising complications.    CHADS-VASc = 5  (unadjusted ischemic stroke risk/year:  7.2%, which is moderate risk)    Does patient have any changes to current medical/health status since last appt (Y/N):  no  Does patient have any signs/symptoms of bleeding and/or thrombosis since the last  appt (Y/N):  no  Does patient have any interval changes to diet or medications since last appt (Y/N):  no  Are there any complications or cost restrictions with current therapy (Y/N):  no       Vitals declined    Asssessment:      INR therapeutic at 2.0, therefore decreasing pt's risk of clotting and/or bleeding complications   Reason(s) for out of range INR today:  n/a      Plan:  Bolus x 1 day to maintain INR in therapeutic range then continue regimen     Follow up:  Because warfarin is a high risk medication and current CHEST guidelines recommend regular monitoring intervals (few days up to 12 weeks), will have patient return to clinic in 1 weeks to recheck INR.    Pt's [facility] PCP is Ganesh Mckeon M.D.  Referral due  03/2020    Yahaira Lilly, AlexisD

## 2020-02-07 ENCOUNTER — ANTICOAGULATION VISIT (OUTPATIENT)
Dept: MEDICAL GROUP | Facility: PHYSICIAN GROUP | Age: 85
End: 2020-02-07
Payer: MEDICARE

## 2020-02-07 DIAGNOSIS — I48.0 PAF (PAROXYSMAL ATRIAL FIBRILLATION) (HCC): ICD-10-CM

## 2020-02-07 DIAGNOSIS — Z79.01 CHRONIC ANTICOAGULATION: Primary | ICD-10-CM

## 2020-02-07 LAB — INR PPP: 1.8 (ref 2–3.5)

## 2020-02-07 PROCEDURE — 85610 PROTHROMBIN TIME: CPT | Performed by: FAMILY MEDICINE

## 2020-02-07 PROCEDURE — 99211 OFF/OP EST MAY X REQ PHY/QHP: CPT | Performed by: FAMILY MEDICINE

## 2020-02-07 NOTE — PROGRESS NOTES
Anticoagulation Summary  As of 2020    INR goal:   2.0-3.0   TTR:   77.6 % (1.7 y)   INR used for dosin.80! (2020)   Warfarin maintenance plan:   1.5 mg (3 mg x 0.5) every Wed, Fri; 3 mg (3 mg x 1) all other days   Weekly warfarin total:   18 mg   Plan last modified:   Brady Piña, PharmD (2020)   Next INR check:   2020   Target end date:   Indefinite    Indications    Chronic anticoagulation [Z79.01]  PAF (paroxysmal atrial fibrillation) ([facility]) [I48.0]             Anticoagulation Episode Summary     INR check location:       Preferred lab:       Send INR reminders to:       Comments:         Anticoagulation Care Providers     Provider Role Specialty Phone number    Ganesh Mckeon M.D. Referring Geriatrics [phone]    [facility] Anticoagulation Services Responsible  [phone]        Anticoagulation Patient Findings  Patient Findings     Negatives:   Signs/symptoms of thrombosis, Signs/symptoms of bleeding, Laboratory test error suspected, Change in health, Change in alcohol use, Change in activity, Upcoming invasive procedure, Emergency department visit, Upcoming dental procedure, Missed doses, Extra doses, Change in medications, Change in diet/appetite, Hospital admission, Bruising, Other complaints        HPI:   Shereen Dale seen in clinic today, on anticoagulation therapy with warfarin for stroke prevention due to history of PAF.    Patient's previous INR was therapeutic at 2.0 on 20, at which time patient was instructed to bolus with one dose, then continue with current warfarin regimen.  She returns to clinic today to recheck INR to ensure it is therapeutic and thus preventing possible clotting and/or bleeding/bruising complications.    CHADS-VASc = at least 5  (unadjusted ischemic stroke risk/year:  7.2%, which is moderate risk)    Does patient have any changes to current medical/health status since last appt (Y/N):  NO  Does patient have any signs/symptoms of  bleeding and/or thrombosis since the last appt (Y/N):  NO  Does patient have any interval changes to diet or medications since last appt (Y/N):  NO  Are there any complications or cost restrictions with current therapy (Y/N):  NO     Does patient have [facility] PCP? YES, Dr Ganesh Mckeon (If not, please document discussion that patient must be seen at [facility])       Vitals:  declined today.    There were no vitals filed for this visit.     Asssessment:      INR subtherapeutic at 1.8, therefore increasing patient's stroke risk.   Reason(s) for out of range INR today:  Dose too low.      Plan:  Instructed patient to bolus with 3mg X 1, then increase weekly warfarin regimen as detailed above in order to bring INR to therapeutic range.     Follow up:  Because warfarin is a high risk medication and current CHEST guidelines recommend regular monitoring intervals (few days up to 12 weeks), will have patient return to clinic in 1 weeks to recheck INR.    Brady Piña, PharmD, BCACP

## 2020-02-11 DIAGNOSIS — I48.0 PAF (PAROXYSMAL ATRIAL FIBRILLATION) (HCC): ICD-10-CM

## 2020-02-12 ENCOUNTER — HOSPITAL ENCOUNTER (OUTPATIENT)
Dept: RADIOLOGY | Facility: MEDICAL CENTER | Age: 85
End: 2020-02-12
Attending: FAMILY MEDICINE
Payer: MEDICARE

## 2020-02-12 DIAGNOSIS — Z12.31 VISIT FOR SCREENING MAMMOGRAM: ICD-10-CM

## 2020-02-12 PROCEDURE — 77067 SCR MAMMO BI INCL CAD: CPT

## 2020-02-14 ENCOUNTER — ANTICOAGULATION VISIT (OUTPATIENT)
Dept: MEDICAL GROUP | Facility: PHYSICIAN GROUP | Age: 85
End: 2020-02-14
Payer: MEDICARE

## 2020-02-14 DIAGNOSIS — I48.0 PAF (PAROXYSMAL ATRIAL FIBRILLATION) (HCC): ICD-10-CM

## 2020-02-14 DIAGNOSIS — Z79.01 CHRONIC ANTICOAGULATION: Primary | ICD-10-CM

## 2020-02-14 LAB — INR PPP: 2.7 (ref 2–3.5)

## 2020-02-14 PROCEDURE — 85610 PROTHROMBIN TIME: CPT | Performed by: FAMILY MEDICINE

## 2020-02-14 PROCEDURE — 93793 ANTICOAG MGMT PT WARFARIN: CPT | Performed by: FAMILY MEDICINE

## 2020-02-14 NOTE — PROGRESS NOTES
Anticoagulation Summary  As of 2020    INR goal:   2.0-3.0   TTR:   77.6 % (1.7 y)   INR used for dosin.70 (2020)   Warfarin maintenance plan:   1.5 mg (3 mg x 0.5) every Mon, Fri; 3 mg (3 mg x 1) all other days   Weekly warfarin total:   18 mg   Plan last modified:   Brady Piña, PharmD (2020)   Next INR check:   2020   Target end date:   Indefinite    Indications    Chronic anticoagulation [Z79.01]  PAF (paroxysmal atrial fibrillation) ([facility]) [I48.0]             Anticoagulation Episode Summary     INR check location:       Preferred lab:       Send INR reminders to:       Comments:         Anticoagulation Care Providers     Provider Role Specialty Phone number    Ganesh Mckeon M.D. Referring Geriatrics [phone]    [facility] Responsible  [phone]        Anticoagulation Patient Findings  Patient Findings     Negatives:   Signs/symptoms of thrombosis, Signs/symptoms of bleeding, Laboratory test error suspected, Change in health, Change in alcohol use, Change in activity, Upcoming invasive procedure, Emergency department visit, Upcoming dental procedure, Missed doses, Extra doses, Change in medications, Change in diet/appetite, Hospital admission, Bruising, Other complaints        HPI:   Shereen Dale seen in clinic today, on anticoagulation therapy with warfarin for stroke prevention due to history of PAF.    Patient's previous INR was subtherapeutic at 1.8 on 20, at which time patient was instructed to bolus with one dose, then increase weekly warfarin regimen.  She returns to clinic today to recheck INR to ensure it is therapeutic and thus preventing possible clotting and/or bleeding/bruising complications.    CHADS-VASc = at least 5  (unadjusted ischemic stroke risk/year:  7.2%, which is moderate risk)    Does patient have any changes to current medical/health status since last appt (Y/N):  No  Does patient have any signs/symptoms of bleeding  and/or thrombosis since the last appt (Y/N):  NO  Does patient have any interval changes to diet or medications since last appt (Y/N):  NO  Are there any complications or cost restrictions with current therapy (Y/N):  NO     [facility]? [facility], Dr Ganesh Mckeon (If not, please document discussion that patient must be seen at [facility])       Vitals:  declined by patient today    There were no vitals filed for this visit.     Asssessment:      INR therapeutic at 2.7, therefore decreasing patient's stroke and/or bleeding risk.   Reason(s) for out of range INR today:  n/a      Plan:  Pt is to continue with current warfarin dosing regimen in order to maintain INR in therapeutic range.     Follow up:  Because warfarin is a high risk medication and current CHEST guidelines recommend regular monitoring intervals (few days up to 12 weeks), will have patient return to clinic in 1 weeks to recheck INR.    Brady Piña, PharmD, BCACP

## 2020-02-20 ENCOUNTER — OFFICE VISIT (OUTPATIENT)
Dept: MEDICAL GROUP | Facility: MEDICAL CENTER | Age: 85
End: 2020-02-20
Payer: MEDICARE

## 2020-02-20 VITALS
DIASTOLIC BLOOD PRESSURE: 78 MMHG | BODY MASS INDEX: 28.13 KG/M2 | SYSTOLIC BLOOD PRESSURE: 132 MMHG | HEART RATE: 91 BPM | TEMPERATURE: 97.3 F | HEIGHT: 61 IN | OXYGEN SATURATION: 96 % | WEIGHT: 149 LBS | RESPIRATION RATE: 16 BRPM

## 2020-02-20 DIAGNOSIS — I35.0 NONRHEUMATIC AORTIC VALVE STENOSIS: ICD-10-CM

## 2020-02-20 DIAGNOSIS — E03.9 ACQUIRED HYPOTHYROIDISM: ICD-10-CM

## 2020-02-20 DIAGNOSIS — Z00.00 HEALTHCARE MAINTENANCE: ICD-10-CM

## 2020-02-20 DIAGNOSIS — E11.69 DIABETES MELLITUS TYPE 2 IN OBESE: ICD-10-CM

## 2020-02-20 DIAGNOSIS — K21.9 GASTROESOPHAGEAL REFLUX DISEASE WITHOUT ESOPHAGITIS: ICD-10-CM

## 2020-02-20 DIAGNOSIS — I10 ESSENTIAL HYPERTENSION: ICD-10-CM

## 2020-02-20 DIAGNOSIS — E66.9 DIABETES MELLITUS TYPE 2 IN OBESE: ICD-10-CM

## 2020-02-20 DIAGNOSIS — Z00.00 MEDICARE ANNUAL WELLNESS VISIT, SUBSEQUENT: ICD-10-CM

## 2020-02-20 DIAGNOSIS — I48.0 PAF (PAROXYSMAL ATRIAL FIBRILLATION) (HCC): ICD-10-CM

## 2020-02-20 PROCEDURE — G0439 PPPS, SUBSEQ VISIT: HCPCS | Performed by: FAMILY MEDICINE

## 2020-02-20 ASSESSMENT — PATIENT HEALTH QUESTIONNAIRE - PHQ9: CLINICAL INTERPRETATION OF PHQ2 SCORE: 0

## 2020-02-20 NOTE — PROGRESS NOTES
Chief Complaint   Patient presents with   • Follow-Up         HPI:  Shereen Dale is a 84 y.o. here for Medicare Annual Wellness Visit     Medicare annual wellness visit, subsequent  Preventive measures and chronic medical issues reviewed.    Diabetes mellitus type 2 in obese ([facility])  Has been adequately controlled.  Her last A1c was 6.3.Has been on metformin 500 mg twice a day.No side effects.    Acquired hypothyroidism  She has been tolerating Levothyroxine, no palpitation, no cold or heat intolerance, has been on levothyroxine 50 mcg daily.  Last TSH was normal    PAF (paroxysmal atrial fibrillation) ([facility])  Denies palpitation, chest pain, shortness of breath.  Patient has beenon carvedilol 25 mg twice a day, and Coumadin.  Has been following up with Coumadin clinic    Essential hypertension  Has been adequately controlled on current medication. Denies headache, chest pain, and SOB.patient is currently on telmisartan 40 mg daily, amlodipine 5 mg daily, and carvedilol 25 mg twice a day.  No side effects    Nonrheumatic aortic valve stenosis  Patient has severe aortic stenosis.  Mostly asymptomatic, denies any chest pain, dizziness, or syncopal episodes, however sometimes she feels short of breath with exertion.  Declined any kind of surgical intervention..      Gastroesophageal reflux disease without esophagitis  Denies any epigastric pain, heartburn, nausea, or vomiting.  Patient has been doing fine on Pepcid 20 mg daily.  Denies side effects    Vaccinations are up-to-date.  However no Tdap, patient stated that she is allergic to it.      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) 05/02/2012     Priority: High   • Acute, but ill-defined, cerebrovascular disease 04/30/2012     Priority: High   • Cough 05/02/2012     Priority: Low   •  Edema 05/02/2012     Priority: Low   • Acquired hypothyroidism 09/12/2019   • Diabetes mellitus type 2 in obese ([facility]) 09/12/2019   • Skin abrasion 08/20/2019   • Chronic anticoagulation 08/02/2019   • Hyponatremia 03/25/2019   • Advanced directives, counseling/discussion 09/26/2018   • Hypertensive urgency 05/11/2017   • Diastolic dysfunction 02/02/2016   • Tear of lateral cartilage or meniscus of knee, current 05/21/2015   • PAF (paroxysmal atrial fibrillation) ([facility]) 01/12/2015   • Dyspnea on effort 01/02/2014   • Diabetes ([facility]) 12/26/2013   • Aortic stenosis 07/02/2013       Current Outpatient Medications   Medication Sig Dispense Refill   • cloNIDine (CATAPRES) 0.1 MG Tab Take 1 Tab by mouth 2 times a day as needed. If SBP > 160 60 Tab 2   • glipiZIDE SR (GLUCOTROL) 2.5 MG TABLET SR 24 HR Take 1 Tab by mouth every day. 100 Tab 3   • telmisartan (MICARDIS) 40 MG Tab TAKE ONE TABLET BY MOUTH TWICE A  Tab 3   • fluconazole (DIFLUCAN) 100 MG Tab Take 1 Tab by mouth every day. 1 Tab 0   • amLODIPine (NORVASC) 5 MG Tab Take 1 Tab by mouth every morning AND 0.5 Tabs every evening. Take 5 mg in the AM and 2.5 mg in the  Tab 2   • metFORMIN (GLUCOPHAGE) 500 MG Tab Take 1 Tab by mouth 3 times a day. 300 Tab 3   • famotidine (PEPCID) 20 MG Tab TAKE ONE TABLET BY MOUTH TWICE A DAY 60 Tab 3   • famotidine (PEPCID) 20 MG Tab Take 1 Tab by mouth every day. 90 Tab 3   • levothyroxine (SYNTHROID) 50 MCG Tab Take 1 Tab by mouth every morning. 90 Tab 3   • warfarin (COUMADIN) 3 MG Tab Take 1 Tab by mouth every day. 90 Tab 3   • acetaminophen (TYLENOL) 325 MG Tab Take 2 Tabs by mouth every 6 hours as needed (Mild Pain; (Pain scale 1-3); Temp greater than 100.5 F). 30 Tab 0   • gabapentin (NEURONTIN) 100 MG Cap Take 1 Cap by mouth every day. 90 Cap 0   • gabapentin (NEURONTIN) 100 MG Cap Take 2 Caps by mouth every evening. 90 Cap 0   • Blood Glucose Monitoring Suppl Device Meter: Dispense free style meter. Sig. Use 2  times daily as directed for blood sugar monitoring. dx e11.9 1 Device 0   • dorzolamide-timolol (COSOPT) 22.3-6.8 MG/ML Solution Place 1 Drop in both eyes 2 times a day.     • carvedilol (COREG) 25 MG Tab Take 1 Tab by mouth 2 times a day. 180 Tab 3   • Glucosamine HCl (GLUCOSAMINE PO) Take 1 Tab by mouth every morning. Pt unsure of dose     • Polyethyl Glycol-Propyl Glycol (SYSTANE OP) 1-2 Drops by Ophthalmic route 4 times a day as needed.     • docosahexanoic acid (OMEGA 3 FA) 1000 MG CAPS Take 1 Cap by mouth every morning.     • VITAMIN D PO Take 2,000 Units by mouth every morning.     • CALCIUM 500 PO Take 1 Tab by mouth every morning.       No current facility-administered medications for this visit.             Current supplements as per medication list.       Allergies: Darvon [propoxyphene hcl]; Iodine; Latex; Penicillins; Propoxyphene hcl; Tetanus antitoxin; and Tetracycline    Current social contact/activities:      She  reports that she quit smoking about 56 years ago. Her smoking use included cigarettes. She has a 5.50 pack-year smoking history. She has never used smokeless tobacco. She reports current alcohol use. She reports that she does not use drugs.  Counseling given: Not Answered  Comment: Started at       [facility]/Advanced Directive:      ROS:    Gait: Uses no assistive device  Ostomy: No  Other tubes: No  Amputations: No  Chronic oxygen use: No  Last eye exam: Not sure  Hears hearing aids: No  : No leak    Screening:    Depression Screening    Little interest or pleasure in doing things?  0 - not at all  Feeling down, depressed , or hopeless? 0 - not at all  Patient Health Questionnaire Score: 0     If depressive symptoms identified deferred to follow up visit unless specifically addressed in assessment and plan.    Interpretation of PHQ-9 Total Score   Score Severity   1-4 No Depression   5-9 Mild Depression   10-14 Moderate Depression   15-19 Moderately Severe Depression   20-27 Severe  Depression    Screening for Cognitive Impairment    Three Minute Recall (village, kitchen, baby)  3/3    Mark Anthony clock face with all 12 numbers and set the hands to show 10 past 10.   yes    Cognitive concerns identified deferred for follow up unless specifically addressed in assessment and plan.    Fall Risk Assessment    Has the patient had two or more falls in the last year or any fall with injury in the last year?  No    Safety Assessment    Throw rugs on floor.     Handrails on all stairs.     Good lighting in all hallways.     Difficulty hearing.     Patient counseled about all safety risks that were identified.    Functional Assessment ADLs    Are there any barriers preventing you from cooking for yourself or meeting nutritional needs?   .no    Are there any barriers preventing you from driving safely or obtaining transportation?   .no    Are there any barriers preventing you from using a telephone or calling for help?   .no    Are there any barriers preventing you from shopping?   .no    Are there any barriers preventing you from taking care of your own finances?   .no    Are there any barriers preventing you from managing your medications?   .no    Are there any barriers preventing you from showering, bathing or dressing yourself?   .no    Are you currently engaging in any exercise or physical activity?   .no     What is your perception of your health?   .fair      Health Maintenance Summary                IMM HEP B VACCINE Overdue 12/21/1955     COLONOSCOPY Overdue 12/21/1986     IMM DTaP/Tdap/Td Vaccine Postponed 2/20/2021 Originally 12/21/1946. Patient Refused    URINE ACR / MICROALBUMIN Next Due 3/25/2020      Done 3/25/2019 URINE PROTEIN     Patient has more history with this topic...    RETINAL SCREENING Next Due 6/10/2020      Done 6/10/2019 REFERRAL FOR RETINAL SCREENING EXAM     Patient has more history with this topic...    A1C SCREENING Next Due 6/12/2020      Done 12/12/2019 POCT A1C     Patient  has more history with this topic...    Annual Wellness Visit Next Due 2020      Done 2019 INITIAL ANNUAL WELLNESS VISIT-INCLUDES PPPS ()     Patient has more history with this topic...    FASTING LIPID PROFILE Next Due 2020      Done 2019 LIPID PROFILE     Patient has more history with this topic...    SERUM CREATININE Next Due 2020      Done 2019 COMP METABOLIC PANEL     Patient has more history with this topic...    DIABETES MONOFILAMENT / LE EXAM Next Due 2020      Done 2019 AMB DIABETIC MONOFILAMENT LOWER EXTREMITY EXAM     Patient has more history with this topic...    MAMMOGRAM Next Due 2021      Done 2020 MA-SCREENING MAMMO BILAT W/TOMOSYNTHESIS W/CAD     Patient has more history with this topic...          Patient Care Team:  Ganesh Mckeon M.D. as PCP - General (Geriatrics)  [facility] as Pharmacist  Rubina Hernandez as    Tyson Arce M.D. as Consulting Physician (Urology)  ROWENA Fontanez M.D. as Consulting Physician (Neurology)  Alex Sharp M.D. as Consulting Physician (Interventional Cardiology)  SPRING Garcia as Consulting Physician (Ophthalmology)        Social History     Tobacco Use   • Smoking status: Former Smoker     Packs/day: 0.50     Years: 11.00     Pack years: 5.50     Types: Cigarettes     Last attempt to quit: 1964     Years since quittin.1   • Smokeless tobacco: Never Used   • Tobacco comment: Started at    Substance Use Topics   • Alcohol use: Yes     Alcohol/week: 0.0 oz     Comment: rarely   • Drug use: No     Family History   Problem Relation Age of Onset   • Heart Attack Mother    • Hypertension Mother    • Heart Disease Father    • Hypertension Father    • No Known Problems Sister    • No Known Problems Brother    • Leukemia Sister    • Sleep Apnea Sister    • No Known Problems Sister    • No Known Problems Sister      She  has a past  "medical history of AF (atrial fibrillation) (HCC), Arrhythmia, Backpain, Breast cancer (HCC), Breath shortness, Cancer (HCC) (1993), CATARACT, Chronic anticoagulation (5/2/2012), Cough (5/2/2012), Dental disorder, Diabetes, Edema (5/2/2012), Glaucoma, Heart burn, Heart murmur, Heart valve disease, Hypertension, Hypothyroid, Oxygen deficiency, Paroxysmal atrial fibrillation (HCC), Sleep apnea, tia, and Unspecified hemorrhagic conditions.   Past Surgical History:   Procedure Laterality Date   • KNEE ARTHROSCOPY Right 5/21/2015    Procedure: KNEE ARTHROSCOPY;  Surgeon: Emerson Garcia M.D.;  Location: SURGERY [facility];  Service:    • MENISCECTOMY Right 5/21/2015    Procedure: LATERAL MENISCECTOMY;  Surgeon: Emerson Garcia M.D.;  Location: SURGERY [facility];  Service:    • CATARACT PHACO WITH IOL  10/21/2013    Performed by Dominick Fernando M.D. at [facility]   • CATARACT PHACO WITH IOL  9/23/2013    Performed by Dominick Fernando M.D. at [facility]   • CHOLECYSTECTOMY     • HYSTERECTOMY RADICAL     • LUMPECTOMY      R breast   • PB RADIATION THERAPY PLAN SIMPLE         Exam:   /78 (BP Location: Right arm, Patient Position: Sitting, BP Cuff Size: Adult)   Pulse 91   Temp 36.3 °C (97.3 °F) (Temporal)   Resp 16   Ht 1.549 m (5' 1\")   Wt 67.6 kg (149 lb)   SpO2 96%  Body mass index is 28.15 kg/m².    Hearing .    Dentition   Alert, oriented in no acute distress.  Eye contact is good, speech goal directed, affect calm    Assessment and Plan. The following treatment and monitoring plan is recommended:    84 y.o. female     1. Medicare annual wellness visit, subsequent  Preventive measures and chronic medical issues reviewed.    - Subsequent Annual Wellness Visit - Includes PPPS ()    2. Diabetes mellitus type 2 in obese (HCC)  Last A1c was 6.3.  Continue on metformin 500 mg twice a day.    - Subsequent Annual Wellness Visit - Includes PPPS ()    3. " Acquired hypothyroidism  He has been tolerating Levothyroxine, no palpitation, no cold or heat intolerance  Continue levothyroxine 50 mcg daily.    - Subsequent Annual Wellness Visit - Includes PPPS ()    4. PAF (paroxysmal atrial fibrillation) (HCC)  Stable.  No RVR.  Continue on carvedilol 25 mg twice a day, and Coumadin.  Continue follow-up with Coumadin clinic.    - Subsequent Annual Wellness Visit - Includes PPPS ()    5. Essential hypertension  Controlled.  Continue on telmisartan 40 mg daily, amlodipine 5 mg daily, and carvedilol 25 mg twice a day.    - Subsequent Annual Wellness Visit - Includes PPPS ()    6. Nonrheumatic aortic valve stenosis  Patient has severe aortic stenosis.  Declined any kind of surgical intervention..  However she has been asymptomatic.    - Subsequent Annual Wellness Visit - Includes PPPS ()    7. Gastroesophageal reflux disease without esophagitis  Patient has been doing fine on Pepcid 20 mg daily.    - Subsequent Annual Wellness Visit - Includes PPPS ()    8. Healthcare maintenance  Vaccinations are up-to-date.  However no Tdap, patient stated that she is allergic to it.    - Subsequent Annual Wellness Visit - Includes PPPS ()        Services suggested:   Health Care Screening: Age-appropriate preventive services recommended by USPTF and ACIP covered by Medicare were discussed today. Services ordered if indicated and agreed upon by the patient.  Referrals offered: Community-based lifestyle interventions to reduce health risks and promote self-management and wellness, fall prevention, nutrition, physical activity, tobacco-use cessation, weight loss, and mental health services as per orders if indicated.    Discussion today about general wellness and lifestyle habits:    · Prevent falls and reduce trip hazards; Cautioned about securing or removing rugs.  · Have a working fire alarm and carbon monoxide detector;   · Engage in regular physical activity and  social activities     Follow-up: No follow-ups on file.

## 2020-02-21 ENCOUNTER — ANTICOAGULATION VISIT (OUTPATIENT)
Dept: MEDICAL GROUP | Facility: PHYSICIAN GROUP | Age: 85
End: 2020-02-21
Payer: MEDICARE

## 2020-02-21 DIAGNOSIS — Z79.01 CHRONIC ANTICOAGULATION: Primary | ICD-10-CM

## 2020-02-21 DIAGNOSIS — I48.0 PAF (PAROXYSMAL ATRIAL FIBRILLATION) (HCC): ICD-10-CM

## 2020-02-21 LAB — INR PPP: 1.9 (ref 2–3.5)

## 2020-02-21 PROCEDURE — 85610 PROTHROMBIN TIME: CPT | Performed by: FAMILY MEDICINE

## 2020-02-21 PROCEDURE — 99211 OFF/OP EST MAY X REQ PHY/QHP: CPT | Performed by: FAMILY MEDICINE

## 2020-02-21 NOTE — PROGRESS NOTES
Anticoagulation Summary  As of 2020    INR goal:   2.0-3.0   TTR:   77.7 % (1.7 y)   INR used for dosin.90! (2020)   Warfarin maintenance plan:   1.5 mg (3 mg x 0.5) every Mon; 3 mg (3 mg x 1) all other days   Weekly warfarin total:   19.5 mg   Plan last modified:   Brady Piña, PharmD (2020)   Next INR check:   2020   Target end date:   Indefinite    Indications    Chronic anticoagulation [Z79.01]  PAF (paroxysmal atrial fibrillation) ([facility]) [I48.0]             Anticoagulation Episode Summary     INR check location:       Preferred lab:       Send INR reminders to:       Comments:         Anticoagulation Care Providers     Provider Role Specialty Phone number    Ganesh Mckeon M.D. Referring Geriatrics [phone]    [facility] Anticoagulation Services Responsible  [phone]        Anticoagulation Patient Findings  Patient Findings     Negatives:   Signs/symptoms of thrombosis, Signs/symptoms of bleeding, Laboratory test error suspected, Change in health, Change in alcohol use, Change in activity, Upcoming invasive procedure, Emergency department visit, Upcoming dental procedure, Missed doses, Extra doses, Change in medications, Change in diet/appetite, Hospital admission, Bruising, Other complaints        HPI:   Shereen Dale seen in clinic today, on anticoagulation therapy with warfarin for stroke prevention due to history of PAF.    Patient's previous INR was therapeutic at 2.7 on 20, at which time patient was instructed to continue with current warfarin regimen.  She returns to clinic today to recheck INR to ensure it is therapeutic and thus preventing possible clotting and/or bleeding/bruising complications.    CHADS-VASc = at least 5  (unadjusted ischemic stroke risk/year:  7.2%, which is moderate risk)    Does patient have any changes to current medical/health status since last appt (Y/N):  NO  Does patient have any signs/symptoms of bleeding and/or thrombosis since  the last appt (Y/N):  NO  Does patient have any interval changes to diet or medications since last appt (Y/N):  NO  Are there any complications or cost restrictions with current therapy (Y/N):  NO     Does patient have [facility] PCP? YES, Dr Ganesh Mckeon (If not, please document discussion that patient must be seen at [facility])       Vitals:  declined by patient at today's visit.    There were no vitals filed for this visit.     Asssessment:      INR subtherapeutic at 1.9, therefore increasing patient's risk of stroke due to a-fib.   Reason(s) for out of range INR today:  Dose too low?      Plan:  Instructed patient to increase weekly warfarin regimen as detailed above in order to bring INR to therapeutic range.     Follow up:  Because warfarin is a high risk medication and current CHEST guidelines recommend regular monitoring intervals (few days up to 12 weeks), will have patient return to clinic in 1 weeks to recheck INR.    Brady Piña, PharmD, BCACP

## 2020-02-28 ENCOUNTER — ANTICOAGULATION VISIT (OUTPATIENT)
Dept: MEDICAL GROUP | Facility: PHYSICIAN GROUP | Age: 85
End: 2020-02-28
Payer: MEDICARE

## 2020-02-28 DIAGNOSIS — I48.0 PAF (PAROXYSMAL ATRIAL FIBRILLATION) (HCC): ICD-10-CM

## 2020-02-28 DIAGNOSIS — Z79.01 CHRONIC ANTICOAGULATION: ICD-10-CM

## 2020-02-28 LAB — INR PPP: 2.7 (ref 2–3.5)

## 2020-02-28 PROCEDURE — 93793 ANTICOAG MGMT PT WARFARIN: CPT | Performed by: FAMILY MEDICINE

## 2020-02-28 PROCEDURE — 85610 PROTHROMBIN TIME: CPT | Performed by: FAMILY MEDICINE

## 2020-02-28 NOTE — PROGRESS NOTES
Anticoagulation Summary  As of 2020    INR goal:   2.0-3.0   TTR:   77.8 % (1.7 y)   INR used for dosin.70 (2020)   Warfarin maintenance plan:   1.5 mg (3 mg x 0.5) every Mon; 3 mg (3 mg x 1) all other days   Weekly warfarin total:   19.5 mg   Plan last modified:   Brady Piña, PharmD (2020)   Next INR check:   3/6/2020   Target end date:   Indefinite    Indications    Chronic anticoagulation [Z79.01]  PAF (paroxysmal atrial fibrillation) ([facility]) [I48.0]             Anticoagulation Episode Summary     INR check location:       Preferred lab:       Send INR reminders to:       Comments:         Anticoagulation Care Providers     Provider Role Specialty Phone number    Ganesh Mckeon M.D. Referring Geriatrics [phone]    [facility] Responsible  [phone]        Anticoagulation Patient Findings  Patient Findings     Negatives:   Signs/symptoms of thrombosis, Signs/symptoms of bleeding, Laboratory test error suspected, Change in health, Change in alcohol use, Change in activity, Upcoming invasive procedure, Emergency department visit, Upcoming dental procedure, Missed doses, Extra doses, Change in medications, Change in diet/appetite, Hospital admission, Bruising, Other complaints        HPI:   Shereen Dale seen in clinic today, on anticoagulation therapy with warfarin for stroke prevention due to history of PAF.    Patient's previous INR was subtherapeutic at 1.9 on 20, at which time patient was instructed to increase weekly warfarin reigmen.  She returns to clinic today to recheck INR to ensure it is therapeutic and thus preventing possible clotting and/or bleeding/bruising complications.    CHADS-VASc = at least 5  (unadjusted ischemic stroke risk/year:  7.2%, which is moderate risk)    Does patient have any changes to current medical/health status since last appt (Y/N):  NO  Does patient have any signs/symptoms of bleeding and/or thrombosis since the  last appt (Y/N):  NO  Does patient have any interval changes to diet or medications since last appt (Y/N):  Less greens this week  Are there any complications or cost restrictions with current therapy (Y/N):  NO     Does patient have [facility] PCP? YES, Dr Ganesh Mckeon (If not, please document discussion that patient must be seen at [facility])       Vitals:  declined by patient today.    There were no vitals filed for this visit.     Asssessment:      INR therapeutic at 2.7, therefore decreasing patient's risk of stroke and/or bleeding complications.   Reason(s) for out of range INR today:  n/a      Plan:  Pt is to continue with current warfarin dosing regimen in order to maintain INR in therapeutic range.     Follow up:  Because warfarin is a high risk medication and current CHEST guidelines recommend regular monitoring intervals (few days up to 12 weeks), will have patient return to clinic in 1 weeks to recheck INR.    Brady Piña, PharmD, BCACP

## 2020-03-03 DIAGNOSIS — I10 ESSENTIAL HYPERTENSION: ICD-10-CM

## 2020-03-03 RX ORDER — FAMOTIDINE 20 MG/1
TABLET, FILM COATED ORAL
Qty: 60 TAB | Refills: 2 | Status: SHIPPED | OUTPATIENT
Start: 2020-03-03 | End: 2021-03-31

## 2020-03-06 ENCOUNTER — ANTICOAGULATION VISIT (OUTPATIENT)
Dept: MEDICAL GROUP | Facility: PHYSICIAN GROUP | Age: 85
End: 2020-03-06
Payer: MEDICARE

## 2020-03-06 VITALS — SYSTOLIC BLOOD PRESSURE: 126 MMHG | DIASTOLIC BLOOD PRESSURE: 68 MMHG | HEART RATE: 75 BPM

## 2020-03-06 DIAGNOSIS — Z79.01 CHRONIC ANTICOAGULATION: ICD-10-CM

## 2020-03-06 DIAGNOSIS — I48.0 PAF (PAROXYSMAL ATRIAL FIBRILLATION) (HCC): ICD-10-CM

## 2020-03-06 LAB — INR PPP: 2.6 (ref 2–3.5)

## 2020-03-06 PROCEDURE — 85610 PROTHROMBIN TIME: CPT | Performed by: INTERNAL MEDICINE

## 2020-03-06 PROCEDURE — 93793 ANTICOAG MGMT PT WARFARIN: CPT | Performed by: INTERNAL MEDICINE

## 2020-03-06 NOTE — PROGRESS NOTES
Anticoagulation Summary  As of 3/6/2020    INR goal:   2.0-3.0   TTR:   78.1 % (1.8 y)   INR used for dosin.60 (3/6/2020)   Warfarin maintenance plan:   1.5 mg (3 mg x 0.5) every Mon; 3 mg (3 mg x 1) all other days   Weekly warfarin total:   19.5 mg   Plan last modified:   Brady Piña, PharmD (2020)   Next INR check:   3/13/2020   Target end date:   Indefinite    Indications    Chronic anticoagulation [Z79.01]  PAF (paroxysmal atrial fibrillation) (HCC) [I48.0]             Anticoagulation Episode Summary     INR check location:       Preferred lab:       Send INR reminders to:       Comments:         Anticoagulation Care Providers     Provider Role Specialty Phone number    Ganesh Mckeon M.D. Referring Geriatrics [phone]    [facility] Responsible  [phone]        Anticoagulation Patient Findings  Patient Findings     Negatives:   Signs/symptoms of thrombosis, Signs/symptoms of bleeding, Laboratory test error suspected, Change in health, Change in alcohol use, Change in activity, Upcoming invasive procedure, Emergency department visit, Upcoming dental procedure, Missed doses, Extra doses, Change in medications, Change in diet/appetite, Hospital admission, Bruising, Other complaints          HPI:  Shereen Dale seen in clinic today, on anticoagulation therapy with warfarin for A-Fib  Reason for today's visit (per our collaborative practice policy) is because their last INR was therapeutic on 2020. Intervention at the last visit: Continue dosing regimen   Changes to current medical/health status since last appt: No  No signs/symptoms of bleeding and/or thrombosis since the last appt.    No interval changes to diet or any interval changes to medications since last appt.   No complications or cost restrictions with current therapy.   BP recorded in vitals.    Vitals:    20 1108   BP: 126/68   Pulse: 75         A/P   INR -therapeutic. Instructed patient  to continue dosing regimen     Follow up appointment in 1 weeks to reduce risk of adverse events related to this high risk medication, Warfarin.    Other info:  Pt educated to contact our clinic with any changes in medications or s/s of bleeding or thrombosis  CHEST guidelines recommend frequent INR monitoring at regular intervals (a few days up to a max of 12 weeks) to ensure they are on the proper dose of warfarin and not having any complications from therapy. INRs can dramatically change over a short time period due to diet, medications, and medical conditions.     Do Brown, Pharmacy Intern     Suellen Benitez, PharmD

## 2020-03-11 RX ORDER — CARVEDILOL 25 MG/1
TABLET ORAL
Qty: 200 TAB | Refills: 2 | Status: SHIPPED
Start: 2020-03-11 | End: 2020-10-07

## 2020-03-13 ENCOUNTER — ANTICOAGULATION VISIT (OUTPATIENT)
Dept: MEDICAL GROUP | Facility: PHYSICIAN GROUP | Age: 85
End: 2020-03-13
Payer: MEDICARE

## 2020-03-13 DIAGNOSIS — Z79.01 CHRONIC ANTICOAGULATION: Primary | ICD-10-CM

## 2020-03-13 DIAGNOSIS — I48.0 PAF (PAROXYSMAL ATRIAL FIBRILLATION) (HCC): ICD-10-CM

## 2020-03-13 LAB — INR PPP: 2.4 (ref 2–3.5)

## 2020-03-13 PROCEDURE — 85610 PROTHROMBIN TIME: CPT | Mod: QW | Performed by: FAMILY MEDICINE

## 2020-03-13 PROCEDURE — 93793 ANTICOAG MGMT PT WARFARIN: CPT | Performed by: FAMILY MEDICINE

## 2020-03-13 NOTE — PROGRESS NOTES
Anticoagulation Summary  As of 3/13/2020    INR goal:   2.0-3.0   TTR:   78.3 % (1.8 y)   INR used for dosin.40 (3/13/2020)   Warfarin maintenance plan:   1.5 mg (3 mg x 0.5) every Mon; 3 mg (3 mg x 1) all other days   Weekly warfarin total:   19.5 mg   Plan last modified:   Brady Piña, PharmD (2020)   Next INR check:   3/23/2020   Target end date:   Indefinite    Indications    Chronic anticoagulation [Z79.01]  PAF (paroxysmal atrial fibrillation) (HCC) [I48.0]             Anticoagulation Episode Summary     INR check location:       Preferred lab:       Send INR reminders to:       Comments:         Anticoagulation Care Providers     Provider Role Specialty Phone number    Ganesh Mckeon M.D. Referring Geriatrics [phone]    [facility] Responsible  [phone]        Anticoagulation Patient Findings  Patient Findings     Negatives:   Signs/symptoms of thrombosis, Signs/symptoms of bleeding, Laboratory test error suspected, Change in health, Change in alcohol use, Change in activity, Upcoming invasive procedure, Emergency department visit, Upcoming dental procedure, Missed doses, Extra doses, Change in medications, Change in diet/appetite, Hospital admission, Bruising, Other complaints        HPI:   Shereen Dale seen in clinic today, on anticoagulation therapy with warfarin for stroke prevention due to history of atrial fibrillation.    Patient's previous INR was therapeutic at 2.6 on 3-6-20, at which time patient was instructed to continue with current warfarin regimen.  She returns to clinic today to recheck INR to ensure it is therapeutic and thus preventing possible clotting and/or bleeding/bruising complications.    CHADS-VASc = at least 5  (unadjusted ischemic stroke risk/year:  7.2%, which is moderate risk)    Does patient have any changes to current medical/health status since last appt (Y/N):  NO  Does patient have any signs/symptoms of bleeding and/or  thrombosis since the last appt (Y/N):  NO  Does patient have any interval changes to diet or medications since last appt (Y/N):  NO  Are there any complications or cost restrictions with current therapy (Y/N):  NO     Does patient have [facility] PCP? YES, Dr Ganesh Mckeon (If not, please document discussion that patient must be seen at [facility])       Vitals:  declined by patient at today's visit.    There were no vitals filed for this visit.     Asssessment:      INR remains therapeutic at 2.4, therefore decreasing patient's risk of stroke and/or bleeding complications.   Reason(s) for out of range INR today:  n/a      Plan:  Pt is to continue with current warfarin dosing regimen in order to maintain INR in therapeutic range.     Follow up:  Because warfarin is a high risk medication and current CHEST guidelines recommend regular monitoring intervals (few days up to 12 weeks), will have patient return to clinic in 1.5 weeks to recheck INR.    Brady Piña, PharmD, BCACP

## 2020-03-23 ENCOUNTER — ANTICOAGULATION VISIT (OUTPATIENT)
Dept: MEDICAL GROUP | Facility: PHYSICIAN GROUP | Age: 85
End: 2020-03-23
Payer: MEDICARE

## 2020-03-23 DIAGNOSIS — I48.0 PAF (PAROXYSMAL ATRIAL FIBRILLATION) (HCC): ICD-10-CM

## 2020-03-23 DIAGNOSIS — Z79.01 CHRONIC ANTICOAGULATION: Primary | ICD-10-CM

## 2020-03-23 LAB — INR PPP: 2.4 (ref 2–3.5)

## 2020-03-23 PROCEDURE — 93793 ANTICOAG MGMT PT WARFARIN: CPT | Performed by: FAMILY MEDICINE

## 2020-03-23 PROCEDURE — 85610 PROTHROMBIN TIME: CPT | Performed by: FAMILY MEDICINE

## 2020-03-23 NOTE — PROGRESS NOTES
Anticoagulation Summary  As of 3/23/2020    INR goal:   2.0-3.0   TTR:   78.7 % (1.8 y)   INR used for dosin.40 (3/23/2020)   Warfarin maintenance plan:   1.5 mg (3 mg x 0.5) every Mon; 3 mg (3 mg x 1) all other days   Weekly warfarin total:   19.5 mg   Plan last modified:   Brady Piña, PharmD (2020)   Next INR check:   2020   Target end date:   Indefinite    Indications    Chronic anticoagulation [Z79.01]  PAF (paroxysmal atrial fibrillation) ([facility]) [I48.0]             Anticoagulation Episode Summary     INR check location:       Preferred lab:       Send INR reminders to:       Comments:         Anticoagulation Care Providers     Provider Role Specialty Phone number    Ganesh Mckeon M.D. Referring Geriatrics [phone]    [facility] Responsible  [phone]        Anticoagulation Patient Findings  Patient Findings     Negatives:   Signs/symptoms of thrombosis, Signs/symptoms of bleeding, Laboratory test error suspected, Change in health, Change in alcohol use, Change in activity, Upcoming invasive procedure, Emergency department visit, Upcoming dental procedure, Missed doses, Extra doses, Change in medications, Change in diet/appetite, Hospital admission, Bruising, Other complaints        HPI:   Shereen Dale seen in clinic today, on anticoagulation therapy with warfarin for stroke prevention due to history of PAF.    Patient's previous INR was therapeutic at 2.4 on 3-13-20, at which time patient was instructed to continue with current warfarin regimen.  She returns to clinic today to recheck INR to ensure it is therapeutic and thus preventing possible clotting and/or bleeding/bruising complications.    CHADS-VASc = at least 5  (unadjusted ischemic stroke risk/year:  7.2%, which is moderate risk)    Does patient have any changes to current medical/health status since last appt (Y/N):  NO  Does patient have any signs/symptoms of bleeding and/or thrombosis since  the last appt (Y/N):  NO  Does patient have any interval changes to diet or medications since last appt (Y/N):  NO  Are there any complications or cost restrictions with current therapy (Y/N):  NO     Does patient have [facility] PCP? YES, Dr Ganesh Mckeon (If not, please document discussion that patient must be seen at [facility])       Vitals:  declined today.    There were no vitals filed for this visit.     Asssessment:      INR remains therapeutic at 2.4, therefore decreasing patient's risk of stroke and/or bleeding complications.   Reason(s) for out of range INR today:  n/a      Plan:  Pt is to continue with current warfarin dosing regimen in order to maintain INR in therapeutic range.     Follow up:  Because warfarin is a high risk medication and current CHEST guidelines recommend regular monitoring intervals (few days up to 12 weeks), will have patient return to clinic in 2 weeks to recheck INR.    Brady Piña, PharmD, BCACP

## 2020-04-06 ENCOUNTER — ANTICOAGULATION VISIT (OUTPATIENT)
Dept: MEDICAL GROUP | Facility: PHYSICIAN GROUP | Age: 85
End: 2020-04-06
Payer: MEDICARE

## 2020-04-06 DIAGNOSIS — Z79.01 CHRONIC ANTICOAGULATION: Primary | ICD-10-CM

## 2020-04-06 DIAGNOSIS — I48.0 PAF (PAROXYSMAL ATRIAL FIBRILLATION) (HCC): ICD-10-CM

## 2020-04-06 LAB — INR PPP: 2.6 (ref 2–3.5)

## 2020-04-06 PROCEDURE — 85610 PROTHROMBIN TIME: CPT | Performed by: INTERNAL MEDICINE

## 2020-04-06 PROCEDURE — 93793 ANTICOAG MGMT PT WARFARIN: CPT | Performed by: INTERNAL MEDICINE

## 2020-04-06 NOTE — PROGRESS NOTES
Anticoagulation Summary  As of 2020    INR goal:   2.0-3.0   TTR:   79.1 % (1.8 y)   INR used for dosin.60 (2020)   Warfarin maintenance plan:   1.5 mg (3 mg x 0.5) every Mon; 3 mg (3 mg x 1) all other days   Weekly warfarin total:   19.5 mg   Plan last modified:   Brady Piña, PharmD (2020)   Next INR check:   2020   Target end date:   Indefinite    Indications    Chronic anticoagulation [Z79.01]  PAF (paroxysmal atrial fibrillation) ([facility]) [I48.0]             Anticoagulation Episode Summary     INR check location:       Preferred lab:       Send INR reminders to:       Comments:         Anticoagulation Care Providers     Provider Role Specialty Phone number    Ganesh Mckeon M.D. Referring Geriatrics [phone]    [facility] Responsible  [phone]        Anticoagulation Patient Findings  Patient Findings     Negatives:   Signs/symptoms of thrombosis, Signs/symptoms of bleeding, Laboratory test error suspected, Change in health, Change in alcohol use, Change in activity, Upcoming invasive procedure, Emergency department visit, Upcoming dental procedure, Missed doses, Extra doses, Change in medications, Change in diet/appetite, Hospital admission, Bruising, Other complaints        HPI:   Shereen Dale seen in clinic today, on anticoagulation therapy with warfarin for stroke prevention due to history of PAF.    Patient's previous INR was therapeutic at 2.4 on 3-23-20, at which time patient was instructed to continue with current warfarin regimen.  She returns to clinic today to recheck INR to ensure it is therapeutic and thus preventing possible clotting and/or bleeding/bruising complications.    CHADS-VASc = at least 5  (unadjusted ischemic stroke risk/year:  7.2%, which is moderate risk)    Does patient have any changes to current medical/health status since last appt (Y/N):  NO  Does patient have any signs/symptoms of bleeding and/or thrombosis since the  last appt (Y/N):  NO  Does patient have any interval changes to diet or medications since last appt (Y/N):  NO  Are there any complications or cost restrictions with current therapy (Y/N):  NO     Does patient have [facility] PCP? YES, Dr Ganesh Mckeon (If not, please document discussion that patient must be seen at [facility])       Vitals:  declined today.    There were no vitals filed for this visit.     Asssessment:      INR remains therapeutic at 2.6, therefore decreasing patient's risk of stroke and/or bleeding complications.   Reason(s) for out of range INR today:  n/a      Plan:  Pt is to continue with current warfarin dosing regimen in order to maintain INR in therapeutic range.     Follow up:  Because warfarin is a high risk medication and current CHEST guidelines recommend regular monitoring intervals (few days up to 12 weeks), will have patient return to clinic in 3 weeks to recheck INR.    Brady Piña, PharmD, BCACP

## 2020-04-07 ENCOUNTER — PATIENT OUTREACH (OUTPATIENT)
Dept: HEALTH INFORMATION MANAGEMENT | Facility: OTHER | Age: 85
End: 2020-04-07

## 2020-04-07 ENCOUNTER — TELEPHONE (OUTPATIENT)
Dept: HEALTH INFORMATION MANAGEMENT | Facility: OTHER | Age: 85
End: 2020-04-07

## 2020-04-07 NOTE — PROGRESS NOTES
Pt called stating that she has had abdominal pain for a few days now and has tried to treat it with over the counter anti acids but it is not getting better. She does not want to go into urgent care or ED. She stated she has tried calling her pcps MA but has not gotten a call back. She wanted to get connected to the office to find out if she should come in for an appointment or not. I connected her with [facility]  for virtual visit if needed.

## 2020-04-08 ENCOUNTER — OFFICE VISIT (OUTPATIENT)
Dept: MEDICAL GROUP | Facility: MEDICAL CENTER | Age: 85
End: 2020-04-08
Payer: MEDICARE

## 2020-04-08 DIAGNOSIS — R10.13 ABDOMINAL DISCOMFORT, EPIGASTRIC: ICD-10-CM

## 2020-04-08 PROCEDURE — 99442 PR PHYSICIAN TELEPHONE EVALUATION 11-20 MIN: CPT | Mod: CR | Performed by: FAMILY MEDICINE

## 2020-04-08 NOTE — PROGRESS NOTES
As a means of avoiding spread of COVID-19, this visit is being conducted by telephone. This telephone visit was initiated by the patient and they verbally consented.    Time at start of call: 11 AM     Reason for Call: Epigastric discomfort    Abdominal discomfort, epigastric  Patient has been having epigastric discomfort for a week, the pain is localized in the epigastric area (nonradiating), patient has history of GERD, so Pepcid has been helping the pain a little bit.  Denies any nausea or vomiting, denies any hematemesis.  Denies any constipation or diarrhea.  Denies blood in the stool.  Denies any cough or shortness of breath.  Denies any dizziness or palpitation.           Assessment and plan.     1. Abdominal discomfort, epigastric  Will check patient for H. pylori infection.  If negative, patient might need to be evaluated by an EGD  We will do an ultrasound of the abdomen(survey ultrasound).  For now patient advised to take omeprazole 20 mg twice a day.  If the pain persist or gets worse patient need to be seen in the clinic/or ER for more evaluation.    - H.PYLORI STOOL ANTIGEN; Future  - US-ABDOMEN COMPLETE SURVEY; Future             Time at end of call: 11:13 AM  Total Time Spent: 13 minutes    Ganesh Mckeon M.D.

## 2020-04-09 ENCOUNTER — HOSPITAL ENCOUNTER (OUTPATIENT)
Dept: LAB | Facility: MEDICAL CENTER | Age: 85
End: 2020-04-09
Attending: FAMILY MEDICINE
Payer: MEDICARE

## 2020-04-09 ENCOUNTER — HOSPITAL ENCOUNTER (OUTPATIENT)
Dept: RADIOLOGY | Facility: MEDICAL CENTER | Age: 85
End: 2020-04-09
Attending: FAMILY MEDICINE
Payer: MEDICARE

## 2020-04-09 DIAGNOSIS — R10.13 ABDOMINAL DISCOMFORT, EPIGASTRIC: ICD-10-CM

## 2020-04-09 PROCEDURE — 87338 HPYLORI STOOL AG IA: CPT

## 2020-04-09 PROCEDURE — 76700 US EXAM ABDOM COMPLETE: CPT

## 2020-04-10 LAB — H PYLORI AG STL QL IA: NOT DETECTED

## 2020-04-13 ENCOUNTER — TELEPHONE (OUTPATIENT)
Dept: MEDICAL GROUP | Facility: MEDICAL CENTER | Age: 85
End: 2020-04-13

## 2020-04-13 RX ORDER — FAMOTIDINE 20 MG/1
20 TABLET, FILM COATED ORAL DAILY
Qty: 100 TAB | Refills: 2 | Status: SHIPPED
Start: 2020-04-13 | End: 2020-10-07

## 2020-04-13 NOTE — TELEPHONE ENCOUNTER
Patient would like an order of the omeprazole sent into her pharmacy. She declines the GI doctor right now and if she changes her mind she has a GI doctor she can go see when covid is over.

## 2020-04-27 ENCOUNTER — ANTICOAGULATION VISIT (OUTPATIENT)
Dept: MEDICAL GROUP | Facility: PHYSICIAN GROUP | Age: 85
End: 2020-04-27
Payer: MEDICARE

## 2020-04-27 DIAGNOSIS — Z79.01 CHRONIC ANTICOAGULATION: Primary | ICD-10-CM

## 2020-04-27 DIAGNOSIS — I48.0 PAF (PAROXYSMAL ATRIAL FIBRILLATION) (HCC): ICD-10-CM

## 2020-04-27 LAB — INR PPP: 3.1 (ref 2–3.5)

## 2020-04-27 PROCEDURE — 99211 OFF/OP EST MAY X REQ PHY/QHP: CPT | Performed by: FAMILY MEDICINE

## 2020-04-27 PROCEDURE — 85610 PROTHROMBIN TIME: CPT | Performed by: FAMILY MEDICINE

## 2020-04-27 NOTE — PROGRESS NOTES
Anticoagulation Summary  As of 4/27/2020    INR goal:   2.0-3.0   TTR:   79.1 % (1.9 y)   INR used for dosing:   3.10! (4/27/2020)   Warfarin maintenance plan:   1.5 mg (3 mg x 0.5) every Mon; 3 mg (3 mg x 1) all other days   Weekly warfarin total:   19.5 mg   Plan last modified:   Brady Piña, PharmD (2/21/2020)   Next INR check:   5/4/2020   Target end date:   Indefinite    Indications    Chronic anticoagulation [Z79.01]  PAF (paroxysmal atrial fibrillation) ([facility]) [I48.0]             Anticoagulation Episode Summary     INR check location:       Preferred lab:       Send INR reminders to:       Comments:         Anticoagulation Care Providers     Provider Role Specialty Phone number    Ganesh Mckeon M.D. Referring Geriatrics [phone]    [facility] Responsible  [phone]        Anticoagulation Patient Findings  Patient Findings     Negatives:   Signs/symptoms of thrombosis, Signs/symptoms of bleeding, Laboratory test error suspected, Change in health, Change in alcohol use, Change in activity, Upcoming invasive procedure, Emergency department visit, Upcoming dental procedure, Missed doses, Extra doses, Change in medications, Change in diet/appetite, Hospital admission, Bruising, Other complaints        HPI:   Shereen Dale seen in clinic today, on anticoagulation therapy with warfarin for stroke prevention due to history of PAF.    Patient's previous INR was therapeutic at 2.6 on 4-6-20, at which time patient was instructed to continue with current warfarin regimen.  She returns to clinic today to recheck INR to ensure it is therapeutic and thus preventing possible clotting and/or bleeding/bruising complications.    CHADS-VASc = at least 5  (unadjusted ischemic stroke risk/year:  7.2%, which is moderate risk)    Does patient have any changes to current medical/health status since last appt (Y/N):  NO  Does patient have any signs/symptoms of bleeding and/or thrombosis since  the last appt (Y/N):  NO  Does patient have any interval changes to diet or medications since last appt (Y/N):  NO  Are there any complications or cost restrictions with current therapy (Y/N):  NO     Does patient have [facility] PCP? YES, Dr Ganesh Mckeon (If not, please document discussion that patient must be seen at [facility])       Vitals:  declined today, vehicle visit.    There were no vitals filed for this visit.     Asssessment:      INR supratherapeutic at 3.1, therefore increasing patient's risk of bleeding complications.   Reason(s) for out of range INR today: etiology unknown.      Plan:  Instructed patient to HOLD X 1, then resume current warfarin regimen in order to bring INR to therapeutic range.     Follow up:  Because warfarin is a high risk medication and current CHEST guidelines recommend regular monitoring intervals (few days up to 12 weeks), will have patient return to clinic in 1 weeks to recheck INR.    Brady Piña, PharmD, BCACP

## 2020-04-28 ENCOUNTER — TELEPHONE (OUTPATIENT)
Dept: MEDICAL GROUP | Facility: MEDICAL CENTER | Age: 85
End: 2020-04-28

## 2020-04-29 ENCOUNTER — TELEPHONE (OUTPATIENT)
Dept: MEDICAL GROUP | Facility: MEDICAL CENTER | Age: 85
End: 2020-04-29

## 2020-04-29 DIAGNOSIS — E11.9 TYPE 2 DIABETES MELLITUS WITHOUT COMPLICATION, WITHOUT LONG-TERM CURRENT USE OF INSULIN (HCC): ICD-10-CM

## 2020-05-04 ENCOUNTER — ANTICOAGULATION VISIT (OUTPATIENT)
Dept: MEDICAL GROUP | Facility: PHYSICIAN GROUP | Age: 85
End: 2020-05-04
Payer: MEDICARE

## 2020-05-04 DIAGNOSIS — Z79.01 CHRONIC ANTICOAGULATION: Primary | ICD-10-CM

## 2020-05-04 DIAGNOSIS — I48.0 PAF (PAROXYSMAL ATRIAL FIBRILLATION) (HCC): ICD-10-CM

## 2020-05-04 LAB — INR PPP: 2.8 (ref 2–3.5)

## 2020-05-04 PROCEDURE — 93793 ANTICOAG MGMT PT WARFARIN: CPT | Performed by: INTERNAL MEDICINE

## 2020-05-04 PROCEDURE — 85610 PROTHROMBIN TIME: CPT | Performed by: INTERNAL MEDICINE

## 2020-05-04 RX ORDER — LEVOTHYROXINE SODIUM 0.05 MG/1
50 TABLET ORAL EVERY MORNING
Qty: 90 TAB | Refills: 3 | Status: SHIPPED | OUTPATIENT
Start: 2020-05-04 | End: 2021-04-04 | Stop reason: SDUPTHER

## 2020-05-04 NOTE — PROGRESS NOTES
Anticoagulation Summary  As of 2020    INR goal:   2.0-3.0   TTR:   79.0 % (1.9 y)   INR used for dosin.80 (2020)   Warfarin maintenance plan:   1.5 mg (3 mg x 0.5) every Mon; 4.5 mg (3 mg x 1.5) every Fri; 3 mg (3 mg x 1) all other days   Weekly warfarin total:   21 mg   Plan last modified:   Bardy Piña, PharmD (2020)   Next INR check:   5/15/2020   Target end date:   Indefinite    Indications    Chronic anticoagulation [Z79.01]  PAF (paroxysmal atrial fibrillation) (HCC) [I48.0]             Anticoagulation Episode Summary     INR check location:       Preferred lab:       Send INR reminders to:       Comments:         Anticoagulation Care Providers     Provider Role Specialty Phone number    Ganesh Mckeon M.D. Referring Geriatrics [phone]    [facility] Responsible  [phone]        Anticoagulation Patient Findings  Patient Findings     Negatives:   Signs/symptoms of thrombosis, Signs/symptoms of bleeding, Laboratory test error suspected, Change in health, Change in alcohol use, Change in activity, Upcoming invasive procedure, Emergency department visit, Upcoming dental procedure, Missed doses, Extra doses, Change in medications, Change in diet/appetite, Hospital admission, Bruising, Other complaints        HPI:   Shereen Dale seen in clinic today, on anticoagulation therapy with warfarin for stroke prevention due to history of PAF.    Patient's previous INR was supratherapeutic at 3.1 on 20, at which time patient was instructed to hold one dose, then resume current warfarin regimen.  She returns to clinic today to recheck INR to ensure it is therapeutic and thus preventing possible clotting and/or bleeding/bruising complications.    CHADS-VASc = at least 5  (unadjusted ischemic stroke risk/year:  7.2%, which is moderate risk)    Does patient have any changes to current medical/health status since last appt (Y/N):  NO  Does patient have any  signs/symptoms of bleeding and/or thrombosis since the last appt (Y/N):  NO  Does patient have any interval changes to diet or medications since last appt (Y/N):  NO  Are there any complications or cost restrictions with current therapy (Y/N):  NO     Does patient have [facility] PCP? YES, Dr Ganesh Mckeon (If not, please document discussion that patient must be seen at [facility])       Vitals:  declined today, vehicle visit.    There were no vitals filed for this visit.     Asssessment:      INR therapeutic at 2.8, therefore decreasing patient's stroke and/or bleeding risk.   Reason(s) for out of range INR today:  n/a      Plan:  Instructed patient to decrease weekly warfarin regimen as detailed above in order to bring INR to therapeutic range.     Follow up:  Because warfarin is a high risk medication and current CHEST guidelines recommend regular monitoring intervals (few days up to 12 weeks), will have patient return to clinic in 1.5 weeks to recheck INR.    Brady Piña, PharmD, BCACP

## 2020-05-14 ENCOUNTER — OFFICE VISIT (OUTPATIENT)
Dept: MEDICAL GROUP | Facility: MEDICAL CENTER | Age: 85
End: 2020-05-14
Payer: MEDICARE

## 2020-05-14 VITALS
BODY MASS INDEX: 28.32 KG/M2 | RESPIRATION RATE: 16 BRPM | DIASTOLIC BLOOD PRESSURE: 80 MMHG | WEIGHT: 150 LBS | OXYGEN SATURATION: 96 % | HEIGHT: 61 IN | SYSTOLIC BLOOD PRESSURE: 110 MMHG | TEMPERATURE: 97.8 F | HEART RATE: 97 BPM

## 2020-05-14 DIAGNOSIS — I35.0 SEVERE AORTIC STENOSIS: ICD-10-CM

## 2020-05-14 DIAGNOSIS — I10 ESSENTIAL HYPERTENSION: ICD-10-CM

## 2020-05-14 DIAGNOSIS — E03.9 ACQUIRED HYPOTHYROIDISM: ICD-10-CM

## 2020-05-14 DIAGNOSIS — E66.9 DIABETES MELLITUS TYPE 2 IN OBESE: ICD-10-CM

## 2020-05-14 DIAGNOSIS — E11.69 DIABETES MELLITUS TYPE 2 IN OBESE: ICD-10-CM

## 2020-05-14 DIAGNOSIS — I48.0 PAF (PAROXYSMAL ATRIAL FIBRILLATION) (HCC): ICD-10-CM

## 2020-05-14 DIAGNOSIS — E11.9 TYPE 2 DIABETES MELLITUS WITHOUT COMPLICATION, WITHOUT LONG-TERM CURRENT USE OF INSULIN (HCC): ICD-10-CM

## 2020-05-14 LAB
HBA1C MFR BLD: 6.5 % (ref 0–5.6)
INT CON NEG: NEGATIVE
INT CON POS: POSITIVE

## 2020-05-14 PROCEDURE — 83036 HEMOGLOBIN GLYCOSYLATED A1C: CPT | Performed by: FAMILY MEDICINE

## 2020-05-14 PROCEDURE — 99214 OFFICE O/P EST MOD 30 MIN: CPT | Performed by: FAMILY MEDICINE

## 2020-05-14 ASSESSMENT — FIBROSIS 4 INDEX: FIB4 SCORE: 1.56

## 2020-05-14 NOTE — PROGRESS NOTES
CC: Diabetes, hypertension, hypothyroidism, aortic stenosis, A. fib    HPI:   Shereen presents today to discuss the following medical issues:    Diabetes mellitus type 2 in obese (HCC)  Has been adequately controlled.  Her last A1c was 6.3.Has been on metformin 500 mg twice a day.No side effects.     Essential hypertension  Has been adequately controlled on current medication. Denies headache, chest pain, and SOB.patient is currently on telmisartan 40 mg daily, amlodipine 5 mg daily, and carvedilol 25 mg twice a day.  No side effects    Acquired hypothyroidism  She has been tolerating Levothyroxine, no palpitation, no cold or heat intolerance, has been on levothyroxine 50 mcg daily.  Last TSH was normal    Severe aortic stenosis  Patient has severe aortic stenosis.  Mostly asymptomatic, denies any chest pain, dizziness, or syncopal episodes, however sometimes she feels short of breath with exertion.  Declined any kind of surgical intervention.    PAF (paroxysmal atrial fibrillation) ([facility])  Denies palpitation, chest pain, shortness of breath.  Patient has beenon carvedilol 25 mg twice a day, and Coumadin.  Has been following up with Coumadin clinic.  Patient develops superficial hematoma and her left thigh, that is now getting better, denies any pain on the area.  Last INR was 2.8.  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) 05/02/2012     Priority: High   • Acute, but ill-defined, cerebrovascular disease 04/30/2012     Priority: High   • Cough 05/02/2012     Priority: Low   • Edema 05/02/2012     Priority: Low   • Acquired hypothyroidism 09/12/2019   • Diabetes mellitus type 2 in obese (HCC) 09/12/2019   • Skin abrasion 08/20/2019   • Chronic anticoagulation 08/02/2019   • Hyponatremia 03/25/2019   • Advanced directives,  counseling/discussion 09/26/2018   • Hypertensive urgency 05/11/2017   • Diastolic dysfunction 02/02/2016   • Tear of lateral cartilage or meniscus of knee, current 05/21/2015   • PAF (paroxysmal atrial fibrillation) ([facility]) 01/12/2015   • Dyspnea on effort 01/02/2014   • Diabetes ([facility]) 12/26/2013   • Aortic stenosis 07/02/2013       Current Outpatient Medications   Medication Sig Dispense Refill   • levothyroxine (SYNTHROID) 50 MCG Tab Take 1 Tab by mouth every morning. 90 Tab 3   • Blood Glucose Monitoring Suppl Device Meter: Dispense free style meter. Sig. Use 2 times daily as directed for blood sugar monitoring. dx e11.9 1 Device 0   • famotidine (PEPCID) 20 MG Tab Take 1 Tab by mouth every day. 100 Tab 2   • carvedilol (COREG) 25 MG Tab TAKE ONE TABLET BY MOUTH TWICE A  Tab 2   • famotidine (PEPCID) 20 MG Tab TAKE ONE TABLET BY MOUTH TWICE A DAY 60 Tab 2   • cloNIDine (CATAPRES) 0.1 MG Tab Take 1 Tab by mouth 2 times a day as needed. If SBP > 160 60 Tab 2   • glipiZIDE SR (GLUCOTROL) 2.5 MG TABLET SR 24 HR Take 1 Tab by mouth every day. 100 Tab 3   • telmisartan (MICARDIS) 40 MG Tab TAKE ONE TABLET BY MOUTH TWICE A  Tab 3   • fluconazole (DIFLUCAN) 100 MG Tab Take 1 Tab by mouth every day. 1 Tab 0   • amLODIPine (NORVASC) 5 MG Tab Take 1 Tab by mouth every morning AND 0.5 Tabs every evening. Take 5 mg in the AM and 2.5 mg in the  Tab 2   • metFORMIN (GLUCOPHAGE) 500 MG Tab Take 1 Tab by mouth 3 times a day. 300 Tab 3   • famotidine (PEPCID) 20 MG Tab TAKE ONE TABLET BY MOUTH TWICE A DAY 60 Tab 3   • warfarin (COUMADIN) 3 MG Tab Take 1 Tab by mouth every day. 90 Tab 3   • acetaminophen (TYLENOL) 325 MG Tab Take 2 Tabs by mouth every 6 hours as needed (Mild Pain; (Pain scale 1-3); Temp greater than 100.5 F). 30 Tab 0   • gabapentin (NEURONTIN) 100 MG Cap Take 1 Cap by mouth every day. 90 Cap 0   • gabapentin (NEURONTIN) 100 MG Cap Take 2 Caps by mouth every evening. 90 Cap 0   •  "dorzolamide-timolol (COSOPT) 22.3-6.8 MG/ML Solution Place 1 Drop in both eyes 2 times a day.     • Glucosamine HCl (GLUCOSAMINE PO) Take 1 Tab by mouth every morning. Pt unsure of dose     • Polyethyl Glycol-Propyl Glycol (SYSTANE OP) 1-2 Drops by Ophthalmic route 4 times a day as needed.     • docosahexanoic acid (OMEGA 3 FA) 1000 MG CAPS Take 1 Cap by mouth every morning.     • VITAMIN D PO Take 2,000 Units by mouth every morning.     • CALCIUM 500 PO Take 1 Tab by mouth every morning.       No current facility-administered medications for this visit.          Allergies as of 05/14/2020 - Reviewed 05/14/2020   Allergen Reaction Noted   • Darvon [propoxyphene hcl]  03/18/2008   • Iodine  05/11/2015   • Latex  09/19/2013   • Penicillins Anaphylaxis 08/02/2008   • Propoxyphene hcl Anaphylaxis 08/02/2008   • Tetanus antitoxin Myalgia 11/19/2017   • Tetracycline Anaphylaxis 08/02/2008        ROS: Denies any chest pain, Shortness of breath, Changes bowel or bladder, Lower extremity edema.    Physical Exam:  /80 (BP Location: Right arm, Patient Position: Sitting, BP Cuff Size: Adult)   Pulse 97   Temp 36.6 °C (97.8 °F) (Temporal)   Resp 16   Ht 1.549 m (5' 1\")   Wt 68 kg (150 lb)   LMP 01/01/1985   SpO2 96%   BMI 28.34 kg/m²   Gen.: Well-developed, well-nourished, no apparent distress,pleasant and cooperative with the examination  Skin:  Warm and dry with good turgor.  Superficial hematoma hematoma on the right thigh 10 cm x10 cm, no tenderness  HEENT:Sinuses nontender with palpation, TMs clear, nares patent with pink mucosa and clear rhinorrhea,no septal deviation ,polyps or lesions. lips without lesions, oropharynx clear.  Neck: Trachea midline,no masses or adenopathy. No JVD.  Cor: Regular rate and rhythm without murmur, gallop or rub.  Lungs: Respirations unlabored.Clear to auscultation with equal breath sounds bilaterally. No wheezes, rhonchi.  Extremities: No cyanosis, clubbing or " edema.        Assessment and Plan.   84 y.o. female     1. Type 2 diabetes mellitus without complication, without long-term current use of insulin (HCC)  Controlled.  A1c today 6.3.  Continue on metformin 500 mg twice a day.    - POCT  A1C    2. Essential hypertension  Controlled  Continue on telmisartan 40 mg daily, amlodipine 5 mg daily, and carvedilol 25 mg twice a day.      3. Acquired hypothyroidism  He has been tolerating Levothyroxine, no palpitation, no cold or heat intolerance  Continue levothyroxine 50 mcg daily    4. Severe aortic stenosis  Stable.  Asymptomatic   Declined any kind of surgical intervention..      5. PAF (paroxysmal atrial fibrillation) ([facility])  Stable.  No RVR.  Continue on Coumadin and carvedilol.

## 2020-05-15 ENCOUNTER — ANTICOAGULATION VISIT (OUTPATIENT)
Dept: MEDICAL GROUP | Facility: PHYSICIAN GROUP | Age: 85
End: 2020-05-15
Payer: MEDICARE

## 2020-05-15 DIAGNOSIS — Z79.01 CHRONIC ANTICOAGULATION: Primary | ICD-10-CM

## 2020-05-15 DIAGNOSIS — I48.0 PAF (PAROXYSMAL ATRIAL FIBRILLATION) (HCC): ICD-10-CM

## 2020-05-15 LAB — INR PPP: 2.7 (ref 2–3.5)

## 2020-05-15 PROCEDURE — 85610 PROTHROMBIN TIME: CPT | Performed by: FAMILY MEDICINE

## 2020-05-15 PROCEDURE — 93793 ANTICOAG MGMT PT WARFARIN: CPT | Performed by: FAMILY MEDICINE

## 2020-05-15 NOTE — PROGRESS NOTES
Anticoagulation Summary  As of 5/15/2020    INR goal:   2.0-3.0   TTR:   79.3 % (1.9 y)   INR used for dosin.70 (5/15/2020)   Warfarin maintenance plan:   1.5 mg (3 mg x 0.5) every Mon, Fri; 3 mg (3 mg x 1) all other days   Weekly warfarin total:   18 mg   Plan last modified:   Brady Piña, PharmD (5/15/2020)   Next INR check:   2020   Target end date:   Indefinite    Indications    Chronic anticoagulation [Z79.01]  PAF (paroxysmal atrial fibrillation) (HCC) [I48.0]             Anticoagulation Episode Summary     INR check location:       Preferred lab:       Send INR reminders to:       Comments:         Anticoagulation Care Providers     Provider Role Specialty Phone number    Ganesh Mckeon M.D. Referring Geriatrics [phone]    [facility] Responsible  [phone]        Anticoagulation Patient Findings  Patient Findings     Negatives:   Signs/symptoms of thrombosis, Signs/symptoms of bleeding, Laboratory test error suspected, Change in health, Change in alcohol use, Change in activity, Upcoming invasive procedure, Emergency department visit, Upcoming dental procedure, Missed doses, Extra doses, Change in medications, Change in diet/appetite, Hospital admission, Bruising, Other complaints        HPI:   Shereen Dale seen in clinic today, on anticoagulation therapy with warfarin for stroke prevention due to history of atrial fibrillation.    Patient's previous INR was therapeutic at 2.8 on 20, at which time patient was instructed to continue with current warfarin regimen.  She returns to clinic today to recheck INR to ensure it is therapeutic and thus preventing possible clotting and/or bleeding/bruising complications.    CHADS-VASc = at least 5  (unadjusted ischemic stroke risk/year:  7.2%, which is moderate risk)    Does patient have any changes to current medical/health status since last appt (Y/N):  NO  Does patient have any signs/symptoms of bleeding and/or  thrombosis since the last appt (Y/N):  NO  Does patient have any interval changes to diet or medications since last appt (Y/N):  NO  Are there any complications or cost restrictions with current therapy (Y/N):  NO     Does patient have [facility] PCP? YES, Dr Ganesh Mckeon (If not, please document discussion that patient must be seen at [facility])       Vitals:  declined today, vehicle visit.    There were no vitals filed for this visit.     Asssessment:      INR remains therapeutic at 2.7, therefore decreasing patient's stroke and/or bleeding risk.   Reason(s) for out of range INR today:  n/a      Plan:  Pt is to continue with current warfarin dosing regimen in order to maintain INR in therapeutic range.     Follow up:  Because warfarin is a high risk medication and current CHEST guidelines recommend regular monitoring intervals (few days up to 12 weeks), will have patient return to clinic in 2 weeks to recheck INR.    Brady Piña, PharmD, BCACP

## 2020-05-29 ENCOUNTER — ANTICOAGULATION VISIT (OUTPATIENT)
Dept: MEDICAL GROUP | Facility: PHYSICIAN GROUP | Age: 85
End: 2020-05-29
Payer: MEDICARE

## 2020-05-29 DIAGNOSIS — Z79.01 CHRONIC ANTICOAGULATION: Primary | ICD-10-CM

## 2020-05-29 DIAGNOSIS — I48.0 PAF (PAROXYSMAL ATRIAL FIBRILLATION) (HCC): ICD-10-CM

## 2020-05-29 LAB — INR PPP: 3.2 (ref 2–3.5)

## 2020-05-29 PROCEDURE — 85610 PROTHROMBIN TIME: CPT | Performed by: FAMILY MEDICINE

## 2020-05-29 PROCEDURE — 99211 OFF/OP EST MAY X REQ PHY/QHP: CPT | Performed by: FAMILY MEDICINE

## 2020-05-29 NOTE — PROGRESS NOTES
Anticoagulation Summary  As of 5/29/2020    INR goal:   2.0-3.0   TTR:   78.9 % (2 y)   INR used for dosing:   3.20! (5/29/2020)   Warfarin maintenance plan:   1.5 mg (3 mg x 0.5) every Mon, Wed, Fri; 3 mg (3 mg x 1) all other days   Weekly warfarin total:   16.5 mg   Plan last modified:   Brady Piña, PharmD (5/29/2020)   Next INR check:   6/12/2020   Target end date:   Indefinite    Indications    Chronic anticoagulation [Z79.01]  PAF (paroxysmal atrial fibrillation) ([facility]) [I48.0]             Anticoagulation Episode Summary     INR check location:       Preferred lab:       Send INR reminders to:       Comments:         Anticoagulation Care Providers     Provider Role Specialty Phone number    Ganesh Mckeon M.D. Referring Geriatrics [phone]    [facility] Responsible  [phone]        Anticoagulation Patient Findings  Patient Findings     Negatives:   Signs/symptoms of thrombosis, Signs/symptoms of bleeding, Laboratory test error suspected, Change in health, Change in alcohol use, Change in activity, Upcoming invasive procedure, Emergency department visit, Upcoming dental procedure, Missed doses, Extra doses, Change in medications, Change in diet/appetite, Hospital admission, Bruising, Other complaints        HPI:   Shereen Dale seen in clinic today, on anticoagulation therapy with warfarin for stroke prevention due to history of PAF.    Patient's previous INR was therapeutic at 2.7 on 5-15-20, at which time patient was instructed to continue with current warfarin regimen.  She returns to clinic today to recheck INR to ensure it is therapeutic and thus preventing possible clotting and/or bleeding/bruising complications.    CHADS-VASc = at least 5  (unadjusted ischemic stroke risk/year:  7.2%, which is moderate risk)    Does patient have any changes to current medical/health status since last appt (Y/N):  NO  Does patient have any signs/symptoms of bleeding and/or  thrombosis since the last appt (Y/N):  NO  Does patient have any interval changes to diet or medications since last appt (Y/N):  NO  Are there any complications or cost restrictions with current therapy (Y/N):  NO     Does patient have [facility] PCP? YES, Dr Ganesh Mckeon (If not, please document discussion that patient must be seen at [facility])       Vitals:  declined today, vehicle visit.    There were no vitals filed for this visit.     Asssessment:      INR supratherapeutic at 3.2, therefore increasing patient's bleeding risk.   Reason(s) for out of range INR today:  Dose too high?      Plan:  Instructed patient to decrease weekly warfarin regimen as detailed above in order to bring INR to therapeutic range.     Follow up:  Because warfarin is a high risk medication and current CHEST guidelines recommend regular monitoring intervals (few days up to 12 weeks), will have patient return to clinic in 2 weeks to recheck INR.    Brady Piña, PharmD, BCACP

## 2020-06-01 RX ORDER — WARFARIN SODIUM 3 MG/1
TABLET ORAL
Qty: 100 TAB | Refills: 2 | Status: SHIPPED | OUTPATIENT
Start: 2020-06-01 | End: 2020-06-26

## 2020-06-12 ENCOUNTER — ANTICOAGULATION VISIT (OUTPATIENT)
Dept: MEDICAL GROUP | Facility: PHYSICIAN GROUP | Age: 85
End: 2020-06-12
Payer: MEDICARE

## 2020-06-12 DIAGNOSIS — I48.0 PAF (PAROXYSMAL ATRIAL FIBRILLATION) (HCC): ICD-10-CM

## 2020-06-12 DIAGNOSIS — Z79.01 CHRONIC ANTICOAGULATION: Primary | ICD-10-CM

## 2020-06-12 LAB — INR PPP: 2.3 (ref 2–3.5)

## 2020-06-12 PROCEDURE — 93793 ANTICOAG MGMT PT WARFARIN: CPT | Performed by: FAMILY MEDICINE

## 2020-06-12 PROCEDURE — 85610 PROTHROMBIN TIME: CPT | Performed by: FAMILY MEDICINE

## 2020-06-12 NOTE — PROGRESS NOTES
Anticoagulation Summary  As of 2020    INR goal:   2.0-3.0   TTR:   78.9 % (2 y)   INR used for dosin.30 (2020)   Warfarin maintenance plan:   1.5 mg (3 mg x 0.5) every Mon, Wed, Fri; 3 mg (3 mg x 1) all other days   Weekly warfarin total:   16.5 mg   Plan last modified:   Brady Piña, PharmD (2020)   Next INR check:   2020   Target end date:   Indefinite    Indications    Chronic anticoagulation [Z79.01]  PAF (paroxysmal atrial fibrillation) ([facility]) [I48.0]             Anticoagulation Episode Summary     INR check location:       Preferred lab:       Send INR reminders to:       Comments:         Anticoagulation Care Providers     Provider Role Specialty Phone number    Ganesh Mckeon M.D. Referring Geriatrics [phone]    [facility] Responsible  [phone]        Anticoagulation Patient Findings  Patient Findings     Negatives:   Signs/symptoms of thrombosis, Signs/symptoms of bleeding, Laboratory test error suspected, Change in health, Change in alcohol use, Change in activity, Upcoming invasive procedure, Emergency department visit, Upcoming dental procedure, Missed doses, Extra doses, Change in medications, Change in diet/appetite, Hospital admission, Bruising, Other complaints         HPI:   Shereen Dale seen in clinic today, on anticoagulation therapy with warfarin for stroke prevention due to history of PAF.    Patient's previous INR was supratherapeutic at 3.2 on 20, at which time patient was instructed to decrease weekly warfarin regimen.  She returns to clinic today to recheck INR to ensure it is therapeutic and thus preventing possible clotting and/or bleeding/bruising complications.    CHADS-VASc = at least 5  (unadjusted ischemic stroke risk/year:  7.2%, which is moderate risk)    Does patient have any changes to current medical/health status since last appt (Y/N):  NO  Does patient have any signs/symptoms of bleeding and/or thrombosis  since the last appt (Y/N):  NO  Does patient have any interval changes to diet or medications since last appt (Y/N):  NO  Are there any complications or cost restrictions with current therapy (Y/N):  NO     Does patient have [facility] PCP? YES, Dr Ganesh Mckeon (If not, please document discussion that patient must be seen at [facility])       Vitals:  declined today, vehicle visit.    There were no vitals filed for this visit.     Asssessment:      INR therapeutic at 2.3, therefore decreasing patient's stroke and/or bleeding risk.   Reason(s) for out of range INR today:  n/a    Plan:  Pt is to continue with current warfarin dosing regimen in order to maintain INR in therapeutic range.     Follow up:  Because warfarin is a high risk medication and current CHEST guidelines recommend regular monitoring intervals (few days up to 12 weeks), will have patient return to clinic in 2 weeks to recheck INR.    Brady Piña, PharmD, BCACP

## 2020-06-26 ENCOUNTER — ANTICOAGULATION VISIT (OUTPATIENT)
Dept: MEDICAL GROUP | Facility: PHYSICIAN GROUP | Age: 85
End: 2020-06-26
Payer: MEDICARE

## 2020-06-26 DIAGNOSIS — I48.0 PAF (PAROXYSMAL ATRIAL FIBRILLATION) (HCC): ICD-10-CM

## 2020-06-26 DIAGNOSIS — Z79.01 CHRONIC ANTICOAGULATION: Primary | ICD-10-CM

## 2020-06-26 LAB — INR PPP: 3 (ref 2–3.5)

## 2020-06-26 PROCEDURE — 93793 ANTICOAG MGMT PT WARFARIN: CPT | Performed by: FAMILY MEDICINE

## 2020-06-26 PROCEDURE — 85610 PROTHROMBIN TIME: CPT | Performed by: FAMILY MEDICINE

## 2020-06-26 RX ORDER — WARFARIN SODIUM 3 MG/1
TABLET ORAL
Qty: 100 TAB | Refills: 2 | Status: SHIPPED | OUTPATIENT
Start: 2020-06-26 | End: 2021-03-22

## 2020-06-26 NOTE — PROGRESS NOTES
Anticoagulation Summary  As of 6/26/2020    INR goal:   2.0-3.0   TTR:   79.3 % (2.1 y)   INR used for dosing:   3.00 (6/26/2020)   Warfarin maintenance plan:   1.5 mg (3 mg x 0.5) every Mon, Wed, Fri; 3 mg (3 mg x 1) all other days   Weekly warfarin total:   16.5 mg   Plan last modified:   Brady Piña, PharmD (5/29/2020)   Next INR check:   7/10/2020   Target end date:   Indefinite    Indications    Chronic anticoagulation [Z79.01]  PAF (paroxysmal atrial fibrillation) (HCC) [I48.0]             Anticoagulation Episode Summary     INR check location:       Preferred lab:       Send INR reminders to:       Comments:         Anticoagulation Care Providers     Provider Role Specialty Phone number    Ganesh Mckeon M.D. Referring Geriatrics [phone]    [facility] Responsible  [phone]        Anticoagulation Patient Findings  Patient Findings     Negatives:   Signs/symptoms of thrombosis, Signs/symptoms of bleeding, Laboratory test error suspected, Change in health, Change in alcohol use, Change in activity, Upcoming invasive procedure, Emergency department visit, Upcoming dental procedure, Missed doses, Extra doses, Change in medications, Change in diet/appetite, Hospital admission, Bruising, Other complaints         HPI:   Shereen Dale seen in clinic today, on anticoagulation therapy with warfarin for stroke prevention due to history of atrial fibrillation.    Patient's previous INR was therapeutic at 2.3 on 6-12-20, at which time patient was instructed to continue with current warfarin regimen.  She returns to clinic today to recheck INR to ensure it is therapeutic and thus preventing possible clotting and/or bleeding/bruising complications.    CHADS-VASc = at least 5  (unadjusted ischemic stroke risk/year:  7.2%, which is moderate risk)    Does patient have any changes to current medical/health status since last appt (Y/N):  NO  Does patient have any signs/symptoms of  bleeding and/or thrombosis since the last appt (Y/N):  NO  Does patient have any interval changes to diet or medications since last appt (Y/N):  NO  Are there any complications or cost restrictions with current therapy (Y/N):  NO     Does patient have [facility] PCP? YES, Dr Ganesh Mckeon (If not, please document discussion that patient must be seen at [facility])       Vitals:  declined today, vehicle visit.    There were no vitals filed for this visit.     Asssessment:      INR remains therapeutic at 3.0, therefore decreasing patient's stroke and/or bleeding risk.   Reason(s) for out of range INR today:  n/a      Plan:  Pt is to continue with current warfarin dosing regimen in order to maintain INR in therapeutic range.     Follow up:  Because warfarin is a high risk medication and current CHEST guidelines recommend regular monitoring intervals (few days up to 12 weeks), will have patient return to clinic in 2 weeks to recheck INR.    Brady Piña, PharmD, BCACP

## 2020-07-10 ENCOUNTER — ANTICOAGULATION VISIT (OUTPATIENT)
Dept: MEDICAL GROUP | Facility: PHYSICIAN GROUP | Age: 85
End: 2020-07-10
Payer: MEDICARE

## 2020-07-10 DIAGNOSIS — Z79.01 CHRONIC ANTICOAGULATION: Primary | ICD-10-CM

## 2020-07-10 DIAGNOSIS — I48.0 PAF (PAROXYSMAL ATRIAL FIBRILLATION) (HCC): ICD-10-CM

## 2020-07-10 LAB — INR PPP: 2.4 (ref 2–3.5)

## 2020-07-10 PROCEDURE — 85610 PROTHROMBIN TIME: CPT | Performed by: INTERNAL MEDICINE

## 2020-07-10 PROCEDURE — 93793 ANTICOAG MGMT PT WARFARIN: CPT | Performed by: INTERNAL MEDICINE

## 2020-07-10 NOTE — PROGRESS NOTES
Anticoagulation Summary  As of 7/10/2020    INR goal:   2.0-3.0   TTR:   79.7 % (2.1 y)   INR used for dosin.40 (7/10/2020)   Warfarin maintenance plan:   1.5 mg (3 mg x 0.5) every Mon, Wed, Fri; 3 mg (3 mg x 1) all other days   Weekly warfarin total:   16.5 mg   Plan last modified:   Brady Piña, PharmD (2020)   Next INR check:   2020   Target end date:   Indefinite    Indications    Chronic anticoagulation [Z79.01]  PAF (paroxysmal atrial fibrillation) (HCC) [I48.0]             Anticoagulation Episode Summary     INR check location:       Preferred lab:       Send INR reminders to:       Comments:         Anticoagulation Care Providers     Provider Role Specialty Phone number    Ganesh Mckeon M.D. Referring Geriatrics [phone]    [facility] Responsible  [phone]        Anticoagulation Patient Findings  Patient Findings     Negatives:   Signs/symptoms of thrombosis, Signs/symptoms of bleeding, Laboratory test error suspected, Change in health, Change in alcohol use, Change in activity, Upcoming invasive procedure, Emergency department visit, Upcoming dental procedure, Missed doses, Extra doses, Change in medications, Change in diet/appetite, Hospital admission, Bruising, Other complaints              HPI:   Shereen Wattsa seen in clinic today, on anticoagulation therapy with warfarin for prevention of stroke due to history of afib    Patient's previous INR was  therapeutic at 3.0 on , at which time patient was instructed to Pt is to continue with current warfarin dosing regimen.  .  She returns to clinic today to recheck INR to ensure it is therapeutic and thus preventing possible clotting and/or bleeding/bruising complications.    CHADS-VASc = 5  (unadjusted ischemic stroke risk/year:  7.2%, which is high risk)    Does patient have any changes to current medical/health status since last appt (Y/N):  no  Does patient have any signs/symptoms of bleeding  and/or thrombosis since the last appt (Y/N):  no  Does patient have any interval changes to diet or medications since last appt (Y/N):  no  Are there any complications or cost restrictions with current therapy (Y/N):  no     Does patient have [facility] PCP? Yes, Ganesh Burton (If not, please document discussion that patient must be seen at [facility])       Vitals:  patient declined, car visit    There were no vitals filed for this visit.     Asssessment:      INR  therapeutic at 2.4, therefore decreased risk of stroke and bleeding   Reason(s) for out of range INR today:  na      Plan:  Pt is to continue with current warfarin dosing regimen.       Follow up:  Because warfarin is a high risk medication and current CHEST guidelines recommend regular monitoring intervals (few days up to 12 weeks), will have patient return to clinic in 2 weeks to recheck INR.    Aubree Gonzalez PharmD candidate  Brady Piña, PharmD, BCACP

## 2020-07-24 ENCOUNTER — ANTICOAGULATION VISIT (OUTPATIENT)
Dept: MEDICAL GROUP | Facility: PHYSICIAN GROUP | Age: 85
End: 2020-07-24
Payer: MEDICARE

## 2020-07-24 DIAGNOSIS — I48.0 PAF (PAROXYSMAL ATRIAL FIBRILLATION) (HCC): ICD-10-CM

## 2020-07-24 DIAGNOSIS — Z79.01 CHRONIC ANTICOAGULATION: Primary | ICD-10-CM

## 2020-07-24 LAB — INR PPP: 2 (ref 2–3.5)

## 2020-07-24 PROCEDURE — 93793 ANTICOAG MGMT PT WARFARIN: CPT | Performed by: INTERNAL MEDICINE

## 2020-07-24 PROCEDURE — 85610 PROTHROMBIN TIME: CPT | Performed by: INTERNAL MEDICINE

## 2020-07-24 NOTE — PROGRESS NOTES
Anticoagulation Summary  As of 2020    INR goal:   2.0-3.0   TTR:   80.1 % (2.1 y)   INR used for dosin.00 (2020)   Warfarin maintenance plan:   1.5 mg (3 mg x 0.5) every Mon, Wed, Fri; 3 mg (3 mg x 1) all other days   Weekly warfarin total:   16.5 mg   Plan last modified:   Brady Piña, PharmD (2020)   Next INR check:   2020   Target end date:   Indefinite    Indications    Chronic anticoagulation [Z79.01]  PAF (paroxysmal atrial fibrillation) (HCC) [I48.0]             Anticoagulation Episode Summary     INR check location:       Preferred lab:       Send INR reminders to:       Comments:         Anticoagulation Care Providers     Provider Role Specialty Phone number    Ganesh Mckeon M.D. Referring Geriatrics [phone]    [facility] Responsible  [phone]        Anticoagulation Patient Findings  Patient Findings     Negatives:   Signs/symptoms of thrombosis, Signs/symptoms of bleeding, Laboratory test error suspected, Change in health, Change in alcohol use, Change in activity, Upcoming invasive procedure, Emergency department visit, Upcoming dental procedure, Missed doses, Extra doses, Change in medications, Change in diet/appetite, Hospital admission, Bruising, Other complaints        HPI:   Shereen Dale seen in clinic today, on anticoagulation therapy with warfarin for stroke prevention due to history of atrial fibrillation.    Patient's previous INR was therapeutic at 2.4 on 7-10-20, at which time patient was instructed to continue with current warfarin regimen.  She returns to clinic today to recheck INR to ensure it is therapeutic and thus preventing possible clotting and/or bleeding/bruising complications.    CHADS-VASc = at least 5  (unadjusted ischemic stroke risk/year:  7.2%, which is moderate risk)    Does patient have any changes to current medical/health status since last appt (Y/N):  NO  Does patient have any signs/symptoms of bleeding  and/or thrombosis since the last appt (Y/N):  NO  Does patient have any interval changes to diet or medications since last appt (Y/N):  NO  Are there any complications or cost restrictions with current therapy (Y/N):  NO     Does patient have [facility] PCP? Yes, Dr Ganesh Mckeon (If not, please document discussion that patient must be seen at [facility])       Vitals:  declined today, vehicle visit.    There were no vitals filed for this visit.     Asssessment:      INR remains therapeutic at 2.0, therefore decreasing patient's stroke and/or bleeding risk.   Reason(s) for out of range INR today:  n/a      Plan:  Pt is to continue with current warfarin dosing regimen in order to maintain INR in therapeutic range.  Discussed having patient eat a little less greens between now and next appt as well.     Follow up:  Because warfarin is a high risk medication and current CHEST guidelines recommend regular monitoring intervals (few days up to 12 weeks), will have patient return to clinic in 2 weeks to recheck INR.    Brady Piña, PharmD, BCACP

## 2020-07-28 DIAGNOSIS — I10 ESSENTIAL HYPERTENSION: ICD-10-CM

## 2020-07-28 RX ORDER — AMLODIPINE BESYLATE 5 MG/1
TABLET ORAL
Qty: 100 TAB | Refills: 3 | Status: SHIPPED | OUTPATIENT
Start: 2020-07-28 | End: 2020-12-01 | Stop reason: SDUPTHER

## 2020-08-07 ENCOUNTER — ANTICOAGULATION VISIT (OUTPATIENT)
Dept: MEDICAL GROUP | Facility: PHYSICIAN GROUP | Age: 85
End: 2020-08-07
Payer: MEDICARE

## 2020-08-07 DIAGNOSIS — I48.0 PAF (PAROXYSMAL ATRIAL FIBRILLATION) (HCC): ICD-10-CM

## 2020-08-07 DIAGNOSIS — Z79.01 CHRONIC ANTICOAGULATION: Primary | ICD-10-CM

## 2020-08-07 LAB — INR PPP: 1.9 (ref 2–3.5)

## 2020-08-07 PROCEDURE — 99211 OFF/OP EST MAY X REQ PHY/QHP: CPT | Performed by: FAMILY MEDICINE

## 2020-08-07 PROCEDURE — 85610 PROTHROMBIN TIME: CPT | Performed by: FAMILY MEDICINE

## 2020-08-07 NOTE — PROGRESS NOTES
Anticoagulation Summary  As of 2020    INR goal:  2.0-3.0   TTR:  78.6 % (2.2 y)   INR used for dosin.90 (2020)   Warfarin maintenance plan:  1.5 mg (3 mg x 0.5) every Mon, Wed, Fri; 3 mg (3 mg x 1) all other days   Weekly warfarin total:  16.5 mg   Plan last modified:  Brady Piña, PharmD (2020)   Next INR check:  2020   Target end date:  Indefinite    Indications    Chronic anticoagulation [Z79.01]  PAF (paroxysmal atrial fibrillation) ([facility]) [I48.0]             Anticoagulation Episode Summary     INR check location:      Preferred lab:      Send INR reminders to:      Comments:        Anticoagulation Care Providers     Provider Role Specialty Phone number    Ganesh Mckeon M.D. Referring Geriatrics [phone]    [facility] Responsible  [phone]        Anticoagulation Patient Findings  Patient Findings     Negatives:  Signs/symptoms of thrombosis, Signs/symptoms of bleeding, Laboratory test error suspected, Change in health, Change in alcohol use, Change in activity, Upcoming invasive procedure, Emergency department visit, Upcoming dental procedure, Missed doses, Extra doses, Change in medications, Change in diet/appetite, Hospital admission, Bruising, Other complaints        HPI:   Shereen Dale seen in clinic today, on anticoagulation therapy with warfarin for stroke prevention due to history of PAF.    Patient's previous INR was therapeutic at 2.0 on 20, at which time patient was instructed to continue with current warfarin regimen.  She returns to clinic today to recheck INR to ensure it is therapeutic and thus preventing possible clotting and/or bleeding/bruising complications.    CHADS-VASc = at least 5  (unadjusted ischemic stroke risk/year:  7.2%, which is moderate risk)    Does patient have any changes to current medical/health status since last appt (Y/N):  NO  Does patient have any signs/symptoms of bleeding and/or thrombosis since the  last appt (Y/N):  NO  Does patient have any interval changes to diet or medications since last appt (Y/N):  NO  Are there any complications or cost restrictions with current therapy (Y/N):  NO     Does patient have [facility] PCP? Yes, Dr Ganesh Mckeon (If not, please document discussion that patient must be seen at [facility])       Vitals:  declined today, vehicle visit.    There were no vitals filed for this visit.     Asssessment:      INR slightly subtherapeutic at 1.9, therefore increasing patient's stroke risk.   Reason(s) for out of range INR today:  Etiology unknown.      Plan:  Instructed patient to bolus with 3mg X 1, then resume current warfarin regimen in order to bring INR to therapeutic range.     Follow up:  Because warfarin is a high risk medication and current CHEST guidelines recommend regular monitoring intervals (few days up to 12 weeks), will have patient return to clinic in 2 weeks to recheck INR.    Brady Piña, PharmD, BCACP

## 2020-08-20 ENCOUNTER — HOSPITAL ENCOUNTER (OUTPATIENT)
Dept: LAB | Facility: MEDICAL CENTER | Age: 85
End: 2020-08-20
Attending: INTERNAL MEDICINE
Payer: MEDICARE

## 2020-08-20 DIAGNOSIS — I48.0 PAF (PAROXYSMAL ATRIAL FIBRILLATION) (HCC): ICD-10-CM

## 2020-08-20 DIAGNOSIS — Z79.01 CHRONIC ANTICOAGULATION: ICD-10-CM

## 2020-08-20 DIAGNOSIS — I35.0 NONRHEUMATIC AORTIC VALVE STENOSIS: ICD-10-CM

## 2020-08-20 DIAGNOSIS — E87.1 HYPONATREMIA: ICD-10-CM

## 2020-08-20 DIAGNOSIS — E78.5 HYPERLIPIDEMIA, UNSPECIFIED HYPERLIPIDEMIA TYPE: ICD-10-CM

## 2020-08-20 DIAGNOSIS — Z79.01 ON CONTINUOUS ORAL ANTICOAGULATION: ICD-10-CM

## 2020-08-20 LAB
ALBUMIN SERPL BCP-MCNC: 4.5 G/DL (ref 3.2–4.9)
ALBUMIN/GLOB SERPL: 1.7 G/DL
ALP SERPL-CCNC: 45 U/L (ref 30–99)
ALT SERPL-CCNC: 36 U/L (ref 2–50)
ANION GAP SERPL CALC-SCNC: 12 MMOL/L (ref 7–16)
AST SERPL-CCNC: 25 U/L (ref 12–45)
BILIRUB SERPL-MCNC: 0.5 MG/DL (ref 0.1–1.5)
BUN SERPL-MCNC: 14 MG/DL (ref 8–22)
CALCIUM SERPL-MCNC: 9.4 MG/DL (ref 8.5–10.5)
CHLORIDE SERPL-SCNC: 95 MMOL/L (ref 96–112)
CHOLEST SERPL-MCNC: 170 MG/DL (ref 100–199)
CO2 SERPL-SCNC: 24 MMOL/L (ref 20–33)
CREAT SERPL-MCNC: 0.59 MG/DL (ref 0.5–1.4)
ERYTHROCYTE [DISTWIDTH] IN BLOOD BY AUTOMATED COUNT: 40.3 FL (ref 35.9–50)
FASTING STATUS PATIENT QL REPORTED: NORMAL
GLOBULIN SER CALC-MCNC: 2.6 G/DL (ref 1.9–3.5)
GLUCOSE SERPL-MCNC: 121 MG/DL (ref 65–99)
HCT VFR BLD AUTO: 40.4 % (ref 37–47)
HDLC SERPL-MCNC: 59 MG/DL
HGB BLD-MCNC: 14.1 G/DL (ref 12–16)
LDLC SERPL CALC-MCNC: 91 MG/DL
MCH RBC QN AUTO: 31.5 PG (ref 27–33)
MCHC RBC AUTO-ENTMCNC: 34.9 G/DL (ref 33.6–35)
MCV RBC AUTO: 90.4 FL (ref 81.4–97.8)
POTASSIUM SERPL-SCNC: 4.7 MMOL/L (ref 3.6–5.5)
PROT SERPL-MCNC: 7.1 G/DL (ref 6–8.2)
RBC # BLD AUTO: 4.47 M/UL (ref 4.2–5.4)
SODIUM SERPL-SCNC: 131 MMOL/L (ref 135–145)
TRIGL SERPL-MCNC: 98 MG/DL (ref 0–149)
TSH SERPL DL<=0.005 MIU/L-ACNC: 1.71 UIU/ML (ref 0.38–5.33)
WBC # BLD AUTO: 6.3 K/UL (ref 4.8–10.8)

## 2020-08-20 PROCEDURE — 80053 COMPREHEN METABOLIC PANEL: CPT

## 2020-08-20 PROCEDURE — 36415 COLL VENOUS BLD VENIPUNCTURE: CPT

## 2020-08-20 PROCEDURE — 85048 AUTOMATED LEUKOCYTE COUNT: CPT

## 2020-08-20 PROCEDURE — 85014 HEMATOCRIT: CPT

## 2020-08-20 PROCEDURE — 80061 LIPID PANEL: CPT

## 2020-08-20 PROCEDURE — 85041 AUTOMATED RBC COUNT: CPT

## 2020-08-20 PROCEDURE — 84443 ASSAY THYROID STIM HORMONE: CPT

## 2020-08-20 PROCEDURE — 85018 HEMOGLOBIN: CPT

## 2020-08-21 ENCOUNTER — ANTICOAGULATION VISIT (OUTPATIENT)
Dept: MEDICAL GROUP | Facility: PHYSICIAN GROUP | Age: 85
End: 2020-08-21
Payer: MEDICARE

## 2020-08-21 DIAGNOSIS — I48.0 PAF (PAROXYSMAL ATRIAL FIBRILLATION) (HCC): ICD-10-CM

## 2020-08-21 DIAGNOSIS — Z79.01 CHRONIC ANTICOAGULATION: Primary | ICD-10-CM

## 2020-08-21 LAB — INR PPP: 1.6 (ref 2–3.5)

## 2020-08-21 PROCEDURE — 85610 PROTHROMBIN TIME: CPT | Performed by: FAMILY MEDICINE

## 2020-08-21 PROCEDURE — 99211 OFF/OP EST MAY X REQ PHY/QHP: CPT | Performed by: FAMILY MEDICINE

## 2020-08-27 ENCOUNTER — OFFICE VISIT (OUTPATIENT)
Dept: MEDICAL GROUP | Facility: MEDICAL CENTER | Age: 85
End: 2020-08-27
Payer: MEDICARE

## 2020-08-27 VITALS
SYSTOLIC BLOOD PRESSURE: 112 MMHG | TEMPERATURE: 97.8 F | RESPIRATION RATE: 16 BRPM | WEIGHT: 156 LBS | OXYGEN SATURATION: 95 % | HEIGHT: 61 IN | HEART RATE: 82 BPM | DIASTOLIC BLOOD PRESSURE: 68 MMHG | BODY MASS INDEX: 29.45 KG/M2

## 2020-08-27 DIAGNOSIS — E66.9 DIABETES MELLITUS TYPE 2 IN OBESE: ICD-10-CM

## 2020-08-27 DIAGNOSIS — K21.9 GASTROESOPHAGEAL REFLUX DISEASE WITHOUT ESOPHAGITIS: ICD-10-CM

## 2020-08-27 DIAGNOSIS — E11.69 DIABETES MELLITUS TYPE 2 IN OBESE: ICD-10-CM

## 2020-08-27 DIAGNOSIS — E03.9 ACQUIRED HYPOTHYROIDISM: ICD-10-CM

## 2020-08-27 DIAGNOSIS — I48.0 PAF (PAROXYSMAL ATRIAL FIBRILLATION) (HCC): ICD-10-CM

## 2020-08-27 PROCEDURE — 99214 OFFICE O/P EST MOD 30 MIN: CPT | Performed by: FAMILY MEDICINE

## 2020-08-27 RX ORDER — OMEPRAZOLE 20 MG/1
20 CAPSULE, DELAYED RELEASE ORAL DAILY
Qty: 90 CAP | Refills: 3 | Status: SHIPPED | OUTPATIENT
Start: 2020-08-27 | End: 2021-04-04 | Stop reason: SDUPTHER

## 2020-08-27 ASSESSMENT — FIBROSIS 4 INDEX: FIB4 SCORE: 1.47

## 2020-08-27 NOTE — PROGRESS NOTES
CC: Diabetes, hypothyroidism, A. fib, acid reflux    HPI:   Shereen presents today discuss the following medical issues:    Diabetes mellitus type 2 in obese (Formerly [facility])  Has been adequately controlled.  Her last A1c was 6.5.Has been on metformin 500 mg twice a day.No side effects.    Acquired hypothyroidism  She has been tolerating Levothyroxine, no palpitation, no cold or heat intolerance, has been on levothyroxine 50 mcg daily.  Most recent TSH was normal    PAF (paroxysmal atrial fibrillation) ([facility])  Denies palpitation, chest pain, shortness of breath.  Patient has beenon carvedilol 25 mg twice a day, and Coumadin.  Has been following up with Coumadin clinic.  Patient develops superficial hematoma and her left thigh, that is now getting better, denies any     Gastroesophageal reflux disease without esophagitis  Patient has been doing fine on famotidine.  Patient stated that she cannot find famotidine anywhere, wants to try omeprazole.    Patient Active Problem List    Diagnosis Date Noted   • Epistaxis 03/25/2019     Priority: High   • HTN (hypertension) 05/04/2012     Priority: High   • Hemorrhagic disorder due to extrinsic circulating anticoagulants ([facility]) 05/02/2012     Priority: High   • Acute, but ill-defined, cerebrovascular disease 04/30/2012     Priority: High   • Cough 05/02/2012     Priority: Low   • Edema 05/02/2012     Priority: Low   • Acquired hypothyroidism 09/12/2019   • Diabetes mellitus type 2 in obese ([facility]) 09/12/2019   • Skin abrasion 08/20/2019   • Chronic anticoagulation 08/02/2019   • Hyponatremia 03/25/2019   • Advanced directives, counseling/discussion 09/26/2018   • Hypertensive urgency 05/11/2017   • Diastolic dysfunction 02/02/2016   • Tear of lateral cartilage or meniscus of knee, current 05/21/2015   • PAF (paroxysmal atrial fibrillation) ([facility]) 01/12/2015   • Dyspnea on effort 01/02/2014   • Diabetes ([facility]) 12/26/2013   • Aortic stenosis 07/02/2013       Current Outpatient Medications    Medication Sig Dispense Refill   • amLODIPine (NORVASC) 5 MG Tab TAKE ONE-HALF TABLET BY MOUTH TWO TIMES A  Tab 3   • metFORMIN (GLUCOPHAGE) 500 MG Tab TAKE ONE TABLET BY MOUTH THREE TIMES A  Tab 2   • warfarin (COUMADIN) 3 MG Tab TAKE ONE TABLET BY MOUTH EVERY  Tab 2   • levothyroxine (SYNTHROID) 50 MCG Tab Take 1 Tab by mouth every morning. 90 Tab 3   • Blood Glucose Monitoring Suppl Device Meter: Dispense free style meter. Sig. Use 2 times daily as directed for blood sugar monitoring. dx e11.9 1 Device 0   • famotidine (PEPCID) 20 MG Tab Take 1 Tab by mouth every day. 100 Tab 2   • carvedilol (COREG) 25 MG Tab TAKE ONE TABLET BY MOUTH TWICE A  Tab 2   • famotidine (PEPCID) 20 MG Tab TAKE ONE TABLET BY MOUTH TWICE A DAY 60 Tab 2   • cloNIDine (CATAPRES) 0.1 MG Tab Take 1 Tab by mouth 2 times a day as needed. If SBP > 160 60 Tab 2   • glipiZIDE SR (GLUCOTROL) 2.5 MG TABLET SR 24 HR Take 1 Tab by mouth every day. 100 Tab 3   • telmisartan (MICARDIS) 40 MG Tab TAKE ONE TABLET BY MOUTH TWICE A  Tab 3   • fluconazole (DIFLUCAN) 100 MG Tab Take 1 Tab by mouth every day. 1 Tab 0   • famotidine (PEPCID) 20 MG Tab TAKE ONE TABLET BY MOUTH TWICE A DAY 60 Tab 3   • acetaminophen (TYLENOL) 325 MG Tab Take 2 Tabs by mouth every 6 hours as needed (Mild Pain; (Pain scale 1-3); Temp greater than 100.5 F). 30 Tab 0   • gabapentin (NEURONTIN) 100 MG Cap Take 1 Cap by mouth every day. 90 Cap 0   • gabapentin (NEURONTIN) 100 MG Cap Take 2 Caps by mouth every evening. 90 Cap 0   • dorzolamide-timolol (COSOPT) 22.3-6.8 MG/ML Solution Place 1 Drop in both eyes 2 times a day.     • Glucosamine HCl (GLUCOSAMINE PO) Take 1 Tab by mouth every morning. Pt unsure of dose     • Polyethyl Glycol-Propyl Glycol (SYSTANE OP) 1-2 Drops by Ophthalmic route 4 times a day as needed.     • docosahexanoic acid (OMEGA 3 FA) 1000 MG CAPS Take 1 Cap by mouth every morning.     • VITAMIN D PO Take 2,000 Units by mouth  "every morning.     • CALCIUM 500 PO Take 1 Tab by mouth every morning.       No current facility-administered medications for this visit.          Allergies as of 08/27/2020 - Reviewed 08/27/2020   Allergen Reaction Noted   • Darvon [propoxyphene hcl]  03/18/2008   • Iodine  05/11/2015   • Latex  09/19/2013   • Penicillins Anaphylaxis 08/02/2008   • Propoxyphene hcl Anaphylaxis 08/02/2008   • Tetanus antitoxin Myalgia 11/19/2017   • Tetracycline Anaphylaxis 08/02/2008        ROS: Denies any chest pain, Shortness of breath, Changes bowel or bladder, Lower extremity edema.    Physical Exam:  /68 (BP Location: Right arm, Patient Position: Sitting, BP Cuff Size: Adult)   Pulse 82   Temp 36.6 °C (97.8 °F) (Temporal)   Resp 16   Ht 1.549 m (5' 1\")   Wt 70.8 kg (156 lb)   LMP 01/01/1985   SpO2 95%   BMI 29.48 kg/m²   Gen.: Well-developed, well-nourished, no apparent distress,pleasant and cooperative with the examination  Skin:  Warm and dry with good turgor. No rashes or suspicious lesions in visible areas  HEENT:Sinuses nontender with palpation, TMs clear, nares patent with pink mucosa and clear rhinorrhea,no septal deviation ,polyps or lesions. lips without lesions, oropharynx clear.  Neck: Trachea midline,no masses or adenopathy. No JVD.  Cor: Regular rate and rhythm without murmur, gallop or rub.  Lungs: Respirations unlabored.Clear to auscultation with equal breath sounds bilaterally. No wheezes, rhonchi.  Extremities: No cyanosis, clubbing or edema.      Assessment and Plan.   84 y.o. female     1. Diabetes mellitus type 2 in obese ([facility])  Last A1c 6.5  Continue on metformin 500 mg twice a day    2. Acquired hypothyroidism  He has been tolerating Levothyroxine, no palpitation, no cold or heat intolerance  Continue on levothyroxine 50 mcg daily    3. PAF (paroxysmal atrial fibrillation) ([facility])  Stable.  No RVR.  Continue carvedilol 25 mg twice a day, and Coumadin.    Continue follow-up with Coumadin " clinic.    4. Gastroesophageal reflux disease without esophagitis  Patient has been doing fine on famotidine.  Patient stated that she cannot find famotidine anywhere, wants to try omeprazole.

## 2020-08-31 ENCOUNTER — ANTICOAGULATION VISIT (OUTPATIENT)
Dept: MEDICAL GROUP | Facility: PHYSICIAN GROUP | Age: 85
End: 2020-08-31
Payer: MEDICARE

## 2020-08-31 DIAGNOSIS — I48.0 PAF (PAROXYSMAL ATRIAL FIBRILLATION) (HCC): ICD-10-CM

## 2020-08-31 DIAGNOSIS — Z79.01 CHRONIC ANTICOAGULATION: Primary | ICD-10-CM

## 2020-08-31 LAB — INR PPP: 2.8 (ref 2–3.5)

## 2020-08-31 PROCEDURE — 85610 PROTHROMBIN TIME: CPT | Performed by: INTERNAL MEDICINE

## 2020-08-31 PROCEDURE — 93793 ANTICOAG MGMT PT WARFARIN: CPT | Performed by: INTERNAL MEDICINE

## 2020-08-31 NOTE — PROGRESS NOTES
Anticoagulation Summary  As of 2020    INR goal:  2.0-3.0   TTR:  77.2 % (2.2 y)   INR used for dosin.80 (2020)   Warfarin maintenance plan:  1.5 mg (3 mg x 0.5) every Mon, Fri; 3 mg (3 mg x 1) all other days   Weekly warfarin total:  18 mg   Plan last modified:  Brady Piña, PharmD (2020)   Next INR check:  2020   Target end date:  Indefinite    Indications    Chronic anticoagulation [Z79.01]  PAF (paroxysmal atrial fibrillation) ([facility]) [I48.0]             Anticoagulation Episode Summary     INR check location:      Preferred lab:      Send INR reminders to:      Comments:        Anticoagulation Care Providers     Provider Role Specialty Phone number    Ganesh Mckeon M.D. Referring Geriatrics [phone]    [facility] Responsible  [phone]        Anticoagulation Patient Findings  Patient Findings     Negatives:  Signs/symptoms of thrombosis, Signs/symptoms of bleeding, Laboratory test error suspected, Change in health, Change in alcohol use, Change in activity, Upcoming invasive procedure, Emergency department visit, Upcoming dental procedure, Missed doses, Extra doses, Change in medications, Change in diet/appetite, Hospital admission, Bruising, Other complaints              HPI:   Shereen Dale was seen in clinic today, on anticoagulation therapy with warfarin for stroke prevention due to history of PAF.    Patient's previous INR was subtherapeutic at 1.6 on 20, at which time patient was instructed to bolus 4.5 mg X 1 and continue on with current weekly regimen.  She returns to clinic today to recheck INR to ensure it is therapeutic and thus preventing possible clotting and/or bleeding/bruising complications.    CHADS-VASc = 5  (unadjusted ischemic stroke risk/year:  6.7%, which is moderate risk)    Does patient have any changes to current medical/health status since last appt (Y/N):  No  Does patient have any signs/symptoms of bleeding and/or  thrombosis since the last appt (Y/N):  No  Does patient have any interval changes to diet or medications since last appt (Y/N):  No  Are there any complications or cost restrictions with current therapy (Y/N):  No     Does patient have [facility] PCP? Yes, Dr. Ganesh Mckeon (If not, please document discussion that patient must be seen at [facility])       Vitals:  declined by patient today.    There were no vitals filed for this visit.     Asssessment:      INR therapeutic at 2.8, therefore decreasing patient's risk of bleeding and or blood clots.   Reason(s) for out of range INR today:  n/a      Plan:  patient is to reduce weekly regimen as detailed above.     Follow up:  Because warfarin is a high risk medication and current CHEST guidelines recommend regular monitoring intervals (few days up to 12 weeks), will have patient return to clinic in 11 days to recheck INR.    Mathieu Bosch, Pharmacy intern  Brady Piña, PharmD, BCACP

## 2020-09-11 ENCOUNTER — ANTICOAGULATION VISIT (OUTPATIENT)
Dept: MEDICAL GROUP | Facility: PHYSICIAN GROUP | Age: 85
End: 2020-09-11
Payer: MEDICARE

## 2020-09-11 DIAGNOSIS — I48.0 PAF (PAROXYSMAL ATRIAL FIBRILLATION) (HCC): ICD-10-CM

## 2020-09-11 DIAGNOSIS — Z79.01 CHRONIC ANTICOAGULATION: Primary | ICD-10-CM

## 2020-09-11 LAB — INR PPP: 2.3 (ref 2–3.5)

## 2020-09-11 PROCEDURE — 93793 ANTICOAG MGMT PT WARFARIN: CPT | Performed by: INTERNAL MEDICINE

## 2020-09-11 PROCEDURE — 85610 PROTHROMBIN TIME: CPT | Performed by: INTERNAL MEDICINE

## 2020-09-25 ENCOUNTER — ANTICOAGULATION VISIT (OUTPATIENT)
Dept: MEDICAL GROUP | Facility: PHYSICIAN GROUP | Age: 85
End: 2020-09-25
Payer: MEDICARE

## 2020-09-25 DIAGNOSIS — Z79.01 CHRONIC ANTICOAGULATION: Primary | ICD-10-CM

## 2020-09-25 DIAGNOSIS — I48.0 PAF (PAROXYSMAL ATRIAL FIBRILLATION) (HCC): ICD-10-CM

## 2020-09-25 LAB — INR PPP: 2.7 (ref 2–3.5)

## 2020-09-25 PROCEDURE — 99999 PR NO CHARGE: CPT | Performed by: FAMILY MEDICINE

## 2020-09-25 PROCEDURE — 93793 ANTICOAG MGMT PT WARFARIN: CPT | Performed by: FAMILY MEDICINE

## 2020-09-25 PROCEDURE — 85610 PROTHROMBIN TIME: CPT | Performed by: INTERNAL MEDICINE

## 2020-09-25 NOTE — PROGRESS NOTES
Anticoagulation Summary  As of 2020    INR goal:  2.0-3.0   TTR:  77.8 % (2.3 y)   INR used for dosin.70 (2020)   Warfarin maintenance plan:  1.5 mg (3 mg x 0.5) every Mon, Fri; 3 mg (3 mg x 1) all other days   Weekly warfarin total:  18 mg   Plan last modified:  Brady Piña, PharmD (2020)   Next INR check:  10/9/2020   Target end date:  Indefinite    Indications    Chronic anticoagulation [Z79.01]  PAF (paroxysmal atrial fibrillation) (Former[facility]) [I48.0]             Anticoagulation Episode Summary     INR check location:      Preferred lab:      Send INR reminders to:      Comments:        Anticoagulation Care Providers     Provider Role Specialty Phone number    Ganesh Mckeon M.D. Referring Geriatrics [phone]    [facility] Responsible  [phone]        Anticoagulation Patient Findings  Patient Findings     Negatives:  Signs/symptoms of thrombosis, Signs/symptoms of bleeding, Laboratory test error suspected, Change in health, Change in alcohol use, Change in activity, Upcoming invasive procedure, Emergency department visit, Upcoming dental procedure, Missed doses, Extra doses, Change in medications, Change in diet/appetite, Hospital admission, Bruising, Other complaints              HPI:   Shereen was seen in clinic today, on anticoagulation therapy with warfarin for stroke prevention due to history of PAF    Patient's previous INR was therapeutic at 2.3 on 2020, at which time patient was instructed to continue current dosing regimen.  She returns to clinic today to recheck INR to ensure it is therapeutic and thus preventing possible clotting and/or bleeding/bruising complications.    CHADS-VASc = 5  (unadjusted ischemic stroke risk/year:  7.2%, which is moderate)    Does patient have any changes to current medical/health status since last appt (Y/N):  no  Does patient have any signs/symptoms of bleeding and/or thrombosis since the last appt (Y/N):   no  Does patient have any interval changes to diet or medications since last appt (Y/N):  no  Are there any complications or cost restrictions with current therapy (Y/N):  no     Does patient have [facility] PCP? Yes, Dr Ganesh Burton (If not, please document discussion that patient must be seen at [facility])       Vitals:  declined, car visit    There were no vitals filed for this visit.     Asssessment:      INR therapeutic at 2.7, therefore decreased risk of bleeding and clotting   Reason(s) for out of range INR today:  n/a      Plan:  Instructed patient to decrease dose on Wednesday and then continue current dosing regimen as detailed above     Follow up:  Because warfarin is a high risk medication and current CHEST guidelines recommend regular monitoring intervals (few days up to 12 weeks), will have patient return to clinic in 2 weeks to recheck INR.    Hortensia Ortega, Pharmacy Intern  Brady Piña, PharmD, BCACP

## 2020-10-07 ENCOUNTER — OFFICE VISIT (OUTPATIENT)
Dept: CARDIOLOGY | Facility: MEDICAL CENTER | Age: 85
End: 2020-10-07
Payer: MEDICARE

## 2020-10-07 VITALS
DIASTOLIC BLOOD PRESSURE: 60 MMHG | HEART RATE: 80 BPM | OXYGEN SATURATION: 95 % | BODY MASS INDEX: 29.45 KG/M2 | WEIGHT: 156 LBS | HEIGHT: 61 IN | SYSTOLIC BLOOD PRESSURE: 108 MMHG

## 2020-10-07 DIAGNOSIS — R60.0 LOCALIZED EDEMA: ICD-10-CM

## 2020-10-07 DIAGNOSIS — I10 ESSENTIAL HYPERTENSION: ICD-10-CM

## 2020-10-07 DIAGNOSIS — E66.9 DIABETES MELLITUS TYPE 2 IN OBESE: ICD-10-CM

## 2020-10-07 DIAGNOSIS — E11.69 DIABETES MELLITUS TYPE 2 IN OBESE: ICD-10-CM

## 2020-10-07 DIAGNOSIS — I48.0 PAF (PAROXYSMAL ATRIAL FIBRILLATION) (HCC): ICD-10-CM

## 2020-10-07 DIAGNOSIS — Z79.01 CHRONIC ANTICOAGULATION: ICD-10-CM

## 2020-10-07 DIAGNOSIS — I35.0 NONRHEUMATIC AORTIC VALVE STENOSIS: ICD-10-CM

## 2020-10-07 PROCEDURE — 99215 OFFICE O/P EST HI 40 MIN: CPT | Performed by: INTERNAL MEDICINE

## 2020-10-07 RX ORDER — FUROSEMIDE 20 MG/1
20 TABLET ORAL DAILY
Qty: 30 TAB | Refills: 3 | Status: SHIPPED | OUTPATIENT
Start: 2020-10-07 | End: 2021-04-04 | Stop reason: SDUPTHER

## 2020-10-07 RX ORDER — POTASSIUM CHLORIDE 20 MEQ/1
20 TABLET, EXTENDED RELEASE ORAL 2 TIMES DAILY
Qty: 60 TAB | Refills: 11 | Status: SHIPPED
Start: 2020-10-07 | End: 2021-04-04

## 2020-10-07 RX ORDER — SULFAMETHOXAZOLE AND TRIMETHOPRIM 800; 160 MG/1; MG/1
TABLET ORAL
COMMUNITY
Start: 2020-07-13 | End: 2020-10-07

## 2020-10-07 RX ORDER — DIGOXIN 125 MCG
125 TABLET ORAL DAILY
Qty: 90 TAB | Refills: 3 | Status: SHIPPED
Start: 2020-10-07 | End: 2021-04-04

## 2020-10-07 ASSESSMENT — FIBROSIS 4 INDEX: FIB4 SCORE: 1.47

## 2020-10-07 NOTE — PROGRESS NOTES
Subjective:   Shereen Dale is an 84 y.o. female who presents today for follow-up of PAF, HTN and Severe AS. She also has sleep apnea intolerant of CPAP. She is on chronic oral anti-coagulation.    In the interim she has had slow continuation of her dyspnea on exertion.  She is now experiencing more palpitations when she initially become supine at night.  She is only taking her Coreg intermittently due to hypotension.  She continues to wish a palliative course of care.  We have discussed that overnight procedure in the form of a TAVR would cure her aortic valve dysfunction and improve her life expectancy and symptoms.  She declines again today.    Past Medical History:   Diagnosis Date   • AF (atrial fibrillation) (HCC)    • Arrhythmia     A-fib   • Backpain     Lower back pain   • Breast cancer (HCC)    • Breath shortness     uses 2-3 L O2 at night   • Cancer (HCC) 1993    right breast   • CATARACT     celestine IOL   • Chronic anticoagulation 5/2/2012   • Cough 5/2/2012   • Dental disorder     dentures   • Diabetes     oral medication   • Edema 5/2/2012   • Glaucoma    • Heart burn    • Heart murmur    • Heart valve disease    • Hypertension    • Hypothyroid    • Oxygen deficiency     uses 2.5-3 l at night   • Paroxysmal atrial fibrillation (HCC)    • Sleep apnea     no cpap   • tia     TIA x2   • Unspecified hemorrhagic conditions     takes coumadin     Past Surgical History:   Procedure Laterality Date   • KNEE ARTHROSCOPY Right 5/21/2015    Procedure: KNEE ARTHROSCOPY;  Surgeon: Emerson Garcia M.D.;  Location: [facility];  Service:    • MENISCECTOMY Right 5/21/2015    Procedure: LATERAL MENISCECTOMY;  Surgeon: Emerson Garcia M.D.;  Location: [facility];  Service:    • CATARACT PHACO WITH IOL  10/21/2013    Performed by Dominick Fernando M.D. at [facility]   • CATARACT PHACO WITH IOL  9/23/2013    Performed by Dominick Fernando M.D. at [facility]   •  CHOLECYSTECTOMY     • HYSTERECTOMY RADICAL     • LUMPECTOMY      R breast   • PB RADIATION THERAPY PLAN SIMPLE       Family History   Problem Relation Age of Onset   • Heart Attack Mother    • Hypertension Mother    • Heart Disease Father    • Hypertension Father    • No Known Problems Sister    • No Known Problems Brother    • Leukemia Sister    • Sleep Apnea Sister    • No Known Problems Sister    • No Known Problems Sister      Social History     Tobacco Use   Smoking Status Former Smoker   • Packs/day: 0.50   • Years: 11.00   • Pack years: 5.50   • Types: Cigarettes   • Quit date: 1964   • Years since quittin.8   Smokeless Tobacco Never Used   Tobacco Comment    Started at      Allergies   Allergen Reactions   • Darvon [Propoxyphene Hcl]      shock   • Iodine      Shock  - IV   • Latex      Swelling = hands with glove use   • Penicillins Anaphylaxis   • Propoxyphene Hcl Anaphylaxis   • Tetanus Antitoxin Myalgia   • Tetracycline Anaphylaxis     Outpatient Encounter Medications as of 10/7/2020   Medication Sig Dispense Refill   • furosemide (LASIX) 20 MG Tab Take 1 Tab by mouth every day. 30 Tab 3   • potassium chloride SA (KDUR) 20 MEQ Tab CR Take 1 Tab by mouth 2 times a day. 60 Tab 11   • digoxin (LANOXIN) 125 MCG Tab Take 1 Tab by mouth every day. 90 Tab 3   • telmisartan (MICARDIS) 40 MG Tab TAKE ONE TABLET BY MOUTH TWICE A  Tab 0   • omeprazole (PRILOSEC) 20 MG delayed-release capsule Take 1 Cap by mouth every day. 90 Cap 3   • amLODIPine (NORVASC) 5 MG Tab TAKE ONE-HALF TABLET BY MOUTH TWO TIMES A  Tab 3   • metFORMIN (GLUCOPHAGE) 500 MG Tab TAKE ONE TABLET BY MOUTH THREE TIMES A  Tab 2   • warfarin (COUMADIN) 3 MG Tab TAKE ONE TABLET BY MOUTH EVERY  Tab 2   • levothyroxine (SYNTHROID) 50 MCG Tab Take 1 Tab by mouth every morning. 90 Tab 3   • Blood Glucose Monitoring Suppl Device Meter: Dispense free style meter. Sig. Use 2 times daily as directed for blood sugar  monitoring. dx e11.9 1 Device 0   • famotidine (PEPCID) 20 MG Tab TAKE ONE TABLET BY MOUTH TWICE A DAY 60 Tab 2   • cloNIDine (CATAPRES) 0.1 MG Tab Take 1 Tab by mouth 2 times a day as needed. If SBP > 160 60 Tab 2   • glipiZIDE SR (GLUCOTROL) 2.5 MG TABLET SR 24 HR Take 1 Tab by mouth every day. 100 Tab 3   • fluconazole (DIFLUCAN) 100 MG Tab Take 1 Tab by mouth every day. 1 Tab 0   • famotidine (PEPCID) 20 MG Tab TAKE ONE TABLET BY MOUTH TWICE A DAY 60 Tab 3   • acetaminophen (TYLENOL) 325 MG Tab Take 2 Tabs by mouth every 6 hours as needed (Mild Pain; (Pain scale 1-3); Temp greater than 100.5 F). 30 Tab 0   • gabapentin (NEURONTIN) 100 MG Cap Take 1 Cap by mouth every day. 90 Cap 0   • dorzolamide-timolol (COSOPT) 22.3-6.8 MG/ML Solution Place 1 Drop in both eyes 2 times a day.     • Glucosamine HCl (GLUCOSAMINE PO) Take 1 Tab by mouth every morning. Pt unsure of dose     • Polyethyl Glycol-Propyl Glycol (SYSTANE OP) 1-2 Drops by Ophthalmic route 4 times a day as needed.     • docosahexanoic acid (OMEGA 3 FA) 1000 MG CAPS Take 1 Cap by mouth every morning.     • VITAMIN D PO Take 2,000 Units by mouth every morning.     • CALCIUM 500 PO Take 1 Tab by mouth every morning.     • [DISCONTINUED] sulfamethoxazole-trimethoprim (BACTRIM DS) 800-160 MG tablet      • [DISCONTINUED] famotidine (PEPCID) 20 MG Tab Take 1 Tab by mouth every day. (Patient not taking: Reported on 10/7/2020) 100 Tab 2   • [DISCONTINUED] carvedilol (COREG) 25 MG Tab TAKE ONE TABLET BY MOUTH TWICE A  Tab 2   • [DISCONTINUED] gabapentin (NEURONTIN) 100 MG Cap Take 2 Caps by mouth every evening. (Patient not taking: Reported on 10/7/2020) 90 Cap 0     No facility-administered encounter medications on file as of 10/7/2020.      Review of Systems   All other systems reviewed and are negative.  Today I reviewed the medical, surgical, social and family histories with the patient. As per HPI, otherwise noncontributory.       Objective:   /60  "(BP Location: Left arm, Patient Position: Sitting, BP Cuff Size: Adult)   Pulse 80   Ht 1.549 m (5' 1\")   Wt 70.8 kg (156 lb)   LMP 01/01/1985   SpO2 95%   BMI 29.48 kg/m²     Physical Exam   Constitutional: She is oriented to person, place, and time. She appears well-developed and well-nourished. No distress.   HENT:   Head: Normocephalic and atraumatic.   Mouth/Throat: Oropharynx is clear and moist. No oropharyngeal exudate.   Eyes: Pupils are equal, round, and reactive to light. Conjunctivae are normal. No scleral icterus.   Neck: Normal range of motion. Neck supple. No JVD present. No thyromegaly present.   Cardiovascular: Normal rate, regular rhythm and intact distal pulses. Exam reveals no gallop and no friction rub.   Murmur ( 3/6 systolic ejection murmur impinging upon S2) heard.  Pulses:       Carotid pulses are 2+ on the right side and 2+ on the left side.       Radial pulses are 2+ on the right side and 2+ on the left side.        Popliteal pulses are 2+ on the right side and 2+ on the left side.        Dorsalis pedis pulses are 2+ on the right side and 2+ on the left side.        Posterior tibial pulses are 2+ on the right side and 2+ on the left side.   Pulmonary/Chest: Effort normal and breath sounds normal. She has no wheezes. She has no rales.   Abdominal: Soft. Bowel sounds are normal. She exhibits no distension. There is no abdominal tenderness.   Musculoskeletal:         General: Edema (  1-2+ pitting bilateral lower extremity edema) present. No tenderness.   Neurological: She is alert and oriented to person, place, and time. No cranial nerve deficit.   Skin: Skin is warm and dry. No rash noted. She is not diaphoretic. No erythema.   Psychiatric: She has a normal mood and affect. Her behavior is normal.   Vitals reviewed.    LABS:  Lab Results   Component Value Date/Time    CHOLSTRLTOT 170 08/20/2020 08:05 AM    LDL 91 08/20/2020 08:05 AM    HDL 59 08/20/2020 08:05 AM    TRIGLYCERIDE 98 " 08/20/2020 08:05 AM       Lab Results   Component Value Date/Time    WBC 6.3 08/20/2020 08:05 AM    RBC 4.47 08/20/2020 08:05 AM    HEMOGLOBIN 14.1 08/20/2020 08:05 AM    HEMATOCRIT 40.4 08/20/2020 08:05 AM    MCV 90.4 08/20/2020 08:05 AM    NEUTSPOLYS 57.30 04/05/2019 07:42 AM    LYMPHOCYTES 28.00 04/05/2019 07:42 AM    MONOCYTES 9.40 04/05/2019 07:42 AM    EOSINOPHILS 3.70 04/05/2019 07:42 AM    BASOPHILS 0.80 04/05/2019 07:42 AM     Lab Results   Component Value Date/Time    SODIUM 131 (L) 08/20/2020 08:05 AM    POTASSIUM 4.7 08/20/2020 08:05 AM    CHLORIDE 95 (L) 08/20/2020 08:05 AM    CO2 24 08/20/2020 08:05 AM    GLUCOSE 121 (H) 08/20/2020 08:05 AM    BUN 14 08/20/2020 08:05 AM    CREATININE 0.59 08/20/2020 08:05 AM    CREATININE 0.7 08/02/2008 08:30 AM     Lab Results   Component Value Date    HBA1C 6.5 (A) 05/14/2020      Lab Results   Component Value Date/Time    ALKPHOSPHAT 45 08/20/2020 08:05 AM    ASTSGOT 25 08/20/2020 08:05 AM    ALTSGPT 36 08/20/2020 08:05 AM    TBILIRUBIN 0.5 08/20/2020 08:05 AM      Lab Results   Component Value Date/Time    BNPBTYPENAT 33 05/11/2017 05:05 PM      No results found for: TSH  Lab Results   Component Value Date/Time    PROTHROMBTM 19.5 (H) 03/28/2019 02:41 AM    INR 2.70 09/25/2020 10:30 AM      ECHO CONCLUSIONS (5/9/2019):  Compared to the prior study done 10/18/18 - the aortic stenosis is now   severe.  Normal left ventricular chamber size.  Left ventricular ejection fraction is visually estimated to be 65%.  Grade I diastolic dysfunction.  Moderate left ventricular hypertrophy.  Severe aortic stenosis. Vmax is 4.04 m/s. Aortic valve area calculated   from the continuity equation is 0.47cm2.  Right ventricular systolic pressure is estimated to be 30 mmHg.    ECHO CONCLUSIONS (10/18/2018):  Prior echo 04/04/18.  Left ventricle is small in size.  Left ventricular ejection fraction is visually estimated to be 65%.  Moderate concentric left ventricular  hypertrophy.  Calcified aortic valve leaflets.  Moderate aortic stenosis.  Vmax is 3.7 m/s.  Normal inferior vena cava size and inspiratory collapse.  Compared to the report of the study done - there has been no   significant change.     ECHO CONCLUSIONS (4/4/2018):  Prior echo 1/9/2017.  Moderate left ventricular hypertrophy.  Left ventricular ejection fraction is visually estimated to be greater   than 75%.  Grade I diastolic dysfunction.  Moderate aortic stenosis.  Vmax is 3.79  m/s. Transvalvular gradients are - Peak: 58 mmHg, Mean:    35 mmHg.  Compared to the report of the study done - there has been a slight   progression of aortic stenosis, still moderate in severity.     ECHO CONCLUSIONS (1/19/2017):  Compared to the images of the prior study done on 01/29/15 - The AS is   now moderate.  Normal left ventricular size and systolic function.  Left ventricular ejection fraction is visually estimated to be 65%.  Grade I diastolic dysfunction.  Moderate aortic stenosis.  Transvalvular gradients are - Peak: 37 mmHg, Mean: 21 mmHg.  Normal inferior vena cava size and inspiratory collapse.      Assessment:     1. Nonrheumatic aortic valve stenosis  furosemide (LASIX) 20 MG Tab    potassium chloride SA (KDUR) 20 MEQ Tab CR    digoxin (LANOXIN) 125 MCG Tab   2. PAF (paroxysmal atrial fibrillation) (HCC)  furosemide (LASIX) 20 MG Tab    potassium chloride SA (KDUR) 20 MEQ Tab CR    digoxin (LANOXIN) 125 MCG Tab    TSH   3. Chronic anticoagulation  CBC WITHOUT DIFFERENTIAL   4. Essential hypertension     5. Localized edema  furosemide (LASIX) 20 MG Tab    potassium chloride SA (KDUR) 20 MEQ Tab CR   6. Diabetes mellitus type 2 in obese (HCC)  Comp Metabolic Panel    Lipid Profile    HEMOGLOBIN A1C       Medical Decision Making:  Today's Assessment / Status / Plan:     She is an excellent candidate for transcatheter aortic valve replacement.  She nonetheless declines.  Please see the extensive discussions documented in  prior office visit notes.  She is tolerating her anticoagulation well.  She is having more palpitations at night and has some edema now.  Recommend low-dose Lasix, initiation of low-dose digoxin for her palpitations as she is intolerant to further increases in other medical therapy due to hypotension, 1, potassium administration with Lasix and labs at her request are completed.  She does not want to perform surveillance echocardiography due to a desire to avoid any interventions.  She will follow-up in 6 months.

## 2020-10-08 RX ORDER — GLIPIZIDE 2.5 MG/1
TABLET, EXTENDED RELEASE ORAL
Qty: 100 TAB | Refills: 3 | Status: SHIPPED
Start: 2020-10-08 | End: 2021-06-24

## 2020-10-09 ENCOUNTER — ANTICOAGULATION VISIT (OUTPATIENT)
Dept: MEDICAL GROUP | Facility: PHYSICIAN GROUP | Age: 85
End: 2020-10-09
Payer: MEDICARE

## 2020-10-09 DIAGNOSIS — Z79.01 CHRONIC ANTICOAGULATION: Primary | ICD-10-CM

## 2020-10-09 DIAGNOSIS — I48.0 PAF (PAROXYSMAL ATRIAL FIBRILLATION) (HCC): ICD-10-CM

## 2020-10-09 LAB — INR PPP: 2.1 (ref 2–3.5)

## 2020-10-09 PROCEDURE — 93793 ANTICOAG MGMT PT WARFARIN: CPT | Performed by: INTERNAL MEDICINE

## 2020-10-09 PROCEDURE — 85610 PROTHROMBIN TIME: CPT | Performed by: INTERNAL MEDICINE

## 2020-10-09 NOTE — PROGRESS NOTES
Anticoagulation Summary  As of 10/9/2020    INR goal:  2.0-3.0   TTR:  78.2 % (2.4 y)   INR used for dosin.10 (10/9/2020)   Warfarin maintenance plan:  1.5 mg (3 mg x 0.5) every Mon, Fri; 3 mg (3 mg x 1) all other days   Weekly warfarin total:  18 mg   Plan last modified:  Brady Piña, PharmD (2020)   Next INR check:  10/23/2020   Target end date:  Indefinite    Indications    Chronic anticoagulation [Z79.01]  PAF (paroxysmal atrial fibrillation) (HCC) [I48.0]             Anticoagulation Episode Summary     INR check location:      Preferred lab:      Send INR reminders to:      Comments:        Anticoagulation Care Providers     Provider Role Specialty Phone number    Ganesh Mckeon M.D. Referring Geriatrics [phone]    [facility] Responsible  [phone]        Anticoagulation Patient Findings  Patient Findings     Negatives:  Signs/symptoms of thrombosis, Signs/symptoms of bleeding, Laboratory test error suspected, Change in health, Change in alcohol use, Change in activity, Upcoming invasive procedure, Emergency department visit, Upcoming dental procedure, Missed doses, Extra doses, Change in medications, Change in diet/appetite, Hospital admission, Bruising, Other complaints        HPI:   Shereen Dale seen in clinic today, on anticoagulation therapy with warfarin for stroke prevention due to history of atrial fibrillation.    Patient's previous INR was therapeutic at 2.7 on 20, at which time patient was instructed to decrease weekly warfarin regimen.  She returns to clinic today to recheck INR to ensure it is therapeutic and thus preventing possible clotting and/or bleeding/bruising complications.    CHADS-VASc = at least 5  (unadjusted ischemic stroke risk/year:  7.2%, which is moderate risk)    Does patient have any changes to current medical/health status since last appt (Y/N):  NO  Does patient have any signs/symptoms of bleeding and/or thrombosis since  the last appt (Y/N):  NO  Does patient have any interval changes to diet or medications since last appt (Y/N):  NO  Are there any complications or cost restrictions with current therapy (Y/N):  NO     Does patient have [facility] PCP? Yes, Dr Ganesh Mckeon (If not, please document discussion that patient must be seen at [facility])       Vitals:  declined today, vehicle visit.    There were no vitals filed for this visit.     Medication reconciliation completed today.    Asssessment:      INR therapeutic at 2.1, therefore decreasing patient's stroke and/or bleeding risk.   Reason(s) for out of range INR today:  n/a      Plan:  Pt is to continue with current warfarin dosing regimen in order to maintain INR in therapeutic range.     Follow up:  Because warfarin is a high risk medication and current CHEST guidelines recommend regular monitoring intervals (few days up to 12 weeks), will have patient return to clinic in 2 weeks to recheck INR.    Brady Piña, PharmD, BCACP

## 2020-10-23 ENCOUNTER — ANTICOAGULATION VISIT (OUTPATIENT)
Dept: MEDICAL GROUP | Facility: PHYSICIAN GROUP | Age: 85
End: 2020-10-23
Payer: MEDICARE

## 2020-10-23 DIAGNOSIS — I48.0 PAF (PAROXYSMAL ATRIAL FIBRILLATION) (HCC): ICD-10-CM

## 2020-10-23 DIAGNOSIS — Z79.01 CHRONIC ANTICOAGULATION: Primary | ICD-10-CM

## 2020-10-23 LAB — INR PPP: 2.7 (ref 2–3.5)

## 2020-10-23 PROCEDURE — 93793 ANTICOAG MGMT PT WARFARIN: CPT | Performed by: FAMILY MEDICINE

## 2020-10-23 PROCEDURE — 85610 PROTHROMBIN TIME: CPT | Performed by: FAMILY MEDICINE

## 2020-10-23 NOTE — PROGRESS NOTES
Anticoagulation Summary  As of 10/23/2020    INR goal:  2.0-3.0   TTR:  78.5 % (2.4 y)   INR used for dosin.70 (10/23/2020)   Warfarin maintenance plan:  1.5 mg (3 mg x 0.5) every Mon, Fri; 3 mg (3 mg x 1) all other days   Weekly warfarin total:  18 mg   Plan last modified:  Brady Piña, PharmD (2020)   Next INR check:  2020   Target end date:  Indefinite    Indications    Chronic anticoagulation [Z79.01]  PAF (paroxysmal atrial fibrillation) (formerly [facility]) [I48.0]             Anticoagulation Episode Summary     INR check location:      Preferred lab:      Send INR reminders to:      Comments:        Anticoagulation Care Providers     Provider Role Specialty Phone number    Ganesh Mckeon M.D. Referring Geriatrics [phone]    [facility] Responsible  [phone]        Anticoagulation Patient Findings  Patient Findings     Negatives:  Signs/symptoms of thrombosis, Signs/symptoms of bleeding, Laboratory test error suspected, Change in health, Change in alcohol use, Change in activity, Upcoming invasive procedure, Emergency department visit, Upcoming dental procedure, Missed doses, Extra doses, Change in medications, Change in diet/appetite, Hospital admission, Bruising, Other complaints        HPI:   Shereen Dale seen in clinic today, on anticoagulation therapy with warfarin for stroke prophylaxis due to history of PAF.    Patient's previous INR was therapeutic at 2.1 on 10-9-20, at which time patient was instructed to continue with current warfarin regimen.  She returns to clinic today to recheck INR to ensure it is therapeutic and thus preventing possible clotting and/or bleeding/bruising complications.    CHADS-VASc = at least 5  (unadjusted ischemic stroke risk/year:  7.2%, which is moderate risk)    Does patient have any changes to current medical/health status since last appt (Y/N):  NO  Does patient have any signs/symptoms of bleeding and/or thrombosis since the last  appt (Y/N):  NO  Does patient have any interval changes to diet or medications since last appt (Y/N):  NO  Are there any complications or cost restrictions with current therapy (Y/N):  NO     Does patient have [facility] PCP? Yes, Dr Ganesh Mckeon (If not, please document discussion that patient must be seen at [facility])       Vitals:  declined by patient today.    There were no vitals filed for this visit.     Medication reconciliation completed today.    Asssessment:      INR remains therapeutic at 2.7, therefore decreasing patient's stroke and/or bleeding risk.   Reason(s) for out of range INR today:  n/a      Plan:  Patient wishes to decrease weekly warfarin regimen next week only, then resume current warfarin regimen in order to maintain in therapeutic range.     Follow up:  Because warfarin is a high risk medication and current CHEST guidelines recommend regular monitoring intervals (few days up to 12 weeks), will have patient return to clinic in 2 weeks to recheck INR.    Brady Piña, PharmD, BCACP

## 2020-11-06 ENCOUNTER — ANTICOAGULATION VISIT (OUTPATIENT)
Dept: MEDICAL GROUP | Facility: PHYSICIAN GROUP | Age: 85
End: 2020-11-06
Payer: MEDICARE

## 2020-11-06 DIAGNOSIS — I48.0 PAF (PAROXYSMAL ATRIAL FIBRILLATION) (HCC): ICD-10-CM

## 2020-11-06 DIAGNOSIS — Z79.01 CHRONIC ANTICOAGULATION: Primary | ICD-10-CM

## 2020-11-06 LAB — INR PPP: 1.8 (ref 2–3.5)

## 2020-11-06 PROCEDURE — 85610 PROTHROMBIN TIME: CPT | Performed by: INTERNAL MEDICINE

## 2020-11-06 PROCEDURE — 99211 OFF/OP EST MAY X REQ PHY/QHP: CPT | Performed by: INTERNAL MEDICINE

## 2020-11-06 NOTE — PROGRESS NOTES
Anticoagulation Summary  As of 2020    INR goal:  2.0-3.0   TTR:  78.5 % (2.4 y)   INR used for dosin.80 (2020)   Warfarin maintenance plan:  1.5 mg (3 mg x 0.5) every Mon, Fri; 3 mg (3 mg x 1) all other days   Weekly warfarin total:  18 mg   Plan last modified:  Brady Piña, PharmD (2020)   Next INR check:  2020   Target end date:  Indefinite    Indications    Chronic anticoagulation [Z79.01]  PAF (paroxysmal atrial fibrillation) (HCC) [I48.0]             Anticoagulation Episode Summary     INR check location:      Preferred lab:      Send INR reminders to:      Comments:        Anticoagulation Care Providers     Provider Role Specialty Phone number    Ganesh Mckeon M.D. Referring Geriatrics [phone]    [facility] Responsible  [phone]        Anticoagulation Patient Findings  Patient Findings     Negatives:  Signs/symptoms of thrombosis, Signs/symptoms of bleeding, Laboratory test error suspected, Change in health, Change in alcohol use, Change in activity, Upcoming invasive procedure, Emergency department visit, Upcoming dental procedure, Missed doses, Extra doses, Change in medications, Change in diet/appetite, Hospital admission, Bruising, Other complaints        HPI:   Shereen Dale seen in clinic today, on anticoagulation therapy with warfarin for stroke prevention due to history of atrial fibrillation.    Patient's previous INR was therapeutic at 2.7 on 10-23-20, at which time patient was instructed to decrease weekly warfarin regimen.  She returns to clinic today to recheck INR to ensure it is therapeutic and thus preventing possible clotting and/or bleeding/bruising complications.    CHADS-VASc = at least 5  (unadjusted ischemic stroke risk/year:  7.2%, which is moderate risk)    Does patient have any changes to current medical/health status since last appt (Y/N):  NO  Does patient have any signs/symptoms of bleeding and/or thrombosis since  the last appt (Y/N):  NO  Does patient have any interval changes to diet or medications since last appt (Y/N):  Little bit more greens since last visit.  Are there any complications or cost restrictions with current therapy (Y/N):  NO     Does patient have [facility] PCP? Yes, Dr Ganesh Mckeon (If not, please document discussion that patient must be seen at [facility])       Vitals:  declined by patient at today's visit.    There were no vitals filed for this visit.     Asssessment:      INR subtherapeutic at 1.8, therefore increasing patient's stroke risk.   Reason(s) for out of range INR today:  May be due to a little bit more greens recently.      Plan:  Instructed patient to bolus with 3mg X 1, then resume current warfarin regimen in order to bring INR to therapeutic range.     Follow up:  Because warfarin is a high risk medication and current CHEST guidelines recommend regular monitoring intervals (few days up to 12 weeks), will have patient return to clinic in 2 weeks to recheck INR.    Brady Piña, PharmD, BCACP

## 2020-11-20 ENCOUNTER — ANTICOAGULATION VISIT (OUTPATIENT)
Dept: MEDICAL GROUP | Facility: PHYSICIAN GROUP | Age: 85
End: 2020-11-20
Payer: MEDICARE

## 2020-11-20 DIAGNOSIS — Z79.01 CHRONIC ANTICOAGULATION: Primary | ICD-10-CM

## 2020-11-20 DIAGNOSIS — I48.0 PAF (PAROXYSMAL ATRIAL FIBRILLATION) (HCC): ICD-10-CM

## 2020-11-20 LAB — INR PPP: 2.2 (ref 2–3.5)

## 2020-11-20 PROCEDURE — 85610 PROTHROMBIN TIME: CPT | Performed by: INTERNAL MEDICINE

## 2020-11-20 PROCEDURE — 93793 ANTICOAG MGMT PT WARFARIN: CPT | Performed by: FAMILY MEDICINE

## 2020-11-20 NOTE — PROGRESS NOTES
Anticoagulation Summary  As of 2020    INR goal:  2.0-3.0   TTR:  78.1 % (2.5 y)   INR used for dosin.20 (2020)   Warfarin maintenance plan:  1.5 mg (3 mg x 0.5) every Mon, Fri; 3 mg (3 mg x 1) all other days   Weekly warfarin total:  18 mg   Plan last modified:  Brady Piña, PharmD (2020)   Next INR check:  2020   Target end date:  Indefinite    Indications    Chronic anticoagulation [Z79.01]  PAF (paroxysmal atrial fibrillation) (HCC) [I48.0]             Anticoagulation Episode Summary     INR check location:      Preferred lab:      Send INR reminders to:      Comments:        Anticoagulation Care Providers     Provider Role Specialty Phone number    Ganesh Mckeon M.D. Referring Geriatrics [phone]    [facility] Responsible  [phone]        Anticoagulation Patient Findings  Patient Findings     Negatives:  Signs/symptoms of thrombosis, Signs/symptoms of bleeding, Laboratory test error suspected, Change in health, Change in alcohol use, Change in activity, Upcoming invasive procedure, Emergency department visit, Upcoming dental procedure, Missed doses, Extra doses, Change in medications, Change in diet/appetite, Hospital admission, Bruising, Other complaints        HPI:   Shereen Dale seen in clinic today, on anticoagulation therapy with warfarin for stroke prophylaxis due to history of atrial fibrillation.    Patient's previous INR was subtherapeutic at 1.8 on 20, at which time patient was instructed to bolus with one dose, then resume current warfarin regimen.  She returns to clinic today to recheck INR to ensure it is therapeutic and thus preventing possible clotting and/or bleeding/bruising complications.    CHADS-VASc = at least 5  (unadjusted ischemic stroke risk/year:  7.2%, which is moderate risk)    Does patient have any changes to current medical/health status since last appt (Y/N):  NO  Does patient have any signs/symptoms of  bleeding and/or thrombosis since the last appt (Y/N):  NO  Does patient have any interval changes to diet or medications since last appt (Y/N):  NO  Are there any complications or cost restrictions with current therapy (Y/N):  NO     Does patient have [facility] PCP? Yes, Dr Ganesh Mckeon (If not, please document discussion that patient must be seen at [facility])       Vitals:  declined today due to covid transmission concerns.    There were no vitals filed for this visit.     Medication reconciliation completed today.    Asssessment:      INR therapeutic at 2.2, therefore decreasing patient's stroke and/or bleeding risk.   Reason(s) for out of range INR today:  n/a      Plan:  Pt is to continue with current warfarin dosing regimen in order to maintain INR in therapeutic range.     Follow up:  Because warfarin is a high risk medication and current CHEST guidelines recommend regular monitoring intervals (few days up to 12 weeks), will have patient return to clinic in 2 weeks to recheck INR.    Brady Piña, PharmD, BCACP

## 2020-12-01 ENCOUNTER — TELEPHONE (OUTPATIENT)
Dept: MEDICAL GROUP | Facility: MEDICAL CENTER | Age: 85
End: 2020-12-01

## 2020-12-01 DIAGNOSIS — I10 ESSENTIAL HYPERTENSION: ICD-10-CM

## 2020-12-01 RX ORDER — AMLODIPINE BESYLATE 5 MG/1
TABLET ORAL
Qty: 135 TAB | Refills: 3 | Status: SHIPPED | OUTPATIENT
Start: 2020-12-01 | End: 2021-04-04 | Stop reason: SDUPTHER

## 2020-12-01 RX ORDER — AMLODIPINE BESYLATE 5 MG/1
2.5 TABLET ORAL 2 TIMES DAILY
Qty: 100 TAB | Refills: 3 | Status: SHIPPED | OUTPATIENT
Start: 2020-12-01 | End: 2020-12-01 | Stop reason: SDUPTHER

## 2020-12-01 NOTE — TELEPHONE ENCOUNTER
Please write a new script for amlodipine 5mg. Patient take 1 tab in the am and half a tab in the after noon and the pharmacy keep shorting her medication please send in a new one for 100 days

## 2020-12-04 ENCOUNTER — ANTICOAGULATION VISIT (OUTPATIENT)
Dept: MEDICAL GROUP | Facility: PHYSICIAN GROUP | Age: 85
End: 2020-12-04
Payer: MEDICARE

## 2020-12-04 DIAGNOSIS — I48.0 PAF (PAROXYSMAL ATRIAL FIBRILLATION) (HCC): ICD-10-CM

## 2020-12-04 DIAGNOSIS — Z79.01 CHRONIC ANTICOAGULATION: Primary | ICD-10-CM

## 2020-12-04 LAB — INR PPP: 2.1 (ref 2–3.5)

## 2020-12-04 PROCEDURE — 85610 PROTHROMBIN TIME: CPT | Performed by: FAMILY MEDICINE

## 2020-12-04 PROCEDURE — 99999 PR NO CHARGE: CPT | Performed by: FAMILY MEDICINE

## 2020-12-04 PROCEDURE — 93793 ANTICOAG MGMT PT WARFARIN: CPT | Performed by: FAMILY MEDICINE

## 2020-12-04 NOTE — PROGRESS NOTES
Anticoagulation Summary  As of 2020    INR goal:  2.0-3.0   TTR:  78.4 % (2.5 y)   INR used for dosin.10 (2020)   Warfarin maintenance plan:  1.5 mg (3 mg x 0.5) every Mon, Fri; 3 mg (3 mg x 1) all other days   Weekly warfarin total:  18 mg   Plan last modified:  Brady Piña, PharmD (2020)   Next INR check:  2020   Target end date:  Indefinite    Indications    Chronic anticoagulation [Z79.01]  PAF (paroxysmal atrial fibrillation) (HCC) [I48.0]             Anticoagulation Episode Summary     INR check location:      Preferred lab:      Send INR reminders to:      Comments:        Anticoagulation Care Providers     Provider Role Specialty Phone number    Ganesh Mckeon M.D. Referring Geriatrics [phone]    [facility] Responsible  [phone]        Anticoagulation Patient Findings  Patient Findings     Negatives:  Signs/symptoms of thrombosis, Signs/symptoms of bleeding, Laboratory test error suspected, Change in health, Change in alcohol use, Change in activity, Upcoming invasive procedure, Emergency department visit, Upcoming dental procedure, Missed doses, Extra doses, Change in medications, Change in diet/appetite, Hospital admission, Bruising, Other complaints        HPI:   Shereen Dale seen in clinic today, on anticoagulation therapy with warfarin for stroke prophylaxis due to history of atrial fibrillation.    Patient's previous INR was therapeutic at 2.2 on 20, at which time patient was instructed to continue with current warfarin regimen.  She returns to clinic today to recheck INR to ensure it is therapeutic and thus preventing possible clotting and/or bleeding/bruising complications.    CHADS-VASc = at least 5  (unadjusted ischemic stroke risk/year:  7.2%, which is moderate risk)    Does patient have any changes to current medical/health status since last appt (Y/N):  NO  Does patient have any signs/symptoms of bleeding and/or  thrombosis since the last appt (Y/N):  NO  Does patient have any interval changes to diet or medications since last appt (Y/N):  Cranberry sauce and lots of vegetables at [facility]  Are there any complications or cost restrictions with current therapy (Y/N):  NO     Does patient have [facility] PCP? Yes, Dr Ganesh Mckeon (If not, please document discussion that patient must be seen at [facility])       Vitals:  declined at today's visit due to covid transmission concerns.    There were no vitals filed for this visit.     Medication reconciliation completed today.    Asssessment:      INR remains therapeutic at 2.1, therefore decreasing patient's stroke and/or bleeding risk.   Reason(s) for out of range INR today:  n/a      Plan:  Pt is to continue with current warfarin dosing regimen in order to maintain INR in therapeutic range.     Follow up:  Because warfarin is a high risk medication and current CHEST guidelines recommend regular monitoring intervals (few days up to 12 weeks), will have patient return to clinic in 2 weeks to recheck INR.    Brady Piña, PharmD, BCACP

## 2020-12-07 DIAGNOSIS — I10 ESSENTIAL HYPERTENSION: ICD-10-CM

## 2020-12-18 ENCOUNTER — ANTICOAGULATION VISIT (OUTPATIENT)
Dept: MEDICAL GROUP | Facility: PHYSICIAN GROUP | Age: 85
End: 2020-12-18
Payer: MEDICARE

## 2020-12-18 DIAGNOSIS — I48.0 PAF (PAROXYSMAL ATRIAL FIBRILLATION) (HCC): ICD-10-CM

## 2020-12-18 DIAGNOSIS — Z79.01 CHRONIC ANTICOAGULATION: Primary | ICD-10-CM

## 2020-12-18 LAB — INR PPP: 2 (ref 2–3.5)

## 2020-12-18 PROCEDURE — 93793 ANTICOAG MGMT PT WARFARIN: CPT | Performed by: FAMILY MEDICINE

## 2020-12-18 PROCEDURE — 99999 PR NO CHARGE: CPT | Performed by: FAMILY MEDICINE

## 2020-12-18 PROCEDURE — 85610 PROTHROMBIN TIME: CPT | Performed by: FAMILY MEDICINE

## 2020-12-18 RX ORDER — TELMISARTAN 40 MG/1
TABLET ORAL
Qty: 200 TAB | Refills: 2 | Status: SHIPPED
Start: 2020-12-18 | End: 2021-04-04

## 2020-12-18 NOTE — PROGRESS NOTES
Anticoagulation Summary  As of 2020    INR goal:  2.0-3.0   TTR:  78.8 % (2.5 y)   INR used for dosin.00 (2020)   Warfarin maintenance plan:  1.5 mg (3 mg x 0.5) every Mon, Fri; 3 mg (3 mg x 1) all other days   Weekly warfarin total:  18 mg   Plan last modified:  Brady Piña, PharmD (2020)   Next INR check:  2021   Target end date:  Indefinite    Indications    Chronic anticoagulation [Z79.01]  PAF [facility]paroxysmal atrial fibrillation) ([facility]) [I48.0]             Anticoagulation Episode Summary     INR check location:      Preferred lab:      Send INR reminders to:      Comments:        Anticoagulation Care Providers     Provider Role Specialty Phone number    Ganesh Mckeon M.D. Referring Geriatrics [phone]    [facility] Responsible  [phone]        Anticoagulation Patient Findings  Patient Findings     Negatives:  Signs/symptoms of thrombosis, Signs/symptoms of bleeding, Laboratory test error suspected, Change in health, Change in alcohol use, Change in activity, Upcoming invasive procedure, Emergency department visit, Upcoming dental procedure, Missed doses, Extra doses, Change in medications, Change in diet/appetite, Hospital admission, Bruising, Other complaints        HPI:   Shereen Dale seen in clinic today, on anticoagulation therapy with warfarin for stroke prophylaxis due to history of atrial fibrillation.    Patient's previous INR was therapeutic at 2.1 on 20, at which time patient was instructed to continue with current warfarin regimen.  She returns to clinic today to recheck INR to ensure it is therapeutic and thus preventing possible clotting and/or bleeding/bruising complications.    CHADS-VASc = at least 5  (unadjusted ischemic stroke risk/year:  7.2%, which is moderate risk)    Does patient have any changes to current medical/health status since last appt (Y/N):  NO  Does patient have any signs/symptoms of bleeding and/or thrombosis  since the last appt (Y/N):  NO  Does patient have any interval changes to diet or medications since last appt (Y/N):  NO  Are there any complications or cost restrictions with current therapy (Y/N):  NO     Does patient have [facility] PCP? Yes, Dr Ganesh Mckeon (If not, please document discussion that patient must be seen at [facility])       Vitals:  declined at today's visit due to covid concerns.    There were no vitals filed for this visit.     Medication reconciliation completed today.    Asssessment:      INR remains therapeutic at 2.0, therefore decreasing patient's stroke and/or bleeding risk.   Reason(s) for out of range INR today:  n/a      Plan:  Pt is to continue with current warfarin dosing regimen.     Follow up:  Because warfarin is a high risk medication and current CHEST guidelines recommend regular monitoring intervals (few days up to 12 weeks), will have patient return to clinic in 2 weeks to recheck INR.    Brady Piña, PharmD, BCACP

## 2021-01-04 ENCOUNTER — ANTICOAGULATION VISIT (OUTPATIENT)
Dept: MEDICAL GROUP | Facility: PHYSICIAN GROUP | Age: 86
End: 2021-01-04
Payer: MEDICARE

## 2021-01-04 DIAGNOSIS — Z79.01 CHRONIC ANTICOAGULATION: Primary | ICD-10-CM

## 2021-01-04 DIAGNOSIS — I48.0 PAF (PAROXYSMAL ATRIAL FIBRILLATION) (HCC): ICD-10-CM

## 2021-01-04 LAB — INR PPP: 2.8 (ref 2–3.5)

## 2021-01-04 PROCEDURE — 99999 PR NO CHARGE: CPT | Performed by: PHYSICIAN ASSISTANT

## 2021-01-04 PROCEDURE — 93793 ANTICOAG MGMT PT WARFARIN: CPT | Performed by: PHYSICIAN ASSISTANT

## 2021-01-04 PROCEDURE — 85610 PROTHROMBIN TIME: CPT | Performed by: PHYSICIAN ASSISTANT

## 2021-01-04 NOTE — PROGRESS NOTES
Anticoagulation Summary  As of 2021    INR goal:  2.0-3.0   TTR:  79.1 % (2.6 y)   INR used for dosin.80 (2021)   Warfarin maintenance plan:  1.5 mg (3 mg x 0.5) every Mon, Fri; 3 mg (3 mg x 1) all other days   Weekly warfarin total:  18 mg   Plan last modified:  Brady Piña, PharmD (2020)   Next INR check:  1/15/2021   Target end date:  Indefinite    Indications    Chronic anticoagulation [Z79.01]  PAF (paroxysmal atrial fibrillation) (HCC) [I48.0]             Anticoagulation Episode Summary     INR check location:      Preferred lab:      Send INR reminders to:      Comments:        Anticoagulation Care Providers     Provider Role Specialty Phone number    Ganesh Mckeon M.D. Referring Geriatrics [phone]    [facility] Responsible  [phone]        Anticoagulation Patient Findings  Patient Findings     Negatives:  Signs/symptoms of thrombosis, Signs/symptoms of bleeding, Laboratory test error suspected, Change in health, Change in alcohol use, Change in activity, Upcoming invasive procedure, Emergency department visit, Upcoming dental procedure, Missed doses, Extra doses, Change in medications, Change in diet/appetite, Hospital admission, Bruising, Other complaints              HPI:   Shereen Dale seen in clinic today, on anticoagulation therapy with warfarin for stroke prevention due to history of A.fib.    Patient's previous INR was therapeutic at 2.2 on 2020, at which time patient was instructed to continue with current regimen.  She returns to clinic today to recheck INR to ensure it is therapeutic and thus preventing possible clotting and/or bleeding/bruising complications.    CHADS-VASc = 5  (unadjusted ischemic stroke risk/year:  7.2%, which is moderate)    Does patient have any changes to current medical/health status since last appt (Y/N):  No  Does patient have any signs/symptoms of bleeding and/or thrombosis since the last appt (Y/N):   No  Does patient have any interval changes to diet or medications since last appt (Y/N):  Yes, green beans  Are there any complications or cost restrictions with current therapy (Y/N):  No     Does patient have [facility] PCP? Yes,Ganesh Burton (If not, please document discussion that patient must be seen at [facility])       Vitals:  not taken d/t covid    There were no vitals filed for this visit.     Asssessment:      INR therapeutic at 2.8, therefore decreasing the risk for stroke.   Reason(s) for out of range INR today:  N/A      Pt is not on antiplatelet therapy.    Medication reconciliation completed today.Yes    Plan:  Pt is to continue with current warfarin dosing regimen.       Follow up:  Because warfarin is a high risk medication and current CHEST guidelines recommend regular monitoring intervals (few days up to 12 weeks), will have patient return to clinic in 1.5 weeks to recheck INR.    Zeferino Burton, Pharmacy Intern  Brady Piña, PharmD, BCACP

## 2021-01-11 DIAGNOSIS — Z23 NEED FOR VACCINATION: ICD-10-CM

## 2021-01-12 ENCOUNTER — TELEPHONE (OUTPATIENT)
Dept: HEALTH INFORMATION MANAGEMENT | Facility: OTHER | Age: 86
End: 2021-01-12

## 2021-01-12 NOTE — TELEPHONE ENCOUNTER
There is no rule for allergic reaction.  Patient could be allergic to medication but not for COVID-19 vaccine and vice versa.

## 2021-01-12 NOTE — TELEPHONE ENCOUNTER
Hello,    Patient has allergies to PCN and asking if it ok for her to have the Covid Vaccine.     She states she has never had reaction to any other vaccine.     Spouse has allergies to Iodine and same question as well.     Thank you,  [facility] Coordinator

## 2021-01-15 ENCOUNTER — ANTICOAGULATION VISIT (OUTPATIENT)
Dept: MEDICAL GROUP | Facility: PHYSICIAN GROUP | Age: 86
End: 2021-01-15
Payer: MEDICARE

## 2021-01-15 DIAGNOSIS — I48.0 PAF (PAROXYSMAL ATRIAL FIBRILLATION) (HCC): ICD-10-CM

## 2021-01-15 DIAGNOSIS — Z79.01 CHRONIC ANTICOAGULATION: Primary | ICD-10-CM

## 2021-01-15 LAB — INR PPP: 2.4 (ref 2–3.5)

## 2021-01-15 PROCEDURE — 99999 PR NO CHARGE: CPT | Performed by: FAMILY MEDICINE

## 2021-01-15 PROCEDURE — 85610 PROTHROMBIN TIME: CPT | Performed by: FAMILY MEDICINE

## 2021-01-15 PROCEDURE — 93793 ANTICOAG MGMT PT WARFARIN: CPT | Performed by: FAMILY MEDICINE

## 2021-01-15 NOTE — PROGRESS NOTES
Anticoagulation Summary  As of 1/15/2021    INR goal:  2.0-3.0   TTR:  79.4 % (2.6 y)   INR used for dosin.40 (1/15/2021)   Warfarin maintenance plan:  1.5 mg (3 mg x 0.5) every Mon, Fri; 3 mg (3 mg x 1) all other days   Weekly warfarin total:  18 mg   Plan last modified:  Brady Piña, PharmD (2020)   Next INR check:  2021   Target end date:  Indefinite    Indications    Chronic anticoagulation [Z79.01]  PAF (paroxysmal atrial fibrillation) (HCC) [I48.0]             Anticoagulation Episode Summary     INR check location:      Preferred lab:      Send INR reminders to:      Comments:        Anticoagulation Care Providers     Provider Role Specialty Phone number    Ganesh Mckeon M.D. Referring Geriatrics [phone]    [facility] Responsible  [phone]        Anticoagulation Patient Findings  Patient Findings     Positives:  Change in diet/appetite (Eating Kale more than usual)    Negatives:  Signs/symptoms of thrombosis, Signs/symptoms of bleeding, Laboratory test error suspected, Change in health, Change in alcohol use, Change in activity, Upcoming invasive procedure, Emergency department visit, Upcoming dental procedure, Missed doses, Extra doses, Change in medications, Hospital admission, Bruising, Other complaints              HPI:   Shereen Dale seen in clinic today, on anticoagulation therapy with warfarin for stroke prevention due to history of A.fib    Patient's previous INR was therapeutic at 2.8 on 2021, at which time patient was instructed to continue with current dose regimen.  She returns to clinic today to recheck INR to ensure it is therapeutic and thus preventing possible clotting and/or bleeding/bruising complications.    CHADS-VASc = 5  (unadjusted ischemic stroke risk/year:  7.2%, which is moderate risk)    Does patient have any changes to current medical/health status since last appt (Y/N):  No  Does patient have any signs/symptoms of  bleeding and/or thrombosis since the last appt (Y/N):  No  Does patient have any interval changes to diet or medications since last appt (Y/N):  Yes, reports to eat kale.  Are there any complications or cost restrictions with current therapy (Y/N):  No     Does patient have [facility] PCP? Yes, Ganesh Burton (If not, please document discussion that patient must be seen at [facility] clinic)       Vitals:  Not taken d/t covid transmission risk        Asssessment:      INR therapeutic at 2.4, therefore decreasing the risk for stroke   Reason(s) for out of range INR today:  N/A      Pt NOT on antiplatelet therapy.    Medication reconciliation completed today.    Plan:  Pt is to continue with current warfarin dosing regimen.       Follow up:  Because warfarin is a high risk medication and current CHEST guidelines recommend regular monitoring intervals (few days up to 12 weeks), will have patient return to clinic in 2 weeks to recheck INR.    Zeferino Burton, Pharmacy Intern  Brady Piña, PharmD, BCACP

## 2021-01-28 ENCOUNTER — IMMUNIZATION (OUTPATIENT)
Dept: FAMILY PLANNING/WOMEN'S HEALTH CLINIC | Facility: IMMUNIZATION CENTER | Age: 86
End: 2021-01-28
Attending: INTERNAL MEDICINE
Payer: MEDICARE

## 2021-01-28 DIAGNOSIS — Z23 ENCOUNTER FOR VACCINATION: Primary | ICD-10-CM

## 2021-01-28 DIAGNOSIS — Z23 NEED FOR VACCINATION: ICD-10-CM

## 2021-01-28 PROCEDURE — 91301 MODERNA SARS-COV-2 VACCINE: CPT

## 2021-01-28 PROCEDURE — 0011A MODERNA SARS-COV-2 VACCINE: CPT

## 2021-01-29 ENCOUNTER — ANTICOAGULATION VISIT (OUTPATIENT)
Dept: MEDICAL GROUP | Facility: PHYSICIAN GROUP | Age: 86
End: 2021-01-29
Payer: MEDICARE

## 2021-01-29 DIAGNOSIS — Z79.01 CHRONIC ANTICOAGULATION: Primary | ICD-10-CM

## 2021-01-29 DIAGNOSIS — I48.0 PAF (PAROXYSMAL ATRIAL FIBRILLATION) (HCC): ICD-10-CM

## 2021-01-29 LAB — INR PPP: 2.7 (ref 2–3.5)

## 2021-01-29 PROCEDURE — 85610 PROTHROMBIN TIME: CPT | Performed by: FAMILY MEDICINE

## 2021-01-29 PROCEDURE — 93793 ANTICOAG MGMT PT WARFARIN: CPT | Performed by: FAMILY MEDICINE

## 2021-01-29 PROCEDURE — 99999 PR NO CHARGE: CPT | Performed by: FAMILY MEDICINE

## 2021-01-29 NOTE — PROGRESS NOTES
Anticoagulation Summary  As of 2021    INR goal:  2.0-3.0   TTR:  79.7 % (2.7 y)   INR used for dosin.70 (2021)   Warfarin maintenance plan:  1.5 mg (3 mg x 0.5) every Mon, Fri; 3 mg (3 mg x 1) all other days   Weekly warfarin total:  18 mg   Plan last modified:  Brady Piña, PharmD (2020)   Next INR check:  2021   Target end date:  Indefinite    Indications    Chronic anticoagulation [Z79.01]  PAF (paroxysmal atrial fibrillation) (HCC) [I48.0]             Anticoagulation Episode Summary     INR check location:      Preferred lab:      Send INR reminders to:      Comments:        Anticoagulation Care Providers     Provider Role Specialty Phone number    Ganesh Mckeon M.D. Referring Geriatrics [phone]    [facility] Responsible  [phone]        Anticoagulation Patient Findings  Patient Findings     Negatives:  Signs/symptoms of thrombosis, Signs/symptoms of bleeding, Laboratory test error suspected, Change in health, Change in alcohol use, Change in activity, Upcoming invasive procedure, Emergency department visit, Upcoming dental procedure, Missed doses, Extra doses, Change in medications, Change in diet/appetite, Hospital admission, Bruising, Other complaints        HPI:   Shereen Dale seen in clinic today, on anticoagulation therapy with warfarin for stroke prophylaxis due to history of atrial fibrillation.    Patient's previous INR was therapeutic at 2.4 on 1-15-21, at which time patient was instructed to continue with current warfarin regimen.  She returns to clinic today to recheck INR to ensure it is therapeutic and thus preventing possible clotting and/or bleeding/bruising complications.    CHADS-VASc = at least 5  (unadjusted ischemic stroke risk/year:  7.2%, which is moderate risk)    Does patient have any changes to current medical/health status since last appt (Y/N):  NO  Does patient have any signs/symptoms of bleeding and/or thrombosis  since the last appt (Y/N):  NO  Does patient have any interval changes to diet or medications since last appt (Y/N):  NO  Are there any complications or cost restrictions with current therapy (Y/N):  NO     Does patient have [facility] PCP? Yes, Dr Ganesh Mckeon (If not, please document discussion that patient must be seen at [facility])       Vitals:  Declined by patient today due to Covid-19 transmission concerns.    There were no vitals filed for this visit.     Asssessment:      INR therapeutic at 2.7, therefore decreasing patient's stroke and/or bleeding risk.  Reason(s) for out of range INR today:  n/a      Pt NOT on antiplatelet therapy.    Medication reconciliation completed today.    Plan:  Pt is to continue with current warfarin dosing regimen.     Follow up:  Because warfarin is a high risk medication and current CHEST guidelines recommend regular monitoring intervals (few days up to 12 weeks), will have patient return to clinic in 2 weeks to recheck INR.    Brady Piña, PharmD, BCACP

## 2021-02-10 DIAGNOSIS — Z79.01 CHRONIC ANTICOAGULATION: ICD-10-CM

## 2021-02-12 ENCOUNTER — ANTICOAGULATION VISIT (OUTPATIENT)
Dept: MEDICAL GROUP | Facility: PHYSICIAN GROUP | Age: 86
End: 2021-02-12
Payer: MEDICARE

## 2021-02-12 DIAGNOSIS — I48.0 PAF (PAROXYSMAL ATRIAL FIBRILLATION) (HCC): ICD-10-CM

## 2021-02-12 DIAGNOSIS — Z79.01 CHRONIC ANTICOAGULATION: Primary | ICD-10-CM

## 2021-02-12 LAB — INR PPP: 2 (ref 2–3.5)

## 2021-02-12 PROCEDURE — 93793 ANTICOAG MGMT PT WARFARIN: CPT | Performed by: NURSE PRACTITIONER

## 2021-02-12 PROCEDURE — 85610 PROTHROMBIN TIME: CPT | Performed by: FAMILY MEDICINE

## 2021-02-12 PROCEDURE — 99999 PR NO CHARGE: CPT | Performed by: FAMILY MEDICINE

## 2021-02-12 NOTE — PROGRESS NOTES
Anticoagulation Summary  As of 2021    INR goal:  2.0-3.0   TTR:  80.0 % (2.7 y)   INR used for dosin.00 (2021)   Warfarin maintenance plan:  1.5 mg (3 mg x 0.5) every Mon, Fri; 3 mg (3 mg x 1) all other days   Weekly warfarin total:  18 mg   Plan last modified:  Brady Piña, PharmD (2020)   Next INR check:  2021   Target end date:  Indefinite    Indications    Chronic anticoagulation [Z79.01]  [facility] [I48.0]             Anticoagulation Episode Summary     INR check location:      Preferred lab:      Send INR reminders to:      Comments:        Anticoagulation Care Providers     Provider Role Specialty Phone number    Ganesh Mckeon M.D. Referring Geriatrics [phone]    [facility] Responsible  [phone]        Anticoagulation Patient Findings  Patient Findings     Negatives:  Signs/symptoms of thrombosis, Signs/symptoms of bleeding, Laboratory test error suspected, Change in health, Change in alcohol use, Change in activity, Upcoming invasive procedure, Emergency department visit, Upcoming dental procedure, Missed doses, Extra doses, Change in medications, Change in diet/appetite, Hospital admission, Bruising, Other complaints        HPI:   Shereen Dale seen in clinic today, on anticoagulation therapy with warfarin for stroke prophylaxis due to history of PAF.    Patient's previous INR was therapeutic at 2.7 on 21, at which time patient was instructed to continue with current warfarin regimen.  She returns to clinic today to recheck INR to ensure it is therapeutic and thus preventing possible clotting and/or bleeding/bruising complications.    CHADS-VASc = at least 5  (unadjusted ischemic stroke risk/year:  7.2%, which is moderate risk)    Does patient have any changes to current medical/health status since last appt (Y/N):  NO  Does patient have any signs/symptoms of bleeding and/or thrombosis since the last  appt (Y/N):  NO  Does patient have any interval changes to diet or medications since last appt (Y/N):  NO  Are there any complications or cost restrictions with current therapy (Y/N):  NO     Does patient have [facility] PCP? Yes, Dr Ganesh Mckeon (If not, please document discussion that patient must be seen at [facility])       Vitals:  Declined by patient today due to Covid-19 transmission concerns.    There were no vitals filed for this visit.     Asssessment:      INR therapeutic at 2.0, therefore decreasing patient's stroke and/or bleeding risk.   Reason(s) for out of range INR today:  n/a      Pt NOT on antiplatelet therapy.    Medication reconciliation completed today.    Plan:  Pt is to continue with current warfarin dosing regimen.     Follow up:  Because warfarin is a high risk medication and current CHEST guidelines recommend regular monitoring intervals (few days up to 12 weeks), will have patient return to clinic in 2 weeks to recheck INR.    Brady Piña, PharmD, BCACP

## 2021-02-24 ENCOUNTER — HOSPITAL ENCOUNTER (OUTPATIENT)
Dept: RADIOLOGY | Facility: MEDICAL CENTER | Age: 86
End: 2021-02-24
Attending: FAMILY MEDICINE
Payer: MEDICARE

## 2021-02-24 DIAGNOSIS — Z12.31 VISIT FOR SCREENING MAMMOGRAM: ICD-10-CM

## 2021-02-24 PROCEDURE — 77063 BREAST TOMOSYNTHESIS BI: CPT

## 2021-02-25 ENCOUNTER — IMMUNIZATION (OUTPATIENT)
Dept: FAMILY PLANNING/WOMEN'S HEALTH CLINIC | Facility: IMMUNIZATION CENTER | Age: 86
End: 2021-02-25
Attending: INTERNAL MEDICINE
Payer: MEDICARE

## 2021-02-25 DIAGNOSIS — Z23 ENCOUNTER FOR VACCINATION: Primary | ICD-10-CM

## 2021-02-25 PROCEDURE — 91301 MODERNA SARS-COV-2 VACCINE: CPT

## 2021-02-25 PROCEDURE — 0012A MODERNA SARS-COV-2 VACCINE: CPT

## 2021-02-26 ENCOUNTER — ANTICOAGULATION VISIT (OUTPATIENT)
Dept: MEDICAL GROUP | Facility: PHYSICIAN GROUP | Age: 86
End: 2021-02-26
Payer: MEDICARE

## 2021-02-26 DIAGNOSIS — Z79.01 CHRONIC ANTICOAGULATION: Primary | ICD-10-CM

## 2021-02-26 DIAGNOSIS — I48.0 PAF (PAROXYSMAL ATRIAL FIBRILLATION) (HCC): ICD-10-CM

## 2021-02-26 LAB — INR PPP: 2 (ref 2–3.5)

## 2021-02-26 PROCEDURE — 85610 PROTHROMBIN TIME: CPT | Performed by: FAMILY MEDICINE

## 2021-02-26 PROCEDURE — 99999 PR NO CHARGE: CPT | Performed by: FAMILY MEDICINE

## 2021-02-26 PROCEDURE — 93793 ANTICOAG MGMT PT WARFARIN: CPT | Performed by: FAMILY MEDICINE

## 2021-02-26 NOTE — PROGRESS NOTES
Anticoagulation Summary  As of 2021    INR goal:  2.0-3.0   TTR:  80.2 % (2.7 y)   INR used for dosin.00 (2021)   Warfarin maintenance plan:  1.5 mg (3 mg x 0.5) every Mon, Fri; 3 mg (3 mg x 1) all other days   Weekly warfarin total:  18 mg   Plan last modified:  Brady Piña, PharmD (2020)   Next INR check:  3/12/2021   Target end date:  Indefinite    Indications    Chronic anticoagulation [Z79.01]  PAF (paroxysmal atrial fibrillation) (HCC) [I48.0]             Anticoagulation Episode Summary     INR check location:      Preferred lab:      Send INR reminders to:      Comments:        Anticoagulation Care Providers     Provider Role Specialty Phone number    Ganesh Mckeon M.D. Referring Geriatrics [phone]    [facility] Responsible  [phone]        Anticoagulation Patient Findings  Patient Findings     Negatives:  Signs/symptoms of thrombosis, Signs/symptoms of bleeding, Laboratory test error suspected, Change in health, Change in alcohol use, Change in activity, Upcoming invasive procedure, Emergency department visit, Upcoming dental procedure, Missed doses, Extra doses, Change in medications, Change in diet/appetite, Hospital admission, Bruising, Other complaints        HPI:   Shereen Dale seen in clinic today, on anticoagulation therapy with warfarin for stroke prophylaxis due to history of atrial fibrillation.    Patient's previous INR was therapeutic at 2.0 on 21, at which time patient was instructed to continue with current warfarin regimen.  She returns to clinic today to recheck INR to ensure it is therapeutic and thus preventing possible clotting and/or bleeding/bruising complications.    CHADS-VASc = at least 5  (unadjusted ischemic stroke risk/year:  7.2%, which is moderate risk)    Does patient have any changes to current medical/health status since last appt (Y/N):  NO  Does patient have any signs/symptoms of bleeding and/or thrombosis  since the last appt (Y/N):  NO  Does patient have any interval changes to diet or medications since last appt (Y/N):  Received second covid vaccine this week.  Are there any complications or cost restrictions with current therapy (Y/N):  NO     [facility]? Yes, Dr Ganesh Mckeon (If not, please document discussion that patient must be seen at [facility])       Vitals:  Declined by patient today due to Covid-19 transmission concerns.    There were no vitals filed for this visit.     Asssessment:      INR therapeutic at 2.0, therefore decreasing patient's stroke and/or bleeding risk.   Reason(s) for out of range INR today:  n/a      Pt NOT on antiplatelet therapy.    Medication reconciliation completed today.    Plan:  Pt is to continue with current warfarin dosing regimen.     Follow up:  Because warfarin is a high risk medication and current CHEST guidelines recommend regular monitoring intervals (few days up to 12 weeks), will have patient return to clinic in 2 weeks to recheck INR.    Brady Piña, PharmD, BCACP

## 2021-03-01 ENCOUNTER — HOSPITAL ENCOUNTER (OUTPATIENT)
Dept: LAB | Facility: MEDICAL CENTER | Age: 86
End: 2021-03-01
Attending: INTERNAL MEDICINE
Payer: MEDICARE

## 2021-03-01 DIAGNOSIS — I48.0 PAF (PAROXYSMAL ATRIAL FIBRILLATION) (HCC): ICD-10-CM

## 2021-03-01 DIAGNOSIS — E66.9 DIABETES MELLITUS TYPE 2 IN OBESE: ICD-10-CM

## 2021-03-01 DIAGNOSIS — Z79.01 CHRONIC ANTICOAGULATION: ICD-10-CM

## 2021-03-01 DIAGNOSIS — E11.69 DIABETES MELLITUS TYPE 2 IN OBESE: ICD-10-CM

## 2021-03-01 LAB
ALBUMIN SERPL BCP-MCNC: 4.5 G/DL (ref 3.2–4.9)
ALBUMIN/GLOB SERPL: 1.6 G/DL
ALP SERPL-CCNC: 52 U/L (ref 30–99)
ALT SERPL-CCNC: 45 U/L (ref 2–50)
ANION GAP SERPL CALC-SCNC: 10 MMOL/L (ref 7–16)
AST SERPL-CCNC: 38 U/L (ref 12–45)
BILIRUB SERPL-MCNC: 0.4 MG/DL (ref 0.1–1.5)
BUN SERPL-MCNC: 10 MG/DL (ref 8–22)
CALCIUM SERPL-MCNC: 9.7 MG/DL (ref 8.5–10.5)
CHLORIDE SERPL-SCNC: 100 MMOL/L (ref 96–112)
CHOLEST SERPL-MCNC: 185 MG/DL (ref 100–199)
CO2 SERPL-SCNC: 28 MMOL/L (ref 20–33)
CREAT SERPL-MCNC: 0.65 MG/DL (ref 0.5–1.4)
ERYTHROCYTE [DISTWIDTH] IN BLOOD BY AUTOMATED COUNT: 44.8 FL (ref 35.9–50)
EST. AVERAGE GLUCOSE BLD GHB EST-MCNC: 137 MG/DL
FASTING STATUS PATIENT QL REPORTED: NORMAL
GLOBULIN SER CALC-MCNC: 2.9 G/DL (ref 1.9–3.5)
GLUCOSE SERPL-MCNC: 141 MG/DL (ref 65–99)
HBA1C MFR BLD: 6.4 % (ref 4–5.6)
HCT VFR BLD AUTO: 41 % (ref 37–47)
HDLC SERPL-MCNC: 54 MG/DL
HGB BLD-MCNC: 13.4 G/DL (ref 12–16)
LDLC SERPL CALC-MCNC: 104 MG/DL
MCH RBC QN AUTO: 30.7 PG (ref 27–33)
MCHC RBC AUTO-ENTMCNC: 32.7 G/DL (ref 33.6–35)
MCV RBC AUTO: 93.8 FL (ref 81.4–97.8)
PLATELET # BLD AUTO: 232 K/UL (ref 164–446)
PMV BLD AUTO: 9.9 FL (ref 9–12.9)
POTASSIUM SERPL-SCNC: 4.5 MMOL/L (ref 3.6–5.5)
PROT SERPL-MCNC: 7.4 G/DL (ref 6–8.2)
RBC # BLD AUTO: 4.37 M/UL (ref 4.2–5.4)
SODIUM SERPL-SCNC: 138 MMOL/L (ref 135–145)
TRIGL SERPL-MCNC: 136 MG/DL (ref 0–149)
TSH SERPL DL<=0.005 MIU/L-ACNC: 2.81 UIU/ML (ref 0.38–5.33)
WBC # BLD AUTO: 5.9 K/UL (ref 4.8–10.8)

## 2021-03-01 PROCEDURE — 36415 COLL VENOUS BLD VENIPUNCTURE: CPT

## 2021-03-01 PROCEDURE — 84443 ASSAY THYROID STIM HORMONE: CPT

## 2021-03-01 PROCEDURE — 85027 COMPLETE CBC AUTOMATED: CPT

## 2021-03-01 PROCEDURE — 80053 COMPREHEN METABOLIC PANEL: CPT

## 2021-03-01 PROCEDURE — 83036 HEMOGLOBIN GLYCOSYLATED A1C: CPT

## 2021-03-01 PROCEDURE — 80061 LIPID PANEL: CPT

## 2021-03-04 ENCOUNTER — OFFICE VISIT (OUTPATIENT)
Dept: MEDICAL GROUP | Facility: MEDICAL CENTER | Age: 86
End: 2021-03-04
Payer: MEDICARE

## 2021-03-04 VITALS
RESPIRATION RATE: 16 BRPM | HEIGHT: 61 IN | TEMPERATURE: 84 F | BODY MASS INDEX: 29.83 KG/M2 | SYSTOLIC BLOOD PRESSURE: 126 MMHG | HEART RATE: 99 BPM | DIASTOLIC BLOOD PRESSURE: 60 MMHG | OXYGEN SATURATION: 95 % | WEIGHT: 158 LBS

## 2021-03-04 DIAGNOSIS — I48.0 PAF (PAROXYSMAL ATRIAL FIBRILLATION) (HCC): ICD-10-CM

## 2021-03-04 DIAGNOSIS — E11.69 DIABETES MELLITUS TYPE 2 IN OBESE: ICD-10-CM

## 2021-03-04 DIAGNOSIS — E66.9 DIABETES MELLITUS TYPE 2 IN OBESE: ICD-10-CM

## 2021-03-04 DIAGNOSIS — I10 ESSENTIAL HYPERTENSION: ICD-10-CM

## 2021-03-04 DIAGNOSIS — I35.0 SEVERE AORTIC STENOSIS: ICD-10-CM

## 2021-03-04 PROCEDURE — 99214 OFFICE O/P EST MOD 30 MIN: CPT | Performed by: FAMILY MEDICINE

## 2021-03-04 ASSESSMENT — FIBROSIS 4 INDEX: FIB4 SCORE: 2.05

## 2021-03-04 ASSESSMENT — PATIENT HEALTH QUESTIONNAIRE - PHQ9: CLINICAL INTERPRETATION OF PHQ2 SCORE: 0

## 2021-03-04 NOTE — PROGRESS NOTES
Annual Health Assessment Questions:    1.  Are you currently engaging in any exercise or physical activity? No    2.  How would you describe your mood or emotional well-being today? good    3.  Have you had any falls in the last year? No    4.  Have you noticed any problems with your balance or had difficulty walking? No    5.  In the last six months have you experienced any leakage of urine? No    6. DPA/Advanced Directive: Patient has Advanced Directive on file.      CC: AUDRA. fib, hypertension, severe aortic stenosis, diabetes    HPI:   Shereen presents today to discuss the following    PAF (paroxysmal atrial fibrillation) ([facility])  Denies palpitation that she has been having exertional shortness of breath.  Patient is currently on Coumadin and digoxin.Last INR was 2.  She has been following up with cardiology and Coumadin clinic    Essential hypertension  Has been adequately controlled on current medication. Denies headache, chest pain, and SOB.patient has been on telmisartan 40 mg twice a day, and clonidine 0.1 mg twice a day as needed    Severe aortic stenosis  Patient currently has shortness of breath and mild bilateral lower extremities edema.  Last echocardiogram was done in 2019.Patient is adamant to have any surgical replacement of her aortic valve.  However will discuss the echocardiogram with the patient and have the results back.    Diabetes mellitus type 2 in obese ([facility])  Blood glucose has been adequately controlled.  Denies polyuria polydipsia.  Last A1c was 6.4.  Patient is currently on Metformin 1500 mg daily, and glipizide SR 2.5 mg daily.  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) 09/12/2019   • Skin abrasion 08/20/2019   • Chronic anticoagulation 08/02/2019   • Hyponatremia 03/25/2019   • Advanced directives, counseling/discussion 09/26/2018   • Hypertensive urgency 05/11/2017   • Diastolic dysfunction 02/02/2016   • Tear of lateral cartilage or meniscus of knee, current 05/21/2015   • PAF (paroxysmal atrial fibrillation) ([facility]) 01/12/2015   • Dyspnea on effort 01/02/2014   • Diabetes ([facility]) 12/26/2013   • Aortic stenosis 07/02/2013       Current Outpatient Medications   Medication Sig Dispense Refill   • telmisartan (MICARDIS) 40 MG Tab TAKE ONE TABLET BY MOUTH TWICE A  Tab 2   • amLODIPine (NORVASC) 5 MG Tab Take 5 mg in the morning, and 2.5 in the evening 135 Tab 3   • glipiZIDE SR (GLUCOTROL) 2.5 MG TABLET SR 24 HR TAKE ONE TABLET BY MOUTH EVERY  Tab 3   • furosemide (LASIX) 20 MG Tab Take 1 Tab by mouth every day. 30 Tab 3   • potassium chloride SA (KDUR) 20 MEQ Tab CR Take 1 Tab by mouth 2 times a day. 60 Tab 11   • digoxin (LANOXIN) 125 MCG Tab Take 1 Tab by mouth every day. 90 Tab 3   • omeprazole (PRILOSEC) 20 MG delayed-release capsule Take 1 Cap by mouth every day. 90 Cap 3   • metFORMIN (GLUCOPHAGE) 500 MG Tab TAKE ONE TABLET BY MOUTH THREE TIMES A  Tab 2   • warfarin (COUMADIN) 3 MG Tab TAKE ONE TABLET BY MOUTH EVERY  Tab 2   • levothyroxine (SYNTHROID) 50 MCG Tab Take 1 Tab by mouth every morning. 90 Tab 3   • Blood Glucose Monitoring Suppl Device Meter: Dispense free style meter. Sig. Use 2 times daily as directed for blood sugar monitoring. dx e11.9 1 Device 0   • famotidine (PEPCID) 20 MG Tab TAKE ONE TABLET BY MOUTH TWICE A DAY 60 Tab 2   • cloNIDine (CATAPRES) 0.1 MG Tab Take 1 Tab by mouth 2 times a day as needed. If SBP > 160 60 Tab 2   • famotidine (PEPCID) 20 MG Tab TAKE ONE TABLET BY MOUTH TWICE A DAY 60 Tab 3   • acetaminophen (TYLENOL) 325 MG Tab Take 2 Tabs by mouth every 6 hours as needed (Mild  "Pain; (Pain scale 1-3); Temp greater than 100.5 F). 30 Tab 0   • gabapentin (NEURONTIN) 100 MG Cap Take 1 Cap by mouth every day. 90 Cap 0   • dorzolamide-timolol (COSOPT) 22.3-6.8 MG/ML Solution Place 1 Drop in both eyes 2 times a day.     • Glucosamine HCl (GLUCOSAMINE PO) Take 1 Tab by mouth every morning. Pt unsure of dose     • Polyethyl Glycol-Propyl Glycol (SYSTANE OP) 1-2 Drops by Ophthalmic route 4 times a day as needed.     • docosahexanoic acid (OMEGA 3 FA) 1000 MG CAPS Take 1 Cap by mouth every morning.     • VITAMIN D PO Take 2,000 Units by mouth every morning.     • CALCIUM 500 PO Take 1 Tab by mouth every morning.       No current facility-administered medications for this visit.         Allergies as of 03/04/2021 - Reviewed 03/04/2021   Allergen Reaction Noted   • Darvon [propoxyphene hcl]  03/18/2008   • Iodine  05/11/2015   • Latex  09/19/2013   • Penicillins Anaphylaxis 08/02/2008   • Propoxyphene hcl Anaphylaxis 08/02/2008   • Tetanus antitoxin Myalgia 11/19/2017   • Tetracycline Anaphylaxis 08/02/2008        ROS: Denies any chest pain, Shortness of breath, Changes bowel or bladder, Lower extremity edema.    Physical Exam:  /60 (BP Location: Right arm, Patient Position: Sitting, BP Cuff Size: Adult)   Pulse 99   Temp (!) 28.9 °C (84 °F) (Temporal)   Resp 16   Ht 1.549 m (5' 1\")   Wt 71.7 kg (158 lb)   LMP 01/01/1985   SpO2 95%   BMI 29.85 kg/m²   Gen.: Well-developed, well-nourished, no apparent distress,pleasant and cooperative with the examination  Skin:  Warm and dry with good turgor. No rashes or suspicious lesions in visible areas  Eye: PERRLA, conjunctiva and sclera clear, lids normal  HEENT: Normocephalic/atraumatic, sinuses nontender with palpation, TMs clear, nares patent with pink mucosa and clear rhinorrhea, lips without lesions, oropharynx clear.  Neck: Trachea midline,no masses or adenopathy  Thyroid: normal consistency and size. No masses or nodules. Not tender with " palpation.  Cor: Irregular irregular rate and rhythm with systolic murmur, gallop or rub.  Lungs: Respirations unlabored.Clear to auscultation with equal breath sounds bilaterally. No wheezes, rhonchi.  Abdomen: Soft nontender without hepatosplenomegaly or masses appreciated, normoactive bowel sounds. No hernias.  Extremities: No cyanosis, clubbing or edema, Symmetrical without deformities or malformations. Pulses 2+ and symmetrical both upper and lower extremities  Lymphatic: No abnormal adenopathy of the neck groin or axillae.  Psych: Alert and oriented x 3.Normal affect, judgement,insight and memory.        Assessment and Plan.   85 y.o. female     1. PAF (paroxysmal atrial fibrillation) (HCC)  Stable.  No RVR.  Continue on Coumadin and digoxin.  Last INR was 2    2. Essential hypertension  Controlled.  Continue on telmisartan 40 mg twice a day, and clonidine 0.1 mg twice a day as needed    3. Severe aortic stenosis  Patient currently has shortness of breath and mild bilateral lower extremities edema.  Last echocardiogram was done in 2019.  We will repeat echocardiogram.  However patient is adamant to have any surgical replacement of her aortic valve.  However will discuss the echocardiogram with the patient and have the results back.    - EC-ECHOCARDIOGRAM COMPLETE W/ CONT; Future    4. Diabetes mellitus type 2 in obese (HCC)  Last A1c was 6.4.  Continue on Metformin 1500 mg daily, and glipizide SR 2.5 mg daily

## 2021-03-12 ENCOUNTER — ANTICOAGULATION VISIT (OUTPATIENT)
Dept: MEDICAL GROUP | Facility: PHYSICIAN GROUP | Age: 86
End: 2021-03-12
Payer: MEDICARE

## 2021-03-12 DIAGNOSIS — I48.0 PAF (PAROXYSMAL ATRIAL FIBRILLATION) (HCC): ICD-10-CM

## 2021-03-12 DIAGNOSIS — Z79.01 CHRONIC ANTICOAGULATION: Primary | ICD-10-CM

## 2021-03-12 LAB — INR PPP: 2.6 (ref 2–3.5)

## 2021-03-12 PROCEDURE — 93793 ANTICOAG MGMT PT WARFARIN: CPT | Performed by: FAMILY MEDICINE

## 2021-03-12 PROCEDURE — 99999 PR NO CHARGE: CPT | Performed by: FAMILY MEDICINE

## 2021-03-12 PROCEDURE — 85610 PROTHROMBIN TIME: CPT | Performed by: FAMILY MEDICINE

## 2021-03-12 NOTE — PROGRESS NOTES
Anticoagulation Summary  As of 3/12/2021    INR goal:  2.0-3.0   TTR:  80.5 % (2.8 y)   INR used for dosin.60 (3/12/2021)   Warfarin maintenance plan:  1.5 mg (3 mg x 0.5) every Mon, Fri; 3 mg (3 mg x 1) all other days   Weekly warfarin total:  18 mg   Plan last modified:  Brady Piña, PharmD (2020)   Next INR check:  3/26/2021   Target end date:  Indefinite    Indications    Chronic anticoagulation [Z79.01]  PAF (paroxysmal atrial fibrillation) (HCC) [I48.0]             Anticoagulation Episode Summary     INR check location:      Preferred lab:      Send INR reminders to:      Comments:        Anticoagulation Care Providers     Provider Role Specialty Phone number    Ganesh Mckeon M.D. Referring Geriatrics [phone]    [facility] Responsible  [phone]        Anticoagulation Patient Findings  Patient Findings     Negatives:  Signs/symptoms of thrombosis, Signs/symptoms of bleeding, Laboratory test error suspected, Change in health, Change in alcohol use, Change in activity, Upcoming invasive procedure, Emergency department visit, Upcoming dental procedure, Missed doses, Extra doses, Change in medications, Change in diet/appetite, Hospital admission, Bruising, Other complaints        HPI:   Shereen Dale seen in clinic today, on anticoagulation therapy with warfarin for stroke prophylaxis due to history of atrial fibrillation.    Patient's previous INR was therapeutic at 2.0 on 21, at which time patient was instructed to continue with current warfarin regimen.  She returns to clinic today to recheck INR to ensure it is therapeutic and thus preventing possible clotting and/or bleeding/bruising complications.    CHADS-VASc = at least 5  (unadjusted ischemic stroke risk/year:  7.2%, which is moderate risk)    Does patient have any changes to current medical/health status since last appt (Y/N):  NO  Does patient have any signs/symptoms of bleeding and/or thrombosis  since the last appt (Y/N):  NO  Does patient have any interval changes to diet or medications since last appt (Y/N):  NO  Are there any complications or cost restrictions with current therapy (Y/N):  NO     Does patient have [facility] PCP? Yes, Dr Ganesh Mckeon (If not, please document discussion that patient must be seen at [facility])       Vitals:  Declined by patient today due to Covid-19 transmission concerns.    There were no vitals filed for this visit.     Asssessment:      INR remains therapeutic at 2.6, therefore decreasing patient's stroke and/or bleeding risk.   Reason(s) for out of range INR today:  n/a      Pt NOT on antiplatelet therapy.    Medication reconciliation completed today.    Plan:  Pt is to continue with current warfarin dosing regimen.     Follow up:  Because warfarin is a high risk medication and current CHEST guidelines recommend regular monitoring intervals (few days up to 12 weeks), will have patient return to clinic in 2 weeks to recheck INR.    Brady Piña, PharmD, BCACP

## 2021-03-22 RX ORDER — WARFARIN SODIUM 3 MG/1
TABLET ORAL
Qty: 100 TABLET | Refills: 3 | Status: SHIPPED | OUTPATIENT
Start: 2021-03-22 | End: 2021-04-19

## 2021-03-26 ENCOUNTER — ANTICOAGULATION VISIT (OUTPATIENT)
Dept: MEDICAL GROUP | Facility: PHYSICIAN GROUP | Age: 86
End: 2021-03-26
Payer: MEDICARE

## 2021-03-26 DIAGNOSIS — I48.0 PAF (PAROXYSMAL ATRIAL FIBRILLATION) (HCC): ICD-10-CM

## 2021-03-26 DIAGNOSIS — Z79.01 CHRONIC ANTICOAGULATION: Primary | ICD-10-CM

## 2021-03-26 LAB — INR PPP: 1.9 (ref 2–3.5)

## 2021-03-26 PROCEDURE — 85610 PROTHROMBIN TIME: CPT | Performed by: FAMILY MEDICINE

## 2021-03-26 PROCEDURE — 99211 OFF/OP EST MAY X REQ PHY/QHP: CPT | Performed by: FAMILY MEDICINE

## 2021-03-26 NOTE — PROGRESS NOTES
Anticoagulation Summary  As of 3/26/2021    INR goal:  2.0-3.0   TTR:  80.6 % (2.8 y)   INR used for dosin.90 (3/26/2021)   Warfarin maintenance plan:  1.5 mg (3 mg x 0.5) every Mon, Fri; 3 mg (3 mg x 1) all other days   Weekly warfarin total:  18 mg   Plan last modified:  Brady Piña, PharmD (2020)   Next INR check:  2021   Target end date:  Indefinite    Indications    Chronic anticoagulation [Z79.01]  PAF (paroxysmal atrial fibrillation) ([facility]) [I48.0]             Anticoagulation Episode Summary     INR check location:      Preferred lab:      Send INR reminders to:      Comments:        Anticoagulation Care Providers     Provider Role Specialty Phone number    Ganesh Mckeon M.D. Referring Geriatrics [phone]    [facility] Responsible  [phone]        Anticoagulation Patient Findings  Patient Findings     Negatives:  Signs/symptoms of thrombosis, Signs/symptoms of bleeding, Laboratory test error suspected, Change in health, Change in alcohol use, Change in activity, Upcoming invasive procedure, Emergency department visit, Upcoming dental procedure, Missed doses, Extra doses, Change in medications, Change in diet/appetite, Hospital admission, Bruising, Other complaints              HPI:   Shereenty Xavieroza seen in clinic today, on anticoagulation therapy with warfarin for Stroke prophylaxis due to history of PAF    Patient's previous INR was therapeutic at 2.60 on 2021, at which time patient was instructed to Pt is to continue with current warfarin dosing regimen.  She returns to clinic today to recheck INR to ensure it is therapeutic and thus preventing possible clotting and/or bleeding/bruising complications.    CHADS-VASc = at least 5   (unadjusted ischemic stroke risk/year:  7.2%, which is moderate risk)    Does patient have any changes to current medical/health status since last appt (Y/N):  No  Does patient have any signs/symptoms of bleeding and/or  thrombosis since the last appt (Y/N):  No  Does patient have any interval changes to diet or medications since last appt (Y/N):  No  Are there any complications or cost restrictions with current therapy (Y/N):  No     Does patient have [facility] PCP? Dr. Andersen (If not, please document discussion that patient must be seen at [facility])       Vitals:  Declined by patient today due to Covid-19 transmission concerns.      There were no vitals filed for this visit.     Asssessment:      INR sub-therapeutic at 1.90, therefore increased stroke and bleeding risk    Reason(s) for out of range INR today:  Patient ate large amount of brussel sprouts for dinner last night       Pt on antiplatelet therapy - NA    Medication reconciliation completed today.    Plan:  Bolus dose today of 3 mg and after that continue with current warfarin dosing regimen.       Follow up:  Because warfarin is a high risk medication and current CHEST guidelines recommend regular monitoring intervals (few days up to 12 weeks), will have patient return to clinic in 2 weeks to recheck INR.    Arthur Alarcon - Student Pharmacist   Brady Piña, PharmD, BCACP

## 2021-03-30 ENCOUNTER — HOSPITAL ENCOUNTER (EMERGENCY)
Facility: MEDICAL CENTER | Age: 86
End: 2021-03-31
Attending: EMERGENCY MEDICINE
Payer: MEDICARE

## 2021-03-30 DIAGNOSIS — R06.00 DYSPNEA, UNSPECIFIED TYPE: ICD-10-CM

## 2021-03-30 DIAGNOSIS — I50.9 CONGESTIVE HEART FAILURE, UNSPECIFIED HF CHRONICITY, UNSPECIFIED HEART FAILURE TYPE (HCC): ICD-10-CM

## 2021-03-30 PROCEDURE — 93005 ELECTROCARDIOGRAM TRACING: CPT

## 2021-03-30 PROCEDURE — 93005 ELECTROCARDIOGRAM TRACING: CPT | Performed by: EMERGENCY MEDICINE

## 2021-03-30 PROCEDURE — 99284 EMERGENCY DEPT VISIT MOD MDM: CPT

## 2021-03-31 ENCOUNTER — APPOINTMENT (OUTPATIENT)
Dept: RADIOLOGY | Facility: MEDICAL CENTER | Age: 86
End: 2021-03-31
Attending: EMERGENCY MEDICINE
Payer: MEDICARE

## 2021-03-31 ENCOUNTER — OFFICE VISIT (OUTPATIENT)
Dept: CARDIOLOGY | Facility: MEDICAL CENTER | Age: 86
End: 2021-03-31
Payer: MEDICARE

## 2021-03-31 VITALS
HEART RATE: 82 BPM | SYSTOLIC BLOOD PRESSURE: 110 MMHG | RESPIRATION RATE: 20 BRPM | OXYGEN SATURATION: 95 % | HEIGHT: 62 IN | WEIGHT: 159 LBS | DIASTOLIC BLOOD PRESSURE: 70 MMHG | BODY MASS INDEX: 29.26 KG/M2

## 2021-03-31 VITALS
RESPIRATION RATE: 16 BRPM | SYSTOLIC BLOOD PRESSURE: 116 MMHG | TEMPERATURE: 97.6 F | DIASTOLIC BLOOD PRESSURE: 57 MMHG | HEART RATE: 85 BPM | OXYGEN SATURATION: 97 %

## 2021-03-31 DIAGNOSIS — Z79.01 CHRONIC ANTICOAGULATION: ICD-10-CM

## 2021-03-31 DIAGNOSIS — Z79.01 ON CONTINUOUS ORAL ANTICOAGULATION: ICD-10-CM

## 2021-03-31 DIAGNOSIS — I35.0 SEVERE AORTIC STENOSIS: ICD-10-CM

## 2021-03-31 DIAGNOSIS — I48.0 PAF (PAROXYSMAL ATRIAL FIBRILLATION) (HCC): ICD-10-CM

## 2021-03-31 DIAGNOSIS — I35.0 NONRHEUMATIC AORTIC VALVE STENOSIS: ICD-10-CM

## 2021-03-31 DIAGNOSIS — Z66 DNR (DO NOT RESUSCITATE): ICD-10-CM

## 2021-03-31 LAB
ALBUMIN SERPL BCP-MCNC: 4.5 G/DL (ref 3.2–4.9)
ALBUMIN/GLOB SERPL: 1.7 G/DL
ALP SERPL-CCNC: 55 U/L (ref 30–99)
ALT SERPL-CCNC: 40 U/L (ref 2–50)
ANION GAP SERPL CALC-SCNC: 13 MMOL/L (ref 7–16)
AST SERPL-CCNC: 29 U/L (ref 12–45)
BASOPHILS # BLD AUTO: 1.2 % (ref 0–1.8)
BASOPHILS # BLD: 0.09 K/UL (ref 0–0.12)
BILIRUB SERPL-MCNC: 0.3 MG/DL (ref 0.1–1.5)
BUN SERPL-MCNC: 11 MG/DL (ref 8–22)
CALCIUM SERPL-MCNC: 9.3 MG/DL (ref 8.5–10.5)
CHLORIDE SERPL-SCNC: 93 MMOL/L (ref 96–112)
CO2 SERPL-SCNC: 24 MMOL/L (ref 20–33)
CREAT SERPL-MCNC: 1.11 MG/DL (ref 0.5–1.4)
EKG IMPRESSION: NORMAL
EOSINOPHIL # BLD AUTO: 0.31 K/UL (ref 0–0.51)
EOSINOPHIL NFR BLD: 4.2 % (ref 0–6.9)
ERYTHROCYTE [DISTWIDTH] IN BLOOD BY AUTOMATED COUNT: 41.3 FL (ref 35.9–50)
GLOBULIN SER CALC-MCNC: 2.7 G/DL (ref 1.9–3.5)
GLUCOSE SERPL-MCNC: 126 MG/DL (ref 65–99)
HCT VFR BLD AUTO: 37.6 % (ref 37–47)
HGB BLD-MCNC: 12.8 G/DL (ref 12–16)
IMM GRANULOCYTES # BLD AUTO: 0.06 K/UL (ref 0–0.11)
IMM GRANULOCYTES NFR BLD AUTO: 0.8 % (ref 0–0.9)
LYMPHOCYTES # BLD AUTO: 2.21 K/UL (ref 1–4.8)
LYMPHOCYTES NFR BLD: 30.2 % (ref 22–41)
MCH RBC QN AUTO: 30.6 PG (ref 27–33)
MCHC RBC AUTO-ENTMCNC: 34 G/DL (ref 33.6–35)
MCV RBC AUTO: 90 FL (ref 81.4–97.8)
MONOCYTES # BLD AUTO: 0.65 K/UL (ref 0–0.85)
MONOCYTES NFR BLD AUTO: 8.9 % (ref 0–13.4)
NEUTROPHILS # BLD AUTO: 3.99 K/UL (ref 2–7.15)
NEUTROPHILS NFR BLD: 54.7 % (ref 44–72)
NRBC # BLD AUTO: 0 K/UL
NRBC BLD-RTO: 0 /100 WBC
NT-PROBNP SERPL IA-MCNC: 243 PG/ML (ref 0–125)
PLATELET # BLD AUTO: 222 K/UL (ref 164–446)
PMV BLD AUTO: 9.4 FL (ref 9–12.9)
POTASSIUM SERPL-SCNC: 4.4 MMOL/L (ref 3.6–5.5)
PROT SERPL-MCNC: 7.2 G/DL (ref 6–8.2)
RBC # BLD AUTO: 4.18 M/UL (ref 4.2–5.4)
SODIUM SERPL-SCNC: 130 MMOL/L (ref 135–145)
TROPONIN T SERPL-MCNC: 9 NG/L (ref 6–19)
WBC # BLD AUTO: 7.3 K/UL (ref 4.8–10.8)

## 2021-03-31 PROCEDURE — 700111 HCHG RX REV CODE 636 W/ 250 OVERRIDE (IP): Performed by: EMERGENCY MEDICINE

## 2021-03-31 PROCEDURE — 84484 ASSAY OF TROPONIN QUANT: CPT

## 2021-03-31 PROCEDURE — 80053 COMPREHEN METABOLIC PANEL: CPT

## 2021-03-31 PROCEDURE — 83880 ASSAY OF NATRIURETIC PEPTIDE: CPT

## 2021-03-31 PROCEDURE — 96374 THER/PROPH/DIAG INJ IV PUSH: CPT

## 2021-03-31 PROCEDURE — A9270 NON-COVERED ITEM OR SERVICE: HCPCS | Performed by: EMERGENCY MEDICINE

## 2021-03-31 PROCEDURE — 71045 X-RAY EXAM CHEST 1 VIEW: CPT

## 2021-03-31 PROCEDURE — 99215 OFFICE O/P EST HI 40 MIN: CPT | Performed by: INTERNAL MEDICINE

## 2021-03-31 PROCEDURE — 700102 HCHG RX REV CODE 250 W/ 637 OVERRIDE(OP): Performed by: EMERGENCY MEDICINE

## 2021-03-31 PROCEDURE — 85025 COMPLETE CBC W/AUTO DIFF WBC: CPT

## 2021-03-31 RX ORDER — TIZANIDINE 4 MG/1
4 TABLET ORAL EVERY 6 HOURS PRN
Qty: 2 TABLET | Refills: 0 | Status: SHIPPED | OUTPATIENT
Start: 2021-03-31 | End: 2021-04-01

## 2021-03-31 RX ORDER — TIZANIDINE 4 MG/1
4 TABLET ORAL EVERY 6 HOURS PRN
COMMUNITY
End: 2021-03-31 | Stop reason: SDUPTHER

## 2021-03-31 RX ORDER — FUROSEMIDE 10 MG/ML
20 INJECTION INTRAMUSCULAR; INTRAVENOUS ONCE
Status: COMPLETED | OUTPATIENT
Start: 2021-03-31 | End: 2021-03-31

## 2021-03-31 RX ORDER — VITS A,C,E/LUTEIN/MINERALS 300MCG-200
200 TABLET ORAL DAILY
Qty: 90 TABLET | Refills: 3 | Status: SHIPPED
Start: 2021-03-31 | End: 2021-04-04

## 2021-03-31 RX ORDER — TIZANIDINE 4 MG/1
2 TABLET ORAL ONCE
Status: COMPLETED | OUTPATIENT
Start: 2021-03-31 | End: 2021-03-31

## 2021-03-31 RX ADMIN — TIZANIDINE 2 MG: 4 TABLET ORAL at 02:08

## 2021-03-31 RX ADMIN — FUROSEMIDE 20 MG: 10 INJECTION, SOLUTION INTRAMUSCULAR; INTRAVENOUS at 01:36

## 2021-03-31 ASSESSMENT — FIBROSIS 4 INDEX: FIB4 SCORE: 1.73

## 2021-03-31 NOTE — ED NOTES
Pt brought from lob to [facility] via wheelchair. Pt taken to restroom on the way to her room and was able to use facilities without assistance.

## 2021-03-31 NOTE — ED TRIAGE NOTES
Chief Complaint   Patient presents with   • Shortness of Breath     started at around 2130, states that she has sleep apnea and uses 3L at night. pt states that she has CHF, +1 pitting edema to LLE. pt on RA 98%, placed on 3L NC for comfort. pt able to talk in complete sentences, LS  clear. denies pain.  non-compliant w/ medications, positive orthopnea       Pt to triage in ,  at bedside. Pt [place], somewhat uncomfortable. Pt states she does not take lasix d/t leg cramps. Pt has hx of a fib, takes warfarin as indicated.     Pt placed in lobby, educated on alerting staff on changes in conditions. EKG done.     /91   Pulse 91   Temp 36.3 °C (97.4 °F) (Temporal)   Resp (!) 22   LMP 01/01/1985   SpO2 97%

## 2021-03-31 NOTE — ED PROVIDER NOTES
ED Provider Note    CHIEF COMPLAINT  Chief Complaint   Patient presents with    Shortness of Breath     started at around 2130, states that she has sleep apnea and uses 3L at night. pt states that she has CHF, +1 pitting edema to LLE. pt on RA 98%, placed on 3L NC for comfort. pt able to talk in complete sentences, LS  clear. denies pain.  non-compliant w/ medications, positive orthopnea       HPI  Shereen Dale is a 85 y.o. female who presents to the emergency room complaining of shortness of breath. Past medical history significant for a fib, sleep apnea, aortic stenosis and CHF. She notes that she does not like taking her Lasix as it causes muscle cramps. She notes that tonight her breathing became worse despite her regular nasal cannula oxygen supplementation for the evening. Due to feeling more short of breath she decided to come to the emergency department. She does note that her legs have been slightly more swollen of recent. No recent fever chills. No known recent exposure with sick person. She has completed her COVID vaccination series.    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 All other systems are negative.     PAST MEDICAL HISTORY   has a past medical history of AF (atrial fibrillation) (), Arrhythmia, Backpain, Breast cancer (), Breath shortness, Cancer (HCC) (), CATARACT, Chronic anticoagulation (2012), Congestive heart failure (HCC), Cough (2012), Dental disorder, Diabetes, Edema (2012), Glaucoma, Heart burn, Heart murmur, Heart valve disease, Hypertension, Hypothyroid, Oxygen deficiency, Paroxysmal atrial fibrillation (HCC), Sleep apnea, tia, and Unspecified hemorrhagic conditions.    SOCIAL HISTORY  Social History     Tobacco Use    Smoking status: Former Smoker     Packs/day: 0.50     Years: 11.00     Pack years: 5.50     Types: Cigarettes     Quit date: 1964     Years since quittin.2    Smokeless tobacco: Never Used     Tobacco comment: Started at    Substance and Sexual Activity    Alcohol use: Not Currently     Alcohol/week: 0.0 oz     Comment: rarely    Drug use: No    Sexual activity: Never     Partners: Male       SURGICAL HISTORY   has a past surgical history that includes lumpectomy; cholecystectomy; hysterectomy radical; radiation therapy plan simple; cataract phaco with iol (9/23/2013); cataract phaco with iol (10/21/2013); knee arthroscopy (Right, 5/21/2015); and meniscectomy (Right, 5/21/2015).    CURRENT MEDICATIONS  Home Medications    **Home medications have not yet been reviewed for this encounter**         ALLERGIES  Allergies   Allergen Reactions    Darvon [Propoxyphene Hcl]      shock    Iodine      Shock  - IV    Latex      Swelling = hands with glove use    Penicillins Anaphylaxis    Propoxyphene Hcl Anaphylaxis    Tetanus Antitoxin Myalgia    Tetracycline Anaphylaxis       PHYSICAL EXAM  VITAL SIGNS: /57   Pulse 85   Temp 36.4 °C (97.6 °F) (Temporal)   Resp 16   LMP 01/01/1985   SpO2 97%  @[id number]::@   Pulse ox interpretation: I interpret this pulse ox as normal.  Constitutional: Alert in no apparent distress.  HENT: No signs of trauma, Bilateral external ears normal, Nose normal.   Eyes: Pupils are equal and reactive  Neck: Normal range of motion, No tenderness, Supple  Cardiovascular: Regular rate and rhythm, 5/6 systolic murmur  Thorax & Lungs: Normal breath sounds, No respiratory distress, No wheezing, No chest tenderness.   Abdomen: Bowel sounds normal, Soft, No tenderness, No masses, No pulsatile masses. No peritoneal signs.  Skin: Warm, Dry, No erythema, No rash.   Extremities: Intact distal pulses, No edema, No tenderness  Musculoskeletal: Good range of motion in all major joints. No tenderness to palpation or major deformities noted.   Neurologic: Alert , Normal motor function, Normal sensory function, No focal deficits noted.   Psychiatric: Affect normal, Judgment normal, Mood normal.        DIAGNOSTIC STUDIES / PROCEDURES      LABS  Results for orders placed or performed during the hospital encounter of 03/30/21   CBC w/ Differential   Result Value Ref Range    WBC 7.3 4.8 - 10.8 K/uL    RBC 4.18 (L) 4.20 - 5.40 M/uL    Hemoglobin 12.8 12.0 - 16.0 g/dL    Hematocrit 37.6 37.0 - 47.0 %    MCV 90.0 81.4 - 97.8 fL    MCH 30.6 27.0 - 33.0 pg    MCHC 34.0 33.6 - 35.0 g/dL    RDW 41.3 35.9 - 50.0 fL    Platelet Count 222 164 - 446 K/uL    MPV 9.4 9.0 - 12.9 fL    Neutrophils-Polys 54.70 44.00 - 72.00 %    Lymphocytes 30.20 22.00 - 41.00 %    Monocytes 8.90 0.00 - 13.40 %    Eosinophils 4.20 0.00 - 6.90 %    Basophils 1.20 0.00 - 1.80 %    Immature Granulocytes 0.80 0.00 - 0.90 %    Nucleated RBC 0.00 /100 WBC    Neutrophils (Absolute) 3.99 2.00 - 7.15 K/uL    Lymphs (Absolute) 2.21 1.00 - 4.80 K/uL    Monos (Absolute) 0.65 0.00 - 0.85 K/uL    Eos (Absolute) 0.31 0.00 - 0.51 K/uL    Baso (Absolute) 0.09 0.00 - 0.12 K/uL    Immature Granulocytes (abs) 0.06 0.00 - 0.11 K/uL    NRBC (Absolute) 0.00 K/uL   Complete Metabolic Panel (CMP)   Result Value Ref Range    Sodium 130 (L) 135 - 145 mmol/L    Potassium 4.4 3.6 - 5.5 mmol/L    Chloride 93 (L) 96 - 112 mmol/L    Co2 24 20 - 33 mmol/L    Anion Gap 13.0 7.0 - 16.0    Glucose 126 (H) 65 - 99 mg/dL    Bun 11 8 - 22 mg/dL    Creatinine 1.11 0.50 - 1.40 mg/dL    Calcium 9.3 8.5 - 10.5 mg/dL    AST(SGOT) 29 12 - 45 U/L    ALT(SGPT) 40 2 - 50 U/L    Alkaline Phosphatase 55 30 - 99 U/L    Total Bilirubin 0.3 0.1 - 1.5 mg/dL    Albumin 4.5 3.2 - 4.9 g/dL    Total Protein 7.2 6.0 - 8.2 g/dL    Globulin 2.7 1.9 - 3.5 g/dL    A-G Ratio 1.7 g/dL   proBrain Natriuretic Peptide, NT   Result Value Ref Range    NT-proBNP 243 (H) 0 - 125 pg/mL   Troponin STAT   Result Value Ref Range    Troponin T 9 6 - 19 ng/L   ESTIMATED GFR   Result Value Ref Range    GFR If  57 (A) >60 mL/min/1.73 m 2    GFR If Non  47 (A) >60 mL/min/1.73 m 2   EKG    Result Value Ref Range    Report       [facility] Emergency Dept.    Test Date:  2021  Pt Name:    LIAM ROTH                 Department: ER  MRN:        [id number]                      Room:  Gender:     Female                       Technician: [id number]  :        [id number]                   Requested By:ER TRIAGE PROTOCOL  Order #:    [id number]                    Reading MD: Ricco Spencer    Measurements  Intervals                                Axis  Rate:       80                           P:          23  FL:         168                          QRS:        21  QRSD:       90                           T:          72  QT:         392  QTc:        453    Interpretive Statements  SINUS RHYTHM  MINIMAL ST DEPRESSION, LATERAL LEADS  Compared to ECG 2019 10:59:07  ST (T wave) deviation now present  Electronically Signed On 3- 1:27:23 PDT by Ricco Spencer           RADIOLOGY  DX-CHEST-PORTABLE (1 VIEW)   Final Result         1.  Pulmonary edema and/or infiltrates are identified, which appear somewhat increased since the prior exam.   2.  Cardiomegaly              COURSE & MEDICAL DECISION MAKING  Pertinent Labs & Imaging studies reviewed. (See chart for details)  patient presented emergency department with the above complaint. Tonight with mild CHF exacerbation likely secondary to disuse of her diuretic. She has been given initial dose diary care and she is understand that she will need to continue with diuretic at home. She is to follow-up with her primary care physician for reevaluation ongoing care. She is understand return precautions here the ER if needed.      The patient will return for worsening symptoms and is stable at the time of discharge. The patient verbalizes understanding and will comply.    FINAL IMPRESSION  1. Dyspnea, unspecified type    2. Congestive heart failure, unspecified HF chronicity, unspecified heart failure type (HCC)            Electronically signed  by: Ricco Spencer M.D., 3/31/2021 12:18 AM

## 2021-03-31 NOTE — PROGRESS NOTES
Subjective:   Shereen Dale is an 84 y.o. female who presents today for follow-up of PAF, HTN and Severe symptomatic AS. She also has sleep apnea intolerant of CPAP. She is on chronic oral anti-coagulation.    Since last visit she has had continued progressive dyspnea on exertion culminating in an emergency department visit yesterday.  She was prescribed Lasix and presents today after being discharged from the emergency department.  She continues to have NYHA class III-IV dyspnea on exertion.  No chest pain.  No syncope.  She remains DNR/DNI and uninterested in any valve or invasive procedures despite them being low risk and high success.    Past Medical History:   Diagnosis Date   • AF (atrial fibrillation) (HCC)    • Arrhythmia     A-fib   • Backpain     Lower back pain   • Breast cancer (HCC)    • Breath shortness     uses 2-3 L O2 at night   • Cancer (HCC) 1993    right breast   • CATARACT     celestine IOL   • Chronic anticoagulation 5/2/2012   • Congestive heart failure (HCC)    • Cough 5/2/2012   • Dental disorder     dentures   • Diabetes     oral medication   • Edema 5/2/2012   • Glaucoma    • Heart burn    • Heart murmur    • Heart valve disease    • Hypertension    • Hypothyroid    • Oxygen deficiency     uses 2.5-3 l at night   • Paroxysmal atrial fibrillation (HCC)    • Sleep apnea     no cpap   • tia     TIA x2   • Unspecified hemorrhagic conditions     takes coumadin     Past Surgical History:   Procedure Laterality Date   • KNEE ARTHROSCOPY Right 5/21/2015    Procedure: KNEE ARTHROSCOPY;  Surgeon: Emerson Garcia M.D.;  Location: [facility];  Service:    • MENISCECTOMY Right 5/21/2015    Procedure: LATERAL MENISCECTOMY;  Surgeon: Emerson Garcia M.D.;  Location: [facility];  Service:    • CATARACT PHACO WITH IOL  10/21/2013    Performed by Dominick Fernando M.D. at [facility]   • CATARACT PHACO WITH IOL  9/23/2013    Performed by Dominick Fernando M.D. at  [facility]   • CHOLECYSTECTOMY     • HYSTERECTOMY RADICAL     • LUMPECTOMY      R breast   • PB RADIATION THERAPY PLAN SIMPLE       Family History   Problem Relation Age of Onset   • Heart Attack Mother    • Hypertension Mother    • Heart Disease Father    • Hypertension Father    • No Known Problems Sister    • No Known Problems Brother    • Leukemia Sister    • Sleep Apnea Sister    • No Known Problems Sister    • No Known Problems Sister      Social History     Tobacco Use   Smoking Status Former Smoker   • Packs/day: 0.50   • Years: 11.00   • Pack years: 5.50   • Types: Cigarettes   • Quit date: 1964   • Years since quittin.2   Smokeless Tobacco Never Used   Tobacco Comment    Started at      Allergies   Allergen Reactions   • Darvon [Propoxyphene Hcl]      shock   • Iodine      Shock  - IV   • Latex      Swelling = hands with glove use   • Penicillins Anaphylaxis   • Propoxyphene Hcl Anaphylaxis   • Tetanus Antitoxin Myalgia   • Tetracycline Anaphylaxis     Outpatient Encounter Medications as of 3/31/2021   Medication Sig Dispense Refill   • Magnesium Oxide (MAG-OXIDE) 200 MG Tab Take 1 tablet by mouth every day. 90 tablet 3   • tizanidine (ZANAFLEX) 4 MG Tab Take 1 tablet by mouth every 6 hours as needed. 2 tablet 0   • warfarin (COUMADIN) 3 MG Tab TAKE ONE TABLET BY MOUTH EVERY  tablet 3   • telmisartan (MICARDIS) 40 MG Tab TAKE ONE TABLET BY MOUTH TWICE A  Tab 2   • amLODIPine (NORVASC) 5 MG Tab Take 5 mg in the morning, and 2.5 in the evening 135 Tab 3   • glipiZIDE SR (GLUCOTROL) 2.5 MG TABLET SR 24 HR TAKE ONE TABLET BY MOUTH EVERY  Tab 3   • furosemide (LASIX) 20 MG Tab Take 1 Tab by mouth every day. 30 Tab 3   • potassium chloride SA (KDUR) 20 MEQ Tab CR Take 1 Tab by mouth 2 times a day. 60 Tab 11   • digoxin (LANOXIN) 125 MCG Tab Take 1 Tab by mouth every day. 90 Tab 3   • omeprazole (PRILOSEC) 20 MG delayed-release capsule Take 1 Cap by mouth every day. 90  "Cap 3   • metFORMIN (GLUCOPHAGE) 500 MG Tab TAKE ONE TABLET BY MOUTH THREE TIMES A  Tab 2   • levothyroxine (SYNTHROID) 50 MCG Tab Take 1 Tab by mouth every morning. 90 Tab 3   • Blood Glucose Monitoring Suppl Device Meter: Dispense free style meter. Sig. Use 2 times daily as directed for blood sugar monitoring. dx e11.9 1 Device 0   • cloNIDine (CATAPRES) 0.1 MG Tab Take 1 Tab by mouth 2 times a day as needed. If SBP > 160 60 Tab 2   • acetaminophen (TYLENOL) 325 MG Tab Take 2 Tabs by mouth every 6 hours as needed (Mild Pain; (Pain scale 1-3); Temp greater than 100.5 F). 30 Tab 0   • gabapentin (NEURONTIN) 100 MG Cap Take 1 Cap by mouth every day. 90 Cap 0   • dorzolamide-timolol (COSOPT) 22.3-6.8 MG/ML Solution Place 1 Drop in both eyes 2 times a day.     • Glucosamine HCl (GLUCOSAMINE PO) Take 1 Tab by mouth every morning. Pt unsure of dose     • Polyethyl Glycol-Propyl Glycol (SYSTANE OP) 1-2 Drops by Ophthalmic route 4 times a day as needed.     • docosahexanoic acid (OMEGA 3 FA) 1000 MG CAPS Take 1 Cap by mouth every morning.     • VITAMIN D PO Take 2,000 Units by mouth every morning.     • CALCIUM 500 PO Take 1 Tab by mouth every morning.     • [DISCONTINUED] tizanidine (ZANAFLEX) 4 MG Tab Take 4 mg by mouth every 6 hours as needed.     • famotidine (PEPCID) 20 MG Tab TAKE ONE TABLET BY MOUTH TWICE A DAY 60 Tab 2   • famotidine (PEPCID) 20 MG Tab TAKE ONE TABLET BY MOUTH TWICE A DAY (Patient not taking: Reported on 3/31/2021) 60 Tab 3     No facility-administered encounter medications on file as of 3/31/2021.     Review of Systems   All other systems reviewed and are negative.  Today I reviewed the medical, surgical, social and family histories with the patient. As per HPI, otherwise noncontributory.       Objective:   /70 (BP Location: Left arm, Patient Position: Sitting, BP Cuff Size: Adult)   Pulse 82   Resp 20   Ht 1.575 m (5' 2\")   Wt 72.1 kg (159 lb)   LMP 01/01/1985   SpO2 95%   BMI " 29.08 kg/m²     Physical Exam   Constitutional: She is oriented to person, place, and time. She appears well-developed and well-nourished. No distress.   HENT:   Head: Normocephalic and atraumatic.   Mouth/Throat: Oropharynx is clear and moist. No oropharyngeal exudate.   Eyes: Pupils are equal, round, and reactive to light. Conjunctivae are normal. No scleral icterus.   Neck: No JVD present. No thyromegaly present.   Cardiovascular: Normal rate, regular rhythm and intact distal pulses. Exam reveals no gallop and no friction rub.   Murmur ( 4/6 systolic ejection murmur with obscured S2) heard.  Pulses:       Carotid pulses are 2+ on the right side and 2+ on the left side.       Radial pulses are 2+ on the right side and 2+ on the left side.        Popliteal pulses are 2+ on the right side and 2+ on the left side.        Dorsalis pedis pulses are 2+ on the right side and 2+ on the left side.        Posterior tibial pulses are 2+ on the right side and 2+ on the left side.   Pulmonary/Chest: Effort normal and breath sounds normal. She has no wheezes. She has no rales.   Abdominal: Soft. Bowel sounds are normal. She exhibits no distension. There is no abdominal tenderness.   Musculoskeletal:         General: Edema (  1-2+ pitting bilateral lower extremity edema) present. No tenderness.      Cervical back: Normal range of motion and neck supple.   Neurological: She is alert and oriented to person, place, and time. No cranial nerve deficit.   Skin: Skin is warm and dry. No rash noted. She is not diaphoretic. No erythema.   Psychiatric: She has a normal mood and affect. Her behavior is normal.   Vitals reviewed.    LABS:  Lab Results   Component Value Date/Time    CHOLSTRLTOT 185 03/01/2021 07:43 AM     (H) 03/01/2021 07:43 AM    HDL 54 03/01/2021 07:43 AM    TRIGLYCERIDE 136 03/01/2021 07:43 AM       Lab Results   Component Value Date/Time    WBC 7.3 03/31/2021 12:20 AM    RBC 4.18 (L) 03/31/2021 12:20 AM     HEMOGLOBIN 12.8 03/31/2021 12:20 AM    HEMATOCRIT 37.6 03/31/2021 12:20 AM    MCV 90.0 03/31/2021 12:20 AM    NEUTSPOLYS 54.70 03/31/2021 12:20 AM    LYMPHOCYTES 30.20 03/31/2021 12:20 AM    MONOCYTES 8.90 03/31/2021 12:20 AM    EOSINOPHILS 4.20 03/31/2021 12:20 AM    BASOPHILS 1.20 03/31/2021 12:20 AM     Lab Results   Component Value Date/Time    SODIUM 130 (L) 03/31/2021 12:20 AM    POTASSIUM 4.4 03/31/2021 12:20 AM    CHLORIDE 93 (L) 03/31/2021 12:20 AM    CO2 24 03/31/2021 12:20 AM    GLUCOSE 126 (H) 03/31/2021 12:20 AM    BUN 11 03/31/2021 12:20 AM    CREATININE 1.11 03/31/2021 12:20 AM    CREATININE 0.7 08/02/2008 08:30 AM     Lab Results   Component Value Date    HBA1C 6.4 (H) 03/01/2021      Lab Results   Component Value Date/Time    ALKPHOSPHAT 55 03/31/2021 12:20 AM    ASTSGOT 29 03/31/2021 12:20 AM    ALTSGPT 40 03/31/2021 12:20 AM    TBILIRUBIN 0.3 03/31/2021 12:20 AM      Lab Results   Component Value Date/Time    BNPBTYPENAT 33 05/11/2017 05:05 PM      No results found for: TSH  Lab Results   Component Value Date/Time    PROTHROMBTM 19.5 (H) 03/28/2019 02:41 AM    INR 1.90 (A) 03/26/2021 11:10 AM      ECHO CONCLUSIONS (5/9/2019):  Compared to the prior study done 10/18/18 - the aortic stenosis is now   severe.  Normal left ventricular chamber size.  Left ventricular ejection fraction is visually estimated to be 65%.  Grade I diastolic dysfunction.  Moderate left ventricular hypertrophy.  Severe aortic stenosis. Vmax is 4.04 m/s. Aortic valve area calculated   from the continuity equation is 0.47cm2.  Right ventricular systolic pressure is estimated to be 30 mmHg.    ECHO CONCLUSIONS (10/18/2018):  Prior echo 04/04/18.  Left ventricle is small in size.  Left ventricular ejection fraction is visually estimated to be 65%.  Moderate concentric left ventricular hypertrophy.  Calcified aortic valve leaflets.  Moderate aortic stenosis.  Vmax is 3.7 m/s.  Normal inferior vena cava size and inspiratory  collapse.  Compared to the report of the study done - there has been no   significant change.     ECHO CONCLUSIONS (4/4/2018):  Prior echo 1/9/2017.  Moderate left ventricular hypertrophy.  Left ventricular ejection fraction is visually estimated to be greater   than 75%.  Grade I diastolic dysfunction.  Moderate aortic stenosis.  Vmax is 3.79  m/s. Transvalvular gradients are - Peak: 58 mmHg, Mean:    35 mmHg.  Compared to the report of the study done - there has been a slight   progression of aortic stenosis, still moderate in severity.     ECHO CONCLUSIONS (1/19/2017):  Compared to the images of the prior study done on 01/29/15 - The AS is   now moderate.  Normal left ventricular size and systolic function.  Left ventricular ejection fraction is visually estimated to be 65%.  Grade I diastolic dysfunction.  Moderate aortic stenosis.  Transvalvular gradients are - Peak: 37 mmHg, Mean: 21 mmHg.  Normal inferior vena cava size and inspiratory collapse.      Assessment:     1. Nonrheumatic aortic valve stenosis     2. PAF (paroxysmal atrial fibrillation) ([facility])  REFERRAL TO PALLIATIVE CARE   3. On continuous oral anticoagulation  REFERRAL TO PALLIATIVE CARE   4. Chronic anticoagulation     5. Severe aortic stenosis  REFERRAL TO PALLIATIVE CARE   6. DNR (do not resuscitate)  REFERRAL TO PALLIATIVE CARE       Medical Decision Making:  Today's Assessment / Status / Plan:     She is an excellent candidate for transcatheter aortic valve replacement.  She continues to decline on repeated visits and again today.  Her valvular heart failure has advanced we discussed the prognosis for this.  We also discussed that should she have refractory end-of-life symptoms, she would be unable to change her mind and have a TAVR placed at that time as it is not an emergency procedure.  I encouraged her again to at least further consider this lifesaving curative low risk procedure that is the standard of care internationally.  She again  declines and states her own personal wishes.  She wishes to remain DNR/DNI.  I suggest hospice evaluation and care as her goals of care are to be comfortable and stay out of the hospital.  She declined at this time but was willing to entertain a referral to palliative care.  She requests a refill of Zanaflex I discussed this would be more appropriate for palliative care.  Continue Lasix.  Recommend magnesium supplementation in addition to her potassium to help with cramps.    I spent 45 minutes with Shereen Dale, over fifty percent was spent counseling the patient on their condition, best management practices, reviewing test results, risks and benefits of treatment and coordinating care.

## 2021-03-31 NOTE — ED NOTES
Report given to Jennifer Mota RN. At this time pt is resting in bed with even and unlabored breaths, in no apparent distress.

## 2021-04-01 ENCOUNTER — HOME CARE VISIT (OUTPATIENT)
Dept: HOSPICE | Facility: HOSPICE | Age: 86
End: 2021-04-01
Payer: MEDICARE

## 2021-04-01 ENCOUNTER — TELEPHONE (OUTPATIENT)
Dept: CARDIOLOGY | Facility: MEDICAL CENTER | Age: 86
End: 2021-04-01

## 2021-04-01 ENCOUNTER — HOSPICE ADMISSION (OUTPATIENT)
Dept: HOSPICE | Facility: HOSPICE | Age: 86
End: 2021-04-01
Payer: MEDICARE

## 2021-04-01 DIAGNOSIS — Z66 DNR (DO NOT RESUSCITATE): ICD-10-CM

## 2021-04-01 DIAGNOSIS — I35.0 SEVERE AORTIC STENOSIS: ICD-10-CM

## 2021-04-01 DIAGNOSIS — Z79.01 ON CONTINUOUS ORAL ANTICOAGULATION: ICD-10-CM

## 2021-04-01 DIAGNOSIS — I48.0 PAF (PAROXYSMAL ATRIAL FIBRILLATION) (HCC): ICD-10-CM

## 2021-04-01 RX ORDER — TIZANIDINE 4 MG/1
TABLET ORAL
Qty: 100 TABLET | Refills: 3 | Status: SHIPPED
Start: 2021-04-01 | End: 2021-04-01 | Stop reason: CLARIF

## 2021-04-01 NOTE — TELEPHONE ENCOUNTER
Called [facility], spoke with Yaritza. She will have Gerard give us a call back to further discuss referral. Provided this RN's extension as well as number to cardiology office. Will await call back from Gerard.

## 2021-04-01 NOTE — TELEPHONE ENCOUNTER
TW    Pt called regarding referral to Palliative care. Pt called there office and they told her that Dr GATICA needs to call and tell them why Dr GATICA is referring pt, they will not schedule until they hear from him.   Palliative care # [phone]    Please call pt back if you have any further questions at [phone]    Thank you

## 2021-04-02 NOTE — PROGRESS NOTES
Patient called for refill.  Refill was ordered for 100 pills with 3 refills.  We had discussed I would not be refilling this medication and administered her to.  It was inadvertently signed in the electronic prescription refill.  I called her pharmacy release at [phone] and spoke to the pharmacist who indicated they will cancel this inadvertent refill and that it was not dispensed.

## 2021-04-03 ENCOUNTER — HOME CARE VISIT (OUTPATIENT)
Dept: HOSPICE | Facility: HOSPICE | Age: 86
End: 2021-04-03
Payer: MEDICARE

## 2021-04-03 DIAGNOSIS — R06.00 DYSPNEA, UNSPECIFIED TYPE: ICD-10-CM

## 2021-04-03 DIAGNOSIS — I35.0 AORTIC VALVE STENOSIS, ETIOLOGY OF CARDIAC VALVE DISEASE UNSPECIFIED: ICD-10-CM

## 2021-04-03 DIAGNOSIS — R52 PAIN: ICD-10-CM

## 2021-04-03 RX ORDER — MORPHINE SULFATE 100 MG/5ML
5 SOLUTION ORAL
Qty: 120 ML | Refills: 0 | Status: SHIPPED | OUTPATIENT
Start: 2021-04-03 | End: 2021-06-02

## 2021-04-03 ASSESSMENT — ENCOUNTER SYMPTOMS: SHORTNESS OF BREATH: 1

## 2021-04-04 ENCOUNTER — HOME CARE VISIT (OUTPATIENT)
Dept: HOSPICE | Facility: HOSPICE | Age: 86
End: 2021-04-04
Payer: MEDICARE

## 2021-04-04 VITALS — HEART RATE: 80 BPM | SYSTOLIC BLOOD PRESSURE: 120 MMHG | RESPIRATION RATE: 20 BRPM | DIASTOLIC BLOOD PRESSURE: 70 MMHG

## 2021-04-04 PROCEDURE — G0299 HHS/HOSPICE OF RN EA 15 MIN: HCPCS

## 2021-04-04 PROCEDURE — 6650990 HC HOSPICE AND HOME CARE RX REV CODE 0250

## 2021-04-04 PROCEDURE — S9126 HOSPICE CARE, IN THE HOME, P: HCPCS

## 2021-04-04 PROCEDURE — 665036 HSPC NOTICE OF ELECTION NOE

## 2021-04-04 RX ORDER — GABAPENTIN 100 MG/1
100 CAPSULE ORAL
Qty: 90 CAPSULE | Refills: 0
Start: 2021-04-04 | End: 2021-11-16

## 2021-04-04 RX ORDER — AMLODIPINE BESYLATE 5 MG/1
TABLET ORAL
Qty: 135 TABLET | Refills: 3
Start: 2021-04-04 | End: 2021-11-16

## 2021-04-04 RX ORDER — LEVOTHYROXINE SODIUM 0.05 MG/1
50 TABLET ORAL EVERY MORNING
Qty: 90 TABLET | Refills: 3
Start: 2021-04-04 | End: 2021-05-17 | Stop reason: SDUPTHER

## 2021-04-04 RX ORDER — FUROSEMIDE 20 MG/1
20 TABLET ORAL DAILY
Qty: 30 TABLET | Refills: 3
Start: 2021-04-04 | End: 2021-04-06

## 2021-04-04 RX ORDER — OMEPRAZOLE 20 MG/1
20 CAPSULE, DELAYED RELEASE ORAL DAILY
Qty: 90 CAPSULE | Refills: 3
Start: 2021-04-04 | End: 2021-11-16

## 2021-04-04 SDOH — ECONOMIC STABILITY: HOUSING INSECURITY: EVIDENCE OF SMOKING MATERIAL: 0

## 2021-04-04 ASSESSMENT — ENCOUNTER SYMPTOMS
LOWER EXTREMITY EDEMA: 1
DIZZINESS: 1
PALPITATIONS: 1
DRY SKIN: 1
DYSPNEA ACTIVITY LEVEL: AFTER AMBULATING MORE THAN 20 FT
DYSPNEA ON EXERTION: 1
FATIGUES EASILY: 1
SHORTNESS OF BREATH: 1
HYPERTENSION: 1

## 2021-04-04 ASSESSMENT — ACTIVITIES OF DAILY LIVING (ADL): MONEY MANAGEMENT (EXPENSES/BILLS): INDEPENDENT

## 2021-04-04 ASSESSMENT — PATIENT HEALTH QUESTIONNAIRE - PHQ9: CLINICAL INTERPRETATION OF PHQ2 SCORE: 0

## 2021-04-05 ENCOUNTER — HOME CARE VISIT (OUTPATIENT)
Dept: HOSPICE | Facility: HOSPICE | Age: 86
End: 2021-04-05
Payer: MEDICARE

## 2021-04-05 PROCEDURE — G0299 HHS/HOSPICE OF RN EA 15 MIN: HCPCS

## 2021-04-05 PROCEDURE — S9126 HOSPICE CARE, IN THE HOME, P: HCPCS

## 2021-04-06 ENCOUNTER — HOME CARE VISIT (OUTPATIENT)
Dept: HOSPICE | Facility: HOSPICE | Age: 86
End: 2021-04-06
Payer: MEDICARE

## 2021-04-06 VITALS — RESPIRATION RATE: 20 BRPM

## 2021-04-06 PROCEDURE — G0155 HHCP-SVS OF CSW,EA 15 MIN: HCPCS

## 2021-04-06 PROCEDURE — S9126 HOSPICE CARE, IN THE HOME, P: HCPCS

## 2021-04-06 PROCEDURE — 6650990 HC HOSPICE AND HOME CARE RX REV CODE 0250

## 2021-04-06 ASSESSMENT — ENCOUNTER SYMPTOMS
DRY SKIN: 1
HYPERTENSION: 1
LOWER EXTREMITY EDEMA: 1
CHEST TIGHTNESS: 1
SLEEP QUALITY: FAIR
SHORTNESS OF BREATH: 1
DIZZINESS: 1
DYSPNEA ON EXERTION: 1
FATIGUES EASILY: 1
DYSPNEA ACTIVITY LEVEL: AFTER AMBULATING MORE THAN 20 FT
DYSPNEA ACTIVITY LEVEL: WHILE SPEAKING
PALPITATIONS: 1

## 2021-04-06 ASSESSMENT — ACTIVITIES OF DAILY LIVING (ADL): MONEY MANAGEMENT (EXPENSES/BILLS): INDEPENDENT

## 2021-04-06 ASSESSMENT — SOCIAL DETERMINANTS OF HEALTH (SDOH)
ACTIVE STRESSOR - HEALTH CHANGES: 1
ACTIVE STRESSOR - EXPRESSED EMOTIONAL NEED: 1

## 2021-04-07 PROCEDURE — S9126 HOSPICE CARE, IN THE HOME, P: HCPCS

## 2021-04-08 ENCOUNTER — HOME CARE VISIT (OUTPATIENT)
Dept: HOSPICE | Facility: HOSPICE | Age: 86
End: 2021-04-08
Payer: MEDICARE

## 2021-04-08 PROCEDURE — G0155 HHCP-SVS OF CSW,EA 15 MIN: HCPCS

## 2021-04-08 PROCEDURE — S9126 HOSPICE CARE, IN THE HOME, P: HCPCS

## 2021-04-08 ASSESSMENT — ENCOUNTER SYMPTOMS: SLEEP QUALITY: FAIR

## 2021-04-08 ASSESSMENT — SOCIAL DETERMINANTS OF HEALTH (SDOH)
ACTIVE STRESSOR - EXPRESSED EMOTIONAL NEED: 1
ACTIVE STRESSOR - HEALTH CHANGES: 1

## 2021-04-09 ENCOUNTER — ANTICOAGULATION VISIT (OUTPATIENT)
Dept: MEDICAL GROUP | Facility: PHYSICIAN GROUP | Age: 86
End: 2021-04-09
Payer: MEDICARE

## 2021-04-09 ENCOUNTER — HOME CARE VISIT (OUTPATIENT)
Dept: HOSPICE | Facility: HOSPICE | Age: 86
End: 2021-04-09
Payer: MEDICARE

## 2021-04-09 DIAGNOSIS — I48.0 PAF (PAROXYSMAL ATRIAL FIBRILLATION) (HCC): ICD-10-CM

## 2021-04-09 DIAGNOSIS — Z79.01 CHRONIC ANTICOAGULATION: Primary | ICD-10-CM

## 2021-04-09 LAB — INR PPP: 2.6 (ref 2–3.5)

## 2021-04-09 PROCEDURE — 85610 PROTHROMBIN TIME: CPT | Mod: GW | Performed by: FAMILY MEDICINE

## 2021-04-09 PROCEDURE — 93793 ANTICOAG MGMT PT WARFARIN: CPT | Mod: GW | Performed by: FAMILY MEDICINE

## 2021-04-09 PROCEDURE — S9126 HOSPICE CARE, IN THE HOME, P: HCPCS

## 2021-04-09 NOTE — PROGRESS NOTES
Anticoagulation Summary  As of 2021    INR goal:  2.0-3.0   TTR:  80.7 % (2.8 y)   INR used for dosin.60 (2021)   Warfarin maintenance plan:  1.5 mg (3 mg x 0.5) every Mon, Fri; 3 mg (3 mg x 1) all other days   Weekly warfarin total:  18 mg   Plan last modified:  Brady Piña, PharmD (2020)   Next INR check:  2021   Target end date:  Indefinite    Indications    Chronic anticoagulation [Z79.01]  PAF (paroxysmal atrial fibrillation) ([facility]) [I48.0]             Anticoagulation Episode Summary     INR check location:      Preferred lab:      Send INR reminders to:      Comments:        Anticoagulation Care Providers     Provider Role Specialty Phone number    Ganesh Mckeon M.D. Referring Geriatrics [phone]    [facility] Responsible  [phone]        Anticoagulation Patient Findings  Patient Findings     Negatives:  Signs/symptoms of thrombosis, Signs/symptoms of bleeding, Laboratory test error suspected, Change in health, Change in alcohol use, Change in activity, Upcoming invasive procedure, Emergency department visit, Upcoming dental procedure, Missed doses, Extra doses, Change in medications, Change in diet/appetite, Hospital admission, Bruising, Other complaints           HPI:   Shereen Dale seen in clinic today, on anticoagulation therapy with warfarin for stroke prophylaxis due to history of PAF    Patient's previous INR was sub-therapeutic at 1.90 on 3/26, at which time patient was instructed to bolus dose that day and continue normal warfarin regimen.  She returns to clinic today to recheck INR to ensure it is therapeutic and thus preventing possible clotting and/or bleeding/bruising complications.    CHADS-VASc = at least 5  (unadjusted ischemic stroke risk/year:  7.2%, which is moderate risk)    Does patient have any changes to current medical/health status since last appt (Y/N):  N  Does patient have any signs/symptoms of bleeding and/or  thrombosis since the last appt (Y/N):  N  Does patient have any interval changes to diet or medications since last appt (Y/N):  N  Are there any complications or cost restrictions with current therapy (Y/N):  N     Does patient have [facility] PCP? Dr. Ganesh Burton (If not, please document discussion that patient must be seen at [facility])       Vitals:  Declined by patient today due to Covid-19 transmission concerns.      There were no vitals filed for this visit.     Asssessment:      INR therapeutic at 2.6, therefore decreased risk of stroke   Reason(s) for out of range INR today:  n/a      Pt on antiplatelet therapy - n/a    Medication reconciliation completed today.    Plan:  Pt is to continue with current warfarin dosing regimen.       Follow up:  Because warfarin is a high risk medication and current CHEST guidelines recommend regular monitoring intervals (few days up to 12 weeks), will have patient return to clinic in 2 weeks to recheck INR.    Arthur Alarcon - Student Pharmacist   Brady Piña, PharmD, BCACP

## 2021-04-10 PROCEDURE — S9126 HOSPICE CARE, IN THE HOME, P: HCPCS

## 2021-04-11 PROCEDURE — S9126 HOSPICE CARE, IN THE HOME, P: HCPCS

## 2021-04-12 PROCEDURE — S9126 HOSPICE CARE, IN THE HOME, P: HCPCS

## 2021-04-13 PROCEDURE — S9126 HOSPICE CARE, IN THE HOME, P: HCPCS

## 2021-04-14 PROCEDURE — S9126 HOSPICE CARE, IN THE HOME, P: HCPCS

## 2021-04-15 ENCOUNTER — HOME CARE VISIT (OUTPATIENT)
Dept: HOSPICE | Facility: HOSPICE | Age: 86
End: 2021-04-15
Payer: MEDICARE

## 2021-04-15 VITALS — DIASTOLIC BLOOD PRESSURE: 65 MMHG | SYSTOLIC BLOOD PRESSURE: 120 MMHG | RESPIRATION RATE: 20 BRPM

## 2021-04-15 PROCEDURE — S9126 HOSPICE CARE, IN THE HOME, P: HCPCS

## 2021-04-15 PROCEDURE — G0299 HHS/HOSPICE OF RN EA 15 MIN: HCPCS

## 2021-04-16 PROCEDURE — 6650990 HC HOSPICE AND HOME CARE RX REV CODE 0250

## 2021-04-16 PROCEDURE — S9126 HOSPICE CARE, IN THE HOME, P: HCPCS

## 2021-04-17 PROCEDURE — S9126 HOSPICE CARE, IN THE HOME, P: HCPCS

## 2021-04-18 PROCEDURE — S9126 HOSPICE CARE, IN THE HOME, P: HCPCS

## 2021-04-19 PROCEDURE — S9126 HOSPICE CARE, IN THE HOME, P: HCPCS

## 2021-04-19 RX ORDER — WARFARIN SODIUM 3 MG/1
TABLET ORAL
Qty: 100 TABLET | Refills: 2 | Status: SHIPPED
Start: 2021-04-19 | End: 2021-06-24

## 2021-04-19 SDOH — ECONOMIC STABILITY: HOUSING INSECURITY: EVIDENCE OF SMOKING MATERIAL: 0

## 2021-04-19 SDOH — ECONOMIC STABILITY: HOUSING INSECURITY: HOME SAFETY: O2 PRN AND HS ONLY

## 2021-04-19 ASSESSMENT — ENCOUNTER SYMPTOMS
FATIGUES EASILY: 1
CHEST TIGHTNESS: 1
DYSPNEA ACTIVITY LEVEL: AFTER AMBULATING LESS THAN 10 FT
HYPERTENSION: 1
LOWER EXTREMITY EDEMA: 1
SLEEP QUALITY: FAIR
SHORTNESS OF BREATH: 1
DRY SKIN: 1
DYSPNEA ON EXERTION: 1
DIZZINESS: 1
DYSPNEA ACTIVITY LEVEL: WHILE SPEAKING
PALPITATIONS: 1

## 2021-04-19 ASSESSMENT — SOCIAL DETERMINANTS OF HEALTH (SDOH)
ACTIVE STRESSOR - HEALTH CHANGES: 1
ACTIVE STRESSOR - EXPRESSED EMOTIONAL NEED: 1

## 2021-04-19 ASSESSMENT — ACTIVITIES OF DAILY LIVING (ADL): MONEY MANAGEMENT (EXPENSES/BILLS): INDEPENDENT

## 2021-04-20 PROCEDURE — S9126 HOSPICE CARE, IN THE HOME, P: HCPCS

## 2021-04-21 PROCEDURE — S9126 HOSPICE CARE, IN THE HOME, P: HCPCS

## 2021-04-22 ENCOUNTER — HOME CARE VISIT (OUTPATIENT)
Dept: HOSPICE | Facility: HOSPICE | Age: 86
End: 2021-04-22
Payer: MEDICARE

## 2021-04-22 PROCEDURE — G0299 HHS/HOSPICE OF RN EA 15 MIN: HCPCS

## 2021-04-22 PROCEDURE — S9126 HOSPICE CARE, IN THE HOME, P: HCPCS

## 2021-04-22 ASSESSMENT — SOCIAL DETERMINANTS OF HEALTH (SDOH)
ACTIVE STRESSOR - EXPRESSED EMOTIONAL NEED: 1
ACTIVE STRESSOR - HEALTH CHANGES: 1

## 2021-04-22 ASSESSMENT — ACTIVITIES OF DAILY LIVING (ADL): MONEY MANAGEMENT (EXPENSES/BILLS): INDEPENDENT

## 2021-04-22 ASSESSMENT — ENCOUNTER SYMPTOMS: SLEEP QUALITY: FAIR

## 2021-04-23 ENCOUNTER — ANTICOAGULATION VISIT (OUTPATIENT)
Dept: MEDICAL GROUP | Facility: PHYSICIAN GROUP | Age: 86
End: 2021-04-23
Payer: MEDICARE

## 2021-04-23 DIAGNOSIS — I48.0 PAF (PAROXYSMAL ATRIAL FIBRILLATION) (HCC): ICD-10-CM

## 2021-04-23 DIAGNOSIS — Z79.01 CHRONIC ANTICOAGULATION: Primary | ICD-10-CM

## 2021-04-23 LAB — INR PPP: 2.7 (ref 2–3.5)

## 2021-04-23 PROCEDURE — 93793 ANTICOAG MGMT PT WARFARIN: CPT | Mod: GW | Performed by: FAMILY MEDICINE

## 2021-04-23 PROCEDURE — 85610 PROTHROMBIN TIME: CPT | Mod: GW | Performed by: FAMILY MEDICINE

## 2021-04-23 PROCEDURE — S9126 HOSPICE CARE, IN THE HOME, P: HCPCS

## 2021-04-23 NOTE — PROGRESS NOTES
Anticoagulation Summary  As of 4/23/2021    INR goal:  2.0-3.0   TTR:  80.7 % (2.8 y)   INR used for dosing:     Warfarin maintenance plan:  1.5 mg (3 mg x 0.5) every Mon, Fri; 3 mg (3 mg x 1) all other days   Weekly warfarin total:  18 mg   Plan last modified:  Brady Piña, PharmD (8/31/2020)   Next INR check:     Target end date:  Indefinite    Indications    Chronic anticoagulation [Z79.01]  PAF (paroxysmal atrial fibrillation) ([facility]) [I48.0]             Anticoagulation Episode Summary     INR check location:      Preferred lab:      Send INR reminders to:      Comments:        Anticoagulation Care Providers     Provider Role Specialty Phone number    Ganesh Mckeon M.D. Referring Geriatrics [phone]    [facility] Anticoagulation [facility] Responsible  [phone]        Anticoagulation Patient Findings          HPI:   Shereen Dale seen in clinic today, on anticoagulation therapy with warfarin for stroke prophylaxis due to history of PAF    Patient's previous INR was therapeutic at 2.60 on 4/9, at which time patient was instructed to continue warfarin regimen.  She returns to clinic today to recheck INR to ensure it is therapeutic and thus preventing possible clotting and/or bleeding/bruising complications.    CHADS-VASc = at least 5   (unadjusted ischemic stroke risk/year:  7.2%, which is moderate risk)    Does patient have any changes to current medical/health status since last appt (Y/N):  N  Does patient have any signs/symptoms of bleeding and/or thrombosis since the last appt (Y/N):  N  Does patient have any interval changes to diet or medications since last appt (Y/N):  N  Are there any complications or cost restrictions with current therapy (Y/N):  N     Does patient have [facility] PCP? Dr. Ganesh Burton (If not, please document discussion that patient must be seen at [facility])       Vitals:  Declined by patient today due to Covid-19 transmission concerns.      There were no vitals  filed for this visit.     Asssessment:      INR therapeutic at 2.7, therefore decreased risk of stroke   Reason(s) for out of range INR today:  n/a      Pt Not on antiplatelet therapy     Medication reconciliation completed today.    Plan:  Pt is to continue with current warfarin dosing regimen.       Follow up:  Because warfarin is a high risk medication and current CHEST guidelines recommend regular monitoring intervals (few days up to 12 weeks), will have patient return to clinic in 2 weeks to recheck INR..    Arthur Alarcon - Student Pharmacist   Brady Piña, PharmD, BCACP

## 2021-04-24 PROCEDURE — S9126 HOSPICE CARE, IN THE HOME, P: HCPCS

## 2021-04-25 PROCEDURE — S9126 HOSPICE CARE, IN THE HOME, P: HCPCS

## 2021-04-26 PROCEDURE — S9126 HOSPICE CARE, IN THE HOME, P: HCPCS

## 2021-04-27 PROCEDURE — S9126 HOSPICE CARE, IN THE HOME, P: HCPCS

## 2021-04-28 PROCEDURE — S9126 HOSPICE CARE, IN THE HOME, P: HCPCS

## 2021-04-29 PROCEDURE — S9126 HOSPICE CARE, IN THE HOME, P: HCPCS

## 2021-04-30 PROCEDURE — S9126 HOSPICE CARE, IN THE HOME, P: HCPCS

## 2021-05-01 VITALS — RESPIRATION RATE: 20 BRPM

## 2021-05-01 PROCEDURE — S9126 HOSPICE CARE, IN THE HOME, P: HCPCS

## 2021-05-01 SDOH — ECONOMIC STABILITY: HOUSING INSECURITY: EVIDENCE OF SMOKING MATERIAL: 0

## 2021-05-01 SDOH — ECONOMIC STABILITY: HOUSING INSECURITY: HOME SAFETY: O2 AT HS ONLY

## 2021-05-01 ASSESSMENT — ENCOUNTER SYMPTOMS
HYPERTENSION: 1
BOWEL PATTERN NORMAL: 1
PALPITATIONS: 1
DYSPNEA ACTIVITY LEVEL: WHILE SPEAKING
DIZZINESS: 1
DRY SKIN: 1
SHORTNESS OF BREATH: 1
FATIGUES EASILY: 1
DYSPNEA ON EXERTION: 1
CHEST TIGHTNESS: 1
STOOL FREQUENCY: LESS THAN DAILY
DYSPNEA ACTIVITY LEVEL: AFTER AMBULATING LESS THAN 10 FT

## 2021-05-02 PROCEDURE — S9126 HOSPICE CARE, IN THE HOME, P: HCPCS

## 2021-05-03 PROCEDURE — S9126 HOSPICE CARE, IN THE HOME, P: HCPCS

## 2021-05-04 PROCEDURE — S9126 HOSPICE CARE, IN THE HOME, P: HCPCS

## 2021-05-05 PROCEDURE — S9126 HOSPICE CARE, IN THE HOME, P: HCPCS

## 2021-05-06 ENCOUNTER — HOME CARE VISIT (OUTPATIENT)
Dept: HOSPICE | Facility: HOSPICE | Age: 86
End: 2021-05-06
Payer: MEDICARE

## 2021-05-06 PROCEDURE — G0299 HHS/HOSPICE OF RN EA 15 MIN: HCPCS

## 2021-05-06 PROCEDURE — S9126 HOSPICE CARE, IN THE HOME, P: HCPCS

## 2021-05-07 ENCOUNTER — ANTICOAGULATION VISIT (OUTPATIENT)
Dept: MEDICAL GROUP | Facility: PHYSICIAN GROUP | Age: 86
End: 2021-05-07
Payer: MEDICARE

## 2021-05-07 ENCOUNTER — HOME CARE VISIT (OUTPATIENT)
Dept: HOSPICE | Facility: HOSPICE | Age: 86
End: 2021-05-07
Payer: MEDICARE

## 2021-05-07 DIAGNOSIS — Z79.01 CHRONIC ANTICOAGULATION: Primary | ICD-10-CM

## 2021-05-07 DIAGNOSIS — I48.0 PAF (PAROXYSMAL ATRIAL FIBRILLATION) (HCC): ICD-10-CM

## 2021-05-07 LAB — INR PPP: 4.3 (ref 2–3.5)

## 2021-05-07 PROCEDURE — 99211 OFF/OP EST MAY X REQ PHY/QHP: CPT | Mod: GW | Performed by: FAMILY MEDICINE

## 2021-05-07 PROCEDURE — S9126 HOSPICE CARE, IN THE HOME, P: HCPCS

## 2021-05-07 PROCEDURE — 85610 PROTHROMBIN TIME: CPT | Mod: GW | Performed by: FAMILY MEDICINE

## 2021-05-07 NOTE — PROGRESS NOTES
Anticoagulation Summary  As of 2021    INR goal:  2.0-3.0   TTR:  80.1 % (2.9 y)   INR used for dosin.30 (2021)   Warfarin maintenance plan:  1.5 mg (3 mg x 0.5) every Mon, Fri; 3 mg (3 mg x 1) all other days   Weekly warfarin total:  18 mg   Plan last modified:  Brady Piña, PharmD (2020)   Next INR check:  5/10/2021   Target end date:  Indefinite    Indications    Chronic anticoagulation [Z79.01]  PAF (paroxysmal atrial fibrillation) ([facility]) [I48.0]             Anticoagulation Episode Summary     INR check location:      Preferred lab:      Send INR reminders to:      Comments:        Anticoagulation Care Providers     Provider Role Specialty Phone number    Ganesh Mckeon M.D. Referring Geriatrics [phone]    [facility] Responsible  [phone]        Anticoagulation Patient Findings  Patient Findings     Negatives:  Signs/symptoms of thrombosis, Signs/symptoms of bleeding, Laboratory test error suspected, Change in health, Change in alcohol use, Change in activity, Upcoming invasive procedure, Emergency department visit, Upcoming dental procedure, Missed doses, Extra doses, Change in medications, Change in diet/appetite, Hospital admission, Bruising, Other complaints        HPI:   Shereen Dale seen in clinic today, on anticoagulation therapy with warfarin for stroke prophylaxis due to history of atrial fibrillation.    Patient's previous INR was therapeutic at 2.7 on 21, at which time patient was instructed to continue with current warfarin regimen.  She returns to clinic today to recheck INR to ensure it is therapeutic and thus preventing possible clotting and/or bleeding/bruising complications.    CHADS-VASc = at least 5  (unadjusted ischemic stroke risk/year:  7.2%, which is moderate risk)    Does patient have any changes to current medical/health status since last appt (Y/N):  NO  Does patient have any signs/symptoms of bleeding and/or thrombosis  since the last appt (Y/N):  NO  Does patient have any interval changes to diet or medications since last appt (Y/N):  NO  Are there any complications or cost restrictions with current therapy (Y/N):  NO     Does patient have [facility] PCP? Yes, Dr Ganesh Mckeon (If not, please document discussion that patient must be seen at [facility])       Vitals:  Declined by patient today due to Covid-19 transmission concerns.    There were no vitals filed for this visit.     Asssessment:      INR supratherapeutic at 4.3, therefore increasing patient's bleeding risk.   Reason(s) for out of range INR today:  Etiology unknown.      Pt NOT on antiplatelet therapy.    Medication reconciliation completed today.    Plan:  Instructed patient to HOLD X 1, decrease tomorrow's dose to 1.5mg, then resume current warfarin regimen.     Follow up:  Because warfarin is a high risk medication and current CHEST guidelines recommend regular monitoring intervals (few days up to 12 weeks), will have patient return to clinic in 3 days to recheck INR.    Brady Piña, PharmD, BCACP

## 2021-05-08 PROCEDURE — S9126 HOSPICE CARE, IN THE HOME, P: HCPCS

## 2021-05-09 PROCEDURE — S9126 HOSPICE CARE, IN THE HOME, P: HCPCS

## 2021-05-10 ENCOUNTER — ANTICOAGULATION VISIT (OUTPATIENT)
Dept: MEDICAL GROUP | Facility: PHYSICIAN GROUP | Age: 86
End: 2021-05-10
Payer: MEDICARE

## 2021-05-10 DIAGNOSIS — Z79.01 CHRONIC ANTICOAGULATION: Primary | ICD-10-CM

## 2021-05-10 DIAGNOSIS — I48.0 PAF (PAROXYSMAL ATRIAL FIBRILLATION) (HCC): ICD-10-CM

## 2021-05-10 LAB — INR PPP: 1.9 (ref 2–3.5)

## 2021-05-10 PROCEDURE — 85610 PROTHROMBIN TIME: CPT | Mod: GW | Performed by: PHYSICIAN ASSISTANT

## 2021-05-10 PROCEDURE — S9126 HOSPICE CARE, IN THE HOME, P: HCPCS

## 2021-05-10 PROCEDURE — 99211 OFF/OP EST MAY X REQ PHY/QHP: CPT | Mod: GW | Performed by: PHYSICIAN ASSISTANT

## 2021-05-10 NOTE — PROGRESS NOTES
Anticoagulation Summary  As of 5/10/2021    INR goal:  2.0-3.0   TTR:  80.0 % (2.9 y)   INR used for dosin.90 (5/10/2021)   Warfarin maintenance plan:  1.5 mg (3 mg x 0.5) every Mon, Fri; 3 mg (3 mg x 1) all other days   Weekly warfarin total:  18 mg   Plan last modified:  Brady Piña, PharmD (2020)   Next INR check:  2021   Target end date:  Indefinite    Indications    Chronic anticoagulation [Z79.01]  PAF (paroxysmal atrial fibrillation) ([facility]) [I48.0]             Anticoagulation Episode Summary     INR check location:      Preferred lab:      Send INR reminders to:      Comments:        Anticoagulation Care Providers     Provider Role Specialty Phone number    Ganesh Mckeon M.D. Referring Geriatrics [phone]    [facility] Responsible  [phone]        Anticoagulation Patient Findings  Patient Findings     Negatives:  Signs/symptoms of thrombosis, Signs/symptoms of bleeding, Laboratory test error suspected, Change in health, Change in alcohol use, Change in activity, Upcoming invasive procedure, Emergency department visit, Upcoming dental procedure, Missed doses, Extra doses, Change in medications, Change in diet/appetite, Hospital admission, Bruising, Other complaints        HPI:   Shereen Dale seen in clinic today, on anticoagulation therapy with warfarin for stroke prophylaxis due to history of atrial fibrillation.    Patient's previous INR was supratherapeutic at 4.3 on 21, at which time patient was instructed to hold one dose, decrease next dose, then resume current warfarin regimen.  She returns to clinic today to recheck INR to ensure it is therapeutic and thus preventing possible clotting and/or bleeding/bruising complications.    CHADS-VASc = at least 5  (unadjusted ischemic stroke risk/year:  7.2%, which is moderate risk)    Does patient have any changes to current medical/health status since last appt (Y/N):  NO  Does patient have any  signs/symptoms of bleeding and/or thrombosis since the last appt (Y/N):  nO  Does patient have any interval changes to diet or medications since last appt (Y/N):  NO  Are there any complications or cost restrictions with current therapy (Y/N):  NO     Does patient have [facility] PCP? Yes, Dr Ganesh Mckeon (If not, please document discussion that patient must be seen at [facility])       Vitals:  Declined by patient today due to Covid-19 transmission concerns.    There were no vitals filed for this visit.     Asssessment:      INR subtherapeutic at 1.9, therefore increasing patient's stroke risk.   Reason(s) for out of range INR today:  Held doses.      Pt NOT on antiplatelet therapy.    Medication reconciliation completed today.    Plan:  Pt is to continue with current warfarin dosing regimen.     Follow up:  Because warfarin is a high risk medication and current CHEST guidelines recommend regular monitoring intervals (few days up to 12 weeks), will have patient return to clinic in 1.5 weeks to recheck INR (per patient preference).    Brady Piña, PharmD, BCACP

## 2021-05-11 PROCEDURE — S9126 HOSPICE CARE, IN THE HOME, P: HCPCS

## 2021-05-12 PROCEDURE — S9126 HOSPICE CARE, IN THE HOME, P: HCPCS

## 2021-05-13 PROCEDURE — S9126 HOSPICE CARE, IN THE HOME, P: HCPCS

## 2021-05-14 PROCEDURE — S9126 HOSPICE CARE, IN THE HOME, P: HCPCS

## 2021-05-15 PROCEDURE — S9126 HOSPICE CARE, IN THE HOME, P: HCPCS

## 2021-05-16 PROCEDURE — S9126 HOSPICE CARE, IN THE HOME, P: HCPCS

## 2021-05-17 PROCEDURE — S9126 HOSPICE CARE, IN THE HOME, P: HCPCS

## 2021-05-17 RX ORDER — LEVOTHYROXINE SODIUM 0.05 MG/1
50 TABLET ORAL EVERY MORNING
Qty: 90 TABLET | Refills: 0 | Status: SHIPPED | OUTPATIENT
Start: 2021-05-17 | End: 2021-08-06

## 2021-05-18 PROCEDURE — S9126 HOSPICE CARE, IN THE HOME, P: HCPCS

## 2021-05-19 PROCEDURE — S9126 HOSPICE CARE, IN THE HOME, P: HCPCS

## 2021-05-20 ENCOUNTER — PATIENT MESSAGE (OUTPATIENT)
Dept: HEALTH INFORMATION MANAGEMENT | Facility: OTHER | Age: 86
End: 2021-05-20

## 2021-05-20 ENCOUNTER — HOME CARE VISIT (OUTPATIENT)
Dept: HOSPICE | Facility: HOSPICE | Age: 86
End: 2021-05-20
Payer: MEDICARE

## 2021-05-20 PROCEDURE — S9126 HOSPICE CARE, IN THE HOME, P: HCPCS

## 2021-05-20 PROCEDURE — G0299 HHS/HOSPICE OF RN EA 15 MIN: HCPCS

## 2021-05-21 ENCOUNTER — ANTICOAGULATION VISIT (OUTPATIENT)
Dept: MEDICAL GROUP | Facility: PHYSICIAN GROUP | Age: 86
End: 2021-05-21
Payer: MEDICARE

## 2021-05-21 VITALS — RESPIRATION RATE: 20 BRPM

## 2021-05-21 DIAGNOSIS — I48.0 PAF (PAROXYSMAL ATRIAL FIBRILLATION) (HCC): ICD-10-CM

## 2021-05-21 DIAGNOSIS — Z79.01 CHRONIC ANTICOAGULATION: Primary | ICD-10-CM

## 2021-05-21 LAB — INR PPP: 2.4 (ref 2–3.5)

## 2021-05-21 PROCEDURE — S9126 HOSPICE CARE, IN THE HOME, P: HCPCS

## 2021-05-21 PROCEDURE — 93793 ANTICOAG MGMT PT WARFARIN: CPT | Mod: GW | Performed by: FAMILY MEDICINE

## 2021-05-21 PROCEDURE — 6650990 HC HOSPICE AND HOME CARE RX REV CODE 0250

## 2021-05-21 PROCEDURE — 85610 PROTHROMBIN TIME: CPT | Mod: GW | Performed by: FAMILY MEDICINE

## 2021-05-21 SDOH — ECONOMIC STABILITY: HOUSING INSECURITY: EVIDENCE OF SMOKING MATERIAL: 0

## 2021-05-21 ASSESSMENT — ENCOUNTER SYMPTOMS
FATIGUES EASILY: 1
HYPERTENSION: 1
DYSPNEA ACTIVITY LEVEL: WHILE SPEAKING
DIZZINESS: 1
LOWER EXTREMITY EDEMA: 1
DYSPNEA ON EXERTION: 1
PALPITATIONS: 1
CHEST TIGHTNESS: 1
SHORTNESS OF BREATH: 1
DYSPNEA ACTIVITY LEVEL: AFTER AMBULATING LESS THAN 10 FT

## 2021-05-21 NOTE — PROGRESS NOTES
Anticoagulation Summary  As of 2021    INR goal:  2.0-3.0   TTR:  80.0 % (3 y)   INR used for dosin.40 (2021)   Warfarin maintenance plan:  1.5 mg (3 mg x 0.5) every Mon, Fri; 3 mg (3 mg x 1) all other days   Weekly warfarin total:  18 mg   Plan last modified:  Brady Piña, PharmD (2020)   Next INR check:  2021   Target end date:  Indefinite    Indications    Chronic anticoagulation [Z79.01]  PAF (paroxysmal atrial fibrillation) ([facility]) [I48.0]             Anticoagulation Episode Summary     INR check location:      Preferred lab:      Send INR reminders to:      Comments:        Anticoagulation Care Providers     Provider Role Specialty Phone number    Ganesh Mckeon M.D. Referring Geriatrics [phone]    [facility] Anticoagulation Services Responsible  [phone]        Anticoagulation Patient Findings  Patient Findings     Positives:  Change in diet/appetite    Negatives:  Signs/symptoms of thrombosis, Signs/symptoms of bleeding, Laboratory test error suspected, Change in health, Change in alcohol use, Change in activity, Upcoming invasive procedure, Emergency department visit, Upcoming dental procedure, Missed doses, Extra doses, Change in medications, Hospital admission, Bruising, Other complaints              HPI:   Shereen Dale seen in clinic today, on anticoagulation therapy with warfarin for stroke prophylaxis  due to history of A-fib.    Patient's previous INR was subtherapeutic at 1.9 on 05/10, at which time patient was instructed to Decrease greens.  She returns to clinic today to recheck INR to ensure it is therapeutic and thus preventing possible clotting and/or bleeding/bruising complications.    CHADS-VASc = 5  (unadjusted ischemic stroke risk/year:  7.2%, which is moderate risk)    Does patient have any changes to current medical/health status since last appt (Y/N):  No  Does patient have any signs/symptoms of bleeding and/or thrombosis since the last appt (Y/N):   No  Does patient have any interval changes to diet or medications since last appt (Y/N):  Has decreased the amount of greens lately  Are there any complications or cost restrictions with current therapy (Y/N):  No     Does patient have [facility] PCP? Dr. Ganesh Burton (If not, please document discussion that patient must be seen at [facility])       Vitals:  Declined by patient today due to Covid-19 transmission concerns.      There were no vitals filed for this visit.     Asssessment:      INR therapeutic at 2.4, therefore patient is not at risk for stroke or bleeding   Reason(s) for out of range INR today:  n/a      Pt not antiplatelet therapy     Medication reconciliation completed today.    Plan:  Pt is to continue with current warfarin dosing regimen.       Follow up:  Because warfarin is a high risk medication and current CHEST guidelines recommend regular monitoring intervals (few days up to 12 weeks), will have patient return to clinic in 2 weeks to recheck INR.    Eleazar Greenberg student pharmacist  Brady Piña, PharmD, BCACP

## 2021-05-22 PROCEDURE — S9126 HOSPICE CARE, IN THE HOME, P: HCPCS

## 2021-05-23 PROCEDURE — S9126 HOSPICE CARE, IN THE HOME, P: HCPCS

## 2021-05-24 PROCEDURE — S9126 HOSPICE CARE, IN THE HOME, P: HCPCS

## 2021-05-25 PROCEDURE — S9126 HOSPICE CARE, IN THE HOME, P: HCPCS

## 2021-05-26 PROCEDURE — S9126 HOSPICE CARE, IN THE HOME, P: HCPCS

## 2021-05-27 ENCOUNTER — HOME CARE VISIT (OUTPATIENT)
Dept: HOSPICE | Facility: HOSPICE | Age: 86
End: 2021-05-27
Payer: MEDICARE

## 2021-05-27 PROCEDURE — S9126 HOSPICE CARE, IN THE HOME, P: HCPCS

## 2021-05-27 PROCEDURE — 6650990 HC HOSPICE AND HOME CARE RX REV CODE 0250

## 2021-05-28 ENCOUNTER — HOME CARE VISIT (OUTPATIENT)
Dept: HOSPICE | Facility: HOSPICE | Age: 86
End: 2021-05-28
Payer: MEDICARE

## 2021-05-28 PROCEDURE — S9126 HOSPICE CARE, IN THE HOME, P: HCPCS

## 2021-05-29 PROCEDURE — S9126 HOSPICE CARE, IN THE HOME, P: HCPCS

## 2021-05-30 PROCEDURE — S9126 HOSPICE CARE, IN THE HOME, P: HCPCS

## 2021-05-31 PROCEDURE — S9126 HOSPICE CARE, IN THE HOME, P: HCPCS

## 2021-06-01 PROCEDURE — S9126 HOSPICE CARE, IN THE HOME, P: HCPCS

## 2021-06-02 PROCEDURE — S9126 HOSPICE CARE, IN THE HOME, P: HCPCS

## 2021-06-03 ENCOUNTER — HOME CARE VISIT (OUTPATIENT)
Dept: HOSPICE | Facility: HOSPICE | Age: 86
End: 2021-06-03
Payer: MEDICARE

## 2021-06-03 PROCEDURE — S9126 HOSPICE CARE, IN THE HOME, P: HCPCS

## 2021-06-03 PROCEDURE — G0155 HHCP-SVS OF CSW,EA 15 MIN: HCPCS

## 2021-06-03 PROCEDURE — G0299 HHS/HOSPICE OF RN EA 15 MIN: HCPCS

## 2021-06-03 ASSESSMENT — ENCOUNTER SYMPTOMS: SLEEP QUALITY: FAIR

## 2021-06-03 ASSESSMENT — SOCIAL DETERMINANTS OF HEALTH (SDOH): ACTIVE STRESSOR - HEALTH CHANGES: 1

## 2021-06-04 ENCOUNTER — ANTICOAGULATION VISIT (OUTPATIENT)
Dept: MEDICAL GROUP | Facility: PHYSICIAN GROUP | Age: 86
End: 2021-06-04
Payer: MEDICARE

## 2021-06-04 DIAGNOSIS — Z79.01 CHRONIC ANTICOAGULATION: Primary | ICD-10-CM

## 2021-06-04 DIAGNOSIS — I48.0 PAF (PAROXYSMAL ATRIAL FIBRILLATION) (HCC): ICD-10-CM

## 2021-06-04 LAB — INR PPP: 3 (ref 2–3.5)

## 2021-06-04 PROCEDURE — S9126 HOSPICE CARE, IN THE HOME, P: HCPCS

## 2021-06-04 PROCEDURE — 93793 ANTICOAG MGMT PT WARFARIN: CPT | Mod: GW | Performed by: FAMILY MEDICINE

## 2021-06-04 PROCEDURE — 85610 PROTHROMBIN TIME: CPT | Mod: GW | Performed by: FAMILY MEDICINE

## 2021-06-04 NOTE — PROGRESS NOTES
Anticoagulation Summary  As of 6/4/2021    INR goal:  2.0-3.0   TTR:  80.2 % (3 y)   INR used for dosing:  3.00 (6/4/2021)   Warfarin maintenance plan:  1.5 mg (3 mg x 0.5) every Mon, Fri; 3 mg (3 mg x 1) all other days   Weekly warfarin total:  18 mg   Plan last modified:  Brady Piña, PharmD (8/31/2020)   Next INR check:  6/18/2021   Target end date:  Indefinite    Indications    Chronic anticoagulation [Z79.01]  PAF (paroxysmal atrial fibrillation) ([facility]) [I48.0]             Anticoagulation Episode Summary     INR check location:      Preferred lab:      Send INR reminders to:      Comments:        Anticoagulation Care Providers     Provider Role Specialty Phone number    Ganesh Mckeon M.D. Referring Geriatrics [phone]    [facility] Anticoagulation Services Responsible  [phone]        Anticoagulation Patient Findings  Patient Findings     Negatives:  Signs/symptoms of thrombosis, Signs/symptoms of bleeding, Laboratory test error suspected, Change in health, Change in alcohol use, Change in activity, Upcoming invasive procedure, Emergency department visit, Upcoming dental procedure, Missed doses, Extra doses, Change in medications, Change in diet/appetite, Hospital admission, Bruising, Other complaints              HPI:   Shereen Dale seen in clinic today, on anticoagulation therapy with warfarin for stroke prophylaxis due to history of A-fib.    Patient's previous INR was therapeutic at 2.4 on 05/21, at which time patient was instructed to continue current regimen.  She returns to clinic today to recheck INR to ensure it is therapeutic and thus preventing possible clotting and/or bleeding/bruising complications.    CHADS-VASc = 5  (unadjusted ischemic stroke risk/year:  7.2, which is high risk)    Does patient have any changes to current medical/health status since last appt (Y/N):  No  Does patient have any signs/symptoms of bleeding and/or thrombosis since the last appt (Y/N):  No  Does  patient have any interval changes to diet or medications since last appt (Y/N):  PT has been eating more vegetables   Are there any complications or cost restrictions with current therapy (Y/N):  No     Does patient have [facility] PCP? Dr. Ganesh Burton (If not, please document discussion that patient must be seen at [facility])       Vitals:  Declined by patient today due to Covid-19 transmission concerns.      There were no vitals filed for this visit.     Asssessment:      INR therapeutic at 3.0, therefore PT is not at risk for thrombosis or bleeding   Reason(s) for out of range INR today:  n/a      Pt not antiplatelet therapy    Medication reconciliation completed today.    Plan:  PT is to decrease Wednesday's dose once and continue current regimen     Follow up:  Because warfarin is a high risk medication and current CHEST guidelines recommend regular monitoring intervals (few days up to 12 weeks), will have patient return to clinic in 2 weeks to recheck INR.    Marcelle Greenberg student pharmacist    Brady Piña, PharmD, BCACP

## 2021-06-05 PROCEDURE — S9126 HOSPICE CARE, IN THE HOME, P: HCPCS

## 2021-06-06 PROCEDURE — S9126 HOSPICE CARE, IN THE HOME, P: HCPCS

## 2021-06-07 PROCEDURE — S9126 HOSPICE CARE, IN THE HOME, P: HCPCS

## 2021-06-08 VITALS — RESPIRATION RATE: 20 BRPM | SYSTOLIC BLOOD PRESSURE: 130 MMHG | DIASTOLIC BLOOD PRESSURE: 80 MMHG

## 2021-06-08 PROCEDURE — S9126 HOSPICE CARE, IN THE HOME, P: HCPCS

## 2021-06-08 SDOH — ECONOMIC STABILITY: HOUSING INSECURITY: EVIDENCE OF SMOKING MATERIAL: 0

## 2021-06-08 ASSESSMENT — ENCOUNTER SYMPTOMS
FATIGUES EASILY: 1
DIZZINESS: 1
DYSPNEA ACTIVITY LEVEL: WHILE SPEAKING
CHEST TIGHTNESS: 1
PALPITATIONS: 1
DYSPNEA ON EXERTION: 1
HYPERTENSION: 1
LOWER EXTREMITY EDEMA: 1
SHORTNESS OF BREATH: 1
STOOL FREQUENCY: LESS THAN DAILY
DYSPNEA ACTIVITY LEVEL: AFTER AMBULATING LESS THAN 10 FT
BOWEL PATTERN NORMAL: 1

## 2021-06-09 PROCEDURE — S9126 HOSPICE CARE, IN THE HOME, P: HCPCS

## 2021-06-10 PROCEDURE — S9126 HOSPICE CARE, IN THE HOME, P: HCPCS

## 2021-06-11 PROCEDURE — S9126 HOSPICE CARE, IN THE HOME, P: HCPCS

## 2021-06-12 PROCEDURE — S9126 HOSPICE CARE, IN THE HOME, P: HCPCS

## 2021-06-13 PROCEDURE — S9126 HOSPICE CARE, IN THE HOME, P: HCPCS

## 2021-06-14 VITALS — RESPIRATION RATE: 20 BRPM

## 2021-06-14 PROCEDURE — S9126 HOSPICE CARE, IN THE HOME, P: HCPCS

## 2021-06-14 SDOH — ECONOMIC STABILITY: HOUSING INSECURITY: EVIDENCE OF SMOKING MATERIAL: 0

## 2021-06-14 ASSESSMENT — ENCOUNTER SYMPTOMS
DYSPNEA ON EXERTION: 1
HYPERTENSION: 1
LOWER EXTREMITY EDEMA: 1
DYSPNEA ACTIVITY LEVEL: WHILE SPEAKING
PALPITATIONS: 1
FATIGUES EASILY: 1
BOWEL PATTERN NORMAL: 1
SLEEP QUALITY: FAIR
DIZZINESS: 1
STOOL FREQUENCY: LESS THAN DAILY
SHORTNESS OF BREATH: 1
DYSPNEA ACTIVITY LEVEL: AFTER AMBULATING LESS THAN 10 FT
CHEST TIGHTNESS: 1

## 2021-06-14 ASSESSMENT — SOCIAL DETERMINANTS OF HEALTH (SDOH): ACTIVE STRESSOR - HEALTH CHANGES: 1

## 2021-06-15 PROCEDURE — S9126 HOSPICE CARE, IN THE HOME, P: HCPCS

## 2021-06-16 PROCEDURE — 6650990 HC HOSPICE AND HOME CARE RX REV CODE 0250

## 2021-06-16 PROCEDURE — S9126 HOSPICE CARE, IN THE HOME, P: HCPCS

## 2021-06-17 ENCOUNTER — HOME CARE VISIT (OUTPATIENT)
Dept: HOSPICE | Facility: HOSPICE | Age: 86
End: 2021-06-17
Payer: MEDICARE

## 2021-06-17 VITALS — RESPIRATION RATE: 20 BRPM | DIASTOLIC BLOOD PRESSURE: 62 MMHG | SYSTOLIC BLOOD PRESSURE: 108 MMHG

## 2021-06-17 PROCEDURE — G0299 HHS/HOSPICE OF RN EA 15 MIN: HCPCS

## 2021-06-17 PROCEDURE — S9126 HOSPICE CARE, IN THE HOME, P: HCPCS

## 2021-06-18 ENCOUNTER — ANTICOAGULATION VISIT (OUTPATIENT)
Dept: MEDICAL GROUP | Facility: PHYSICIAN GROUP | Age: 86
End: 2021-06-18
Payer: MEDICARE

## 2021-06-18 DIAGNOSIS — Z79.01 CHRONIC ANTICOAGULATION: Primary | ICD-10-CM

## 2021-06-18 DIAGNOSIS — I48.0 PAF (PAROXYSMAL ATRIAL FIBRILLATION) (HCC): ICD-10-CM

## 2021-06-18 LAB — INR PPP: 3.9 (ref 2–3.5)

## 2021-06-18 PROCEDURE — 85610 PROTHROMBIN TIME: CPT | Mod: GW | Performed by: FAMILY MEDICINE

## 2021-06-18 PROCEDURE — 99211 OFF/OP EST MAY X REQ PHY/QHP: CPT | Mod: GW | Performed by: FAMILY MEDICINE

## 2021-06-18 PROCEDURE — S9126 HOSPICE CARE, IN THE HOME, P: HCPCS

## 2021-06-18 NOTE — PROGRESS NOTES
Anticoagulation Summary  As of 6/18/2021    INR goal:  2.0-3.0   TTR:  79.2 % (3 y)   INR used for dosing:  3.90 (6/18/2021)   Warfarin maintenance plan:  1.5 mg (3 mg x 0.5) every Mon, Wed, Fri; 3 mg (3 mg x 1) all other days   Weekly warfarin total:  16.5 mg   Plan last modified:  Brady Piña, PharmD (6/18/2021)   Next INR check:  7/2/2021   Target end date:  Indefinite    Indications    Chronic anticoagulation [Z79.01]  PAF (paroxysmal atrial fibrillation) ([facility]) [I48.0]             Anticoagulation Episode Summary     INR check location:      Preferred lab:      Send INR reminders to:      Comments:        Anticoagulation Care Providers     Provider Role Specialty Phone number    Ganesh Mckeon M.D. Referring Geriatrics [phone]    [facility] Anticoagulation Services Responsible  [phone]        Anticoagulation Patient Findings  Patient Findings     Negatives:  Signs/symptoms of thrombosis, Signs/symptoms of bleeding, Laboratory test error suspected, Change in health, Change in alcohol use, Change in activity, Upcoming invasive procedure, Emergency department visit, Upcoming dental procedure, Missed doses, Extra doses, Change in medications, Change in diet/appetite, Hospital admission, Bruising, Other complaints              HPI:   Shereen Dale seen in clinic today, on anticoagulation therapy with warfarin for stroke prophylaxis due to history of A-fib.    Patient's previous INR was therapeutic at 3.0 on 06/04, at which time patient was instructed to continue with current regimen.  She returns to clinic today to recheck INR to ensure it is therapeutic and thus preventing possible clotting and/or bleeding/bruising complications.    CHADS-VASc = 5  (unadjusted ischemic stroke risk/year:  6.7, which is Moderate risk)    Does patient have any changes to current medical/health status since last appt (Y/N):  No  Does patient have any signs/symptoms of bleeding and/or thrombosis since the last appt  (Y/N):  No  Does patient have any interval changes to diet or medications since last appt (Y/N):  PT ate no vegetables this week  Are there any complications or cost restrictions with current therapy (Y/N):  No     Does patient have [facility] PCP? Ganesh Mckeon M.D.   (If not, please document discussion that patient must be seen at [facility])       Vitals:  Declined by patient today due to Covid-19 transmission concerns.      There were no vitals filed for this visit.     Asssessment:      INR supratherapeutic at 3.9, therefore PT is at risk of bleeding   Reason(s) for out of range INR today:  Dose is too high      Pt not antiplatelet therapy     Medication reconciliation completed today.    Plan:  PT is to hold today and decrease overall weekly warfarin regimen as detailed above.      Follow up:  Because warfarin is a high risk medication and current CHEST guidelines recommend regular monitoring intervals (few days up to 12 weeks), will have patient return to clinic in 2 weeks to recheck INR.    Marcelle Greenberg student pharmacist    Brady Piña, PharmD, BCACP

## 2021-06-19 PROCEDURE — S9126 HOSPICE CARE, IN THE HOME, P: HCPCS

## 2021-06-20 PROCEDURE — S9126 HOSPICE CARE, IN THE HOME, P: HCPCS

## 2021-06-21 ENCOUNTER — HOME CARE VISIT (OUTPATIENT)
Dept: HOSPICE | Facility: HOSPICE | Age: 86
End: 2021-06-21
Payer: MEDICARE

## 2021-06-21 PROCEDURE — S9126 HOSPICE CARE, IN THE HOME, P: HCPCS

## 2021-06-22 PROCEDURE — S9126 HOSPICE CARE, IN THE HOME, P: HCPCS

## 2021-06-23 PROCEDURE — S9126 HOSPICE CARE, IN THE HOME, P: HCPCS

## 2021-06-23 SDOH — ECONOMIC STABILITY: HOUSING INSECURITY: EVIDENCE OF SMOKING MATERIAL: 0

## 2021-06-23 ASSESSMENT — ENCOUNTER SYMPTOMS
DYSPNEA ACTIVITY LEVEL: AFTER AMBULATING LESS THAN 10 FT
DIZZINESS: 1
DYSPNEA ACTIVITY LEVEL: WHILE SPEAKING
DYSPNEA ON EXERTION: 1
SHORTNESS OF BREATH: 1
HYPERTENSION: 1
LOWER EXTREMITY EDEMA: 1
BOWEL PATTERN NORMAL: 1
LAST BOWEL MOVEMENT: 65912
STOOL FREQUENCY: DAILY
CHEST TIGHTNESS: 1
FATIGUES EASILY: 1
PALPITATIONS: 1

## 2021-06-23 ASSESSMENT — ACTIVITIES OF DAILY LIVING (ADL): MONEY MANAGEMENT (EXPENSES/BILLS): INDEPENDENT

## 2021-06-24 PROCEDURE — S9126 HOSPICE CARE, IN THE HOME, P: HCPCS

## 2021-06-25 PROCEDURE — S9126 HOSPICE CARE, IN THE HOME, P: HCPCS

## 2021-06-26 PROCEDURE — S9126 HOSPICE CARE, IN THE HOME, P: HCPCS

## 2021-06-27 PROCEDURE — S9126 HOSPICE CARE, IN THE HOME, P: HCPCS

## 2021-06-28 ENCOUNTER — TELEPHONE (OUTPATIENT)
Dept: MEDICAL GROUP | Facility: MEDICAL CENTER | Age: 86
End: 2021-06-28

## 2021-06-28 PROCEDURE — S9126 HOSPICE CARE, IN THE HOME, P: HCPCS

## 2021-06-28 NOTE — TELEPHONE ENCOUNTER
ESTABLISHED PATIENT PRE-VISIT PLANNING     Patient was NOT contacted to complete PVP.     Note: Patient will not be contacted if there is no indication to call.     1.  Reviewed notes from the last few office visits within the medical group: Yes    2.  If any orders were placed at last visit or intended to be done for this visit (i.e. 6 mos follow-up), do we have Results/Consult Notes?         •  Labs - Labs were not ordered at last office visit.  Note: If patient appointment is for lab review and patient did not complete labs, check with provider if OK to reschedule patient until labs completed.       •  Imaging - Imaging was not ordered at last office visit.       •  Referrals - No referrals were ordered at last office visit.    3. Is this appointment scheduled as a Hospital Follow-Up? No on Hospice    4.  Immunizations were updated in Epic using Reconcile Outside Information activity? Yes    5.  AHA (Pulse[employer]) form printed for Provider? Yes

## 2021-06-29 PROCEDURE — S9126 HOSPICE CARE, IN THE HOME, P: HCPCS

## 2021-06-30 PROCEDURE — S9126 HOSPICE CARE, IN THE HOME, P: HCPCS

## 2021-07-01 ENCOUNTER — HOME CARE VISIT (OUTPATIENT)
Dept: HOSPICE | Facility: HOSPICE | Age: 86
End: 2021-07-01
Payer: MEDICARE

## 2021-07-01 PROCEDURE — G0299 HHS/HOSPICE OF RN EA 15 MIN: HCPCS

## 2021-07-01 PROCEDURE — S9126 HOSPICE CARE, IN THE HOME, P: HCPCS

## 2021-07-02 ENCOUNTER — ANTICOAGULATION VISIT (OUTPATIENT)
Dept: MEDICAL GROUP | Facility: PHYSICIAN GROUP | Age: 86
End: 2021-07-02
Payer: MEDICARE

## 2021-07-02 DIAGNOSIS — I48.0 PAF (PAROXYSMAL ATRIAL FIBRILLATION) (HCC): ICD-10-CM

## 2021-07-02 DIAGNOSIS — Z79.01 CHRONIC ANTICOAGULATION: ICD-10-CM

## 2021-07-02 LAB — INR PPP: 3.2 (ref 2–3.5)

## 2021-07-02 PROCEDURE — S9126 HOSPICE CARE, IN THE HOME, P: HCPCS

## 2021-07-02 PROCEDURE — 85610 PROTHROMBIN TIME: CPT | Mod: GW | Performed by: INTERNAL MEDICINE

## 2021-07-02 PROCEDURE — 99211 OFF/OP EST MAY X REQ PHY/QHP: CPT | Mod: GW | Performed by: INTERNAL MEDICINE

## 2021-07-02 NOTE — PROGRESS NOTES
OP Anticoagulation Service Note    Date: 7/2/2021    Anticoagulation Summary  As of 7/2/2021    INR goal:  2.0-3.0   TTR:  78.2 % (3.1 y)   INR used for dosing:  3.20 (7/2/2021)   Warfarin maintenance plan:  3 mg (3 mg x 1) every Mon, Wed, Fri; 1.5 mg (3 mg x 0.5) all other days   Weekly warfarin total:  15 mg   Plan last modified:  Chris Lopes, PharmD (7/2/2021)   Next INR check:  7/12/2021   Target end date:  Indefinite    Indications    Chronic anticoagulation [Z79.01]  PAF (paroxysmal atrial fibrillation) (HCC) [I48.0]             Anticoagulation Episode Summary     INR check location:      Preferred lab:      Send INR reminders to:      Comments:        Anticoagulation Care Providers     Provider Role Specialty Phone number    Ganesh Mckeon M.D. Referring Geriatrics [phone]    [facility] Anticoagulation Services Responsible  [phone]        Anticoagulation Patient Findings      HPI:   Shereen Dale is in the Anticoagulation Clinic today for a INR check on their anticoagulation therapy.     The reason for today's visit is to prevent morbidity and mortality from a blood clot and/or stroke and to reduce the risk of bleeding while on a anticoagulant.     PCP:  Ganesh Mckeon M.D.  [address] [place] [address] [id number] [phone]    3 vitals included with today's appt-unless patient declined:  (BP, HR, weight, ht, RR)   There were no vitals filed for this visit.    Assessment:   INR  SUPRA-therapeutic.   Confirmed warfarin dosing regimen: Yes  Interval Changes with foods rich in vitamin K: No  Interval Changes in ETOH or cranberries:   No  Interval Changes in smoking status:  No  Interval Changes in medication:  No   S/S of bleeding or bruising:  No  Signs/symptoms  thrombosis since the last appt:  No  Any upcoming procedures that require stopping warfarin and/or using bridge therapy: None    Pt is not on antiplatelet therapy.    Plan:  Instructed pt to hold x 1 and to then  begin newly decreased regimen of 3 mg M/W/F and 1.5 mg ROW.    Follow up:  Follow up appointment in ~ 1 week(s).       Other info:  Pt educated to contact our clinic with any changes in medications or s/s of bleeding or thrombosis.  Education was provided today regarding tips to reduce their bleed risk and dietary constraints while on a anticoagulant.    National Recommendations:  The CHEST guidelines recommends frequent INR monitoring at regular intervals (a few days up to a max of 12 weeks) to ensure patients are on the proper dose of warfarin, and patients are not having any complications from therapy.  INRs can dramatically change over a short time period due to diet, medications, and medical conditions.     Chris Lopes, PharmD    [facility]  Phone [phone] fax [phone]

## 2021-07-03 PROCEDURE — S9126 HOSPICE CARE, IN THE HOME, P: HCPCS

## 2021-07-04 VITALS — DIASTOLIC BLOOD PRESSURE: 70 MMHG | RESPIRATION RATE: 20 BRPM | SYSTOLIC BLOOD PRESSURE: 120 MMHG

## 2021-07-04 PROCEDURE — S9126 HOSPICE CARE, IN THE HOME, P: HCPCS

## 2021-07-04 SDOH — ECONOMIC STABILITY: HOUSING INSECURITY: EVIDENCE OF SMOKING MATERIAL: 0

## 2021-07-04 SDOH — ECONOMIC STABILITY: HOUSING INSECURITY

## 2021-07-04 ASSESSMENT — ENCOUNTER SYMPTOMS
DYSPNEA ON EXERTION: 1
DYSPNEA ACTIVITY LEVEL: WHILE SPEAKING
PALPITATIONS: 1
HYPERTENSION: 1
DYSPNEA ACTIVITY LEVEL: AFTER AMBULATING LESS THAN 10 FT
LOWER EXTREMITY EDEMA: 1
STOOL FREQUENCY: DAILY
FATIGUES EASILY: 1
DIZZINESS: 1
BOWEL PATTERN NORMAL: 1
SHORTNESS OF BREATH: 1
LAST BOWEL MOVEMENT: 65926
SLEEP QUALITY: FAIR
CHEST TIGHTNESS: 1

## 2021-07-04 ASSESSMENT — SOCIAL DETERMINANTS OF HEALTH (SDOH): ACTIVE STRESSOR - HEALTH CHANGES: 1

## 2021-07-05 PROCEDURE — S9126 HOSPICE CARE, IN THE HOME, P: HCPCS

## 2021-07-06 DIAGNOSIS — E11.9 TYPE 2 DIABETES MELLITUS WITHOUT COMPLICATION, WITHOUT LONG-TERM CURRENT USE OF INSULIN (HCC): ICD-10-CM

## 2021-07-06 PROCEDURE — S9126 HOSPICE CARE, IN THE HOME, P: HCPCS

## 2021-07-06 RX ORDER — BLOOD-GLUCOSE METER
KIT MISCELLANEOUS
Qty: 100 EACH | Refills: 3 | Status: SHIPPED | OUTPATIENT
Start: 2021-07-06 | End: 2023-05-26

## 2021-07-07 ENCOUNTER — OFFICE VISIT (OUTPATIENT)
Dept: MEDICAL GROUP | Facility: MEDICAL CENTER | Age: 86
End: 2021-07-07
Payer: MEDICARE

## 2021-07-07 VITALS
WEIGHT: 158 LBS | DIASTOLIC BLOOD PRESSURE: 76 MMHG | SYSTOLIC BLOOD PRESSURE: 116 MMHG | OXYGEN SATURATION: 93 % | TEMPERATURE: 98 F | BODY MASS INDEX: 29.08 KG/M2 | HEIGHT: 62 IN | RESPIRATION RATE: 16 BRPM | HEART RATE: 80 BPM

## 2021-07-07 DIAGNOSIS — E03.9 ACQUIRED HYPOTHYROIDISM: ICD-10-CM

## 2021-07-07 DIAGNOSIS — E66.9 DIABETES MELLITUS TYPE 2 IN OBESE: ICD-10-CM

## 2021-07-07 DIAGNOSIS — I48.0 PAF (PAROXYSMAL ATRIAL FIBRILLATION) (HCC): ICD-10-CM

## 2021-07-07 DIAGNOSIS — E11.69 DIABETES MELLITUS TYPE 2 IN OBESE: ICD-10-CM

## 2021-07-07 DIAGNOSIS — I35.0 SEVERE AORTIC STENOSIS: ICD-10-CM

## 2021-07-07 PROCEDURE — 99214 OFFICE O/P EST MOD 30 MIN: CPT | Mod: GW | Performed by: FAMILY MEDICINE

## 2021-07-07 PROCEDURE — S9126 HOSPICE CARE, IN THE HOME, P: HCPCS

## 2021-07-07 ASSESSMENT — FIBROSIS 4 INDEX: FIB4 SCORE: 1.73

## 2021-07-07 NOTE — PROGRESS NOTES
CC: Diabetes, hypothyroidism, A. fib, severe aortic stenosis, hypertension    HPI:   Shereen presents today to discuss the following:    Diabetes mellitus type 2 in obese ([facility])  Blood glucose has been adequately controlled.  Denies polyuria polydipsia.  Last A1c was 6.4.  Patient is currently on Metformin 1500 mg daily, and glipizide SR 2.5 mg daily.  No hypoglycemic episodes    Acquired hypothyroidism  He has been tolerating Levothyroxine, no palpitation, no cold or heat intolerance, patient has been on levothyroxine 50 mcg daily    PAF (paroxysmal atrial fibrillation) ([facility])  Denies palpitation that she has been having exertional shortness of breath.  Patient is currently on Coumadin and digoxin.Last INR was 2.  She has been following up with cardiology and Coumadin clinic    Severe aortic stenosis  Patient currently has shortness of breath and mild bilateral lower extremities edema.  Last echocardiogram was done in 2019.Patient is adamant to have any surgical replacement of her aortic valve(TAVR procedure).      Essential hypertension  Has been adequately controlled on current medication. Denies headache, chest pain, and SOB.patient has been on amlodipine 5 mg daily, carvedilol 12.5 mg twice a day.        Patient Active Problem List    Diagnosis Date Noted   • Acquired hypothyroidism 09/12/2019   • Diabetes mellitus type 2 in obese ([facility]) 09/12/2019   • Skin abrasion 08/20/2019   • Chronic anticoagulation 08/02/2019   • Hyponatremia 03/25/2019   • Epistaxis 03/25/2019   • Advanced directives, counseling/discussion 09/26/2018   • Hypertensive urgency 05/11/2017   • Diastolic dysfunction 02/02/2016   • Tear of lateral cartilage or meniscus of knee, current 05/21/2015   • PAF (paroxysmal atrial fibrillation) ([facility]) 01/12/2015   • Dyspnea on effort 01/02/2014   • Diabetes ([facility]) 12/26/2013   • Aortic stenosis 07/02/2013   • HTN (hypertension) 05/04/2012   • Hemorrhagic disorder due to extrinsic circulating anticoagulants  ([facility]) 05/02/2012   • Cough 05/02/2012   • Edema 05/02/2012   • Acute, but ill-defined, cerebrovascular disease 04/30/2012       Current Outpatient Medications   Medication Sig Dispense Refill   • Blood Glucose Monitoring Suppl (FREESTYLE LITE) Device USE TWO TIMES A DAY AS DIRECTED FOR BLOOD SUGAR MONITORING 100 Each 3   • warfarin (COUMADIN) 3 MG Tab Take 3 mg by mouth every day. Indications: Aortic Stenosis     • glipiZIDE SR (GLUCOTROL) 2.5 MG TABLET SR 24 HR Take 2.5 mg by mouth every day. Indications: Type 2 Diabetes     • metFORMIN (GLUCOPHAGE) 500 MG Tab Take 500 mg by mouth 3 times a day. Indications: Type 2 Diabetes     • levothyroxine (SYNTHROID) 50 MCG Tab Take 1 tablet by mouth every morning. Indications: Underactive Thyroid 90 tablet 0   • metaxalone (SKELAXIN) 800 MG Tab Take 200 mg by mouth 3 times a day as needed for Muscle Spasms. Indications: Musculoskeletal Pain     • Magnesium 400 MG Tab Take 0.5 Tablets by mouth every day at 6 PM. Patient already taking, adding to profile - adding indication  Indications: supplement     • spironolactone (ALDACTONE) 50 MG Tab Take 50 mg by mouth every day. Indications: Edema     • Home Care Oxygen Inhale 3.5 L/min at bedtime. Indications: Shortness of breath     • carvedilol (COREG) 12.5 MG Tab Take 12.5 mg by mouth 2 times a day. Indications: High Blood Pressure Disorder     • haloperidol (HALDOL) 2 MG/ML Conc Take 0.5 mg by mouth every 6 hours as needed (Anxiety, agitation).     • Sennosides-Docusate Sodium (SENNA PLUS) 8.6-50 MG Cap Take 2 tablet by mouth every day. Take every evening.  Hold for loose stools  Indications: Constipation     • gabapentin (NEURONTIN) 100 MG Cap Take 1 capsule by mouth 2 (two) times a day. Take one tab every morning and two tabs every evening  Indications: Diabetes with Nerve Disease 90 capsule 0   • omeprazole (PRILOSEC) 20 MG delayed-release capsule Take 1 capsule by mouth every day. Indications: Heartburn 90 capsule 3   •  "amLODIPine (NORVASC) 5 MG Tab Take 5 mg in the morning, and 2.5 in the evening  Indications: High Blood Pressure Disorder 135 tablet 3   • ondansetron (ZOFRAN ODT) 4 MG TABLET DISPERSIBLE Take 4 mg by mouth every 6 hours as needed for Nausea. (Patient not taking: Reported on 7/7/2021)       No current facility-administered medications for this visit.         Allergies as of 07/07/2021 - Reviewed 07/07/2021   Allergen Reaction Noted   • Darvon [propoxyphene hcl]  03/18/2008   • Iodine  05/11/2015   • Latex  09/19/2013   • Penicillins Anaphylaxis 08/02/2008   • Propoxyphene hcl Anaphylaxis 08/02/2008   • Tetanus antitoxin Myalgia 11/19/2017   • Tetracycline Anaphylaxis 08/02/2008        ROS: Denies any chest pain, Shortness of breath, Changes bowel or bladder, Lower extremity edema.    Physical Exam:  /76 (BP Location: Left arm, Patient Position: Sitting)   Pulse 80   Temp 36.7 °C (98 °F) (Temporal)   Resp 16   Ht 1.575 m (5' 2\")   Wt 71.7 kg (158 lb)   LMP 01/01/1985   SpO2 93%   BMI 28.90 kg/m²   Gen.: Well-developed, well-nourished, no apparent distress,pleasant and cooperative with the examination  Skin:  Warm and dry with good turgor. No rashes or suspicious lesions in visible areas  HEENT:Sinuses nontender with palpation, TMs clear, nares patent with pink mucosa and clear rhinorrhea,no septal deviation ,polyps or lesions. lips without lesions, oropharynx clear.  Neck: Trachea midline,no masses or adenopathy. No JVD.  Cor: Regular rate and rhythm without murmur, gallop or rub.  Lungs: Respirations unlabored.Clear to auscultation with equal breath sounds bilaterally. No wheezes, rhonchi.  Extremities: No cyanosis, clubbing or edema.        Assessment and Plan.   85 y.o. female     1. Diabetes mellitus type 2 in obese (HCC)  Controlled. Last A1c was 6.4  Continue Metformin 1500 mg daily    2. Acquired hypothyroidism  He has been tolerating Levothyroxine, no palpitation, no cold or heat " intolerance  Continue levothyroxine 50 mcg daily    3. PAF (paroxysmal atrial fibrillation) (HCC)  No RVR. Continue on Coumadin, and digoxin.    4. Severe aortic stenosis  Patient gets shortness of breath once in a while. Refused TAVR procedure.   Patient is currently in hospice, placed by the cardiologist because patient refused to have TAVR procedure.    5. Essential hypertension  Controlled.   Continue on amlodipine 5 mg daily, carvedilol 12.5 mg twice a day.

## 2021-07-08 PROCEDURE — S9126 HOSPICE CARE, IN THE HOME, P: HCPCS

## 2021-07-08 PROCEDURE — 6650990 HC HOSPICE AND HOME CARE RX REV CODE 0250

## 2021-07-09 PROCEDURE — S9126 HOSPICE CARE, IN THE HOME, P: HCPCS

## 2021-07-10 PROCEDURE — S9126 HOSPICE CARE, IN THE HOME, P: HCPCS

## 2021-07-11 PROCEDURE — S9126 HOSPICE CARE, IN THE HOME, P: HCPCS

## 2021-07-12 ENCOUNTER — ANTICOAGULATION VISIT (OUTPATIENT)
Dept: MEDICAL GROUP | Facility: PHYSICIAN GROUP | Age: 86
End: 2021-07-12
Payer: MEDICARE

## 2021-07-12 DIAGNOSIS — I48.0 PAF (PAROXYSMAL ATRIAL FIBRILLATION) (HCC): ICD-10-CM

## 2021-07-12 DIAGNOSIS — Z79.01 CHRONIC ANTICOAGULATION: Primary | ICD-10-CM

## 2021-07-12 LAB — INR PPP: 2.7 (ref 2–3.5)

## 2021-07-12 PROCEDURE — 99999 PR NO CHARGE: CPT | Mod: GW | Performed by: INTERNAL MEDICINE

## 2021-07-12 PROCEDURE — S9126 HOSPICE CARE, IN THE HOME, P: HCPCS

## 2021-07-12 PROCEDURE — 85610 PROTHROMBIN TIME: CPT | Mod: GW | Performed by: INTERNAL MEDICINE

## 2021-07-12 PROCEDURE — 93793 ANTICOAG MGMT PT WARFARIN: CPT | Mod: GW | Performed by: INTERNAL MEDICINE

## 2021-07-12 NOTE — PROGRESS NOTES
Anticoagulation Summary  As of 2021    INR goal:  2.0-3.0   TTR:  78.1 % (3.1 y)   INR used for dosin.70 (2021)   Warfarin maintenance plan:  3 mg (3 mg x 1) every Mon, Wed, Fri; 1.5 mg (3 mg x 0.5) all other days   Weekly warfarin total:  15 mg   Plan last modified:  Chris Lopes PharmD (2021)   Next INR check:  2021   Target end date:  Indefinite    Indications    Chronic anticoagulation [Z79.01]  PAF (paroxysmal atrial fibrillation) ([facility]) [I48.0]             Anticoagulation Episode Summary     INR check location:      Preferred lab:      Send INR reminders to:      Comments:        Anticoagulation Care Providers     Provider Role Specialty Phone number    Ganesh Mckeon M.D. Referring Geriatrics [phone]    [facility] Responsible  [phone]        Anticoagulation Patient Findings  Patient Findings     Negatives:  Signs/symptoms of thrombosis, Signs/symptoms of bleeding, Laboratory test error suspected, Change in health, Change in alcohol use, Change in activity, Upcoming invasive procedure, Emergency department visit, Upcoming dental procedure, Missed doses, Extra doses, Change in medications, Change in diet/appetite, Hospital admission, Bruising, Other complaints        HPI:   Shereen Dale seen in clinic today, on anticoagulation therapy with warfarin for stroke prophylaxis due to history of PAF.    Patient's previous INR was supratherapeutic at 3.2 on 21, at which time patient was instructed to hold one dose, then decrease weekly warfarin regimen.  She returns to clinic today to recheck INR to ensure it is therapeutic and thus preventing possible clotting and/or bleeding/bruising complications.    CHADS-VASc = at least 5  (unadjusted ischemic stroke risk/year:  7.2%, which is moderate risk)    Does patient have any changes to current medical/health status since last appt (Y/N):  NO  Does patient have any signs/symptoms of bleeding and/or  thrombosis since the last appt (Y/N):  NO  Does patient have any interval changes to diet or medications since last appt (Y/N):  NO  Are there any complications or cost restrictions with current therapy (Y/N):  NO     Does patient have [facility] PCP? Yes, Dr Ganesh Mckeon (If not, please document discussion that patient must be seen at [facility])       Vitals:  declined at today's visit.    There were no vitals filed for this visit.     Asssessment:      INR therapeutic at 2.7, therefore decreasing patient's stroke and/or bleeding risk.   Reason(s) for out of range INR today:  n/a      Pt NOT on antiplatelet therapy.    Medication reconciliation completed today.    Plan:  Pt is to continue with current warfarin dosing regimen.     Follow up:  Because warfarin is a high risk medication and current CHEST guidelines recommend regular monitoring intervals (few days up to 12 weeks), will have patient return to clinic in 1.5 weeks to recheck INR.    Brady Piña, PharmD, BCACP

## 2021-07-13 PROCEDURE — S9126 HOSPICE CARE, IN THE HOME, P: HCPCS

## 2021-07-14 PROCEDURE — S9126 HOSPICE CARE, IN THE HOME, P: HCPCS

## 2021-07-15 ENCOUNTER — HOME CARE VISIT (OUTPATIENT)
Dept: HOSPICE | Facility: HOSPICE | Age: 86
End: 2021-07-15
Payer: MEDICARE

## 2021-07-15 PROCEDURE — G0299 HHS/HOSPICE OF RN EA 15 MIN: HCPCS

## 2021-07-15 PROCEDURE — S9126 HOSPICE CARE, IN THE HOME, P: HCPCS

## 2021-07-16 VITALS — SYSTOLIC BLOOD PRESSURE: 110 MMHG | DIASTOLIC BLOOD PRESSURE: 60 MMHG | RESPIRATION RATE: 20 BRPM

## 2021-07-16 PROCEDURE — S9126 HOSPICE CARE, IN THE HOME, P: HCPCS

## 2021-07-16 SDOH — ECONOMIC STABILITY: HOUSING INSECURITY: EVIDENCE OF SMOKING MATERIAL: 0

## 2021-07-16 ASSESSMENT — ENCOUNTER SYMPTOMS
DYSPNEA ACTIVITY LEVEL: AFTER AMBULATING LESS THAN 10 FT
BOWEL PATTERN NORMAL: 1
PALPITATIONS: 1
STOOL FREQUENCY: DAILY
LOWER EXTREMITY EDEMA: 1
DIZZINESS: 1
DYSPNEA ACTIVITY LEVEL: WHILE SPEAKING
CHEST TIGHTNESS: 1
DYSPNEA ON EXERTION: 1
SHORTNESS OF BREATH: 1
HYPERTENSION: 1
LAST BOWEL MOVEMENT: 65940
FATIGUES EASILY: 1

## 2021-07-17 PROCEDURE — S9126 HOSPICE CARE, IN THE HOME, P: HCPCS

## 2021-07-18 PROCEDURE — S9126 HOSPICE CARE, IN THE HOME, P: HCPCS

## 2021-07-19 PROCEDURE — S9126 HOSPICE CARE, IN THE HOME, P: HCPCS

## 2021-07-20 PROCEDURE — S9126 HOSPICE CARE, IN THE HOME, P: HCPCS

## 2021-07-21 PROCEDURE — S9126 HOSPICE CARE, IN THE HOME, P: HCPCS

## 2021-07-22 PROCEDURE — S9126 HOSPICE CARE, IN THE HOME, P: HCPCS

## 2021-07-23 ENCOUNTER — ANTICOAGULATION VISIT (OUTPATIENT)
Dept: MEDICAL GROUP | Facility: PHYSICIAN GROUP | Age: 86
End: 2021-07-23
Payer: MEDICARE

## 2021-07-23 DIAGNOSIS — I48.0 PAF (PAROXYSMAL ATRIAL FIBRILLATION) (HCC): ICD-10-CM

## 2021-07-23 DIAGNOSIS — Z79.01 CHRONIC ANTICOAGULATION: Primary | ICD-10-CM

## 2021-07-23 LAB — INR PPP: 3.3 (ref 2–3.5)

## 2021-07-23 PROCEDURE — S9126 HOSPICE CARE, IN THE HOME, P: HCPCS

## 2021-07-23 PROCEDURE — 85610 PROTHROMBIN TIME: CPT | Mod: GW | Performed by: FAMILY MEDICINE

## 2021-07-23 PROCEDURE — 99211 OFF/OP EST MAY X REQ PHY/QHP: CPT | Mod: GW | Performed by: FAMILY MEDICINE

## 2021-07-23 NOTE — PROGRESS NOTES
Anticoagulation Summary  As of 7/23/2021    INR goal:  2.0-3.0   TTR:  77.8 % (3.1 y)   INR used for dosing:  3.30 (7/23/2021)   Warfarin maintenance plan:  3 mg (3 mg x 1) every Mon, Wed; 1.5 mg (3 mg x 0.5) all other days   Weekly warfarin total:  13.5 mg   Plan last modified:  Brady Piña, PharmD (7/23/2021)   Next INR check:  8/6/2021   Target end date:  Indefinite    Indications    Chronic anticoagulation [Z79.01]  PAF (paroxysmal atrial fibrillation) (HCC) [I48.0]             Anticoagulation Episode Summary     INR check location:      Preferred lab:      Send INR reminders to:      Comments:        Anticoagulation Care Providers     Provider Role Specialty Phone number    Ganesh Mckeon M.D. Referring Geriatrics [phone]    [facility] Anticoagulation [facility] Responsible  [phone]        Anticoagulation Patient Findings  Patient Findings     Positives:  Signs/symptoms of bleeding (epistaxis)    Negatives:  Signs/symptoms of thrombosis, Laboratory test error suspected, Change in health, Change in alcohol use, Change in activity, Upcoming invasive procedure, Emergency department visit, Upcoming dental procedure, Missed doses, Extra doses, Change in medications, Change in diet/appetite, Hospital admission, Bruising, Other complaints              HPI:   Shereen Dale seen in clinic today, on anticoagulation therapy with warfarin for stroke prophylaxis due to history of PAF.    Patient's previous INR was therapeutic at 2.7 on 7/12, at which time patient was instructed to continue regimen.  She returns to clinic today to recheck INR to ensure it is therapeutic and thus preventing possible clotting and/or bleeding/bruising complications.    CHADS-VASc = 5  (unadjusted ischemic stroke risk/year:  6.7, which is moderate risk)    Does patient have any changes to current medical/health status since last appt (Y/N):  N  Does patient have any signs/symptoms of bleeding and/or thrombosis since the last appt  (Y/N):  N  Does patient have any interval changes to diet or medications since last appt (Y/N):  N  Are there any complications or cost restrictions with current therapy (Y/N):  N     Does patient have [facility] PCP? Yes, Ganesh Burton (If not, please document discussion that patient must be seen at [facility])       Vitals:  declined by patient    There were no vitals filed for this visit.     Asssessment:      INR supratherapeutic at 3.3, therefore increased risk of bleeding.   Reason(s) for out of range INR today:  unknown      Pt not on antiplatelet therapy    Medication reconciliation completed today.    Plan:  Reduce total weekly dose as indicated above.     Follow up:  Because warfarin is a high risk medication and current CHEST guidelines recommend regular monitoring intervals (few days up to 12 weeks), will have patient return to clinic in 2 weeks to recheck INR.    Chika Clay, Pharmacy Intern    Brady Piña, PharmD

## 2021-07-24 PROCEDURE — S9126 HOSPICE CARE, IN THE HOME, P: HCPCS

## 2021-07-25 PROCEDURE — S9126 HOSPICE CARE, IN THE HOME, P: HCPCS

## 2021-07-26 PROCEDURE — S9126 HOSPICE CARE, IN THE HOME, P: HCPCS

## 2021-07-26 NOTE — PROGRESS NOTES
Anticoagulation Summary  As of 11/27/2018    INR goal:   2.0-3.0   TTR:   67.1 % (5.9 mo)   Today's INR:   3.0   Warfarin maintenance plan:   3 mg (3 mg x 1) every day   Weekly warfarin total:   21 mg   Plan last modified:   Emily Coulter (11/2/2018)   Next INR check:   12/18/2018   Target end date:   Indefinite    Indications    Chronic anticoagulation [Z79.01]  PAF (paroxysmal atrial fibrillation) (HCC) [I48.0]             Anticoagulation Episode Summary     INR check location:       Preferred lab:       Send INR reminders to:       Comments:         Anticoagulation Care Providers     Provider Role Specialty Phone number    Ganesh Mckeon M.D. Referring Geriatrics [phone]    [facility] Anticoagulation Services Responsible  [phone]        Anticoagulation Patient Findings    INR  therapeutic.   Denies signs/symptoms of bleeding and/or thrombosis.   Denies changes to diet or medications.   Follow up appointment in 3 week(s).    Continue weekly warfarin dose as noted      Guru Baldwin, PharmD       hard copy, drawn during this pregnancy

## 2021-07-27 PROCEDURE — S9126 HOSPICE CARE, IN THE HOME, P: HCPCS

## 2021-07-28 ENCOUNTER — HOME CARE VISIT (OUTPATIENT)
Dept: HOSPICE | Facility: HOSPICE | Age: 86
End: 2021-07-28
Payer: MEDICARE

## 2021-07-28 PROCEDURE — S9126 HOSPICE CARE, IN THE HOME, P: HCPCS

## 2021-07-29 ENCOUNTER — PATIENT OUTREACH (OUTPATIENT)
Dept: HEALTH INFORMATION MANAGEMENT | Facility: OTHER | Age: 86
End: 2021-07-29

## 2021-07-29 ENCOUNTER — HOME CARE VISIT (OUTPATIENT)
Dept: HOSPICE | Facility: HOSPICE | Age: 86
End: 2021-07-29
Payer: MEDICARE

## 2021-07-29 PROCEDURE — S9126 HOSPICE CARE, IN THE HOME, P: HCPCS

## 2021-07-29 PROCEDURE — G0299 HHS/HOSPICE OF RN EA 15 MIN: HCPCS

## 2021-07-29 NOTE — NON-PROVIDER
Inbound call from mbr stating she would like to speak with Dr. Mckeon as she was told by her Hospice doctor to no longer go to [facility]. Mbr also wanting to know if [employer] will cover INR machine. Informed mbr she would need to have orders placed by primary care provider for DME. Mbr understood. Mbr declined scheduling an appointment with Dr. Mckeon at this time. No further needs at this time. Warm transferred to [place] at  at [address] to connect member to Dr. Mckeon.

## 2021-07-30 ENCOUNTER — DOCUMENTATION (OUTPATIENT)
Dept: MEDICAL GROUP | Facility: MEDICAL CENTER | Age: 86
End: 2021-07-30

## 2021-07-30 ENCOUNTER — TELEPHONE (OUTPATIENT)
Dept: MEDICAL GROUP | Facility: MEDICAL CENTER | Age: 86
End: 2021-07-30

## 2021-07-30 DIAGNOSIS — I48.0 PAF (PAROXYSMAL ATRIAL FIBRILLATION) (HCC): ICD-10-CM

## 2021-07-30 PROCEDURE — S9126 HOSPICE CARE, IN THE HOME, P: HCPCS

## 2021-07-30 PROCEDURE — 6650990 HC HOSPICE AND HOME CARE RX REV CODE 0250

## 2021-07-30 NOTE — TELEPHONE ENCOUNTER
"----- Message from Rose Fox sent at 7/29/2021  3:50 PM PDT -----  Regarding: INR machine  Patient called wanting Dr Mckeon to order her an \"INR machine\" because she takes warfarin and wants to check her INR, since the hospice doesn't use it anymore. I asked her what this machine is for. She said \" to check her INR.\" She would like the medical assistant to call her back regarding this.    "

## 2021-07-30 NOTE — TELEPHONE ENCOUNTER
KEV Lundberg did the following:    DME INR Machine order, Recent Office Visit Notes, ID/Insurance Cards, and Face Sheet has been sent to the following:    [id number] [place] [address] Ph. [phone] Fax. [phone]    Patient informed? Yes

## 2021-07-31 PROCEDURE — S9126 HOSPICE CARE, IN THE HOME, P: HCPCS

## 2021-08-01 PROCEDURE — S9126 HOSPICE CARE, IN THE HOME, P: HCPCS

## 2021-08-02 PROCEDURE — S9126 HOSPICE CARE, IN THE HOME, P: HCPCS

## 2021-08-02 RX ORDER — GLIPIZIDE 2.5 MG/1
2.5 TABLET, EXTENDED RELEASE ORAL DAILY
Qty: 100 TABLET | Refills: 3 | Status: SHIPPED
Start: 2021-08-02 | End: 2022-01-31

## 2021-08-03 PROCEDURE — S9126 HOSPICE CARE, IN THE HOME, P: HCPCS

## 2021-08-04 PROCEDURE — S9126 HOSPICE CARE, IN THE HOME, P: HCPCS

## 2021-08-05 VITALS — RESPIRATION RATE: 20 BRPM | DIASTOLIC BLOOD PRESSURE: 70 MMHG | HEART RATE: 96 BPM | SYSTOLIC BLOOD PRESSURE: 105 MMHG

## 2021-08-05 PROCEDURE — S9126 HOSPICE CARE, IN THE HOME, P: HCPCS

## 2021-08-05 SDOH — ECONOMIC STABILITY: HOUSING INSECURITY: EVIDENCE OF SMOKING MATERIAL: 0

## 2021-08-05 ASSESSMENT — ENCOUNTER SYMPTOMS
LAST BOWEL MOVEMENT: 65953
SLEEP QUALITY: FAIR
STOOL FREQUENCY: DAILY
BOWEL PATTERN NORMAL: 1
DENIES PAIN: 1
FATIGUES EASILY: 1
PALPITATIONS: 1
FORGETFULNESS: 1
SHORTNESS OF BREATH: 1
HYPERTENSION: 1
CHEST TIGHTNESS: 1
DYSPNEA ACTIVITY LEVEL: WHILE SPEAKING
DIZZINESS: 1
DYSPNEA ON EXERTION: 1
DYSPNEA ACTIVITY LEVEL: AFTER AMBULATING LESS THAN 10 FT
LOWER EXTREMITY EDEMA: 1

## 2021-08-05 ASSESSMENT — SOCIAL DETERMINANTS OF HEALTH (SDOH): ACTIVE STRESSOR - HEALTH CHANGES: 1

## 2021-08-06 PROCEDURE — S9126 HOSPICE CARE, IN THE HOME, P: HCPCS

## 2021-08-07 PROCEDURE — S9126 HOSPICE CARE, IN THE HOME, P: HCPCS

## 2021-08-08 PROCEDURE — S9126 HOSPICE CARE, IN THE HOME, P: HCPCS

## 2021-08-09 ENCOUNTER — HOME CARE VISIT (OUTPATIENT)
Dept: HOSPICE | Facility: HOSPICE | Age: 86
End: 2021-08-09
Payer: MEDICARE

## 2021-08-09 PROCEDURE — S9126 HOSPICE CARE, IN THE HOME, P: HCPCS

## 2021-08-09 RX ORDER — LEVOTHYROXINE SODIUM 0.05 MG/1
TABLET ORAL
Qty: 100 TABLET | Refills: 1 | Status: SHIPPED | OUTPATIENT
Start: 2021-08-09 | End: 2021-10-18 | Stop reason: SDUPTHER

## 2021-08-10 ENCOUNTER — TELEPHONE (OUTPATIENT)
Dept: HEALTH INFORMATION MANAGEMENT | Facility: OTHER | Age: 86
End: 2021-08-10

## 2021-08-10 ENCOUNTER — PATIENT OUTREACH (OUTPATIENT)
Dept: HEALTH INFORMATION MANAGEMENT | Facility: OTHER | Age: 86
End: 2021-08-10

## 2021-08-10 PROCEDURE — S9126 HOSPICE CARE, IN THE HOME, P: HCPCS

## 2021-08-11 PROCEDURE — S9126 HOSPICE CARE, IN THE HOME, P: HCPCS

## 2021-08-12 ENCOUNTER — HOME CARE VISIT (OUTPATIENT)
Dept: HOSPICE | Facility: HOSPICE | Age: 86
End: 2021-08-12
Payer: MEDICARE

## 2021-08-12 PROCEDURE — G0299 HHS/HOSPICE OF RN EA 15 MIN: HCPCS

## 2021-08-12 PROCEDURE — S9126 HOSPICE CARE, IN THE HOME, P: HCPCS

## 2021-08-13 PROCEDURE — S9126 HOSPICE CARE, IN THE HOME, P: HCPCS

## 2021-08-14 PROCEDURE — S9126 HOSPICE CARE, IN THE HOME, P: HCPCS

## 2021-08-15 PROCEDURE — S9126 HOSPICE CARE, IN THE HOME, P: HCPCS

## 2021-08-16 PROCEDURE — S9126 HOSPICE CARE, IN THE HOME, P: HCPCS

## 2021-08-17 VITALS — RESPIRATION RATE: 20 BRPM | DIASTOLIC BLOOD PRESSURE: 75 MMHG | SYSTOLIC BLOOD PRESSURE: 115 MMHG

## 2021-08-17 PROCEDURE — S9126 HOSPICE CARE, IN THE HOME, P: HCPCS

## 2021-08-17 PROCEDURE — 6650990 HC HOSPICE AND HOME CARE RX REV CODE 0250

## 2021-08-17 SDOH — ECONOMIC STABILITY: HOUSING INSECURITY: EVIDENCE OF SMOKING MATERIAL: 0

## 2021-08-17 ASSESSMENT — ENCOUNTER SYMPTOMS
DYSPNEA ACTIVITY LEVEL: WHILE SPEAKING
LAST BOWEL MOVEMENT: 65967
FATIGUES EASILY: 1
PALPITATIONS: 1
CHEST TIGHTNESS: 1
DENIES PAIN: 1
LOWER EXTREMITY EDEMA: 1
HYPERTENSION: 1
DYSPNEA ON EXERTION: 1
STOOL FREQUENCY: DAILY
SHORTNESS OF BREATH: 1
DYSPNEA ACTIVITY LEVEL: AFTER AMBULATING LESS THAN 10 FT
BOWEL PATTERN NORMAL: 1
DIZZINESS: 1
FORGETFULNESS: 1

## 2021-08-18 PROCEDURE — S9126 HOSPICE CARE, IN THE HOME, P: HCPCS

## 2021-08-19 ENCOUNTER — HOME CARE VISIT (OUTPATIENT)
Dept: HOSPICE | Facility: HOSPICE | Age: 86
End: 2021-08-19
Payer: MEDICARE

## 2021-08-19 VITALS — SYSTOLIC BLOOD PRESSURE: 120 MMHG | HEART RATE: 88 BPM | DIASTOLIC BLOOD PRESSURE: 78 MMHG | RESPIRATION RATE: 20 BRPM

## 2021-08-19 PROCEDURE — G0299 HHS/HOSPICE OF RN EA 15 MIN: HCPCS

## 2021-08-19 PROCEDURE — S9126 HOSPICE CARE, IN THE HOME, P: HCPCS

## 2021-08-19 SDOH — ECONOMIC STABILITY: HOUSING INSECURITY: EVIDENCE OF SMOKING MATERIAL: 0

## 2021-08-19 ASSESSMENT — ENCOUNTER SYMPTOMS
PALPITATIONS: 1
LOWER EXTREMITY EDEMA: 1
DYSPNEA ON EXERTION: 1
BOWEL PATTERN NORMAL: 1
LAST BOWEL MOVEMENT: 65975
DIZZINESS: 1
FATIGUES EASILY: 1
FORGETFULNESS: 1
HYPERTENSION: 1
SHORTNESS OF BREATH: 1
STOOL FREQUENCY: DAILY
DENIES PAIN: 1
DYSPNEA ACTIVITY LEVEL: WHILE SPEAKING
DYSPNEA ACTIVITY LEVEL: AT REST

## 2021-08-20 PROCEDURE — S9126 HOSPICE CARE, IN THE HOME, P: HCPCS

## 2021-08-21 PROCEDURE — S9126 HOSPICE CARE, IN THE HOME, P: HCPCS

## 2021-08-22 PROCEDURE — S9126 HOSPICE CARE, IN THE HOME, P: HCPCS

## 2021-08-23 PROCEDURE — S9126 HOSPICE CARE, IN THE HOME, P: HCPCS

## 2021-08-24 PROCEDURE — S9126 HOSPICE CARE, IN THE HOME, P: HCPCS

## 2021-08-25 PROCEDURE — S9126 HOSPICE CARE, IN THE HOME, P: HCPCS

## 2021-08-26 PROCEDURE — S9126 HOSPICE CARE, IN THE HOME, P: HCPCS

## 2021-08-27 ENCOUNTER — HOME CARE VISIT (OUTPATIENT)
Dept: HOSPICE | Facility: HOSPICE | Age: 86
End: 2021-08-27
Payer: MEDICARE

## 2021-08-27 VITALS — DIASTOLIC BLOOD PRESSURE: 73 MMHG | RESPIRATION RATE: 22 BRPM | SYSTOLIC BLOOD PRESSURE: 111 MMHG | HEART RATE: 78 BPM

## 2021-08-27 PROCEDURE — G0299 HHS/HOSPICE OF RN EA 15 MIN: HCPCS

## 2021-08-27 PROCEDURE — S9126 HOSPICE CARE, IN THE HOME, P: HCPCS

## 2021-08-27 SDOH — ECONOMIC STABILITY: HOUSING INSECURITY: EVIDENCE OF SMOKING MATERIAL: 0

## 2021-08-27 ASSESSMENT — ENCOUNTER SYMPTOMS
DYSPNEA ACTIVITY LEVEL: WHILE SPEAKING
DENIES PAIN: 1
SHORTNESS OF BREATH: 1
DYSPNEA ACTIVITY LEVEL: AT REST

## 2021-08-28 PROCEDURE — S9126 HOSPICE CARE, IN THE HOME, P: HCPCS

## 2021-08-29 PROCEDURE — S9126 HOSPICE CARE, IN THE HOME, P: HCPCS

## 2021-08-30 PROCEDURE — S9126 HOSPICE CARE, IN THE HOME, P: HCPCS

## 2021-08-31 PROCEDURE — S9126 HOSPICE CARE, IN THE HOME, P: HCPCS

## 2021-09-01 PROCEDURE — S9126 HOSPICE CARE, IN THE HOME, P: HCPCS

## 2021-09-02 ENCOUNTER — HOME CARE VISIT (OUTPATIENT)
Dept: HOSPICE | Facility: HOSPICE | Age: 86
End: 2021-09-02
Payer: MEDICARE

## 2021-09-02 VITALS — DIASTOLIC BLOOD PRESSURE: 70 MMHG | SYSTOLIC BLOOD PRESSURE: 130 MMHG | RESPIRATION RATE: 20 BRPM

## 2021-09-02 PROCEDURE — G0299 HHS/HOSPICE OF RN EA 15 MIN: HCPCS

## 2021-09-02 PROCEDURE — S9126 HOSPICE CARE, IN THE HOME, P: HCPCS

## 2021-09-02 PROCEDURE — 6650990 HC HOSPICE AND HOME CARE RX REV CODE 0250

## 2021-09-02 SDOH — ECONOMIC STABILITY: HOUSING INSECURITY: EVIDENCE OF SMOKING MATERIAL: 0

## 2021-09-02 ASSESSMENT — ENCOUNTER SYMPTOMS
DIZZINESS: 1
DENIES PAIN: 1
FATIGUES EASILY: 1
DYSPNEA ACTIVITY LEVEL: WHILE SPEAKING
SHORTNESS OF BREATH: 1
HYPERTENSION: 1
LAST BOWEL MOVEMENT: 65988
FORGETFULNESS: 1
BOWEL PATTERN NORMAL: 1
LOWER EXTREMITY EDEMA: 1
PALPITATIONS: 1
DYSPNEA ON EXERTION: 1
STOOL FREQUENCY: DAILY
DYSPNEA ACTIVITY LEVEL: AT REST

## 2021-09-03 PROCEDURE — S9126 HOSPICE CARE, IN THE HOME, P: HCPCS

## 2021-09-04 PROCEDURE — S9126 HOSPICE CARE, IN THE HOME, P: HCPCS

## 2021-09-05 PROCEDURE — S9126 HOSPICE CARE, IN THE HOME, P: HCPCS

## 2021-09-06 PROCEDURE — S9126 HOSPICE CARE, IN THE HOME, P: HCPCS

## 2021-09-07 PROCEDURE — S9126 HOSPICE CARE, IN THE HOME, P: HCPCS

## 2021-09-08 PROCEDURE — S9126 HOSPICE CARE, IN THE HOME, P: HCPCS

## 2021-09-09 PROCEDURE — S9126 HOSPICE CARE, IN THE HOME, P: HCPCS

## 2021-09-10 PROCEDURE — S9126 HOSPICE CARE, IN THE HOME, P: HCPCS

## 2021-09-11 PROCEDURE — S9126 HOSPICE CARE, IN THE HOME, P: HCPCS

## 2021-09-12 PROCEDURE — S9126 HOSPICE CARE, IN THE HOME, P: HCPCS

## 2021-09-13 PROCEDURE — S9126 HOSPICE CARE, IN THE HOME, P: HCPCS

## 2021-09-14 PROCEDURE — S9126 HOSPICE CARE, IN THE HOME, P: HCPCS

## 2021-09-15 ENCOUNTER — HOME CARE VISIT (OUTPATIENT)
Dept: HOSPICE | Facility: HOSPICE | Age: 86
End: 2021-09-15
Payer: MEDICARE

## 2021-09-15 PROCEDURE — S9126 HOSPICE CARE, IN THE HOME, P: HCPCS

## 2021-09-16 ENCOUNTER — HOME CARE VISIT (OUTPATIENT)
Dept: HOSPICE | Facility: HOSPICE | Age: 86
End: 2021-09-16
Payer: MEDICARE

## 2021-09-16 PROCEDURE — S9126 HOSPICE CARE, IN THE HOME, P: HCPCS

## 2021-09-16 PROCEDURE — G0299 HHS/HOSPICE OF RN EA 15 MIN: HCPCS

## 2021-09-16 PROCEDURE — 6650990 HC HOSPICE AND HOME CARE RX REV CODE 0250

## 2021-09-17 VITALS — DIASTOLIC BLOOD PRESSURE: 75 MMHG | SYSTOLIC BLOOD PRESSURE: 115 MMHG | RESPIRATION RATE: 20 BRPM

## 2021-09-17 PROCEDURE — S9126 HOSPICE CARE, IN THE HOME, P: HCPCS

## 2021-09-18 PROCEDURE — S9126 HOSPICE CARE, IN THE HOME, P: HCPCS

## 2021-09-19 PROCEDURE — S9126 HOSPICE CARE, IN THE HOME, P: HCPCS

## 2021-09-20 PROCEDURE — S9126 HOSPICE CARE, IN THE HOME, P: HCPCS

## 2021-09-21 PROCEDURE — S9126 HOSPICE CARE, IN THE HOME, P: HCPCS

## 2021-09-22 PROCEDURE — S9126 HOSPICE CARE, IN THE HOME, P: HCPCS

## 2021-09-23 PROCEDURE — S9126 HOSPICE CARE, IN THE HOME, P: HCPCS

## 2021-09-24 PROCEDURE — S9126 HOSPICE CARE, IN THE HOME, P: HCPCS

## 2021-09-25 PROCEDURE — S9126 HOSPICE CARE, IN THE HOME, P: HCPCS

## 2021-09-26 PROCEDURE — S9126 HOSPICE CARE, IN THE HOME, P: HCPCS

## 2021-09-27 PROCEDURE — S9126 HOSPICE CARE, IN THE HOME, P: HCPCS

## 2021-09-27 SDOH — ECONOMIC STABILITY: HOUSING INSECURITY: EVIDENCE OF SMOKING MATERIAL: 0

## 2021-09-27 ASSESSMENT — ENCOUNTER SYMPTOMS
DIZZINESS: 1
SHORTNESS OF BREATH: 1
DENIES PAIN: 1
LOWER EXTREMITY EDEMA: 1
LAST BOWEL MOVEMENT: 66003
DYSPNEA ACTIVITY LEVEL: AT REST
FATIGUES EASILY: 1
HYPERTENSION: 1
DYSPNEA ACTIVITY LEVEL: WHILE SPEAKING
BOWEL PATTERN NORMAL: 1
STOOL FREQUENCY: DAILY
DYSPNEA ON EXERTION: 1
FORGETFULNESS: 1
PALPITATIONS: 1

## 2021-09-28 PROCEDURE — S9126 HOSPICE CARE, IN THE HOME, P: HCPCS

## 2021-09-29 PROCEDURE — S9126 HOSPICE CARE, IN THE HOME, P: HCPCS

## 2021-09-30 ENCOUNTER — HOME CARE VISIT (OUTPATIENT)
Dept: HOSPICE | Facility: HOSPICE | Age: 86
End: 2021-09-30
Payer: MEDICARE

## 2021-09-30 VITALS — RESPIRATION RATE: 18 BRPM | HEART RATE: 88 BPM | SYSTOLIC BLOOD PRESSURE: 140 MMHG | DIASTOLIC BLOOD PRESSURE: 90 MMHG

## 2021-09-30 PROCEDURE — 6650990 HC HOSPICE AND HOME CARE RX REV CODE 0250

## 2021-09-30 PROCEDURE — G0300 HHS/HOSPICE OF LPN EA 15 MIN: HCPCS

## 2021-09-30 PROCEDURE — S9126 HOSPICE CARE, IN THE HOME, P: HCPCS

## 2021-09-30 SDOH — ECONOMIC STABILITY: HOUSING INSECURITY: EVIDENCE OF SMOKING MATERIAL: 0

## 2021-09-30 ASSESSMENT — ENCOUNTER SYMPTOMS
PAIN LOCATION - PAIN SEVERITY: 7/10
PAIN LOCATION - PAIN DURATION: SHORT
PAIN LOCATION - PAIN FREQUENCY: INFREQUENT
PAIN LOCATION - EXACERBATING FACTORS: HYPERTENSION
DIZZINESS: 1
HYPERTENSION: 1
SHORTNESS OF BREATH: 1
PAIN LOCATION - PAIN QUALITY: ACHE
PALPITATIONS: 1
PAIN LOCATION: HEAD
FATIGUES EASILY: 1
PERSON REPORTING PAIN: PATIENT
LOWER EXTREMITY EDEMA: 1
LAST BOWEL MOVEMENT: 66017
DYSPNEA ON EXERTION: 1
STOOL FREQUENCY: DAILY
DYSPNEA ACTIVITY LEVEL: AT REST
BOWEL PATTERN NORMAL: 1
PAIN: 1
DYSPNEA ACTIVITY LEVEL: WHILE SPEAKING

## 2021-10-01 PROCEDURE — S9126 HOSPICE CARE, IN THE HOME, P: HCPCS

## 2021-10-02 PROCEDURE — S9126 HOSPICE CARE, IN THE HOME, P: HCPCS

## 2021-10-03 PROCEDURE — S9126 HOSPICE CARE, IN THE HOME, P: HCPCS

## 2021-10-04 PROCEDURE — S9126 HOSPICE CARE, IN THE HOME, P: HCPCS

## 2021-10-05 PROCEDURE — S9126 HOSPICE CARE, IN THE HOME, P: HCPCS

## 2021-10-06 PROCEDURE — S9126 HOSPICE CARE, IN THE HOME, P: HCPCS

## 2021-10-07 ENCOUNTER — HOME CARE VISIT (OUTPATIENT)
Dept: HOSPICE | Facility: HOSPICE | Age: 86
End: 2021-10-07
Payer: MEDICARE

## 2021-10-07 ENCOUNTER — HOSPITAL ENCOUNTER (EMERGENCY)
Facility: MEDICAL CENTER | Age: 86
End: 2021-10-07
Attending: EMERGENCY MEDICINE
Payer: MEDICARE

## 2021-10-07 VITALS
BODY MASS INDEX: 28.16 KG/M2 | RESPIRATION RATE: 16 BRPM | HEIGHT: 62 IN | OXYGEN SATURATION: 95 % | WEIGHT: 153 LBS | SYSTOLIC BLOOD PRESSURE: 136 MMHG | DIASTOLIC BLOOD PRESSURE: 75 MMHG | HEART RATE: 87 BPM | TEMPERATURE: 97.2 F

## 2021-10-07 DIAGNOSIS — R04.0 EPISTAXIS: ICD-10-CM

## 2021-10-07 DIAGNOSIS — Z79.01 CHRONIC ANTICOAGULATION: ICD-10-CM

## 2021-10-07 LAB
ALBUMIN SERPL BCP-MCNC: 5.1 G/DL (ref 3.2–4.9)
ALBUMIN/GLOB SERPL: 1.8 G/DL
ALP SERPL-CCNC: 52 U/L (ref 30–99)
ALT SERPL-CCNC: 43 U/L (ref 2–50)
ANION GAP SERPL CALC-SCNC: 17 MMOL/L (ref 7–16)
APTT PPP: 33.6 SEC (ref 24.7–36)
AST SERPL-CCNC: 33 U/L (ref 12–45)
BASOPHILS # BLD AUTO: 1 % (ref 0–1.8)
BASOPHILS # BLD: 0.1 K/UL (ref 0–0.12)
BILIRUB SERPL-MCNC: 0.4 MG/DL (ref 0.1–1.5)
BUN SERPL-MCNC: 10 MG/DL (ref 8–22)
CALCIUM SERPL-MCNC: 9.9 MG/DL (ref 8.5–10.5)
CHLORIDE SERPL-SCNC: 91 MMOL/L (ref 96–112)
CO2 SERPL-SCNC: 21 MMOL/L (ref 20–33)
CREAT SERPL-MCNC: 0.61 MG/DL (ref 0.5–1.4)
EOSINOPHIL # BLD AUTO: 0.17 K/UL (ref 0–0.51)
EOSINOPHIL NFR BLD: 1.7 % (ref 0–6.9)
ERYTHROCYTE [DISTWIDTH] IN BLOOD BY AUTOMATED COUNT: 40.1 FL (ref 35.9–50)
GLOBULIN SER CALC-MCNC: 2.9 G/DL (ref 1.9–3.5)
GLUCOSE SERPL-MCNC: 165 MG/DL (ref 65–99)
HCT VFR BLD AUTO: 41.2 % (ref 37–47)
HGB BLD-MCNC: 14.3 G/DL (ref 12–16)
IMM GRANULOCYTES # BLD AUTO: 0.09 K/UL (ref 0–0.11)
IMM GRANULOCYTES NFR BLD AUTO: 0.9 % (ref 0–0.9)
INR PPP: 2.01 (ref 0.87–1.13)
LYMPHOCYTES # BLD AUTO: 2.17 K/UL (ref 1–4.8)
LYMPHOCYTES NFR BLD: 21.1 % (ref 22–41)
MCH RBC QN AUTO: 30.3 PG (ref 27–33)
MCHC RBC AUTO-ENTMCNC: 34.7 G/DL (ref 33.6–35)
MCV RBC AUTO: 87.3 FL (ref 81.4–97.8)
MONOCYTES # BLD AUTO: 0.81 K/UL (ref 0–0.85)
MONOCYTES NFR BLD AUTO: 7.9 % (ref 0–13.4)
NEUTROPHILS # BLD AUTO: 6.93 K/UL (ref 2–7.15)
NEUTROPHILS NFR BLD: 67.4 % (ref 44–72)
NRBC # BLD AUTO: 0 K/UL
NRBC BLD-RTO: 0 /100 WBC
PLATELET # BLD AUTO: 255 K/UL (ref 164–446)
PMV BLD AUTO: 8.8 FL (ref 9–12.9)
POTASSIUM SERPL-SCNC: 4.2 MMOL/L (ref 3.6–5.5)
PROT SERPL-MCNC: 8 G/DL (ref 6–8.2)
PROTHROMBIN TIME: 22.2 SEC (ref 12–14.6)
RBC # BLD AUTO: 4.72 M/UL (ref 4.2–5.4)
SODIUM SERPL-SCNC: 129 MMOL/L (ref 135–145)
WBC # BLD AUTO: 10.3 K/UL (ref 4.8–10.8)

## 2021-10-07 PROCEDURE — A9270 NON-COVERED ITEM OR SERVICE: HCPCS | Performed by: EMERGENCY MEDICINE

## 2021-10-07 PROCEDURE — 80053 COMPREHEN METABOLIC PANEL: CPT

## 2021-10-07 PROCEDURE — 700101 HCHG RX REV CODE 250: Performed by: EMERGENCY MEDICINE

## 2021-10-07 PROCEDURE — 85730 THROMBOPLASTIN TIME PARTIAL: CPT

## 2021-10-07 PROCEDURE — 99283 EMERGENCY DEPT VISIT LOW MDM: CPT | Mod: 25

## 2021-10-07 PROCEDURE — 700102 HCHG RX REV CODE 250 W/ 637 OVERRIDE(OP): Performed by: EMERGENCY MEDICINE

## 2021-10-07 PROCEDURE — 700101 HCHG RX REV CODE 250

## 2021-10-07 PROCEDURE — S9126 HOSPICE CARE, IN THE HOME, P: HCPCS

## 2021-10-07 PROCEDURE — 85025 COMPLETE CBC W/AUTO DIFF WBC: CPT

## 2021-10-07 PROCEDURE — 85610 PROTHROMBIN TIME: CPT

## 2021-10-07 PROCEDURE — 303620 HCHG EPISTAXIS CONTROL

## 2021-10-07 RX ORDER — AMOXICILLIN 500 MG/1
500 CAPSULE ORAL 3 TIMES DAILY
Qty: 21 CAPSULE | Refills: 0 | Status: SHIPPED | OUTPATIENT
Start: 2021-10-07 | End: 2021-10-08 | Stop reason: SDUPTHER

## 2021-10-07 RX ORDER — LIDOCAINE HYDROCHLORIDE AND EPINEPHRINE BITARTRATE 20; .01 MG/ML; MG/ML
INJECTION, SOLUTION SUBCUTANEOUS
Status: COMPLETED
Start: 2021-10-07 | End: 2021-10-07

## 2021-10-07 RX ORDER — LIDOCAINE HYDROCHLORIDE AND EPINEPHRINE 10; 10 MG/ML; UG/ML
10 INJECTION, SOLUTION INFILTRATION; PERINEURAL ONCE
Status: COMPLETED | OUTPATIENT
Start: 2021-10-07 | End: 2021-10-07

## 2021-10-07 RX ADMIN — PHENYLEPHRINE HYDROCHLORIDE 1 SPRAY: 1 SPRAY NASAL at 15:20

## 2021-10-07 RX ADMIN — LIDOCAINE HYDROCHLORIDE AND EPINEPHRINE: 20; 10 INJECTION, SOLUTION INFILTRATION; PERINEURAL at 14:00

## 2021-10-07 RX ADMIN — LIDOCAINE HYDROCHLORIDE,EPINEPHRINE BITARTRATE 10 ML: 10; .01 INJECTION, SOLUTION INFILTRATION; PERINEURAL at 15:20

## 2021-10-07 ASSESSMENT — FIBROSIS 4 INDEX: FIB4 SCORE: 1.73

## 2021-10-07 NOTE — ED TRIAGE NOTES
Chief Complaint   Patient presents with   • Epistaxis     Pt to ED via EMS c/o epistaxis that began 15-20 mins PTA. Pt is on coumadin for Afib. Pressure continues to be applied by patient. Oozing still present

## 2021-10-07 NOTE — ED PROVIDER NOTES
ED Provider Note    CHIEF COMPLAINT  Chief Complaint   Patient presents with   • Epistaxis       [facility]  Shereen Vanessa Dale is a 85 y.o. female who presents to the emergency department complaining of epistaxis.  The patient takes Coumadin for chronic atrial fibrillation.  Last INR was 2 4 days ago.  No change in her doses of no new medications.  Today she was in the bathroom and started having a nosebleed.  Bleeding to the left naris.  Denies any trauma.  No other bleeding or bruising.  No other acute concerns complaints.  She had nosebleed in the past.  This persisted for several days patient is on Coumadin.  Denies any other acute concerns or complaints.    REVIEW OF SYSTEMS  See HPI for further details. All other systems are negative.    PAST MEDICAL HISTORY  Past Medical History:   Diagnosis Date   • AF (atrial fibrillation) (HCC)    • Arrhythmia     A-fib   • Backpain     Lower back pain   • Breast cancer (HCC)    • Breath shortness     uses 2-3 L O2 at night   • Cancer (HCC) 1993    right breast   • CATARACT     celestine IOL   • Chronic anticoagulation 5/2/2012   • Congestive heart failure (HCC)    • Cough 5/2/2012   • Dental disorder     dentures   • Diabetes     oral medication   • Edema 5/2/2012   • Glaucoma    • Heart burn    • Heart murmur    • Heart valve disease    • Hypertension    • Hypothyroid    • Oxygen deficiency     uses 2.5-3 l at night   • Paroxysmal atrial fibrillation (HCC)    • Sleep apnea     no cpap   • tia     TIA x2   • Unspecified hemorrhagic conditions     takes coumadin       FAMILY HISTORY  Family History   Problem Relation Age of Onset   • Heart Attack Mother    • Hypertension Mother    • Heart Disease Father    • Hypertension Father    • No Known Problems Sister    • No Known Problems Brother    • Leukemia Sister    • Sleep Apnea Sister    • No Known Problems Sister    • No Known Problems Sister        SOCIAL HISTORY  Social History     Socioeconomic History   • Marital status:       Spouse name: Not on file   • Number of children: Not on file   • Years of education: Not on file   • Highest education level: Not on file   Occupational History   • Not on file   Tobacco Use   • Smoking status: Former Smoker     Packs/day: 0.50     Years: 11.00     Pack years: 5.50     Types: Cigarettes     Quit date: 1964     Years since quittin.8   • Smokeless tobacco: Never Used   • Tobacco comment: Started at    Vaping Use   • Vaping Use: Never used   Substance and Sexual Activity   • Alcohol use: Not Currently     Alcohol/week: 0.0 oz     Comment: rarely   • Drug use: No   • Sexual activity: Never     Partners: Male   Other Topics Concern   • Not on file   Social History Narrative   • Not on file     Social Determinants of Health     Financial Resource Strain:    • Difficulty of Paying Living Expenses:    Food Insecurity:    • Worried About Running Out of Food in the Last Year:    • Ran Out of Food in the Last Year:    Transportation Needs:    • Lack of Transportation (Medical):    • Lack of Transportation (Non-Medical):    Physical Activity:    • Days of Exercise per Week:    • Minutes of Exercise per Session:    Stress:    • Feeling of Stress :    Social Connections:    • Frequency of Communication with Friends and Family:    • Frequency of Social Gatherings with Friends and Family:    • Attends Rastafarian Services:    • Active Member of Clubs or Organizations:    • Attends Club or Organization Meetings:    • Marital Status:    Intimate Partner Violence:    • Fear of Current or Ex-Partner:    • Emotionally Abused:    • Physically Abused:    • Sexually Abused:        SURGICAL HISTORY  Past Surgical History:   Procedure Laterality Date   • KNEE ARTHROSCOPY Right 2015    Procedure: KNEE ARTHROSCOPY;  Surgeon: Emerson Garcia M.D.;  Location: [facility];  Service:    • MENISCECTOMY Right 2015    Procedure: LATERAL MENISCECTOMY;  Surgeon: Emerson Garcia M.D.;  Location:  "SURGERY [facility] ORS;  Service:    • CATARACT PHACO WITH IOL  10/21/2013    Performed by Dominick Fernando M.D. at SURGERY SURGICAL [facility] ORS   • CATARACT PHACO WITH IOL  9/23/2013    Performed by Dominick Fernando M.D. at SURGERY [facility] ORS   • CHOLECYSTECTOMY     • HYSTERECTOMY RADICAL     • LUMPECTOMY      R breast   • PB RADIATION THERAPY PLAN SIMPLE         CURRENT MEDICATIONS  Home Medications     Reviewed by Bernice Nava R.N. (Registered Nurse) on 10/07/21 at 1429  Med List Status: Not Addressed   Medication Last Dose Status   amLODIPine (NORVASC) 5 MG Tab  Active   Blood Glucose Monitoring Suppl (FREESTYLE LITE) Device  Active   carvedilol (COREG) 12.5 MG Tab  Active   gabapentin (NEURONTIN) 100 MG Cap  Active   glipiZIDE SR (GLUCOTROL) 2.5 MG TABLET SR 24 HR  Active   haloperidol (HALDOL) 2 MG/ML Conc  Active   Home Care Oxygen  Active   levothyroxine (SYNTHROID) 50 MCG Tab  Active   metaxalone (SKELAXIN) 800 MG Tab  Active   Misc. Devices Misc  Active   omeprazole (PRILOSEC) 20 MG delayed-release capsule  Active   ondansetron (ZOFRAN ODT) 4 MG TABLET DISPERSIBLE  Active   Sennosides-Docusate Sodium (SENNA PLUS) 8.6-50 MG Cap  Active   spironolactone (ALDACTONE) 50 MG Tab  Active   warfarin (COUMADIN) 3 MG Tab  Active                ALLERGIES  Allergies   Allergen Reactions   • Darvon [Propoxyphene Hcl]      shock   • Iodine      Shock  - IV   • Latex      Swelling = hands with glove use   • Penicillins Anaphylaxis   • Propoxyphene Hcl Anaphylaxis   • Tetanus Antitoxin Myalgia   • Tetracycline Anaphylaxis       PHYSICAL EXAM  VITAL SIGNS: BP (!) 176/83   Pulse 92   Temp 36.7 °C (98 °F) (Oral)   Resp 18   Ht 1.575 m (5' 2\")   Wt 69.4 kg (153 lb)   LMP 01/01/1985   BMI 27.98 kg/m²    Constitutional: Well developed, Well nourished, No acute distress, Non-toxic appearance.   HENT: Normocephalic, Atraumatic, Bilateral external ears normal, Oropharynx moist, bleeding and oozing noted to the left " naris.  No obvious bleeding vessel.  Right naris has no blood.  Eyes: PERRL, EOMI, Conjunctiva normal, No discharge.   Neck: Normal range of motion,  Cardiovascular: Normal heart rate, Normal rhythm, No murmurs, No rubs, No gallops.   Thorax & Lungs: Normal breath sounds, No respiratory distress, No wheezing  Musculoskeletal: Good range of motion in all major joints.  Neurologic: Alert, No focal deficits noted.   Psychiatric: Affect anxious    RADIOLOGY/PROCEDURES    Management of epistaxis  The patient's left naris is bleeding fairly vigorously.  Direct pressure is applied.  Stanislaw-Synephrine is sprayed in her left naris after clots were evacuated and then is packed with a cotton ball soaked with lidocaine with epinephrine.  The patient continues to bleed no bleeding of the posterior oropharynx was then packed with a rapid Rhino.  It is inflated.  Moderate pressure.  The patient is gradually slowing down she is referred to the bleeding stopped which is a little bit of serosanguineous colored oozing.  There is mostly rhinorrhea that is clear that is blood-tinged from the packing is in place.  The balloon is decreased a little bit of any complications.      COURSE & MEDICAL DECISION MAKING  Pertinent Labs & Imaging studies reviewed. (See chart for details)      Patient is chronically anticoagulated from A. fib.  She has got epistaxis of left naris.  At this time is not particularly brisk.  We will sprayed Stanislaw-Synephrine and then packed her left naris with a cottonball soaked with lidocaine with epinephrine.    Patient had markedly elevated blood pressure which has improved with time in decreasing anxiety.  Labs are reassuring.  She is not particular coagulopathic.    She is observed for prolonged period of time.  Bleeding is minimal.  She is a great deal of anxiety about the spotting is reasonable for her to gyxamw-iz-xcyh-old put on amoxicillin because she has a packing in place.  She has a documented anaphylaxis to  penicillin however she states she is taken Amoxil without any difficulty.    Patient be advised the packing should stay in place for 5 days.  She like to see her ENT tomorrow.  She is referred to Dr. Rhoades who is her ENT doctor.  Also referred back to her primary care doctor.  Return for pain, bleeding, or other concerns.  Questions are answered, she is agreeable to plan she is discharged in good condition.    The patient was noted to have elevated blood pressure while in the ER and was counseled to see their doctor within one wee to have this rechecked.      Ganesh Mckeon M.D.  [address] [place] [id number]  [phone]    Schedule an appointment as soon as possible for a visit in 1 day      Sammy Rhoades M.D.  [address] [facility] [address]  [phone]              FINAL IMPRESSION  1. Epistaxis  amoxicillin (AMOXIL) 500 MG Cap   2. Chronic anticoagulation         2.   3.         Electronically signed by: Ricco Burton M.D., 10/7/2021 2:45 PM

## 2021-10-08 ENCOUNTER — HOME CARE VISIT (OUTPATIENT)
Dept: HOSPICE | Facility: HOSPICE | Age: 86
End: 2021-10-08
Payer: MEDICARE

## 2021-10-08 PROCEDURE — G0299 HHS/HOSPICE OF RN EA 15 MIN: HCPCS

## 2021-10-08 PROCEDURE — S9126 HOSPICE CARE, IN THE HOME, P: HCPCS

## 2021-10-08 RX ORDER — AMOXICILLIN 500 MG/1
500 CAPSULE ORAL 3 TIMES DAILY
Qty: 21 CAPSULE | Refills: 0 | Status: SHIPPED | OUTPATIENT
Start: 2021-10-08 | End: 2021-10-15

## 2021-10-08 NOTE — DISCHARGE INSTRUCTIONS
Keep the packing in place.  Return for increasing pain, bleeding, or other concerns.  Follow-up with your ENT and your primary care doctor.  Return for any new medical problems or complaints.  Take antibiotics as prescribed.  Packing remains in place for 5 days.  Follow-up with your ENT

## 2021-10-09 PROCEDURE — S9126 HOSPICE CARE, IN THE HOME, P: HCPCS

## 2021-10-10 PROCEDURE — S9126 HOSPICE CARE, IN THE HOME, P: HCPCS

## 2021-10-11 ENCOUNTER — OFFICE VISIT (OUTPATIENT)
Dept: MEDICAL GROUP | Facility: MEDICAL CENTER | Age: 86
End: 2021-10-11
Payer: MEDICARE

## 2021-10-11 VITALS
BODY MASS INDEX: 28.56 KG/M2 | SYSTOLIC BLOOD PRESSURE: 128 MMHG | WEIGHT: 155.2 LBS | OXYGEN SATURATION: 94 % | DIASTOLIC BLOOD PRESSURE: 78 MMHG | HEART RATE: 94 BPM | TEMPERATURE: 98.2 F | HEIGHT: 62 IN

## 2021-10-11 VITALS — RESPIRATION RATE: 24 BRPM

## 2021-10-11 DIAGNOSIS — R04.0 EPISTAXIS: ICD-10-CM

## 2021-10-11 DIAGNOSIS — I48.0 PAF (PAROXYSMAL ATRIAL FIBRILLATION) (HCC): ICD-10-CM

## 2021-10-11 DIAGNOSIS — E66.9 DIABETES MELLITUS TYPE 2 IN OBESE: ICD-10-CM

## 2021-10-11 DIAGNOSIS — Z09 HOSPITAL DISCHARGE FOLLOW-UP: ICD-10-CM

## 2021-10-11 DIAGNOSIS — D68.69 SECONDARY HYPERCOAGULABLE STATE (HCC): ICD-10-CM

## 2021-10-11 DIAGNOSIS — I35.0 SEVERE AORTIC STENOSIS: ICD-10-CM

## 2021-10-11 DIAGNOSIS — I70.0 ATHEROSCLEROSIS OF AORTA (HCC): ICD-10-CM

## 2021-10-11 DIAGNOSIS — E03.9 ACQUIRED HYPOTHYROIDISM: ICD-10-CM

## 2021-10-11 DIAGNOSIS — E11.69 DIABETES MELLITUS TYPE 2 IN OBESE: ICD-10-CM

## 2021-10-11 PROCEDURE — S9126 HOSPICE CARE, IN THE HOME, P: HCPCS

## 2021-10-11 PROCEDURE — 99214 OFFICE O/P EST MOD 30 MIN: CPT | Mod: GW | Performed by: FAMILY MEDICINE

## 2021-10-11 RX ORDER — WARFARIN SODIUM 3 MG/1
1.5 TABLET ORAL DAILY
Qty: 30 TABLET | Refills: 3 | Status: SHIPPED | OUTPATIENT
Start: 2021-10-11 | End: 2021-10-18 | Stop reason: SDUPTHER

## 2021-10-11 ASSESSMENT — ENCOUNTER SYMPTOMS
FORGETFULNESS: 1
STOOL FREQUENCY: DAILY
BOWEL PATTERN NORMAL: 1
DIZZINESS: 1
SHORTNESS OF BREATH: 1
DYSPNEA ACTIVITY LEVEL: WHILE SPEAKING
FATIGUES EASILY: 1
HYPERTENSION: 1
DENIES PAIN: 1
DYSPNEA ON EXERTION: 1
DYSPNEA ACTIVITY LEVEL: AT REST
LOWER EXTREMITY EDEMA: 1
LAST BOWEL MOVEMENT: 66025

## 2021-10-11 ASSESSMENT — FIBROSIS 4 INDEX: FIB4 SCORE: 1.66

## 2021-10-11 ASSESSMENT — PAIN SCALES - GENERAL: PAINLEVEL: NO PAIN

## 2021-10-11 NOTE — PROGRESS NOTES
CC: Hospital discharge follow-up/nasal bleed, A. fib, diabetes, hypothyroidism, severe aortic stenosis    HPI:   Shereen presents today to discuss the following:    Hospital discharge follow-up/epistaxis  Patient had significant nasal bleeding, she underwent packing.  CBC showed no sign of bleeding.Has been on warfarin for A. fib, risk versus benefits discussed.  Patient has been taking small dose, she stated that she preferred to have bleeding that has been stroke.  However currently no bleeding. Nasal packing is in place.  Been on prophylactic antibiotics/amoxicillin.  She has an appointment with the ENT tomorrow    PAF (paroxysmal atrial fibrillation) ([facility])/  Secondary hypercoagulable state ([facility])  Denies palpitation, chest pain, or shortness of breath.  Most recent INR was 2. Has been on warfarin for A. fib, risk versus benefits discussed.  Patient has been taking small dose, she stated that she preferred to have bleeding that has been stroke.  However currently no bleeding.    Diabetes mellitus type 2 in obese ([facility])  Last A1c was 6.4.  Patient has been on diet control.    Acquired hypothyroidism  He has been tolerating Levothyroxine, no palpitation, no cold or heat intolerance, she has been on levothyroxine 50 mcg daily.    Severe aortic stenosis  Patient has been symptomatic.  Having shortness of breath with exertion, and tiredness all the time.  RefusedTAVR procedure. She preferred to be on hospice.    Atherosclerosis of aorta ([facility])  Previous CT scan showed mild atherosclerosis of the thoracic.  Has been asymptomatic        Patient Active Problem List    Diagnosis Date Noted   • Acquired hypothyroidism 09/12/2019   • Diabetes mellitus type 2 in obese ([facility]) 09/12/2019   • Skin abrasion 08/20/2019   • Chronic anticoagulation 08/02/2019   • Hyponatremia 03/25/2019   • Epistaxis 03/25/2019   • Advanced directives, counseling/discussion 09/26/2018   • Hypertensive urgency 05/11/2017   • Diastolic dysfunction  02/02/2016   • Tear of lateral cartilage or meniscus of knee, current 05/21/2015   • PAF (paroxysmal atrial fibrillation) ([facility]) 01/12/2015   • Dyspnea on effort 01/02/2014   • Diabetes ([facility]) 12/26/2013   • Aortic stenosis 07/02/2013   • HTN (hypertension) 05/04/2012   • Hemorrhagic disorder due to extrinsic circulating anticoagulants ([facility]) 05/02/2012   • Cough 05/02/2012   • Edema 05/02/2012   • Acute, but ill-defined, cerebrovascular disease 04/30/2012       Current Outpatient Medications   Medication Sig Dispense Refill   • amoxicillin (AMOXIL) 500 MG Cap Take 1 Capsule by mouth 3 times a day for 7 days. 21 Capsule 0   • warfarin (COUMADIN) 3 MG Tab Take 1.5 mg by mouth every day. Indications: Aortic Stenosis     • levothyroxine (SYNTHROID) 50 MCG Tab TAKE ONE TABLET BY MOUTH EVERY MORNING FOR UNDERACTIVE THYROID 100 tablet 1   • glipiZIDE SR (GLUCOTROL) 2.5 MG TABLET SR 24 HR Take 1 tablet by mouth every day. Indications: Type 2 Diabetes 100 tablet 3   • Misc. Devices Misc INR meter( INR home srinivaas) 1 Each 0   • Blood Glucose Monitoring Suppl (FREESTYLE LITE) Device USE TWO TIMES A DAY AS DIRECTED FOR BLOOD SUGAR MONITORING 100 Each 3   • metaxalone (SKELAXIN) 800 MG Tab Take 200 mg by mouth 3 times a day as needed for Muscle Spasms. Indications: Musculoskeletal Pain     • spironolactone (ALDACTONE) 50 MG Tab Take 25 mg by mouth every day. Indications: Edema     • Home Care Oxygen Inhale 3.5 L/min at bedtime. Indications: Shortness of breath     • carvedilol (COREG) 12.5 MG Tab Take 12.5 mg by mouth 2 times a day. Indications: High Blood Pressure Disorder     • haloperidol (HALDOL) 2 MG/ML Conc Take 0.5 mg by mouth every 6 hours as needed (Anxiety, agitation).     • Sennosides-Docusate Sodium (SENNA PLUS) 8.6-50 MG Cap Take 2 tablet by mouth every day. Take every evening.  Hold for loose stools  Indications: Constipation     • gabapentin (NEURONTIN) 100 MG Cap Take 1 capsule by mouth 2 (two) times a day. Take  "one tab every morning and two tabs every evening  Indications: Diabetes with Nerve Disease 90 capsule 0   • omeprazole (PRILOSEC) 20 MG delayed-release capsule Take 1 capsule by mouth every day. Indications: Heartburn 90 capsule 3   • amLODIPine (NORVASC) 5 MG Tab Take 5 mg in the morning, and 2.5 in the evening  Indications: High Blood Pressure Disorder 135 tablet 3   • ondansetron (ZOFRAN ODT) 4 MG TABLET DISPERSIBLE Take 4 mg by mouth every 6 hours as needed for Nausea. (Patient not taking: Reported on 7/7/2021)       No current facility-administered medications for this visit.         Allergies as of 10/11/2021 - Reviewed 10/11/2021   Allergen Reaction Noted   • Darvon [propoxyphene hcl]  03/18/2008   • Iodine  05/11/2015   • Latex  09/19/2013   • Penicillins Anaphylaxis 08/02/2008   • Propoxyphene hcl Anaphylaxis 08/02/2008   • Tetanus antitoxin Myalgia 11/19/2017   • Tetracycline Anaphylaxis 08/02/2008        ROS: Denies any chest pain, Shortness of breath, Changes bowel or bladder, Lower extremity edema.    Physical Exam:  /78 (BP Location: Left arm, Patient Position: Sitting, BP Cuff Size: Adult)   Pulse 94   Temp 36.8 °C (98.2 °F) (Temporal)   Ht 1.575 m (5' 2\")   Wt 70.4 kg (155 lb 3.2 oz)   LMP 01/01/1985   SpO2 94%   BMI 28.39 kg/m²   Gen.: Well-developed, well-nourished, no apparent distress,pleasant and cooperative with the examination  Skin:  Warm and dry with good turgor. No rashes or suspicious lesions in visible areas  Eye: PERRLA, conjunctiva and sclera clear, lids normal  HEENT: Left nostril is packed.  Sinuses nontender with palpation, TMs clear, nares patent with pink mucosa, and clear rhinorrhea,no septal deviation ,polyps or lesions. lips without lesions, oropharynx clear.  Neck: Trachea midline,no masses or adenopathy. No JVD.  Thyroid: normal consistency and size. No masses or nodules. Not tender with palpation.  Cor: Regular rate and rhythm without murmur, gallop or " rub.  Lungs: Respirations unlabored.Clear to auscultation with equal breath sounds bilaterally. No wheezes, rhonchi.  Abdomen: Soft nontender without hepatosplenomegaly or masses appreciated, normoactive bowel sounds. No hernias.  Extremities: No cyanosis, clubbing or edema, Symmetrical without deformities or malformations. Pulses 2+ and symmetrical both upper and lower extremities  Psych: Alert and oriented x 3.Normal affect, judgement,insight and memory.        Assessment and Plan.   85 y.o. female     1. Hospital discharge follow-up  Hospital reviewed.    2. Epistaxis  status post packing.    Has been on warfarin for A. fib, risk versus benefits discussed.  Patient has been taking small dose, she stated that she preferred to have bleeding that has been stroke.  However currently no bleeding.  Most packing in place.  Continue on antibiotics.  She has an appointment with the ENT tomorrow    3. PAF (paroxysmal atrial fibrillation) (HCC)  No RVR.  Most recent INR was 2  Has been on warfarin for A. fib, risk versus benefits discussed.  Patient has been taking small dose, she stated that she preferred to have bleeding that has been stroke.  However currently no bleeding.    4. Diabetes mellitus type 2 in obese (HCC)  Last A1c was 6.4.  Has been on diet control.  Other appointment in December, ordered complete blood work.    - HEMOGLOBIN A1C; Future  - Comp Metabolic Panel; Future  - Lipid Profile; Future  - CBC WITH DIFFERENTIAL; Future  - MICROALBUMIN CREAT RATIO URINE; Future    5. Acquired hypothyroidism  He has been tolerating Levothyroxine, no palpitation, no cold or heat intolerance  Continue levothyroxine 50 mcg daily.    - TSH+FREE T4    6. Severe aortic stenosis  RefusedTAVR procedure.  She preferred to be on hospice.    7. Atherosclerosis of aorta (HCC)  Previous CT scan showed mild atherosclerosis of the thoracic  Recommend risk reduction    8. Secondary hypercoagulable state (HCC)  Due to A. fib.  Continue  on Coumadin

## 2021-10-12 PROCEDURE — S9126 HOSPICE CARE, IN THE HOME, P: HCPCS

## 2021-10-13 PROCEDURE — S9126 HOSPICE CARE, IN THE HOME, P: HCPCS

## 2021-10-14 ENCOUNTER — HOME CARE VISIT (OUTPATIENT)
Dept: HOSPICE | Facility: HOSPICE | Age: 86
End: 2021-10-14
Payer: MEDICARE

## 2021-10-14 PROCEDURE — S9126 HOSPICE CARE, IN THE HOME, P: HCPCS

## 2021-10-14 PROCEDURE — G0299 HHS/HOSPICE OF RN EA 15 MIN: HCPCS

## 2021-10-14 ASSESSMENT — FIBROSIS 4 INDEX: FIB4 SCORE: 1.66

## 2021-10-15 PROCEDURE — S9126 HOSPICE CARE, IN THE HOME, P: HCPCS

## 2021-10-16 PROCEDURE — S9126 HOSPICE CARE, IN THE HOME, P: HCPCS

## 2021-10-17 PROCEDURE — S9126 HOSPICE CARE, IN THE HOME, P: HCPCS

## 2021-10-18 VITALS — BODY MASS INDEX: 29.08 KG/M2 | WEIGHT: 158 LBS | RESPIRATION RATE: 20 BRPM | HEIGHT: 62 IN

## 2021-10-18 DIAGNOSIS — I48.0 PAF (PAROXYSMAL ATRIAL FIBRILLATION) (HCC): ICD-10-CM

## 2021-10-18 PROCEDURE — S9126 HOSPICE CARE, IN THE HOME, P: HCPCS

## 2021-10-18 RX ORDER — AMLODIPINE BESYLATE 5 MG/1
5 TABLET ORAL EVERY MORNING
Qty: 45 TABLET | Refills: 6 | Status: SHIPPED | OUTPATIENT
Start: 2021-10-18 | End: 2022-01-03

## 2021-10-18 RX ORDER — LEVOTHYROXINE SODIUM 0.05 MG/1
TABLET ORAL
Qty: 100 TABLET | Refills: 1 | Status: SHIPPED | OUTPATIENT
Start: 2021-10-18 | End: 2021-10-19 | Stop reason: SDUPTHER

## 2021-10-18 RX ORDER — LEVOTHYROXINE SODIUM 0.05 MG/1
TABLET ORAL
Qty: 100 TABLET | Refills: 1 | Status: CANCELLED | OUTPATIENT
Start: 2021-10-18

## 2021-10-18 RX ORDER — WARFARIN SODIUM 3 MG/1
1.5 TABLET ORAL DAILY
Qty: 30 TABLET | Refills: 3 | Status: SHIPPED | OUTPATIENT
Start: 2021-10-18 | End: 2021-11-02 | Stop reason: SDUPTHER

## 2021-10-18 SDOH — ECONOMIC STABILITY: HOUSING INSECURITY: EVIDENCE OF SMOKING MATERIAL: 0

## 2021-10-18 ASSESSMENT — ENCOUNTER SYMPTOMS
DENIES PAIN: 1
LAST BOWEL MOVEMENT: 66031
HYPERTENSION: 1
FORGETFULNESS: 1
SHORTNESS OF BREATH: 1
LOWER EXTREMITY EDEMA: 1
FATIGUES EASILY: 1
DYSPNEA ACTIVITY LEVEL: WHILE SPEAKING
DIZZINESS: 1
DYSPNEA ON EXERTION: 1
STOOL FREQUENCY: DAILY
DYSPNEA ACTIVITY LEVEL: AT REST
BOWEL PATTERN NORMAL: 1

## 2021-10-19 PROCEDURE — S9126 HOSPICE CARE, IN THE HOME, P: HCPCS

## 2021-10-19 RX ORDER — LEVOTHYROXINE SODIUM 0.05 MG/1
TABLET ORAL
Qty: 100 TABLET | Refills: 1 | Status: SHIPPED | OUTPATIENT
Start: 2021-10-19 | End: 2022-05-02 | Stop reason: SDUPTHER

## 2021-10-20 PROCEDURE — S9126 HOSPICE CARE, IN THE HOME, P: HCPCS

## 2021-10-21 PROCEDURE — S9126 HOSPICE CARE, IN THE HOME, P: HCPCS

## 2021-10-22 PROCEDURE — S9126 HOSPICE CARE, IN THE HOME, P: HCPCS

## 2021-10-23 ENCOUNTER — HOSPITAL ENCOUNTER (EMERGENCY)
Facility: MEDICAL CENTER | Age: 86
End: 2021-10-23
Attending: EMERGENCY MEDICINE
Payer: MEDICARE

## 2021-10-23 ENCOUNTER — HOME CARE VISIT (OUTPATIENT)
Dept: HOSPICE | Facility: HOSPICE | Age: 86
End: 2021-10-23
Payer: MEDICARE

## 2021-10-23 VITALS
RESPIRATION RATE: 18 BRPM | TEMPERATURE: 98.1 F | HEIGHT: 62 IN | BODY MASS INDEX: 27.79 KG/M2 | OXYGEN SATURATION: 96 % | HEART RATE: 79 BPM | WEIGHT: 151 LBS | DIASTOLIC BLOOD PRESSURE: 70 MMHG | SYSTOLIC BLOOD PRESSURE: 151 MMHG

## 2021-10-23 DIAGNOSIS — R04.0 EPISTAXIS: ICD-10-CM

## 2021-10-23 LAB
ANION GAP SERPL CALC-SCNC: 15 MMOL/L (ref 7–16)
APTT PPP: 40.1 SEC (ref 24.7–36)
BASOPHILS # BLD AUTO: 0.7 % (ref 0–1.8)
BASOPHILS # BLD: 0.06 K/UL (ref 0–0.12)
BUN SERPL-MCNC: 9 MG/DL (ref 8–22)
CALCIUM SERPL-MCNC: 8.2 MG/DL (ref 8.5–10.5)
CHLORIDE SERPL-SCNC: 98 MMOL/L (ref 96–112)
CO2 SERPL-SCNC: 22 MMOL/L (ref 20–33)
CREAT SERPL-MCNC: 0.69 MG/DL (ref 0.5–1.4)
EOSINOPHIL # BLD AUTO: 0.16 K/UL (ref 0–0.51)
EOSINOPHIL NFR BLD: 1.8 % (ref 0–6.9)
ERYTHROCYTE [DISTWIDTH] IN BLOOD BY AUTOMATED COUNT: 41.7 FL (ref 35.9–50)
GLUCOSE SERPL-MCNC: 218 MG/DL (ref 65–99)
HCT VFR BLD AUTO: 38.3 % (ref 37–47)
HGB BLD-MCNC: 12.8 G/DL (ref 12–16)
IMM GRANULOCYTES # BLD AUTO: 0.07 K/UL (ref 0–0.11)
IMM GRANULOCYTES NFR BLD AUTO: 0.8 % (ref 0–0.9)
INR PPP: 2.07 (ref 0.87–1.13)
LYMPHOCYTES # BLD AUTO: 1.59 K/UL (ref 1–4.8)
LYMPHOCYTES NFR BLD: 17.5 % (ref 22–41)
MCH RBC QN AUTO: 30 PG (ref 27–33)
MCHC RBC AUTO-ENTMCNC: 33.4 G/DL (ref 33.6–35)
MCV RBC AUTO: 89.7 FL (ref 81.4–97.8)
MONOCYTES # BLD AUTO: 0.63 K/UL (ref 0–0.85)
MONOCYTES NFR BLD AUTO: 6.9 % (ref 0–13.4)
NEUTROPHILS # BLD AUTO: 6.6 K/UL (ref 2–7.15)
NEUTROPHILS NFR BLD: 72.3 % (ref 44–72)
NRBC # BLD AUTO: 0 K/UL
NRBC BLD-RTO: 0 /100 WBC
PLATELET # BLD AUTO: 287 K/UL (ref 164–446)
PMV BLD AUTO: 8.5 FL (ref 9–12.9)
POTASSIUM SERPL-SCNC: 4.2 MMOL/L (ref 3.6–5.5)
PROTHROMBIN TIME: 22.6 SEC (ref 12–14.6)
RBC # BLD AUTO: 4.27 M/UL (ref 4.2–5.4)
SODIUM SERPL-SCNC: 135 MMOL/L (ref 135–145)
WBC # BLD AUTO: 9.1 K/UL (ref 4.8–10.8)

## 2021-10-23 PROCEDURE — A9270 NON-COVERED ITEM OR SERVICE: HCPCS | Performed by: EMERGENCY MEDICINE

## 2021-10-23 PROCEDURE — S9126 HOSPICE CARE, IN THE HOME, P: HCPCS

## 2021-10-23 PROCEDURE — 303620 HCHG EPISTAXIS CONTROL

## 2021-10-23 PROCEDURE — 36415 COLL VENOUS BLD VENIPUNCTURE: CPT

## 2021-10-23 PROCEDURE — 85730 THROMBOPLASTIN TIME PARTIAL: CPT

## 2021-10-23 PROCEDURE — 99284 EMERGENCY DEPT VISIT MOD MDM: CPT

## 2021-10-23 PROCEDURE — 85025 COMPLETE CBC W/AUTO DIFF WBC: CPT

## 2021-10-23 PROCEDURE — 80048 BASIC METABOLIC PNL TOTAL CA: CPT

## 2021-10-23 PROCEDURE — 700102 HCHG RX REV CODE 250 W/ 637 OVERRIDE(OP): Performed by: EMERGENCY MEDICINE

## 2021-10-23 PROCEDURE — 700101 HCHG RX REV CODE 250: Performed by: EMERGENCY MEDICINE

## 2021-10-23 PROCEDURE — 85610 PROTHROMBIN TIME: CPT

## 2021-10-23 RX ORDER — TRANEXAMIC ACID 100 MG/ML
3 INJECTION, SOLUTION INTRAVENOUS ONCE
Status: COMPLETED | OUTPATIENT
Start: 2021-10-23 | End: 2021-10-23

## 2021-10-23 RX ORDER — OXYMETAZOLINE HYDROCHLORIDE 0.05 G/100ML
2 SPRAY NASAL ONCE
Status: COMPLETED | OUTPATIENT
Start: 2021-10-23 | End: 2021-10-23

## 2021-10-23 RX ADMIN — OXYMETAZOLINE HCL 2 SPRAY: 0.05 SPRAY NASAL at 12:30

## 2021-10-23 RX ADMIN — TRANEXAMIC ACID 300 MG: 100 INJECTION, SOLUTION INTRAVENOUS at 12:47

## 2021-10-23 ASSESSMENT — FIBROSIS 4 INDEX: FIB4 SCORE: 1.66

## 2021-10-23 NOTE — ED TRIAGE NOTES
"Shereen Dale  Chief Complaint   Patient presents with   • Epistaxis     left nostril x30 minutes, takes coumadin     Pt biba from home with above complaint.  VSS, no acute distress. Nose clamp in place, pt c/o \"oozing down the back of my throat\"  Pt states similar sx in September and they could not get bleeding to stop. Had rhino-rocket in place for 5 days.   Pt on monitor.  Chart up for ERP  "

## 2021-10-23 NOTE — ED NOTES
Pt care assumed for discharge.  Pt in the bathroom, bleeding from site of PIV d/c.  New dressing placed and pressure held for 5 minutes.  Bleeding controlled.  Pt preparing for discharge.

## 2021-10-23 NOTE — DISCHARGE INSTRUCTIONS
You were seen in the Emergency Department for epistaxis.    Labs showed a hemoglobin of 12.8 and INR of 2.07.    Please use 1,000mg of tylenol every 6 hours as needed for pain.    Please follow up with your ENT doctor in 3 to 5 days for packing removal.  If he is unavailable, return back to the ER no later than 5 days for packing removal.    Return to the Emergency Department with increasing bleeding, lightheadedness or fainting, or other concerns.

## 2021-10-23 NOTE — ED PROVIDER NOTES
ED Provider Note    Scribed for Ester Johnson M.D. by Ethan Uriostegui. 10/23/2021  12:12 PM    Means of arrival: EMS  History obtained from: Patient  History limited by: None      CHIEF COMPLAINT  Chief Complaint   Patient presents with   • Epistaxis     left nostril x30 minutes, takes coumadin       HPI  Shereen Dale is a 85 y.o. female with history of atrial fibrillation on warfarin who presents to the Emergency Department via EMS for constant mild nosebleed onset this morning.  Patient reports she visited the ED 2 weeks ago for similar symptoms and had nose her packed. She states her nosebleed resolved. However, she states she visited Dr. Rhoades (ENT) to get her packing removed. After the packing was removed, her bleeding returned and her wound was cauterized.  Bleeding stopped following this until this morning. She medicated with nasal saline after the procedure. Upon arrival to the ED, patient states her bleeding has improved. She denies any associated fevers, chills, vomiting, or nausea.  No recent history of trauma.  No lightheadedness or fainting.    REVIEW OF SYSTEMS  Pertinent positive include nosebleed. Pertinent negative include fevers, chills, vomiting, or nausea. All other systems reviewed and are negative.      PAST MEDICAL HISTORY   has a past medical history of AF (atrial fibrillation) (HCC), Arrhythmia, Backpain, Breast cancer (HCC), Breath shortness, Cancer (HCC) (1993), CATARACT, Chronic anticoagulation (5/2/2012), Congestive heart failure (HCC), Cough (5/2/2012), Dental disorder, Diabetes, Edema (5/2/2012), Glaucoma, Heart burn, Heart murmur, Heart valve disease, Hypertension, Hypothyroid, Oxygen deficiency, Paroxysmal atrial fibrillation (HCC), Sleep apnea, tia, and Unspecified hemorrhagic conditions.    SOCIAL HISTORY  Social History     Tobacco Use   • Smoking status: Former Smoker     Packs/day: 0.50     Years: 11.00     Pack years: 5.50     Types: Cigarettes     Quit date: 1/1/1964      Years since quittin.8   • Smokeless tobacco: Never Used   • Tobacco comment: Started at    Vaping Use   • Vaping Use: Never used   Substance and Sexual Activity   • Alcohol use: Not Currently     Alcohol/week: 0.0 oz     Comment: rarely   • Drug use: No   • Sexual activity: Never     Partners: Male       SURGICAL HISTORY   has a past surgical history that includes lumpectomy; cholecystectomy; hysterectomy radical; radiation therapy plan simple; cataract phaco with iol (2013); cataract phaco with iol (10/21/2013); knee arthroscopy (Right, 2015); and meniscectomy (Right, 2015).    CURRENT MEDICATIONS  Home Medications     Reviewed by Estelle Mcgill R.N. (Registered Nurse) on 10/23/21 at 1153  Med List Status: Partial   Medication Last Dose Status   amLODIPine (NORVASC) 5 MG Tab  Active   amLODIPine (NORVASC) 5 MG Tab  Active   amLODIPine (NORVASC) 5 MG Tab  Active   Blood Glucose Monitoring Suppl (FREESTYLE LITE) Device  Active   carvedilol (COREG) 12.5 MG Tab  Active   carvedilol (COREG) 12.5 MG Tab  Active   gabapentin (NEURONTIN) 100 MG Cap  Active   gabapentin (NEURONTIN) 100 MG Cap  Active   glipiZIDE SR (GLUCOTROL) 2.5 MG TABLET SR 24 HR  Active   haloperidol (HALDOL) 2 MG/ML Conc  Active   Home Care Oxygen  Active   levothyroxine (SYNTHROID) 50 MCG Tab  Active   metaxalone (SKELAXIN) 800 MG Tab  Active   Misc. Devices Misc  Active   omeprazole (PRILOSEC) 20 MG delayed-release capsule  Active   omeprazole (PRILOSEC) 20 MG delayed-release capsule  Active   ondansetron (ZOFRAN ODT) 4 MG TABLET DISPERSIBLE  Active   Sennosides-Docusate Sodium (SENNA PLUS) 8.6-50 MG Cap  Active   spironolactone (ALDACTONE) 25 MG Tab  Active   spironolactone (ALDACTONE) 50 MG Tab  Active   warfarin (COUMADIN) 3 MG Tab  Active                ALLERGIES  Allergies   Allergen Reactions   • Darvon [Propoxyphene Hcl]      shock   • Iodine      Shock  - IV   • Latex      Swelling = hands with glove use   •  "Penicillins Anaphylaxis   • Propoxyphene Hcl Anaphylaxis   • Tetanus Antitoxin Myalgia   • Tetracycline Anaphylaxis       PHYSICAL EXAM   VITAL SIGNS: /83   Pulse 81   Temp 36.2 °C (97.2 °F) (Temporal)   Resp 18   Ht 1.575 m (5' 2\")   Wt 68.5 kg (151 lb)   LMP 01/01/1985   SpO2 95%   BMI 27.62 kg/m²    Constitutional: nNon-toxic appearing elderly female. Alert in no apparent distress.  HENT: Dried blood in left nare, no bleeding source identified. Normocephalic, Atraumatic. Bilateral external ears normal. Moist mucous membranes.  Oropharynx clear.  Eyes: Pupils are equal and reactive. Conjunctiva normal.   Neck: Supple, full range of motion  Musculoskeletal: Atraumatic. No obvious deformities noted.  No lower extremity edema.  Skin: Warm, Dry.  No erythema, No rash.   Neurologic: Alert and oriented x3. Moving all extremities spontaneously without focal deficits.  Psychiatric: Affect normal, Mood normal, Appears appropriate and not intoxicated.      DIAGNOSTIC STUDIES    LABS  Personally reviewed by me  Labs Reviewed   CBC WITH DIFFERENTIAL - Abnormal; Notable for the following components:       Result Value    MCHC 33.4 (*)     MPV 8.5 (*)     Neutrophils-Polys 72.30 (*)     Lymphocytes 17.50 (*)     All other components within normal limits    Narrative:     Indicate which anticoagulants the patient is on:->UNKNOWN   BASIC METABOLIC PANEL - Abnormal; Notable for the following components:    Glucose 218 (*)     Calcium 8.2 (*)     All other components within normal limits    Narrative:     Indicate which anticoagulants the patient is on:->UNKNOWN   PROTHROMBIN TIME - Abnormal; Notable for the following components:    PT 22.6 (*)     INR 2.07 (*)     All other components within normal limits    Narrative:     Indicate which anticoagulants the patient is on:->UNKNOWN   APTT - Abnormal; Notable for the following components:    APTT 40.1 (*)     All other components within normal limits    Narrative:     " "Indicate which anticoagulants the patient is on:->UNKNOWN   ESTIMATED GFR    Narrative:     Indicate which anticoagulants the patient is on:->UNKNOWN            PROCEDURES  Epistaxis Procedure Note    Indication: Bleeding    Pre-medication: oxymetazoline    Procedure: The patient was positioned appropriately and the nares were cleared as well as possible. The bleeding site was unable to be localized.  Following blowing her nose, bleeding did start again.  She was treated with oxymetazoline and nasal clamp which slowed the bleeding.  Rhino rocket was soaked in TXA and placed in the nose filled with air.  Hemostasis was obtained.    The patient tolerated the procedure well.    Complications: None       ED COURSE  Vitals:    10/23/21 1150 10/23/21 1151 10/23/21 1325   BP:  153/83 151/70   Pulse:  81 79   Resp:  18 18   Temp:  36.2 °C (97.2 °F) 36.7 °C (98.1 °F)   TempSrc:  Temporal Temporal   SpO2:  95% 96%   Weight: 68.5 kg (151 lb)     Height: 1.575 m (5' 2\")           Medications administered:  Medications   oxymetazoline (AFRIN) 0.05 % nasal spray 2 Spray (2 Sprays Nasal Given 10/23/21 1230)   tranexamic acid (CYKLOKAPRON) 1000 MG/10ML for nasal packing 300 mg (300 mg Nasal Given 10/23/21 1247)       12:12 PM Patient seen and examined at bedside. The patient presents with nosebleed. Ordered for CBC w/ diff, BMP, INR, and APTT to evaluate. Patient will be treated with Afrin for her symptoms.     12:50 PM - Epistaxis packing procedure performed by me. See details above.    MEDICAL DECISION MAKING  Patient with history of atrial fibrillation on warfarin who presents with epistaxis.  She was seen here 2 weeks ago and had nasal packing placed followed by cautery with ENT.  Bleeding returned today.  She is nontoxic-appearing with reassuring vital signs on arrival.  Initially the bleeding had stopped however once we tried to clear some of the clot out it did start again.  Attempted Afrin and nasal clamp however bleeding " continued therefore Rhino Rocket was placed which stopped the bleeding.  Labs are reassuring without anemia.  INR is 2.07 and not supratherapeutic.  Patient will be discharged home with instructions to return to the ER or to her ENT in 3-4 days for packing removal.    1:17 PM - Upon reassessment, patient is resting comfortably with normal vital signs.  No new complaints at this time.  Discussed results with patient and/or family as well as importance of established ENT follow up.  Patient understands plan of care and strict return precautions for new or changing symptoms.         The patient will return for new or worsening symptoms and is stable at the time of discharge.    The patient is referred to a primary physician for blood pressure management, diabetic screening, and for all other preventative health concerns.      DISPOSITION:  Patient will be discharged home in stable condition.    FOLLOW UP:  Samym Rhoades M.D.  [address] [place] [id number]  [phone]    Schedule an appointment as soon as possible for a visit   packing removal in 3-5 days    [facility], Emergency Dept  [address] [place] [id number] [phone]    return for packing removal if ENT unavailable        IMPRESSION  (R04.0) Epistaxis  Epistaxis packing procedure performed by ERP.    Results, diagnoses, and treatment options were discussed with the patient and/or family. Patient verbalized understanding of plan of care.    Discharge Medication List as of 10/23/2021  1:56 PM               Ethan ODONNELL (Molly), am scribing for, and in the presence of, Ester Johnson M.D..    Electronically signed by: tEhan Rodriges), 10/23/2021    Ester ODONNELL M.D. personally performed the services described in this documentation, as scribed by Ethan Uriostegui in my presence, and it is both accurate and complete.    The note accurately reflects work and decisions made by me.  Ester Johnson M.D.  10/24/2021  10:39  AM

## 2021-10-24 PROCEDURE — S9126 HOSPICE CARE, IN THE HOME, P: HCPCS

## 2021-10-25 PROCEDURE — S9126 HOSPICE CARE, IN THE HOME, P: HCPCS

## 2021-10-26 PROCEDURE — S9126 HOSPICE CARE, IN THE HOME, P: HCPCS

## 2021-10-27 PROCEDURE — S9126 HOSPICE CARE, IN THE HOME, P: HCPCS

## 2021-10-28 ENCOUNTER — HOME CARE VISIT (OUTPATIENT)
Dept: HOSPICE | Facility: HOSPICE | Age: 86
End: 2021-10-28
Payer: MEDICARE

## 2021-10-28 PROCEDURE — S9126 HOSPICE CARE, IN THE HOME, P: HCPCS

## 2021-10-28 PROCEDURE — G0299 HHS/HOSPICE OF RN EA 15 MIN: HCPCS

## 2021-10-29 PROCEDURE — S9126 HOSPICE CARE, IN THE HOME, P: HCPCS

## 2021-10-30 PROCEDURE — S9126 HOSPICE CARE, IN THE HOME, P: HCPCS

## 2021-10-31 PROCEDURE — S9126 HOSPICE CARE, IN THE HOME, P: HCPCS

## 2021-11-01 PROCEDURE — S9126 HOSPICE CARE, IN THE HOME, P: HCPCS

## 2021-11-02 ENCOUNTER — HOME CARE VISIT (OUTPATIENT)
Dept: HOSPICE | Facility: HOSPICE | Age: 86
End: 2021-11-02
Payer: MEDICARE

## 2021-11-02 VITALS — RESPIRATION RATE: 20 BRPM

## 2021-11-02 DIAGNOSIS — I48.0 PAF (PAROXYSMAL ATRIAL FIBRILLATION) (HCC): ICD-10-CM

## 2021-11-02 PROCEDURE — S9126 HOSPICE CARE, IN THE HOME, P: HCPCS

## 2021-11-02 RX ORDER — WARFARIN SODIUM 3 MG/1
1.5 TABLET ORAL DAILY
Qty: 30 TABLET | Refills: 3 | Status: SHIPPED | OUTPATIENT
Start: 2021-11-02 | End: 2022-02-01 | Stop reason: SDUPTHER

## 2021-11-02 SDOH — ECONOMIC STABILITY: HOUSING INSECURITY: EVIDENCE OF SMOKING MATERIAL: 0

## 2021-11-02 ASSESSMENT — ENCOUNTER SYMPTOMS
DYSPNEA ACTIVITY LEVEL: WHILE SPEAKING
FATIGUES EASILY: 1
BOWEL PATTERN NORMAL: 1
DYSPNEA ACTIVITY LEVEL: AT REST
LOWER EXTREMITY EDEMA: 1
DYSPNEA ON EXERTION: 1
SHORTNESS OF BREATH: 1
LAST BOWEL MOVEMENT: 66044
FORGETFULNESS: 1
HYPERTENSION: 1
DENIES PAIN: 1
STOOL FREQUENCY: LESS THAN DAILY

## 2021-11-03 PROCEDURE — S9126 HOSPICE CARE, IN THE HOME, P: HCPCS

## 2021-11-04 PROCEDURE — S9126 HOSPICE CARE, IN THE HOME, P: HCPCS

## 2021-11-05 PROCEDURE — S9126 HOSPICE CARE, IN THE HOME, P: HCPCS

## 2021-11-06 PROCEDURE — S9126 HOSPICE CARE, IN THE HOME, P: HCPCS

## 2021-11-07 PROCEDURE — S9126 HOSPICE CARE, IN THE HOME, P: HCPCS

## 2021-11-08 PROCEDURE — S9126 HOSPICE CARE, IN THE HOME, P: HCPCS

## 2021-11-09 PROCEDURE — S9126 HOSPICE CARE, IN THE HOME, P: HCPCS

## 2021-11-10 PROCEDURE — S9126 HOSPICE CARE, IN THE HOME, P: HCPCS

## 2021-11-11 ENCOUNTER — HOME CARE VISIT (OUTPATIENT)
Dept: HOSPICE | Facility: HOSPICE | Age: 86
End: 2021-11-11
Payer: MEDICARE

## 2021-11-11 VITALS — SYSTOLIC BLOOD PRESSURE: 120 MMHG | DIASTOLIC BLOOD PRESSURE: 70 MMHG | RESPIRATION RATE: 16 BRPM

## 2021-11-11 PROCEDURE — S9126 HOSPICE CARE, IN THE HOME, P: HCPCS

## 2021-11-11 PROCEDURE — G0299 HHS/HOSPICE OF RN EA 15 MIN: HCPCS

## 2021-11-11 SDOH — ECONOMIC STABILITY: HOUSING INSECURITY: EVIDENCE OF SMOKING MATERIAL: 0

## 2021-11-11 ASSESSMENT — ENCOUNTER SYMPTOMS
DENIES PAIN: 1
FATIGUES EASILY: 1
SHORTNESS OF BREATH: 1
BOWEL PATTERN NORMAL: 1
LOWER EXTREMITY EDEMA: 1
STOOL FREQUENCY: LESS THAN DAILY
DYSPNEA ON EXERTION: 1
HYPERTENSION: 1
DYSPNEA ACTIVITY LEVEL: AFTER AMBULATING LESS THAN 10 FT
FORGETFULNESS: 1
LAST BOWEL MOVEMENT: 66058

## 2021-11-12 PROCEDURE — S9126 HOSPICE CARE, IN THE HOME, P: HCPCS

## 2021-11-13 PROCEDURE — S9126 HOSPICE CARE, IN THE HOME, P: HCPCS

## 2021-11-14 PROCEDURE — S9126 HOSPICE CARE, IN THE HOME, P: HCPCS

## 2021-11-15 PROCEDURE — S9126 HOSPICE CARE, IN THE HOME, P: HCPCS

## 2021-11-16 PROCEDURE — S9126 HOSPICE CARE, IN THE HOME, P: HCPCS

## 2021-11-17 PROCEDURE — S9126 HOSPICE CARE, IN THE HOME, P: HCPCS

## 2021-11-18 ENCOUNTER — HOME CARE VISIT (OUTPATIENT)
Dept: HOSPICE | Facility: HOSPICE | Age: 86
End: 2021-11-18
Payer: MEDICARE

## 2021-11-18 VITALS — DIASTOLIC BLOOD PRESSURE: 68 MMHG | SYSTOLIC BLOOD PRESSURE: 118 MMHG | RESPIRATION RATE: 16 BRPM

## 2021-11-18 DIAGNOSIS — R52 PAIN: ICD-10-CM

## 2021-11-18 DIAGNOSIS — R06.00 DYSPNEA, UNSPECIFIED TYPE: ICD-10-CM

## 2021-11-18 PROCEDURE — S9126 HOSPICE CARE, IN THE HOME, P: HCPCS

## 2021-11-18 PROCEDURE — G0299 HHS/HOSPICE OF RN EA 15 MIN: HCPCS

## 2021-11-18 RX ORDER — MORPHINE SULFATE 100 MG/5ML
5 SOLUTION ORAL
Qty: 120 ML | Refills: 0 | Status: SHIPPED | OUTPATIENT
Start: 2021-11-18 | End: 2022-05-05 | Stop reason: SDUPTHER

## 2021-11-18 SDOH — ECONOMIC STABILITY: HOUSING INSECURITY: EVIDENCE OF SMOKING MATERIAL: 0

## 2021-11-18 ASSESSMENT — ENCOUNTER SYMPTOMS
SHORTNESS OF BREATH: 1
LAST BOWEL MOVEMENT: 66066
STOOL FREQUENCY: LESS THAN DAILY
DYSPNEA ON EXERTION: 1
BOWEL PATTERN NORMAL: 1
FORGETFULNESS: 1
DYSPNEA ACTIVITY LEVEL: AFTER AMBULATING LESS THAN 10 FT
LOWER EXTREMITY EDEMA: 1
HYPERTENSION: 1
FATIGUES EASILY: 1
DENIES PAIN: 1

## 2021-11-19 PROCEDURE — S9126 HOSPICE CARE, IN THE HOME, P: HCPCS

## 2021-11-20 PROCEDURE — S9126 HOSPICE CARE, IN THE HOME, P: HCPCS

## 2021-11-21 PROCEDURE — S9126 HOSPICE CARE, IN THE HOME, P: HCPCS

## 2021-11-22 PROCEDURE — S9126 HOSPICE CARE, IN THE HOME, P: HCPCS

## 2021-11-23 PROCEDURE — S9126 HOSPICE CARE, IN THE HOME, P: HCPCS

## 2021-11-24 PROCEDURE — S9126 HOSPICE CARE, IN THE HOME, P: HCPCS

## 2021-11-25 PROCEDURE — S9126 HOSPICE CARE, IN THE HOME, P: HCPCS

## 2021-11-26 PROCEDURE — S9126 HOSPICE CARE, IN THE HOME, P: HCPCS

## 2021-11-27 PROCEDURE — S9126 HOSPICE CARE, IN THE HOME, P: HCPCS

## 2021-11-28 PROCEDURE — S9126 HOSPICE CARE, IN THE HOME, P: HCPCS

## 2021-11-29 PROCEDURE — S9126 HOSPICE CARE, IN THE HOME, P: HCPCS

## 2021-11-30 ENCOUNTER — HOSPITAL ENCOUNTER (OUTPATIENT)
Dept: LAB | Facility: MEDICAL CENTER | Age: 86
End: 2021-11-30
Attending: FAMILY MEDICINE
Payer: MEDICARE

## 2021-11-30 DIAGNOSIS — E11.69 DIABETES MELLITUS TYPE 2 IN OBESE: ICD-10-CM

## 2021-11-30 DIAGNOSIS — E66.9 DIABETES MELLITUS TYPE 2 IN OBESE: ICD-10-CM

## 2021-11-30 LAB
ALBUMIN SERPL BCP-MCNC: 4.6 G/DL (ref 3.2–4.9)
ALBUMIN/GLOB SERPL: 1.4 G/DL
ALP SERPL-CCNC: 56 U/L (ref 30–99)
ALT SERPL-CCNC: 33 U/L (ref 2–50)
ANION GAP SERPL CALC-SCNC: 12 MMOL/L (ref 7–16)
AST SERPL-CCNC: 26 U/L (ref 12–45)
BASOPHILS # BLD AUTO: 1.1 % (ref 0–1.8)
BASOPHILS # BLD: 0.08 K/UL (ref 0–0.12)
BILIRUB SERPL-MCNC: 0.5 MG/DL (ref 0.1–1.5)
BUN SERPL-MCNC: 8 MG/DL (ref 8–22)
CALCIUM SERPL-MCNC: 9.8 MG/DL (ref 8.5–10.5)
CHLORIDE SERPL-SCNC: 101 MMOL/L (ref 96–112)
CHOLEST SERPL-MCNC: 172 MG/DL (ref 100–199)
CO2 SERPL-SCNC: 25 MMOL/L (ref 20–33)
CREAT SERPL-MCNC: 0.62 MG/DL (ref 0.5–1.4)
CREAT UR-MCNC: 48.02 MG/DL
EOSINOPHIL # BLD AUTO: 0.3 K/UL (ref 0–0.51)
EOSINOPHIL NFR BLD: 4 % (ref 0–6.9)
ERYTHROCYTE [DISTWIDTH] IN BLOOD BY AUTOMATED COUNT: 42.7 FL (ref 35.9–50)
EST. AVERAGE GLUCOSE BLD GHB EST-MCNC: 151 MG/DL
FASTING STATUS PATIENT QL REPORTED: NORMAL
GLOBULIN SER CALC-MCNC: 3.2 G/DL (ref 1.9–3.5)
GLUCOSE SERPL-MCNC: 151 MG/DL (ref 65–99)
HBA1C MFR BLD: 6.9 % (ref 4–5.6)
HCT VFR BLD AUTO: 41.1 % (ref 37–47)
HDLC SERPL-MCNC: 54 MG/DL
HGB BLD-MCNC: 13.9 G/DL (ref 12–16)
IMM GRANULOCYTES # BLD AUTO: 0.04 K/UL (ref 0–0.11)
IMM GRANULOCYTES NFR BLD AUTO: 0.5 % (ref 0–0.9)
LDLC SERPL CALC-MCNC: 96 MG/DL
LYMPHOCYTES # BLD AUTO: 2.06 K/UL (ref 1–4.8)
LYMPHOCYTES NFR BLD: 27.7 % (ref 22–41)
MCH RBC QN AUTO: 30.5 PG (ref 27–33)
MCHC RBC AUTO-ENTMCNC: 33.8 G/DL (ref 33.6–35)
MCV RBC AUTO: 90.3 FL (ref 81.4–97.8)
MICROALBUMIN UR-MCNC: 2.6 MG/DL
MICROALBUMIN/CREAT UR: 54 MG/G (ref 0–30)
MONOCYTES # BLD AUTO: 0.65 K/UL (ref 0–0.85)
MONOCYTES NFR BLD AUTO: 8.7 % (ref 0–13.4)
NEUTROPHILS # BLD AUTO: 4.32 K/UL (ref 2–7.15)
NEUTROPHILS NFR BLD: 58 % (ref 44–72)
NRBC # BLD AUTO: 0 K/UL
NRBC BLD-RTO: 0 /100 WBC
PLATELET # BLD AUTO: 282 K/UL (ref 164–446)
PMV BLD AUTO: 9.2 FL (ref 9–12.9)
POTASSIUM SERPL-SCNC: 4.7 MMOL/L (ref 3.6–5.5)
PROT SERPL-MCNC: 7.8 G/DL (ref 6–8.2)
RBC # BLD AUTO: 4.55 M/UL (ref 4.2–5.4)
SODIUM SERPL-SCNC: 138 MMOL/L (ref 135–145)
T4 FREE SERPL-MCNC: 1.59 NG/DL (ref 0.93–1.7)
TRIGL SERPL-MCNC: 111 MG/DL (ref 0–149)
TSH SERPL DL<=0.005 MIU/L-ACNC: 1.71 UIU/ML (ref 0.38–5.33)
WBC # BLD AUTO: 7.5 K/UL (ref 4.8–10.8)

## 2021-11-30 PROCEDURE — 36415 COLL VENOUS BLD VENIPUNCTURE: CPT

## 2021-11-30 PROCEDURE — 83036 HEMOGLOBIN GLYCOSYLATED A1C: CPT

## 2021-11-30 PROCEDURE — 82570 ASSAY OF URINE CREATININE: CPT

## 2021-11-30 PROCEDURE — 84439 ASSAY OF FREE THYROXINE: CPT

## 2021-11-30 PROCEDURE — 85025 COMPLETE CBC W/AUTO DIFF WBC: CPT

## 2021-11-30 PROCEDURE — 84443 ASSAY THYROID STIM HORMONE: CPT

## 2021-11-30 PROCEDURE — 80053 COMPREHEN METABOLIC PANEL: CPT

## 2021-11-30 PROCEDURE — 82043 UR ALBUMIN QUANTITATIVE: CPT

## 2021-11-30 PROCEDURE — S9126 HOSPICE CARE, IN THE HOME, P: HCPCS

## 2021-11-30 PROCEDURE — 80061 LIPID PANEL: CPT

## 2021-12-01 ENCOUNTER — HOME CARE VISIT (OUTPATIENT)
Dept: HOSPICE | Facility: HOSPICE | Age: 86
End: 2021-12-01
Payer: MEDICARE

## 2021-12-01 PROCEDURE — S9126 HOSPICE CARE, IN THE HOME, P: HCPCS

## 2021-12-02 ENCOUNTER — HOME CARE VISIT (OUTPATIENT)
Dept: HOSPICE | Facility: HOSPICE | Age: 86
End: 2021-12-02
Payer: MEDICARE

## 2021-12-02 VITALS — DIASTOLIC BLOOD PRESSURE: 70 MMHG | SYSTOLIC BLOOD PRESSURE: 122 MMHG | RESPIRATION RATE: 22 BRPM

## 2021-12-02 PROCEDURE — S9126 HOSPICE CARE, IN THE HOME, P: HCPCS

## 2021-12-02 PROCEDURE — G0299 HHS/HOSPICE OF RN EA 15 MIN: HCPCS

## 2021-12-02 SDOH — ECONOMIC STABILITY: HOUSING INSECURITY: EVIDENCE OF SMOKING MATERIAL: 0

## 2021-12-02 ASSESSMENT — ENCOUNTER SYMPTOMS
BOWEL PATTERN NORMAL: 1
STOOL FREQUENCY: LESS THAN DAILY
FATIGUES EASILY: 1
DYSPNEA ON EXERTION: 1
LAST BOWEL MOVEMENT: 66080
PERSON REPORTING PAIN: PATIENT
DENIES PAIN: 1
HYPERTENSION: 1

## 2021-12-03 ENCOUNTER — HOME CARE VISIT (OUTPATIENT)
Dept: HOSPICE | Facility: HOSPICE | Age: 86
End: 2021-12-03
Payer: MEDICARE

## 2021-12-03 PROCEDURE — S9126 HOSPICE CARE, IN THE HOME, P: HCPCS

## 2021-12-04 PROCEDURE — S9126 HOSPICE CARE, IN THE HOME, P: HCPCS

## 2021-12-05 PROCEDURE — S9126 HOSPICE CARE, IN THE HOME, P: HCPCS

## 2021-12-06 PROCEDURE — S9126 HOSPICE CARE, IN THE HOME, P: HCPCS

## 2021-12-07 PROCEDURE — S9126 HOSPICE CARE, IN THE HOME, P: HCPCS

## 2021-12-08 ENCOUNTER — TELEPHONE (OUTPATIENT)
Dept: MEDICAL GROUP | Facility: MEDICAL CENTER | Age: 86
End: 2021-12-08

## 2021-12-08 PROCEDURE — S9126 HOSPICE CARE, IN THE HOME, P: HCPCS

## 2021-12-08 NOTE — TELEPHONE ENCOUNTER
ESTABLISHED PATIENT PRE-VISIT PLANNING     Phone Number Called: [phone] (home)   Call outcome: Spoke to patient regarding message below.  Message: Appointment scheduled with Dr. Mckeon, Thursday, 12/09/2021 at 2:00 PM, [address], [phone]. Arrive 10-15 minutes for check-in.    Patient was contacted to complete PVP.     Note: Patient will not be contacted if there is no indication to call.     1.  Reviewed notes from the last few office visits within the medical group: Yes    2.  If any orders were placed at last visit or intended to be done for this visit (i.e. 6 mos follow-up), do we have Results/Consult Notes?         •  Labs - Labs ordered, completed on 11/30/2021 and results are in chart.  Note: If patient appointment is for lab review and patient did not complete labs, check with provider if OK to reschedule patient until labs completed.       •  Imaging - Imaging ordered, NOT completed. Patient advised to complete prior to next appointment.    -Echocardiogram: Not Done         •  Referrals - No referrals were ordered at last office visit.    3. Is this appointment scheduled as a Hospital Follow-Up? Yes, visit was at [facility].     4.  Immunizations were updated in Epic using Reconcile Outside Information activity? Yes    5.  Patient is due for the following Health Maintenance Topics:   Health Maintenance Due   Topic Date Due   • COLORECTAL CANCER SCREENING  Never done   • IMM DTaP/Tdap/Td Vaccine (1 - Tdap) Never done   • RETINAL SCREENING  06/10/2020   • DIABETES MONOFILAMENT / LE EXAM  12/12/2020   • Annual Wellness Visit  02/20/2021   • COVID-19 Vaccine (3 - Booster for Moderna series) 08/25/2021   • IMM INFLUENZA (1) 09/01/2021       6.  AHA (Pulse8) form printed for Provider? No, patient does not have any open alerts

## 2021-12-09 ENCOUNTER — OFFICE VISIT (OUTPATIENT)
Dept: MEDICAL GROUP | Facility: MEDICAL CENTER | Age: 86
End: 2021-12-09
Payer: MEDICARE

## 2021-12-09 VITALS
DIASTOLIC BLOOD PRESSURE: 60 MMHG | SYSTOLIC BLOOD PRESSURE: 104 MMHG | BODY MASS INDEX: 28.36 KG/M2 | TEMPERATURE: 98.4 F | OXYGEN SATURATION: 95 % | WEIGHT: 154.1 LBS | HEIGHT: 62 IN | HEART RATE: 94 BPM | RESPIRATION RATE: 16 BRPM

## 2021-12-09 DIAGNOSIS — I48.0 PAF (PAROXYSMAL ATRIAL FIBRILLATION) (HCC): ICD-10-CM

## 2021-12-09 DIAGNOSIS — E03.9 ACQUIRED HYPOTHYROIDISM: ICD-10-CM

## 2021-12-09 DIAGNOSIS — I10 PRIMARY HYPERTENSION: ICD-10-CM

## 2021-12-09 DIAGNOSIS — E11.69 DIABETES MELLITUS TYPE 2 IN OBESE: ICD-10-CM

## 2021-12-09 DIAGNOSIS — E66.9 DIABETES MELLITUS TYPE 2 IN OBESE: ICD-10-CM

## 2021-12-09 DIAGNOSIS — I35.0 SEVERE AORTIC STENOSIS: ICD-10-CM

## 2021-12-09 PROCEDURE — 99214 OFFICE O/P EST MOD 30 MIN: CPT | Mod: GW | Performed by: FAMILY MEDICINE

## 2021-12-09 PROCEDURE — S9126 HOSPICE CARE, IN THE HOME, P: HCPCS

## 2021-12-09 RX ORDER — DORZOLAMIDE HYDROCHLORIDE AND TIMOLOL MALEATE 20; 5 MG/ML; MG/ML
1 SOLUTION/ DROPS OPHTHALMIC 2 TIMES DAILY
COMMUNITY
Start: 2021-12-08

## 2021-12-09 RX ORDER — CEPHALEXIN 500 MG/1
CAPSULE ORAL
COMMUNITY
Start: 2021-11-20 | End: 2022-01-27

## 2021-12-09 ASSESSMENT — FIBROSIS 4 INDEX: FIB4 SCORE: 1.35

## 2021-12-09 NOTE — PROGRESS NOTES
CC: MARCUS fib, hypertension, diabetes, hypothyroidism, severe aortic stenosis    HPI:   Shereen presents today to discuss the following medical issues:    PAF (paroxysmal atrial fibrillation) ([facility])  Denies palpitation or chest pain.  Patient has been on warfarin, however she developed multiple episodes of epistaxis.  Wants to stop it.  Patient aware of the risk of having stroke if he stops the warfarin.  Wants to take aspirin instead.     Primary hypertension  Blood pressure has been adequately controlled on amlodipine 2.5 mg daily.  No side effects    Diabetes mellitus type 2 in obese ([facility])  Blood glucose has been adequately controlled.  Last A1c was 6.9.  She has been Metformin 500 mg twice a day.  No side effects    Acquired hypothyroidism  He has been tolerating Levothyroxine, no palpitation, no cold or heat intolerance, patient is currently on levothyroxine 50 mcg daily    Severe aortic stenosis  Patient refused TAVR procedure.  She has been having progressive shortness of breath with mild exertion.  She stopped seeing the cardiologist.  Currently on hospice.      Patient Active Problem List    Diagnosis Date Noted   • Acquired hypothyroidism 09/12/2019   • Diabetes mellitus type 2 in obese ([facility]) 09/12/2019   • Skin abrasion 08/20/2019   • Chronic anticoagulation 08/02/2019   • Hyponatremia 03/25/2019   • Epistaxis 03/25/2019   • Advanced directives, counseling/discussion 09/26/2018   • Hypertensive urgency 05/11/2017   • Diastolic dysfunction 02/02/2016   • Tear of lateral cartilage or meniscus of knee, current 05/21/2015   • PAF (paroxysmal atrial fibrillation) ([facility]) 01/12/2015   • Dyspnea on effort 01/02/2014   • Diabetes ([facility]) 12/26/2013   • Aortic stenosis 07/02/2013   • HTN (hypertension) 05/04/2012   • Hemorrhagic disorder due to extrinsic circulating anticoagulants ([facility]) 05/02/2012   • Cough 05/02/2012   • Edema 05/02/2012   • Acute, but ill-defined, cerebrovascular disease 04/30/2012       Current Outpatient  Medications   Medication Sig Dispense Refill   • cephALEXin (KEFLEX) 500 MG Cap      • dorzolamide-timolol (COSOPT) 22.3-6.8 MG/ML Solution      • metFORMIN (GLUCOPHAGE) 500 MG Tab      • pramoxine-calamine 1-8% (CALADRYL) lotion Apply topically to the groin area as needed for itching. 177 mL 1   • calamine Lotion Apply 1 Application topically as needed. Dose: 1 Application / Route: Topical / Freq: PRN / Admin Inst: Apply generous amount to bilateral groin areas PRN itching. Please remove dried calamine residue with water and a clean cloth and pat dry prior to applying nystatin powder to groin areas.     • nystatin (MYCOSTATIN) powder Apply to affected area 2 times a day for skin irritation and rash. 30 g 2   • morphine (ROXANOL) 20 MG/ML Solution Take 0.25 mL by mouth every 2 hours as needed (moderate to severe pain or shortness of breath). 120 mL 0   • amLODIPine (NORVASC) 2.5 MG Tab Take 2 Tablets by mouth every morning & take 1 tablet orally every evening for hypertension 90 Tablet 6   • warfarin (COUMADIN) 3 MG Tab Take 0.5 Tablets by mouth every day. Indications: Aortic Stenosis 30 Tablet 3   • levothyroxine (SYNTHROID) 50 MCG Tab TAKE ONE TABLET BY MOUTH EVERY MORNING FOR UNDERACTIVE THYROID 100 Tablet 1   • carvedilol (COREG) 12.5 MG Tab Take 1 Tablet by mouth 2 times a day for hypertension 60 Tablet 6   • gabapentin (NEURONTIN) 100 MG Cap Take 1 Capsule by mouth once in the morning and 2 capsules in the evening for diabetic neuropathy. 90 Capsule 6   • omeprazole (PRILOSEC) 20 MG delayed-release capsule Take 1 Capsule by mouth every day for heartburn 30 Capsule 6   • spironolactone (ALDACTONE) 25 MG Tab Take 1 Tablet by mouth every day for edema. 30 Tablet 6   • amLODIPine (NORVASC) 5 MG Tab Take 1 Tablet by mouth every morning, then 1/2 tablet every evening for hypertension. 45 Tablet 6   • glipiZIDE SR (GLUCOTROL) 2.5 MG TABLET SR 24 HR Take 1 tablet by mouth every day. Indications: Type 2 Diabetes 100  "tablet 3   • Misc. Devices Misc INR meter( INR home srinivasa) 1 Each 0   • Blood Glucose Monitoring Suppl (FREESTYLE LITE) Device USE TWO TIMES A DAY AS DIRECTED FOR BLOOD SUGAR MONITORING 100 Each 3   • metaxalone (SKELAXIN) 800 MG Tab Take 200 mg by mouth 3 times a day as needed for Muscle Spasms. Indications: Musculoskeletal Pain     • Home Care Oxygen Inhale 2-5 L/min as needed for Shortness of Breath (sleep apnea). 2-5 LPM as needed via nasal cannula  Indications: Shortness of breath, sleep apnea     • haloperidol (HALDOL) 2 MG/ML Conc Take 0.5 mg by mouth every 6 hours as needed (Anxiety, agitation).     • Sennosides-Docusate Sodium (SENNA PLUS) 8.6-50 MG Cap Take 2 tablet by mouth every day. Take every evening.  Hold for loose stools  Indications: Constipation     • ondansetron (ZOFRAN ODT) 4 MG TABLET DISPERSIBLE Take 4 mg by mouth every 6 hours as needed for Nausea. (Patient not taking: Reported on 7/7/2021)       No current facility-administered medications for this visit.         Allergies as of 12/09/2021 - Reviewed 12/09/2021   Allergen Reaction Noted   • Darvon [propoxyphene hcl]  03/18/2008   • Iodine  05/11/2015   • Latex  09/19/2013   • Penicillins Anaphylaxis 08/02/2008   • Propoxyphene hcl Anaphylaxis 08/02/2008   • Tetanus antitoxin Myalgia 11/19/2017   • Tetracycline Anaphylaxis 08/02/2008        ROS: Denies any chest pain, Shortness of breath, Changes bowel or bladder, Lower extremity edema.    Physical Exam:  /60 (BP Location: Right arm, Patient Position: Sitting, BP Cuff Size: Adult)   Pulse 94   Temp 36.9 °C (98.4 °F) (Temporal)   Resp 16   Ht 1.575 m (5' 2\")   Wt 69.9 kg (154 lb 1.6 oz)   LMP 01/01/1985   SpO2 95%   BMI 28.19 kg/m²   Gen.: Well-developed, well-nourished, no apparent distress,pleasant and cooperative with the examination  Skin:  Warm and dry with good turgor. No rashes or suspicious lesions in visible areas  HEENT:Sinuses nontender with palpation, TMs clear, " nares patent with pink mucosa and clear rhinorrhea,no septal deviation ,polyps or lesions. lips without lesions, oropharynx clear.  Neck: Trachea midline,no masses or adenopathy. No JVD.  Cor: Regular rate and rhythm systolic murmur, gallop or rub.  Lungs: Respirations unlabored.Clear to auscultation with equal breath sounds bilaterally. No wheezes, rhonchi.  Extremities: No cyanosis, clubbing or edema.      Assessment and Plan.   85 y.o. female     1. PAF (paroxysmal atrial fibrillation) ([facility])  No RVR.  Patient has been on warfarin, developed multiple episodes of epistaxis.  Wants to stop it.  Patient knows the risk of having stroke if he stops the warfarin.  Wants to take aspirin instead.     2. Primary hypertension  Controlled.  Continue on amlodipine 2.5 mg daily    3. Diabetes mellitus type 2 in obese ([facility])  Controlled.  Most recent A1c was 6.9.  Continue Metformin 500 mg twice a day    4. Acquired hypothyroidism  He has been tolerating Levothyroxine, no palpitation, no cold or heat intolerance  Continue levothyroxine 50 mcg daily    5. Severe aortic stenosis  Refused TAVR procedure.  Stopped seeing the cardiologist.  Currently on hospice.

## 2021-12-10 PROCEDURE — S9126 HOSPICE CARE, IN THE HOME, P: HCPCS

## 2021-12-11 PROCEDURE — S9126 HOSPICE CARE, IN THE HOME, P: HCPCS

## 2021-12-12 PROCEDURE — S9126 HOSPICE CARE, IN THE HOME, P: HCPCS

## 2021-12-13 PROCEDURE — S9126 HOSPICE CARE, IN THE HOME, P: HCPCS

## 2021-12-14 PROCEDURE — S9126 HOSPICE CARE, IN THE HOME, P: HCPCS

## 2021-12-15 ENCOUNTER — HOME CARE VISIT (OUTPATIENT)
Dept: HOSPICE | Facility: HOSPICE | Age: 86
End: 2021-12-15
Payer: MEDICARE

## 2021-12-15 PROCEDURE — S9126 HOSPICE CARE, IN THE HOME, P: HCPCS

## 2021-12-15 PROCEDURE — G0299 HHS/HOSPICE OF RN EA 15 MIN: HCPCS

## 2021-12-16 VITALS — RESPIRATION RATE: 22 BRPM | HEART RATE: 80 BPM | SYSTOLIC BLOOD PRESSURE: 109 MMHG | DIASTOLIC BLOOD PRESSURE: 92 MMHG

## 2021-12-16 PROCEDURE — S9126 HOSPICE CARE, IN THE HOME, P: HCPCS

## 2021-12-16 SDOH — ECONOMIC STABILITY: HOUSING INSECURITY: EVIDENCE OF SMOKING MATERIAL: 0

## 2021-12-16 ASSESSMENT — ENCOUNTER SYMPTOMS
HYPERTENSION: 1
LAST BOWEL MOVEMENT: 66092
FATIGUES EASILY: 1
FORGETFULNESS: 1
STOOL FREQUENCY: LESS THAN DAILY
DYSPNEA ACTIVITY LEVEL: AFTER AMBULATING LESS THAN 10 FT
SHORTNESS OF BREATH: 1
PERSON REPORTING PAIN: PATIENT
BOWEL PATTERN NORMAL: 1
DENIES PAIN: 1
DYSPNEA ON EXERTION: 1

## 2021-12-17 PROCEDURE — S9126 HOSPICE CARE, IN THE HOME, P: HCPCS

## 2021-12-18 PROCEDURE — S9126 HOSPICE CARE, IN THE HOME, P: HCPCS

## 2021-12-19 PROCEDURE — S9126 HOSPICE CARE, IN THE HOME, P: HCPCS

## 2021-12-20 PROCEDURE — S9126 HOSPICE CARE, IN THE HOME, P: HCPCS

## 2021-12-21 PROCEDURE — S9126 HOSPICE CARE, IN THE HOME, P: HCPCS

## 2021-12-22 PROCEDURE — S9126 HOSPICE CARE, IN THE HOME, P: HCPCS

## 2021-12-23 ENCOUNTER — HOME CARE VISIT (OUTPATIENT)
Dept: HOSPICE | Facility: HOSPICE | Age: 86
End: 2021-12-23
Payer: MEDICARE

## 2021-12-23 PROCEDURE — G0299 HHS/HOSPICE OF RN EA 15 MIN: HCPCS

## 2021-12-23 PROCEDURE — S9126 HOSPICE CARE, IN THE HOME, P: HCPCS

## 2021-12-24 PROCEDURE — S9126 HOSPICE CARE, IN THE HOME, P: HCPCS

## 2021-12-25 PROCEDURE — S9126 HOSPICE CARE, IN THE HOME, P: HCPCS

## 2021-12-26 PROCEDURE — S9126 HOSPICE CARE, IN THE HOME, P: HCPCS

## 2021-12-27 VITALS — DIASTOLIC BLOOD PRESSURE: 60 MMHG | RESPIRATION RATE: 20 BRPM | SYSTOLIC BLOOD PRESSURE: 108 MMHG

## 2021-12-27 PROCEDURE — S9126 HOSPICE CARE, IN THE HOME, P: HCPCS

## 2021-12-27 ASSESSMENT — ENCOUNTER SYMPTOMS
DYSPNEA ON EXERTION: 1
DYSPNEA ACTIVITY LEVEL: AFTER AMBULATING LESS THAN 10 FT
BOWEL PATTERN NORMAL: 1
LAST BOWEL MOVEMENT: 66101
FORGETFULNESS: 1
DENIES PAIN: 1
SHORTNESS OF BREATH: 1
FATIGUES EASILY: 1
HYPERTENSION: 1
STOOL FREQUENCY: LESS THAN DAILY

## 2021-12-28 PROCEDURE — S9126 HOSPICE CARE, IN THE HOME, P: HCPCS

## 2021-12-29 PROCEDURE — S9126 HOSPICE CARE, IN THE HOME, P: HCPCS

## 2021-12-30 PROCEDURE — S9126 HOSPICE CARE, IN THE HOME, P: HCPCS

## 2021-12-31 PROCEDURE — S9126 HOSPICE CARE, IN THE HOME, P: HCPCS

## 2022-01-01 PROCEDURE — S9126 HOSPICE CARE, IN THE HOME, P: HCPCS

## 2022-01-02 PROCEDURE — S9126 HOSPICE CARE, IN THE HOME, P: HCPCS

## 2022-01-03 PROCEDURE — S9126 HOSPICE CARE, IN THE HOME, P: HCPCS

## 2022-01-04 PROCEDURE — S9126 HOSPICE CARE, IN THE HOME, P: HCPCS

## 2022-01-05 PROCEDURE — S9126 HOSPICE CARE, IN THE HOME, P: HCPCS

## 2022-01-06 ENCOUNTER — HOME CARE VISIT (OUTPATIENT)
Dept: HOSPICE | Facility: HOSPICE | Age: 87
End: 2022-01-06
Payer: MEDICARE

## 2022-01-06 VITALS
HEART RATE: 80 BPM | SYSTOLIC BLOOD PRESSURE: 122 MMHG | DIASTOLIC BLOOD PRESSURE: 80 MMHG | RESPIRATION RATE: 20 BRPM | TEMPERATURE: 97.7 F

## 2022-01-06 PROCEDURE — G0299 HHS/HOSPICE OF RN EA 15 MIN: HCPCS

## 2022-01-06 PROCEDURE — S9126 HOSPICE CARE, IN THE HOME, P: HCPCS

## 2022-01-06 SDOH — ECONOMIC STABILITY: HOUSING INSECURITY: EVIDENCE OF SMOKING MATERIAL: 0

## 2022-01-06 ASSESSMENT — ENCOUNTER SYMPTOMS
STOOL FREQUENCY: LESS THAN DAILY
DYSPNEA ACTIVITY LEVEL: AFTER AMBULATING LESS THAN 10 FT
BOWEL PATTERN NORMAL: 1
SHORTNESS OF BREATH: 1
PERSON REPORTING PAIN: PATIENT
DENIES PAIN: 1
LAST BOWEL MOVEMENT: 66115

## 2022-01-07 PROCEDURE — S9126 HOSPICE CARE, IN THE HOME, P: HCPCS

## 2022-01-07 RX ORDER — SPIRONOLACTONE 25 MG/1
25 TABLET ORAL DAILY
Qty: 30 TABLET | Refills: 6 | Status: SHIPPED | OUTPATIENT
Start: 2022-01-07 | End: 2022-03-25

## 2022-01-08 PROCEDURE — S9126 HOSPICE CARE, IN THE HOME, P: HCPCS

## 2022-01-09 PROCEDURE — S9126 HOSPICE CARE, IN THE HOME, P: HCPCS

## 2022-01-10 ENCOUNTER — HOME CARE VISIT (OUTPATIENT)
Dept: HOSPICE | Facility: HOSPICE | Age: 87
End: 2022-01-10
Payer: MEDICARE

## 2022-01-10 PROCEDURE — S9126 HOSPICE CARE, IN THE HOME, P: HCPCS

## 2022-01-11 PROCEDURE — S9126 HOSPICE CARE, IN THE HOME, P: HCPCS

## 2022-01-12 ENCOUNTER — ANTICOAGULATION MONITORING (OUTPATIENT)
Dept: VASCULAR LAB | Facility: MEDICAL CENTER | Age: 87
End: 2022-01-12

## 2022-01-12 DIAGNOSIS — Z79.01 CHRONIC ANTICOAGULATION: ICD-10-CM

## 2022-01-12 DIAGNOSIS — I48.0 PAF (PAROXYSMAL ATRIAL FIBRILLATION) (HCC): ICD-10-CM

## 2022-01-12 PROCEDURE — S9126 HOSPICE CARE, IN THE HOME, P: HCPCS

## 2022-01-12 NOTE — PROGRESS NOTES
Discharged from [facility].  Pt reports DC TO HOSPICE  Alysa Molina, Clinical Pharmacist, CDE, CACP

## 2022-01-13 ENCOUNTER — HOME CARE VISIT (OUTPATIENT)
Dept: HOSPICE | Facility: HOSPICE | Age: 87
End: 2022-01-13
Payer: MEDICARE

## 2022-01-13 PROCEDURE — G0299 HHS/HOSPICE OF RN EA 15 MIN: HCPCS

## 2022-01-13 PROCEDURE — S9126 HOSPICE CARE, IN THE HOME, P: HCPCS

## 2022-01-14 PROCEDURE — S9126 HOSPICE CARE, IN THE HOME, P: HCPCS

## 2022-01-15 PROCEDURE — S9126 HOSPICE CARE, IN THE HOME, P: HCPCS

## 2022-01-16 PROCEDURE — S9126 HOSPICE CARE, IN THE HOME, P: HCPCS

## 2022-01-17 PROCEDURE — S9126 HOSPICE CARE, IN THE HOME, P: HCPCS

## 2022-01-18 PROCEDURE — S9126 HOSPICE CARE, IN THE HOME, P: HCPCS

## 2022-01-19 ENCOUNTER — HOME CARE VISIT (OUTPATIENT)
Dept: HOSPICE | Facility: HOSPICE | Age: 87
End: 2022-01-19
Payer: MEDICARE

## 2022-01-19 PROCEDURE — S9126 HOSPICE CARE, IN THE HOME, P: HCPCS

## 2022-01-20 ENCOUNTER — HOME CARE VISIT (OUTPATIENT)
Dept: HOSPICE | Facility: HOSPICE | Age: 87
End: 2022-01-20
Payer: MEDICARE

## 2022-01-20 PROCEDURE — S9126 HOSPICE CARE, IN THE HOME, P: HCPCS

## 2022-01-21 PROCEDURE — S9126 HOSPICE CARE, IN THE HOME, P: HCPCS

## 2022-01-22 PROCEDURE — S9126 HOSPICE CARE, IN THE HOME, P: HCPCS

## 2022-01-23 PROCEDURE — S9126 HOSPICE CARE, IN THE HOME, P: HCPCS

## 2022-01-24 ENCOUNTER — HOME CARE VISIT (OUTPATIENT)
Dept: HOSPICE | Facility: HOSPICE | Age: 87
End: 2022-01-24
Payer: MEDICARE

## 2022-01-24 PROCEDURE — S9126 HOSPICE CARE, IN THE HOME, P: HCPCS

## 2022-01-25 PROCEDURE — S9126 HOSPICE CARE, IN THE HOME, P: HCPCS

## 2022-01-26 PROCEDURE — S9126 HOSPICE CARE, IN THE HOME, P: HCPCS

## 2022-01-27 ENCOUNTER — HOME CARE VISIT (OUTPATIENT)
Dept: HOSPICE | Facility: HOSPICE | Age: 87
End: 2022-01-27
Payer: MEDICARE

## 2022-01-27 VITALS
DIASTOLIC BLOOD PRESSURE: 70 MMHG | SYSTOLIC BLOOD PRESSURE: 110 MMHG | HEART RATE: 68 BPM | RESPIRATION RATE: 16 BRPM | BODY MASS INDEX: 27.07 KG/M2 | WEIGHT: 148 LBS

## 2022-01-27 PROCEDURE — G0299 HHS/HOSPICE OF RN EA 15 MIN: HCPCS

## 2022-01-27 PROCEDURE — S9126 HOSPICE CARE, IN THE HOME, P: HCPCS

## 2022-01-27 ASSESSMENT — ENCOUNTER SYMPTOMS
SHORTNESS OF BREATH: 1
FORGETFULNESS: 1
DYSPNEA ACTIVITY LEVEL: AFTER AMBULATING LESS THAN 10 FT
HYPERTENSION: 1
LAST BOWEL MOVEMENT: 66135
DYSPNEA ON EXERTION: 1
DENIES PAIN: 1
FATIGUES EASILY: 1
STOOL FREQUENCY: LESS THAN DAILY
BOWEL PATTERN NORMAL: 1

## 2022-01-27 ASSESSMENT — FIBROSIS 4 INDEX: FIB4 SCORE: 1.36

## 2022-01-28 PROCEDURE — S9126 HOSPICE CARE, IN THE HOME, P: HCPCS

## 2022-01-29 PROCEDURE — S9126 HOSPICE CARE, IN THE HOME, P: HCPCS

## 2022-01-30 PROCEDURE — S9126 HOSPICE CARE, IN THE HOME, P: HCPCS

## 2022-01-31 PROCEDURE — S9126 HOSPICE CARE, IN THE HOME, P: HCPCS

## 2022-02-01 PROCEDURE — S9126 HOSPICE CARE, IN THE HOME, P: HCPCS

## 2022-02-01 RX ORDER — WARFARIN SODIUM 3 MG/1
1.5 TABLET ORAL DAILY
Qty: 30 TABLET | Refills: 3 | Status: SHIPPED
Start: 2022-02-01 | End: 2022-06-13 | Stop reason: SDUPTHER

## 2022-02-02 PROCEDURE — S9126 HOSPICE CARE, IN THE HOME, P: HCPCS

## 2022-02-03 PROCEDURE — S9126 HOSPICE CARE, IN THE HOME, P: HCPCS

## 2022-02-04 PROCEDURE — S9126 HOSPICE CARE, IN THE HOME, P: HCPCS

## 2022-02-05 PROCEDURE — S9126 HOSPICE CARE, IN THE HOME, P: HCPCS

## 2022-02-06 PROCEDURE — S9126 HOSPICE CARE, IN THE HOME, P: HCPCS

## 2022-02-07 PROCEDURE — S9126 HOSPICE CARE, IN THE HOME, P: HCPCS

## 2022-02-08 PROCEDURE — S9126 HOSPICE CARE, IN THE HOME, P: HCPCS

## 2022-02-09 PROCEDURE — S9126 HOSPICE CARE, IN THE HOME, P: HCPCS

## 2022-02-10 ENCOUNTER — HOME CARE VISIT (OUTPATIENT)
Dept: HOSPICE | Facility: HOSPICE | Age: 87
End: 2022-02-10
Payer: MEDICARE

## 2022-02-10 PROCEDURE — S9126 HOSPICE CARE, IN THE HOME, P: HCPCS

## 2022-02-10 PROCEDURE — G0299 HHS/HOSPICE OF RN EA 15 MIN: HCPCS

## 2022-02-11 PROCEDURE — S9126 HOSPICE CARE, IN THE HOME, P: HCPCS

## 2022-02-12 PROCEDURE — S9126 HOSPICE CARE, IN THE HOME, P: HCPCS

## 2022-02-13 VITALS — HEART RATE: 71 BPM | DIASTOLIC BLOOD PRESSURE: 91 MMHG | RESPIRATION RATE: 24 BRPM | SYSTOLIC BLOOD PRESSURE: 120 MMHG

## 2022-02-13 PROCEDURE — S9126 HOSPICE CARE, IN THE HOME, P: HCPCS

## 2022-02-13 ASSESSMENT — ENCOUNTER SYMPTOMS
SHORTNESS OF BREATH: 1
DYSPNEA ACTIVITY LEVEL: AFTER AMBULATING LESS THAN 10 FT
BOWEL PATTERN NORMAL: 1
STOOL FREQUENCY: LESS THAN DAILY
LAST BOWEL MOVEMENT: 66150
DENIES PAIN: 1
FATIGUES EASILY: 1
DYSPNEA ON EXERTION: 1
HYPERTENSION: 1
FORGETFULNESS: 1

## 2022-02-14 PROCEDURE — S9126 HOSPICE CARE, IN THE HOME, P: HCPCS

## 2022-02-15 PROCEDURE — S9126 HOSPICE CARE, IN THE HOME, P: HCPCS

## 2022-02-16 PROCEDURE — S9126 HOSPICE CARE, IN THE HOME, P: HCPCS

## 2022-02-17 PROCEDURE — S9126 HOSPICE CARE, IN THE HOME, P: HCPCS

## 2022-02-18 PROCEDURE — S9126 HOSPICE CARE, IN THE HOME, P: HCPCS

## 2022-02-19 PROCEDURE — S9126 HOSPICE CARE, IN THE HOME, P: HCPCS

## 2022-02-20 PROCEDURE — S9126 HOSPICE CARE, IN THE HOME, P: HCPCS

## 2022-02-21 PROCEDURE — S9126 HOSPICE CARE, IN THE HOME, P: HCPCS

## 2022-02-22 ENCOUNTER — HOME CARE VISIT (OUTPATIENT)
Dept: HOSPICE | Facility: HOSPICE | Age: 87
End: 2022-02-22
Payer: MEDICARE

## 2022-02-22 VITALS — RESPIRATION RATE: 20 BRPM | HEART RATE: 90 BPM | DIASTOLIC BLOOD PRESSURE: 73 MMHG | SYSTOLIC BLOOD PRESSURE: 119 MMHG

## 2022-02-22 PROCEDURE — S9126 HOSPICE CARE, IN THE HOME, P: HCPCS

## 2022-02-22 PROCEDURE — G0299 HHS/HOSPICE OF RN EA 15 MIN: HCPCS

## 2022-02-22 ASSESSMENT — ENCOUNTER SYMPTOMS
HIGHEST PAIN SEVERITY IN PAST 24 HOURS: 2/10
BOWEL PATTERN NORMAL: 1
STOOL FREQUENCY: LESS THAN DAILY
SLEEP QUALITY: FAIR
LAST BOWEL MOVEMENT: 66162
PAIN: 1

## 2022-02-23 PROCEDURE — S9126 HOSPICE CARE, IN THE HOME, P: HCPCS

## 2022-02-24 PROCEDURE — S9126 HOSPICE CARE, IN THE HOME, P: HCPCS

## 2022-02-25 PROCEDURE — S9126 HOSPICE CARE, IN THE HOME, P: HCPCS

## 2022-02-26 PROCEDURE — S9126 HOSPICE CARE, IN THE HOME, P: HCPCS

## 2022-02-27 PROCEDURE — S9126 HOSPICE CARE, IN THE HOME, P: HCPCS

## 2022-02-28 PROCEDURE — S9126 HOSPICE CARE, IN THE HOME, P: HCPCS

## 2022-03-01 PROCEDURE — S9126 HOSPICE CARE, IN THE HOME, P: HCPCS

## 2022-03-02 PROCEDURE — S9126 HOSPICE CARE, IN THE HOME, P: HCPCS

## 2022-03-03 PROCEDURE — S9126 HOSPICE CARE, IN THE HOME, P: HCPCS

## 2022-03-04 PROCEDURE — S9126 HOSPICE CARE, IN THE HOME, P: HCPCS

## 2022-03-05 PROCEDURE — S9126 HOSPICE CARE, IN THE HOME, P: HCPCS

## 2022-03-06 PROCEDURE — S9126 HOSPICE CARE, IN THE HOME, P: HCPCS

## 2022-03-07 PROCEDURE — S9126 HOSPICE CARE, IN THE HOME, P: HCPCS

## 2022-03-08 PROCEDURE — S9126 HOSPICE CARE, IN THE HOME, P: HCPCS

## 2022-03-09 PROCEDURE — S9126 HOSPICE CARE, IN THE HOME, P: HCPCS

## 2022-03-10 ENCOUNTER — HOME CARE VISIT (OUTPATIENT)
Dept: HOSPICE | Facility: HOSPICE | Age: 87
End: 2022-03-10
Payer: MEDICARE

## 2022-03-10 PROCEDURE — G0299 HHS/HOSPICE OF RN EA 15 MIN: HCPCS

## 2022-03-10 PROCEDURE — S9126 HOSPICE CARE, IN THE HOME, P: HCPCS

## 2022-03-11 PROCEDURE — S9126 HOSPICE CARE, IN THE HOME, P: HCPCS

## 2022-03-11 RX ORDER — GLIPIZIDE 2.5 MG/1
2.5 TABLET, EXTENDED RELEASE ORAL DAILY
Qty: 90 TABLET | Refills: 2 | Status: SHIPPED | OUTPATIENT
Start: 2022-03-11 | End: 2022-07-21 | Stop reason: SDUPTHER

## 2022-03-11 RX ORDER — GLIPIZIDE 5 MG/1
2.5 TABLET, FILM COATED, EXTENDED RELEASE ORAL DAILY
COMMUNITY
End: 2022-03-11 | Stop reason: SDUPTHER

## 2022-03-12 PROCEDURE — S9126 HOSPICE CARE, IN THE HOME, P: HCPCS

## 2022-03-13 VITALS — HEART RATE: 85 BPM | SYSTOLIC BLOOD PRESSURE: 119 MMHG | DIASTOLIC BLOOD PRESSURE: 87 MMHG | RESPIRATION RATE: 22 BRPM

## 2022-03-13 PROCEDURE — S9126 HOSPICE CARE, IN THE HOME, P: HCPCS

## 2022-03-13 ASSESSMENT — ENCOUNTER SYMPTOMS
FATIGUES EASILY: 1
HYPERTENSION: 1
DYSPNEA ON EXERTION: 1
FORGETFULNESS: 1
SLEEP QUALITY: FAIR
BOWEL PATTERN NORMAL: 1
STOOL FREQUENCY: LESS THAN DAILY
LAST BOWEL MOVEMENT: 66178
DIZZINESS: 1

## 2022-03-14 PROCEDURE — S9126 HOSPICE CARE, IN THE HOME, P: HCPCS

## 2022-03-15 PROCEDURE — S9126 HOSPICE CARE, IN THE HOME, P: HCPCS

## 2022-03-16 PROCEDURE — S9126 HOSPICE CARE, IN THE HOME, P: HCPCS

## 2022-03-17 PROCEDURE — S9126 HOSPICE CARE, IN THE HOME, P: HCPCS

## 2022-03-18 PROCEDURE — S9126 HOSPICE CARE, IN THE HOME, P: HCPCS

## 2022-03-19 PROCEDURE — S9126 HOSPICE CARE, IN THE HOME, P: HCPCS

## 2022-03-20 PROCEDURE — S9126 HOSPICE CARE, IN THE HOME, P: HCPCS

## 2022-03-21 PROCEDURE — S9126 HOSPICE CARE, IN THE HOME, P: HCPCS

## 2022-03-22 PROCEDURE — S9126 HOSPICE CARE, IN THE HOME, P: HCPCS

## 2022-03-23 PROCEDURE — S9126 HOSPICE CARE, IN THE HOME, P: HCPCS

## 2022-03-24 ENCOUNTER — HOME CARE VISIT (OUTPATIENT)
Dept: HOSPICE | Facility: HOSPICE | Age: 87
End: 2022-03-24
Payer: MEDICARE

## 2022-03-24 VITALS — RESPIRATION RATE: 22 BRPM | DIASTOLIC BLOOD PRESSURE: 71 MMHG | SYSTOLIC BLOOD PRESSURE: 102 MMHG | HEART RATE: 88 BPM

## 2022-03-24 PROCEDURE — G0299 HHS/HOSPICE OF RN EA 15 MIN: HCPCS

## 2022-03-24 PROCEDURE — S9126 HOSPICE CARE, IN THE HOME, P: HCPCS

## 2022-03-24 ASSESSMENT — ENCOUNTER SYMPTOMS
DENIES PAIN: 1
LAST BOWEL MOVEMENT: 66178
BOWEL PATTERN NORMAL: 1
DYSPNEA ACTIVITY LEVEL: AT REST
SHORTNESS OF BREATH: 1
COUGH: 1
STOOL FREQUENCY: LESS THAN DAILY
SLEEP QUALITY: FAIR

## 2022-03-24 ASSESSMENT — SOCIAL DETERMINANTS OF HEALTH (SDOH): ACTIVE STRESSOR - HEALTH CHANGES: 1

## 2022-03-25 PROCEDURE — S9126 HOSPICE CARE, IN THE HOME, P: HCPCS

## 2022-03-26 PROCEDURE — S9126 HOSPICE CARE, IN THE HOME, P: HCPCS

## 2022-03-27 PROCEDURE — S9126 HOSPICE CARE, IN THE HOME, P: HCPCS

## 2022-03-28 ENCOUNTER — PATIENT MESSAGE (OUTPATIENT)
Dept: HEALTH INFORMATION MANAGEMENT | Facility: OTHER | Age: 87
End: 2022-03-28

## 2022-03-28 PROCEDURE — S9126 HOSPICE CARE, IN THE HOME, P: HCPCS

## 2022-03-29 PROCEDURE — S9126 HOSPICE CARE, IN THE HOME, P: HCPCS

## 2022-03-30 PROCEDURE — S9126 HOSPICE CARE, IN THE HOME, P: HCPCS

## 2022-03-31 PROCEDURE — S9126 HOSPICE CARE, IN THE HOME, P: HCPCS

## 2022-04-01 PROCEDURE — S9126 HOSPICE CARE, IN THE HOME, P: HCPCS

## 2022-04-02 PROCEDURE — S9126 HOSPICE CARE, IN THE HOME, P: HCPCS

## 2022-04-03 PROCEDURE — S9126 HOSPICE CARE, IN THE HOME, P: HCPCS

## 2022-04-04 PROCEDURE — S9126 HOSPICE CARE, IN THE HOME, P: HCPCS

## 2022-04-05 PROCEDURE — S9126 HOSPICE CARE, IN THE HOME, P: HCPCS

## 2022-04-06 PROCEDURE — S9126 HOSPICE CARE, IN THE HOME, P: HCPCS

## 2022-04-07 ENCOUNTER — HOME CARE VISIT (OUTPATIENT)
Dept: HOSPICE | Facility: HOSPICE | Age: 87
End: 2022-04-07
Payer: MEDICARE

## 2022-04-07 PROCEDURE — S9126 HOSPICE CARE, IN THE HOME, P: HCPCS

## 2022-04-07 PROCEDURE — G0299 HHS/HOSPICE OF RN EA 15 MIN: HCPCS

## 2022-04-08 PROCEDURE — S9126 HOSPICE CARE, IN THE HOME, P: HCPCS

## 2022-04-09 PROCEDURE — S9126 HOSPICE CARE, IN THE HOME, P: HCPCS

## 2022-04-10 VITALS — DIASTOLIC BLOOD PRESSURE: 70 MMHG | RESPIRATION RATE: 20 BRPM | SYSTOLIC BLOOD PRESSURE: 110 MMHG | HEART RATE: 97 BPM

## 2022-04-10 PROCEDURE — S9126 HOSPICE CARE, IN THE HOME, P: HCPCS

## 2022-04-10 ASSESSMENT — ENCOUNTER SYMPTOMS
DYSPNEA ON EXERTION: 1
PAIN: 1
COUGH: 1
SHORTNESS OF BREATH: 1
DIZZINESS: 1
COUGH CHARACTERISTICS: NON-PRODUCTIVE
DYSPNEA ACTIVITY LEVEL: AT REST
SLEEP QUALITY: ADEQUATE
FATIGUES EASILY: 1

## 2022-04-11 PROCEDURE — S9126 HOSPICE CARE, IN THE HOME, P: HCPCS

## 2022-04-12 PROCEDURE — S9126 HOSPICE CARE, IN THE HOME, P: HCPCS

## 2022-04-13 PROCEDURE — S9126 HOSPICE CARE, IN THE HOME, P: HCPCS

## 2022-04-14 PROCEDURE — S9126 HOSPICE CARE, IN THE HOME, P: HCPCS

## 2022-04-15 PROCEDURE — S9126 HOSPICE CARE, IN THE HOME, P: HCPCS

## 2022-04-16 PROCEDURE — S9126 HOSPICE CARE, IN THE HOME, P: HCPCS

## 2022-04-17 PROCEDURE — S9126 HOSPICE CARE, IN THE HOME, P: HCPCS

## 2022-04-18 PROCEDURE — S9126 HOSPICE CARE, IN THE HOME, P: HCPCS

## 2022-04-19 PROCEDURE — S9126 HOSPICE CARE, IN THE HOME, P: HCPCS

## 2022-04-20 ENCOUNTER — HOME CARE VISIT (OUTPATIENT)
Dept: HOSPICE | Facility: HOSPICE | Age: 87
End: 2022-04-20
Payer: MEDICARE

## 2022-04-20 VITALS — SYSTOLIC BLOOD PRESSURE: 134 MMHG | RESPIRATION RATE: 18 BRPM | DIASTOLIC BLOOD PRESSURE: 84 MMHG | HEART RATE: 80 BPM

## 2022-04-20 PROCEDURE — S9126 HOSPICE CARE, IN THE HOME, P: HCPCS

## 2022-04-20 PROCEDURE — G0299 HHS/HOSPICE OF RN EA 15 MIN: HCPCS

## 2022-04-20 ASSESSMENT — ENCOUNTER SYMPTOMS
PERSON REPORTING PAIN: PATIENT
DYSPNEA ACTIVITY LEVEL: AT REST
DENIES PAIN: 1
SHORTNESS OF BREATH: 1
DIZZINESS: 1
DYSPNEA ON EXERTION: 1
BOWEL PATTERN NORMAL: 1
FATIGUES EASILY: 1
STOOL FREQUENCY: LESS THAN DAILY
SLEEP QUALITY: ADEQUATE
LAST BOWEL MOVEMENT: 66219
FORGETFULNESS: 1

## 2022-04-20 ASSESSMENT — PAIN SCALES - PAIN ASSESSMENT IN ADVANCED DEMENTIA (PAINAD)
TOTALSCORE: 0
FACIALEXPRESSION: 0 - SMILING OR INEXPRESSIVE.
NEGVOCALIZATION: 0 - NONE.
BODYLANGUAGE: 0 - RELAXED.
CONSOLABILITY: 0 - NO NEED TO CONSOLE.

## 2022-04-21 PROCEDURE — S9126 HOSPICE CARE, IN THE HOME, P: HCPCS

## 2022-04-22 PROCEDURE — S9126 HOSPICE CARE, IN THE HOME, P: HCPCS

## 2022-04-23 PROCEDURE — S9126 HOSPICE CARE, IN THE HOME, P: HCPCS

## 2022-04-24 PROCEDURE — S9126 HOSPICE CARE, IN THE HOME, P: HCPCS

## 2022-04-25 PROCEDURE — S9126 HOSPICE CARE, IN THE HOME, P: HCPCS

## 2022-04-26 PROCEDURE — S9126 HOSPICE CARE, IN THE HOME, P: HCPCS

## 2022-04-27 PROCEDURE — S9126 HOSPICE CARE, IN THE HOME, P: HCPCS

## 2022-04-28 PROCEDURE — S9126 HOSPICE CARE, IN THE HOME, P: HCPCS

## 2022-04-29 PROCEDURE — S9126 HOSPICE CARE, IN THE HOME, P: HCPCS

## 2022-04-29 SDOH — ECONOMIC STABILITY: HOUSING INSECURITY: EVIDENCE OF SMOKING MATERIAL: 0

## 2022-04-29 ASSESSMENT — ENCOUNTER SYMPTOMS
HYPERTENSION: 1
CHEST PAIN QUALITY: RADIATING
DIZZINESS: 1
LAST BOWEL MOVEMENT: 66122
SHORTNESS OF BREATH: 1
BOWEL PATTERN NORMAL: 1
FATIGUES EASILY: 1
DYSPNEA ON EXERTION: 1
STOOL FREQUENCY: LESS THAN DAILY
DYSPNEA ACTIVITY LEVEL: AT REST

## 2022-04-30 PROCEDURE — S9126 HOSPICE CARE, IN THE HOME, P: HCPCS

## 2022-05-01 PROCEDURE — S9126 HOSPICE CARE, IN THE HOME, P: HCPCS

## 2022-05-01 ASSESSMENT — ENCOUNTER SYMPTOMS: DENIES PAIN: 1

## 2022-05-02 PROCEDURE — S9126 HOSPICE CARE, IN THE HOME, P: HCPCS

## 2022-05-02 NOTE — TELEPHONE ENCOUNTER
Received request via: Pharmacy    Was the patient seen in the last year in this department? Yes    Does the patient have an active prescription (recently filled or refills available) for medication(s) requested? No     Called pt to confirm that she would like prescription sent to [facility], pt confirmed yes

## 2022-05-03 PROCEDURE — S9126 HOSPICE CARE, IN THE HOME, P: HCPCS

## 2022-05-03 RX ORDER — LEVOTHYROXINE SODIUM 0.05 MG/1
TABLET ORAL
Qty: 100 TABLET | Refills: 1 | Status: SHIPPED | OUTPATIENT
Start: 2022-05-03 | End: 2022-11-08 | Stop reason: SDUPTHER

## 2022-05-04 PROCEDURE — S9126 HOSPICE CARE, IN THE HOME, P: HCPCS

## 2022-05-05 ENCOUNTER — HOME CARE VISIT (OUTPATIENT)
Dept: HOSPICE | Facility: HOSPICE | Age: 87
End: 2022-05-05
Payer: MEDICARE

## 2022-05-05 VITALS — SYSTOLIC BLOOD PRESSURE: 92 MMHG | RESPIRATION RATE: 22 BRPM | HEART RATE: 85 BPM | DIASTOLIC BLOOD PRESSURE: 61 MMHG

## 2022-05-05 DIAGNOSIS — R52 PAIN: ICD-10-CM

## 2022-05-05 DIAGNOSIS — R06.00 DYSPNEA, UNSPECIFIED TYPE: ICD-10-CM

## 2022-05-05 PROCEDURE — S9126 HOSPICE CARE, IN THE HOME, P: HCPCS

## 2022-05-05 PROCEDURE — G0299 HHS/HOSPICE OF RN EA 15 MIN: HCPCS

## 2022-05-05 RX ORDER — MORPHINE SULFATE 100 MG/5ML
5 SOLUTION ORAL
Qty: 120 ML | Refills: 0 | Status: SHIPPED | OUTPATIENT
Start: 2022-05-05 | End: 2023-05-05

## 2022-05-05 ASSESSMENT — ENCOUNTER SYMPTOMS
COUGH: 1
LAST BOWEL MOVEMENT: 66234
DYSPNEA ACTIVITY LEVEL: AT REST
FORGETFULNESS: 1
SHORTNESS OF BREATH: 1
BOWEL PATTERN NORMAL: 1
DENIES PAIN: 1
STOOL FREQUENCY: LESS THAN DAILY
SLEEP QUALITY: ADEQUATE
CHEST TIGHTNESS: 1

## 2022-05-06 PROCEDURE — S9126 HOSPICE CARE, IN THE HOME, P: HCPCS

## 2022-05-07 PROCEDURE — S9126 HOSPICE CARE, IN THE HOME, P: HCPCS

## 2022-05-08 PROCEDURE — S9126 HOSPICE CARE, IN THE HOME, P: HCPCS

## 2022-05-09 PROCEDURE — S9126 HOSPICE CARE, IN THE HOME, P: HCPCS

## 2022-05-10 PROCEDURE — S9126 HOSPICE CARE, IN THE HOME, P: HCPCS

## 2022-05-11 PROCEDURE — S9126 HOSPICE CARE, IN THE HOME, P: HCPCS

## 2022-05-12 PROCEDURE — S9126 HOSPICE CARE, IN THE HOME, P: HCPCS

## 2022-05-13 PROCEDURE — S9126 HOSPICE CARE, IN THE HOME, P: HCPCS

## 2022-05-14 PROCEDURE — S9126 HOSPICE CARE, IN THE HOME, P: HCPCS

## 2022-05-15 PROCEDURE — S9126 HOSPICE CARE, IN THE HOME, P: HCPCS

## 2022-05-16 PROCEDURE — S9126 HOSPICE CARE, IN THE HOME, P: HCPCS

## 2022-05-17 PROCEDURE — S9126 HOSPICE CARE, IN THE HOME, P: HCPCS

## 2022-05-18 PROCEDURE — S9126 HOSPICE CARE, IN THE HOME, P: HCPCS

## 2022-05-19 ENCOUNTER — HOME CARE VISIT (OUTPATIENT)
Dept: HOSPICE | Facility: HOSPICE | Age: 87
End: 2022-05-19
Payer: MEDICARE

## 2022-05-19 PROCEDURE — S9126 HOSPICE CARE, IN THE HOME, P: HCPCS

## 2022-05-19 PROCEDURE — G0299 HHS/HOSPICE OF RN EA 15 MIN: HCPCS

## 2022-05-19 ASSESSMENT — ENCOUNTER SYMPTOMS: SLEEP QUALITY: ADEQUATE

## 2022-05-20 PROCEDURE — S9126 HOSPICE CARE, IN THE HOME, P: HCPCS

## 2022-05-21 ENCOUNTER — HOME CARE VISIT (OUTPATIENT)
Dept: HOSPICE | Facility: HOSPICE | Age: 87
End: 2022-05-21
Payer: MEDICARE

## 2022-05-21 PROCEDURE — S9126 HOSPICE CARE, IN THE HOME, P: HCPCS

## 2022-05-21 PROCEDURE — G0299 HHS/HOSPICE OF RN EA 15 MIN: HCPCS

## 2022-05-22 PROCEDURE — S9126 HOSPICE CARE, IN THE HOME, P: HCPCS

## 2022-05-23 PROCEDURE — S9126 HOSPICE CARE, IN THE HOME, P: HCPCS

## 2022-05-25 ENCOUNTER — PHARMACY VISIT (OUTPATIENT)
Dept: PHARMACY | Facility: MEDICAL CENTER | Age: 87
End: 2022-05-25
Payer: COMMERCIAL

## 2022-05-25 PROCEDURE — RXMED WILLOW AMBULATORY MEDICATION CHARGE: Performed by: REHABILITATION PRACTITIONER

## 2022-06-03 RX ORDER — OMEPRAZOLE 20 MG/1
20 CAPSULE, DELAYED RELEASE ORAL 2 TIMES DAILY
Qty: 60 CAPSULE | Refills: 1 | Status: SHIPPED | OUTPATIENT
Start: 2022-06-03 | End: 2022-08-16 | Stop reason: SDUPTHER

## 2022-06-03 RX ORDER — AMLODIPINE BESYLATE 5 MG/1
5 TABLET ORAL DAILY
Qty: 30 TABLET | Refills: 1 | Status: SHIPPED | OUTPATIENT
Start: 2022-06-03 | End: 2022-07-28 | Stop reason: SDUPTHER

## 2022-06-03 RX ORDER — AMLODIPINE BESYLATE 2.5 MG/1
2.5 TABLET ORAL DAILY
Qty: 30 TABLET | Refills: 1 | Status: SHIPPED | OUTPATIENT
Start: 2022-06-03 | End: 2022-07-21 | Stop reason: SDUPTHER

## 2022-06-03 RX ORDER — GABAPENTIN 100 MG/1
100 CAPSULE ORAL 2 TIMES DAILY
Qty: 90 CAPSULE | Refills: 1 | Status: SHIPPED | OUTPATIENT
Start: 2022-06-03 | End: 2022-07-21 | Stop reason: SDUPTHER

## 2022-06-03 RX ORDER — CARVEDILOL 12.5 MG/1
12.5 TABLET ORAL 2 TIMES DAILY
Qty: 60 TABLET | Refills: 1 | Status: SHIPPED | OUTPATIENT
Start: 2022-06-03 | End: 2022-09-21 | Stop reason: SDUPTHER

## 2022-06-08 ENCOUNTER — TELEPHONE (OUTPATIENT)
Dept: MEDICAL GROUP | Facility: MEDICAL CENTER | Age: 87
End: 2022-06-08
Payer: MEDICARE

## 2022-06-08 NOTE — TELEPHONE ENCOUNTER
ESTABLISHED PATIENT PRE-VISIT PLANNING     Annual Wellness Visit Over Due      Patient was NOT contacted to complete PVP.     Note: Patient will not be contacted if there is no indication to call.     1.  Reviewed notes from the last few office visits within the medical group: Yes    2.  If any orders were placed at last visit or intended to be done for this visit (i.e. 6 mos follow-up), do we have Results/Consult Notes?         •  Labs - Labs were not ordered at last office visit.  Last office visit with  was on 12/09/2021.   Note: If patient appointment is for lab review and patient did not complete labs, check with provider if OK to reschedule patient until labs completed.       •  Imaging - Imaging was not ordered at last office visit.          •  Referrals - DME: Referral Status Authorized    3. Is this appointment scheduled as a Hospital Follow-Up? No    4.  Immunizations were updated in Epic using Reconcile Outside Information activity? Yes    5.  Patient is due for the following Health Maintenance Topics:   Health Maintenance Due   Topic Date Due   • IMM DTaP/Tdap/Td Vaccine (1 - Tdap) Never done   • COLORECTAL CANCER SCREENING  Never done   • DIABETES MONOFILAMENT / LE EXAM  12/12/2020   • Annual Wellness Visit  02/20/2021   • COVID-19 Vaccine (3 - Booster for Moderna series) 07/25/2021   • MAMMOGRAM  02/24/2022   • A1C SCREENING  05/30/2022       6.  AHA (Pulse8) form printed for Provider? Yes

## 2022-06-09 ENCOUNTER — OFFICE VISIT (OUTPATIENT)
Dept: MEDICAL GROUP | Facility: MEDICAL CENTER | Age: 87
End: 2022-06-09
Payer: MEDICARE

## 2022-06-09 VITALS
TEMPERATURE: 97.9 F | OXYGEN SATURATION: 96 % | WEIGHT: 151 LBS | HEART RATE: 88 BPM | DIASTOLIC BLOOD PRESSURE: 60 MMHG | BODY MASS INDEX: 27.79 KG/M2 | SYSTOLIC BLOOD PRESSURE: 110 MMHG | RESPIRATION RATE: 16 BRPM | HEIGHT: 62 IN

## 2022-06-09 DIAGNOSIS — E66.9 DIABETES MELLITUS TYPE 2 IN OBESE: ICD-10-CM

## 2022-06-09 DIAGNOSIS — I48.0 PAF (PAROXYSMAL ATRIAL FIBRILLATION) (HCC): ICD-10-CM

## 2022-06-09 DIAGNOSIS — J96.11 CHRONIC RESPIRATORY FAILURE WITH HYPOXIA (HCC): ICD-10-CM

## 2022-06-09 DIAGNOSIS — D68.69 SECONDARY HYPERCOAGULABLE STATE (HCC): ICD-10-CM

## 2022-06-09 DIAGNOSIS — I70.0 ATHEROSCLEROSIS OF AORTA (HCC): ICD-10-CM

## 2022-06-09 DIAGNOSIS — E11.69 DIABETES MELLITUS TYPE 2 IN OBESE: ICD-10-CM

## 2022-06-09 LAB
HBA1C MFR BLD: 7 % (ref 0–5.6)
INT CON NEG: NEGATIVE
INT CON POS: POSITIVE

## 2022-06-09 PROCEDURE — 83036 HEMOGLOBIN GLYCOSYLATED A1C: CPT | Performed by: FAMILY MEDICINE

## 2022-06-09 PROCEDURE — 99214 OFFICE O/P EST MOD 30 MIN: CPT | Performed by: FAMILY MEDICINE

## 2022-06-09 ASSESSMENT — FIBROSIS 4 INDEX: FIB4 SCORE: 1.36

## 2022-06-09 ASSESSMENT — PATIENT HEALTH QUESTIONNAIRE - PHQ9: CLINICAL INTERPRETATION OF PHQ2 SCORE: 0

## 2022-06-09 NOTE — PROGRESS NOTES
CC: Diabetes, A. fib, hypercoagulable state, chronic respiratory failure, atherosclerosis of aorta    HPI:   Shereen presents today to discuss the following:    Diabetes mellitus type 2 in obese ([facility])  Most recent A1c was 7.0.  Denies polyuria, polydipsia, or hypoglycemic episodes.  Patient has been on metformin 1500 mg daily, and glipizide SR 2.5 mg daily.    PAF (paroxysmal atrial fibrillation) ([facility])/ Secondary hypercoagulable state ([facility])  Denies palpitation, chest pain, or shortness of breath.  Patient has been on Coumadin 3 mg every other day.  She has not been following up with Coumadin clinic because she was on hospice for her aortic stenosis.  Patient's INR has been 1.0.  Now she is out of hospice so I advised to follow-up with Coumadin clinic for more evaluation and adjustment of medication    Chronic respiratory failure with hypoxia ([facility])  Patient with history of sleep apnea, oxygen saturation was low 88% at night.  Could not tolerate CPAP.  She  has been on oxygen many might, at 2.5 to 3 L/min .  Prescription refilled    Atherosclerosis of aorta  At previous CT showed patient has mild atherosclerosis of the thoracic aorta.  However patient has been asymptomatic.      Patient Active Problem List    Diagnosis Date Noted   • Secondary hypercoagulable state ([facility]) 06/09/2022   • Acquired hypothyroidism 09/12/2019   • Diabetes mellitus type 2 in obese ([facility]) 09/12/2019   • Skin abrasion 08/20/2019   • Chronic anticoagulation 08/02/2019   • Hyponatremia 03/25/2019   • Epistaxis 03/25/2019   • Advanced directives, counseling/discussion 09/26/2018   • Hypertensive urgency 05/11/2017   • Diastolic dysfunction 02/02/2016   • Tear of lateral cartilage or meniscus of knee, current 05/21/2015   • PAF (paroxysmal atrial fibrillation) ([facility]) 01/12/2015   • Dyspnea on effort 01/02/2014   • Diabetes ([facility]) 12/26/2013   • Aortic stenosis 07/02/2013   • HTN (hypertension) 05/04/2012   • Hemorrhagic disorder due to extrinsic  circulating anticoagulants (HCC) 05/02/2012   • Cough 05/02/2012   • Edema 05/02/2012   • Acute, but ill-defined, cerebrovascular disease 04/30/2012       Current Outpatient Medications   Medication Sig Dispense Refill   • Misc. Devices Misc Oxygen concentrator with humidifier, 2.5-3 L/min 1 Each 0   • carvedilol (COREG) 12.5 MG Tab Take 1 Tablet by mouth 2 times a day. 60 Tablet 1   • gabapentin (NEURONTIN) 100 MG Cap Take 1 Capsule by mouth 2 times a day. Take 1 capsule PO Daily in the AM, Take 2 capsules daily in the PM 90 Capsule 1   • omeprazole (PRILOSEC) 20 MG delayed-release capsule Take 1 Capsule by mouth 2 times a day. 60 Capsule 1   • spironolactone (ALDACTONE) 25 MG Tab Take 25 mg by mouth every day.     • morphine (ROXANOL) 20 MG/ML Solution Take 0.25 mL by mouth every 2 hours as needed (moderate to severe pain or shortness of breath). 120 mL 0   • levothyroxine (SYNTHROID) 50 MCG Tab TAKE ONE TABLET BY MOUTH EVERY MORNING FOR UNDERACTIVE THYROID 100 Tablet 1   • glipiZIDE SR (GLUCOTROL) 2.5 MG TABLET SR 24 HR Take 1 Tablet by mouth every day. 90 Tablet 2   • warfarin (COUMADIN) 3 MG Tab Take 0.5 Tablets by mouth every day. taking every other day.   Indications: Aortic Stenosis 30 Tablet 3   • dorzolamide-timolol (COSOPT) 22.3-6.8 MG/ML Solution Administer 1 Drop into both eyes 2 times a day. Indications: Wide-Angle Glaucoma     • metFORMIN (GLUCOPHAGE) 500 MG Tab Take 500 mg by mouth 3 times a day.     • metaxalone (SKELAXIN) 800 MG Tab Take 800 mg by mouth 2 times a day. 1/2 tab 400 mg 2 x day.  Indications: Musculoskeletal Pain     • Sennosides-Docusate Sodium (SENNA PLUS) 8.6-50 MG Cap Take 2 tablet by mouth every day. Take every evening.  Hold for loose stools  Indications: Constipation     • amLODIPine (NORVASC) 5 MG Tab Take 1 Tablet by mouth every day for 30 days. Take 5mg PO daily in AM. (Patient not taking: Reported on 6/9/2022) 30 Tablet 1   • amLODIPine (NORVASC) 2.5 MG Tab Take 1 Tablet by  "mouth every day for 60 days. 30 Tablet 1   • senna-docusate (PERICOLACE OR SENOKOT S) 8.6-50 MG Tab Take 2 Tablets by mouth every day for constipation. Hold for loose stools 60 Tablet 1   • carvedilol (COREG) 12.5 MG Tab Take 1 Tablet by mouth 2 times a day for BP 60 Tablet 1   • nystatin (MYCOSTATIN) powder Apply topically 2 times a day for irritation 30 g 3   • calamine Lotion Apply 1 Application topically as needed. Dose: 1 Application / Route: Topical / Freq: PRN / Admin Inst: Apply generous amount to bilateral groin areas PRN itching. Please remove dried calamine residue with water and a clean cloth and pat dry prior to applying nystatin powder to groin areas.     • Blood Glucose Monitoring Suppl (FREESTYLE LITE) Device USE TWO TIMES A DAY AS DIRECTED FOR BLOOD SUGAR MONITORING 100 Each 3   • Home Care Oxygen Inhale 2-5 L/min as needed for Shortness of Breath (sleep apnea). 2-5 LPM as needed via nasal cannula  Indications: Shortness of breath, sleep apnea     • haloperidol (HALDOL) 2 MG/ML Conc Take 0.5 mg by mouth every 6 hours as needed (Anxiety, agitation).       No current facility-administered medications for this visit.         Allergies as of 06/09/2022 - Reviewed 06/09/2022   Allergen Reaction Noted   • Darvon [propoxyphene hcl]  03/18/2008   • Iodine  05/11/2015   • Latex  09/19/2013   • Penicillins Anaphylaxis 08/02/2008   • Propoxyphene hcl Anaphylaxis 08/02/2008   • Tetanus antitoxin Myalgia 11/19/2017   • Tetracycline Anaphylaxis 08/02/2008        ROS: Denies any chest pain, Shortness of breath, Changes bowel or bladder, Lower extremity edema.    Physical Exam:  /60 (BP Location: Right arm, Patient Position: Sitting, BP Cuff Size: Adult)   Pulse 88   Temp 36.6 °C (97.9 °F) (Temporal)   Resp 16   Ht 1.575 m (5' 2\")   Wt 68.5 kg (151 lb)   LMP 01/01/1985   SpO2 96%   BMI 27.62 kg/m²   Gen.: Well-developed, well-nourished, no apparent distress,pleasant and cooperative with the " examination  Skin:  Warm and dry with good turgor. No rashes or suspicious lesions in visible areas  HEENT:Sinuses nontender with palpation, TMs clear, nares patent with pink mucosa and clear rhinorrhea,no septal deviation ,polyps or lesions. lips without lesions, oropharynx clear.  Neck: Trachea midline,no masses or adenopathy. No JVD.  Cor: Regular rate and rhythm with systolic murmur, no gallop or rub.  Lungs: Respirations unlabored.Clear to auscultation with equal breath sounds bilaterally. No wheezes, rhonchi.  Extremities: No cyanosis, clubbing or edema.          Assessment and Plan.   86 y.o. female     1. Diabetes mellitus type 2 in obese ([facility])  Most recent A1c was 7.0.  Continue on metformin 1500 mg daily, and glipizide SR 2.5 mg daily.    - POCT Hemoglobin A1C    2. PAF (paroxysmal atrial fibrillation) ([facility])  Stable.  No RVR.  Continue Coumadin.  Patient has been taking 3 mg every other day, has not been following up with Coumadin clinic because she was on hospice for her aortic stenosis.  Patient advised to follow-up with Coumadin clinic for more evaluation and adjustment of medication  Referral placed  - Referral to Anticoagulation Monitoring    3. Secondary hypercoagulable state (HCC)  Due to A. fib.  Asymptomatic.  Continue Coumadin  Follow-up with Coumadin clinic    4. Chronic respiratory failure with hypoxia (HCC)  Patient has been on oxygen many might, at 2.5 to 3 L/min .  Prescription refilled    - Misc. Devices Misc; Oxygen concentrator with humidifier, 2.5-3 L/min  Dispense: 1 Each; Refill: 0    5. Atherosclerosis of aorta  At previous CT showed patient has mild atherosclerosis of the thoracic aorta.  However patient has been asymptomatic.

## 2022-06-10 ENCOUNTER — TELEPHONE (OUTPATIENT)
Dept: VASCULAR LAB | Facility: MEDICAL CENTER | Age: 87
End: 2022-06-10
Payer: MEDICARE

## 2022-06-10 NOTE — TELEPHONE ENCOUNTER
[facility]    Received anticoag referral for warfarin due to AFib from Dr. Mckeon on 6/9/22    Pt previously followed w/ our services. Was DC'd d/t hospice. Therapy re-initiated so she is reestablishing w/ our clinic.    Scheduled for 9/13 @ 1pm.    Insurance: [facility]/SC[facility]  [facility]  Locations to be seen: Any    [facility] at [phone], fax [phone]    Chris Lpoes, AlexisD, BCACP

## 2022-06-13 ENCOUNTER — ANTICOAGULATION VISIT (OUTPATIENT)
Dept: MEDICAL GROUP | Facility: PHYSICIAN GROUP | Age: 87
End: 2022-06-13
Payer: MEDICARE

## 2022-06-13 VITALS — DIASTOLIC BLOOD PRESSURE: 82 MMHG | HEART RATE: 80 BPM | SYSTOLIC BLOOD PRESSURE: 118 MMHG

## 2022-06-13 DIAGNOSIS — I48.0 PAF (PAROXYSMAL ATRIAL FIBRILLATION) (HCC): ICD-10-CM

## 2022-06-13 DIAGNOSIS — Z79.01 CHRONIC ANTICOAGULATION: ICD-10-CM

## 2022-06-13 LAB — INR PPP: 1 (ref 2–3.5)

## 2022-06-13 PROCEDURE — 85610 PROTHROMBIN TIME: CPT | Performed by: INTERNAL MEDICINE

## 2022-06-13 PROCEDURE — 99211 OFF/OP EST MAY X REQ PHY/QHP: CPT | Performed by: INTERNAL MEDICINE

## 2022-06-13 RX ORDER — WARFARIN SODIUM 3 MG/1
1.5 TABLET ORAL DAILY
Qty: 45 TABLET | Refills: 1 | Status: SHIPPED | OUTPATIENT
Start: 2022-06-13 | End: 2022-08-08 | Stop reason: SDUPTHER

## 2022-06-13 NOTE — PROGRESS NOTES
Anticoagulation Summary  As of 2022    INR goal:  2.0-3.0   TTR:  --   INR used for dosin.00 (2022)   Warfarin maintenance plan:  1.5 mg (3 mg x 0.5) every day   Weekly warfarin total:  10.5 mg   Plan last modified:  Yahaira Johnson, PharmD (2022)   Next INR check:  2022   Target end date:      Indications    Aortic stenosis [I35.0]  PAF (paroxysmal atrial fibrillation) (HCC) [I48.0]  Chronic anticoagulation [Z79.01]             Anticoagulation Episode Summary     INR check location:      Preferred lab:      Send INR reminders to:      Comments:        Anticoagulation Care Providers     Provider Role Specialty Phone number    [facility]   [phone]        Anticoagulation Patient Findings  Patient Findings     Positives:  Change in health (discharged from hospice), Missed doses (Has been taking 1.5 mg every other day d/t frequent epistaxis)    Negatives:  Signs/symptoms of thrombosis, Signs/symptoms of bleeding, Laboratory test error suspected, Change in alcohol use, Change in activity, Upcoming invasive procedure, Emergency department visit, Upcoming dental procedure, Extra doses, Change in medications, Change in diet/appetite, Hospital admission, Bruising, Other complaints              HPI:   Shereen Dale seen in clinic today, on anticoagulation therapy with warfarin for stroke prophylaxis due to history of AFib    Patient's previous INR was therapeutic at 3.3 on 2021, at which time patient was instructed to reduce weekly regimen.  She returns to clinic today to recheck INR to ensure it is therapeutic and thus preventing possible clotting and/or bleeding/bruising complications.        Does patient have any changes to current medical/health status since last appt (Y/N):  Yes see above  Does patient have any signs/symptoms of bleeding and/or thrombosis since the last appt (Y/N):  no  Does patient have any interval changes to diet or medications since last appt  (Y/N):  no  Are there any complications or cost restrictions with current therapy (Y/N):  no     Does patient have [facility] PCP?  Yes Ganesh Mckeon M.D.    Vitals:      Vitals:    06/13/22 1314   BP: 118/82   Pulse: 80        Asssessment:      INR subtherapeutic at 1.0, therefore pt at increased risk for stroke   Reason(s) for out of range INR today:  Dose too low      Pt is not on antiplatelet therapy    Medication reconciliation completed today.    Plan:  Increase weekly regimen to 1.5 mg daily     Follow up:  Because warfarin is a high risk medication and current CHEST guidelines recommend regular monitoring intervals (few days up to 12 weeks), will have patient return to clinic in 1.5 weeks to recheck INR.    Yahaira Johnson, PharmD

## 2022-06-24 ENCOUNTER — ANTICOAGULATION VISIT (OUTPATIENT)
Dept: MEDICAL GROUP | Facility: PHYSICIAN GROUP | Age: 87
End: 2022-06-24
Payer: MEDICARE

## 2022-06-24 DIAGNOSIS — I48.0 PAF (PAROXYSMAL ATRIAL FIBRILLATION) (HCC): ICD-10-CM

## 2022-06-24 DIAGNOSIS — Z79.01 CHRONIC ANTICOAGULATION: Primary | ICD-10-CM

## 2022-06-24 LAB — INR PPP: 1.5 (ref 2–3.5)

## 2022-06-24 PROCEDURE — 85610 PROTHROMBIN TIME: CPT | Performed by: FAMILY MEDICINE

## 2022-06-24 PROCEDURE — 99211 OFF/OP EST MAY X REQ PHY/QHP: CPT | Performed by: FAMILY MEDICINE

## 2022-06-24 NOTE — PROGRESS NOTES
Anticoagulation Summary  As of 2022    INR goal:  2.0-3.0   TTR:  0.0 % (1 d)   INR used for dosin.50 (2022)   Warfarin maintenance plan:  1.5 mg (3 mg x 0.5) every day   Weekly warfarin total:  10.5 mg   Plan last modified:  Yahaira Johnson, PharmD (2022)   Next INR check:  2022   Target end date:      Indications    Aortic stenosis [I35.0]  PAF (paroxysmal atrial fibrillation) (HCC) [I48.0]  Chronic anticoagulation [Z79.01]             Anticoagulation Episode Summary     INR check location:      Preferred lab:      Send INR reminders to:      Comments:        Anticoagulation Care Providers     Provider Role Specialty Phone number    [facility]   [phone]        Anticoagulation Patient Findings  Patient Findings     Negatives:  Signs/symptoms of thrombosis, Signs/symptoms of bleeding, Laboratory test error suspected, Change in health, Change in alcohol use, Change in activity, Upcoming invasive procedure, Emergency department visit, Upcoming dental procedure, Missed doses, Extra doses, Change in medications, Change in diet/appetite, Hospital admission, Bruising, Other complaints              HPI:   Shereen Dale seen in clinic today, on anticoagulation therapy with warfarin for stroke prophylaxis due to history of AFib    Patient's previous INR was SUB-therapeutic at 1.00 on , at which time patient was instructed to adhere to current warfarin regimen.  She returns to clinic today to recheck INR to ensure it is therapeutic and thus preventing possible clotting and/or bleeding/bruising complications.    CHADS-VASc = 5  (unadjusted ischemic stroke risk/year:  7.2%, which is moderate risk)    Does patient have any changes to current medical/health status since last appt (Y/N):  no  Does patient have any signs/symptoms of bleeding and/or thrombosis since the last appt (Y/N):  no  Does patient have any interval changes to diet or medications since last appt (Y/N):   no  Are there any complications or cost restrictions with current therapy (Y/N):  no     Does patient have [facility] PCP? Ganesh Mckeon M.D.   (If not, please document discussion that patient must be seen at [facility])       Vitals:      There were no vitals filed for this visit.     Asssessment:      INR SUB-therapeutic at 1.5, therefore increasing risk of clot/stroke   Reason(s) for out of range INR today:  Dose too low      Pt is not on antiplatelet therapy    Medication reconciliation completed today.     Plan:  Instructed pt to BOLUS today 3 mg x 1, then resume current warfarin regimen     Follow up:  Because warfarin is a high risk medication and current CHEST guidelines recommend regular monitoring intervals (few days up to 12 weeks), will have patient return to clinic in 1 week to recheck INR.    Carlo Madrid, Student Pharmacist

## 2022-07-01 ENCOUNTER — ANTICOAGULATION VISIT (OUTPATIENT)
Dept: MEDICAL GROUP | Facility: PHYSICIAN GROUP | Age: 87
End: 2022-07-01
Payer: MEDICARE

## 2022-07-01 DIAGNOSIS — I48.0 PAF (PAROXYSMAL ATRIAL FIBRILLATION) (HCC): ICD-10-CM

## 2022-07-01 DIAGNOSIS — Z79.01 CHRONIC ANTICOAGULATION: ICD-10-CM

## 2022-07-01 LAB — INR PPP: 1.4 (ref 2–3.5)

## 2022-07-01 PROCEDURE — 85610 PROTHROMBIN TIME: CPT | Performed by: INTERNAL MEDICINE

## 2022-07-01 PROCEDURE — 99211 OFF/OP EST MAY X REQ PHY/QHP: CPT | Performed by: INTERNAL MEDICINE

## 2022-07-01 NOTE — PROGRESS NOTES
OP Anticoagulation Service Note    Date: 2022    Anticoagulation Summary  As of 2022    INR goal:  2.0-3.0   TTR:  0.0 % (1.1 wk)   INR used for dosin.40 (2022)   Warfarin maintenance plan:  3 mg (3 mg x 1) every Mon, Fri; 1.5 mg (3 mg x 0.5) all other days   Weekly warfarin total:  13.5 mg   Plan last modified:  Alexis NagyD (2022)   Next INR check:  2022   Target end date:      Indications    Aortic stenosis [I35.0]  PAF (paroxysmal atrial fibrillation) (HCC) [I48.0]  Chronic anticoagulation [Z79.01]             Anticoagulation Episode Summary     INR check location:      Preferred lab:      Send INR reminders to:      Comments:        Anticoagulation Care Providers     Provider Role Specialty Phone number    [facility]   [phone]        Anticoagulation Patient Findings  Patient Findings     Negatives:  Signs/symptoms of thrombosis, Signs/symptoms of bleeding, Laboratory test error suspected, Change in health, Change in alcohol use, Change in activity, Upcoming invasive procedure, Emergency department visit, Upcoming dental procedure, Missed doses, Extra doses, Change in medications, Change in diet/appetite, Hospital admission, Bruising, Other complaints          HPI:   Shereen Dale is in the Anticoagulation Clinic today for an INR check on their anticoagulation therapy.     The reason for today's visit is to prevent morbidity and mortality from a blood clot and/or stroke and to reduce the risk of bleeding while on a anticoagulant.     PCP:  Ganesh Mckeon M.D.  [address] [place] [address] [phone]    3 vitals included with today's appt-unless patient declined:  (BP, HR, weight, ht, RR)   There were no vitals filed for this visit.    Verified current warfarin dosing schedule.    Medications reconciled   Pt is not on antiplatelet therapy    Assessment:   INR  SUB-therapeutic.     Plan:  Instructed pt to begin newly increased  regimen of 3 mg M/F and 1.5 mg ROW.    Follow up:  Follow up appointment in 1.5 week(s).       Other info:  Pt educated to contact our clinic with any changes in medications or s/s of bleeding or thrombosis.  Education was provided today regarding tips to reduce their bleed risk and dietary constraints while on a anticoagulant.    National Recommendations:  The CHEST guidelines recommends frequent INR monitoring at regular intervals (a few days up to a max of 12 weeks) to ensure patients are on the proper dose of warfarin, and patients are not having any complications from therapy.  INRs can dramatically change over a short time period due to diet, medications, and medical conditions.     Chris Lopes, PharmD, BCACP    [facility]  Phone [phone] fax [phone]

## 2022-07-12 ENCOUNTER — ANTICOAGULATION VISIT (OUTPATIENT)
Dept: MEDICAL GROUP | Facility: PHYSICIAN GROUP | Age: 87
End: 2022-07-12
Payer: MEDICARE

## 2022-07-12 DIAGNOSIS — Z79.01 CHRONIC ANTICOAGULATION: Primary | ICD-10-CM

## 2022-07-12 DIAGNOSIS — I48.0 PAF (PAROXYSMAL ATRIAL FIBRILLATION) (HCC): ICD-10-CM

## 2022-07-12 DIAGNOSIS — I35.0 NONRHEUMATIC AORTIC VALVE STENOSIS: ICD-10-CM

## 2022-07-12 LAB — INR PPP: 1.7 (ref 2–3.5)

## 2022-07-12 PROCEDURE — 85610 PROTHROMBIN TIME: CPT | Performed by: NURSE PRACTITIONER

## 2022-07-12 PROCEDURE — 99211 OFF/OP EST MAY X REQ PHY/QHP: CPT | Performed by: NURSE PRACTITIONER

## 2022-07-12 NOTE — PROGRESS NOTES
Anticoagulation Summary  As of 2022    INR goal:  2.0-3.0   TTR:  0.0 % (2.7 wk)   INR used for dosin.70 (2022)   Warfarin maintenance plan:  3 mg (3 mg x 1) every Mon, Wed, Fri; 1.5 mg (3 mg x 0.5) all other days   Weekly warfarin total:  15 mg   Plan last modified:  Brady Piña, PharmD (2022)   Next INR check:  2022   Target end date:  Indefinite    Indications    Aortic stenosis [I35.0]  PAF (paroxysmal atrial fibrillation) (HCC) [I48.0]  Chronic anticoagulation [Z79.01]             Anticoagulation Episode Summary     INR check location:      Preferred lab:      Send INR reminders to:      Comments:        Anticoagulation Care Providers     Provider Role Specialty Phone number    Ganesh Mckeon M.D. Referring Geriatric Medicine - Family Medicine [phone]    [facility]agulation [facility] Responsible  [phone]        Anticoagulation Patient Findings  Patient Findings     Negatives:  Signs/symptoms of thrombosis, Signs/symptoms of bleeding, Laboratory test error suspected, Change in health, Change in alcohol use, Change in activity, Upcoming invasive procedure, Emergency department visit, Upcoming dental procedure, Missed doses, Extra doses, Change in medications, Change in diet/appetite, Hospital admission, Bruising, Other complaints        HPI:   Shereen Dale seen in clinic today, on anticoagulation therapy with warfarin for stroke prophylaxis due to history of PAF.    Patient's previous INR was subtherapeutic at 1.4 on 22, at which time patient was instructed to increase weekly warfarin regimen.  She returns to clinic today to recheck INR to ensure it is therapeutic and thus preventing possible clotting and/or bleeding/bruising complications.    CHADS-VASc = 5  (unadjusted ischemic stroke risk/year:  7.2%, which is moderate risk)    Does patient have any changes to current medical/health status since last appt (Y/N):  NO  Does patient have any signs/symptoms  of bleeding and/or thrombosis since the last appt (Y/N):  NO  Does patient have any interval changes to diet or medications since last appt (Y/N):  NO  Are there any complications or cost restrictions with current therapy (Y/N):  NO     Does patient have [facility] PCP? Yes, Dr Ganesh Mckeon (If not, please document discussion that patient must be seen at [facility])       Vitals:  declined today.    There were no vitals filed for this visit.     Asssessment:      INR subtherapeutic at 1.7, therefore increasing patient's stroke risk   Reason(s) for out of range INR today:  Dose too low      Pt is not on antiplatelet therapy    Medication reconciliation completed today.    Plan:  Instructed patient to increase weekly warfarin regimen as detailed above.     Follow up:  Because warfarin is a high risk medication and current CHEST guidelines recommend regular monitoring intervals (few days up to 12 weeks), will have patient return to clinic in 2 weeks to recheck INR (per patient preference).    Brady Piña, PharmD, BCACP

## 2022-07-22 RX ORDER — AMOXICILLIN 250 MG
2 CAPSULE ORAL DAILY
Qty: 60 TABLET | Refills: 1 | Status: SHIPPED
Start: 2022-07-22 | End: 2023-05-26

## 2022-07-22 RX ORDER — AMLODIPINE BESYLATE 2.5 MG/1
2.5 TABLET ORAL DAILY
Qty: 30 TABLET | Refills: 1 | Status: SHIPPED | OUTPATIENT
Start: 2022-07-22 | End: 2022-07-28 | Stop reason: SDUPTHER

## 2022-07-22 RX ORDER — CARVEDILOL 12.5 MG/1
12.5 TABLET ORAL 2 TIMES DAILY
Qty: 60 TABLET | Refills: 1 | Status: SHIPPED | OUTPATIENT
Start: 2022-07-22 | End: 2022-09-19 | Stop reason: SDUPTHER

## 2022-07-22 RX ORDER — GLIPIZIDE 2.5 MG/1
2.5 TABLET, EXTENDED RELEASE ORAL DAILY
Qty: 90 TABLET | Refills: 2 | Status: SHIPPED | OUTPATIENT
Start: 2022-07-22 | End: 2023-02-15 | Stop reason: SDUPTHER

## 2022-07-22 RX ORDER — GABAPENTIN 100 MG/1
100 CAPSULE ORAL 2 TIMES DAILY
Qty: 90 CAPSULE | Refills: 1 | Status: SHIPPED | OUTPATIENT
Start: 2022-07-22 | End: 2022-09-14 | Stop reason: SDUPTHER

## 2022-07-25 ENCOUNTER — ANTICOAGULATION VISIT (OUTPATIENT)
Dept: MEDICAL GROUP | Facility: PHYSICIAN GROUP | Age: 87
End: 2022-07-25
Payer: MEDICARE

## 2022-07-25 DIAGNOSIS — Z79.01 CHRONIC ANTICOAGULATION: Primary | ICD-10-CM

## 2022-07-25 DIAGNOSIS — I48.0 PAF (PAROXYSMAL ATRIAL FIBRILLATION) (HCC): ICD-10-CM

## 2022-07-25 DIAGNOSIS — I35.0 NONRHEUMATIC AORTIC VALVE STENOSIS: ICD-10-CM

## 2022-07-25 LAB — INR PPP: 2.6 (ref 2–3.5)

## 2022-07-25 PROCEDURE — 85610 PROTHROMBIN TIME: CPT | Performed by: INTERNAL MEDICINE

## 2022-07-25 PROCEDURE — 93793 ANTICOAG MGMT PT WARFARIN: CPT | Performed by: INTERNAL MEDICINE

## 2022-07-25 PROCEDURE — 99999 PR NO CHARGE: CPT | Performed by: INTERNAL MEDICINE

## 2022-07-25 NOTE — PROGRESS NOTES
Anticoagulation Summary  As of 2022    INR goal:  2.0-3.0   TTR:  29.6 % (1.1 mo)   INR used for dosin.60 (2022)   Warfarin maintenance plan:  3 mg (3 mg x 1) every Mon, Wed, Fri; 1.5 mg (3 mg x 0.5) all other days   Weekly warfarin total:  15 mg   Plan last modified:  Brady Piña, PharmD (2022)   Next INR check:  2022   Target end date:  Indefinite    Indications    Aortic stenosis [I35.0]  PAF (paroxysmal atrial fibrillation) (HCC) [I48.0]  Chronic anticoagulation [Z79.01]             Anticoagulation Episode Summary     INR check location:      Preferred lab:      Send INR reminders to:      Comments:        Anticoagulation Care Providers     Provider Role Specialty Phone number    Ganesh Mckeon M.D. Referring Geriatric Medicine - Family Medicine [phone]    [facility]tico[facility] Responsible  [phone]        Anticoagulation Patient Findings  Patient Findings     Negatives:  Signs/symptoms of thrombosis, Signs/symptoms of bleeding, Laboratory test error suspected, Change in health, Change in alcohol use, Change in activity, Upcoming invasive procedure, Emergency department visit, Upcoming dental procedure, Missed doses, Extra doses, Change in medications, Change in diet/appetite, Hospital admission, Bruising, Other complaints              HPI:   Shereen Dael seen in clinic today, on anticoagulation therapy with warfarin for stroke prophylaxis due to history of PAF    Patient's previous INR was SUB-therapeutic at 1.7 on , at which time patient was instructed to increase dose.  She returns to clinic today to recheck INR to ensure it is therapeutic and thus preventing possible clotting and/or bleeding/bruising complications.    CHADS-VASc = 5  (unadjusted ischemic stroke risk/year:  7.2%, which is moderate risk)    Does patient have any changes to current medical/health status since last appt (Y/N):  N  Does patient have any signs/symptoms of bleeding and/or  thrombosis since the last appt (Y/N):  N  Does patient have any interval changes to diet or medications since last appt (Y/N):  N  Are there any complications or cost restrictions with current therapy (Y/N):  N     Does patient have [facility] PCP? Yes, Ganesh Burton (If not, please document discussion that patient must be seen at [facility])       Vitals:  Declined    There were no vitals filed for this visit.     Asssessment:      INR therapeutic at 2.6, therefore decreasing risk of bleeding and stroke   Reason(s) for out of range INR today:  N/A      Pt is not on antiplatelet therapy    Medication reconciliation completed today.    Plan:  Pt is to take decreased dose of 1.5 mg x 1 per pt request as she likes INR closer to  2.0, then continue with current warfarin dosing regimen.       Follow up:  Because warfarin is a high risk medication and current CHEST guidelines recommend regular monitoring intervals (few days up to 12 weeks), will have patient return to clinic in 2 weeks to recheck INR.    Melissa Reese, Pharmacy Inter    Brady Piña, PharmD

## 2022-07-28 RX ORDER — AMLODIPINE BESYLATE 2.5 MG/1
2.5 TABLET ORAL DAILY
Qty: 100 TABLET | Refills: 1 | Status: SHIPPED | OUTPATIENT
Start: 2022-07-28 | End: 2023-02-13

## 2022-07-28 RX ORDER — AMLODIPINE BESYLATE 5 MG/1
5 TABLET ORAL DAILY
Qty: 100 TABLET | Refills: 2 | Status: SHIPPED | OUTPATIENT
Start: 2022-07-28 | End: 2023-05-02 | Stop reason: SDUPTHER

## 2022-08-05 RX ORDER — METAXALONE 800 MG/1
400 TABLET ORAL 2 TIMES DAILY
Qty: 20 TABLET | Refills: 0 | Status: SHIPPED | OUTPATIENT
Start: 2022-08-05 | End: 2022-09-07 | Stop reason: SDUPTHER

## 2022-08-08 ENCOUNTER — ANTICOAGULATION VISIT (OUTPATIENT)
Dept: MEDICAL GROUP | Facility: PHYSICIAN GROUP | Age: 87
End: 2022-08-08
Payer: MEDICARE

## 2022-08-08 DIAGNOSIS — Z79.01 CHRONIC ANTICOAGULATION: ICD-10-CM

## 2022-08-08 DIAGNOSIS — I48.0 PAF (PAROXYSMAL ATRIAL FIBRILLATION) (HCC): ICD-10-CM

## 2022-08-08 DIAGNOSIS — I35.0 NONRHEUMATIC AORTIC VALVE STENOSIS: ICD-10-CM

## 2022-08-08 DIAGNOSIS — Z79.01 CHRONIC ANTICOAGULATION: Primary | ICD-10-CM

## 2022-08-08 LAB — INR PPP: 2 (ref 2–3.5)

## 2022-08-08 PROCEDURE — 93793 ANTICOAG MGMT PT WARFARIN: CPT | Performed by: FAMILY MEDICINE

## 2022-08-08 PROCEDURE — 85610 PROTHROMBIN TIME: CPT | Performed by: FAMILY MEDICINE

## 2022-08-08 RX ORDER — WARFARIN SODIUM 3 MG/1
TABLET ORAL
Qty: 90 TABLET | Refills: 1 | Status: SHIPPED | OUTPATIENT
Start: 2022-08-08 | End: 2023-02-07

## 2022-08-08 NOTE — PROGRESS NOTES
Anticoagulation Summary  As of 2022    INR goal:  2.0-3.0   TTR:  51.0 % (1.5 mo)   INR used for dosin.00 (2022)   Warfarin maintenance plan:  3 mg (3 mg x 1) every Mon, Wed, Fri; 1.5 mg (3 mg x 0.5) all other days   Weekly warfarin total:  15 mg   Plan last modified:  Brady Piña, PharmD (2022)   Next INR check:  2022   Target end date:  Indefinite    Indications    Aortic stenosis [I35.0]  PAF (paroxysmal atrial fibrillation) (HCC) [I48.0]  Chronic anticoagulation [Z79.01]             Anticoagulation Episode Summary     INR check location:      Preferred lab:      Send INR reminders to:      Comments:        Anticoagulation Care Providers     Provider Role Specialty Phone number    Ganesh Mckeon M.D. Referring Geriatric Medicine - Family Medicine [phone]    [facility] Anticoagulation Services Responsible  [phone]        Anticoagulation Patient Findings  Patient Findings     Negatives:  Signs/symptoms of thrombosis, Signs/symptoms of bleeding, Laboratory test error suspected, Change in health, Change in alcohol use, Change in activity, Upcoming invasive procedure, Emergency department visit, Upcoming dental procedure, Missed doses, Extra doses, Change in medications, Change in diet/appetite, Hospital admission, Bruising, Other complaints        HPI:   Shereen Wattsa seen in clinic today, on anticoagulation therapy with warfarin for stroke prophylaxis due to history of atrial fibrillation.    Patient's previous INR was therapeutic at 2.6 on 22, at which time patient was instructed to decrease one dose, then resume current warfarin regimen.  She returns to clinic today to recheck INR to ensure it is therapeutic and thus preventing possible clotting and/or bleeding/bruising complications.    CHADS-VASc = at least 5  (unadjusted ischemic stroke risk/year:  7.2%, which is moderate risk)    Does patient have any changes to current medical/health status since last appt  (Y/N):  NO  Does patient have any signs/symptoms of bleeding and/or thrombosis since the last appt (Y/N):  NO  Does patient have any interval changes to diet or medications since last appt (Y/N):  NO  Are there any complications or cost restrictions with current therapy (Y/N):  NO     Does patient have [facility] PCP? Yes, Dr Andersen  (If not, please document discussion that patient must be seen at [facility])       Vitals:  declined today.    There were no vitals filed for this visit.     Asssessment:      INR therapeutic at 2.0, therefore decreasing patient's stroke and/or bleeding risk.   Reason(s) for out of range INR today:  n/a      Pt is not on antiplatelet therapy    Medication reconciliation completed today.    Plan:  Pt is to continue with current warfarin dosing regimen.     Follow up:  Because warfarin is a high risk medication and current CHEST guidelines recommend regular monitoring intervals (few days up to 12 weeks), will have patient return to clinic in 2.5 weeks to recheck INR.    Brady Piña, PharmD, BCACP  '

## 2022-08-16 RX ORDER — OMEPRAZOLE 20 MG/1
20 CAPSULE, DELAYED RELEASE ORAL 2 TIMES DAILY
Qty: 60 CAPSULE | Refills: 1 | Status: SHIPPED | OUTPATIENT
Start: 2022-08-16 | End: 2023-01-03 | Stop reason: SDUPTHER

## 2022-08-19 ENCOUNTER — TELEPHONE (OUTPATIENT)
Dept: VASCULAR LAB | Facility: MEDICAL CENTER | Age: 87
End: 2022-08-19
Payer: MEDICARE

## 2022-08-26 ENCOUNTER — ANTICOAGULATION VISIT (OUTPATIENT)
Dept: MEDICAL GROUP | Facility: PHYSICIAN GROUP | Age: 87
End: 2022-08-26
Payer: MEDICARE

## 2022-08-26 ENCOUNTER — PATIENT OUTREACH (OUTPATIENT)
Dept: HEALTH INFORMATION MANAGEMENT | Facility: OTHER | Age: 87
End: 2022-08-26

## 2022-08-26 DIAGNOSIS — I35.0 NONRHEUMATIC AORTIC VALVE STENOSIS: ICD-10-CM

## 2022-08-26 DIAGNOSIS — I51.89 DIASTOLIC DYSFUNCTION: ICD-10-CM

## 2022-08-26 DIAGNOSIS — E11.9 TYPE 2 DIABETES MELLITUS WITHOUT COMPLICATION, WITHOUT LONG-TERM CURRENT USE OF INSULIN (HCC): ICD-10-CM

## 2022-08-26 DIAGNOSIS — Z79.01 CHRONIC ANTICOAGULATION: Primary | ICD-10-CM

## 2022-08-26 DIAGNOSIS — I48.0 PAF (PAROXYSMAL ATRIAL FIBRILLATION) (HCC): ICD-10-CM

## 2022-08-26 LAB — INR PPP: 2.1 (ref 2–3.5)

## 2022-08-26 PROCEDURE — 85610 PROTHROMBIN TIME: CPT | Performed by: FAMILY MEDICINE

## 2022-08-26 PROCEDURE — 99999 PR NO CHARGE: CPT | Performed by: FAMILY MEDICINE

## 2022-08-26 PROCEDURE — 93793 ANTICOAG MGMT PT WARFARIN: CPT | Performed by: FAMILY MEDICINE

## 2022-08-26 NOTE — PROGRESS NOTES
Spoke with Shereen while she was in office seeing the pharmacist today for anti-coag management. 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 We made an appointment for 8/31 at 11 to do enrollment.

## 2022-08-26 NOTE — PROGRESS NOTES
Anticoagulation Summary  As of 2022      INR goal:  2.0-3.0   TTR:  64.7 % (2.1 mo)   INR used for dosin.10 (2022)   Warfarin maintenance plan:  3 mg (3 mg x 1) every Mon, Wed, Fri; 1.5 mg (3 mg x 0.5) all other days   Weekly warfarin total:  15 mg   Plan last modified:  Brady Piña, PharmD (2022)   Next INR check:  2022   Target end date:  Indefinite    Indications    Aortic stenosis [I35.0]  PAF (paroxysmal atrial fibrillation) (HCC) [I48.0]  Chronic anticoagulation [Z79.01]                 Anticoagulation Episode Summary       INR check location:      Preferred lab:      Send INR reminders to:      Comments:            Anticoagulation Care Providers       Provider Role Specialty Phone number    Ganesh Mckeon M.D. Referring Geriatric Medicine - Family Medicine [phone]    [facility] Anticoagulation Services Responsible  [phone]          Anticoagulation Patient Findings  Patient Findings       Negatives:  Signs/symptoms of thrombosis, Signs/symptoms of bleeding, Laboratory test error suspected, Change in health, Change in alcohol use, Change in activity, Upcoming invasive procedure, Emergency department visit, Upcoming dental procedure, Missed doses, Extra doses, Change in medications, Change in diet/appetite, Hospital admission, Bruising, Other complaints          HPI:   Shereen Dale seen in clinic today, on anticoagulation therapy with warfarin for stroke prophylaxis due to history of atrial fibrillation.    Patient's previous INR was therapeutic at 2.0 on 22, at which time patient was instructed to continue with current warfarin regimen.  She returns to clinic today to recheck INR to ensure it is therapeutic and thus preventing possible clotting and/or bleeding/bruising complications.    CHADS-VASc = at least 5  (unadjusted ischemic stroke risk/year:  7.2%, which is moderate risk)    Does patient have any changes to current medical/health status since last appt  (Y/N):  NO  Does patient have any signs/symptoms of bleeding and/or thrombosis since the last appt (Y/N):  NO  Does patient have any interval changes to diet or medications since last appt (Y/N):  NO  Are there any complications or cost restrictions with current therapy (Y/N):  NO     Does patient have [facility] PCP? Yes, Dr Ganesh Mckeon (If not, please document discussion that patient must be seen at [facility])       Vitals:  declined today    There were no vitals filed for this visit.     Asssessment:      INR remains therapeutic at 2.1, therefore decreasing patient's stroke and/or bleeding risk.   Reason(s) for out of range INR today:  n/a      Pt is not on antiplatelet therapy    Medication reconciliation completed today.    Plan:  Pt is to continue with current warfarin dosing regimen.     Follow up:  Because warfarin is a high risk medication and current CHEST guidelines recommend regular monitoring intervals (few days up to 12 weeks), will have patient return to clinic in 2 weeks to recheck INR.    Brady Piña, PharmD, BCACP

## 2022-08-31 ENCOUNTER — PATIENT OUTREACH (OUTPATIENT)
Dept: HEALTH INFORMATION MANAGEMENT | Facility: OTHER | Age: 87
End: 2022-08-31
Payer: MEDICARE

## 2022-08-31 DIAGNOSIS — E11.9 TYPE 2 DIABETES MELLITUS WITHOUT COMPLICATION, WITHOUT LONG-TERM CURRENT USE OF INSULIN (HCC): ICD-10-CM

## 2022-08-31 DIAGNOSIS — I51.89 DIASTOLIC DYSFUNCTION: ICD-10-CM

## 2022-08-31 PROCEDURE — 99999 PR NO CHARGE: CPT | Performed by: FAMILY MEDICINE

## 2022-08-31 SDOH — HEALTH STABILITY: MENTAL HEALTH
STRESS IS WHEN SOMEONE FEELS TENSE, NERVOUS, ANXIOUS, OR CAN'T SLEEP AT NIGHT BECAUSE THEIR MIND IS TROUBLED. HOW STRESSED ARE YOU?: NOT AT ALL

## 2022-08-31 SDOH — ECONOMIC STABILITY: HOUSING INSECURITY: IN THE LAST 12 MONTHS, HOW MANY PLACES HAVE YOU LIVED?: 1

## 2022-08-31 SDOH — ECONOMIC STABILITY: INCOME INSECURITY: IN THE LAST 12 MONTHS, WAS THERE A TIME WHEN YOU WERE NOT ABLE TO PAY THE MORTGAGE OR RENT ON TIME?: NO

## 2022-08-31 SDOH — HEALTH STABILITY: PHYSICAL HEALTH: ON AVERAGE, HOW MANY DAYS PER WEEK DO YOU ENGAGE IN MODERATE TO STRENUOUS EXERCISE (LIKE A BRISK WALK)?: 0 DAYS

## 2022-08-31 SDOH — HEALTH STABILITY: MENTAL HEALTH: HOW OFTEN DO YOU HAVE 6 OR MORE DRINKS ON ONE OCCASION?: NEVER

## 2022-08-31 SDOH — ECONOMIC STABILITY: FOOD INSECURITY: WITHIN THE PAST 12 MONTHS, THE FOOD YOU BOUGHT JUST DIDN'T LAST AND YOU DIDN'T HAVE MONEY TO GET MORE.: NEVER TRUE

## 2022-08-31 SDOH — ECONOMIC STABILITY: FOOD INSECURITY: WITHIN THE PAST 12 MONTHS, YOU WORRIED THAT YOUR FOOD WOULD RUN OUT BEFORE YOU GOT MONEY TO BUY MORE.: NEVER TRUE

## 2022-08-31 SDOH — ECONOMIC STABILITY: TRANSPORTATION INSECURITY
IN THE PAST 12 MONTHS, HAS THE LACK OF TRANSPORTATION KEPT YOU FROM MEDICAL APPOINTMENTS OR FROM GETTING MEDICATIONS?: NO

## 2022-08-31 SDOH — SOCIAL STABILITY: SOCIAL NETWORK: ARE YOU MARRIED, WIDOWED, DIVORCED, SEPARATED, NEVER MARRIED, OR LIVING WITH A PARTNER?: MARRIED

## 2022-08-31 SDOH — ECONOMIC STABILITY: INCOME INSECURITY: HOW HARD IS IT FOR YOU TO PAY FOR THE VERY BASICS LIKE FOOD, HOUSING, MEDICAL CARE, AND HEATING?: NOT HARD AT ALL

## 2022-08-31 SDOH — SOCIAL STABILITY: SOCIAL NETWORK: HOW OFTEN DO YOU ATTEND CHURCH OR RELIGIOUS SERVICES?: MORE THAN 4 TIMES PER YEAR

## 2022-08-31 SDOH — HEALTH STABILITY: MENTAL HEALTH: HOW OFTEN DO YOU HAVE A DRINK CONTAINING ALCOHOL?: MONTHLY OR LESS

## 2022-08-31 SDOH — ECONOMIC STABILITY: HOUSING INSECURITY
IN THE LAST 12 MONTHS, WAS THERE A TIME WHEN YOU DID NOT HAVE A STEADY PLACE TO SLEEP OR SLEPT IN A SHELTER (INCLUDING NOW)?: NO

## 2022-08-31 SDOH — SOCIAL STABILITY: SOCIAL NETWORK
DO YOU BELONG TO ANY CLUBS OR ORGANIZATIONS SUCH AS CHURCH GROUPS UNIONS, FRATERNAL OR ATHLETIC GROUPS, OR SCHOOL GROUPS?: NO

## 2022-08-31 SDOH — SOCIAL STABILITY: SOCIAL NETWORK: HOW OFTEN DO YOU ATTENT MEETINGS OF THE CLUB OR ORGANIZATION YOU BELONG TO?: NEVER

## 2022-08-31 SDOH — SOCIAL STABILITY: SOCIAL NETWORK: HOW OFTEN DO YOU GET TOGETHER WITH FRIENDS OR RELATIVES?: MORE THAN THREE TIMES A WEEK

## 2022-08-31 SDOH — ECONOMIC STABILITY: TRANSPORTATION INSECURITY
IN THE PAST 12 MONTHS, HAS LACK OF TRANSPORTATION KEPT YOU FROM MEETINGS, WORK, OR FROM GETTING THINGS NEEDED FOR DAILY LIVING?: NO

## 2022-08-31 SDOH — HEALTH STABILITY: MENTAL HEALTH: HOW MANY STANDARD DRINKS CONTAINING ALCOHOL DO YOU HAVE ON A TYPICAL DAY?: PATIENT DOES NOT DRINK

## 2022-08-31 SDOH — SOCIAL STABILITY: SOCIAL NETWORK
IN A TYPICAL WEEK, HOW MANY TIMES DO YOU TALK ON THE PHONE WITH FAMILY, FRIENDS, OR NEIGHBORS?: MORE THAN THREE TIMES A WEEK

## 2022-08-31 SDOH — HEALTH STABILITY: PHYSICAL HEALTH: ON AVERAGE, HOW MANY MINUTES DO YOU ENGAGE IN EXERCISE AT THIS LEVEL?: 0 MIN

## 2022-08-31 ASSESSMENT — LIFESTYLE VARIABLES
SKIP TO QUESTIONS 9-10: 1
AUDIT-C TOTAL SCORE: 1

## 2022-08-31 ASSESSMENT — PATIENT HEALTH QUESTIONNAIRE - PHQ9: CLINICAL INTERPRETATION OF PHQ2 SCORE: 0

## 2022-09-01 NOTE — PROGRESS NOTES
INITIAL CARE MANAGEMENT CARE PLAN/ASSESSMENT     Shereen is an 86 year old female contacted today for CCM enrollment as per our conversation while she was in office seeing Pharmacist Brady for DM and INR management. Shereen verbalized her full name and  as well as her intent to enroll in CCM services. She lives with her  Nick, in a single family home no stairs. This is her daughter's home but her daughter does not live there as she is always out traveling. She has 3 daughters in the area who take turns helping them and assisting where needed. She also has a son in [place] who they speak to frequently on the phone. Shereen see pharmacy for DM and anti-coag management. She is independent in most her ADL/IADL's other than exercise and other physical activity. She does not partake in any physical activity due to her heart valve needing to be replaced, but she refuses to go under an invasive procedures to get it fixed. She uses a can for assistance when ambulating for stability otherwise she has to use the furniture and the walls to steady herself. She is at a fall risk as she gets short of breath when ambulating or doing any movement for a prolonged period of time. This shortness of breath does cause her to be weak and have unsteady balance at times. She is an active  but only for short distances and her daughters will take her to her appointments or drive her to places that are a farther distance. 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 She has no issues with medications at this time. Shereen says her and her  are financially stable and deny resources at this time. They do know to reach out for assistance as needed.     Medication Self-Management Goals:     Reviewed medications listed below with patient.      Current Outpatient Medications:     metaxalone (SKELAXIN) 800 MG Tab, Take 0.5 Tablets by mouth 2  times a day. 1/2 tab 400 mg 2 x day.  Indications: Musculoskeletal Pain, Disp: 20 Tablet, Rfl: 0    metFORMIN (GLUCOPHAGE) 500 MG Tab, Take 1 Tablet by mouth 3 times a day., Disp: 300 Tablet, Rfl: 0    omeprazole (PRILOSEC) 20 MG delayed-release capsule, Take 1 Capsule by mouth 2 times a day., Disp: 60 Capsule, Rfl: 1    warfarin (COUMADIN) 3 MG Tab, Take one-half to one tablet by mouth daily or as directed by anticoagulation clinic  Indications: Aortic Stenosis, Disp: 90 Tablet, Rfl: 1    amLODIPine (NORVASC) 2.5 MG Tab, Take 1 Tablet by mouth every day for 200 days., Disp: 100 Tablet, Rfl: 1    amLODIPine (NORVASC) 5 MG Tab, Take 1 Tablet by mouth every day for 200 days. Take 5mg PO daily in AM., Disp: 100 Tablet, Rfl: 2    glipiZIDE SR (GLUCOTROL) 2.5 MG TABLET SR 24 HR, Take 1 Tablet by mouth every day., Disp: 90 Tablet, Rfl: 2    gabapentin (NEURONTIN) 100 MG Cap, Take 1 Capsule by mouth 2 times a day. Take 1 capsule PO Daily in the AM, Take 2 capsules daily in the PM, Disp: 90 Capsule, Rfl: 1    senna-docusate (PERICOLACE OR SENOKOT S) 8.6-50 MG Tab, Take 2 Tablets by mouth every day for constipation. Hold for loose stools, Disp: 60 Tablet, Rfl: 1    carvedilol (COREG) 12.5 MG Tab, Take 1 Tablet by mouth 2 times a day for BP, Disp: 60 Tablet, Rfl: 1    Misc. Devices Misc, Oxygen concentrator with humidifier, 2.5-3 L/min, Disp: 1 Each, Rfl: 0    carvedilol (COREG) 12.5 MG Tab, Take 1 Tablet by mouth 2 times a day., Disp: 60 Tablet, Rfl: 1    spironolactone (ALDACTONE) 25 MG Tab, Take 25 mg by mouth every day., Disp: , Rfl:     morphine (ROXANOL) 20 MG/ML Solution, Take 0.25 mL by mouth every 2 hours as needed (moderate to severe pain or shortness of breath)., Disp: 120 mL, Rfl: 0    levothyroxine (SYNTHROID) 50 MCG Tab, TAKE ONE TABLET BY MOUTH EVERY MORNING FOR UNDERACTIVE THYROID, Disp: 100 Tablet, Rfl: 1    nystatin (MYCOSTATIN) powder, Apply topically 2 times a day for irritation, Disp: 30 g, Rfl: 3     dorzolamide-timolol (COSOPT) 22.3-6.8 MG/ML Solution, Administer 1 Drop into both eyes 2 times a day. Indications: Wide-Angle Glaucoma, Disp: , Rfl:     calamine Lotion, Apply 1 Application topically as needed. Dose: 1 Application / Route: Topical / Freq: PRN / Admin Inst: Apply generous amount to bilateral groin areas PRN itching. Please remove dried calamine residue with water and a clean cloth and pat dry prior to applying nystatin powder to groin areas., Disp: , Rfl:     Blood Glucose Monitoring Suppl (FREESTYLE LITE) Device, USE TWO TIMES A DAY AS DIRECTED FOR BLOOD SUGAR MONITORING, Disp: 100 Each, Rfl: 3    Home Care Oxygen, Inhale 2-5 L/min as needed for Shortness of Breath (sleep apnea). 2-5 LPM as needed via nasal cannula  Indications: Shortness of breath, sleep apnea, Disp: , Rfl:     haloperidol (HALDOL) 2 MG/ML Conc, Take 0.5 mg by mouth every 6 hours as needed (Anxiety, agitation)., Disp: , Rfl:     Sennosides-Docusate Sodium (SENNA PLUS) 8.6-50 MG Cap, Take 2 tablet by mouth every day. Take every evening.  Hold for loose stools  Indications: Constipation, Disp: , Rfl:       Goal: Continue medication adherence       Physical/Functional/Environmental Status:    Shereen uses a cane to get around most of the time, she does feel unsteady or need to stabilize herself on furniture or walls if she does not have her cane. She said as long as she has her cane she does well.      Activies of Daily Living 2/27/2023   FINDINGS - ADLS - SELF CARE - CURRENT FUNCTION - BATHING independent   FINDINGS - ADLS - SELF CARE - CURRENT FUNCTION - DRESSING independent   FINDINGS - ADLS - SELF CARE - CURRENT FUNCTION - GROOMING independent   FINDINGS - ADLS - SELF CARE - CURRENT FUNCTION - FEEDING - FOOD TO MOUTH independent   FINDINGS - ADLS - SELF CARE - CURRENT FUNCTION - TOILETING independent   FINDINGS - ADLS - PHYSICAL TRANSFER - CURRENT FUNCTION independent   FINDINGS - ADLS - AMBULATION - CURRENT FUNCTION - AMBULATION  REQUIRES ASSISTANCE - LEVEL OF ASSISTANCE ON SURFACES - COMMUNITY SURFACES independent   FINDINGS - ADLS - PHYSICAL TRANSFER - CURRENT FUNCTION - STAIRS needs assistance   FINDINGS - ADLS - SELF CARE - CURRENT FUNCTION - EXERCISE needs assistance   FINDINGS - ADLS - SELF CARE - CURRENT FUNCTION - FEEDING - EATING FINGER FOODS independent   FINDINGS - ADLS - HOUSEHOLD MANAGEMENT - CURRENT FUNCTION - SHOPPING independent   FINDINGS - ADLS - HOUSEHOLD MANAGEMENT - CURRENT FUNCTION - MEAL PREPARATION - FOOD TRANSPORT independent   FINDINGS - ADLS - SELF CARE - CURRENT FUNCTION - MEDICATION MANAGEMENT independent   FINDINGS - ADLS - HOUSEHOLD MANAGEMENT - CURRENT FUNCTION - TELEPHONE USE independent   FINDINGS - ADLS - HOUSEHOLD MANAGEMENT - CURRENT FUNCTION - HOUSEKEEPING independent   FINDINGS - ADLS - HOUSEHOLD MANAGEMENT - CURRENT FUNCTION - LAUNDRY independent   FINDINGS - ADLS - TRANSPORTATION - CURRENT FUNCTION - DRIVING independent   FINDINGS - ADLS - HOUSEHOLD MANAGEMENT - CURRENT FUNCTION - MONEY MANAGEMENT independent       STEADI Fall Risk 8/31/2022   STEADI Risk for Falling Score 6   One or more falls in the last year No   Advised to use a cane or walker to get around safely Yes   Feels unsteady when walking No   Steadies self on furniture while walking at home Yes   Worried about falling No   Needs to push with hands when rising from a chair Yes   Has trouble stepping up onto a curb / using stairs No   Often has to rush to the toilet Yes   Has lost some feeling in feet No   Takes medicine that makes him/her feel lightheaded or more tired than usual No   Takes medicine to sleep or improve mood Yes   Often feels sad or depressed No       Goal: Decrease fall risk     Financial Status:     Shereen and her  both say they are financially stable and deny resources at this time     Goal: N/A     Transportation Status:    Shereen and her  Amanuelr are active drivers, however, only for short distances. Their  children take turns helping them with transportation when needed. They deny resources at this time.     Goal: N/A    Mental/Behavioral/Psychosocial Status:  Shereen denies any sadness or depression     Depression Screen (PHQ-2/PHQ-9) 4/4/2021 6/9/2022 8/31/2022   PHQ-2 Total Score - - -   PHQ-2 Total Score 0 0 0       Interpretation of PHQ-9 Total Score   Score Severity   1-4 No Depression   5-9 Mild Depression   10-14 Moderate Depression   15-19 Moderately Severe Depression   20-27 Severe Depression       Goal: N/A      Chronic Care Management Care Plan       Nutrition    Goal: Choose heart healthy, low sodium low carb meal options   Barriers: age, habits, cultural foods  Interventions: Education, motivation, resources     Start Date: 8/31/2022  End Date:      Fall Risk    Goal: Decrease fall risk   Barriers: age, medical diagnosis, DME equipment, resources  Interventions: education, motivation, resources    Start Date: 8/31/2022  End Date:      Activity    Goal: Mobility and stability exercises as tolerated    Barriers: age, knowledge, medical diagnosis  Interventions: education, motivation, resources    Start Date: 8/31/2022   End Date:        Discussion: Personal care Management Program, CCM services, resources    Goals: Created     Next Scheduled patient outreach:  9/29/2022 @ 1130

## 2022-09-07 ENCOUNTER — OFFICE VISIT (OUTPATIENT)
Dept: MEDICAL GROUP | Facility: MEDICAL CENTER | Age: 87
End: 2022-09-07
Payer: MEDICARE

## 2022-09-07 VITALS
DIASTOLIC BLOOD PRESSURE: 80 MMHG | HEART RATE: 92 BPM | HEIGHT: 62 IN | BODY MASS INDEX: 27.79 KG/M2 | WEIGHT: 151 LBS | OXYGEN SATURATION: 95 % | RESPIRATION RATE: 16 BRPM | SYSTOLIC BLOOD PRESSURE: 120 MMHG | TEMPERATURE: 97.4 F

## 2022-09-07 DIAGNOSIS — E03.9 ACQUIRED HYPOTHYROIDISM: ICD-10-CM

## 2022-09-07 DIAGNOSIS — D68.69 SECONDARY HYPERCOAGULABLE STATE (HCC): ICD-10-CM

## 2022-09-07 DIAGNOSIS — E66.9 DIABETES MELLITUS TYPE 2 IN OBESE: ICD-10-CM

## 2022-09-07 DIAGNOSIS — I10 PRIMARY HYPERTENSION: ICD-10-CM

## 2022-09-07 DIAGNOSIS — E11.69 DIABETES MELLITUS TYPE 2 IN OBESE: ICD-10-CM

## 2022-09-07 DIAGNOSIS — I35.0 SEVERE AORTIC STENOSIS: ICD-10-CM

## 2022-09-07 DIAGNOSIS — I48.0 PAF (PAROXYSMAL ATRIAL FIBRILLATION) (HCC): ICD-10-CM

## 2022-09-07 PROCEDURE — 99214 OFFICE O/P EST MOD 30 MIN: CPT | Performed by: FAMILY MEDICINE

## 2022-09-07 RX ORDER — METAXALONE 800 MG/1
400 TABLET ORAL 2 TIMES DAILY
Qty: 20 TABLET | Refills: 0 | Status: SHIPPED | OUTPATIENT
Start: 2022-09-07 | End: 2022-09-29 | Stop reason: SDUPTHER

## 2022-09-07 ASSESSMENT — FIBROSIS 4 INDEX: FIB4 SCORE: 1.36

## 2022-09-07 NOTE — PROGRESS NOTES
Annual Health Assessment Questions:    1.  Are you currently engaging in any exercise or physical activity? Yes    2.  How would you describe your mood or emotional well-being today? good    3.  Have you had any falls in the last year? No    4.  Have you noticed any problems with your balance or had difficulty walking? Yes    5.  In the last six months have you experienced any leakage of urine? No    6. DPA/Advanced Directive: Patient has Advanced Directive on file.       CC: Hypertension, diabetes, A. fib, secondary hypercoagulable state, hypothyroidism, severe aortic stenosis    HPI:   Shereen presents today to discuss the following:    Primary hypertension  Blood pressure has been adequate controlled on amlodipine 7.5 mg daily.  No side effects    Diabetes mellitus type 2 in obese ([facility])  Last A1c was 7.0.  Patient has been on metformin 1500 mg daily, and glipizide SR 2.5 mg daily, no side effects.  Denies polyuria or polydipsia.  Patient is due for monofilament foot exam    PAF (paroxysmal atrial fibrillation) ([facility])/ Secondary hypercoagulable state ([facility])  Denies palpitation, chest pain, shortness of breath.  He has been on warfarin and carvedilol.  She has been following up with Coumadin clinic in a regular basis    Acquired hypothyroidism  She has been tolerating Levothyroxine, no palpitation, no cold or heat intolerance, has been on levothyroxine 50 mcg daily    Severe aortic stenosis  She is a candidate for TAVR procedure as per cardiology, however patient refused to have the procedure.  However currently denies any syncopal episodes or dizziness.      Patient Active Problem List    Diagnosis Date Noted    Secondary hypercoagulable state ([facility]) 06/09/2022    Acquired hypothyroidism 09/12/2019    Diabetes mellitus type 2 in obese ([facility]) 09/12/2019    Skin abrasion 08/20/2019    Chronic anticoagulation 08/02/2019    Hyponatremia 03/25/2019    Epistaxis 03/25/2019    Advanced directives, counseling/discussion  09/26/2018    Hypertensive urgency 05/11/2017    Diastolic dysfunction 02/02/2016    Tear of lateral cartilage or meniscus of knee, current 05/21/2015    PAF (paroxysmal atrial fibrillation) ([facility]) 01/12/2015    Dyspnea on effort 01/02/2014    Diabetes ([facility]) 12/26/2013    Severe aortic stenosis 07/02/2013    HTN (hypertension) 05/04/2012    Hemorrhagic disorder due to extrinsic circulating anticoagulants ([facility]) 05/02/2012    Cough 05/02/2012    Edema 05/02/2012    Acute, but ill-defined, cerebrovascular disease 04/30/2012       Current Outpatient Medications   Medication Sig Dispense Refill    metaxalone (SKELAXIN) 800 MG Tab Take 0.5 Tablets by mouth 2 times a day. 1/2 tab 400 mg 2 x day.  Indications: Musculoskeletal Pain 20 Tablet 0    metFORMIN (GLUCOPHAGE) 500 MG Tab Take 1 Tablet by mouth 3 times a day. 300 Tablet 0    omeprazole (PRILOSEC) 20 MG delayed-release capsule Take 1 Capsule by mouth 2 times a day. 60 Capsule 1    warfarin (COUMADIN) 3 MG Tab Take one-half to one tablet by mouth daily or as directed by anticoagulation clinic  Indications: Aortic Stenosis 90 Tablet 1    amLODIPine (NORVASC) 2.5 MG Tab Take 1 Tablet by mouth every day for 200 days. 100 Tablet 1    amLODIPine (NORVASC) 5 MG Tab Take 1 Tablet by mouth every day for 200 days. Take 5mg PO daily in AM. 100 Tablet 2    glipiZIDE SR (GLUCOTROL) 2.5 MG TABLET SR 24 HR Take 1 Tablet by mouth every day. 90 Tablet 2    gabapentin (NEURONTIN) 100 MG Cap Take 1 Capsule by mouth 2 times a day. Take 1 capsule PO Daily in the AM, Take 2 capsules daily in the PM 90 Capsule 1    senna-docusate (PERICOLACE OR SENOKOT S) 8.6-50 MG Tab Take 2 Tablets by mouth every day for constipation. Hold for loose stools 60 Tablet 1    carvedilol (COREG) 12.5 MG Tab Take 1 Tablet by mouth 2 times a day for BP 60 Tablet 1    Misc. Devices Misc Oxygen concentrator with humidifier, 2.5-3 L/min 1 Each 0    carvedilol (COREG) 12.5 MG Tab Take 1 Tablet by mouth 2 times a day.  "60 Tablet 1    spironolactone (ALDACTONE) 25 MG Tab Take 25 mg by mouth every day.      morphine (ROXANOL) 20 MG/ML Solution Take 0.25 mL by mouth every 2 hours as needed (moderate to severe pain or shortness of breath). 120 mL 0    levothyroxine (SYNTHROID) 50 MCG Tab TAKE ONE TABLET BY MOUTH EVERY MORNING FOR UNDERACTIVE THYROID 100 Tablet 1    nystatin (MYCOSTATIN) powder Apply topically 2 times a day for irritation 30 g 3    dorzolamide-timolol (COSOPT) 22.3-6.8 MG/ML Solution Administer 1 Drop into both eyes 2 times a day. Indications: Wide-Angle Glaucoma      Blood Glucose Monitoring Suppl (FREESTYLE LITE) Device USE TWO TIMES A DAY AS DIRECTED FOR BLOOD SUGAR MONITORING 100 Each 3    Home Care Oxygen Inhale 2-5 L/min as needed for Shortness of Breath (sleep apnea). 2-5 LPM as needed via nasal cannula  Indications: Shortness of breath, sleep apnea      haloperidol (HALDOL) 2 MG/ML Conc Take 0.5 mg by mouth every 6 hours as needed (Anxiety, agitation).      Sennosides-Docusate Sodium (SENNA PLUS) 8.6-50 MG Cap Take 2 tablet by mouth every day. Take every evening.  Hold for loose stools  Indications: Constipation       No current facility-administered medications for this visit.         Allergies as of 09/07/2022 - Reviewed 06/09/2022   Allergen Reaction Noted    Darvon [propoxyphene hcl]  03/18/2008    Iodine  05/11/2015    Latex  09/19/2013    Penicillins Anaphylaxis 08/02/2008    Propoxyphene hcl Anaphylaxis 08/02/2008    Tetanus antitoxin Myalgia 11/19/2017    Tetracycline Anaphylaxis 08/02/2008        ROS: Denies any chest pain, Shortness of breath, Changes bowel or bladder, Lower extremity edema.    Physical Exam:  /80 (BP Location: Right arm, Patient Position: Sitting, BP Cuff Size: Adult)   Pulse 92   Temp 36.3 °C (97.4 °F) (Temporal)   Resp 16   Ht 1.575 m (5' 2\")   Wt 68.5 kg (151 lb)   LMP 01/01/1985   SpO2 95%   BMI 27.62 kg/m²   Gen.: Well-developed, well-nourished, no apparent " distress,pleasant and cooperative with the examination  Skin:  Warm and dry with good turgor. No rashes or suspicious lesions in visible areas  HEENT:Sinuses nontender with palpation, TMs clear, nares patent with pink mucosa and clear rhinorrhea,no septal deviation ,polyps or lesions. lips without lesions, oropharynx clear.  Neck: Trachea midline,no masses or adenopathy. No JVD.  Cor: Regular rate and rhythm without murmur, gallop or rub.  Lungs: Respirations unlabored.Clear to auscultation with equal breath sounds bilaterally. No wheezes, rhonchi.  Extremities: No cyanosis, clubbing or edema.    Monofilament foot exam: Normal sensation bilaterally, no macerations or ulcers, normal peripheral pulses.    Assessment and Plan.   86 y.o. female     1. Primary hypertension  Controlled.  Continue on amlodipine 7.5 mg daily    2. Diabetes mellitus type 2 in obese ([facility])  A1c was 7.0  Continue on metformin 1500 mg daily, and glipizide SR 2.5 mg daily  Clinical exam showed no sign of neuropathy.    3. PAF (paroxysmal atrial fibrillation) ([facility])  No RVR.  Continue on warfarin and carvedilol    4. Secondary hypercoagulable state ([facility])  Due to A. fib.  Continue on warfarin    5. Acquired hypothyroidism  He has been tolerating Levothyroxine, no palpitation, no cold or heat intolerance  Continue levothyroxine 50 mcg daily    6. Severe aortic stenosis  She is a candidate for TAVR procedure as per cardiology, however patient refused to have the procedure.  However currently denies any syncopal episodes or dizziness.

## 2022-09-09 ENCOUNTER — ANTICOAGULATION VISIT (OUTPATIENT)
Dept: MEDICAL GROUP | Facility: PHYSICIAN GROUP | Age: 87
End: 2022-09-09
Payer: MEDICARE

## 2022-09-09 ENCOUNTER — PATIENT OUTREACH (OUTPATIENT)
Dept: HEALTH INFORMATION MANAGEMENT | Facility: OTHER | Age: 87
End: 2022-09-09

## 2022-09-09 DIAGNOSIS — I35.0 SEVERE AORTIC STENOSIS: ICD-10-CM

## 2022-09-09 DIAGNOSIS — Z79.01 CHRONIC ANTICOAGULATION: Primary | ICD-10-CM

## 2022-09-09 DIAGNOSIS — E11.9 TYPE 2 DIABETES MELLITUS WITHOUT COMPLICATION, WITHOUT LONG-TERM CURRENT USE OF INSULIN (HCC): ICD-10-CM

## 2022-09-09 DIAGNOSIS — I48.0 PAF (PAROXYSMAL ATRIAL FIBRILLATION) (HCC): ICD-10-CM

## 2022-09-09 LAB — INR PPP: 2.3 (ref 2–3.5)

## 2022-09-09 PROCEDURE — 93793 ANTICOAG MGMT PT WARFARIN: CPT | Performed by: INTERNAL MEDICINE

## 2022-09-09 PROCEDURE — 85610 PROTHROMBIN TIME: CPT | Performed by: INTERNAL MEDICINE

## 2022-09-09 PROCEDURE — 99999 PR NO CHARGE: CPT | Performed by: INTERNAL MEDICINE

## 2022-09-09 NOTE — PROGRESS NOTES
9/9/2022  CHW was referred patient by DIAMOND Singh to assist with any resources patient could benefit from. Patient stated that she is no needing any assistance at this time. CHW advised patient to reach out should that change in the future.

## 2022-09-09 NOTE — PROGRESS NOTES
Anticoagulation Summary  As of 2022      INR goal:  2.0-3.0   TTR:  71.0 % (2.6 mo)   INR used for dosin.30 (2022)   Warfarin maintenance plan:  3 mg (3 mg x 1) every Mon, Wed, Fri; 1.5 mg (3 mg x 0.5) all other days   Weekly warfarin total:  15 mg   Plan last modified:  Brady Piña, PharmD (2022)   Next INR check:  2022   Target end date:  Indefinite    Indications    Severe aortic stenosis [I35.0]  PAF (paroxysmal atrial fibrillation) (HCC) [I48.0]  Chronic anticoagulation [Z79.01]                 Anticoagulation Episode Summary       INR check location:      Preferred lab:      Send INR reminders to:      Comments:            Anticoagulation Care Providers       Provider Role Specialty Phone number    Ganesh Mckeon M.D. Referring Geriatric Medicine - Family Medicine [phone]    [facility] Responsible  [phone]          Anticoagulation Patient Findings  Patient Findings       Negatives:  Signs/symptoms of thrombosis, Signs/symptoms of bleeding, Laboratory test error suspected, Change in health, Change in alcohol use, Change in activity, Upcoming invasive procedure, Emergency department visit, Upcoming dental procedure, Missed doses, Extra doses, Change in medications, Change in diet/appetite, Hospital admission, Bruising, Other complaints          HPI:   Shereen Wattsa seen in clinic today, on anticoagulation therapy with warfarin for stroke prophylaxis due to history of PAF.    Patient's previous INR was therapeutic at 2.1 on 22, at which time patient was instructed to continue with current warfarin regimen.  She returns to clinic today to recheck INR to ensure it is therapeutic and thus preventing possible clotting and/or bleeding/bruising complications.    CHADS-VASc = at least 5  (unadjusted ischemic stroke risk/year:  7.2%, which is moderate risk)    Does patient have any changes to current medical/health status since last appt (Y/N):   NO  Does patient have any signs/symptoms of bleeding and/or thrombosis since the last appt (Y/N):  NO  Does patient have any interval changes to diet or medications since last appt (Y/N):  NO  Are there any complications or cost restrictions with current therapy (Y/N):  NO     Does patient have [facility] PCP? Yes, Dr Ganesh Mckeon (If not, please document discussion that patient must be seen at [facility])       Vitals:  declined today    There were no vitals filed for this visit.     Asssessment:      INR therapeutic at 2.3, therefore decreasing stroke and/or bleeding risk.   Reason(s) for out of range INR today:  n/a      Pt is not on antiplatelet therapy    Medication reconciliation completed today.    Plan:  Pt is to continue with current warfarin dosing regimen.     Follow up:  Because warfarin is a high risk medication and current CHEST guidelines recommend regular monitoring intervals (few days up to 12 weeks), will have patient return to clinic in 2 weeks to recheck INR.    Brady Piña, PharmD, BCACP

## 2022-09-10 ENCOUNTER — APPOINTMENT (OUTPATIENT)
Dept: RADIOLOGY | Facility: IMAGING CENTER | Age: 87
End: 2022-09-10
Attending: PHYSICIAN ASSISTANT
Payer: MEDICARE

## 2022-09-10 ENCOUNTER — OFFICE VISIT (OUTPATIENT)
Dept: URGENT CARE | Facility: CLINIC | Age: 87
End: 2022-09-10
Payer: MEDICARE

## 2022-09-10 VITALS
DIASTOLIC BLOOD PRESSURE: 86 MMHG | WEIGHT: 150.8 LBS | HEIGHT: 62 IN | TEMPERATURE: 97.1 F | BODY MASS INDEX: 27.75 KG/M2 | RESPIRATION RATE: 18 BRPM | OXYGEN SATURATION: 97 % | SYSTOLIC BLOOD PRESSURE: 134 MMHG | HEART RATE: 87 BPM

## 2022-09-10 DIAGNOSIS — L03.032 CELLULITIS OF TOE OF LEFT FOOT: ICD-10-CM

## 2022-09-10 DIAGNOSIS — M79.675 TOE PAIN, LEFT: ICD-10-CM

## 2022-09-10 DIAGNOSIS — E11.9 TYPE 2 DIABETES MELLITUS WITHOUT COMPLICATION, WITHOUT LONG-TERM CURRENT USE OF INSULIN (HCC): ICD-10-CM

## 2022-09-10 DIAGNOSIS — B37.31 VAGINAL CANDIDIASIS: ICD-10-CM

## 2022-09-10 PROCEDURE — 73660 X-RAY EXAM OF TOE(S): CPT | Mod: TC,LT | Performed by: RADIOLOGY

## 2022-09-10 PROCEDURE — 99214 OFFICE O/P EST MOD 30 MIN: CPT | Performed by: PHYSICIAN ASSISTANT

## 2022-09-10 RX ORDER — FLUCONAZOLE 150 MG/1
TABLET ORAL
Qty: 2 TABLET | Refills: 0 | Status: SHIPPED
Start: 2022-09-10 | End: 2023-04-10

## 2022-09-10 RX ORDER — SULFAMETHOXAZOLE AND TRIMETHOPRIM 800; 160 MG/1; MG/1
1 TABLET ORAL 2 TIMES DAILY
Qty: 10 TABLET | Refills: 0 | Status: SHIPPED | OUTPATIENT
Start: 2022-09-10 | End: 2022-09-15

## 2022-09-10 RX ORDER — CEPHALEXIN 500 MG/1
500 CAPSULE ORAL 4 TIMES DAILY
Qty: 20 CAPSULE | Refills: 0 | Status: SHIPPED | OUTPATIENT
Start: 2022-09-10 | End: 2022-09-15

## 2022-09-10 ASSESSMENT — FIBROSIS 4 INDEX: FIB4 SCORE: 1.36

## 2022-09-10 NOTE — PROGRESS NOTES
Subjective:   Shereen Dale is a 86 y.o. female who presents for Toe Pain (Left little toe/pain/swollen/red/xlast night)     This is a pleasant 86-year-old diabetic female who presents with chief complaint of acute onset left toe redness pain and swelling since yesterday.  She denies fever or chills.  No myalgias.  She has diabetic.  She reports the pain is throbbing.  She has not tried any medications.  She does not recall any traumatic event.  She does have decreased sensation to the foot at times.      Medications:  amLODIPine Tabs  carvedilol Tabs  dorzolamide-timolol Soln  FreeStyle Lite Teresa  gabapentin Caps  glipiZIDE SR Tb24  haloperidol Conc  Home Care Oxygen  levothyroxine Tabs  metaxalone Tabs  metFORMIN Tabs  Misc. Devices Misc  morphine Soln  nystatin  omeprazole  Senna Plus Caps  senna-docusate Tabs  spironolactone Tabs  warfarin Tabs    Allergies:             Darvon [propoxyphene hcl], Iodine, Latex, Penicillins, Propoxyphene hcl, Tetanus antitoxin, and Tetracycline    Surgical History:         Past Surgical History:   Procedure Laterality Date    KNEE ARTHROSCOPY Right 5/21/2015    Procedure: KNEE ARTHROSCOPY;  Surgeon: Emerson Garcia M.D.;  Location: SURGERY [facility];  Service:     MENISCECTOMY Right 5/21/2015    Procedure: LATERAL MENISCECTOMY;  Surgeon: Emerson Garcia M.D.;  Location: [facility];  Service:     CATARACT PHACO WITH IOL  10/21/2013    Performed by Dominick Fernando M.D. at [facility]    CATARACT PHACO WITH IOL  9/23/2013    Performed by Dominick Fernando M.D. at [facility]    CHOLECYSTECTOMY      HYSTERECTOMY RADICAL      LUMPECTOMY      R breast    SC RADIATION THERAPY PLAN SIMPLE         Past Social Hx:  Shereen Dale  reports that she quit smoking about 58 years ago. Her smoking use included cigarettes. She has a 5.50 pack-year smoking history. She has never used smokeless tobacco. She reports that she does not currently  "use alcohol. She reports that she does not use drugs.     Past Family Hx:   Shereen Dale family history includes Heart Attack in her mother; Heart Disease in her father; Hypertension in her father and mother; Leukemia in her sister; No Known Problems in her brother, sister, sister, and sister; Sleep Apnea in her sister.       Problem list, medications, and allergies reviewed by myself today in Epic.     Objective:     /86 (BP Location: Left arm, Patient Position: Sitting)   Pulse 87   Temp 36.2 °C (97.1 °F) (Temporal)   Resp 18   Ht 1.575 m (5' 2\")   Wt 68.4 kg (150 lb 12.8 oz)   LMP 01/01/1985   SpO2 97%   BMI 27.58 kg/m²     Physical Exam  Musculoskeletal:        Feet:    Feet:      Comments: There is exquisite tenderness to the left fifth digit at the MTP over the proximal middle and distal phalanx.  Sensation is slightly diminished.  There is tenderness to the pulp space.  Distal pulses are intact.  There is no tenderness to the foot or ankle.  No ecchymosis.  Capillary refill within normal limits.  No obvious portal of entry.  No significant tinea or issue with the digital space.  No evidence of paronychia.    RADIOLOGY RESULTS   DX-TOE(S) 2+ LEFT    Result Date: 9/10/2022  9/10/2022 8:39 AM HISTORY/REASON FOR EXAM:  Left foot pain and swelling.. TECHNIQUE/EXAM DESCRIPTION AND NUMBER OF VIEWS:  3 views of the LEFT toes. COMPARISON: None FINDINGS: No evidence of fracture. There is degenerative change first MTP joint. There is vascular calcification. No evidence of bony destructive change.     No evidence of fracture or bony destructive change.         *X-rays were reviewed and interpreted independently by me. I agree with the radiologist's findings     Assessment/Plan:     Diagnosis and Associated Orders:     1. Toe pain, left  - DX-TOE(S) 2+ LEFT; Future    2. Cellulitis of toe of left foot  - cephALEXin (KEFLEX) 500 MG Cap; Take 1 Capsule by mouth 4 times a day for 5 days.  Dispense: 20 " Capsule; Refill: 0  - sulfamethoxazole-trimethoprim (BACTRIM DS) 800-160 MG tablet; Take 1 Tablet by mouth 2 times a day for 5 days.  Dispense: 10 Tablet; Refill: 0    3. Vaginal candidiasis  - fluconazole (DIFLUCAN) 150 MG tablet; Take one now and repeat in 72 hours if symptoms persist  Dispense: 2 Tablet; Refill: 0    4. Type 2 diabetes mellitus without complication, without long-term current use of insulin ([facility])      Comments/MDM:  Patient presents with acute onset erythema pain and swelling to the left pinky toe.  No obvious signs of paronychia.  Pulp space erythematous red and exquisitely tender.  Vital signs stable and reassuring.  We will initiate antibiotic therapy given history of diabetes.  Patient does have proclivity to antibiotic induced candidiasis.  I was concerned about giving her Diflucan given her Coumadin use.  Her daughter is a pharmacist and was contacted during the call.  She stated there was no issues with this.  I did recommend she use Monistat instead if she develops symptoms.  I do not recommend she start that she develops symptoms.  Probiotics.  Follow-up with increased redness swelling fever chills myalgias streaking in the leg.  All questions answered.  X-ray demonstrates no evidence of osteomyelitis.  No signs of      I personally reviewed prior external notes and test results pertinent to today's visit.  Red flags discussed as well as indications to present to the Emergency Department.  Supportive care, natural history, differential diagnoses, and indications for immediate follow-up discussed.  Patient expresses understanding and agrees to plan.  Patient denies any other questions or concerns.    Follow-up with the primary care physician for recheck, reevaluation, and consideration of further management.      Please note that this dictation was created using voice recognition software. I have made a reasonable attempt to correct obvious errors, but I expect that there are errors of  grammar and possibly content that I did not discover before finalizing the note.    This note was electronically signed by Tiffani Dye PA-C

## 2022-09-12 ENCOUNTER — ANTICOAGULATION MONITORING (OUTPATIENT)
Dept: VASCULAR LAB | Facility: MEDICAL CENTER | Age: 87
End: 2022-09-12
Payer: MEDICARE

## 2022-09-12 DIAGNOSIS — Z79.01 CHRONIC ANTICOAGULATION: ICD-10-CM

## 2022-09-12 DIAGNOSIS — I48.0 PAF (PAROXYSMAL ATRIAL FIBRILLATION) (HCC): ICD-10-CM

## 2022-09-12 DIAGNOSIS — I35.0 SEVERE AORTIC STENOSIS: ICD-10-CM

## 2022-09-12 LAB — INR PPP: 2.7 (ref 2–3.5)

## 2022-09-12 NOTE — PROGRESS NOTES
Pt was started on 5 days of Bactrim and 1 day of fluconazole.  She has an INR machine at home and her INR was 2.7 today.    Instructed pt hold warfarin x 1 day then continue regimen (~20% decrease).    F/u INR scheduled for 9/23    Yahaira Johnson, PharmD

## 2022-09-14 ENCOUNTER — OFFICE VISIT (OUTPATIENT)
Dept: MEDICAL GROUP | Facility: MEDICAL CENTER | Age: 87
End: 2022-09-14
Payer: MEDICARE

## 2022-09-14 VITALS
RESPIRATION RATE: 16 BRPM | HEIGHT: 62 IN | BODY MASS INDEX: 28.2 KG/M2 | SYSTOLIC BLOOD PRESSURE: 110 MMHG | HEART RATE: 94 BPM | WEIGHT: 153.22 LBS | TEMPERATURE: 97.3 F | OXYGEN SATURATION: 91 % | DIASTOLIC BLOOD PRESSURE: 60 MMHG

## 2022-09-14 DIAGNOSIS — L60.0 INGROWING NAIL WITH INFECTION: ICD-10-CM

## 2022-09-14 PROCEDURE — 99214 OFFICE O/P EST MOD 30 MIN: CPT | Performed by: FAMILY MEDICINE

## 2022-09-14 RX ORDER — GABAPENTIN 100 MG/1
100 CAPSULE ORAL 2 TIMES DAILY
Qty: 200 CAPSULE | Refills: 3 | Status: SHIPPED | OUTPATIENT
Start: 2022-09-14 | End: 2023-01-09 | Stop reason: SDUPTHER

## 2022-09-14 ASSESSMENT — FIBROSIS 4 INDEX: FIB4 SCORE: 1.36

## 2022-09-14 NOTE — PROGRESS NOTES
CC: Left little toe pain    HPI:   Shereen with history of diabetes, presents today with left little toe pain for 5 days.  She denies fever or chills. She reported that the pain is throbbing.  She denies any trauma.  She was seen at the urgent care on 9/10, she had an x-ray that showed no sign of fracture or bone infection.  Patient was treated with Keflex and Bactrim.  They have helped to some extent but she still having the pain especially when she puts pressure on it.      Patient Active Problem List    Diagnosis Date Noted    Secondary hypercoagulable state ([facility]) 06/09/2022    Acquired hypothyroidism 09/12/2019    Diabetes mellitus type 2 in obese ([facility]) 09/12/2019    Skin abrasion 08/20/2019    Chronic anticoagulation 08/02/2019    Hyponatremia 03/25/2019    Epistaxis 03/25/2019    Advanced directives, counseling/discussion 09/26/2018    Hypertensive urgency 05/11/2017    Diastolic dysfunction 02/02/2016    Tear of lateral cartilage or meniscus of knee, current 05/21/2015    PAF (paroxysmal atrial fibrillation) ([facility]) 01/12/2015    Dyspnea on effort 01/02/2014    Diabetes ([facility]) 12/26/2013    Severe aortic stenosis 07/02/2013    HTN (hypertension) 05/04/2012    Hemorrhagic disorder due to extrinsic circulating anticoagulants ([facility]) 05/02/2012    Cough 05/02/2012    Edema 05/02/2012    Acute, but ill-defined, cerebrovascular disease 04/30/2012       Current Outpatient Medications   Medication Sig Dispense Refill    cephALEXin (KEFLEX) 500 MG Cap Take 1 Capsule by mouth 4 times a day for 5 days. 20 Capsule 0    sulfamethoxazole-trimethoprim (BACTRIM DS) 800-160 MG tablet Take 1 Tablet by mouth 2 times a day for 5 days. 10 Tablet 0    fluconazole (DIFLUCAN) 150 MG tablet Take one now and repeat in 72 hours if symptoms persist 2 Tablet 0    metaxalone (SKELAXIN) 800 MG Tab Take 0.5 Tablets by mouth 2 times a day. 1/2 tab 400 mg 2 x day.  Indications: Musculoskeletal Pain 20 Tablet 0    metFORMIN (GLUCOPHAGE) 500 MG Tab  Take 1 Tablet by mouth 3 times a day. 300 Tablet 0    omeprazole (PRILOSEC) 20 MG delayed-release capsule Take 1 Capsule by mouth 2 times a day. 60 Capsule 1    warfarin (COUMADIN) 3 MG Tab Take one-half to one tablet by mouth daily or as directed by anticoagulation clinic  Indications: Aortic Stenosis 90 Tablet 1    amLODIPine (NORVASC) 2.5 MG Tab Take 1 Tablet by mouth every day for 200 days. 100 Tablet 1    amLODIPine (NORVASC) 5 MG Tab Take 1 Tablet by mouth every day for 200 days. Take 5mg PO daily in AM. 100 Tablet 2    glipiZIDE SR (GLUCOTROL) 2.5 MG TABLET SR 24 HR Take 1 Tablet by mouth every day. 90 Tablet 2    gabapentin (NEURONTIN) 100 MG Cap Take 1 Capsule by mouth 2 times a day. Take 1 capsule PO Daily in the AM, Take 2 capsules daily in the PM 90 Capsule 1    senna-docusate (PERICOLACE OR SENOKOT S) 8.6-50 MG Tab Take 2 Tablets by mouth every day for constipation. Hold for loose stools 60 Tablet 1    carvedilol (COREG) 12.5 MG Tab Take 1 Tablet by mouth 2 times a day for BP 60 Tablet 1    Misc. Devices Misc Oxygen concentrator with humidifier, 2.5-3 L/min 1 Each 0    carvedilol (COREG) 12.5 MG Tab Take 1 Tablet by mouth 2 times a day. 60 Tablet 1    spironolactone (ALDACTONE) 25 MG Tab Take 25 mg by mouth every day.      morphine (ROXANOL) 20 MG/ML Solution Take 0.25 mL by mouth every 2 hours as needed (moderate to severe pain or shortness of breath). 120 mL 0    levothyroxine (SYNTHROID) 50 MCG Tab TAKE ONE TABLET BY MOUTH EVERY MORNING FOR UNDERACTIVE THYROID 100 Tablet 1    nystatin (MYCOSTATIN) powder Apply topically 2 times a day for irritation 30 g 3    dorzolamide-timolol (COSOPT) 22.3-6.8 MG/ML Solution Administer 1 Drop into both eyes 2 times a day. Indications: Wide-Angle Glaucoma      Blood Glucose Monitoring Suppl (FREESTYLE LITE) Device USE TWO TIMES A DAY AS DIRECTED FOR BLOOD SUGAR MONITORING 100 Each 3    Home Care Oxygen Inhale 2-5 L/min as needed for Shortness of Breath (sleep  "apnea). 2-5 LPM as needed via nasal cannula  Indications: Shortness of breath, sleep apnea      haloperidol (HALDOL) 2 MG/ML Conc Take 0.5 mg by mouth every 6 hours as needed (Anxiety, agitation).      Sennosides-Docusate Sodium (SENNA PLUS) 8.6-50 MG Cap Take 2 tablet by mouth every day. Take every evening.  Hold for loose stools  Indications: Constipation       No current facility-administered medications for this visit.         Allergies as of 09/14/2022 - Reviewed 09/14/2022   Allergen Reaction Noted    Darvon [propoxyphene hcl]  03/18/2008    Iodine  05/11/2015    Latex  09/19/2013    Penicillins Anaphylaxis 08/02/2008    Propoxyphene hcl Anaphylaxis 08/02/2008    Tetanus antitoxin Myalgia 11/19/2017    Tetracycline Anaphylaxis 08/02/2008        ROS: Denies any chest pain, Shortness of breath, Changes bowel or bladder, Lower extremity edema.    Physical Exam:  /60 (BP Location: Right arm, Patient Position: Sitting, BP Cuff Size: Adult)   Pulse 94   Temp 36.3 °C (97.3 °F) (Temporal)   Resp 16   Ht 1.575 m (5' 2\")   Wt 69.5 kg (153 lb 3.5 oz)   LMP 01/01/1985   SpO2 91%   BMI 28.02 kg/m²   Gen.: Well-developed, well-nourished, no apparent distress,pleasant and cooperative with the examination  Left foot: Ingrowing toenail, with tenderness, nonfluctuant swelling.  No change in the color of the skin        Assessment and Plan.   86 y.o. female     1. Ingrowing nail with infection  Patient seems to have ingrowing nail with infection,  Continue on oral antibiotics.  Continue on Tylenol as needed.  Patient advised to be seen by a podiatrist for permanent treatment of the ingrowing nail.    - Referral to Podiatry      "

## 2022-09-19 RX ORDER — CARVEDILOL 12.5 MG/1
12.5 TABLET ORAL 2 TIMES DAILY
Qty: 200 TABLET | Refills: 3 | Status: SHIPPED | OUTPATIENT
Start: 2022-09-19 | End: 2023-01-30 | Stop reason: SDUPTHER

## 2022-09-21 RX ORDER — CARVEDILOL 12.5 MG/1
12.5 TABLET ORAL 2 TIMES DAILY
Qty: 200 TABLET | Refills: 2 | Status: SHIPPED
Start: 2022-09-21 | End: 2023-05-26

## 2022-09-23 ENCOUNTER — ANTICOAGULATION VISIT (OUTPATIENT)
Dept: MEDICAL GROUP | Facility: PHYSICIAN GROUP | Age: 87
End: 2022-09-23
Payer: MEDICARE

## 2022-09-23 DIAGNOSIS — I48.0 PAF (PAROXYSMAL ATRIAL FIBRILLATION) (HCC): ICD-10-CM

## 2022-09-23 DIAGNOSIS — I35.0 SEVERE AORTIC STENOSIS: ICD-10-CM

## 2022-09-23 DIAGNOSIS — Z79.01 CHRONIC ANTICOAGULATION: Primary | ICD-10-CM

## 2022-09-23 LAB — INR PPP: 2.7 (ref 2–3.5)

## 2022-09-23 PROCEDURE — 99999 PR NO CHARGE: CPT | Performed by: INTERNAL MEDICINE

## 2022-09-23 PROCEDURE — 85610 PROTHROMBIN TIME: CPT | Performed by: INTERNAL MEDICINE

## 2022-09-23 PROCEDURE — 93793 ANTICOAG MGMT PT WARFARIN: CPT | Performed by: INTERNAL MEDICINE

## 2022-09-23 NOTE — PROGRESS NOTES
Anticoagulation Summary  As of 2022      INR goal:  2.0-3.0   TTR:  75.4 % (3.1 mo)   INR used for dosin.70 (2022)   Warfarin maintenance plan:  3 mg (3 mg x 1) every Mon, Wed, Fri; 1.5 mg (3 mg x 0.5) all other days   Weekly warfarin total:  15 mg   Plan last modified:  Brady Piña, PharmD (2022)   Next INR check:  10/7/2022   Target end date:  Indefinite    Indications    Severe aortic stenosis [I35.0]  PAF (paroxysmal atrial fibrillation) (HCC) [I48.0]  Chronic anticoagulation [Z79.01]                 Anticoagulation Episode Summary       INR check location:      Preferred lab:      Send INR reminders to:      Comments:            Anticoagulation Care Providers       Provider Role Specialty Phone number    Ganesh Mckeon M.D. Referring Geriatric Medicine - Family Medicine [phone]    [facility] Responsible  [phone]          Anticoagulation Patient Findings  Patient Findings       Positives:  Change in medications (just finished course of bactrim and keflex)    Negatives:  Signs/symptoms of thrombosis, Signs/symptoms of bleeding, Laboratory test error suspected, Change in health, Change in alcohol use, Change in activity, Upcoming invasive procedure, Emergency department visit, Upcoming dental procedure, Missed doses, Extra doses, Change in diet/appetite, Hospital admission, Bruising, Other complaints          HPI:   Shereen Dale seen in clinic today, on anticoagulation therapy with warfarin for stroke prophylaxis due to history of atrial fibrillation.    Patient's previous INR was therapeutic at 2.7 on 22, at which time patient was instructed to hold one dose d/t DDI with bactrim, then resume current warfarin regimen.  She returns to clinic today to recheck INR to ensure it is therapeutic and thus preventing possible clotting and/or bleeding/bruising complications.    CHADS-VASc = at least 5  (unadjusted ischemic stroke risk/year:  7.2%, which  is moderate risk)    Does patient have any changes to current medical/health status since last appt (Y/N):  NO  Does patient have any signs/symptoms of bleeding and/or thrombosis since the last appt (Y/N):  NO  Does patient have any interval changes to diet or medications since last appt (Y/N):  Just finished course of bactrim and keflex.  Are there any complications or cost restrictions with current therapy (Y/N):  NO     Does patient have [facility] PCP? Yes, Dr Ganesh Perales. (If not, please document discussion that patient must be seen at [facility])       Vitals:  declined today.    There were no vitals filed for this visit.     Asssessment:      INR therapeutic at 2.7, therefore decreasing patient's stroke and/or bleeding risk.   Reason(s) for out of range INR today:  n/a      Pt is not on antiplatelet therapy    Medication reconciliation completed today.    Plan:  Instructed patient to decrease today's dose to 1.5mg (she likes INR to be 2.0-2.5), then resume current warfarin regimen.     Follow up:  Because warfarin is a high risk medication and current CHEST guidelines recommend regular monitoring intervals (few days up to 12 weeks), will have patient return to clinic in 2 weeks to recheck INR.    Brady Piña, PharmD, BCACP

## 2022-09-29 ENCOUNTER — PATIENT OUTREACH (OUTPATIENT)
Dept: HEALTH INFORMATION MANAGEMENT | Facility: OTHER | Age: 87
End: 2022-09-29
Payer: MEDICARE

## 2022-09-29 DIAGNOSIS — I51.89 DIASTOLIC DYSFUNCTION: ICD-10-CM

## 2022-09-29 DIAGNOSIS — E11.9 TYPE 2 DIABETES MELLITUS WITHOUT COMPLICATION, WITHOUT LONG-TERM CURRENT USE OF INSULIN (HCC): ICD-10-CM

## 2022-09-29 PROCEDURE — 99490 CHRNC CARE MGMT STAFF 1ST 20: CPT | Performed by: FAMILY MEDICINE

## 2022-09-29 RX ORDER — METAXALONE 800 MG/1
400 TABLET ORAL 2 TIMES DAILY
Qty: 20 TABLET | Refills: 0 | Status: SHIPPED | OUTPATIENT
Start: 2022-09-29 | End: 2022-10-19 | Stop reason: SDUPTHER

## 2022-09-29 NOTE — PROGRESS NOTES
Assessment    Spoke with Shereen today for CCM follow-up. She said she is doing well. Saw Podiatrist yesterday because she was concerned for gangrene but her just trimmed her toes, and put some medication on it and said it was ok. He did not require follow-up with her. She said otherwise they are doing well and no needs or concerns at this time    Education    Diet, exercise, safety    Care Plan    Progressing    Progress:    Progressing    Next outreach: Catrina will set up CCM monthly follow-up with them

## 2022-10-07 ENCOUNTER — ANTICOAGULATION VISIT (OUTPATIENT)
Dept: MEDICAL GROUP | Facility: PHYSICIAN GROUP | Age: 87
End: 2022-10-07
Payer: MEDICARE

## 2022-10-07 DIAGNOSIS — Z79.01 CHRONIC ANTICOAGULATION: Primary | ICD-10-CM

## 2022-10-07 DIAGNOSIS — I35.0 SEVERE AORTIC STENOSIS: ICD-10-CM

## 2022-10-07 DIAGNOSIS — I48.0 PAF (PAROXYSMAL ATRIAL FIBRILLATION) (HCC): ICD-10-CM

## 2022-10-07 LAB — INR PPP: 3.6 (ref 2–3.5)

## 2022-10-07 PROCEDURE — 85610 PROTHROMBIN TIME: CPT | Performed by: INTERNAL MEDICINE

## 2022-10-07 PROCEDURE — 99211 OFF/OP EST MAY X REQ PHY/QHP: CPT | Performed by: INTERNAL MEDICINE

## 2022-10-07 NOTE — PROGRESS NOTES
Anticoagulation Summary  As of 10/7/2022      INR goal:  2.0-3.0   TTR:  69.1 % (3.5 mo)   INR used for dosing:  3.60 (10/7/2022)   Warfarin maintenance plan:  3 mg (3 mg x 1) every Mon, Fri; 1.5 mg (3 mg x 0.5) all other days   Weekly warfarin total:  13.5 mg   Plan last modified:  Brady Piña, PharmD (10/7/2022)   Next INR check:  10/21/2022   Target end date:  Indefinite    Indications    Severe aortic stenosis [I35.0]  PAF (paroxysmal atrial fibrillation) (HCC) [I48.0]  Chronic anticoagulation [Z79.01]                 Anticoagulation Episode Summary       INR check location:      Preferred lab:      Send INR reminders to:      Comments:            Anticoagulation Care Providers       Provider Role Specialty Phone number    Ganesh Mckeon M.D. Referring Geriatric Medicine - Family Medicine [phone]    [facility] Responsible  [phone]          Anticoagulation Patient Findings  Patient Findings       Negatives:  Signs/symptoms of thrombosis, Signs/symptoms of bleeding, Laboratory test error suspected, Change in health, Change in alcohol use, Change in activity, Upcoming invasive procedure, Emergency department visit, Upcoming dental procedure, Missed doses, Extra doses, Change in medications, Change in diet/appetite, Hospital admission, Bruising, Other complaints                HPI:   Shereen Dale  seen in clinic today, on anticoagulation therapy with warfarin for stroke prophylaxis due to history of atrial fibrillation    Patient's previous INR was therapeutic at 2.7 on 09/23/2022, at which time patient was instructed to continue with current warfarin dosing regimen.  .  She returns to clinic today to recheck INR to ensure it is therapeutic and thus preventing possible clotting and/or bleeding/bruising complications.    CHADS-VASc = at least 5   (unadjusted ischemic stroke risk/year:  7.2 %, which is moderate)    Does patient have any changes to current medical/health  status since last appt (Y/N):  no  Does patient have any signs/symptoms of bleeding and/or thrombosis since the last appt (Y/N):  no  Does patient have any interval changes to diet or medications since last appt (Y/N):  no  Are there any complications or cost restrictions with current therapy (Y/N):  no     Does patient have [facility] PCP? Dr Ganesh Burton (If not, please document discussion that patient must be seen at [facility])       Vitals:  declined    There were no vitals filed for this visit.     Asssessment:      INR SUPRAtherapeutic at 3.6, therefore hold tonight's warfarin dose and decrease TWD next week as above.    Reason(s) for out of range INR today:  Dose too high      Pt is not on antiplatelet therapy    Medication reconciliation completed today.    Plan:  Hold tonight's dose and reduce TWD as above.     Follow up:  Because warfarin is a high risk medication and current CHEST guidelines recommend regular monitoring intervals (few days up to 12 weeks), will have patient return to clinic in 2 weeks to recheck INR.    Dominick Hampton Pharmacy Intern  Brady Piña, PharmD

## 2022-10-17 ENCOUNTER — PATIENT OUTREACH (OUTPATIENT)
Dept: HEALTH INFORMATION MANAGEMENT | Facility: OTHER | Age: 87
End: 2022-10-17
Payer: MEDICARE

## 2022-10-17 DIAGNOSIS — I51.89 DIASTOLIC DYSFUNCTION: ICD-10-CM

## 2022-10-17 DIAGNOSIS — E11.9 TYPE 2 DIABETES MELLITUS WITHOUT COMPLICATION, WITHOUT LONG-TERM CURRENT USE OF INSULIN (HCC): ICD-10-CM

## 2022-10-17 NOTE — PROGRESS NOTES
Warm handoff Report to Catrina FIELDS who will take over Shereen's care coordination. Shereen is aware at our last conversation that Catrina would continue to follow-up with her as of October. Will transfer CCM program to Catrina FIELDS at this time

## 2022-10-19 RX ORDER — METAXALONE 800 MG/1
400 TABLET ORAL 2 TIMES DAILY
Qty: 20 TABLET | Refills: 0 | Status: SHIPPED | OUTPATIENT
Start: 2022-10-19 | End: 2022-11-08 | Stop reason: SDUPTHER

## 2022-10-21 ENCOUNTER — ANTICOAGULATION VISIT (OUTPATIENT)
Dept: MEDICAL GROUP | Facility: PHYSICIAN GROUP | Age: 87
End: 2022-10-21
Payer: MEDICARE

## 2022-10-21 DIAGNOSIS — Z79.01 CHRONIC ANTICOAGULATION: Primary | ICD-10-CM

## 2022-10-21 DIAGNOSIS — I48.0 PAF (PAROXYSMAL ATRIAL FIBRILLATION) (HCC): ICD-10-CM

## 2022-10-21 DIAGNOSIS — I35.0 SEVERE AORTIC STENOSIS: ICD-10-CM

## 2022-10-21 LAB — INR PPP: 2.3 (ref 2–3.5)

## 2022-10-21 PROCEDURE — 99999 PR NO CHARGE: CPT | Performed by: INTERNAL MEDICINE

## 2022-10-21 PROCEDURE — 85610 PROTHROMBIN TIME: CPT | Performed by: INTERNAL MEDICINE

## 2022-10-21 PROCEDURE — 93793 ANTICOAG MGMT PT WARFARIN: CPT | Performed by: INTERNAL MEDICINE

## 2022-10-21 NOTE — PROGRESS NOTES
Anticoagulation Summary  As of 10/21/2022      INR goal:  2.0-3.0   TTR:  68.4 % (4 mo)   INR used for dosin.30 (10/21/2022)   Warfarin maintenance plan:  3 mg (3 mg x 1) every Mon, Fri; 1.5 mg (3 mg x 0.5) all other days   Weekly warfarin total:  13.5 mg   Plan last modified:  Brady Piña, PharmD (10/7/2022)   Next INR check:  2022   Target end date:  Indefinite    Indications    Severe aortic stenosis [I35.0]  PAF (paroxysmal atrial fibrillation) (HCC) [I48.0]  Chronic anticoagulation [Z79.01]                 Anticoagulation Episode Summary       INR check location:      Preferred lab:      Send INR reminders to:      Comments:            Anticoagulation Care Providers       Provider Role Specialty Phone number    Ganesh Mckeon M.D. Referring Geriatric Medicine - Family Medicine [phone]    [facility] Anticoagulation Services Responsible  [phone]          Anticoagulation Patient Findings  Patient Findings       Negatives:  Signs/symptoms of thrombosis, Signs/symptoms of bleeding, Laboratory test error suspected, Change in health, Change in alcohol use, Change in activity, Upcoming invasive procedure, Emergency department visit, Upcoming dental procedure, Missed doses, Extra doses, Change in medications, Change in diet/appetite, Hospital admission, Bruising, Other complaints          HPI:   Shereen E Dale seen in clinic today, on anticoagulation therapy with warfarin for stroke prophylaxis due to history of PAF.    Patient's previous INR was supratherapeutic at 3.6 on 10-7-22, at which time patient was instructed to hold one dose, then decrease weekly warfarin regimen.  She returns to clinic today to recheck INR to ensure it is therapeutic and thus preventing possible clotting and/or bleeding/bruising complications.    CHADS-VASc = at least 5  (unadjusted ischemic stroke risk/year:  7.2%, which is moderate risk)    Does patient have any changes to current medical/health status since  last appt (Y/N):  NO  Does patient have any signs/symptoms of bleeding and/or thrombosis since the last appt (Y/N):  NO  Does patient have any interval changes to diet or medications since last appt (Y/N):  NO  Are there any complications or cost restrictions with current therapy (Y/N):  NO     Does patient have [facility] PCP? Yes, Dr Ganesh Mckeon (If not, please document discussion that patient must be seen at [facility])       Vitals:  declined today.    There were no vitals filed for this visit.     Asssessment:      INR therapeutic at 2.3, therefore decreasing patient's stroke and/or bleeding risk.   Reason(s) for out of range INR today:  n/a      Pt is not on antiplatelet therapy    Medication reconciliation completed today.    Plan:  Pt is to continue with current warfarin dosing regimen.     Follow up:  Because warfarin is a high risk medication and current CHEST guidelines recommend regular monitoring intervals (few days up to 12 weeks), will have patient return to clinic in 2 weeks to recheck INR.    Brady Piña, PharmD, BCACP

## 2022-10-27 ENCOUNTER — PATIENT OUTREACH (OUTPATIENT)
Dept: HEALTH INFORMATION MANAGEMENT | Facility: OTHER | Age: 87
End: 2022-10-27
Payer: MEDICARE

## 2022-10-27 DIAGNOSIS — I10 PRIMARY HYPERTENSION: ICD-10-CM

## 2022-10-27 DIAGNOSIS — E11.9 TYPE 2 DIABETES MELLITUS WITHOUT COMPLICATION, WITHOUT LONG-TERM CURRENT USE OF INSULIN (HCC): ICD-10-CM

## 2022-10-27 PROCEDURE — 99999 PR NO CHARGE: CPT | Performed by: FAMILY MEDICINE

## 2022-10-27 NOTE — PROGRESS NOTES
Reached out to patient for CCM follow up. This RN introduced herself and that she was taking over for Samantha FIELDS. Per patient, she is doing well. She stated that her toenail fell off. Other than that, she has not needs at this time. Next outreach: 11/28/2022 @ 1300.

## 2022-11-02 ENCOUNTER — DOCUMENTATION (OUTPATIENT)
Dept: VASCULAR LAB | Facility: MEDICAL CENTER | Age: 87
End: 2022-11-02
Payer: MEDICARE

## 2022-11-02 NOTE — PROGRESS NOTES
S/w patient today regarding tooth extraction done this AM.  She was instructed to hold warfarin dose yesterday in preparation and said she has an INR of 1.7 today.  She was told by oral surgeon to remain off warfarin for two additional days.  Given current ADA guidelines, as well as patient's hx of TIA, there would be no reason to remain off warfarin at this time as stroke risk far outweighs bleeding risk.  Instructed patient to resume current warfarin regimen.  Appropriate f/u INR scheduled for 11-4-22.  Brady Piña, PharmD, BCACP

## 2022-11-04 ENCOUNTER — ANTICOAGULATION VISIT (OUTPATIENT)
Dept: MEDICAL GROUP | Facility: PHYSICIAN GROUP | Age: 87
End: 2022-11-04
Payer: MEDICARE

## 2022-11-04 DIAGNOSIS — I48.0 PAF (PAROXYSMAL ATRIAL FIBRILLATION) (HCC): ICD-10-CM

## 2022-11-04 DIAGNOSIS — Z79.01 CHRONIC ANTICOAGULATION: Primary | ICD-10-CM

## 2022-11-04 DIAGNOSIS — I35.0 SEVERE AORTIC STENOSIS: ICD-10-CM

## 2022-11-04 LAB — INR PPP: 1.3 (ref 2–3.5)

## 2022-11-04 PROCEDURE — 99211 OFF/OP EST MAY X REQ PHY/QHP: CPT | Performed by: INTERNAL MEDICINE

## 2022-11-04 PROCEDURE — 85610 PROTHROMBIN TIME: CPT | Performed by: INTERNAL MEDICINE

## 2022-11-04 NOTE — PROGRESS NOTES
Anticoagulation Summary  As of 2022      INR goal:  2.0-3.0   TTR:  63.7 % (4.5 mo)   INR used for dosin.30 (2022)   Warfarin maintenance plan:  3 mg (3 mg x 1) every Mon, Fri; 1.5 mg (3 mg x 0.5) all other days   Weekly warfarin total:  13.5 mg   Plan last modified:  Brady Piña, PharmD (10/7/2022)   Next INR check:  2022   Target end date:  Indefinite    Indications    Severe aortic stenosis [I35.0]  PAF (paroxysmal atrial fibrillation) (HCC) [I48.0]  Chronic anticoagulation [Z79.01]                 Anticoagulation Episode Summary       INR check location:      Preferred lab:      Send INR reminders to:      Comments:            Anticoagulation Care Providers       Provider Role Specialty Phone number    Ganesh Mckeon M.D. Referring Geriatric Medicine - Family Medicine [phone]    [facility] Responsible  [phone]          Anticoagulation Patient Findings  Patient Findings       Positives:  Change in health, Missed doses    Negatives:  Signs/symptoms of thrombosis, Signs/symptoms of bleeding, Laboratory test error suspected, Change in alcohol use, Change in activity, Upcoming invasive procedure, Emergency department visit, Upcoming dental procedure, Extra doses, Change in medications, Change in diet/appetite, Hospital admission, Bruising, Other complaints          HPI:   Shereen DE LEÓN Dale seen in clinic today, on anticoagulation therapy with warfarin for stroke prophylaxis due to history of atrial fibrillation.    Patient's previous INR was therapeutic at 2.3 on 10-21-22, at which time patient was instructed to continue with current warfarin regimen.  She returns to clinic today to recheck INR to ensure it is therapeutic and thus preventing possible clotting and/or bleeding/bruising complications.    CHADS-VASc = at least 5  (unadjusted ischemic stroke risk/year:  7.2%, which is moderate risk)    Does patient have any changes to current medical/health  status since last appt (Y/N):  As previously noted, had single tooth extraction on 11-1-22 and was told to hold warfarin dose.  Does patient have any signs/symptoms of bleeding and/or thrombosis since the last appt (Y/N):  NO  Does patient have any interval changes to diet or medications since last appt (Y/N):  Three day course of clindamycin, finishes tomorrow.  Are there any complications or cost restrictions with current therapy (Y/N):  NO     Does patient have [facility] PCP? Yes, Dr Ganesh Mckeon (If not, please document discussion that patient must be seen at [facility])       Vitals:  declined today    There were no vitals filed for this visit.     Asssessment:      INR subtherapeutic at 1.3, therefore increasing patient's stroke risk   Reason(s) for out of range INR today:  missed dose      Pt is not on antiplatelet therapy    Medication reconciliation completed today.    Plan:  Instructed patient to bolus with 4.5mg X 1, then resume current warfarin regimen.     Follow up:  Because warfarin is a high risk medication and current CHEST guidelines recommend regular monitoring intervals (few days up to 12 weeks), will have patient return to clinic in 1 weeks to recheck INR.    Brady Piña, PharmD, BCACP

## 2022-11-08 RX ORDER — LEVOTHYROXINE SODIUM 0.05 MG/1
TABLET ORAL
Qty: 100 TABLET | Refills: 1 | Status: SHIPPED | OUTPATIENT
Start: 2022-11-08 | End: 2023-03-28 | Stop reason: SDUPTHER

## 2022-11-08 RX ORDER — METAXALONE 800 MG/1
400 TABLET ORAL 2 TIMES DAILY
Qty: 20 TABLET | Refills: 0 | Status: SHIPPED | OUTPATIENT
Start: 2022-11-08 | End: 2022-11-28 | Stop reason: SDUPTHER

## 2022-11-09 ENCOUNTER — PATIENT MESSAGE (OUTPATIENT)
Dept: HEALTH INFORMATION MANAGEMENT | Facility: OTHER | Age: 87
End: 2022-11-09

## 2022-11-11 ENCOUNTER — ANTICOAGULATION VISIT (OUTPATIENT)
Dept: MEDICAL GROUP | Facility: PHYSICIAN GROUP | Age: 87
End: 2022-11-11
Payer: MEDICARE

## 2022-11-11 DIAGNOSIS — Z79.01 CHRONIC ANTICOAGULATION: ICD-10-CM

## 2022-11-11 DIAGNOSIS — I35.0 SEVERE AORTIC STENOSIS: ICD-10-CM

## 2022-11-11 DIAGNOSIS — I48.0 PAF (PAROXYSMAL ATRIAL FIBRILLATION) (HCC): ICD-10-CM

## 2022-11-11 LAB — INR PPP: 1.8 (ref 2–3.5)

## 2022-11-11 PROCEDURE — 99211 OFF/OP EST MAY X REQ PHY/QHP: CPT | Performed by: INTERNAL MEDICINE

## 2022-11-11 PROCEDURE — 85610 PROTHROMBIN TIME: CPT | Performed by: INTERNAL MEDICINE

## 2022-11-11 NOTE — PROGRESS NOTES
Anticoagulation Summary  As of 2022      INR goal:  2.0-3.0   TTR:  60.6 % (4.7 mo)   INR used for dosin.80 (2022)   Warfarin maintenance plan:  3 mg (3 mg x 1) every Mon, Fri; 1.5 mg (3 mg x 0.5) all other days; Starting 2022   Weekly warfarin total:  13.5 mg   Plan last modified:  Brady Piña, PharmD (10/7/2022)   Next INR check:  2022   Target end date:  Indefinite    Indications    Severe aortic stenosis [I35.0]  PAF (paroxysmal atrial fibrillation) (HCC) [I48.0]  Chronic anticoagulation [Z79.01]                 Anticoagulation Episode Summary       INR check location:      Preferred lab:      Send INR reminders to:      Comments:            Anticoagulation Care Providers       Provider Role Specialty Phone number    Ganesh Mckeon M.D. Referring Geriatric Medicine - Family Medicine [phone]    [facility] Anticoagulation Services Responsible  [phone]          Anticoagulation Patient Findings  Patient Findings       Negatives:  Signs/symptoms of thrombosis, Signs/symptoms of bleeding, Laboratory test error suspected, Change in health, Change in alcohol use, Change in activity, Upcoming invasive procedure, Emergency department visit, Upcoming dental procedure, Missed doses, Extra doses, Change in medications, Change in diet/appetite, Hospital admission, Bruising, Other complaints            HPI:   Shereen Dale seen in clinic today, on anticoagulation therapy with warfarin for stroke prophylaxis due to history of atrial fibrillation.    Patient's previous INR was SUB-therapeutic at 1.3 on 22, at which time patient was instructed to bolus with boost dose then to resume current warfarin regimen.  She returns to clinic today to recheck INR to ensure it is therapeutic and thus preventing possible clotting and/or bleeding/bruising complications.    CHADS-VASc = at least 5  (unadjusted ischemic stroke risk/year:  7.2%, which is moderate risk)    Does patient have any  changes to current medical/health status since last appt (Y/N): No     Does patient have any signs/symptoms of bleeding and/or thrombosis since the last appt (Y/N):  NO  Does patient have any interval changes to diet or medications since last appt (Y/N):  NO  Are there any complications or cost restrictions with current therapy (Y/N):  NO     Does patient have [facility] PCP? Yes, Dr Ganesh Mckeon (If not, please document discussion that patient must be seen at [facility])       Vitals:  declined today    There were no vitals filed for this visit.     Asssessment:      INR subtherapeutic at 1.8,  therefore increasing patient's stroke risk   Reason(s) for out of range INR today: was low last week    Pt is not on antiplatelet therapy    Medication reconciliation completed today.    Plan:  Instructed patient to bolus with 3mg TOMORROW, then to resume her current dosing regimen.  Patient will follow up again in 1 week.     Follow up:  Because warfarin is a high risk medication and current CHEST guidelines recommend regular monitoring intervals (few days up to 12 weeks), will have patient return to clinic in 1 weeks to recheck INR.    Samson Coulter  PharmD

## 2022-11-16 ENCOUNTER — PATIENT MESSAGE (OUTPATIENT)
Dept: HEALTH INFORMATION MANAGEMENT | Facility: OTHER | Age: 87
End: 2022-11-16

## 2022-11-16 ENCOUNTER — DOCUMENTATION (OUTPATIENT)
Dept: HEALTH INFORMATION MANAGEMENT | Facility: OTHER | Age: 87
End: 2022-11-16
Payer: MEDICARE

## 2022-11-18 ENCOUNTER — ANTICOAGULATION VISIT (OUTPATIENT)
Dept: MEDICAL GROUP | Facility: PHYSICIAN GROUP | Age: 87
End: 2022-11-18
Payer: MEDICARE

## 2022-11-18 DIAGNOSIS — I48.0 PAF (PAROXYSMAL ATRIAL FIBRILLATION) (HCC): ICD-10-CM

## 2022-11-18 DIAGNOSIS — I35.0 SEVERE AORTIC STENOSIS: ICD-10-CM

## 2022-11-18 DIAGNOSIS — Z79.01 CHRONIC ANTICOAGULATION: Primary | ICD-10-CM

## 2022-11-18 LAB — INR PPP: 2.3 (ref 2–3.5)

## 2022-11-18 PROCEDURE — 85610 PROTHROMBIN TIME: CPT | Performed by: INTERNAL MEDICINE

## 2022-11-18 PROCEDURE — 93793 ANTICOAG MGMT PT WARFARIN: CPT | Performed by: INTERNAL MEDICINE

## 2022-11-18 PROCEDURE — 99999 PR NO CHARGE: CPT | Performed by: INTERNAL MEDICINE

## 2022-11-18 NOTE — PROGRESS NOTES
Anticoagulation Summary  As of 2022      INR goal:  2.0-3.0   TTR:  61.5 % (4.9 mo)   INR used for dosin.30 (2022)   Warfarin maintenance plan:  3 mg (3 mg x 1) every Mon, Fri; 1.5 mg (3 mg x 0.5) all other days; Starting 2022   Weekly warfarin total:  13.5 mg   Plan last modified:  Brady Piña, PharmD (10/7/2022)   Next INR check:  2022   Target end date:  Indefinite    Indications    Severe aortic stenosis [I35.0]  PAF (paroxysmal atrial fibrillation) (HCC) [I48.0]  Chronic anticoagulation [Z79.01]                 Anticoagulation Episode Summary       INR check location:      Preferred lab:      Send INR reminders to:      Comments:            Anticoagulation Care Providers       Provider Role Specialty Phone number    Ganesh Mckeon M.D. Referring Geriatric Medicine - Family Medicine [phone]    [facility] Anticoagulation Services Responsible  [phone]          Anticoagulation Patient Findings  Patient Findings       Negatives:  Signs/symptoms of thrombosis, Signs/symptoms of bleeding, Laboratory test error suspected, Change in health, Change in alcohol use, Change in activity, Upcoming invasive procedure, Emergency department visit, Upcoming dental procedure, Missed doses, Extra doses, Change in medications, Change in diet/appetite, Hospital admission, Bruising, Other complaints          HPI:   Shereen Dale seen in clinic today, on anticoagulation therapy with warfarin for stroke prophylaxis due to history of PAF and aortic stenosis.    Patient's previous INR was subtherapeutic at 1.8 on 22, at which time patient was instructed to bolus with one dose, then resume current warfarin regimen.  She returns to clinic today to recheck INR to ensure it is therapeutic and thus preventing possible clotting and/or bleeding/bruising complications.    CHADS-VASc = at least 5  (unadjusted ischemic stroke risk/year:  7.2%, which is moderate risk)    Does patient have any  changes to current medical/health status since last appt (Y/N):  NO  Does patient have any signs/symptoms of bleeding and/or thrombosis since the last appt (Y/N):  NO  Does patient have any interval changes to diet or medications since last appt (Y/N):  NO  Are there any complications or cost restrictions with current therapy (Y/N):  NO     Does patient have [facility] PCP? Yes, Dr Ganesh Mckeon (If not, please document discussion that patient must be seen at [facility])       Vitals:  declined today    There were no vitals filed for this visit.     Asssessment:      INR therapeutic at 2.3, therefore decreasing patient's stroke and/or bleeding risk.   Reason(s) for out of range INR today:  n/a      Pt is not on antiplatelet therapy    Medication reconciliation completed today.    Plan:  Pt is to continue with current warfarin dosing regimen.     Follow up:  Because warfarin is a high risk medication and current CHEST guidelines recommend regular monitoring intervals (few days up to 12 weeks), will have patient return to clinic in 2 weeks to recheck INR.    Brady Piña, PharmD, BCACP

## 2022-11-29 RX ORDER — METAXALONE 800 MG/1
400 TABLET ORAL 2 TIMES DAILY
Qty: 20 TABLET | Refills: 0 | Status: SHIPPED | OUTPATIENT
Start: 2022-11-29 | End: 2022-12-04 | Stop reason: SDUPTHER

## 2022-12-01 ENCOUNTER — PATIENT OUTREACH (OUTPATIENT)
Dept: HEALTH INFORMATION MANAGEMENT | Facility: OTHER | Age: 87
End: 2022-12-01
Payer: MEDICARE

## 2022-12-01 DIAGNOSIS — E66.9 DIABETES MELLITUS TYPE 2 IN OBESE: ICD-10-CM

## 2022-12-01 DIAGNOSIS — M79.10 MUSCULAR PAIN: ICD-10-CM

## 2022-12-01 DIAGNOSIS — E11.69 DIABETES MELLITUS TYPE 2 IN OBESE: ICD-10-CM

## 2022-12-01 PROCEDURE — 99490 CHRNC CARE MGMT STAFF 1ST 20: CPT | Performed by: FAMILY MEDICINE

## 2022-12-01 NOTE — PROGRESS NOTES
12/01/2022:     1423: Attempt x1 to reach patient for monthly CCM follow up call. No answer. LVM with contact information.      12/02/2022:    1030: Attempt x2 to reach patient for monthly CCM follow up call. No answer. LVM with contact information.      1115: Patient called this RN back for monthly CCM call. Per patient, she is doing okay. She just got her INR checked again. She stated that she is not going to get her heart valve replaced. She told this RN she was on hospice care for a year but they took her off. Patient requesting her Skelaxin be re-sent to the pharmacy ask she has not received it and she called [facility] yesterday and they stated they did not have it. Next outreach: 12/30/2022 @ 1400.

## 2022-12-02 ENCOUNTER — ANTICOAGULATION VISIT (OUTPATIENT)
Dept: MEDICAL GROUP | Facility: PHYSICIAN GROUP | Age: 87
End: 2022-12-02
Payer: MEDICARE

## 2022-12-02 DIAGNOSIS — Z79.01 CHRONIC ANTICOAGULATION: Primary | ICD-10-CM

## 2022-12-02 DIAGNOSIS — I35.0 SEVERE AORTIC STENOSIS: ICD-10-CM

## 2022-12-02 DIAGNOSIS — I48.0 PAF (PAROXYSMAL ATRIAL FIBRILLATION) (HCC): ICD-10-CM

## 2022-12-02 LAB — INR PPP: 1.9 (ref 2–3.5)

## 2022-12-02 PROCEDURE — 85610 PROTHROMBIN TIME: CPT | Performed by: INTERNAL MEDICINE

## 2022-12-02 PROCEDURE — 93793 ANTICOAG MGMT PT WARFARIN: CPT | Performed by: INTERNAL MEDICINE

## 2022-12-02 NOTE — PROGRESS NOTES
Anticoagulation Summary  As of 2022      INR goal:  2.0-3.0   TTR:  61.1 % (5.4 mo)   INR used for dosin.90 (2022)   Warfarin maintenance plan:  3 mg (3 mg x 1) every Mon, Fri; 1.5 mg (3 mg x 0.5) all other days; Starting 2022   Weekly warfarin total:  13.5 mg   Plan last modified:  Brady Piña, PharmD (10/7/2022)   Next INR check:  2022   Target end date:  Indefinite    Indications    Severe aortic stenosis [I35.0]  PAF (paroxysmal atrial fibrillation) (HCC) [I48.0]  Chronic anticoagulation [Z79.01]                 Anticoagulation Episode Summary       INR check location:      Preferred lab:      Send INR reminders to:      Comments:            Anticoagulation Care Providers       Provider Role Specialty Phone number    Ganesh Mckeon M.D. Referring Geriatric Medicine - Family Medicine [phone]    [facility] Anticoagulation Services Responsible  [phone]          Anticoagulation Patient Findings  Patient Findings       Negatives:  Signs/symptoms of thrombosis, Signs/symptoms of bleeding, Laboratory test error suspected, Change in health, Change in alcohol use, Change in activity, Upcoming invasive procedure, Emergency department visit, Upcoming dental procedure, Missed doses, Extra doses, Change in medications, Change in diet/appetite, Hospital admission, Bruising, Other complaints          HPI:   Shereen Dale seen in clinic today, on anticoagulation therapy with warfarin for stroke prophylaxis due to history of PAF and aortic stenosis.    Patient's previous INR was therapeutic at 2.3 on 22, at which time patient was instructed to continue with current warfarin regimen.  She returns to clinic today to recheck INR to ensure it is therapeutic and thus preventing possible clotting and/or bleeding/bruising complications.    CHADS-VASc = at least 5  (unadjusted ischemic stroke risk/year:  7.2%, which is moderate risk)    Does patient have any changes to current  medical/health status since last appt (Y/N):  NO  Does patient have any signs/symptoms of bleeding and/or thrombosis since the last appt (Y/N):  NO  Does patient have any interval changes to diet or medications since last appt (Y/N):  NO  Are there any complications or cost restrictions with current therapy (Y/N):  NO     Does patient have [facility] PCP? Yes, Dr Ganesh Mckeon (If not, please document discussion that patient must be seen at [facility])       Vitals:  declined today    There were no vitals filed for this visit.     Asssessment:      INR slightly subtherapeutic at 1.9, therefore increasing patient's stroke risk.   Reason(s) for out of range INR today:  etiology unknown.      Pt is not on antiplatelet therapy    Medication reconciliation completed today.    Plan:  Patient would like to continue with current warfarin regimen, which is a reasonable plan given 0.1 out of range.     Follow up:  Because warfarin is a high risk medication and current CHEST guidelines recommend regular monitoring intervals (few days up to 12 weeks), will have patient return to clinic in 2 weeks to recheck INR.    Brady Piña, PharmD, BCACP

## 2022-12-04 RX ORDER — METAXALONE 800 MG/1
400 TABLET ORAL 2 TIMES DAILY
Qty: 20 TABLET | Refills: 0 | Status: SHIPPED | OUTPATIENT
Start: 2022-12-04 | End: 2022-12-15 | Stop reason: SDUPTHER

## 2022-12-15 DIAGNOSIS — M79.10 MUSCULAR PAIN: ICD-10-CM

## 2022-12-15 RX ORDER — METAXALONE 800 MG/1
400 TABLET ORAL 2 TIMES DAILY
Qty: 20 TABLET | Refills: 0 | Status: SHIPPED | OUTPATIENT
Start: 2022-12-15 | End: 2022-12-19 | Stop reason: SDUPTHER

## 2022-12-16 ENCOUNTER — ANTICOAGULATION VISIT (OUTPATIENT)
Dept: MEDICAL GROUP | Facility: PHYSICIAN GROUP | Age: 87
End: 2022-12-16
Payer: MEDICARE

## 2022-12-16 DIAGNOSIS — Z79.01 CHRONIC ANTICOAGULATION: Primary | ICD-10-CM

## 2022-12-16 DIAGNOSIS — I48.0 PAF (PAROXYSMAL ATRIAL FIBRILLATION) (HCC): ICD-10-CM

## 2022-12-16 DIAGNOSIS — I35.0 SEVERE AORTIC STENOSIS: ICD-10-CM

## 2022-12-16 LAB — INR PPP: 2.1 (ref 2–3.5)

## 2022-12-16 PROCEDURE — 99999 PR NO CHARGE: CPT | Performed by: INTERNAL MEDICINE

## 2022-12-16 PROCEDURE — 93793 ANTICOAG MGMT PT WARFARIN: CPT | Performed by: INTERNAL MEDICINE

## 2022-12-16 PROCEDURE — 85610 PROTHROMBIN TIME: CPT | Performed by: INTERNAL MEDICINE

## 2022-12-16 NOTE — PROGRESS NOTES
Anticoagulation Summary  As of 2022      INR goal:  2.0-3.0   TTR:  61.2 % (5.9 mo)   INR used for dosin.10 (2022)   Warfarin maintenance plan:  3 mg (3 mg x 1) every Mon, Fri; 1.5 mg (3 mg x 0.5) all other days; Starting 2022   Weekly warfarin total:  13.5 mg   Plan last modified:  Brady Piña, PharmD (10/7/2022)   Next INR check:  2022   Target end date:  Indefinite    Indications    Severe aortic stenosis [I35.0]  PAF (paroxysmal atrial fibrillation) (HCC) [I48.0]  Chronic anticoagulation [Z79.01]                 Anticoagulation Episode Summary       INR check location:      Preferred lab:      Send INR reminders to:      Comments:            Anticoagulation Care Providers       Provider Role Specialty Phone number    Ganesh Mckeon M.D. Referring Geriatric Medicine - Family Medicine [phone]    [facility] Anticoagulation Services Responsible  [phone]          Anticoagulation Patient Findings  Patient Findings       Negatives:  Signs/symptoms of thrombosis, Signs/symptoms of bleeding, Laboratory test error suspected, Change in health, Change in alcohol use, Change in activity, Upcoming invasive procedure, Emergency department visit, Upcoming dental procedure, Missed doses, Extra doses, Change in medications, Change in diet/appetite, Hospital admission, Bruising, Other complaints          HPI:   Shereen Dale seen in clinic today, on anticoagulation therapy with warfarin for stroke prophylaxis due to history of PAF.    Patient's previous INR was subtherapeutic at 1.9 on 22, at which time patient was instructed to continue with current warfarin regimen.  She returns to clinic today to recheck INR to ensure it is therapeutic and thus preventing possible clotting and/or bleeding/bruising complications.    CHADS-VASc = at least 5  (unadjusted ischemic stroke risk/year:  7.2%, which is moderate risk)    Does patient have any changes to current medical/health status  since last appt (Y/N):  NO  Does patient have any signs/symptoms of bleeding and/or thrombosis since the last appt (Y/N):  NO  Does patient have any interval changes to diet or medications since last appt (Y/N):  NO  Are there any complications or cost restrictions with current therapy (Y/N):  NO     Does patient have [facility] PCP? Yes, Dr Ganesh Mckeon (If not, please document discussion that patient must be seen at [facility])       Vitals:  declined today    There were no vitals filed for this visit.     Asssessment:      INR therapeutic at 2.1, therefore decreasing patient's stroke and/or bleeding risk.   Reason(s) for out of range INR today:  n/a      Pt is not on antiplatelet therapy    Medication reconciliation completed today.    Plan:  Pt is to continue with current warfarin dosing regimen.     Follow up:  Because warfarin is a high risk medication and current CHEST guidelines recommend regular monitoring intervals (few days up to 12 weeks), will have patient return to clinic in 2 weeks to recheck INR.    Brady Piña, PharmD, BCACP

## 2022-12-19 DIAGNOSIS — M79.10 MUSCULAR PAIN: ICD-10-CM

## 2022-12-19 RX ORDER — METAXALONE 800 MG/1
400 TABLET ORAL 2 TIMES DAILY
Qty: 20 TABLET | Refills: 0 | Status: SHIPPED | OUTPATIENT
Start: 2022-12-19 | End: 2022-12-20 | Stop reason: SDUPTHER

## 2022-12-20 ENCOUNTER — TELEPHONE (OUTPATIENT)
Dept: HEALTH INFORMATION MANAGEMENT | Facility: OTHER | Age: 87
End: 2022-12-20
Payer: MEDICARE

## 2022-12-20 DIAGNOSIS — M79.10 MUSCULAR PAIN: ICD-10-CM

## 2022-12-20 RX ORDER — METAXALONE 800 MG/1
400 TABLET ORAL 2 TIMES DAILY
Qty: 90 TABLET | Refills: 0 | Status: SHIPPED | OUTPATIENT
Start: 2022-12-20 | End: 2023-01-09 | Stop reason: SDUPTHER

## 2022-12-20 NOTE — TELEPHONE ENCOUNTER
Ainsley from [facility]     Stool C. Diff was a form stool and test could not be done. Pt needs to provide a liquid/soft stool.    Do you want pt to repeat?   The medication is a muscle relaxant.  Advised to be taken only as needed.  However 90 days supply sent to the pharmacy .

## 2022-12-20 NOTE — TELEPHONE ENCOUNTER
Samantha Hawk, RN called stating that Shereen had contacted our CCM line for this RN. Samantha ended up listening to what patient needed and relayed in information for this RN. Patient is requesting a longer term prescription for her Skelaxin. Right now, patient receives a 20 day supply and patient states she has been taking it for quite some time. Will send message to PCP.

## 2022-12-30 ENCOUNTER — PATIENT OUTREACH (OUTPATIENT)
Dept: HEALTH INFORMATION MANAGEMENT | Facility: OTHER | Age: 87
End: 2022-12-30

## 2022-12-30 ENCOUNTER — ANTICOAGULATION VISIT (OUTPATIENT)
Dept: MEDICAL GROUP | Facility: PHYSICIAN GROUP | Age: 87
End: 2022-12-30
Payer: MEDICARE

## 2022-12-30 DIAGNOSIS — Z79.01 CHRONIC ANTICOAGULATION: Primary | ICD-10-CM

## 2022-12-30 DIAGNOSIS — I48.0 PAF (PAROXYSMAL ATRIAL FIBRILLATION) (HCC): ICD-10-CM

## 2022-12-30 DIAGNOSIS — I51.89 DIASTOLIC DYSFUNCTION: ICD-10-CM

## 2022-12-30 DIAGNOSIS — I35.0 SEVERE AORTIC STENOSIS: ICD-10-CM

## 2022-12-30 DIAGNOSIS — E11.9 TYPE 2 DIABETES MELLITUS WITHOUT COMPLICATION, WITHOUT LONG-TERM CURRENT USE OF INSULIN (HCC): ICD-10-CM

## 2022-12-30 LAB — INR PPP: 1.9 (ref 2–3.5)

## 2022-12-30 PROCEDURE — 85610 PROTHROMBIN TIME: CPT | Performed by: FAMILY MEDICINE

## 2022-12-30 PROCEDURE — 99211 OFF/OP EST MAY X REQ PHY/QHP: CPT | Performed by: FAMILY MEDICINE

## 2022-12-30 NOTE — PROGRESS NOTES
Assessment    Reached out to patient for monthly CCM follow up call. Per patient, she is doing okay. Just came from coumadin clinic. She has an appointment on 01/09/2023 with Dr. Mckeon. Patient advised to get to her appointment 15 minutes early. Patient aware. She had a good holiday season, they just stayed home. She still is unable to get her Skelaxin. The pharmacy states she cannot get it until after January 7th. She will speak with Dr. Mckeon about this during her appointment.      Education    Appointment    Care Plan    Continue on with current plan of care    Progress:    On-track    Next outreach: 01/30/2023 @ 1300.

## 2022-12-30 NOTE — PROGRESS NOTES
Anticoagulation Summary  As of 2022      INR goal:  2.0-3.0   TTR:  59.5 % (6.3 mo)   INR used for dosin.90 (2022)   Warfarin maintenance plan:  3 mg (3 mg x 1) every Mon, Fri; 1.5 mg (3 mg x 0.5) all other days   Weekly warfarin total:  13.5 mg   Plan last modified:  Brady Piña, PharmD (10/7/2022)   Next INR check:  2023   Target end date:  Indefinite    Indications    Severe aortic stenosis [I35.0]  PAF (paroxysmal atrial fibrillation) (HCC) [I48.0]  Chronic anticoagulation [Z79.01]                 Anticoagulation Episode Summary       INR check location:      Preferred lab:      Send INR reminders to:      Comments:            Anticoagulation Care Providers       Provider Role Specialty Phone number    Ganesh Mckeon M.D. Referring Geriatric Medicine - Family Medicine [phone]    [facility] Responsible  [phone]          Anticoagulation Patient Findings  Patient Findings       Positives:  Change in diet/appetite (more kale recently)    Negatives:  Signs/symptoms of thrombosis, Signs/symptoms of bleeding, Laboratory test error suspected, Change in health, Change in alcohol use, Change in activity, Upcoming invasive procedure, Emergency department visit, Upcoming dental procedure, Missed doses, Extra doses, Change in medications, Hospital admission, Bruising, Other complaints          HPI:   Shereen Dale seen in clinic today, on anticoagulation therapy with warfarin for stroke prophylaxis due to history of PAF.    Patient's previous INR was therapeutic at 2.1 on 22, at which time patient was instructed to continue with current warfarin regimen.  She returns to clinic today to recheck INR to ensure it is therapeutic and thus preventing possible clotting and/or bleeding/bruising complications.    CHADS-VASc = at least 5  (unadjusted ischemic stroke risk/year:  7.2%, which is moderate risk)    Does patient have any changes to current  medical/health status since last appt (Y/N):  NO  Does patient have any signs/symptoms of bleeding and/or thrombosis since the last appt (Y/N):  NO  Does patient have any interval changes to diet or medications since last appt (Y/N):  More kale recently  Are there any complications or cost restrictions with current therapy (Y/N):  NO     Does patient have [facility] PCP? Yes, Ganesh Mckeon M.D. (If not, please document discussion that patient must be seen at [facility])       Vitals:  declined today    There were no vitals filed for this visit.     Asssessment:      INR subtherapeutic at 1.9, therefore increasing stroke risk   Reason(s) for out of range INR today:  likely d/t increased vitamin K intake      Pt is not on antiplatelet therapy    Medication reconciliation completed today.    Plan:  Pt is to continue with current warfarin dosing regimen and decrease kale intake going forward.     Follow up:  Because warfarin is a high risk medication and current CHEST guidelines recommend regular monitoring intervals (few days up to 12 weeks), will have patient return to clinic in 2 weeks to recheck INR.    Brady Piña, PharmD, BCACP

## 2022-12-31 ENCOUNTER — HOSPITAL ENCOUNTER (OUTPATIENT)
Dept: LAB | Facility: MEDICAL CENTER | Age: 87
End: 2022-12-31
Attending: FAMILY MEDICINE
Payer: MEDICARE

## 2022-12-31 DIAGNOSIS — E03.9 ACQUIRED HYPOTHYROIDISM: ICD-10-CM

## 2022-12-31 DIAGNOSIS — E66.9 DIABETES MELLITUS TYPE 2 IN OBESE: ICD-10-CM

## 2022-12-31 DIAGNOSIS — I10 PRIMARY HYPERTENSION: ICD-10-CM

## 2022-12-31 DIAGNOSIS — E11.69 DIABETES MELLITUS TYPE 2 IN OBESE: ICD-10-CM

## 2022-12-31 LAB
ALBUMIN SERPL BCP-MCNC: 4.4 G/DL (ref 3.2–4.9)
ALBUMIN/GLOB SERPL: 1.5 G/DL
ALP SERPL-CCNC: 48 U/L (ref 30–99)
ALT SERPL-CCNC: 37 U/L (ref 2–50)
ANION GAP SERPL CALC-SCNC: 15 MMOL/L (ref 7–16)
AST SERPL-CCNC: 35 U/L (ref 12–45)
BASOPHILS # BLD AUTO: 1 % (ref 0–1.8)
BASOPHILS # BLD: 0.1 K/UL (ref 0–0.12)
BILIRUB SERPL-MCNC: 0.7 MG/DL (ref 0.1–1.5)
BUN SERPL-MCNC: 10 MG/DL (ref 8–22)
CALCIUM ALBUM COR SERPL-MCNC: 9.2 MG/DL (ref 8.5–10.5)
CALCIUM SERPL-MCNC: 9.5 MG/DL (ref 8.5–10.5)
CHLORIDE SERPL-SCNC: 99 MMOL/L (ref 96–112)
CHOLEST SERPL-MCNC: 182 MG/DL (ref 100–199)
CO2 SERPL-SCNC: 25 MMOL/L (ref 20–33)
CREAT SERPL-MCNC: 0.57 MG/DL (ref 0.5–1.4)
CREAT UR-MCNC: 35.88 MG/DL
EOSINOPHIL # BLD AUTO: 0.2 K/UL (ref 0–0.51)
EOSINOPHIL NFR BLD: 2 % (ref 0–6.9)
ERYTHROCYTE [DISTWIDTH] IN BLOOD BY AUTOMATED COUNT: 42.1 FL (ref 35.9–50)
EST. AVERAGE GLUCOSE BLD GHB EST-MCNC: 148 MG/DL
FASTING STATUS PATIENT QL REPORTED: NORMAL
GFR SERPLBLD CREATININE-BSD FMLA CKD-EPI: 88 ML/MIN/1.73 M 2
GLOBULIN SER CALC-MCNC: 2.9 G/DL (ref 1.9–3.5)
GLUCOSE SERPL-MCNC: 123 MG/DL (ref 65–99)
HBA1C MFR BLD: 6.8 % (ref 4–5.6)
HCT VFR BLD AUTO: 40.4 % (ref 37–47)
HDLC SERPL-MCNC: 62 MG/DL
HGB BLD-MCNC: 13.7 G/DL (ref 12–16)
IMM GRANULOCYTES # BLD AUTO: 0.04 K/UL (ref 0–0.11)
IMM GRANULOCYTES NFR BLD AUTO: 0.4 % (ref 0–0.9)
LDLC SERPL CALC-MCNC: 101 MG/DL
LYMPHOCYTES # BLD AUTO: 1.87 K/UL (ref 1–4.8)
LYMPHOCYTES NFR BLD: 18.6 % (ref 22–41)
MCH RBC QN AUTO: 30.4 PG (ref 27–33)
MCHC RBC AUTO-ENTMCNC: 33.9 G/DL (ref 33.6–35)
MCV RBC AUTO: 89.6 FL (ref 81.4–97.8)
MICROALBUMIN UR-MCNC: 6 MG/DL
MICROALBUMIN/CREAT UR: 167 MG/G (ref 0–30)
MONOCYTES # BLD AUTO: 0.72 K/UL (ref 0–0.85)
MONOCYTES NFR BLD AUTO: 7.2 % (ref 0–13.4)
NEUTROPHILS # BLD AUTO: 7.12 K/UL (ref 2–7.15)
NEUTROPHILS NFR BLD: 70.8 % (ref 44–72)
NRBC # BLD AUTO: 0 K/UL
NRBC BLD-RTO: 0 /100 WBC
PLATELET # BLD AUTO: 224 K/UL (ref 164–446)
PMV BLD AUTO: 9.9 FL (ref 9–12.9)
POTASSIUM SERPL-SCNC: 4.6 MMOL/L (ref 3.6–5.5)
PROT SERPL-MCNC: 7.3 G/DL (ref 6–8.2)
RBC # BLD AUTO: 4.51 M/UL (ref 4.2–5.4)
SODIUM SERPL-SCNC: 139 MMOL/L (ref 135–145)
T4 FREE SERPL-MCNC: 1.49 NG/DL (ref 0.93–1.7)
TRIGL SERPL-MCNC: 94 MG/DL (ref 0–149)
TSH SERPL DL<=0.005 MIU/L-ACNC: 3.11 UIU/ML (ref 0.38–5.33)
WBC # BLD AUTO: 10.1 K/UL (ref 4.8–10.8)

## 2022-12-31 PROCEDURE — 80053 COMPREHEN METABOLIC PANEL: CPT

## 2022-12-31 PROCEDURE — 85025 COMPLETE CBC W/AUTO DIFF WBC: CPT

## 2022-12-31 PROCEDURE — 80061 LIPID PANEL: CPT

## 2022-12-31 PROCEDURE — 84443 ASSAY THYROID STIM HORMONE: CPT

## 2022-12-31 PROCEDURE — 83036 HEMOGLOBIN GLYCOSYLATED A1C: CPT

## 2022-12-31 PROCEDURE — 82570 ASSAY OF URINE CREATININE: CPT

## 2022-12-31 PROCEDURE — 84439 ASSAY OF FREE THYROXINE: CPT

## 2022-12-31 PROCEDURE — 82043 UR ALBUMIN QUANTITATIVE: CPT

## 2022-12-31 PROCEDURE — 36415 COLL VENOUS BLD VENIPUNCTURE: CPT

## 2023-01-03 ENCOUNTER — PATIENT MESSAGE (OUTPATIENT)
Dept: HEALTH INFORMATION MANAGEMENT | Facility: OTHER | Age: 88
End: 2023-01-03

## 2023-01-03 RX ORDER — OMEPRAZOLE 20 MG/1
20 CAPSULE, DELAYED RELEASE ORAL 2 TIMES DAILY
Qty: 200 CAPSULE | Refills: 1 | Status: SHIPPED | OUTPATIENT
Start: 2023-01-03 | End: 2023-01-18 | Stop reason: SDUPTHER

## 2023-01-09 ENCOUNTER — OFFICE VISIT (OUTPATIENT)
Dept: MEDICAL GROUP | Facility: MEDICAL CENTER | Age: 88
End: 2023-01-09
Payer: MEDICARE

## 2023-01-09 VITALS
TEMPERATURE: 97.8 F | BODY MASS INDEX: 27.79 KG/M2 | OXYGEN SATURATION: 95 % | WEIGHT: 151 LBS | HEART RATE: 97 BPM | SYSTOLIC BLOOD PRESSURE: 110 MMHG | DIASTOLIC BLOOD PRESSURE: 60 MMHG | HEIGHT: 62 IN | RESPIRATION RATE: 16 BRPM

## 2023-01-09 DIAGNOSIS — I35.0 SEVERE AORTIC STENOSIS: ICD-10-CM

## 2023-01-09 DIAGNOSIS — E11.69 DIABETES MELLITUS TYPE 2 IN OBESE: ICD-10-CM

## 2023-01-09 DIAGNOSIS — D68.69 SECONDARY HYPERCOAGULABLE STATE (HCC): ICD-10-CM

## 2023-01-09 DIAGNOSIS — M79.10 MUSCULAR PAIN: ICD-10-CM

## 2023-01-09 DIAGNOSIS — I10 PRIMARY HYPERTENSION: ICD-10-CM

## 2023-01-09 DIAGNOSIS — E66.9 DIABETES MELLITUS TYPE 2 IN OBESE: ICD-10-CM

## 2023-01-09 DIAGNOSIS — I48.0 PAF (PAROXYSMAL ATRIAL FIBRILLATION) (HCC): ICD-10-CM

## 2023-01-09 PROCEDURE — 99214 OFFICE O/P EST MOD 30 MIN: CPT | Performed by: FAMILY MEDICINE

## 2023-01-09 RX ORDER — AMOXICILLIN 500 MG/1
CAPSULE ORAL
COMMUNITY
End: 2023-01-09

## 2023-01-09 RX ORDER — METAXALONE 800 MG/1
400 TABLET ORAL 2 TIMES DAILY
Qty: 90 TABLET | Refills: 0 | Status: SHIPPED | OUTPATIENT
Start: 2023-01-09 | End: 2023-03-28 | Stop reason: SDUPTHER

## 2023-01-09 RX ORDER — GABAPENTIN 100 MG/1
100 CAPSULE ORAL 2 TIMES DAILY
Qty: 200 CAPSULE | Refills: 3 | Status: SHIPPED | OUTPATIENT
Start: 2023-01-09 | End: 2023-04-10 | Stop reason: SDUPTHER

## 2023-01-09 RX ORDER — CLINDAMYCIN HYDROCHLORIDE 150 MG/1
CAPSULE ORAL
COMMUNITY
Start: 2022-11-02 | End: 2023-04-10

## 2023-01-09 ASSESSMENT — PATIENT HEALTH QUESTIONNAIRE - PHQ9: CLINICAL INTERPRETATION OF PHQ2 SCORE: 0

## 2023-01-09 ASSESSMENT — FIBROSIS 4 INDEX: FIB4 SCORE: 2.21

## 2023-01-09 NOTE — PROGRESS NOTES
Annual Health Assessment Questions:    1.  Are you currently engaging in any exercise or physical activity? No    2.  How would you describe your mood or emotional well-being today? good    3.  Have you had any falls in the last year? No    4.  Have you noticed any problems with your balance or had difficulty walking? No    5.  In the last six months have you experienced any leakage of urine? No    6. DPA/Advanced Directive: Patient does not have an Advance Directive.  A packet and workshop information was given on Advance Directives.      CC: Severe aortic stenosis, diabetes, hypertension, A. fib, secondary hypercoagulable state    HPI:   Shereen presents today to discuss the following medical issues:    Severe aortic stenosis  Patient has been having shortness of breath with exertion.  However she denies any syncopal episodes or dizziness.  She is a candidate for TAVR procedure as per cardiology, however patient refused to have the procedure.      Diabetes mellitus type 2 in obese ([facility])  Most recent A1c was 6.8.  Patient has been on metformin 1500 mg daily, and glipizide SR 2.5 mg daily, no side effects.  Denies polyuria or polydipsia.  Patient is due for monofilament foot exam     Primary hypertension  Patient's blood pressure has been adequate controlled on amlodipine 7.5 mg daily.  No side effects     PAF (paroxysmal atrial fibrillation) ([facility])/ Secondary hypercoagulable state ([facility])  She denies palpitation, chest pain, shortness of breath.  He has been on warfarin and carvedilol.  She has been following up with Coumadin clinic in a regular basis     Patient Active Problem List    Diagnosis Date Noted    Secondary hypercoagulable state ([facility]) 06/09/2022    Acquired hypothyroidism 09/12/2019    Diabetes mellitus type 2 in obese ([facility]) 09/12/2019    Skin abrasion 08/20/2019    Chronic anticoagulation 08/02/2019    Hyponatremia 03/25/2019    Epistaxis 03/25/2019    Advanced directives, counseling/discussion 09/26/2018     Hypertensive urgency 05/11/2017    Diastolic dysfunction 02/02/2016    Tear of lateral cartilage or meniscus of knee, current 05/21/2015    PAF (paroxysmal atrial fibrillation) ([facility]) 01/12/2015    Dyspnea on effort 01/02/2014    Diabetes ([facility]) 12/26/2013    Severe aortic stenosis 07/02/2013    HTN (hypertension) 05/04/2012    Hemorrhagic disorder due to extrinsic circulating anticoagulants ([facility]) 05/02/2012    Cough 05/02/2012    Edema 05/02/2012    Acute, but ill-defined, cerebrovascular disease 04/30/2012       Current Outpatient Medications   Medication Sig Dispense Refill    clindamycin (CLEOCIN) 150 MG Cap       metFORMIN (GLUCOPHAGE) 500 MG Tab TAKE ONE TABLET BY MOUTH THREE TIMES A  Tablet 0    omeprazole (PRILOSEC) 20 MG delayed-release capsule Take 1 Capsule by mouth 2 times a day. 200 Capsule 1    metaxalone (SKELAXIN) 800 MG Tab Take 0.5 Tablets by mouth 2 times a day. 1/2 tab 400 mg 2 x day.  Indications: Musculoskeletal Pain 90 Tablet 0    levothyroxine (SYNTHROID) 50 MCG Tab TAKE ONE TABLET BY MOUTH EVERY MORNING FOR UNDERACTIVE THYROID 100 Tablet 1    carvedilol (COREG) 12.5 MG Tab Take 1 Tablet by mouth 2 times a day. 200 Tablet 2    carvedilol (COREG) 12.5 MG Tab Take 1 Tablet by mouth 2 times a day for  Tablet 3    gabapentin (NEURONTIN) 100 MG Cap Take 1 Capsule by mouth 2 times a day. Take 1 capsule PO Daily in the AM, Take 2 capsules daily in the  Capsule 3    fluconazole (DIFLUCAN) 150 MG tablet Take one now and repeat in 72 hours if symptoms persist 2 Tablet 0    warfarin (COUMADIN) 3 MG Tab Take one-half to one tablet by mouth daily or as directed by anticoagulation clinic  Indications: Aortic Stenosis 90 Tablet 1    amLODIPine (NORVASC) 2.5 MG Tab Take 1 Tablet by mouth every day for 200 days. 100 Tablet 1    amLODIPine (NORVASC) 5 MG Tab Take 1 Tablet by mouth every day for 200 days. Take 5mg PO daily in AM. 100 Tablet 2    glipiZIDE SR (GLUCOTROL) 2.5 MG TABLET SR 24 HR  Take 1 Tablet by mouth every day. 90 Tablet 2    senna-docusate (PERICOLACE OR SENOKOT S) 8.6-50 MG Tab Take 2 Tablets by mouth every day for constipation. Hold for loose stools 60 Tablet 1    Misc. Devices Misc Oxygen concentrator with humidifier, 2.5-3 L/min 1 Each 0    spironolactone (ALDACTONE) 25 MG Tab Take 25 mg by mouth every day.      morphine (ROXANOL) 20 MG/ML Solution Take 0.25 mL by mouth every 2 hours as needed (moderate to severe pain or shortness of breath). 120 mL 0    nystatin (MYCOSTATIN) powder Apply topically 2 times a day for irritation 30 g 3    dorzolamide-timolol (COSOPT) 22.3-6.8 MG/ML Solution Administer 1 Drop into both eyes 2 times a day. Indications: Wide-Angle Glaucoma      Blood Glucose Monitoring Suppl (FREESTYLE LITE) Device USE TWO TIMES A DAY AS DIRECTED FOR BLOOD SUGAR MONITORING 100 Each 3    Home Care Oxygen Inhale 2-5 L/min as needed for Shortness of Breath (sleep apnea). 2-5 LPM as needed via nasal cannula  Indications: Shortness of breath, sleep apnea      haloperidol (HALDOL) 2 MG/ML Conc Take 0.5 mg by mouth every 6 hours as needed (Anxiety, agitation).      Sennosides-Docusate Sodium (SENNA PLUS) 8.6-50 MG Cap Take 2 tablet by mouth every day. Take every evening.  Hold for loose stools  Indications: Constipation       No current facility-administered medications for this visit.         Allergies as of 01/09/2023 - Reviewed 01/09/2023   Allergen Reaction Noted    Darvon [propoxyphene hcl]  03/18/2008    Iodine  05/11/2015    Latex  09/19/2013    Penicillins Anaphylaxis 08/02/2008    Propoxyphene hcl Anaphylaxis 08/02/2008    Tetanus antitoxin Myalgia 11/19/2017    Tetracycline Anaphylaxis 08/02/2008        ROS: Denies any chest pain, Shortness of breath, Changes bowel or bladder, Lower extremity edema.    Physical Exam:  Gen.: Well-developed, well-nourished, no apparent distress,pleasant and cooperative with the examination  /60 (BP Location: Right arm, Patient  "Position: Sitting, BP Cuff Size: Adult)   Pulse 97   Temp 36.6 °C (97.8 °F) (Temporal)   Resp 16   Ht 1.575 m (5' 2\")   Wt 68.5 kg (151 lb)   LMP 01/01/1985   SpO2 95%   BMI 27.62 kg/m²   Gen.: Well-developed, well-nourished, no apparent distress,pleasant and cooperative with the examination  Skin:  Warm and dry with good turgor. No rashes or suspicious lesions in visible areas  HEENT:Sinuses nontender with palpation, TMs clear, nares patent with pink mucosa and clear rhinorrhea,no septal deviation ,polyps or lesions. lips without lesions, oropharynx clear.  Neck: Trachea midline,no masses or adenopathy. No JVD.  Cor: Regular rate and rhythm without murmur, gallop or rub.  Lungs: Respirations unlabored.Clear to auscultation with equal breath sounds bilaterally. No wheezes, rhonchi.  Extremities: No cyanosis, clubbing or edema.        Assessment and Plan.   87 y.o. female     1. Severe aortic stenosis  She is a candidate for TAVR procedure as per cardiology, however patient refused to have the procedure.  However currently denies any syncopal episodes or dizziness.  However she has been having shortness of breath with exertion    2. Diabetes mellitus type 2 in obese (HCC)  Most recent A1c was 6.8  Continue on metformin 1500 mg daily, and glipizide SR 2.5 mg daily,     3. Primary hypertension  Controlled.  Continue on amlodipine 7.5 mg    4. PAF (paroxysmal atrial fibrillation) ([facility])  No RVR.  Continue on warfarin and carvedilol    5. Secondary hypercoagulable state ([facility])  Due to A. fib.  Continue warfarin              "

## 2023-01-13 ENCOUNTER — ANTICOAGULATION VISIT (OUTPATIENT)
Dept: MEDICAL GROUP | Facility: PHYSICIAN GROUP | Age: 88
End: 2023-01-13
Payer: MEDICARE

## 2023-01-13 DIAGNOSIS — I48.0 PAF (PAROXYSMAL ATRIAL FIBRILLATION) (HCC): ICD-10-CM

## 2023-01-13 DIAGNOSIS — I35.0 SEVERE AORTIC STENOSIS: ICD-10-CM

## 2023-01-13 DIAGNOSIS — Z79.01 CHRONIC ANTICOAGULATION: Primary | ICD-10-CM

## 2023-01-13 LAB — INR PPP: 2.1 (ref 2–3.5)

## 2023-01-13 PROCEDURE — 99999 PR NO CHARGE: CPT | Performed by: INTERNAL MEDICINE

## 2023-01-13 PROCEDURE — 85610 PROTHROMBIN TIME: CPT | Performed by: INTERNAL MEDICINE

## 2023-01-13 PROCEDURE — 93793 ANTICOAG MGMT PT WARFARIN: CPT | Performed by: INTERNAL MEDICINE

## 2023-01-13 NOTE — PROGRESS NOTES
Anticoagulation Summary  As of 1/13/2023      INR goal:  2.0-3.0   TTR:  59.5 % (6.3 mo)   INR used for dosing:     Warfarin maintenance plan:  3 mg (3 mg x 1) every Mon, Fri; 1.5 mg (3 mg x 0.5) all other days   Weekly warfarin total:  13.5 mg   Plan last modified:  Brady Piña, PharmD (10/7/2022)   Next INR check:     Target end date:  Indefinite    Indications    Severe aortic stenosis [I35.0]  PAF (paroxysmal atrial fibrillation) (HCC) [I48.0]  Chronic anticoagulation [Z79.01]                 Anticoagulation Episode Summary       INR check location:      Preferred lab:      Send INR reminders to:      Comments:            Anticoagulation Care Providers       Provider Role Specialty Phone number    Ganesh Mckeon M.D. Referring Geriatric Medicine - Family Medicine [phone]    [facility] Responsible  [phone]          Anticoagulation Patient Findings  Patient Findings       Negatives:  Signs/symptoms of thrombosis, Signs/symptoms of bleeding, Laboratory test error suspected, Change in health, Change in alcohol use, Change in activity, Upcoming invasive procedure, Emergency department visit, Upcoming dental procedure, Missed doses, Extra doses, Change in medications, Change in diet/appetite, Hospital admission, Bruising, Other complaints          HPI:   Shereen GENET Dale seen in clinic today, on anticoagulation therapy with warfarin for stroke prophylaxis due to history of PAF.    Patient's previous INR was subtherapeutic at 1.9 on 12-30-22, at which time patient was instructed to continue with current warfarin regimen.  She returns to clinic today to recheck INR to ensure it is therapeutic and thus preventing possible clotting and/or bleeding/bruising complications.    CHADS-VASc = at least 5  (unadjusted ischemic stroke risk/year:  7.2%, which is moderate risk)    Does patient have any changes to current medical/health status since last appt (Y/N):  NO  Does patient have any  signs/symptoms of bleeding and/or thrombosis since the last appt (Y/N):  NO  Does patient have any interval changes to diet or medications since last appt (Y/N):  NO  Are there any complications or cost restrictions with current therapy (Y/N):  NO     Does patient have [facility] PCP? Yes, Ganesh Mckeon M.D. (If not, please document discussion that patient must be seen at [facility])       Vitals:  declined today    There were no vitals filed for this visit.     Asssessment:      INR therapeutic at 2.1, therefore decreasing the risk of stroke and bleeding.    Reason(s) for out of range INR today:  n/a      Pt is not on antiplatelet therapy    Medication reconciliation completed today.    Plan:  Pt is to continue with current warfarin dosing regimen.     Follow up:  Because warfarin is a high risk medication and current CHEST guidelines recommend regular monitoring intervals (few days up to 12 weeks), will have patient return to clinic in 2 weeks to recheck INR.    Brady Piña, PharmD

## 2023-01-18 RX ORDER — OMEPRAZOLE 20 MG/1
20 CAPSULE, DELAYED RELEASE ORAL 2 TIMES DAILY
Qty: 200 CAPSULE | Refills: 1 | Status: SHIPPED | OUTPATIENT
Start: 2023-01-18

## 2023-01-27 ENCOUNTER — ANTICOAGULATION VISIT (OUTPATIENT)
Dept: MEDICAL GROUP | Facility: PHYSICIAN GROUP | Age: 88
End: 2023-01-27
Payer: MEDICARE

## 2023-01-27 DIAGNOSIS — Z79.01 CHRONIC ANTICOAGULATION: Primary | ICD-10-CM

## 2023-01-27 DIAGNOSIS — I35.0 SEVERE AORTIC STENOSIS: ICD-10-CM

## 2023-01-27 DIAGNOSIS — I48.0 PAF (PAROXYSMAL ATRIAL FIBRILLATION) (HCC): ICD-10-CM

## 2023-01-27 LAB — INR PPP: 2.3 (ref 2–3.5)

## 2023-01-27 PROCEDURE — 93793 ANTICOAG MGMT PT WARFARIN: CPT | Performed by: FAMILY MEDICINE

## 2023-01-27 PROCEDURE — 85610 PROTHROMBIN TIME: CPT | Performed by: FAMILY MEDICINE

## 2023-01-27 PROCEDURE — 99999 PR NO CHARGE: CPT | Performed by: FAMILY MEDICINE

## 2023-01-27 NOTE — PROGRESS NOTES
Anticoagulation Summary  As of 2023      INR goal:  2.0-3.0   TTR:  62.2 % (7.3 mo)   INR used for dosin.30 (2023)   Warfarin maintenance plan:  3 mg (3 mg x 1) every Mon, Fri; 1.5 mg (3 mg x 0.5) all other days   Weekly warfarin total:  13.5 mg   Plan last modified:  Brady Piña, PharmD (10/7/2022)   Next INR check:  2/10/2023   Target end date:  Indefinite    Indications    Severe aortic stenosis [I35.0]  PAF (paroxysmal atrial fibrillation) (HCC) [I48.0]  Chronic anticoagulation [Z79.01]                 Anticoagulation Episode Summary       INR check location:      Preferred lab:      Send INR reminders to:      Comments:            Anticoagulation Care Providers       Provider Role Specialty Phone number    Ganesh Mckeon M.D. Referring Geriatric Medicine - Family Medicine [phone]    [facility] Responsible  [phone]          Anticoagulation Patient Findings  Patient Findings       Positives:  Change in diet/appetite (a little more greens.)    Negatives:  Signs/symptoms of thrombosis, Signs/symptoms of bleeding, Laboratory test error suspected, Change in health, Change in alcohol use, Change in activity, Upcoming invasive procedure, Emergency department visit, Upcoming dental procedure, Missed doses, Extra doses, Change in medications, Hospital admission, Bruising, Other complaints                  HPI:   Shereen GENET XavierDale seen in clinic today, on anticoagulation therapy with warfarin for chronic anticoagulation due to history of PAF    Patient's previous INR was therapeutic at 2.1 on 2023, at which time patient was instructed to continue with current warfarin dosing regimen. .  She returns to clinic today to recheck INR to ensure it is therapeutic and thus preventing possible clotting and/or bleeding/bruising complications.    CHADS-VASc = 5  (unadjusted ischemic stroke risk/year:  7.2%, which is moderate risk)    Does patient have any changes to  current medical/health status since last appt (Y/N):  N  Does patient have any signs/symptoms of bleeding and/or thrombosis since the last appt (Y/N):  N  Does patient have any interval changes to diet or medications since last appt (Y/N):  N  Are there any complications or cost restrictions with current therapy (Y/N):  N     Does patient have [facility] PCP? Yes, Ganesh Mckeon M.D. (If not, please document discussion that patient must be seen at [facility])       Vitals:  pt declined    There were no vitals filed for this visit.     Asssessment:      INR therapeutic at 2.5, therefore decreasing the risk of stroke and bleeding.   Reason(s) for out of range INR today:  n/a      Pt is not on antiplatelet therapy    Medication reconciliation completed today.    Plan:  Pt is to continue with current warfarin dosing regimen.     Follow up:  Because warfarin is a high risk medication and current CHEST guidelines recommend regular monitoring intervals (few days up to 12 weeks), will have patient return to clinic in 2 weeks to recheck INR.    Gasper Churchill, Pharmacy Intern   Brady Piña, PharmD

## 2023-01-30 ENCOUNTER — PATIENT OUTREACH (OUTPATIENT)
Dept: HEALTH INFORMATION MANAGEMENT | Facility: OTHER | Age: 88
End: 2023-01-30
Payer: MEDICARE

## 2023-01-30 DIAGNOSIS — E11.69 DIABETES MELLITUS TYPE 2 IN OBESE: ICD-10-CM

## 2023-01-30 DIAGNOSIS — E66.9 DIABETES MELLITUS TYPE 2 IN OBESE: ICD-10-CM

## 2023-01-30 PROCEDURE — 99999 PR NO CHARGE: CPT | Performed by: FAMILY MEDICINE

## 2023-01-30 RX ORDER — CARVEDILOL 12.5 MG/1
12.5 TABLET ORAL 2 TIMES DAILY
Qty: 200 TABLET | Refills: 3 | Status: SHIPPED
Start: 2023-01-30 | End: 2023-11-20

## 2023-01-30 NOTE — PROGRESS NOTES
Assessment    Reached out to patient for monthly CCM follow up call. Per patient she is doing okay, but she is having a hard time breathing on exertion. Patient states her oxygen level is around 94. This RN advised patient to call if her breathing or oxygen saturations drop in the 80s.     Care Plan    Continue on with current plan of care    Progress:    On-track    Next outreach: 02/24/2023 @ 3500.

## 2023-02-07 DIAGNOSIS — I48.0 PAF (PAROXYSMAL ATRIAL FIBRILLATION) (HCC): ICD-10-CM

## 2023-02-07 DIAGNOSIS — Z79.01 CHRONIC ANTICOAGULATION: ICD-10-CM

## 2023-02-07 RX ORDER — WARFARIN SODIUM 3 MG/1
TABLET ORAL
Qty: 90 TABLET | Refills: 1 | Status: SHIPPED
Start: 2023-02-07 | End: 2023-05-26

## 2023-02-07 NOTE — TELEPHONE ENCOUNTER
Received request via: Pharmacy    Was the patient seen in the last year in this department? Yes    Does the patient have an active prescription (recently filled or refills available) for medication(s) requested? No    Does the patient have FDC Plus and need 100 day supply (blood pressure, diabetes and cholesterol meds only)? Medication is not for cholesterol, blood pressure or diabetes

## 2023-02-13 ENCOUNTER — ANTICOAGULATION VISIT (OUTPATIENT)
Dept: MEDICAL GROUP | Facility: PHYSICIAN GROUP | Age: 88
End: 2023-02-13
Payer: MEDICARE

## 2023-02-13 DIAGNOSIS — I48.0 PAF (PAROXYSMAL ATRIAL FIBRILLATION) (HCC): ICD-10-CM

## 2023-02-13 DIAGNOSIS — I35.0 SEVERE AORTIC STENOSIS: ICD-10-CM

## 2023-02-13 DIAGNOSIS — Z79.01 CHRONIC ANTICOAGULATION: Primary | ICD-10-CM

## 2023-02-13 LAB — INR PPP: 2.8 (ref 2–3.5)

## 2023-02-13 PROCEDURE — 93793 ANTICOAG MGMT PT WARFARIN: CPT | Performed by: INTERNAL MEDICINE

## 2023-02-13 PROCEDURE — 85610 PROTHROMBIN TIME: CPT | Performed by: INTERNAL MEDICINE

## 2023-02-13 RX ORDER — AMLODIPINE BESYLATE 2.5 MG/1
TABLET ORAL
Qty: 100 TABLET | Refills: 1 | Status: SHIPPED | OUTPATIENT
Start: 2023-02-13 | End: 2023-03-28 | Stop reason: SDUPTHER

## 2023-02-13 NOTE — PROGRESS NOTES
Anticoagulation Summary  As of 2023      INR goal:  2.0-3.0   TTR:  65.0 % (7.8 mo)   INR used for dosin.80 (2023)   Warfarin maintenance plan:  3 mg (3 mg x 1) every Mon, Fri; 1.5 mg (3 mg x 0.5) all other days   Weekly warfarin total:  13.5 mg   Plan last modified:  Brady Piña, PharmD (10/7/2022)   Next INR check:  2023   Target end date:  Indefinite    Indications    Severe aortic stenosis [I35.0]  PAF (paroxysmal atrial fibrillation) (HCC) [I48.0]  Chronic anticoagulation [Z79.01]                 Anticoagulation Episode Summary       INR check location:      Preferred lab:      Send INR reminders to:      Comments:            Anticoagulation Care Providers       Provider Role Specialty Phone number    Ganesh Mckeon M.D. Referring Geriatric Medicine - Family Medicine [phone]    [facility] Responsible  [phone]          Anticoagulation Patient Findings  Patient Findings       Positives:  Change in health, Change in diet/appetite    Negatives:  Signs/symptoms of thrombosis, Signs/symptoms of bleeding, Laboratory test error suspected, Change in alcohol use, Change in activity, Upcoming invasive procedure, Emergency department visit, Upcoming dental procedure, Missed doses, Extra doses, Change in medications, Hospital admission, Bruising, Other complaints                  HPI:   Shereen GENET Dale seen in clinic today, on anticoagulation therapy with warfarin for stroke prophylaxis due to history of PAF.    Patient's previous INR was therapeutic at 2.3 on 23, at which time patient was instructed to continue with current warfarin regimen.  She returns to clinic today to recheck INR to ensure it is therapeutic and thus preventing possible clotting and/or bleeding/bruising complications.    CHADS-VASc = at least 5  (unadjusted ischemic stroke risk/year:  7.2%, which is moderate risk)    Does patient have any changes to current medical/health status since  last appt (Y/N):  upset stomach last week, has not been eating well the last several days.  Does patient have any signs/symptoms of bleeding and/or thrombosis since the last appt (Y/N):  NO  Does patient have any interval changes to diet or medications since last appt (Y/N):  NO  Are there any complications or cost restrictions with current therapy (Y/N):  NO     [facility]? Yes, Ganesh Mckeon M.D. (If not, please document discussion that patient must be seen at [facility])       Vitals:  declined today    There were no vitals filed for this visit.     Asssessment:      INR therapeutic at 2.8, therefore decreasing stroke and/or bleeding risk.   Reason(s) for out of range INR today:  n/a      Pt is not on antiplatelet therapy    Medication reconciliation completed today.    Plan:  Pt is to decrease today's dose to 1.5mg, then continue with current warfarin dosing regimen.     Follow up:  Because warfarin is a high risk medication and current CHEST guidelines recommend regular monitoring intervals (few days up to 12 weeks), will have patient return to clinic in 2 weeks to recheck INR.    Brady Piña, PharmD, BCACP

## 2023-02-15 RX ORDER — GLIPIZIDE 2.5 MG/1
2.5 TABLET, EXTENDED RELEASE ORAL DAILY
Qty: 100 TABLET | Refills: 2 | Status: SHIPPED | OUTPATIENT
Start: 2023-02-15 | End: 2023-06-08 | Stop reason: SDUPTHER

## 2023-02-24 ENCOUNTER — PATIENT OUTREACH (OUTPATIENT)
Dept: HEALTH INFORMATION MANAGEMENT | Facility: OTHER | Age: 88
End: 2023-02-24
Payer: MEDICARE

## 2023-02-24 DIAGNOSIS — E11.69 DIABETES MELLITUS TYPE 2 IN OBESE: ICD-10-CM

## 2023-02-24 DIAGNOSIS — I10 PRIMARY HYPERTENSION: ICD-10-CM

## 2023-02-24 DIAGNOSIS — E66.9 DIABETES MELLITUS TYPE 2 IN OBESE: ICD-10-CM

## 2023-02-24 PROCEDURE — 99490 CHRNC CARE MGMT STAFF 1ST 20: CPT | Performed by: FAMILY MEDICINE

## 2023-02-24 NOTE — PROGRESS NOTES
Assessment    Reached out to patient for monthly [facility] follow up call. Per patient, she is not feeling too well and was not able to complete her INR today. She states she is having pain all over her body today and she gets tired so fast. She states she has found nothing helpful for her pain besides the morphine she was prescribed when she was on hospice. This RN and patient dicussed that palliative care may be appropriate for her. Patient stated she did not know the difference and we dicussed the difference and how it can be beneficial for her. Patient would like some time to think about it and will let this RN know if she would like to proceed.     Education    Palliative Care    Care Plan    Continue with current plan of care    Progress:    On-track    Next outreach: 03/24/2023 @ 1100

## 2023-03-03 ENCOUNTER — ANTICOAGULATION VISIT (OUTPATIENT)
Dept: MEDICAL GROUP | Facility: PHYSICIAN GROUP | Age: 88
End: 2023-03-03
Payer: MEDICARE

## 2023-03-03 DIAGNOSIS — I48.0 PAF (PAROXYSMAL ATRIAL FIBRILLATION) (HCC): ICD-10-CM

## 2023-03-03 DIAGNOSIS — I35.0 SEVERE AORTIC STENOSIS: ICD-10-CM

## 2023-03-03 DIAGNOSIS — Z79.01 CHRONIC ANTICOAGULATION: Primary | ICD-10-CM

## 2023-03-03 LAB — INR PPP: 3.3 (ref 2–3.5)

## 2023-03-03 PROCEDURE — 85610 PROTHROMBIN TIME: CPT | Performed by: FAMILY MEDICINE

## 2023-03-03 PROCEDURE — 99211 OFF/OP EST MAY X REQ PHY/QHP: CPT | Performed by: FAMILY MEDICINE

## 2023-03-03 NOTE — PROGRESS NOTES
Anticoagulation Summary  As of 3/3/2023      INR goal:  2.0-3.0   TTR:  62.7 % (8.4 mo)   INR used for dosing:  3.30 (3/3/2023)   Warfarin maintenance plan:  3 mg (3 mg x 1) every Mon; 1.5 mg (3 mg x 0.5) all other days   Weekly warfarin total:  12 mg   Plan last modified:  Brady Piña, PharmD (3/3/2023)   Next INR check:  3/31/2023   Target end date:  Indefinite    Indications    Severe aortic stenosis [I35.0]  PAF (paroxysmal atrial fibrillation) (HCC) [I48.0]  Chronic anticoagulation [Z79.01]                 Anticoagulation Episode Summary       INR check location:      Preferred lab:      Send INR reminders to:      Comments:            Anticoagulation Care Providers       Provider Role Specialty Phone number    Ganesh Mckeon M.D. Referring Geriatric Medicine - Family Medicine [phone]    [facility] Responsible  [phone]          Anticoagulation Patient Findings  Patient Findings       Positives:  Change in health    Negatives:  Signs/symptoms of thrombosis, Signs/symptoms of bleeding, Laboratory test error suspected, Change in alcohol use, Change in activity, Upcoming invasive procedure, Emergency department visit, Upcoming dental procedure, Missed doses, Extra doses, Change in medications, Change in diet/appetite, Hospital admission, Bruising, Other complaints                  HPI:   Shereen Wattsa seen in clinic today, on anticoagulation therapy with warfarin for stroke prophylaxis due to history of atrial fibrillation.    Patient's previous INR was therapeutic at 2.8 on 2-13-23, at which time patient was instructed to continue with current warfarin regimen.  She returns to clinic today to recheck INR to ensure it is therapeutic and thus preventing possible clotting and/or bleeding/bruising complications.    CHADS-VASc = at least 5  (unadjusted ischemic stroke risk/year:  7.2%, which is moderate risk)    Does patient have any changes to current medical/health status  since last appt (Y/N):  Increased back pain over the last couple weeks  Does patient have any signs/symptoms of bleeding and/or thrombosis since the last appt (Y/N):  NO  Does patient have any interval changes to diet or medications since last appt (Y/N):  NO  Are there any complications or cost restrictions with current therapy (Y/N):  NO     Does patient have [facility] PCP? Yes, Ganesh Mckeon M.D. (If not, please document discussion that patient must be seen at [facility])       Vitals:  declined today    There were no vitals filed for this visit.     Asssessment:      INR supratherapeutic at 3.3, therefore increasing bleeding risk   Reason(s) for out of range INR today:  dose too high      Pt is not on antiplatelet therapy    Medication reconciliation completed today.    Plan:  Instructed patient to HOLD X 1, then decrease weekly warfarin regimen as detailed above.     Follow up:  Because warfarin is a high risk medication and current CHEST guidelines recommend regular monitoring intervals (few days up to 12 weeks), will have patient return to clinic in 4 weeks to recheck INR (per patient preference).    Brady Piña, PharmD, BCACP

## 2023-03-28 ENCOUNTER — PATIENT OUTREACH (OUTPATIENT)
Dept: HEALTH INFORMATION MANAGEMENT | Facility: OTHER | Age: 88
End: 2023-03-28
Payer: MEDICARE

## 2023-03-28 DIAGNOSIS — E11.9 TYPE 2 DIABETES MELLITUS WITHOUT COMPLICATION, WITHOUT LONG-TERM CURRENT USE OF INSULIN (HCC): ICD-10-CM

## 2023-03-28 DIAGNOSIS — E03.9 ACQUIRED HYPOTHYROIDISM: ICD-10-CM

## 2023-03-28 DIAGNOSIS — I10 PRIMARY HYPERTENSION: ICD-10-CM

## 2023-03-28 DIAGNOSIS — M79.10 MUSCULAR PAIN: ICD-10-CM

## 2023-03-28 PROCEDURE — 99999 PR NO CHARGE: CPT | Performed by: FAMILY MEDICINE

## 2023-03-28 RX ORDER — LEVOTHYROXINE SODIUM 0.05 MG/1
TABLET ORAL
Qty: 100 TABLET | Refills: 2 | Status: SHIPPED | OUTPATIENT
Start: 2023-03-28 | End: 2023-04-07 | Stop reason: SDUPTHER

## 2023-03-28 RX ORDER — AMLODIPINE BESYLATE 2.5 MG/1
2.5 TABLET ORAL
Qty: 100 TABLET | Refills: 2 | Status: SHIPPED | OUTPATIENT
Start: 2023-03-28 | End: 2023-04-07 | Stop reason: SDUPTHER

## 2023-03-28 RX ORDER — METAXALONE 800 MG/1
400 TABLET ORAL 2 TIMES DAILY
Qty: 90 TABLET | Refills: 0 | Status: SHIPPED | OUTPATIENT
Start: 2023-03-28 | End: 2023-04-25 | Stop reason: SDUPTHER

## 2023-03-28 NOTE — PROGRESS NOTES
Assessment    Reached out to patient for monthly CCM follow up. Per patient, she has recently been struggling with gas pain the past 2 days. She has tried OTC Simethicone without relief. Patient states she does not want to go to any specialist or doctors at this time. This RN suggested heat pack on her belly - patient to do so. She states she is drinking tea too and it is occasionally helpful. She states she also got a nose bleed right as we were speaking. Patient to take care of it and is aware to go to the ED if it does not stop. She is also requesting refills of her amlodipine, levothyroxine, and metaxalone.  Will send message to PCP.     Education    Nose Bleed, Gas pain intervention    Care Plan    Continue on with current plan of care    Progress:    Not on track    Next outreach: 04/19/2023 @ 1500.

## 2023-03-30 ENCOUNTER — PATIENT OUTREACH (OUTPATIENT)
Dept: HEALTH INFORMATION MANAGEMENT | Facility: OTHER | Age: 88
End: 2023-03-30
Payer: MEDICARE

## 2023-03-30 DIAGNOSIS — E66.9 DIABETES MELLITUS TYPE 2 IN OBESE: ICD-10-CM

## 2023-03-30 DIAGNOSIS — I10 PRIMARY HYPERTENSION: ICD-10-CM

## 2023-03-30 DIAGNOSIS — E11.69 DIABETES MELLITUS TYPE 2 IN OBESE: ICD-10-CM

## 2023-03-30 NOTE — PROGRESS NOTES
03/30/2023:    Patient called this RN stating to forget refilling the medications we discussed during our monthly CCM follow up call. Per patient, her blood pressure has been low and she is not wanting to take her amlodipine anymore.     This RN attempted to call patient twice with the first call being picked up, but a clicking sound in the background with no reply. The second call rang until the voicemail. This RN left message asking for a return call to discuss low blood pressure.     03/31/2023:     1533: Attempt x2 to reach patient regarding information above. No answer. LVM with contact information.     04/03/2023:     1019: Per patient, she is doing well. Her blood pressure has now gone back up to baseline - 121/82. Per patient, when it got low it was in the 90s systolic. Patient stated she was dizzy, so she did not take her blood pressure medication. She is now back to taking her medications.

## 2023-03-31 ENCOUNTER — ANTICOAGULATION VISIT (OUTPATIENT)
Dept: MEDICAL GROUP | Facility: PHYSICIAN GROUP | Age: 88
End: 2023-03-31
Payer: MEDICARE

## 2023-03-31 DIAGNOSIS — I35.0 SEVERE AORTIC STENOSIS: ICD-10-CM

## 2023-03-31 DIAGNOSIS — Z79.01 CHRONIC ANTICOAGULATION: Primary | ICD-10-CM

## 2023-03-31 DIAGNOSIS — I48.0 PAF (PAROXYSMAL ATRIAL FIBRILLATION) (HCC): ICD-10-CM

## 2023-03-31 LAB — INR PPP: 1.5 (ref 2–3.5)

## 2023-03-31 PROCEDURE — 99211 OFF/OP EST MAY X REQ PHY/QHP: CPT | Performed by: FAMILY MEDICINE

## 2023-03-31 PROCEDURE — 85610 PROTHROMBIN TIME: CPT | Performed by: FAMILY MEDICINE

## 2023-03-31 NOTE — PROGRESS NOTES
Anticoagulation Summary  As of 3/31/2023      INR goal:  2.0-3.0   TTR:  61.4 % (9.4 mo)   INR used for dosin.50 (3/31/2023)   Warfarin maintenance plan:  3 mg (3 mg x 1) every Mon, Fri; 1.5 mg (3 mg x 0.5) all other days   Weekly warfarin total:  13.5 mg   Plan last modified:  Brady Piña, PharmD (3/31/2023)   Next INR check:  2023   Target end date:  Indefinite    Indications    Severe aortic stenosis [I35.0]  PAF (paroxysmal atrial fibrillation) (HCC) [I48.0]  Chronic anticoagulation [Z79.01]                 Anticoagulation Episode Summary       INR check location:      Preferred lab:      Send INR reminders to:      Comments:            Anticoagulation Care Providers       Provider Role Specialty Phone number    Ganesh Mckeon M.D. Referring Geriatric Medicine - Family Medicine [phone]    [facility] Responsible  [phone]          Anticoagulation Patient Findings  Patient Findings       Negatives:  Signs/symptoms of thrombosis, Signs/symptoms of bleeding, Laboratory test error suspected, Change in health, Change in alcohol use, Change in activity, Upcoming invasive procedure, Emergency department visit, Upcoming dental procedure, Missed doses, Extra doses, Change in medications, Change in diet/appetite, Hospital admission, Bruising, Other complaints                  HPI:   Shereen Dale seen in clinic today, on anticoagulation therapy with warfarin due to history of afib    Patient's previous INR was SUPRAtherapeutic at 3.3 on 3/3/23, at which time patient was instructed to hold dose x 1 and then decrease weekly dose.  She returns to clinic today to recheck INR to ensure it is therapeutic and thus preventing possible clotting and/or bleeding/bruising complications.    CHADS-VASc = 5  (unadjusted ischemic stroke risk/year:  7.2, which is moderate)    Does patient have any changes to current medical/health status since last appt (Y/N):  no  Does patient have any  signs/symptoms of bleeding and/or thrombosis since the last appt (Y/N):  no  Does patient have any interval changes to diet or medications since last appt (Y/N):  no  Are there any complications or cost restrictions with current therapy (Y/N):  no     Does patient have [facility] PCP? Yes, Ganesh Mckeon M.D. (If not, please document discussion that patient must be seen at [facility])       Vitals:  Declined    There were no vitals filed for this visit.     Asssessment:      INR SUBtherapeutic at 1.5, therefore increased risk of stroke   Reason(s) for out of range INR today:  dose too low      Pt is not on antiplatelet therapy    Medication reconciliation completed today.    Plan:  Bolus 3 mg today and then increase weekly dose as detailed above.     Follow up:  Because warfarin is a high risk medication and current CHEST guidelines recommend regular monitoring intervals (few days up to 12 weeks), will have patient return to clinic in 3 weeks to recheck INR.     Bridger Velez, pharmacy intern  Brady Piña, PharmD, BCACP

## 2023-04-07 DIAGNOSIS — E03.9 ACQUIRED HYPOTHYROIDISM: ICD-10-CM

## 2023-04-07 DIAGNOSIS — I10 PRIMARY HYPERTENSION: ICD-10-CM

## 2023-04-07 RX ORDER — LEVOTHYROXINE SODIUM 0.05 MG/1
TABLET ORAL
Qty: 100 TABLET | Refills: 2 | Status: SHIPPED | OUTPATIENT
Start: 2023-04-07 | End: 2023-04-10 | Stop reason: SDUPTHER

## 2023-04-07 RX ORDER — AMLODIPINE BESYLATE 2.5 MG/1
2.5 TABLET ORAL
Qty: 100 TABLET | Refills: 2 | Status: SHIPPED | OUTPATIENT
Start: 2023-04-07 | End: 2023-04-10 | Stop reason: SDUPTHER

## 2023-04-10 ENCOUNTER — OFFICE VISIT (OUTPATIENT)
Dept: MEDICAL GROUP | Facility: MEDICAL CENTER | Age: 88
End: 2023-04-10
Payer: MEDICARE

## 2023-04-10 VITALS
BODY MASS INDEX: 27.05 KG/M2 | TEMPERATURE: 97 F | HEIGHT: 62 IN | SYSTOLIC BLOOD PRESSURE: 122 MMHG | OXYGEN SATURATION: 97 % | HEART RATE: 94 BPM | RESPIRATION RATE: 16 BRPM | DIASTOLIC BLOOD PRESSURE: 70 MMHG | WEIGHT: 147 LBS

## 2023-04-10 DIAGNOSIS — E66.9 DIABETES MELLITUS TYPE 2 IN OBESE: ICD-10-CM

## 2023-04-10 DIAGNOSIS — E03.9 ACQUIRED HYPOTHYROIDISM: ICD-10-CM

## 2023-04-10 DIAGNOSIS — I10 PRIMARY HYPERTENSION: ICD-10-CM

## 2023-04-10 DIAGNOSIS — E11.69 DIABETES MELLITUS TYPE 2 IN OBESE: ICD-10-CM

## 2023-04-10 DIAGNOSIS — I48.0 PAF (PAROXYSMAL ATRIAL FIBRILLATION) (HCC): ICD-10-CM

## 2023-04-10 PROCEDURE — 99214 OFFICE O/P EST MOD 30 MIN: CPT | Performed by: FAMILY MEDICINE

## 2023-04-10 RX ORDER — AMLODIPINE BESYLATE 2.5 MG/1
2.5 TABLET ORAL
Qty: 100 TABLET | Refills: 2 | Status: SHIPPED | OUTPATIENT
Start: 2023-04-10 | End: 2023-04-10 | Stop reason: SDUPTHER

## 2023-04-10 RX ORDER — GABAPENTIN 100 MG/1
100 CAPSULE ORAL 2 TIMES DAILY
Qty: 200 CAPSULE | Refills: 3 | Status: SHIPPED
Start: 2023-04-10 | End: 2023-05-26

## 2023-04-10 RX ORDER — LEVOTHYROXINE SODIUM 0.05 MG/1
TABLET ORAL
Qty: 100 TABLET | Refills: 2 | Status: SHIPPED
Start: 2023-04-10 | End: 2023-05-26

## 2023-04-10 RX ORDER — AMLODIPINE BESYLATE 2.5 MG/1
2.5 TABLET ORAL
Qty: 100 TABLET | Refills: 2 | Status: SHIPPED | OUTPATIENT
Start: 2023-04-10 | End: 2023-04-25 | Stop reason: SDUPTHER

## 2023-04-10 RX ORDER — LEVOTHYROXINE SODIUM 0.05 MG/1
TABLET ORAL
Qty: 100 TABLET | Refills: 2 | Status: SHIPPED | OUTPATIENT
Start: 2023-04-10 | End: 2023-04-10 | Stop reason: SDUPTHER

## 2023-04-10 ASSESSMENT — FIBROSIS 4 INDEX: FIB4 SCORE: 2.21

## 2023-04-10 NOTE — TELEPHONE ENCOUNTER
Received request via: Pharmacy    Was the patient seen in the last year in this department? Yes    Does the patient have an active prescription (recently filled or refills available) for medication(s) requested? No    Does the patient have [facility] and need 100 day supply (blood pressure, diabetes and cholesterol meds only)? Yes, quantity updated to 100 days

## 2023-04-10 NOTE — PROGRESS NOTES
CC: Hypertension, hypothyroidism, diabetes, hyperlipidemia , paroxysmal A-fib    HPI:   Shereen presents today to discuss the following:    Primary hypertension  Blood pressure has been adequately controlled on amlodipine 2.5 mg daily, carvedilol 12.5 mg twice daily,  spironolactone 25 mg daily.  No side effect    Acquired hypothyroidism  She has been tolerating Levothyroxine, no palpitation, no cold or heat intolerance, she has been on levothyroxine 50 mcg daily    Diabetes mellitus type 2 in obese ([facility])  Blood glucose has been controlled.  Last A1c was 6.8.  Denies polyuria polydipsia.  Currently on glipizide SR 2.5 mg daily, metformin 1500 mg daily.  No hypoglycemic episodes.    PAF (paroxysmal atrial fibrillation) ([facility])/ Secondary hypercoagulable state([facility])  Denies palpitation, chest pain, and shortness of breath.  Patient is currently on warfarin and carvedilol.  No history of stroke        Patient Active Problem List    Diagnosis Date Noted    Secondary hypercoagulable state ([facility]) 06/09/2022    Acquired hypothyroidism 09/12/2019    Diabetes mellitus type 2 in obese ([facility]) 09/12/2019    Skin abrasion 08/20/2019    Chronic anticoagulation 08/02/2019    Hyponatremia 03/25/2019    Epistaxis 03/25/2019    Advanced directives, counseling/discussion 09/26/2018    Hypertensive urgency 05/11/2017    Diastolic dysfunction 02/02/2016    Tear of lateral cartilage or meniscus of knee, current 05/21/2015    PAF (paroxysmal atrial fibrillation) ([facility]) 01/12/2015    Dyspnea on effort 01/02/2014    Diabetes ([facility]) 12/26/2013    Severe aortic stenosis 07/02/2013    HTN (hypertension) 05/04/2012    Hemorrhagic disorder due to extrinsic circulating anticoagulants ([facility]) 05/02/2012    Cough 05/02/2012    Edema 05/02/2012    Acute, but ill-defined, cerebrovascular disease 04/30/2012       Current Outpatient Medications   Medication Sig Dispense Refill    gabapentin (NEURONTIN) 100 MG Cap Take 1 Capsule by mouth 2 times a day. Take 1  capsule PO Daily in the AM, Take 2 capsules daily in the  Capsule 3    amLODIPine (NORVASC) 2.5 MG Tab Take 1 Tablet by mouth every day. 100 Tablet 2    levothyroxine (SYNTHROID) 50 MCG Tab TAKE ONE TABLET BY MOUTH EVERY MORNING FOR UNDERACTIVE THYROID 100 Tablet 2    metaxalone (SKELAXIN) 800 MG Tab Take 0.5 Tablets by mouth 2 times a day. 1/2 tab 400 mg 2 x day.  Indications: Musculoskeletal Pain 90 Tablet 0    glipiZIDE SR (GLUCOTROL) 2.5 MG TABLET SR 24 HR Take 1 Tablet by mouth every day. 100 Tablet 2    warfarin (COUMADIN) 3 MG Tab TAKE 1/2 OR 1 TABLET BY MOUTH EVERY DAY OR TAKE AS DIRECTED BY ANTICOAGULATION CLINIC INDICATIONS 90 Tablet 1    carvedilol (COREG) 12.5 MG Tab Take 1 Tablet by mouth 2 times a day for  Tablet 3    omeprazole (PRILOSEC) 20 MG delayed-release capsule Take 1 Capsule by mouth 2 times a day. 200 Capsule 1    metFORMIN (GLUCOPHAGE) 500 MG Tab TAKE ONE TABLET BY MOUTH THREE TIMES A  Tablet 0    carvedilol (COREG) 12.5 MG Tab Take 1 Tablet by mouth 2 times a day. 200 Tablet 2    senna-docusate (PERICOLACE OR SENOKOT S) 8.6-50 MG Tab Take 2 Tablets by mouth every day for constipation. Hold for loose stools 60 Tablet 1    Misc. Devices Misc Oxygen concentrator with humidifier, 2.5-3 L/min 1 Each 0    spironolactone (ALDACTONE) 25 MG Tab Take 25 mg by mouth every day.      morphine (ROXANOL) 20 MG/ML Solution Take 0.25 mL by mouth every 2 hours as needed (moderate to severe pain or shortness of breath). 120 mL 0    nystatin (MYCOSTATIN) powder Apply topically 2 times a day for irritation 30 g 3    dorzolamide-timolol (COSOPT) 22.3-6.8 MG/ML Solution Administer 1 Drop into both eyes 2 times a day. Indications: Wide-Angle Glaucoma      Blood Glucose Monitoring Suppl (FREESTYLE LITE) Device USE TWO TIMES A DAY AS DIRECTED FOR BLOOD SUGAR MONITORING 100 Each 3    haloperidol (HALDOL) 2 MG/ML Conc Take 0.5 mg by mouth every 6 hours as needed (Anxiety, agitation).      Home Care  "Oxygen Inhale 2-5 L/min as needed for Shortness of Breath (sleep apnea). 2-5 LPM as needed via nasal cannula  Indications: Shortness of breath, sleep apnea      Sennosides-Docusate Sodium (SENNA PLUS) 8.6-50 MG Cap Take 2 tablet by mouth every day. Take every evening.  Hold for loose stools  Indications: Constipation       No current facility-administered medications for this visit.         Allergies as of 04/10/2023 - Reviewed 04/10/2023   Allergen Reaction Noted    Darvon [propoxyphene hcl]  03/18/2008    Iodine  05/11/2015    Latex  09/19/2013    Penicillins Anaphylaxis 08/02/2008    Propoxyphene hcl Anaphylaxis 08/02/2008    Tetanus antitoxin Myalgia 11/19/2017    Tetracycline Anaphylaxis 08/02/2008        ROS: Denies any chest pain, Shortness of breath, Changes bowel or bladder, Lower extremity edema.    Physical Exam:  /70 (BP Location: Right arm, Patient Position: Sitting, BP Cuff Size: Adult)   Pulse 94   Temp 36.1 °C (97 °F) (Temporal)   Resp 16   Ht 1.575 m (5' 2\")   Wt 66.7 kg (147 lb)   LMP 01/01/1985   SpO2 97%   BMI 26.89 kg/m²   Gen.: Well-developed, well-nourished, no apparent distress,pleasant and cooperative with the examination  Skin:  Warm and dry with good turgor. No rashes or suspicious lesions in visible areas  HEENT:Sinuses nontender with palpation, TMs clear, nares patent with pink mucosa and clear rhinorrhea,no septal deviation ,polyps or lesions. lips without lesions, oropharynx clear.  Neck: Trachea midline,no masses or adenopathy. No JVD.  Cor: Regular rate and rhythm without murmur, gallop or rub.  Lungs: Respirations unlabored.Clear to auscultation with equal breath sounds bilaterally. No wheezes, rhonchi.  Extremities: No cyanosis, clubbing or edema.      Assessment and Plan.   87 y.o. female     1. Primary hypertension  Controlled.  Continue on amlodipine 2.5 mg daily, carvedilol 12.5 mg twice daily,  spironolactone 25 mg daily.    - amLODIPine (NORVASC) 2.5 MG Tab; " Take 1 Tablet by mouth every day.  Dispense: 100 Tablet; Refill: 2    2. Acquired hypothyroidism  She has been tolerating Levothyroxine, no palpitation, no cold or heat intolerance  Continue levothyroxine 50 mcg daily    - levothyroxine (SYNTHROID) 50 MCG Tab; TAKE ONE TABLET BY MOUTH EVERY MORNING FOR UNDERACTIVE THYROID  Dispense: 100 Tablet; Refill: 2    3. Diabetes mellitus type 2 in obese (HCC)  Controlled.  Last A1c was 6.8  Continue on glipizide SR 2.5 mg daily, metformin 1500 mg daily    - gabapentin (NEURONTIN) 100 MG Cap; Take 1 Capsule by mouth 2 times a day. Take 1 capsule PO Daily in the AM, Take 2 capsules daily in the PM  Dispense: 200 Capsule; Refill: 3    4. PAF (paroxysmal atrial fibrillation) ([facility])  No RVR.  Continue on warfarin and carvedilol    5. Secondary hypercoagulable state(HCC)  Due to A-fib, continue warfarin

## 2023-04-19 ENCOUNTER — PATIENT OUTREACH (OUTPATIENT)
Dept: HEALTH INFORMATION MANAGEMENT | Facility: OTHER | Age: 88
End: 2023-04-19
Payer: MEDICARE

## 2023-04-19 DIAGNOSIS — I10 PRIMARY HYPERTENSION: ICD-10-CM

## 2023-04-19 DIAGNOSIS — E11.9 TYPE 2 DIABETES MELLITUS WITHOUT COMPLICATION, WITHOUT LONG-TERM CURRENT USE OF INSULIN (HCC): ICD-10-CM

## 2023-04-19 PROCEDURE — 99490 CHRNC CARE MGMT STAFF 1ST 20: CPT | Performed by: FAMILY MEDICINE

## 2023-04-19 NOTE — PROGRESS NOTES
04/19/2023:    1428: Attempt x1 to reach patient for monthly CCM follow up call. No answer. LVM with contact information.      04/26/2023:     Assessment    Reached out to patient for monthly CCM follow up call. Per patient, she is doing well. She was wondering if her medications were refilled. This RN let patient know that yes they were and should be ready for her at her pharmacy. She recently saw her PCP on 04/10/2023. She does have a follow up appointment in July and would like to know if she needs to complete any lab work. Will send message to PCP.    Per patient, she is checking her blood sugars at home. They are using around 110. Her most recent A1C is 6.8%. She does watch what she eats, but cannot not exercise because she fatigues easily. She is taking her blood pressure at home morning and evening. She states her average is 118/120/80s. She does not add salt into their meals.     Care Plan    Continue with current plan of care    Progress:    On-track    Next outreach: 05/18/2023 @ 1300

## 2023-04-21 ENCOUNTER — ANTICOAGULATION VISIT (OUTPATIENT)
Dept: MEDICAL GROUP | Facility: PHYSICIAN GROUP | Age: 88
End: 2023-04-21
Payer: MEDICARE

## 2023-04-21 DIAGNOSIS — I48.0 PAF (PAROXYSMAL ATRIAL FIBRILLATION) (HCC): ICD-10-CM

## 2023-04-21 DIAGNOSIS — Z79.01 CHRONIC ANTICOAGULATION: Primary | ICD-10-CM

## 2023-04-21 DIAGNOSIS — I35.0 SEVERE AORTIC STENOSIS: ICD-10-CM

## 2023-04-21 LAB — INR PPP: 2.6 (ref 2–3.5)

## 2023-04-21 PROCEDURE — 85610 PROTHROMBIN TIME: CPT | Performed by: INTERNAL MEDICINE

## 2023-04-21 PROCEDURE — 99999 PR NO CHARGE: CPT | Performed by: INTERNAL MEDICINE

## 2023-04-21 PROCEDURE — 93793 ANTICOAG MGMT PT WARFARIN: CPT | Performed by: INTERNAL MEDICINE

## 2023-04-21 NOTE — PROGRESS NOTES
Anticoagulation Summary  As of 2023      INR goal:  2.0-3.0   TTR:  61.4 % (10.1 mo)   INR used for dosin.60 (2023)   Warfarin maintenance plan:  3 mg (3 mg x 1) every Mon, Fri; 1.5 mg (3 mg x 0.5) all other days   Weekly warfarin total:  13.5 mg   Plan last modified:  Brady Piña, PharmD (3/31/2023)   Next INR check:     Target end date:  Indefinite    Indications    Severe aortic stenosis [I35.0]  PAF (paroxysmal atrial fibrillation) (HCC) [I48.0]  Chronic anticoagulation [Z79.01]                 Anticoagulation Episode Summary       INR check location:      Preferred lab:      Send INR reminders to:      Comments:            Anticoagulation Care Providers       Provider Role Specialty Phone number    Ganesh Mckeon M.D. Referring Geriatric Medicine - Family Medicine [phone]    [facility] Responsible  [phone]          Anticoagulation Patient Findings  Patient Findings       Positives:  Signs/symptoms of bleeding (nose bleed)    Negatives:  Signs/symptoms of thrombosis, Laboratory test error suspected, Change in health, Change in alcohol use, Change in activity, Upcoming invasive procedure, Emergency department visit, Upcoming dental procedure, Missed doses, Extra doses, Change in medications, Change in diet/appetite, Hospital admission, Bruising, Other complaints                  HPI:   Shereen Dale seen in clinic today, on anticoagulation therapy with Warfarin due to history of PAF    Patient's previous INR was SUBtherapeutic at 1.5 on 3/31/23, at which time patient was instructed to Bolus dose and increase weekly dose.  She returns to clinic today to recheck INR to ensure it is therapeutic and thus preventing possible clotting and/or bleeding/bruising complications.    CHADS-VASc = 5  (unadjusted ischemic stroke risk/year:  6.7%, which is moderate)    Does patient have any changes to current medical/health status since last appt (Y/N):  no  Does  patient have any signs/symptoms of bleeding and/or thrombosis since the last appt (Y/N):  Y, nose bleed occurred 4/14/23.  Does patient have any interval changes to diet or medications since last appt (Y/N):  no  Are there any complications or cost restrictions with current therapy (Y/N):  no     Does patient have [facility] PCP? Yes, Ganesh Mckeon M.D. (If not, please document discussion that patient must be seen at [facility])       Vitals:      There were no vitals filed for this visit.     Asssessment:      INR therapeutic at 2.6, therefore decreased risk of stroke.   Reason(s) for out of range INR today:  n/a      Pt is not on antiplatelet therapy    Medication reconciliation completed today.    Plan:  Reduce weekly dose as detailed above.     Follow up:  Because warfarin is a high risk medication and current CHEST guidelines recommend regular monitoring intervals (few days up to 12 weeks), will have patient return to clinic in 4 weeks to recheck INR.     Bridger Velez, pharmacy intern  Brady Piña, PharmD, BCACP

## 2023-04-25 DIAGNOSIS — I10 PRIMARY HYPERTENSION: ICD-10-CM

## 2023-04-25 DIAGNOSIS — M79.10 MUSCULAR PAIN: ICD-10-CM

## 2023-04-25 RX ORDER — METAXALONE 800 MG/1
400 TABLET ORAL 2 TIMES DAILY
Qty: 90 TABLET | Refills: 0 | Status: SHIPPED
Start: 2023-04-25 | End: 2023-05-26

## 2023-04-25 RX ORDER — AMLODIPINE BESYLATE 2.5 MG/1
2.5 TABLET ORAL
Qty: 100 TABLET | Refills: 2 | Status: SHIPPED | OUTPATIENT
Start: 2023-04-25 | End: 2023-04-27 | Stop reason: SDUPTHER

## 2023-04-27 ENCOUNTER — PATIENT MESSAGE (OUTPATIENT)
Dept: HEALTH INFORMATION MANAGEMENT | Facility: OTHER | Age: 88
End: 2023-04-27

## 2023-04-27 DIAGNOSIS — I10 PRIMARY HYPERTENSION: ICD-10-CM

## 2023-04-27 RX ORDER — AMLODIPINE BESYLATE 2.5 MG/1
2.5 TABLET ORAL
Qty: 100 TABLET | Refills: 2 | Status: SHIPPED
Start: 2023-04-27 | End: 2023-05-26

## 2023-05-02 ENCOUNTER — TELEPHONE (OUTPATIENT)
Dept: HEALTH INFORMATION MANAGEMENT | Facility: OTHER | Age: 88
End: 2023-05-02
Payer: MEDICARE

## 2023-05-02 DIAGNOSIS — I10 PRIMARY HYPERTENSION: ICD-10-CM

## 2023-05-02 RX ORDER — AMLODIPINE BESYLATE 5 MG/1
5 TABLET ORAL DAILY
Qty: 100 TABLET | Refills: 2 | Status: SHIPPED | OUTPATIENT
Start: 2023-05-02 | End: 2023-05-04 | Stop reason: SDUPTHER

## 2023-05-02 NOTE — TELEPHONE ENCOUNTER
Patient called and LVM stating that she was sent the wrong prescription for Amlodipine. Per patient, her PCP has sent in 2.5 mg instead of her 5mg. Patient requesting 5mg Amlodipine. Will send message to PCP.

## 2023-05-04 RX ORDER — AMLODIPINE BESYLATE 5 MG/1
5 TABLET ORAL DAILY
Qty: 100 TABLET | Refills: 2 | Status: SHIPPED | OUTPATIENT
Start: 2023-05-04 | End: 2024-01-19 | Stop reason: SDUPTHER

## 2023-05-05 ENCOUNTER — TELEPHONE (OUTPATIENT)
Dept: HEALTH INFORMATION MANAGEMENT | Facility: OTHER | Age: 88
End: 2023-05-05

## 2023-05-18 ENCOUNTER — PATIENT OUTREACH (OUTPATIENT)
Dept: HEALTH INFORMATION MANAGEMENT | Facility: OTHER | Age: 88
End: 2023-05-18
Payer: MEDICARE

## 2023-05-18 DIAGNOSIS — E11.69 DIABETES MELLITUS TYPE 2 IN OBESE: ICD-10-CM

## 2023-05-18 DIAGNOSIS — E66.9 DIABETES MELLITUS TYPE 2 IN OBESE: ICD-10-CM

## 2023-05-18 DIAGNOSIS — I10 PRIMARY HYPERTENSION: ICD-10-CM

## 2023-05-18 PROCEDURE — 99999 PR NO CHARGE: CPT | Performed by: FAMILY MEDICINE

## 2023-05-18 NOTE — PROGRESS NOTES
Assessment    Reached out to patient for monthly CCM follow up call. Per patient, she is doing well. She has not questions or concerns at this time. Her most recent A1C was 6.8%. Her AM sugar to was 104 and tends to stick around there. Her blood pressure has been stable, but states her diastolic is elevated at 84. Patient encouraged to contact this RN if she has any questions or needs.    Education    N/A    Plan of Care and Goals    Choose heart healthy, low sodium low carb meal options   Decrease fall risk   Mobility and stability exercises as tolerated      Barriers:    age, knowledge, medical diagnosis    Progress:    Progressing    Next outreach: 06/16/2023 @ 1000

## 2023-05-19 ENCOUNTER — ANTICOAGULATION VISIT (OUTPATIENT)
Dept: MEDICAL GROUP | Facility: PHYSICIAN GROUP | Age: 88
End: 2023-05-19
Payer: MEDICARE

## 2023-05-19 DIAGNOSIS — I48.0 PAF (PAROXYSMAL ATRIAL FIBRILLATION) (HCC): ICD-10-CM

## 2023-05-19 DIAGNOSIS — Z79.01 CHRONIC ANTICOAGULATION: Primary | ICD-10-CM

## 2023-05-19 DIAGNOSIS — I35.0 SEVERE AORTIC STENOSIS: ICD-10-CM

## 2023-05-19 LAB — INR PPP: 2.9 (ref 2–3.5)

## 2023-05-19 PROCEDURE — 99999 PR NO CHARGE: CPT | Performed by: FAMILY MEDICINE

## 2023-05-19 PROCEDURE — 93793 ANTICOAG MGMT PT WARFARIN: CPT | Performed by: FAMILY MEDICINE

## 2023-05-19 PROCEDURE — 85610 PROTHROMBIN TIME: CPT | Performed by: FAMILY MEDICINE

## 2023-05-19 NOTE — PROGRESS NOTES
Anticoagulation Summary  As of 2023      INR goal:  2.0-3.0   TTR:  64.6 % (11 mo)   INR used for dosin.90 (2023)   Warfarin maintenance plan:  3 mg (3 mg x 1) every Mon; 1.5 mg (3 mg x 0.5) all other days   Weekly warfarin total:  12 mg   Plan last modified:  Brady Piña, PharmD (2023)   Next INR check:  2023   Target end date:  Indefinite    Indications    Severe aortic stenosis [I35.0]  PAF (paroxysmal atrial fibrillation) (HCC) [I48.0]  Chronic anticoagulation [Z79.01]                 Anticoagulation Episode Summary       INR check location:      Preferred lab:      Send INR reminders to:      Comments:            Anticoagulation Care Providers       Provider Role Specialty Phone number    Ganesh Mckeon M.D. Referring Geriatric Medicine - Family Medicine [phone]    [facility] Responsible  [phone]          Anticoagulation Patient Findings  Patient Findings       Negatives:  Signs/symptoms of thrombosis, Signs/symptoms of bleeding, Laboratory test error suspected, Change in health, Change in alcohol use, Change in activity, Upcoming invasive procedure, Emergency department visit, Upcoming dental procedure, Missed doses, Extra doses, Change in medications, Change in diet/appetite, Hospital admission, Bruising, Other complaints                  HPI:   Shereen Dale seen in clinic today, on anticoagulation therapy with warfarin due to history of PAF.    Patient's previous INR was therapeutic at 2.6 on 23, at which time patient was instructed to continue with current warfarin regimen.  She returns to clinic today to recheck INR to ensure it is therapeutic and thus preventing possible clotting and/or bleeding/bruising complications.    CHADS-VASc = at least 5  (unadjusted ischemic stroke risk/year:  7.2%, which is moderate risk)    Does patient have any changes to current medical/health status since last appt (Y/N):  NO  Does patient have any  signs/symptoms of bleeding and/or thrombosis since the last appt (Y/N):  NO  Does patient have any interval changes to diet or medications since last appt (Y/N):  NO  Are there any complications or cost restrictions with current therapy (Y/N):  NO     Does patient have [facility] PCP? Yes, Ganesh Mckeon M.D. (If not, please document discussion that patient must be seen at [facility])       Vitals:  declined today    There were no vitals filed for this visit.     Asssessment:      INR therapeutic at 2.9, therefore decreasing patient's bleeding and/or stroke risk.   Reason(s) for out of range INR today:  n/a      Pt is not on antiplatelet therapy    Medication reconciliation completed today.    Plan:  Pt is to continue with current warfarin dosing regimen.     Follow up:  Because warfarin is a high risk medication and current CHEST guidelines recommend regular monitoring intervals (few days up to 12 weeks), will have patient return to clinic in 4 weeks to recheck INR.    Brady Piña, PharmD, BCACP

## 2023-05-25 ENCOUNTER — HOSPITAL ENCOUNTER (OUTPATIENT)
Dept: RADIOLOGY | Facility: MEDICAL CENTER | Age: 88
DRG: 193 | End: 2023-05-25
Attending: NURSE PRACTITIONER
Payer: MEDICARE

## 2023-05-25 ENCOUNTER — TELEPHONE (OUTPATIENT)
Dept: VASCULAR LAB | Facility: MEDICAL CENTER | Age: 88
End: 2023-05-25

## 2023-05-25 ENCOUNTER — OFFICE VISIT (OUTPATIENT)
Dept: URGENT CARE | Facility: PHYSICIAN GROUP | Age: 88
End: 2023-05-25
Payer: MEDICARE

## 2023-05-25 VITALS
HEIGHT: 62 IN | DIASTOLIC BLOOD PRESSURE: 60 MMHG | HEART RATE: 87 BPM | RESPIRATION RATE: 12 BRPM | WEIGHT: 145 LBS | SYSTOLIC BLOOD PRESSURE: 108 MMHG | BODY MASS INDEX: 26.68 KG/M2 | OXYGEN SATURATION: 95 % | TEMPERATURE: 97.1 F

## 2023-05-25 DIAGNOSIS — B95.0 BACTERIAL INFECTION DUE TO STREPTOCOCCUS, GROUP A: ICD-10-CM

## 2023-05-25 DIAGNOSIS — J22 LRTI (LOWER RESPIRATORY TRACT INFECTION): Primary | ICD-10-CM

## 2023-05-25 DIAGNOSIS — Z79.01 CHRONIC ANTICOAGULATION: ICD-10-CM

## 2023-05-25 DIAGNOSIS — R06.02 SHORTNESS OF BREATH: ICD-10-CM

## 2023-05-25 DIAGNOSIS — J22 LRTI (LOWER RESPIRATORY TRACT INFECTION): ICD-10-CM

## 2023-05-25 DIAGNOSIS — R05.9 COUGH IN ADULT: ICD-10-CM

## 2023-05-25 DIAGNOSIS — J02.9 PHARYNGITIS, UNSPECIFIED ETIOLOGY: ICD-10-CM

## 2023-05-25 LAB
FLUAV RNA SPEC QL NAA+PROBE: NEGATIVE
FLUBV RNA SPEC QL NAA+PROBE: NEGATIVE
INT CON NEG: NEGATIVE
INT CON POS: POSITIVE
RSV RNA SPEC QL NAA+PROBE: NEGATIVE
S PYO AG THROAT QL: POSITIVE
SARS-COV-2 RNA RESP QL NAA+PROBE: NEGATIVE

## 2023-05-25 PROCEDURE — 3078F DIAST BP <80 MM HG: CPT | Performed by: NURSE PRACTITIONER

## 2023-05-25 PROCEDURE — 94640 AIRWAY INHALATION TREATMENT: CPT | Performed by: NURSE PRACTITIONER

## 2023-05-25 PROCEDURE — 99214 OFFICE O/P EST MOD 30 MIN: CPT | Mod: 25 | Performed by: NURSE PRACTITIONER

## 2023-05-25 PROCEDURE — 71046 X-RAY EXAM CHEST 2 VIEWS: CPT

## 2023-05-25 PROCEDURE — 0241U POCT CEPHEID COV-2, FLU A/B, RSV - PCR: CPT | Performed by: NURSE PRACTITIONER

## 2023-05-25 PROCEDURE — 3074F SYST BP LT 130 MM HG: CPT | Performed by: NURSE PRACTITIONER

## 2023-05-25 PROCEDURE — 87880 STREP A ASSAY W/OPTIC: CPT | Performed by: NURSE PRACTITIONER

## 2023-05-25 RX ORDER — CEPHALEXIN 500 MG/1
500 CAPSULE ORAL 3 TIMES DAILY
Qty: 30 CAPSULE | Refills: 0 | Status: SHIPPED | OUTPATIENT
Start: 2023-05-25 | End: 2023-06-04

## 2023-05-25 RX ORDER — ALBUTEROL SULFATE 90 UG/1
2 AEROSOL, METERED RESPIRATORY (INHALATION) EVERY 4 HOURS PRN
Qty: 1 EACH | Refills: 0 | Status: SHIPPED | OUTPATIENT
Start: 2023-05-25 | End: 2023-05-25

## 2023-05-25 RX ORDER — PREDNISONE 20 MG/1
40 TABLET ORAL DAILY
Qty: 14 TABLET | Refills: 0 | Status: ON HOLD | OUTPATIENT
Start: 2023-05-25 | End: 2023-05-27

## 2023-05-25 RX ORDER — ALBUTEROL SULFATE 90 UG/1
2 AEROSOL, METERED RESPIRATORY (INHALATION) EVERY 4 HOURS PRN
Qty: 1 EACH | Refills: 0 | Status: SHIPPED | OUTPATIENT
Start: 2023-05-25 | End: 2023-06-08

## 2023-05-25 RX ORDER — CLINDAMYCIN HYDROCHLORIDE 300 MG/1
300 CAPSULE ORAL 3 TIMES DAILY
Qty: 30 CAPSULE | Refills: 0 | Status: SHIPPED
Start: 2023-05-25 | End: 2023-05-25

## 2023-05-25 RX ORDER — PREDNISONE 20 MG/1
40 TABLET ORAL DAILY
Qty: 14 TABLET | Refills: 0 | Status: SHIPPED | OUTPATIENT
Start: 2023-05-25 | End: 2023-05-25

## 2023-05-25 RX ORDER — IPRATROPIUM BROMIDE AND ALBUTEROL SULFATE 2.5; .5 MG/3ML; MG/3ML
3 SOLUTION RESPIRATORY (INHALATION) ONCE
Status: COMPLETED | OUTPATIENT
Start: 2023-05-25 | End: 2023-05-25

## 2023-05-25 RX ORDER — CEPHALEXIN 500 MG/1
500 CAPSULE ORAL 3 TIMES DAILY
Qty: 30 CAPSULE | Refills: 0 | Status: SHIPPED | OUTPATIENT
Start: 2023-05-25 | End: 2023-05-25

## 2023-05-25 RX ORDER — INHALER,ASSIST DEVICE,MED MASK
1 SPACER (EA) MISCELLANEOUS ONCE
Status: SHIPPED | OUTPATIENT
Start: 2023-05-25 | End: 2023-05-28

## 2023-05-25 RX ADMIN — IPRATROPIUM BROMIDE AND ALBUTEROL SULFATE 3 ML: 2.5; .5 SOLUTION RESPIRATORY (INHALATION) at 17:05

## 2023-05-25 ASSESSMENT — ENCOUNTER SYMPTOMS
SPUTUM PRODUCTION: 1
SORE THROAT: 1
FEVER: 0
HEADACHES: 1
SHORTNESS OF BREATH: 1

## 2023-05-25 ASSESSMENT — FIBROSIS 4 INDEX: FIB4 SCORE: 2.21

## 2023-05-25 NOTE — PROGRESS NOTES
Subjective:     Shereen Dale is a 87 y.o. female who presents for Shortness of Breath (Sore throat, SOB, congestion starting x4 days ago. Fatigue, coughing hurts chest. Relying on oxygen more during the day. )      Shortness of Breath  This is a new problem. The current episode started in the past 7 days (Shereen is pleasant 87 year old female who presents to  today with complaints of chest congestion, SOB and productive cough X 5 days). The problem occurs constantly. The problem has been rapidly worsening. Associated symptoms include headaches, a sore throat and sputum production. Pertinent negatives include no fever. She has tried rest (She does suffer from MARQUEZ and notes she has been now using her O2 during the day for SOB, Nyquil) for the symptoms.         Review of Systems   Constitutional:  Negative for fever.   HENT:  Positive for sore throat.    Respiratory:  Positive for sputum production and shortness of breath.    Neurological:  Positive for headaches.       PMH:   Past Medical History:   Diagnosis Date    AF (atrial fibrillation) (HCC)     Arrhythmia     A-fib    Backpain     Lower back pain    Breast cancer (HCC)     Breath shortness     uses 2-3 L O2 at night    Cancer (HCC) 1993    right breast    CATARACT     celestine IOL    Chronic anticoagulation 5/2/2012    Congestive heart failure (HCC)     Cough 5/2/2012    Dental disorder     dentures    Diabetes     oral medication    Edema 5/2/2012    Glaucoma     Heart burn     Heart murmur     Heart valve disease     Hypertension     Hypothyroid     Oxygen deficiency     uses 2.5-3 l at night    Paroxysmal atrial fibrillation (HCC)     Sleep apnea     no cpap    tia     TIA x2    Unspecified hemorrhagic conditions     takes coumadin     ALLERGIES:   Allergies   Allergen Reactions    Darvon [Propoxyphene Hcl]      shock    Iodine      Shock  - IV    Latex      Swelling = hands with glove use    Penicillins Anaphylaxis    Propoxyphene Hcl Anaphylaxis    Tetanus  Antitoxin Myalgia    Tetracycline Anaphylaxis     SURGHX:   Past Surgical History:   Procedure Laterality Date    KNEE ARTHROSCOPY Right 2015    Procedure: KNEE ARTHROSCOPY;  Surgeon: Emerson Garcia M.D.;  Location: SURGERY [facility];  Service:     MENISCECTOMY Right 2015    Procedure: LATERAL MENISCECTOMY;  Surgeon: Emerson Garcia M.D.;  Location: SURGERY [facility];  Service:     CATARACT PHACO WITH IOL  10/21/2013    Performed by Dominick Fernando M.D. at SURGERY [facility]    CATARACT PHACO WITH IOL  2013    Performed by Dominick Fernando M.D. at SURGERY [facility]    CHOLECYSTECTOMY      HYSTERECTOMY RADICAL      LUMPECTOMY      R breast    NM RADIATION THERAPY PLAN SIMPLE       SOCHX:   Social History     Socioeconomic History    Marital status:    Tobacco Use    Smoking status: Former     Packs/day: 0.50     Years: 11.00     Pack years: 5.50     Types: Cigarettes     Quit date: 1964     Years since quittin.4    Smokeless tobacco: Never    Tobacco comments:     Started at    Vaping Use    Vaping Use: Never used   Substance and Sexual Activity    Alcohol use: Not Currently     Alcohol/week: 0.0 oz     Comment: rarely    Drug use: No    Sexual activity: Never     Partners: Male     Social Determinants of Health     Financial Resource Strain: Low Risk  (2022)    Overall Financial Resource Strain (CARDIA)     Difficulty of Paying Living Expenses: Not hard at all   Food Insecurity: No Food Insecurity (2022)    Hunger Vital Sign     Worried About Running Out of Food in the Last Year: Never true     Ran Out of Food in the Last Year: Never true   Transportation Needs: No Transportation Needs (2022)    PRAPARE - Transportation     Lack of Transportation (Medical): No     Lack of Transportation (Non-Medical): No   Physical Activity: Inactive (2022)    Exercise Vital Sign     Days of Exercise per Week: 0 days     Minutes of Exercise per  "Session: 0 min   Stress: No Stress Concern Present (8/31/2022)    [employer] [place] [employer] - Occupational Stress Questionnaire     Feeling of Stress : Not at all   Social Connections: Moderately Integrated (8/31/2022)    Social Connection and Isolation Panel [NHANES]     Frequency of Communication with Friends and Family: More than three times a week     Frequency of Social Gatherings with Friends and Family: More than three times a week     Attends Restorationist Services: More than 4 times per year     Active Member of Clubs or Organizations: No     Attends Club or Organization Meetings: Never     Marital Status:    Housing Stability: Low Risk  (8/31/2022)    Housing Stability Vital Sign     Unable to Pay for Housing in the Last Year: No     Number of Places Lived in the Last Year: 1     Unstable Housing in the Last Year: No     FH:   Family History   Problem Relation Age of Onset    Heart Attack Mother     Hypertension Mother     Heart Disease Father     Hypertension Father     No Known Problems Sister     No Known Problems Brother     Leukemia Sister     Sleep Apnea Sister     No Known Problems Sister     No Known Problems Sister          Objective:   /60   Pulse 87   Temp 36.2 °C (97.1 °F)   Resp 12   Ht 1.575 m (5' 2\")   Wt 65.8 kg (145 lb)   LMP 01/01/1985   SpO2 95%   BMI 26.52 kg/m²     Physical Exam  Vitals and nursing note reviewed.   Constitutional:       General: She is not in acute distress.     Appearance: Normal appearance. She is normal weight. She is ill-appearing. She is not toxic-appearing.   HENT:      Head: Normocephalic.      Right Ear: Tympanic membrane, ear canal and external ear normal.      Left Ear: Tympanic membrane, ear canal and external ear normal.      Nose: Congestion present. No rhinorrhea.      Mouth/Throat:      Mouth: Mucous membranes are moist.      Pharynx: Posterior oropharyngeal erythema present. No oropharyngeal exudate.   Eyes:      General: "         Right eye: No discharge.         Left eye: No discharge.      Pupils: Pupils are equal, round, and reactive to light.   Cardiovascular:      Rate and Rhythm: Normal rate and regular rhythm.   Pulmonary:      Effort: Pulmonary effort is normal.      Breath sounds: Examination of the right-upper field reveals rhonchi. Examination of the left-upper field reveals rhonchi. Examination of the right-middle field reveals rhonchi. Examination of the left-middle field reveals rhonchi. Examination of the right-lower field reveals rhonchi. Examination of the left-lower field reveals rhonchi. Rhonchi present. No wheezing.      Comments: DuoNeb breathing treatment given in clinic today.  Her shortness of breath did improve following albuterol/ipratropium.  Lung sounds remain rhonchorous throughout all lung fields.  O2 saturation remained persistent at 95% following treatment.  Abdominal:      General: Abdomen is flat.   Musculoskeletal:         General: Normal range of motion.      Cervical back: Normal range of motion and neck supple. Tenderness present.   Lymphadenopathy:      Cervical: No cervical adenopathy.   Skin:     General: Skin is dry.   Neurological:      General: No focal deficit present.      Mental Status: She is alert and oriented to person, place, and time. Mental status is at baseline.   Psychiatric:         Mood and Affect: Mood normal.         Behavior: Behavior normal.         Thought Content: Thought content normal.         Judgment: Judgment normal.       DX-CHEST-2 VIEWS    Result Date: 5/25/2023 5/25/2023 4:38 PM HISTORY/REASON FOR EXAM:  Shortness of Breath. TECHNIQUE/EXAM DESCRIPTION AND NUMBER OF VIEWS: Two views of the chest. COMPARISON:  03/31/2021 FINDINGS: There is mild cardiomegaly. No pulmonary infiltrates or consolidations are noted. Perihilar congestive changes are again noted. Lungs are more expanded than on prior radiograph. No pleural effusions are appreciated.     1.  There is mild  cardiomegaly and perihilar congestion. 2.  No consolidations identified.   Results for orders placed or performed in visit on 05/25/23   POCT CoV-2, Flu A/B, RSV by PCR   Result Value Ref Range    SARS-CoV-2 by PCR Negative Negative, Invalid    Influenza virus A RNA Negative Negative, Invalid    Influenza virus B, PCR Negative Negative, Invalid    RSV, PCR Negative Negative, Invalid   POCT Rapid Strep A   Result Value Ref Range    Rapid Strep Screen positive     Internal Control Positive Positive     Internal Control Negative Negative        Assessment/Plan:   Assessment      1. Shortness of breath  DX-CHEST-2 VIEWS    ipratropium-albuterol (DUONEB) nebulizer solution    POCT CoV-2, Flu A/B, RSV by PCR    AEROCHAMBER PLUS-MEDIUM MASK MISC 1 Each    albuterol 108 (90 Base) MCG/ACT Aero Soln inhalation aerosol    predniSONE (DELTASONE) 20 MG Tab    Spacer/Aero-Holding Chambers Device    DISCONTINUED: predniSONE (DELTASONE) 20 MG Tab    DISCONTINUED: albuterol 108 (90 Base) MCG/ACT Aero Soln inhalation aerosol    CANCELED: DX-CHEST-2 VIEWS      2. Cough in adult  DX-CHEST-2 VIEWS    ipratropium-albuterol (DUONEB) nebulizer solution    POCT CoV-2, Flu A/B, RSV by PCR    AEROCHAMBER PLUS-MEDIUM MASK MISC 1 Each    albuterol 108 (90 Base) MCG/ACT Aero Soln inhalation aerosol    predniSONE (DELTASONE) 20 MG Tab    Spacer/Aero-Holding Chambers Device    DISCONTINUED: predniSONE (DELTASONE) 20 MG Tab    DISCONTINUED: albuterol 108 (90 Base) MCG/ACT Aero Soln inhalation aerosol    CANCELED: DX-CHEST-2 VIEWS      3. LRTI (lower respiratory tract infection)  DX-CHEST-2 VIEWS    ipratropium-albuterol (DUONEB) nebulizer solution    POCT CoV-2, Flu A/B, RSV by PCR    AEROCHAMBER PLUS-MEDIUM MASK MISC 1 Each    albuterol 108 (90 Base) MCG/ACT Aero Soln inhalation aerosol    cephALEXin (KEFLEX) 500 MG Cap    predniSONE (DELTASONE) 20 MG Tab    Spacer/Aero-Holding Chambers Device    DISCONTINUED: predniSONE (DELTASONE) 20 MG Tab     DISCONTINUED: albuterol 108 (90 Base) MCG/ACT Aero Soln inhalation aerosol    DISCONTINUED: clindamycin (CLEOCIN) 300 MG Cap    DISCONTINUED: cephALEXin (KEFLEX) 500 MG Cap    CANCELED: DX-CHEST-2 VIEWS      4. Pharyngitis, unspecified etiology  POCT CoV-2, Flu A/B, RSV by PCR    POCT Rapid Strep A      5. Bacterial infection due to streptococcus, group A  cephALEXin (KEFLEX) 500 MG Cap    DISCONTINUED: clindamycin (CLEOCIN) 300 MG Cap    DISCONTINUED: cephALEXin (KEFLEX) 500 MG Cap      6. Chronic anticoagulation        I did did test positive for strep in the clinic today.  Surprisingly her chest x-ray did not show any active infection.  However, due to severe cough and rhonchorous breath sounds she was started on albuterol, prednisone and antibiotics for strep infection.  She was also encouraged to use supplemental O2 as needed throughout the day.  Coumadin clinic on the phone with patient in urgent care to set up INR check for next week due to new antibiotic use and prednisone.  Patient is chronically anticoagulated with warfarin and is able to monitor her INR at home as well.  Warfarin level was decreased per recommendation from Coumadin clinic.  Strict ER precautions given for worsening cough and shortness of breath.  She and daughter are in agreement with plan of care today.  AVS handout given and reviewed with patient. Pt educated on red flags and when to seek treatment back in ER or UC.     Time spent evaluating this patient was at least 39 minutes and includes preparing for visit, counseling/education, exam and evaluation, obtaining history, independent interpretation and ordering labs/tests/procedures.   Please note that this dictation was created using voice recognition software. I have made every reasonable attempt to correct obvious errors, but I expect that there may be errors of grammar and possibly content that I did not discover before finalizing the note.

## 2023-05-26 ENCOUNTER — HOSPITAL ENCOUNTER (INPATIENT)
Facility: MEDICAL CENTER | Age: 88
LOS: 1 days | DRG: 193 | End: 2023-05-27
Attending: EMERGENCY MEDICINE | Admitting: HOSPITALIST
Payer: MEDICARE

## 2023-05-26 ENCOUNTER — APPOINTMENT (OUTPATIENT)
Dept: RADIOLOGY | Facility: MEDICAL CENTER | Age: 88
DRG: 193 | End: 2023-05-26
Attending: EMERGENCY MEDICINE
Payer: MEDICARE

## 2023-05-26 DIAGNOSIS — I35.0 SEVERE AORTIC STENOSIS: ICD-10-CM

## 2023-05-26 DIAGNOSIS — J02.0 STREP THROAT: ICD-10-CM

## 2023-05-26 DIAGNOSIS — J45.30 REACTIVE AIRWAY DISEASE WITH WHEEZING, MILD PERSISTENT, UNCOMPLICATED: ICD-10-CM

## 2023-05-26 DIAGNOSIS — J96.01 ACUTE RESPIRATORY FAILURE WITH HYPOXIA (HCC): ICD-10-CM

## 2023-05-26 DIAGNOSIS — I10 PRIMARY HYPERTENSION: ICD-10-CM

## 2023-05-26 DIAGNOSIS — J45.21 MILD INTERMITTENT REACTIVE AIRWAY DISEASE WITH ACUTE EXACERBATION: ICD-10-CM

## 2023-05-26 DIAGNOSIS — I35.0 AORTIC VALVE STENOSIS, ETIOLOGY OF CARDIAC VALVE DISEASE UNSPECIFIED: ICD-10-CM

## 2023-05-26 DIAGNOSIS — E87.1 HYPONATREMIA: ICD-10-CM

## 2023-05-26 DIAGNOSIS — J18.9 PNEUMONIA OF LEFT LOWER LOBE DUE TO INFECTIOUS ORGANISM: ICD-10-CM

## 2023-05-26 LAB
ALBUMIN SERPL BCP-MCNC: 4.3 G/DL (ref 3.2–4.9)
ALBUMIN/GLOB SERPL: 1.3 G/DL
ALP SERPL-CCNC: 61 U/L (ref 30–99)
ALT SERPL-CCNC: 33 U/L (ref 2–50)
ANION GAP SERPL CALC-SCNC: 13 MMOL/L (ref 7–16)
APPEARANCE UR: CLEAR
AST SERPL-CCNC: 35 U/L (ref 12–45)
BACTERIA #/AREA URNS HPF: NEGATIVE /HPF
BASOPHILS # BLD AUTO: 0.4 % (ref 0–1.8)
BASOPHILS # BLD: 0.03 K/UL (ref 0–0.12)
BILIRUB SERPL-MCNC: 0.3 MG/DL (ref 0.1–1.5)
BILIRUB UR QL STRIP.AUTO: NEGATIVE
BUN SERPL-MCNC: 10 MG/DL (ref 8–22)
CALCIUM ALBUM COR SERPL-MCNC: 8.9 MG/DL (ref 8.5–10.5)
CALCIUM SERPL-MCNC: 9.1 MG/DL (ref 8.5–10.5)
CHLORIDE SERPL-SCNC: 92 MMOL/L (ref 96–112)
CO2 SERPL-SCNC: 20 MMOL/L (ref 20–33)
COLOR UR: YELLOW
CREAT SERPL-MCNC: 0.67 MG/DL (ref 0.5–1.4)
CRP SERPL HS-MCNC: 2.88 MG/DL (ref 0–0.75)
EKG IMPRESSION: NORMAL
EOSINOPHIL # BLD AUTO: 0.03 K/UL (ref 0–0.51)
EOSINOPHIL NFR BLD: 0.4 % (ref 0–6.9)
EPI CELLS #/AREA URNS HPF: NEGATIVE /HPF
ERYTHROCYTE [DISTWIDTH] IN BLOOD BY AUTOMATED COUNT: 39.8 FL (ref 35.9–50)
EST. AVERAGE GLUCOSE BLD GHB EST-MCNC: 154 MG/DL
GFR SERPLBLD CREATININE-BSD FMLA CKD-EPI: 85 ML/MIN/1.73 M 2
GLOBULIN SER CALC-MCNC: 3.2 G/DL (ref 1.9–3.5)
GLUCOSE BLD STRIP.AUTO-MCNC: 228 MG/DL (ref 65–99)
GLUCOSE SERPL-MCNC: 207 MG/DL (ref 65–99)
GLUCOSE UR STRIP.AUTO-MCNC: NEGATIVE MG/DL
HBA1C MFR BLD: 7 % (ref 4–5.6)
HCT VFR BLD AUTO: 37.2 % (ref 37–47)
HGB BLD-MCNC: 12.7 G/DL (ref 12–16)
HYALINE CASTS #/AREA URNS LPF: NORMAL /LPF
IMM GRANULOCYTES # BLD AUTO: 0.05 K/UL (ref 0–0.11)
IMM GRANULOCYTES NFR BLD AUTO: 0.7 % (ref 0–0.9)
INR PPP: 1.5 (ref 0.87–1.13)
KETONES UR STRIP.AUTO-MCNC: NEGATIVE MG/DL
LACTATE SERPL-SCNC: 1.8 MMOL/L (ref 0.5–2)
LEUKOCYTE ESTERASE UR QL STRIP.AUTO: NEGATIVE
LYMPHOCYTES # BLD AUTO: 1.36 K/UL (ref 1–4.8)
LYMPHOCYTES NFR BLD: 19.9 % (ref 22–41)
MAGNESIUM SERPL-MCNC: 1.5 MG/DL (ref 1.5–2.5)
MCH RBC QN AUTO: 29.1 PG (ref 27–33)
MCHC RBC AUTO-ENTMCNC: 34.1 G/DL (ref 32.2–35.5)
MCV RBC AUTO: 85.1 FL (ref 81.4–97.8)
MICRO URNS: ABNORMAL
MONOCYTES # BLD AUTO: 0.14 K/UL (ref 0–0.85)
MONOCYTES NFR BLD AUTO: 2 % (ref 0–13.4)
NEUTROPHILS # BLD AUTO: 5.24 K/UL (ref 1.82–7.42)
NEUTROPHILS NFR BLD: 76.6 % (ref 44–72)
NITRITE UR QL STRIP.AUTO: NEGATIVE
NRBC # BLD AUTO: 0 K/UL
NRBC BLD-RTO: 0 /100 WBC (ref 0–0.2)
NT-PROBNP SERPL IA-MCNC: 1777 PG/ML (ref 0–125)
PH UR STRIP.AUTO: 6.5 [PH] (ref 5–8)
PLATELET # BLD AUTO: 193 K/UL (ref 164–446)
PMV BLD AUTO: 9.3 FL (ref 9–12.9)
POTASSIUM SERPL-SCNC: 5 MMOL/L (ref 3.6–5.5)
PROCALCITONIN SERPL-MCNC: 0.1 NG/ML
PROCALCITONIN SERPL-MCNC: 0.11 NG/ML
PROT SERPL-MCNC: 7.5 G/DL (ref 6–8.2)
PROT UR QL STRIP: NEGATIVE MG/DL
PROTHROMBIN TIME: 17.9 SEC (ref 12–14.6)
RBC # BLD AUTO: 4.37 M/UL (ref 4.2–5.4)
RBC # URNS HPF: NORMAL /HPF
RBC UR QL AUTO: ABNORMAL
SODIUM SERPL-SCNC: 125 MMOL/L (ref 135–145)
SP GR UR STRIP.AUTO: 1.01
TROPONIN T SERPL-MCNC: 10 NG/L (ref 6–19)
UROBILINOGEN UR STRIP.AUTO-MCNC: 0.2 MG/DL
WBC # BLD AUTO: 6.9 K/UL (ref 4.8–10.8)
WBC #/AREA URNS HPF: NORMAL /HPF

## 2023-05-26 PROCEDURE — 87040 BLOOD CULTURE FOR BACTERIA: CPT | Mod: 91

## 2023-05-26 PROCEDURE — 84145 PROCALCITONIN (PCT): CPT

## 2023-05-26 PROCEDURE — 82962 GLUCOSE BLOOD TEST: CPT

## 2023-05-26 PROCEDURE — 93005 ELECTROCARDIOGRAM TRACING: CPT | Performed by: EMERGENCY MEDICINE

## 2023-05-26 PROCEDURE — 700101 HCHG RX REV CODE 250: Performed by: EMERGENCY MEDICINE

## 2023-05-26 PROCEDURE — 36415 COLL VENOUS BLD VENIPUNCTURE: CPT

## 2023-05-26 PROCEDURE — 99497 ADVNCD CARE PLAN 30 MIN: CPT | Performed by: HOSPITALIST

## 2023-05-26 PROCEDURE — A9270 NON-COVERED ITEM OR SERVICE: HCPCS | Performed by: HOSPITALIST

## 2023-05-26 PROCEDURE — 85025 COMPLETE CBC W/AUTO DIFF WBC: CPT

## 2023-05-26 PROCEDURE — 71045 X-RAY EXAM CHEST 1 VIEW: CPT

## 2023-05-26 PROCEDURE — 700102 HCHG RX REV CODE 250 W/ 637 OVERRIDE(OP): Performed by: HOSPITALIST

## 2023-05-26 PROCEDURE — 83036 HEMOGLOBIN GLYCOSYLATED A1C: CPT

## 2023-05-26 PROCEDURE — 81001 URINALYSIS AUTO W/SCOPE: CPT

## 2023-05-26 PROCEDURE — 80053 COMPREHEN METABOLIC PANEL: CPT

## 2023-05-26 PROCEDURE — 96375 TX/PRO/DX INJ NEW DRUG ADDON: CPT

## 2023-05-26 PROCEDURE — 86140 C-REACTIVE PROTEIN: CPT

## 2023-05-26 PROCEDURE — 94640 AIRWAY INHALATION TREATMENT: CPT

## 2023-05-26 PROCEDURE — 83880 ASSAY OF NATRIURETIC PEPTIDE: CPT

## 2023-05-26 PROCEDURE — 87070 CULTURE OTHR SPECIMN AEROBIC: CPT

## 2023-05-26 PROCEDURE — 96365 THER/PROPH/DIAG IV INF INIT: CPT

## 2023-05-26 PROCEDURE — 99285 EMERGENCY DEPT VISIT HI MDM: CPT

## 2023-05-26 PROCEDURE — 84484 ASSAY OF TROPONIN QUANT: CPT

## 2023-05-26 PROCEDURE — 770001 HCHG ROOM/CARE - MED/SURG/GYN PRIV*

## 2023-05-26 PROCEDURE — 83735 ASSAY OF MAGNESIUM: CPT

## 2023-05-26 PROCEDURE — 93005 ELECTROCARDIOGRAM TRACING: CPT

## 2023-05-26 PROCEDURE — 99223 1ST HOSP IP/OBS HIGH 75: CPT | Mod: AI,25 | Performed by: HOSPITALIST

## 2023-05-26 PROCEDURE — 85610 PROTHROMBIN TIME: CPT

## 2023-05-26 PROCEDURE — 700101 HCHG RX REV CODE 250: Performed by: HOSPITALIST

## 2023-05-26 PROCEDURE — 700105 HCHG RX REV CODE 258: Performed by: EMERGENCY MEDICINE

## 2023-05-26 PROCEDURE — 83605 ASSAY OF LACTIC ACID: CPT

## 2023-05-26 PROCEDURE — 87086 URINE CULTURE/COLONY COUNT: CPT

## 2023-05-26 PROCEDURE — 87205 SMEAR GRAM STAIN: CPT

## 2023-05-26 PROCEDURE — 700111 HCHG RX REV CODE 636 W/ 250 OVERRIDE (IP): Performed by: EMERGENCY MEDICINE

## 2023-05-26 RX ORDER — AZITHROMYCIN 250 MG/1
500 TABLET, FILM COATED ORAL EVERY EVENING
Status: DISCONTINUED | OUTPATIENT
Start: 2023-05-26 | End: 2023-05-27 | Stop reason: HOSPADM

## 2023-05-26 RX ORDER — GUAIFENESIN/DEXTROMETHORPHAN 100-10MG/5
10 SYRUP ORAL EVERY 6 HOURS PRN
Status: DISCONTINUED | OUTPATIENT
Start: 2023-05-26 | End: 2023-05-27 | Stop reason: HOSPADM

## 2023-05-26 RX ORDER — CARVEDILOL 12.5 MG/1
12.5 TABLET ORAL 2 TIMES DAILY
Status: DISCONTINUED | OUTPATIENT
Start: 2023-05-26 | End: 2023-05-27 | Stop reason: HOSPADM

## 2023-05-26 RX ORDER — LEVOTHYROXINE SODIUM 0.05 MG/1
50 TABLET ORAL
COMMUNITY
End: 2024-02-22

## 2023-05-26 RX ORDER — ONDANSETRON 4 MG/1
4 TABLET, ORALLY DISINTEGRATING ORAL EVERY 4 HOURS PRN
Status: DISCONTINUED | OUTPATIENT
Start: 2023-05-26 | End: 2023-05-27 | Stop reason: HOSPADM

## 2023-05-26 RX ORDER — SODIUM CHLORIDE 9 MG/ML
500 INJECTION, SOLUTION INTRAVENOUS ONCE
Status: COMPLETED | OUTPATIENT
Start: 2023-05-26 | End: 2023-05-26

## 2023-05-26 RX ORDER — WARFARIN SODIUM 3 MG/1
1.5-3 TABLET ORAL EVERY EVENING
COMMUNITY
End: 2023-06-02

## 2023-05-26 RX ORDER — METHYLPREDNISOLONE SODIUM SUCCINATE 40 MG/ML
40 INJECTION, POWDER, LYOPHILIZED, FOR SOLUTION INTRAMUSCULAR; INTRAVENOUS EVERY 12 HOURS
Status: DISCONTINUED | OUTPATIENT
Start: 2023-05-27 | End: 2023-05-27 | Stop reason: HOSPADM

## 2023-05-26 RX ORDER — LEVALBUTEROL INHALATION SOLUTION 1.25 MG/3ML
1.25 SOLUTION RESPIRATORY (INHALATION)
Status: DISCONTINUED | OUTPATIENT
Start: 2023-05-26 | End: 2023-05-27 | Stop reason: HOSPADM

## 2023-05-26 RX ORDER — GABAPENTIN 100 MG/1
100-200 CAPSULE ORAL 2 TIMES DAILY
COMMUNITY
End: 2023-11-20

## 2023-05-26 RX ORDER — HYDRALAZINE HYDROCHLORIDE 20 MG/ML
10 INJECTION INTRAMUSCULAR; INTRAVENOUS EVERY 4 HOURS PRN
Status: DISCONTINUED | OUTPATIENT
Start: 2023-05-26 | End: 2023-05-27 | Stop reason: HOSPADM

## 2023-05-26 RX ORDER — HALOPERIDOL 2 MG/ML
0.5 SOLUTION ORAL EVERY 6 HOURS PRN
Status: DISCONTINUED | OUTPATIENT
Start: 2023-05-26 | End: 2023-05-27 | Stop reason: HOSPADM

## 2023-05-26 RX ORDER — METHYLPREDNISOLONE SODIUM SUCCINATE 125 MG/2ML
125 INJECTION, POWDER, LYOPHILIZED, FOR SOLUTION INTRAMUSCULAR; INTRAVENOUS ONCE
Status: COMPLETED | OUTPATIENT
Start: 2023-05-26 | End: 2023-05-26

## 2023-05-26 RX ORDER — WARFARIN SODIUM 5 MG/1
5 TABLET ORAL ONCE
Status: DISCONTINUED | OUTPATIENT
Start: 2023-05-26 | End: 2023-05-27 | Stop reason: HOSPADM

## 2023-05-26 RX ORDER — IPRATROPIUM BROMIDE AND ALBUTEROL SULFATE 2.5; .5 MG/3ML; MG/3ML
6 SOLUTION RESPIRATORY (INHALATION) ONCE
Status: COMPLETED | OUTPATIENT
Start: 2023-05-26 | End: 2023-05-26

## 2023-05-26 RX ORDER — MAGNESIUM SULFATE HEPTAHYDRATE 40 MG/ML
2 INJECTION, SOLUTION INTRAVENOUS ONCE
Status: COMPLETED | OUTPATIENT
Start: 2023-05-26 | End: 2023-05-26

## 2023-05-26 RX ORDER — METAXALONE 800 MG/1
400 TABLET ORAL 2 TIMES DAILY PRN
COMMUNITY
End: 2023-10-04

## 2023-05-26 RX ORDER — GUAIFENESIN 600 MG/1
600 TABLET, EXTENDED RELEASE ORAL EVERY 12 HOURS
Status: DISCONTINUED | OUTPATIENT
Start: 2023-05-26 | End: 2023-05-27 | Stop reason: HOSPADM

## 2023-05-26 RX ORDER — CEPHALEXIN 500 MG/1
500 CAPSULE ORAL 3 TIMES DAILY
Status: DISCONTINUED | OUTPATIENT
Start: 2023-05-26 | End: 2023-05-26

## 2023-05-26 RX ORDER — LEVOTHYROXINE SODIUM 0.05 MG/1
50 TABLET ORAL
Status: DISCONTINUED | OUTPATIENT
Start: 2023-05-27 | End: 2023-05-27 | Stop reason: HOSPADM

## 2023-05-26 RX ORDER — GABAPENTIN 100 MG/1
100 CAPSULE ORAL 2 TIMES DAILY
Status: DISCONTINUED | OUTPATIENT
Start: 2023-05-26 | End: 2023-05-27 | Stop reason: HOSPADM

## 2023-05-26 RX ORDER — AMLODIPINE BESYLATE 5 MG/1
2.5 TABLET ORAL DAILY
Status: DISCONTINUED | OUTPATIENT
Start: 2023-05-27 | End: 2023-05-27 | Stop reason: HOSPADM

## 2023-05-26 RX ORDER — ONDANSETRON 2 MG/ML
4 INJECTION INTRAMUSCULAR; INTRAVENOUS EVERY 4 HOURS PRN
Status: DISCONTINUED | OUTPATIENT
Start: 2023-05-26 | End: 2023-05-27 | Stop reason: HOSPADM

## 2023-05-26 RX ORDER — ACETAMINOPHEN 160 MG
2000 TABLET,DISINTEGRATING ORAL DAILY
COMMUNITY

## 2023-05-26 RX ADMIN — METHYLPREDNISOLONE SODIUM SUCCINATE 125 MG: 125 INJECTION, POWDER, FOR SOLUTION INTRAMUSCULAR; INTRAVENOUS at 16:13

## 2023-05-26 RX ADMIN — AZITHROMYCIN MONOHYDRATE 500 MG: 250 TABLET ORAL at 21:45

## 2023-05-26 RX ADMIN — CARVEDILOL 12.5 MG: 12.5 TABLET, FILM COATED ORAL at 21:45

## 2023-05-26 RX ADMIN — LEVALBUTEROL HYDROCHLORIDE 1.25 MG: 1.25 SOLUTION RESPIRATORY (INHALATION) at 21:31

## 2023-05-26 RX ADMIN — IPRATROPIUM BROMIDE AND ALBUTEROL SULFATE 6 ML: .5; 2.5 SOLUTION RESPIRATORY (INHALATION) at 18:06

## 2023-05-26 RX ADMIN — SODIUM CHLORIDE 500 ML: 9 INJECTION, SOLUTION INTRAVENOUS at 17:01

## 2023-05-26 RX ADMIN — GABAPENTIN 100 MG: 100 CAPSULE ORAL at 21:45

## 2023-05-26 RX ADMIN — MAGNESIUM SULFATE HEPTAHYDRATE 2 G: 2 INJECTION, SOLUTION INTRAVENOUS at 16:18

## 2023-05-26 ASSESSMENT — ENCOUNTER SYMPTOMS
CLAUDICATION: 0
BACK PAIN: 0
DEPRESSION: 0
CONSTIPATION: 0
DOUBLE VISION: 0
SPUTUM PRODUCTION: 1
COUGH: 1
FLANK PAIN: 0
NECK PAIN: 0
VOMITING: 0
HEMOPTYSIS: 0
BLOOD IN STOOL: 0
FEVER: 0
STRIDOR: 0
WHEEZING: 1
ABDOMINAL PAIN: 0
HEADACHES: 0
EYE PAIN: 0
PALPITATIONS: 0
WEAKNESS: 0
ORTHOPNEA: 0
PND: 0
DIAPHORESIS: 0
PHOTOPHOBIA: 0
DIZZINESS: 0
TINGLING: 0
TREMORS: 0
BLURRED VISION: 0
BRUISES/BLEEDS EASILY: 0
DIARRHEA: 0
HALLUCINATIONS: 0
SORE THROAT: 0
NAUSEA: 0
HEARTBURN: 0
SHORTNESS OF BREATH: 1
SINUS PAIN: 0
MYALGIAS: 0
CHILLS: 1
FALLS: 0
POLYDIPSIA: 0

## 2023-05-26 ASSESSMENT — COGNITIVE AND FUNCTIONAL STATUS - GENERAL
DAILY ACTIVITIY SCORE: 21
DRESSING REGULAR LOWER BODY CLOTHING: A LITTLE
CLIMB 3 TO 5 STEPS WITH RAILING: A LITTLE
SUGGESTED CMS G CODE MODIFIER MOBILITY: CJ
HELP NEEDED FOR BATHING: A LITTLE
SUGGESTED CMS G CODE MODIFIER DAILY ACTIVITY: CJ
WALKING IN HOSPITAL ROOM: A LITTLE
STANDING UP FROM CHAIR USING ARMS: A LITTLE
MOBILITY SCORE: 21
PERSONAL GROOMING: A LITTLE

## 2023-05-26 ASSESSMENT — PATIENT HEALTH QUESTIONNAIRE - PHQ9
SUM OF ALL RESPONSES TO PHQ9 QUESTIONS 1 AND 2: 0
1. LITTLE INTEREST OR PLEASURE IN DOING THINGS: NOT AT ALL
2. FEELING DOWN, DEPRESSED, IRRITABLE, OR HOPELESS: NOT AT ALL

## 2023-05-26 ASSESSMENT — FIBROSIS 4 INDEX: FIB4 SCORE: 2.21

## 2023-05-26 ASSESSMENT — LIFESTYLE VARIABLES
TOTAL SCORE: 0
DOES PATIENT WANT TO STOP DRINKING: NO
HOW MANY TIMES IN THE PAST YEAR HAVE YOU HAD 5 OR MORE DRINKS IN A DAY: 0
TOTAL SCORE: 0
EVER FELT BAD OR GUILTY ABOUT YOUR DRINKING: NO
ALCOHOL_USE: NO
HAVE PEOPLE ANNOYED YOU BY CRITICIZING YOUR DRINKING: NO
CONSUMPTION TOTAL: NEGATIVE
SUBSTANCE_ABUSE: 0
ON A TYPICAL DAY WHEN YOU DRINK ALCOHOL HOW MANY DRINKS DO YOU HAVE: 0
HAVE YOU EVER FELT YOU SHOULD CUT DOWN ON YOUR DRINKING: NO
EVER HAD A DRINK FIRST THING IN THE MORNING TO STEADY YOUR NERVES TO GET RID OF A HANGOVER: NO
TOTAL SCORE: 0
AVERAGE NUMBER OF DAYS PER WEEK YOU HAVE A DRINK CONTAINING ALCOHOL: 0

## 2023-05-26 NOTE — TELEPHONE ENCOUNTER
Patient started on prednisone. Instructed patient to take 1.5 mg daily until next INR. Schedule patient for INR on 6/2/23.     Alexis MillsD

## 2023-05-26 NOTE — ED NOTES
Lab at bedside for second culture set. Patient requests not to get stuck again. RN draws second set of cultures. Patient medicated per MAR. Daughter at patient bedside. Cultures sent to lab. Patient given ice chips. Call light within reach.

## 2023-05-26 NOTE — ED NOTES
Patient sitting in bed speaking 4-5 word sentences, alert and oriented, family at bedside. IV established, labs drawn and sent. X ray at bedside.

## 2023-05-26 NOTE — ED PROVIDER NOTES
ER Provider Note    Scribed for Ester Johnson M.d. by Estela Walters. 5/26/2023  2:59 PM    Primary Care Provider: Ganesh Mckeon M.D.    CHIEF COMPLAINT  Chief Complaint   Patient presents with    Shortness of Breath     SOB onset yesterday, increasing this am. Cough and soar throat since Monday. + green sputum/ productive cough - fever, + chills, +joint pain. Seen at clinic yesterday and reports being diagnosed with strep throat. Prescribed albuterol. Took two doses this am at home with no relief. Patient reports bronchospasm and dysrhythmias with past use of albuterol.      EXTERNAL RECORDS REVIEWED  Outpatient Notes She was seen at Urgent Care yesterday. She was given a breathing treatment. She had perihilar congestion, no consolidation. She came back negative for the Covid test and the flu test, but she was positive for the strep test. She was discharged with prednisone and keflex. She has a history of AFIB on warfarin and congestive heart failure.     HPI/ROS  LIMITATION TO HISTORY   Select: : None  OUTSIDE HISTORIAN(S):  Family Daughter at bedside to confirm sequence of events and collateral information provided.     Shereen Dale is a 87 y.o. female who presents to the ED complaining of shortness of breath onset earlier this morning. The patient states she also has cough, sore throat, chest pain, joint pain and chills, but denies any fever, vomiting, diarrhea, or leg swelling. She describes that she has had trouble breathing for a long time secondary to her heart valve. She notes that she would gargle salt water every morning with only slight alleviation. She notes that she took the steroids and antibiotics prescribed to her from Urgent Care. She also notes that she used her inhaler once today. She also reports that she only uses her oxygen at night, but she has been having to use her oxygen throughout the day since yesterday.     PAST MEDICAL HISTORY  Past Medical History:    Diagnosis Date    AF (atrial fibrillation) (HCC)     Arrhythmia     A-fib    Backpain     Lower back pain    Breast cancer (HCC)     Breath shortness     uses 2-3 L O2 at night    Cancer (HCC) 1993    right breast    CATARACT     celestine IOL    Chronic anticoagulation 5/2/2012    Congestive heart failure (HCC)     Cough 5/2/2012    Dental disorder     dentures    Diabetes     oral medication    Edema 5/2/2012    Glaucoma     Heart burn     Heart murmur     Heart valve disease     Hypertension     Hypothyroid     Oxygen deficiency     uses 2.5-3 l at night    Paroxysmal atrial fibrillation (HCC)     Sleep apnea     no cpap    tia     TIA x2    Unspecified hemorrhagic conditions     takes coumadin     SURGICAL HISTORY  Past Surgical History:   Procedure Laterality Date    KNEE ARTHROSCOPY Right 5/21/2015    Procedure: KNEE ARTHROSCOPY;  Surgeon: Emerson Garcia M.D.;  Location: SURGERY [facility];  Service:     MENISCECTOMY Right 5/21/2015    Procedure: LATERAL MENISCECTOMY;  Surgeon: Emerson Garcia M.D.;  Location: SURGERY [facility];  Service:     CATARACT PHACO WITH IOL  10/21/2013    Performed by Dominick Fernando M.D. at SURGERY [facility]    CATARACT PHACO WITH IOL  9/23/2013    Performed by Dominick Fernando M.D. at [facility]    CHOLECYSTECTOMY      HYSTERECTOMY RADICAL      LUMPECTOMY      R breast    GA RADIATION THERAPY PLAN SIMPLE       FAMILY HISTORY  Family History   Problem Relation Age of Onset    Heart Attack Mother     Hypertension Mother     Heart Disease Father     Hypertension Father     No Known Problems Sister     No Known Problems Brother     Leukemia Sister     Sleep Apnea Sister     No Known Problems Sister     No Known Problems Sister      SOCIAL HISTORY   reports that she quit smoking about 59 years ago. Her smoking use included cigarettes. She has a 5.50 pack-year smoking history. She has never used smokeless tobacco. She reports that she does not  currently use alcohol. She reports that she does not use drugs.    CURRENT MEDICATIONS  Current Discharge Medication List        CONTINUE these medications which have NOT CHANGED    Details   Cholecalciferol (VITAMIN D3) 2000 UNIT Cap Take 2,000 Units by mouth every day.      gabapentin (NEURONTIN) 100 MG Cap Take 100-200 mg by mouth 2 times a day. Take 1 capsule (100 mg) in the morning and 2 capsules (200 mg) in the evening.      levothyroxine (SYNTHROID) 50 MCG Tab Take 50 mcg by mouth every morning on an empty stomach.      metaxalone (SKELAXIN) 800 MG Tab Take 400 mg by mouth 2 times a day as needed for Moderate Pain or Muscle Spasms. 1/2 tablet = 400 mg.      metFORMIN (GLUCOPHAGE) 500 MG Tab Take 500 mg by mouth 3 times a day with meals.      warfarin (COUMADIN) 3 MG Tab Take 1.5-3 mg by mouth every evening. Take as directed by [facility].      albuterol 108 (90 Base) MCG/ACT Aero Soln inhalation aerosol Inhale 2 Puffs every four hours as needed for Shortness of Breath for up to 14 days.  Qty: 1 Each, Refills: 0    Associated Diagnoses: Shortness of breath; Cough in adult; LRTI (lower respiratory tract infection)      cephALEXin (KEFLEX) 500 MG Cap Take 1 Capsule by mouth 3 times a day for 10 days.  Qty: 30 Capsule, Refills: 0    Associated Diagnoses: LRTI (lower respiratory tract infection); Bacterial infection due to streptococcus, group A      predniSONE (DELTASONE) 20 MG Tab Take 2 Tablets by mouth every day for 7 days.  Qty: 14 Tablet, Refills: 0    Associated Diagnoses: Shortness of breath; Cough in adult; LRTI (lower respiratory tract infection)      Spacer/Aero-Holding Chambers Device 1 Application every 4 hours for 10 days.  Qty: 1 Each, Refills: 0    Associated Diagnoses: Shortness of breath; Cough in adult; LRTI (lower respiratory tract infection)      amLODIPine (NORVASC) 5 MG Tab Take 1 Tablet by mouth every day for 200 days. Take 5mg PO daily in AM.  Qty: 100 Tablet, Refills:  "2      glipiZIDE SR (GLUCOTROL) 2.5 MG TABLET SR 24 HR Take 1 Tablet by mouth every day.  Qty: 100 Tablet, Refills: 2      carvedilol (COREG) 12.5 MG Tab Take 1 Tablet by mouth 2 times a day for BP  Qty: 200 Tablet, Refills: 3      omeprazole (PRILOSEC) 20 MG delayed-release capsule Take 1 Capsule by mouth 2 times a day.  Qty: 200 Capsule, Refills: 1      Misc. Devices Misc Oxygen concentrator with humidifier, 2.5-3 L/min  Qty: 1 Each, Refills: 0    Associated Diagnoses: Chronic respiratory failure with hypoxia (HCC)      dorzolamide-timolol (COSOPT) 22.3-6.8 MG/ML Solution Administer 1 Drop into both eyes 2 times a day. Indications: Wide-Angle Glaucoma      Home Care Oxygen Inhale 2-5 L/min as needed for Shortness of Breath (sleep apnea). 2-5 LPM as needed via nasal cannula  Indications: Shortness of breath, sleep apnea           ALLERGIES  Darvon [propoxyphene hcl], Iodine, Latex, Penicillins, Propoxyphene hcl, Tetanus antitoxin, and Tetracycline    PHYSICAL EXAM  BP (!) 163/73   Pulse 80   Temp 36.5 °C (97.7 °F) (Temporal)   Resp (!) 28   Ht 1.575 m (5' 2\")   Wt 64.9 kg (143 lb)   LMP 01/01/1985   SpO2 98%   BMI 26.16 kg/m²     Constitutional: Nontoxic appearing. Alert in no apparent distress.  HENT: Normocephalic, Atraumatic. Bilateral external ears normal. Nose normal.  Moist mucous membranes.  Oropharynx clear.  Eyes: Pupils are equal and reactive. Conjunctiva normal.   Neck: Supple, full range of motion  Heart: Tachypneic.  No murmurs.    Lungs: Diffuse rhonchi and expiratory wheezing.  Abdomen Soft, no distention.  No tenderness to palpation.  Musculoskeletal: Atraumatic. No obvious deformities noted.  No lower extremity edema.  Skin: Warm, Dry.  No erythema, No rash.   Neurologic: Alert and oriented x3. Moving all extremities spontaneously without focal deficits.  Psychiatric: Affect normal, Mood normal, Appears appropriate and not intoxicated.     DIAGNOSTIC STUDIES    Labs:   Results for orders " placed or performed during the hospital encounter of 05/26/23   Lactic acid (lactate)   Result Value Ref Range    Lactic Acid 1.8 0.5 - 2.0 mmol/L   CBC With Differential   Result Value Ref Range    WBC 6.9 4.8 - 10.8 K/uL    RBC 4.37 4.20 - 5.40 M/uL    Hemoglobin 12.7 12.0 - 16.0 g/dL    Hematocrit 37.2 37.0 - 47.0 %    MCV 85.1 81.4 - 97.8 fL    MCH 29.1 27.0 - 33.0 pg    MCHC 34.1 32.2 - 35.5 g/dL    RDW 39.8 35.9 - 50.0 fL    Platelet Count 193 164 - 446 K/uL    MPV 9.3 9.0 - 12.9 fL    Neutrophils-Polys 76.60 (H) 44.00 - 72.00 %    Lymphocytes 19.90 (L) 22.00 - 41.00 %    Monocytes 2.00 0.00 - 13.40 %    Eosinophils 0.40 0.00 - 6.90 %    Basophils 0.40 0.00 - 1.80 %    Immature Granulocytes 0.70 0.00 - 0.90 %    Nucleated RBC 0.00 0.00 - 0.20 /100 WBC    Neutrophils (Absolute) 5.24 1.82 - 7.42 K/uL    Lymphs (Absolute) 1.36 1.00 - 4.80 K/uL    Monos (Absolute) 0.14 0.00 - 0.85 K/uL    Eos (Absolute) 0.03 0.00 - 0.51 K/uL    Baso (Absolute) 0.03 0.00 - 0.12 K/uL    Immature Granulocytes (abs) 0.05 0.00 - 0.11 K/uL    NRBC (Absolute) 0.00 K/uL   Comp Metabolic Panel   Result Value Ref Range    Sodium 125 (L) 135 - 145 mmol/L    Potassium 5.0 3.6 - 5.5 mmol/L    Chloride 92 (L) 96 - 112 mmol/L    Co2 20 20 - 33 mmol/L    Anion Gap 13.0 7.0 - 16.0    Glucose 207 (H) 65 - 99 mg/dL    Bun 10 8 - 22 mg/dL    Creatinine 0.67 0.50 - 1.40 mg/dL    Calcium 9.1 8.5 - 10.5 mg/dL    AST(SGOT) 35 12 - 45 U/L    ALT(SGPT) 33 2 - 50 U/L    Alkaline Phosphatase 61 30 - 99 U/L    Total Bilirubin 0.3 0.1 - 1.5 mg/dL    Albumin 4.3 3.2 - 4.9 g/dL    Total Protein 7.5 6.0 - 8.2 g/dL    Globulin 3.2 1.9 - 3.5 g/dL    A-G Ratio 1.3 g/dL   CRP QUANTITIVE (NON-CARDIAC)   Result Value Ref Range    Stat C-Reactive Protein 2.88 (H) 0.00 - 0.75 mg/dL   proBrain Natriuretic Peptide, NT   Result Value Ref Range    NT-proBNP 1777 (H) 0 - 125 pg/mL   TROPONIN   Result Value Ref Range    Troponin T 10 6 - 19 ng/L   PROCALCITONIN   Result Value  Ref Range    Procalcitonin 0.11 <0.25 ng/mL   ESTIMATED GFR   Result Value Ref Range    GFR (CKD-EPI) 85 >60 mL/min/1.73 m 2   URINALYSIS   Result Value Ref Range    Color Yellow     Character Clear     Specific Gravity 1.006 <1.035    Ph 6.5 5.0 - 8.0    Glucose Negative Negative mg/dL    Ketones Negative Negative mg/dL    Protein Negative Negative mg/dL    Bilirubin Negative Negative    Urobilinogen, Urine 0.2 Negative    Nitrite Negative Negative    Leukocyte Esterase Negative Negative    Occult Blood Trace (A) Negative    Micro Urine Req Microscopic    CORRECTED CALCIUM   Result Value Ref Range    Correct Calcium 8.9 8.5 - 10.5 mg/dL   MAGNESIUM   Result Value Ref Range    Magnesium 1.5 1.5 - 2.5 mg/dL   URINE MICROSCOPIC (W/UA)   Result Value Ref Range    WBC 2-5 /hpf    RBC 0-2 /hpf    Bacteria Negative None /hpf    Epithelial Cells Negative /hpf    Hyaline Cast 0-2 /lpf   PROCALCITONIN   Result Value Ref Range    Procalcitonin 0.10 <0.25 ng/mL   PROTHROMBIN TIME   Result Value Ref Range    PT 17.9 (H) 12.0 - 14.6 sec    INR 1.50 (H) 0.87 - 1.13   EKG (NOW)   Result Value Ref Range    Report       [facility] Emergency Dept.    Test Date:  2023  Pt Name:    LIAM ROTH                 Department: ER  MRN:        [id number]                      Room:       [facility]  Gender:     Female                       Technician:  :        [id number]                   Requested By:ER TRIAGE PROTOCOL  Order #:    [id number]                    Reading MD: Ester Johnson MD    Measurements  Intervals                                Axis  Rate:       86                           P:          43  FL:         198                          QRS:        28  QRSD:       98                           T:          48  QT:         361  QTc:        432    Interpretive Statements  Sinus rhythm  Probable left ventricular hypertrophy  No ectopy  No ST or T wave change  Compared to ECG 2021 23:08:49  No significant  changes  Electronically Signed On 2023 15:02:39 PDT by Ester Johnson MD        EKG:   I have independently interpreted this EKG  Results for orders placed or performed during the hospital encounter of 23   EKG (NOW)   Result Value Ref Range    Report       [facility] Emergency Dept.    Test Date:  2023  Pt Name:    LIAM ROTH                 Department: ER  MRN:        [id number]                      Room:       [facility]  Gender:     Female                       Technician:  :        [id number]                   Requested By:ER TRIAGE PROTOCOL  Order #:    [id number]                    Reading MD: Ester Johnson MD    Measurements  Intervals                                Axis  Rate:       86                           P:          43  GA:         198                          QRS:        28  QRSD:       98                           T:          48  QT:         361  QTc:        432    Interpretive Statements  Sinus rhythm  Probable left ventricular hypertrophy  No ectopy  No ST or T wave change  Compared to ECG 2021 23:08:49  No significant changes  Electronically Signed On 2023 15:02:39 PDT by Ester Johnson MD         Radiology:   The attending emergency physician has independently interpreted the diagnostic imaging associated with this visit and am waiting the final reading from the radiologist.   Preliminary interpretation is a follows: no infiltrate  Radiologist interpretation:   DX-CHEST-PORTABLE (1 VIEW)   Final Result      1.  No pulmonary congestion.      EC-ECHOCARDIOGRAM COMPLETE W/O CONT    (Results Pending)      COURSE & MEDICAL DECISION MAKING     ED Observation Status? Yes; I am placing the patient in to an observation status due to a diagnostic uncertainty as well as therapeutic intensity. Patient placed in observation status at 3:11 PM, 2023.     Observation plan is as follows: An EKG, lab work, and imaging will be performed for further evaluation.      Upon Reevaluation, the patient's condition has: not improved; and will be escalated to hospitalization.    Patient discharged from ED Observation status at 6:10 PM (Time) 5/26/2023 (Date).     INITIAL ASSESSMENT, COURSE AND PLAN  Care Narrative: Patient presents with few day history of flu like symptoms and worsening shortness of breath.  She has reassuring vitals on arrival other than hypoxia with a new oxygen requirement of 4L.  She has mild tachypnea and some wheezing noted therefore was treated with breathing treatments, steroids and magnesium.  No history of asthma or COPD however may be related to viral illness.  EKG is unchanged from prior. Troponin is normal and symptoms do not seem consistent with ACS.  No leukocytosis or elevated lactate concerning for sepsis.  She has mild elevation of BNP however no evidence of pulmonary edema or pneumonia on chest xray. She is hyponatremic therefore given small fluid bolus.  Plan to admit due to new oxygen requirement.  Likely needs new echo as well due to history of severe aortic stenosis      ADDITIONAL PROBLEM LIST  Problem #1: Reactive airways disease - possibly related to viral illness, given nebs, steroids and magnesium, will admit for hypoxia    Problem #2: Acute hyponatremia - given small fluid bolus, continue to monitor    Problem #3:  History of aortic stenosis - needs repeat echo, no signs of significant heart failure    DISPOSITION AND DISCUSSIONS  I have discussed management of the patient with the following physicians and ABHIJIT's:    I discussed with Dr. Gilliland, hospitalist on call, who accepts admission of the patient      Decision tools and prescription drugs considered including, but not limited to: Antibiotics no signs of bacterial infection .    6:10 PM - Upon reassessment, patient is resting comfortably with stable vital signs.  No new complaints at this time.  Discussed results with patient and/or family as well as plan of care for admission.   Patient and daughter agreeable.      DISPOSITION:  Patient will be hospitalized by Dr. Gilliland in guarded condition.     FINAL DIAGNOSIS  1. Mild intermittent reactive airway disease with acute exacerbation    2. Aortic valve stenosis, etiology of cardiac valve disease unspecified    3. Hyponatremia         Estela ODONNELL (Scribe), am scribing for, and in the presence of, Ester Johnson M.D..    Electronically signed by: Estela Walters (Scribe), 5/26/2023    IEster M.D. personally performed the services described in this documentation, as scribed by Estela Walters in my presence, and it is both accurate and complete.      The note accurately reflects work and decisions made by me.  Ester Johnson M.D.  5/27/2023  1:42 AM

## 2023-05-26 NOTE — ED TRIAGE NOTES
".  Chief Complaint   Patient presents with    Shortness of Breath     SOB onset yesterday, increasing this am. Cough and soar throat since Monday. + green sputum/ productive cough - fever, + chills, +joint pain. Seen at clinic yesterday and reports being diagnosed with strep throat. Prescribed albuterol. Took two doses this am at home with no relief. Patient reports bronchospasm and dysrhythmias with past use of albuterol.      BIB EMS. O2 sat 88-89% RA. 12/90, HR 85, GCS 15. Placed on 4L/min O2 via NC.     Patient denies relief with albuterol. States it makes her fell worse. Audible rhonchi, wheezing to left chest upon auscultation. Reports - Diagnosed with upper respiratory infection yesterday. Covid test at clinic yesterday, and - COVID home test today.     BP (!) 163/73   Pulse 80   Temp 36.5 °C (97.7 °F) (Temporal)   Resp (!) 28   Ht 1.575 m (5' 2\")   Wt 64.9 kg (143 lb)   LMP 01/01/1985   SpO2 98%   BMI 26.16 kg/m²    "

## 2023-05-26 NOTE — ED NOTES
Patient assisted to bedside commode. Urinates approximately 300 mL clear/ aamir urine. Sent to lab. Patient assisted back to bed. Patient reports increases SOB and inability to rest comfortably. Call to MD.

## 2023-05-27 ENCOUNTER — HOME HEALTH ADMISSION (OUTPATIENT)
Dept: HOME HEALTH SERVICES | Facility: HOME HEALTHCARE | Age: 88
End: 2023-05-27
Payer: MEDICARE

## 2023-05-27 VITALS
TEMPERATURE: 97.1 F | OXYGEN SATURATION: 98 % | WEIGHT: 143 LBS | DIASTOLIC BLOOD PRESSURE: 78 MMHG | HEIGHT: 62 IN | HEART RATE: 93 BPM | RESPIRATION RATE: 20 BRPM | BODY MASS INDEX: 26.31 KG/M2 | SYSTOLIC BLOOD PRESSURE: 130 MMHG

## 2023-05-27 LAB
ALBUMIN SERPL BCP-MCNC: 4.4 G/DL (ref 3.2–4.9)
ALBUMIN/GLOB SERPL: 1.5 G/DL
ALP SERPL-CCNC: 61 U/L (ref 30–99)
ALT SERPL-CCNC: 31 U/L (ref 2–50)
ANION GAP SERPL CALC-SCNC: 16 MMOL/L (ref 7–16)
AST SERPL-CCNC: 29 U/L (ref 12–45)
BASOPHILS # BLD AUTO: 0 % (ref 0–1.8)
BASOPHILS # BLD: 0 K/UL (ref 0–0.12)
BILIRUB SERPL-MCNC: 0.3 MG/DL (ref 0.1–1.5)
BUN SERPL-MCNC: 10 MG/DL (ref 8–22)
CALCIUM ALBUM COR SERPL-MCNC: 8.6 MG/DL (ref 8.5–10.5)
CALCIUM SERPL-MCNC: 8.9 MG/DL (ref 8.5–10.5)
CHLORIDE SERPL-SCNC: 90 MMOL/L (ref 96–112)
CO2 SERPL-SCNC: 18 MMOL/L (ref 20–33)
CREAT SERPL-MCNC: 0.77 MG/DL (ref 0.5–1.4)
EOSINOPHIL # BLD AUTO: 0 K/UL (ref 0–0.51)
EOSINOPHIL NFR BLD: 0 % (ref 0–6.9)
ERYTHROCYTE [DISTWIDTH] IN BLOOD BY AUTOMATED COUNT: 38.4 FL (ref 35.9–50)
GFR SERPLBLD CREATININE-BSD FMLA CKD-EPI: 75 ML/MIN/1.73 M 2
GLOBULIN SER CALC-MCNC: 3 G/DL (ref 1.9–3.5)
GLUCOSE BLD STRIP.AUTO-MCNC: 201 MG/DL (ref 65–99)
GLUCOSE SERPL-MCNC: 255 MG/DL (ref 65–99)
GRAM STN SPEC: NORMAL
HCT VFR BLD AUTO: 36 % (ref 37–47)
HGB BLD-MCNC: 12.6 G/DL (ref 12–16)
IMM GRANULOCYTES # BLD AUTO: 0.05 K/UL (ref 0–0.11)
IMM GRANULOCYTES NFR BLD AUTO: 0.6 % (ref 0–0.9)
INR PPP: 1.46 (ref 0.87–1.13)
LYMPHOCYTES # BLD AUTO: 1.25 K/UL (ref 1–4.8)
LYMPHOCYTES NFR BLD: 15.7 % (ref 22–41)
MCH RBC QN AUTO: 29.5 PG (ref 27–33)
MCHC RBC AUTO-ENTMCNC: 35 G/DL (ref 32.2–35.5)
MCV RBC AUTO: 84.3 FL (ref 81.4–97.8)
MONOCYTES # BLD AUTO: 0.17 K/UL (ref 0–0.85)
MONOCYTES NFR BLD AUTO: 2.1 % (ref 0–13.4)
NEUTROPHILS # BLD AUTO: 6.48 K/UL (ref 1.82–7.42)
NEUTROPHILS NFR BLD: 81.6 % (ref 44–72)
NRBC # BLD AUTO: 0 K/UL
NRBC BLD-RTO: 0 /100 WBC (ref 0–0.2)
PLATELET # BLD AUTO: 194 K/UL (ref 164–446)
PMV BLD AUTO: 8.9 FL (ref 9–12.9)
POTASSIUM SERPL-SCNC: 4.8 MMOL/L (ref 3.6–5.5)
PROT SERPL-MCNC: 7.4 G/DL (ref 6–8.2)
PROTHROMBIN TIME: 17.5 SEC (ref 12–14.6)
RBC # BLD AUTO: 4.27 M/UL (ref 4.2–5.4)
SIGNIFICANT IND 70042: NORMAL
SITE SITE: NORMAL
SODIUM SERPL-SCNC: 124 MMOL/L (ref 135–145)
SOURCE SOURCE: NORMAL
WBC # BLD AUTO: 8 K/UL (ref 4.8–10.8)

## 2023-05-27 PROCEDURE — 80053 COMPREHEN METABOLIC PANEL: CPT

## 2023-05-27 PROCEDURE — 700101 HCHG RX REV CODE 250: Performed by: HOSPITALIST

## 2023-05-27 PROCEDURE — 85025 COMPLETE CBC W/AUTO DIFF WBC: CPT

## 2023-05-27 PROCEDURE — 94760 N-INVAS EAR/PLS OXIMETRY 1: CPT

## 2023-05-27 PROCEDURE — 700111 HCHG RX REV CODE 636 W/ 250 OVERRIDE (IP): Performed by: HOSPITALIST

## 2023-05-27 PROCEDURE — 99239 HOSP IP/OBS DSCHRG MGMT >30: CPT | Performed by: STUDENT IN AN ORGANIZED HEALTH CARE EDUCATION/TRAINING PROGRAM

## 2023-05-27 PROCEDURE — 700102 HCHG RX REV CODE 250 W/ 637 OVERRIDE(OP): Performed by: HOSPITALIST

## 2023-05-27 PROCEDURE — 82962 GLUCOSE BLOOD TEST: CPT

## 2023-05-27 PROCEDURE — 36415 COLL VENOUS BLD VENIPUNCTURE: CPT

## 2023-05-27 PROCEDURE — 85610 PROTHROMBIN TIME: CPT

## 2023-05-27 PROCEDURE — 94640 AIRWAY INHALATION TREATMENT: CPT

## 2023-05-27 PROCEDURE — A9270 NON-COVERED ITEM OR SERVICE: HCPCS | Performed by: HOSPITALIST

## 2023-05-27 RX ORDER — GUAIFENESIN 600 MG/1
600 TABLET, EXTENDED RELEASE ORAL EVERY 12 HOURS
Qty: 14 TABLET | Refills: 0 | Status: SHIPPED | OUTPATIENT
Start: 2023-05-27 | End: 2023-06-03

## 2023-05-27 RX ORDER — LEVALBUTEROL INHALATION SOLUTION 1.25 MG/3ML
1.25 SOLUTION RESPIRATORY (INHALATION) EVERY 6 HOURS PRN
Qty: 24 ML | Refills: 0 | Status: SHIPPED
Start: 2023-05-27 | End: 2023-11-20

## 2023-05-27 RX ORDER — WARFARIN SODIUM 3 MG/1
1.5 TABLET ORAL ONCE
Status: DISCONTINUED | OUTPATIENT
Start: 2023-05-27 | End: 2023-05-27 | Stop reason: HOSPADM

## 2023-05-27 RX ORDER — PREDNISONE 20 MG/1
40 TABLET ORAL DAILY
Qty: 10 TABLET | Refills: 0 | Status: SHIPPED | OUTPATIENT
Start: 2023-05-27 | End: 2023-06-01

## 2023-05-27 RX ORDER — LEVALBUTEROL INHALATION SOLUTION 1.25 MG/3ML
1.25 SOLUTION RESPIRATORY (INHALATION) EVERY 6 HOURS PRN
Qty: 24 ML | Refills: 0 | Status: SHIPPED | OUTPATIENT
Start: 2023-05-27 | End: 2023-05-27 | Stop reason: SDUPTHER

## 2023-05-27 RX ADMIN — LEVALBUTEROL HYDROCHLORIDE 1.25 MG: 1.25 SOLUTION RESPIRATORY (INHALATION) at 02:29

## 2023-05-27 RX ADMIN — LEVALBUTEROL HYDROCHLORIDE 1.25 MG: 1.25 SOLUTION RESPIRATORY (INHALATION) at 15:39

## 2023-05-27 RX ADMIN — GABAPENTIN 100 MG: 100 CAPSULE ORAL at 08:54

## 2023-05-27 RX ADMIN — DEXTROMETHORPHAN HYDROBROMIDE, GUAIFENESIN 10 ML: 10; 100 LIQUID ORAL at 08:54

## 2023-05-27 RX ADMIN — LEVALBUTEROL HYDROCHLORIDE 1.25 MG: 1.25 SOLUTION RESPIRATORY (INHALATION) at 08:42

## 2023-05-27 RX ADMIN — METHYLPREDNISOLONE SODIUM SUCCINATE 40 MG: 40 INJECTION, POWDER, FOR SOLUTION INTRAMUSCULAR; INTRAVENOUS at 05:34

## 2023-05-27 RX ADMIN — CEFTRIAXONE SODIUM 2000 MG: 10 INJECTION, POWDER, FOR SOLUTION INTRAVENOUS at 05:34

## 2023-05-27 RX ADMIN — LEVOTHYROXINE SODIUM 50 MCG: 0.05 TABLET ORAL at 05:34

## 2023-05-27 ASSESSMENT — CHA2DS2 SCORE
CHF OR LEFT VENTRICULAR DYSFUNCTION: NO
SEX: FEMALE
AGE 75 OR GREATER: YES
DIABETES: YES
AGE 65 TO 74: NO
PRIOR STROKE OR TIA OR THROMBOEMBOLISM: NO
HYPERTENSION: YES
VASCULAR DISEASE: YES
CHA2DS2 VASC SCORE: 6

## 2023-05-27 ASSESSMENT — PAIN DESCRIPTION - PAIN TYPE: TYPE: ACUTE PAIN

## 2023-05-27 ASSESSMENT — COPD QUESTIONNAIRES
COPD SCREENING SCORE: 5
DO YOU EVER COUGH UP ANY MUCUS OR PHLEGM?: NO/ONLY WITH OCCASIONAL COLDS OR INFECTIONS
HAVE YOU SMOKED AT LEAST 100 CIGARETTES IN YOUR ENTIRE LIFE: YES
DURING THE PAST 4 WEEKS HOW MUCH DID YOU FEEL SHORT OF BREATH: SOME OF THE TIME

## 2023-05-27 NOTE — DISCHARGE PLANNING
ATTN: Case Management  RE: Referral for Home Health    Reason for referral denial: Patient lives outside service area. [facility] does not provide services in area code [phone].              Unfortunately, we are not able to accept this referral for the reason listed above. If further clarity is needed, our Transitional Care Specialists are available to discuss any barriers to service at [phone].      We look forward to collaborating with you in the future,  [facility] Team

## 2023-05-27 NOTE — PROGRESS NOTES
4 Eyes Skin Assessment Completed by DIAMOND Arellano and DIAMOND Altamirano.    Head WDL  Ears WDL  Nose WDL  Mouth WDL  Neck WDL  Breast/Chest WDL  Shoulder Blades WDL  Spine WDL  (R) Arm/Elbow/Hand WDL  (L) Arm/Elbow/Hand WDL  Abdomen WDL  Groin WDL  Scrotum/Coccyx/Buttocks WDL  (R) Leg WDL  (L) Leg WDL  (R) Heel/Foot/Toe WDL  (L) Heel/Foot/Toe WDL          Devices In Places Nasal Cannula      Interventions In Place Pressure Redistribution Mattress    Possible Skin Injury No    Pictures Uploaded Into Epic N/A  Wound Consult Placed N/A  RN Wound Prevention Protocol Ordered No

## 2023-05-27 NOTE — H&P
Hospital Medicine History & Physical Note    Date of Service  5/26/2023    Primary Care Physician  Ganesh Mckeon M.D.    Consultants      Specialist Names:     Code Status  Full Code    Chief Complaint  Chief Complaint   Patient presents with    Shortness of Breath     SOB onset yesterday, increasing this am. Cough and soar throat since Monday. + green sputum/ productive cough - fever, + chills, +joint pain. Seen at clinic yesterday and reports being diagnosed with strep throat. Prescribed albuterol. Took two doses this am at home with no relief. Patient reports bronchospasm and dysrhythmias with past use of albuterol.        History of Presenting Illness  Shereen Dale is a 87 y.o. female who presented 5/26/2023 with past medical history of severe aortic stenosis, atrial fibrillation on warfarin, diabetes, hypothyroidism who comes into the hospital for shortness of breath for the past 5 days.  It is associated with nasal congestion, headaches and a productive cough.  She denies any fevers or hemoptysis.  Patient went to urgent care and received nebulizer treatments and prednisone.  The nebulizer treatments were causing her palpitations.  She tested positive for strep.  She was also started on Keflex.  She was taking her medications but her shortness of breath continued to worsen and she started to feel lightheaded.    Chest x-ray interpreted by me about increasing to vascular congestion and a possible left lower lobe infiltrate  EKG interpreted by me and found normal sinus rhythm, LVH    I discussed the plan of care with patient.    Review of Systems  Review of Systems   Constitutional:  Positive for chills and malaise/fatigue. Negative for diaphoresis and fever.   HENT:  Positive for congestion. Negative for ear discharge, ear pain, hearing loss, nosebleeds, sinus pain, sore throat and tinnitus.    Eyes:  Negative for blurred vision, double vision, photophobia and pain.   Respiratory:  Positive for  cough, sputum production, shortness of breath and wheezing. Negative for hemoptysis and stridor.    Cardiovascular:  Negative for chest pain, palpitations, orthopnea, claudication, leg swelling and PND.   Gastrointestinal:  Negative for abdominal pain, blood in stool, constipation, diarrhea, heartburn, melena, nausea and vomiting.   Genitourinary:  Negative for dysuria, flank pain, frequency, hematuria and urgency.   Musculoskeletal:  Negative for back pain, falls, joint pain, myalgias and neck pain.   Skin:  Negative for itching and rash.   Neurological:  Negative for dizziness, tingling, tremors, weakness and headaches.   Endo/Heme/Allergies:  Negative for environmental allergies and polydipsia. Does not bruise/bleed easily.   Psychiatric/Behavioral:  Negative for depression, hallucinations, substance abuse and suicidal ideas.        Past Medical History   has a past medical history of AF (atrial fibrillation) (HCC), Arrhythmia, Backpain, Breast cancer (HCC), Breath shortness, Cancer (HCC) (1993), CATARACT, Chronic anticoagulation (5/2/2012), Congestive heart failure (HCC), Cough (5/2/2012), Dental disorder, Diabetes, Edema (5/2/2012), Glaucoma, Heart burn, Heart murmur, Heart valve disease, Hypertension, Hypothyroid, Oxygen deficiency, Paroxysmal atrial fibrillation (HCC), Sleep apnea, tia, and Unspecified hemorrhagic conditions.    Surgical History   has a past surgical history that includes lumpectomy; cholecystectomy; hysterectomy radical; pr radiation therapy plan simple; cataract phaco with iol (9/23/2013); cataract phaco with iol (10/21/2013); knee arthroscopy (Right, 5/21/2015); and meniscectomy (Right, 5/21/2015).     Family History  family history includes Heart Attack in her mother; Heart Disease in her father; Hypertension in her father and mother; Leukemia in her sister; No Known Problems in her brother, sister, sister, and sister; Sleep Apnea in her sister.   Family history reviewed with patient.  There is no family history that is pertinent to the chief complaint.     Social History   reports that she quit smoking about 59 years ago. Her smoking use included cigarettes. She has a 5.50 pack-year smoking history. She has never used smokeless tobacco. She reports that she does not currently use alcohol. She reports that she does not use drugs.    Allergies  Allergies   Allergen Reactions    Darvon [Propoxyphene Hcl]      shock    Iodine      Shock  - IV    Latex      Swelling = hands with glove use    Penicillins Anaphylaxis    Propoxyphene Hcl Anaphylaxis    Tetanus Antitoxin Myalgia    Tetracycline Anaphylaxis       Medications  Prior to Admission Medications   Prescriptions Last Dose Informant Patient Reported? Taking?   Blood Glucose Monitoring Suppl (FREESTYLE LITE) Device   No No   Sig: USE TWO TIMES A DAY AS DIRECTED FOR BLOOD SUGAR MONITORING   Home Care Oxygen   Yes No   Sig: Inhale 2-5 L/min as needed for Shortness of Breath (sleep apnea). 2-5 LPM as needed via nasal cannula  Indications: Shortness of breath, sleep apnea   Misc. Devices Misc   No No   Sig: Oxygen concentrator with humidifier, 2.5-3 L/min   Sennosides-Docusate Sodium (SENNA PLUS) 8.6-50 MG Cap   Yes No   Sig: Take 2 tablet by mouth every day. Take every evening.  Hold for loose stools  Indications: Constipation   Spacer/Aero-Holding Chambers Device   No No   Si Application every 4 hours for 10 days.   albuterol 108 (90 Base) MCG/ACT Aero Soln inhalation aerosol   No No   Sig: Inhale 2 Puffs every four hours as needed for Shortness of Breath for up to 14 days.   amLODIPine (NORVASC) 2.5 MG Tab   No No   Sig: Take 1 Tablet by mouth every day.   amLODIPine (NORVASC) 5 MG Tab   No No   Sig: Take 1 Tablet by mouth every day for 200 days. Take 5mg PO daily in AM.   carvedilol (COREG) 12.5 MG Tab   No No   Sig: Take 1 Tablet by mouth 2 times a day.   carvedilol (COREG) 12.5 MG Tab   No No   Sig: Take 1 Tablet by mouth 2 times a day for BP    cephALEXin (KEFLEX) 500 MG Cap   No No   Sig: Take 1 Capsule by mouth 3 times a day for 10 days.   dorzolamide-timolol (COSOPT) 22.3-6.8 MG/ML Solution   Yes No   Sig: Administer 1 Drop into both eyes 2 times a day. Indications: Wide-Angle Glaucoma   gabapentin (NEURONTIN) 100 MG Cap   No No   Sig: Take 1 Capsule by mouth 2 times a day. Take 1 capsule PO Daily in the AM, Take 2 capsules daily in the PM   glipiZIDE SR (GLUCOTROL) 2.5 MG TABLET SR 24 HR   No No   Sig: Take 1 Tablet by mouth every day.   haloperidol (HALDOL) 2 MG/ML Conc   Yes No   Sig: Take 0.5 mg by mouth every 6 hours as needed (Anxiety, agitation).   levothyroxine (SYNTHROID) 50 MCG Tab   No No   Sig: TAKE ONE TABLET BY MOUTH EVERY MORNING FOR UNDERACTIVE THYROID   metFORMIN (GLUCOPHAGE) 500 MG Tab   No No   Sig: TAKE ONE TABLET BY MOUTH THREE TIMES A DAY   metaxalone (SKELAXIN) 800 MG Tab   No No   Sig: Take 0.5 Tablets by mouth 2 times a day. 1/2 tab 400 mg 2 x day.  Indications: Musculoskeletal Pain   nystatin (MYCOSTATIN) powder   No No   Sig: Apply topically 2 times a day for irritation   omeprazole (PRILOSEC) 20 MG delayed-release capsule   No No   Sig: Take 1 Capsule by mouth 2 times a day.   predniSONE (DELTASONE) 20 MG Tab   No No   Sig: Take 2 Tablets by mouth every day for 7 days.   senna-docusate (PERICOLACE OR SENOKOT S) 8.6-50 MG Tab   No No   Sig: Take 2 Tablets by mouth every day for constipation. Hold for loose stools   spironolactone (ALDACTONE) 25 MG Tab   Yes No   Sig: Take 25 mg by mouth every day.   warfarin (COUMADIN) 3 MG Tab   No No   Sig: TAKE 1/2 OR 1 TABLET BY MOUTH EVERY DAY OR TAKE AS DIRECTED BY ANTICOAGULATION CLINIC INDICATIONS      Facility-Administered Medications Last Administration Doses Remaining   AEROCHAMBER PLUS-MEDIUM MASK MISC 1 Each None recorded 1          Physical Exam  Temp:  [36.5 °C (97.7 °F)] 36.5 °C (97.7 °F)  Pulse:  [80-87] 83  Resp:  [24-28] 24  BP: (129-163)/(73-83) 135/83  SpO2:  [97 %-98 %]  98 %  Blood Pressure : 135/83   Temperature: 36.5 °C (97.7 °F)   Pulse: 83   Respiration: (!) 24   Pulse Oximetry: 98 %       Physical Exam  Vitals and nursing note reviewed.   Constitutional:       General: She is not in acute distress.     Appearance: Normal appearance. She is not ill-appearing, toxic-appearing or diaphoretic.   HENT:      Head: Normocephalic and atraumatic.      Nose: No congestion or rhinorrhea.      Mouth/Throat:      Pharynx: No oropharyngeal exudate or posterior oropharyngeal erythema.   Eyes:      General: No scleral icterus.  Neck:      Vascular: No carotid bruit or JVD.   Cardiovascular:      Rate and Rhythm: Normal rate and regular rhythm.      Pulses: Normal pulses.      Heart sounds: Murmur heard.      No friction rub. No gallop.   Pulmonary:      Effort: Pulmonary effort is normal. No respiratory distress.      Breath sounds: No stridor. Wheezing and rales present. No rhonchi.   Abdominal:      General: Abdomen is flat. There is no distension.      Palpations: There is no mass.      Tenderness: There is no abdominal tenderness. There is no left CVA tenderness, guarding or rebound.      Hernia: No hernia is present.   Musculoskeletal:         General: No swelling. Normal range of motion.      Cervical back: No rigidity. No muscular tenderness.      Right lower leg: No edema.      Left lower leg: No edema.   Lymphadenopathy:      Cervical: No cervical adenopathy.   Skin:     General: Skin is warm and dry.      Capillary Refill: Capillary refill takes more than 3 seconds.      Coloration: Skin is not jaundiced or pale.      Findings: No bruising or erythema.   Neurological:      Mental Status: She is alert.         Laboratory:  Recent Labs     05/26/23  1500   WBC 6.9   RBC 4.37   HEMOGLOBIN 12.7   HEMATOCRIT 37.2   MCV 85.1   MCH 29.1   MCHC 34.1   RDW 39.8   PLATELETCT 193   MPV 9.3     Recent Labs     05/26/23  1500   SODIUM 125*   POTASSIUM 5.0   CHLORIDE 92*   CO2 20   GLUCOSE 207*    BUN 10   CREATININE 0.67   CALCIUM 9.1     Recent Labs     05/26/23  1500   ALTSGPT 33   ASTSGOT 35   ALKPHOSPHAT 61   TBILIRUBIN 0.3   GLUCOSE 207*         Recent Labs     05/26/23  1500   NTPROBNP 1777*         Recent Labs     05/26/23  1500   TROPONINT 10       Imaging:  DX-CHEST-PORTABLE (1 VIEW)   Final Result      1.  No pulmonary congestion.      EC-ECHOCARDIOGRAM COMPLETE W/O CONT    (Results Pending)       X-Ray:  I have personally reviewed the images and compared with prior images.  EKG:  I have personally reviewed the images and compared with prior images.    Assessment/Plan:  Justification for Admission Status  I anticipate this patient will require at least two midnights for appropriate medical management, necessitating inpatient admission because respiratory failure    Patient will need a Med/Surg bed on MEDICAL service .  The need is secondary to respiratory failure.    * Reactive airway disease with wheezing, mild persistent, uncomplicated- (present on admission)  Assessment & Plan  No history of asthma COPD  Xopenex every 4 hours since she was developing palpitations and A-fib  IV Solu-Medrol  Decongestants    Strep throat  Assessment & Plan  Continue antibiotics    Pneumonia  Assessment & Plan  Check procalcitonin  Sputum cultures  Decongestants and cough medicine  Start ceftriaxone and azithromycin    Acute respiratory failure with hypoxia (HCC)  Assessment & Plan  On 4 L O2 above baseline    Acquired hypothyroidism- (present on admission)  Assessment & Plan  Continue home thyroid medications    Hyponatremia- (present on admission)  Assessment & Plan  Likely elevated secondary to spironolactone    PAF (paroxysmal atrial fibrillation) (HCC)- (present on admission)  Assessment & Plan  Continue home Coreg  Continue warfarin    Diabetes (HCC)- (present on admission)  Assessment & Plan  Start on insulin sliding scale with serial Accu-Checks  Check hemoglobin A1c  Hypoglycemic protocol in  place        Severe aortic stenosis- (present on admission)  Assessment & Plan  Mild elevation in BNP and mildly volume overload  Recheck cardiac echo    HTN (hypertension)- (present on admission)  Assessment & Plan  Hold Aldactone  Continue Coreg and Norvasc        VTE prophylaxis: SCDs/TEDs    I discussed advance care planning for at least 30 minutes with the patient including diagnosis, prognosis, plan of care, risks and benefits of any therapies that could be offered, as well as alternatives including palliation and hospice, as appropriate. The patient has opted  Full code. Time spent is exclusive of evaluation and management or other separately billable procedures.

## 2023-05-27 NOTE — PROGRESS NOTES
Inpatient Anticoagulation Service Note for 5/27/2023      Reason for Anticoagulation: Atrial Fibrillation   [id number] VASc Score: 6  HAS-BLED Score: 1    Hemoglobin Value: 12.6  Hematocrit Value: (!) 36  Lab Platelet Value: 194  Target INR: 2.0 to 3.0    INR from last 7 days       Date/Time INR Value    05/27/23 0047 1.46    05/26/23 2001 1.5          Dose from last 7 days       Date/Time Dose (mg)    05/27/23 1559 1.5    05/26/23 2158 5          Significant Interactions: Antibiotics  Bridge Therapy: No    Reversal Agent Administered: Not Applicable    A/P:  INR relatively unchanged after 5mg booster dose. Pt takes warfarin 3 mg every Mon, 1.5 mg all other days at home.   -Will give 1.5mg tonight, resuming home dose, but consider another booster dose tomorrow or monday if still unchanged.  - Daily INR until therapeutic and stable  - Pharmacist will monitor INR and make adjustments to warfarin dosing as appropriate      Education Material Provided?: No    Pharmacist suggested discharge dosing: likely home dose of warfarin 5mg every Monday, 1.5mg all other days     Ilda Garcia, AlexisD

## 2023-05-27 NOTE — DISCHARGE PLANNING
Case Management Discharge Planning    Admission Date: 5/26/2023  GMLOS: 4  ALOS: 1    6-Clicks ADL Score: 21  6-Clicks Mobility Score: 21      Anticipated Discharge Dispo: Discharge Disposition: D/T to home under HHA care in anticipation of covered skilled care (06)  Discharge Address: [address] [place] [address] Rd, [place] [address]    DME Needed: Yes    DME Ordered: Yes    Action(s) Taken: Updated Provider/Nurse on Discharge Plan, Choice obtained, Referral(s) sent, and DME Face to Face     Escalations Completed: Provider for orders    Medically Clear: Yes    Next Steps: CM met with Shereen and daughter Giuliana at bedside, IMM explained, patient signed, copy provided and original faxed to [facility] for scanning. CM coordinating with [facility] for Home O2, nebulizer and HHC. Daughter to transport home at discharge.    Barriers to Discharge: DME and Oxygen Delivery/nebulizer    Is the patient up for discharge tomorrow: No today

## 2023-05-27 NOTE — DISCHARGE PLANNING
Care Transition Team Final Discharge Disposition    Actual Discharge Information  Discharge Disposition: D/T to home under HHA care in anticipation of covered skilled care (06)      CM reviewed discharge plan    Home O2 vendor- [employer]  C Vendor- [facility] Pending at discharge. AVS updated to follow-up on Tuesday due to [facility] being closed for the holiday weekend.    Daughter to transport home once home O2 and nebulizer arrives to bedside.

## 2023-05-27 NOTE — ASSESSMENT & PLAN NOTE
Check procalcitonin  Sputum cultures  Decongestants and cough medicine  Start ceftriaxone and azithromycin

## 2023-05-27 NOTE — DISCHARGE PLANNING
HTH/SCP TCN chart review completed. Collaborated with Sarah FINNEY prior to meeting with the pt. The most current review of medical record, knowledge of pt's PLOF and social support, LACE+ score of 69, 6 clicks scores of 21 mobility were considered.      Pt seen at bedside with daughter present. Introduced TCN program. Provided education regarding post acute levels of care. Discussed HTH/SCP plan benefits (Meds to Beds, medical uber and [facility] transitional care). Pt verbalizes understanding. Pt agreeable to [facility] services if indicated at time of dc and TCN sent referral via email.     Pt and daughter report no functional concerns with dc to home once medically cleared. They report she has appropriate ADs (FWW,etc) for dc to home plan and is essentially at her functional baseline. Of note per family and pt, pt recently dc'd from hospice services. They are interested in HH services if indicated at time of dc. Note that she has home 02 concentrator (via [employer]) though reports limited supplies and does NOT have portable 02 for transportation. Pt is hopeful for dc to home today and discussed current dc planning concerns with CM (as pt will likely need 02 for transport and possible HH services given transition off of hospice). Appreciate collaboration with CM.    Also noted that pt address in chart was [address] and obtained home address from pt and daughter to assist with delivery of services post acute setting. Provided address to CM.    No additional provider choices at this time. Choice proactively obtained for HH and DME (02), faxed to [facility] and and given to CM.    Note at time of writing this note, 02 to be delivered within the hour for dc to home and FTF/order for HH has been placed by MD as well. Anticipate dc today with HH services and appropriate supplemental 02. TCN will continue to follow and collaborate with discharge planning team as additional post acute needs arise. Thank you.     Completed today:  Choice obtained:  HH, DME (02)  [facility] referral sent 5/27

## 2023-05-27 NOTE — CARE PLAN
The patient is Stable - Low risk of patient condition declining or worsening    Shift Goals  Clinical Goals: monitor O2 sat    Progress made toward(s) clinical / shift goals:  3L O2 via NC.  O2 sat greater than 90%.  RT giving treatment.      Problem: Respiratory  Goal: Patient will achieve/maintain optimum respiratory ventilation and gas exchange  Outcome: Progressing       Patient is not progressing towards the following goals:

## 2023-05-27 NOTE — DISCHARGE SUMMARY
Discharge Summary    CHIEF COMPLAINT ON ADMISSION  Chief Complaint   Patient presents with    Shortness of Breath     SOB onset yesterday, increasing this am. Cough and soar throat since Monday. + green sputum/ productive cough - fever, + chills, +joint pain. Seen at clinic yesterday and reports being diagnosed with strep throat. Prescribed albuterol. Took two doses this am at home with no relief. Patient reports bronchospasm and dysrhythmias with past use of albuterol.        Reason for Admission  ems     Admission Date  5/26/2023    CODE STATUS  Full Code    HPI & HOSPITAL COURSE  Shereen Dale is a 87 y.o. female who presented 5/26/2023 with past medical history of severe aortic stenosis, atrial fibrillation on warfarin, diabetes and hypothyroidism, MARQUEZ on nocturnal oxygen  previously on hospice who comes into the hospital for shortness of breath for the past 5 days.  It is associated with nasal congestion, headaches and a productive cough.  She denies any fevers or hemoptysis.  Patient went to urgent care and received nebulizer treatments and prednisone. She was given albuterol but was not taking it due to palpitations.  She tested positive for strep and was prescribed 10 day course of Keflex.  She was taking her medications but her shortness of breath continued to worsen and she started to feel lightheaded thus she presented back to  and was transferred to [facility] . On evaluation she was hypoxic and requiring 4L of oxygen. She had diffuse wheezing and was started on scheduled xopenex and IV steroids.  [facility] felt [facility] had possible left lower lobe infiltrate, her procalcitonin was negative.   She was started on rocephin for treatment of strep infection.   On evaluation this morning she feels better in terms of her breathing, she is very anxious and adamant about being discharged. She continues to have wheezing. I did recommend she stay another night however patient  declined. She does have family support at home. She was ambulated and did well although is requiring 2L of oxygen thus home oxygen was set up. She was transitioned back to her oral steroid and keflex. She was given strict return precautions which I discussed with her and her daughter at bedside.   At the time of discharge patient is hemodynamically stable, saturating well on 2L and eager for discharge.       Therefore, she is discharged in fair and stable condition to home with organized home healthcare and close outpatient follow-up.    The patient recovered much more quickly than anticipated on admission.    Discharge Date  5/27/2023    FOLLOW UP ITEMS POST DISCHARGE  O2 needs   Resolution of wheezing   Sodium   Chronic medical conditions     DISCHARGE DIAGNOSES  Principal Problem:    Reactive airway disease with wheezing, mild persistent, uncomplicated (POA: Yes)  Active Problems:    HTN (hypertension) (POA: Yes)    Severe aortic stenosis (POA: Yes)    Diabetes (HCC) (POA: Yes)    PAF (paroxysmal atrial fibrillation) (HCC) (POA: Yes)      Overview:           Hyponatremia (POA: Yes)    Acquired hypothyroidism (POA: Yes)    Acute respiratory failure with hypoxia (HCC) (POA: Unknown)    Pneumonia (POA: Unknown)    Strep throat (POA: Unknown)  Resolved Problems:    * No resolved hospital problems. *      FOLLOW UP  Future Appointments   Date Time Provider Department Center   6/2/2023  4:45 PM KAMILLA HUDSON PHARMACIST [facility]   6/16/2023 11:30 AM [facility]A HUDOSN PHARMACIST [facility]   7/12/2023  1:00 PM Ganesh Mckeon M.D. [facility] Mckeon M.D.  [address] [address]  [place] [address]  [phone]    Schedule an appointment as soon as possible for a visit in 1 week(s)  hospital follow up    Ganesh Mckeon M.D.  [address]  [facility] [address]  [place] [address]  [phone]            MEDICATIONS ON DISCHARGE     Medication List        START taking these medications         Instructions   guaiFENesin  MG Tb12  Commonly known as: MUCINEX   Take 1 Tablet by mouth every 12 hours for 7 days.  Dose: 600 mg     levalbuterol 1.25 MG/3ML Nebu  Commonly known as: XOPENEX   Take 3 mL by nebulization every 6 hours as needed for Shortness of Breath (whezing).  Dose: 1.25 mg            CONTINUE taking these medications        Instructions   albuterol 108 (90 Base) MCG/ACT Aers inhalation aerosol   Inhale 2 Puffs every four hours as needed for Shortness of Breath for up to 14 days.  Dose: 2 Puff     amLODIPine 5 MG Tabs  Commonly known as: NORVASC   Take 1 Tablet by mouth every day for 200 days. Take 5mg PO daily in AM.  Dose: 5 mg     carvedilol 12.5 MG Tabs  Commonly known as: COREG   Take 1 Tablet by mouth 2 times a day for BP  Dose: 12.5 mg     cephALEXin 500 MG Caps  Commonly known as: KEFLEX   Take 1 Capsule by mouth 3 times a day for 10 days.  Dose: 500 mg     dorzolamide-timolol 22.3-6.8 MG/ML Soln  Commonly known as: COSOPT   Administer 1 Drop into both eyes 2 times a day. Indications: Wide-Angle Glaucoma  Dose: 1 Drop     gabapentin 100 MG Caps  Commonly known as: NEURONTIN   Take 100-200 mg by mouth 2 times a day. Take 1 capsule (100 mg) in the morning and 2 capsules (200 mg) in the evening.  Dose: 100-200 mg     glipiZIDE SR 2.5 MG Tb24  Commonly known as: GLUCOTROL   Take 1 Tablet by mouth every day.  Dose: 2.5 mg     Home Care Oxygen   Inhale 2-5 L/min as needed for Shortness of Breath (sleep apnea). 2-5 LPM as needed via nasal cannula  Indications: Shortness of breath, sleep apnea  Dose: 2-5 L/min     levothyroxine 50 MCG Tabs  Commonly known as: SYNTHROID   Take 50 mcg by mouth every morning on an empty stomach.  Dose: 50 mcg     metaxalone 800 MG Tabs  Commonly known as: Skelaxin   Take 400 mg by mouth 2 times a day as needed for Moderate Pain or Muscle Spasms. 1/2 tablet = 400 mg.  Dose: 400 mg     metFORMIN 500 MG Tabs  Commonly known as: GLUCOPHAGE   Take 500 mg by  "mouth 3 times a day with meals.  Dose: 500 mg     Misc. Devices Misc   Oxygen concentrator with humidifier, 2.5-3 L/min     omeprazole 20 MG delayed-release capsule  Commonly known as: PRILOSEC   Take 1 Capsule by mouth 2 times a day.  Dose: 20 mg     predniSONE 20 MG Tabs  Commonly known as: DELTASONE   Take 2 Tablets by mouth every day for 5 days.  Dose: 40 mg     Spacer/Aero-Holding Chambers Teresa   1 Application every 4 hours for 10 days.  Dose: 1 Application     Vitamin D3 2000 UNIT Caps   Take 2,000 Units by mouth every day.  Dose: 2,000 Units     warfarin 3 MG Tabs  Commonly known as: COUMADIN   Take 1.5-3 mg by mouth every evening. Take as directed by [facility].  Dose: 1.5-3 mg              Allergies  Allergies   Allergen Reactions    Darvon [Propoxyphene Hcl] Anaphylaxis    Iodine Anaphylaxis     Shock - IV    Tetanus Antitoxin Anaphylaxis    Tetracycline Anaphylaxis    Latex Unspecified     Swelling = hands with glove use    Penicillins Rash     \"Rash all over body\"       DIET  Orders Placed This Encounter   Procedures    Diet Order Diet: 2 Gram Sodium     Standing Status:   Standing     Number of Occurrences:   1     Order Specific Question:   Diet:     Answer:   2 Gram Sodium [7]       ACTIVITY  As tolerated.      CONSULTATIONS  NA    PROCEDURES  NA    LABORATORY  Lab Results   Component Value Date    SODIUM 124 (L) 05/27/2023    POTASSIUM 4.8 05/27/2023    CHLORIDE 90 (L) 05/27/2023    CO2 18 (L) 05/27/2023    GLUCOSE 255 (H) 05/27/2023    BUN 10 05/27/2023    CREATININE 0.77 05/27/2023    CREATININE 0.7 08/02/2008        Lab Results   Component Value Date    WBC 8.0 05/27/2023    HEMOGLOBIN 12.6 05/27/2023    HEMATOCRIT 36.0 (L) 05/27/2023    PLATELETCT 194 05/27/2023        Total time of the discharge process exceeds 55 minutes.  "

## 2023-05-27 NOTE — PROGRESS NOTES
Called pharmacy and talked to Ronaldo re: pt's dosage.  Pharmacist stated that she is suppose to take 5 mg per protocol and he can't just write 1.5 mg.  Explained to pt again after talking to pharmacist.  Pt continues to refuse despite education.

## 2023-05-27 NOTE — PROGRESS NOTES
1850: Pt arrived to unit. VSS. On 3.5L NC. Audible wheezing heard. Pt A&Ox4. Daughter at bedside. Denies pain. Pt moved to room 606 due to positive strept. Night shift charge made aware. Bed alarm on. Bed locked and in lowest position.

## 2023-05-27 NOTE — DISCHARGE PLANNING
Received choice form @: Taken from [employer]  Agency/Facility name: [facility]  Sent referral per choice form @: [phone]     Agency/Facility Name: [facility]  Spoke To: Isidoro  Outcome:  He has accepted patient on service and will be bedside in about 1 hour

## 2023-05-27 NOTE — PROGRESS NOTES
RN went to give coumadin 5 mg.  Pt refused to take 5 mg.  Pt stated that she was admitted to hospital d/t bleeding problem and was told to take coumadin 1.5 mg only.   Explained to pt that because her INR is 1.5 which is not therapeutic, pharmacist want her to take high dose.  Pt is adamant not to take more than 1.5 mg.  Message sent to Javy LUGO via [employer].

## 2023-05-27 NOTE — PROGRESS NOTES
Inpatient Anticoagulation Service Note for 5/26/2023      Reason for Anticoagulation: Atrial Fibrillation           Hemoglobin Value: 12.7  Hematocrit Value: 37.2  Lab Platelet Value: 193  Target INR: 2.0 to 3.0    INR from last 7 days       Date/Time INR Value    05/26/23 2001 1.5          Dose from last 7 days       Date/Time Dose (mg)    05/26/23 2158 5          Significant Interactions: Antibiotics  Bridge Therapy: No     Reversal Agent Administered: Not Applicable  Comments: INR subtherapeutic, will bolus dose today. Repeat INR tomorrow. Patient on reduced dose since adding steroid as outpatient. No bleeding noted.    Plan:  5mg x 1       Pharmacist suggested discharge dosing: Return to home dosing prior to adjustment to 1.5mg daily. INR within 72 hours of discharge.      Ronaldo Acevedo, AlexisD

## 2023-05-27 NOTE — DISCHARGE INSTRUCTIONS
Take mediations as directed, take antibiotics until completion even if you feel improved   Use oxygen continuously until a doctor tells you otherwise, goal oxygen saturation is >90%  If you develop any increasing shortness of breath, difficulty breathing severe wheezing seek medical attention

## 2023-05-27 NOTE — FACE TO FACE
"Face to Face Note  -  Durable Medical Equipment    Susan Quintana M.D. - NPI: [id number]  I certify that this patient is under my care and that they had a durable medical equipment(DME)face to face encounter by myself that meets the physician DME face-to-face encounter requirements with this patient on:    Date of encounter:   Patient:                    MRN:                       YOB: 2022  Shereen Dale  [id number]  1935     The encounter with the patient was in whole, or in part, for the following medical condition, which is the primary reason for durable medical equipment:  CHF, Asthma, Cardiac Disease, and Other - strep infection with acute reactive airway disease, acute respiratory failure    I certify that, based on my findings, the following durable medical equipment is medically necessary:    Oxygen   HOME O2 Saturation Measurements:(Values must be present for Home Oxygen orders)  Room air sat at rest: 88  Room air sat with amb: 87  With liters of O2: 2, O2 sat at rest with O2: 93  With Liters of O2: 2, O2 sat with amb with O2 : 91  Is the patient mobile?: Yes  If patient feels more short of breath, they can go up to 6 liters per minute and contact healthcare provider.    Supporting Symptoms: The patient requires supplemental oxygen, as the following interventions have been tried with limited or no improvement: \"Bronchodilators and/or steroid inhalers, \"Oral and/or IV steroids, \"Ambulation with oximetry, and \"Incentive spirometry   and Nebulizer.    My Clinical findings support the need for the above equipment due to:  Hypoxia, Wheezing (Chronic)  "

## 2023-05-27 NOTE — DISCHARGE PLANNING
Received choice form @: Taken from [employer]  Agency/Facility name: [facility]  Sent referral per choice form @: [phone]    Patient has been declined by [facility], referral sent to [facility] as per request.

## 2023-05-27 NOTE — FACE TO FACE
Face to Face Supporting Documentation - Home Health    The encounter with this patient was in whole or in part the primary reason for home health admission.    Date of encounter:   Patient:                    MRN:                       YOB: 2022  Shereen Dale  [id number]  1935     Home health to see patient for:  Skilled Nursing care for assessment, interventions & education, Physical Therapy evaluation and treatment, and Occupational therapy evaluation and treatment    Skilled need for:  Exacerbation of Chronic Disease State reactive air way disease, New Onset Medical Diagnosis acute respiratory failure, strep pharyngitis/pneumonia , and Recent Deterioration of Health Status respiratory failure     Skilled nursing interventions to include:  Comment: eval/meds/edu    Homebound status evidenced by:  Need the aid of supportive devices such as crutches, canes, wheelchairs or walkers, Require the use of special transportation, or Needs the assistance of another person in order to leave the home. Leaving home requires a considerable and taxing effort. There is a normal inability to leave the home.    Community Physician to provide follow up care: Ganesh Mckeon M.D.     Optional Interventions? No      I certify the face to face encounter for this home health care referral meets the CMS requirements and the encounter/clinical assessment with the patient was, in whole, or in part, for the medical condition(s) listed above, which is the primary reason for home health care. Based on my clinical findings: the service(s) are medically necessary, support the need for home health care, and the homebound criteria are met.  I certify that this patient has had a face to face encounter by myself.  Susan Quintana M.D. - NPI: [id number]

## 2023-05-27 NOTE — CARE PLAN
Pt A&Ox4 at shift change. On 3L NC. Pt states that she is ready to go home and does not want the ECHO to be completed. This nurse will let MD know of patients requests. Pt audibly wheezing while moving in bed. Bed alarm on. Bed locked and in lowest position. Non-skid socks in place. No other needs at this time. Pt denies pain. Call light in reach.     Home O2 ambulatory test conducted- Pt needs 2L at rest and with ambulation in order to stay above 90%. MD made aware.      Once home oxygen arrives at bedside pt will discharge. Pt and family updated on plan for discharge.     The patient is Stable - Low risk of patient condition declining or worsening    Shift Goals  Clinical Goals: monitor 2 stats  Patient Goals: D/c  Family Goals: D/C    Progress made toward(s) clinical / shift goals:  pt remained free from falls. Pt's home O2 test completed. 2L required for adequate ventilation       Patient is not progressing towards the following goals:

## 2023-05-27 NOTE — ASSESSMENT & PLAN NOTE
No history of asthma COPD  Xopenex every 4 hours since she was developing palpitations and A-fib  IV Solu-Medrol  Decongestants

## 2023-05-27 NOTE — ED NOTES
Med rec completed per patient with daughter at bedside.  Allergies reviewed with patient.  Patient's preferred pharmacy: [facility].    Patient is on a 10 day course of cephalexin and a 7 day course of prednisone, both of which she states that she started this morning 5/26/2023 (1 dose of each medication taken so far).    Patient is on WARFARIN. Patient receives anticoagulation monitoring with [facility]. Last anticoagulation visit on 5/19/2023. Patient's current warfarin dosing schedule: 3 mg every Monday; 1.5 mg on all other days.

## 2023-05-27 NOTE — ED NOTES
Patient assisted to bedside commode. Urine collected and sent to lab. Patient transported off floor with transport.

## 2023-05-28 LAB
BACTERIA UR CULT: NORMAL
SIGNIFICANT IND 70042: NORMAL
SITE SITE: NORMAL
SOURCE SOURCE: NORMAL

## 2023-05-29 ENCOUNTER — HOME HEALTH ADMISSION (OUTPATIENT)
Dept: HOME HEALTH SERVICES | Facility: HOME HEALTHCARE | Age: 88
End: 2023-05-29
Payer: MEDICARE

## 2023-05-29 LAB
BACTERIA SPEC RESP CULT: NORMAL
GRAM STN SPEC: NORMAL
INR PPP: 1.3 (ref 2–3.5)
SIGNIFICANT IND 70042: NORMAL
SITE SITE: NORMAL
SOURCE SOURCE: NORMAL

## 2023-05-29 NOTE — DISCHARGE PLANNING
ATTN: Case Management  RE: Referral for Home Health    As of 5/29/2023, we have accepted the Home Health referral for the patient listed above.    A [facility] clinician will be out to see the patient within 48 hours. If you have any questions or concerns regarding the patient's transition to Home Health, please do not hesitate to contact us at [phone].      We look forward to collaborating with you,  [facility] Home Health Team

## 2023-05-30 ENCOUNTER — TELEPHONE (OUTPATIENT)
Dept: HEALTH INFORMATION MANAGEMENT | Facility: OTHER | Age: 88
End: 2023-05-30

## 2023-05-30 ENCOUNTER — PATIENT OUTREACH (OUTPATIENT)
Dept: MEDICAL GROUP | Facility: MEDICAL CENTER | Age: 88
End: 2023-05-30

## 2023-05-30 ENCOUNTER — ANTICOAGULATION MONITORING (OUTPATIENT)
Dept: MEDICAL GROUP | Facility: PHYSICIAN GROUP | Age: 88
End: 2023-05-30

## 2023-05-30 DIAGNOSIS — I35.0 SEVERE AORTIC STENOSIS: ICD-10-CM

## 2023-05-30 DIAGNOSIS — Z79.01 CHRONIC ANTICOAGULATION: ICD-10-CM

## 2023-05-30 DIAGNOSIS — I48.0 PAF (PAROXYSMAL ATRIAL FIBRILLATION) (HCC): ICD-10-CM

## 2023-05-30 NOTE — TELEPHONE ENCOUNTER
Received a VM from patient's daughter Anya regarding patient's oxygen. This RN called patient's phone number back. Per Anya, Shereen would like an oxygen set up with a humidifier. Currently, she has a dry set up. Anya called [employer] to ask for a humidifier and was notified that they would need a prescription to switch out her concentrators. Will send message to PCP. Her current concentrator # is [phone]. She is also requesting clear tubing instead of green.

## 2023-05-30 NOTE — PROGRESS NOTES
Transitional Care Management    Discharge Questions  Discharge Date: 05/27/2023  Outreach call: Date 05/30/23  Time: 10:41 AM   Now that you are home, how are you feeling?  Poor  Did you receive any new prescriptions? Yes, Were you able to get them filled?  Yes   Pharmacy   Do you have any questions about your current medications or new medications (Review Med Rec)?  No  Do you have a follow up appointment scheduled with your PCP?  Yes Date/Time -   Any issues or paperwork you wish to discuss with your PCP? No  Does this patient qualify for the CCM program?  Yes - currently enrolled     Transitional Care  Number of attempts: 1  Current or previous attempts competed within two business days of discharge?  Yes  Provided education regarding treatment plan, medications, self-management, ADLs?  Yes  Has patient completed an Advanced Directive?  Yes  Is there one on file? Yes    Care Manager phone number provided? Yes  Is there anything else I can help you with?  No    Discharge Summary   Chief Complaint:  Shortness of Breath   Admitting Dx:  EMS   Discharge Dx:  Reactive airway disease with wheezing, mild persistent, uncomplicated    Notes:  N/A     Subjective:     Shereen Dale is a 87 y.o. female who presents for Hospital Follow-up.    HPI:   Recently hospitalized for     Patient was admitted to [facility] on 5/26, presented with shortness of breath for the past 5 days prior to admission.  It was associated with nasal congestion, headaches and a productive cough.  She denies any fevers or hemoptysis.  Patient went to urgent care and received nebulizer treatments and prednisone. She was given albuterol but was not taking it due to palpitations.  She tested positive for strep and was prescribed 10 day course of Keflex.  She was taking her medications but her shortness of breath continued to worsen and she started to feel lightheaded thus she presented back to  and was transferred to [facility] .  In the ER, she  was hypoxic and requiring 4L of oxygen. She had diffuse wheezing and was started on scheduled xopenex and IV steroids.  [facility] possible left lower lobe infiltrate, her procalcitonin was negative. She was started on rocephin for treatment of strep infection. On evaluation next morning she felt better in terms of her breathing, she was very eager to be discharged. She continues to have wheezing.  Recommended to stay another night but she declined.  So she was discharged next day 5/27/2023 . She was ambulated and did well although is requiring 2L of oxygen thus home oxygen was set up. She was transitioned back to her oral steroid and keflex. At the time of discharge patient is hemodynamically stable, saturating well on 2L and eager for discharge.      Came in today for follow-up.  Denies any shortness of breath or cough.  Oxygen saturation is 95% on room air, however she still has been taking oxygen at 2 L during the daytime as needed.  She completed her oral antibiotics(Keflex 500 mg 3 times a day for 10 days), and oral steroid(prednisone 40 mg daily for 7 days).    Current medicines (including reconciliation performed today)  Current Outpatient Medications   Medication Sig Dispense Refill    guaiFENesin ER (MUCINEX) 600 MG TABLET SR 12 HR Take 1 Tablet by mouth every 12 hours for 7 days. 14 Tablet 0    predniSONE (DELTASONE) 20 MG Tab Take 2 Tablets by mouth every day for 5 days. 10 Tablet 0    levalbuterol (XOPENEX) 1.25 MG/3ML Nebu Soln Take 3 mL by nebulization every 6 hours as needed for Shortness of Breath (whezing). 24 mL 0    Cholecalciferol (VITAMIN D3) 2000 UNIT Cap Take 2,000 Units by mouth every day.      gabapentin (NEURONTIN) 100 MG Cap Take 100-200 mg by mouth 2 times a day. Take 1 capsule (100 mg) in the morning and 2 capsules (200 mg) in the evening.      levothyroxine (SYNTHROID) 50 MCG Tab Take 50 mcg by mouth every morning on an empty stomach.       metaxalone (SKELAXIN) 800 MG Tab Take 400 mg by mouth 2 times a day as needed for Moderate Pain or Muscle Spasms. 1/2 tablet = 400 mg.      metFORMIN (GLUCOPHAGE) 500 MG Tab Take 500 mg by mouth 3 times a day with meals.      warfarin (COUMADIN) 3 MG Tab Take 1.5-3 mg by mouth every evening. Take as directed by [facility].      albuterol 108 (90 Base) MCG/ACT Aero Soln inhalation aerosol Inhale 2 Puffs every four hours as needed for Shortness of Breath for up to 14 days. 1 Each 0    cephALEXin (KEFLEX) 500 MG Cap Take 1 Capsule by mouth 3 times a day for 10 days. 30 Capsule 0    Spacer/Aero-Holding Chambers Device 1 Application every 4 hours for 10 days. 1 Each 0    amLODIPine (NORVASC) 5 MG Tab Take 1 Tablet by mouth every day for 200 days. Take 5mg PO daily in AM. 100 Tablet 2    glipiZIDE SR (GLUCOTROL) 2.5 MG TABLET SR 24 HR Take 1 Tablet by mouth every day. 100 Tablet 2    carvedilol (COREG) 12.5 MG Tab Take 1 Tablet by mouth 2 times a day for  Tablet 3    omeprazole (PRILOSEC) 20 MG delayed-release capsule Take 1 Capsule by mouth 2 times a day. 200 Capsule 1    Misc. Devices Misc Oxygen concentrator with humidifier, 2.5-3 L/min 1 Each 0    dorzolamide-timolol (COSOPT) 22.3-6.8 MG/ML Solution Administer 1 Drop into both eyes 2 times a day. Indications: Wide-Angle Glaucoma      Home Care Oxygen Inhale 2-5 L/min as needed for Shortness of Breath (sleep apnea). 2-5 LPM as needed via nasal cannula  Indications: Shortness of breath, sleep apnea       No current facility-administered medications for this visit.       Allergies:   Darvon [propoxyphene hcl], Iodine, Tetanus antitoxin, Tetracycline, Latex, and Penicillins    Social History     Tobacco Use    Smoking status: Former     Packs/day: 0.50     Years: 11.00     Pack years: 5.50     Types: Cigarettes     Quit date: 1964     Years since quittin.4    Smokeless tobacco: Never    Tobacco comments:     Started at    Vaping Use  "   Vaping Use: Never used   Substance Use Topics    Alcohol use: Not Currently     Alcohol/week: 0.0 oz     Comment: rarely    Drug use: No       ROS:  See HPI    Objective:     There were no vitals filed for this visit.  There is no height or weight on file to calculate BMI.    Physical Exam:  /60 (BP Location: Left arm)   Pulse 92   Temp 36.9 °C (98.4 °F)   Ht 1.529 m (5' 0.2\")   Wt 65.9 kg (145 lb 3.2 oz)   LMP 01/01/1985   SpO2 95%   BMI 28.17 kg/m²   Gen.: Well-developed, well-nourished, no apparent distress,pleasant and cooperative with the examination  Skin:  Warm and dry with good turgor. No rashes or suspicious lesions in visible areas  HEENT:Sinuses nontender with palpation, TMs clear, nares patent with pink mucosa and clear rhinorrhea,no septal deviation ,polyps or lesions. lips without lesions, oropharynx clear.  Neck: Trachea midline,no masses or adenopathy. No JVD.  Cor: Regular rate and rhythm without murmur, gallop or rub.  Lungs: Respirations unlabored.Clear to auscultation with equal breath sounds bilaterally. No wheezes, rhonchi.  Extremities: No cyanosis, clubbing or edema.      Assessment and Plan:   87 y.o. female     1. Hospital discharge follow-up  Hospital discharge summary reviewed.    2. Reactive airway disease with wheezing, mild persistent, uncomplicated  3. Acute respiratory failure with hypoxia (HCC)  4. Pneumonia of left lower lobe due to infectious organism  5. Strep throat  Almost resolved.  Oxygen saturation is normal in room air.  Completed oral antibiotics and steroids  Continue oxygen at 2 L/min as needed during the day and regularly at night.    - Chart and discharge summary were reviewed.   - Hospitalization and results reviewed with patient.   - Medications reviewed including instructions regarding high risk medications, dosing and side effects.  - Recommended Services:   - Advance directive/POLST on file?      Follow-up:No follow-ups on file.    Face-to-face " transitional care management services with  medical decision complexity were provided.     LACE+ Historical Score Over Time (0-28: Low, 29-58: Medium, 59+: High): 73

## 2023-05-30 NOTE — PROGRESS NOTES
Transitional Care Management    Discharge Questions  Discharge Date: 05/27/2023  Outreach call: Date 05/30/23  Time: 10:41 AM   Now that you are home, how are you feeling?  Poor  Did you receive any new prescriptions? Yes, Were you able to get them filled?  Yes   Pharmacy   Do you have any questions about your current medications or new medications (Review Med Rec)?  No  Do you have a follow up appointment scheduled with your PCP?  Yes Date/Time - 06/06/2023 @ 1600  Any issues or paperwork you wish to discuss with your PCP? No  Does this patient qualify for the CCM program?  Yes - currently enrolled     Transitional Care  Number of attempts: 1  Current or previous attempts competed within two business days of discharge?  Yes  Provided education regarding treatment plan, medications, self-management, ADLs?  Yes  Has patient completed an Advanced Directive?  Yes  Is there one on file? Yes    Care Manager phone number provided? Yes  Is there anything else I can help you with?  No    Discharge Summary   Chief Complaint:  Shortness of Breath   Admitting Dx:  EMS   Discharge Dx:  Reactive airway disease with wheezing, mild persistent, uncomplicated    Notes:  N/A

## 2023-05-30 NOTE — PROGRESS NOTES
OP Anticoagulation Service Note    Date: 2023    Anticoagulation Summary  As of 2023      INR goal:  2.0-3.0   TTR:  63.6 % (11.3 mo)   INR used for dosin.30 (2023)   Warfarin maintenance plan:  3 mg (3 mg x 1) every Mon; 1.5 mg (3 mg x 0.5) all other days   Weekly warfarin total:  12 mg   Plan last modified:  Guru Baldwin, PharmD (2023)   Next INR check:  2023   Target end date:  Indefinite    Indications    Severe aortic stenosis [I35.0]  PAF (paroxysmal atrial fibrillation) (HCC) [I48.0]  Chronic anticoagulation [Z79.01]                 Anticoagulation Episode Summary       INR check location:      Preferred lab:      Send INR reminders to:      Comments:            Anticoagulation Care Providers       Provider Role Specialty Phone number    Ganesh Mckeon M.D. Referring Geriatric Medicine - Family Medicine [phone]    [facility] Responsible  [phone]          Anticoagulation Patient Findings        Patient's preferred phone number:  [phone]        HPI:   The reason for today's call is to prevent morbidity and mortality from a blood clot and/or stroke and to reduce the risk of bleeding while on a anticoagulant.     PCP:  Ganesh Mckeon M.D.  [address] [place] [phone]    Assessment:     INR  sub-therapeutic.     Lab Results   Component Value Date/Time    BUN 10 2023 12:47 AM    CREATININE 0.77 2023 12:47 AM    CREATININE 0.7 2008 08:30 AM     Lab Results   Component Value Date/Time    HEMOGLOBIN 12.6 2023 12:47 AM    HEMATOCRIT 36.0 (L) 2023 12:47 AM    PLATELETCT 194 2023 12:47 AM    ALKPHOSPHAT 61 2023 12:47 AM    ASTSGOT 29 2023 12:47 AM    ALTSGPT 31 2023 12:47 AM          Current Outpatient Medications:     guaiFENesin ER, 600 mg, Oral, Q12HRS    predniSONE, 40 mg, Oral, DAILY    levalbuterol, 1.25 mg, Nebulization, Q6HRS PRN    Vitamin D3, 2,000  Units, Oral, DAILY    gabapentin, 100-200 mg, Oral, BID    levothyroxine, 50 mcg, Oral, AM ES    metaxalone, 400 mg, Oral, BID PRN    metFORMIN, 500 mg, Oral, TID WITH MEALS    warfarin, 1.5-3 mg, Oral, Q EVENING    albuterol, 2 Puff, Inhalation, Q4HRS PRN    cephALEXin, 500 mg, Oral, TID    Spacer/Aero-Holding Chambers, 1 Application, Does not apply, Q4HRS    amLODIPine, 5 mg, Oral, DAILY    glipiZIDE SR, 2.5 mg, Oral, DAILY    carvedilol, 12.5 mg, Oral, BID    omeprazole, 20 mg, Oral, BID    Misc. Devices, Oxygen concentrator with humidifier, 2.5-3 L/min    dorzolamide-timolol, 1 Drop, Both Eyes, BID    Home Care Oxygen, 2-5 L/min, Inhalation, PRN      Plan:     She took extra dose Monday   Declined change to weekly dose as she is on steroid and ABx    Follow-up:     On date seen above    Additional information discussed with patient:     Asked patient to please call the anticoagulation clinic if they have any signs/symptoms of bleeding and/or thrombosis or any changes to diet or medications.      National recommendations regarding anticoagulation therapy:     The CHEST guidelines recommends frequent INR monitoring at regular intervals (a few days up to a max of 12 weeks) to ensure patients are on the proper dose of warfarin, and patients are not having any complications from therapy.  INRs can dramatically change over a short time period due to diet, medications, and medical conditions.       [facility]  Phone: [phone]  Fax: [phone]  On call: [phone]  General scheduling/information [phone]  For emergencies please dial [phone]  Please do not use eOriginal for urgent matters, call the phone numbers listed above.    This note was created using voice recognition software (Dragon). The accuracy of the dictation is limited by the abilities of the software. I have reviewed the note prior to signing, however some errors in grammar and context are still possible. If you  have any questions related to this note please do not hesitate to contact our office.

## 2023-05-31 LAB
BACTERIA BLD CULT: NORMAL
BACTERIA BLD CULT: NORMAL
SIGNIFICANT IND 70042: NORMAL
SIGNIFICANT IND 70042: NORMAL
SITE SITE: NORMAL
SITE SITE: NORMAL
SOURCE SOURCE: NORMAL
SOURCE SOURCE: NORMAL

## 2023-05-31 NOTE — TELEPHONE ENCOUNTER
Patient called stating she needed to speak with this RN about her oxygen. Per patient, the [employer] oxygen was just to take her home from the hospital. At home she does have her [employer] O2 set up with a humidifier. She would like us to continue that.

## 2023-06-02 ENCOUNTER — ANTICOAGULATION VISIT (OUTPATIENT)
Dept: MEDICAL GROUP | Facility: PHYSICIAN GROUP | Age: 88
End: 2023-06-02
Payer: MEDICARE

## 2023-06-02 DIAGNOSIS — Z79.01 CHRONIC ANTICOAGULATION: Primary | ICD-10-CM

## 2023-06-02 DIAGNOSIS — I48.0 PAF (PAROXYSMAL ATRIAL FIBRILLATION) (HCC): ICD-10-CM

## 2023-06-02 DIAGNOSIS — I35.0 SEVERE AORTIC STENOSIS: ICD-10-CM

## 2023-06-02 LAB — INR PPP: 2.5 (ref 2–3.5)

## 2023-06-02 PROCEDURE — 93793 ANTICOAG MGMT PT WARFARIN: CPT | Performed by: FAMILY MEDICINE

## 2023-06-02 PROCEDURE — 85610 PROTHROMBIN TIME: CPT | Performed by: FAMILY MEDICINE

## 2023-06-02 PROCEDURE — 99999 PR NO CHARGE: CPT | Performed by: FAMILY MEDICINE

## 2023-06-02 RX ORDER — WARFARIN SODIUM 3 MG/1
TABLET ORAL
Qty: 90 TABLET | Refills: 3 | Status: SHIPPED | OUTPATIENT
Start: 2023-06-02

## 2023-06-02 NOTE — PROGRESS NOTES
Anticoagulation Summary  As of 6/2/2023      INR goal:  2.0-3.0   TTR:  63.6 % (11.3 mo)   INR used for dosing:     Warfarin maintenance plan:  3 mg (3 mg x 1) every Mon; 1.5 mg (3 mg x 0.5) all other days   Weekly warfarin total:  12 mg   Plan last modified:  Guru Baldwin, PharmD (5/30/2023)   Next INR check:  6/23/2023   Target end date:  Indefinite    Indications    Severe aortic stenosis [I35.0]  PAF (paroxysmal atrial fibrillation) (HCC) [I48.0]  Chronic anticoagulation [Z79.01]                 Anticoagulation Episode Summary       INR check location:      Preferred lab:      Send INR reminders to:      Comments:            Anticoagulation Care Providers       Provider Role Specialty Phone number    Ganesh Mckeon M.D. Referring Geriatric Medicine - Family Medicine [phone]    [facility] Responsible  [phone]          Anticoagulation Patient Findings  Patient Findings       Positives:  Change in medications (Steroids and Keflex after hospitalization, course finished), Hospital admission (Respiratory infection)    Negatives:  Signs/symptoms of thrombosis, Signs/symptoms of bleeding, Laboratory test error suspected, Change in health, Change in alcohol use, Change in activity, Upcoming invasive procedure, Emergency department visit, Upcoming dental procedure, Missed doses, Extra doses, Change in diet/appetite, Bruising, Other complaints                  HPI:   Shereen Wattsa seen in clinic today, on anticoagulation therapy with warfarin due to history of PAF.    Patient's previous INR was subtherapeutic at 1.3 on 5-30-23, at which time patient was instructed to bolus with one dose, then resume current warfarin regimen.  She returns to clinic today to recheck INR to ensure it is therapeutic and thus preventing possible clotting and/or bleeding/bruising complications.    CHADS-VASc = at least 5  (unadjusted ischemic stroke risk/year:  7.2%, which is moderate  risk)    Does patient have any changes to current medical/health status since last appt (Y/N):  Y, see above  Does patient have any signs/symptoms of bleeding and/or thrombosis since the last appt (Y/N):  N  Does patient have any interval changes to diet or medications since last appt (Y/N):  Y, see above  Are there any complications or cost restrictions with current therapy (Y/N):  N     Does patient have [facility] PCP? Yes, Ganesh Mckeon M.D. (If not, please document discussion that patient must be seen at [facility])       Vitals:  declined today    There were no vitals filed for this visit.     Asssessment:      INR therapeutic at 2.5, therefore decreasing stroke and bleeding risk.   Reason(s) for out of range INR today:  n/a      Pt is not on antiplatelet therapy    Medication reconciliation completed today.    Plan:  Pt is to continue with current warfarin dosing regimen.     Follow up:  Because warfarin is a high risk medication and current CHEST guidelines recommend regular monitoring intervals (few days up to 12 weeks), will have patient return to clinic in 3 weeks to recheck INR.    Samantha Sharma, Pharmacy Intern  Brady Piña, PharmD

## 2023-06-05 DIAGNOSIS — I48.0 PAF (PAROXYSMAL ATRIAL FIBRILLATION) (HCC): ICD-10-CM

## 2023-06-06 ENCOUNTER — OFFICE VISIT (OUTPATIENT)
Dept: MEDICAL GROUP | Facility: MEDICAL CENTER | Age: 88
End: 2023-06-06
Payer: MEDICARE

## 2023-06-06 VITALS
SYSTOLIC BLOOD PRESSURE: 110 MMHG | TEMPERATURE: 98.4 F | DIASTOLIC BLOOD PRESSURE: 60 MMHG | HEART RATE: 92 BPM | WEIGHT: 145.2 LBS | OXYGEN SATURATION: 95 % | HEIGHT: 60 IN | BODY MASS INDEX: 28.51 KG/M2

## 2023-06-06 DIAGNOSIS — J02.0 STREP THROAT: ICD-10-CM

## 2023-06-06 DIAGNOSIS — J96.01 ACUTE RESPIRATORY FAILURE WITH HYPOXIA (HCC): ICD-10-CM

## 2023-06-06 DIAGNOSIS — Z09 HOSPITAL DISCHARGE FOLLOW-UP: ICD-10-CM

## 2023-06-06 DIAGNOSIS — J18.9 PNEUMONIA OF LEFT LOWER LOBE DUE TO INFECTIOUS ORGANISM: ICD-10-CM

## 2023-06-06 DIAGNOSIS — J45.30 REACTIVE AIRWAY DISEASE WITH WHEEZING, MILD PERSISTENT, UNCOMPLICATED: ICD-10-CM

## 2023-06-06 PROCEDURE — 3074F SYST BP LT 130 MM HG: CPT | Performed by: FAMILY MEDICINE

## 2023-06-06 PROCEDURE — 99214 OFFICE O/P EST MOD 30 MIN: CPT | Performed by: FAMILY MEDICINE

## 2023-06-06 PROCEDURE — 3078F DIAST BP <80 MM HG: CPT | Performed by: FAMILY MEDICINE

## 2023-06-06 ASSESSMENT — FIBROSIS 4 INDEX: FIB4 SCORE: 2.31

## 2023-06-08 RX ORDER — GLIPIZIDE 2.5 MG/1
2.5 TABLET, EXTENDED RELEASE ORAL DAILY
Qty: 100 TABLET | Refills: 2 | Status: SHIPPED
Start: 2023-06-08 | End: 2023-11-20

## 2023-06-08 NOTE — TELEPHONE ENCOUNTER
Received request via: Patient    Was the patient seen in the last year in this department? Yes    Does the patient have an active prescription (recently filled or refills available) for medication(s) requested? No    Does the patient have [facility] and need 100 day supply (blood pressure, diabetes and cholesterol meds only)? yes

## 2023-06-16 ENCOUNTER — PATIENT OUTREACH (OUTPATIENT)
Dept: HEALTH INFORMATION MANAGEMENT | Facility: OTHER | Age: 88
End: 2023-06-16
Payer: MEDICARE

## 2023-06-16 DIAGNOSIS — E11.9 TYPE 2 DIABETES MELLITUS WITHOUT COMPLICATION, WITHOUT LONG-TERM CURRENT USE OF INSULIN (HCC): ICD-10-CM

## 2023-06-16 DIAGNOSIS — I10 PRIMARY HYPERTENSION: ICD-10-CM

## 2023-06-16 PROCEDURE — 99490 CHRNC CARE MGMT STAFF 1ST 20: CPT | Performed by: FAMILY MEDICINE

## 2023-06-16 NOTE — PROGRESS NOTES
06/16/2023:     1002: Attempt x1 to reach patient for monthly CCM follow up call. No answer. LVM with contact information.      06/27/2023:     Assessment     Reached out to patient for monthly CCM follow up call. Per patient, she is doing well. She has no questions or needs at this time. She states she is needing to call her DME company to  the oxygen equiptment they get to her when she got discharged from the hospital. Per patient, her blood sugars have been elevated recently d/t increased ice cream comsumption. This RN suggested going for the low sugar/sugar free dessert options. She states her blood pressure is stable, but on the lower end. Per patient, last night her blood pressure was 98/67 and she was symptomatic with lightheaded and dizziness. She then retook her blood pressure and it went up to 124 systolic so she took her amlodipine. Will updated Dr. Mckeon and patient to speak with him about this at upcoming visit on 07/12.     Education     Sugar intake      Plan of Care and Goals     Choose heart healthy, low sodium low carb meal options      Decrease fall risk      Mobility and stability exercises as tolerated       Barriers:     Increased sugar intake      Progress:     Not progressing      Next outreach: 07/17/2023 @ 1000

## 2023-06-23 ENCOUNTER — ANTICOAGULATION VISIT (OUTPATIENT)
Dept: MEDICAL GROUP | Facility: PHYSICIAN GROUP | Age: 88
End: 2023-06-23
Payer: MEDICARE

## 2023-06-23 DIAGNOSIS — Z79.01 CHRONIC ANTICOAGULATION: ICD-10-CM

## 2023-06-23 DIAGNOSIS — I48.0 PAF (PAROXYSMAL ATRIAL FIBRILLATION) (HCC): ICD-10-CM

## 2023-06-23 DIAGNOSIS — I35.0 SEVERE AORTIC STENOSIS: ICD-10-CM

## 2023-06-23 LAB — INR PPP: 2.7 (ref 2–3.5)

## 2023-06-23 PROCEDURE — 99999 PR NO CHARGE: CPT | Performed by: FAMILY MEDICINE

## 2023-06-23 PROCEDURE — 85610 PROTHROMBIN TIME: CPT | Performed by: FAMILY MEDICINE

## 2023-06-23 PROCEDURE — 93793 ANTICOAG MGMT PT WARFARIN: CPT | Performed by: FAMILY MEDICINE

## 2023-06-23 NOTE — PROGRESS NOTES
OP Anticoagulation Service Note    Date: 2023    Anticoagulation Summary  As of 2023      INR goal:  2.0-3.0   TTR:  65.9 % (1 y)   INR used for dosin.70 (2023)   Warfarin maintenance plan:  3 mg (3 mg x 1) every Mon; 1.5 mg (3 mg x 0.5) all other days   Weekly warfarin total:  12 mg   Plan last modified:  Guru Baldwin, PharmD (2023)   Next INR check:  2023   Target end date:  Indefinite    Indications    Severe aortic stenosis [I35.0]  PAF (paroxysmal atrial fibrillation) (HCC) [I48.0]  Chronic anticoagulation [Z79.01]                 Anticoagulation Episode Summary       INR check location:      Preferred lab:      Send INR reminders to:      Comments:            Anticoagulation Care Providers       Provider Role Specialty Phone number    Ganesh Mckeon M.D. Referring Geriatric Medicine - Family Medicine [phone]    [facility] Responsible  [phone]          Anticoagulation Patient Findings  Patient Findings       Positives:  Change in diet/appetite (Less greens lately.)    Negatives:  Signs/symptoms of thrombosis, Signs/symptoms of bleeding, Laboratory test error suspected, Change in health, Change in alcohol use, Change in activity, Upcoming invasive procedure, Emergency department visit, Upcoming dental procedure, Missed doses, Extra doses, Change in medications, Hospital admission, Bruising, Other complaints            HPI:   Shereen Dale is in the Anticoagulation Clinic today for an INR check on their anticoagulation therapy.     The reason for today's visit is to prevent morbidity and mortality from a blood clot and/or stroke and to reduce the risk of bleeding while on a anticoagulant.     PCP:  Ganesh Mckeon M.D.  [address] [facility] [address]  [facility] [id number] [phone]    3 vitals included with today's appt-unless patient declined:  (BP, HR, weight, ht, RR)   There were no vitals filed for this visit.    Verified  current warfarin dosing schedule.    Medications reconciled   Pt is not on antiplatelet therapy    Assessment:   INR  therapeutic.     Plan:  Instructed pt to continue on with current regimen.    Follow up:  Follow up appointment in 4 week(s).       Other info:  Pt educated to contact our clinic with any changes in medications or s/s of bleeding or thrombosis.  Education was provided today regarding tips to reduce their bleed risk and dietary constraints while on a anticoagulant.    National Recommendations:  The CHEST guidelines recommends frequent INR monitoring at regular intervals (a few days up to a max of 12 weeks) to ensure patients are on the proper dose of warfarin, and patients are not having any complications from therapy.  INRs can dramatically change over a short time period due to diet, medications, and medical conditions.     Chris Lopes, PharmD, BCACP    [facility]  Phone [phone] fax [phone]

## 2023-06-28 ENCOUNTER — PATIENT MESSAGE (OUTPATIENT)
Dept: HEALTH INFORMATION MANAGEMENT | Facility: OTHER | Age: 88
End: 2023-06-28

## 2023-06-29 NOTE — TELEPHONE ENCOUNTER
Patient called this RN asking for Dr. Mckeon to give the okay for [employer] to come  the oxygen tank she was given on her way home from the hospital. Will send provider message.    Provider message relayed to patient about blood pressure.

## 2023-07-05 ENCOUNTER — TELEPHONE (OUTPATIENT)
Dept: MEDICAL GROUP | Facility: MEDICAL CENTER | Age: 88
End: 2023-07-05

## 2023-07-05 ENCOUNTER — TELEPHONE (OUTPATIENT)
Dept: HEALTH INFORMATION MANAGEMENT | Facility: OTHER | Age: 88
End: 2023-07-05
Payer: MEDICARE

## 2023-07-05 NOTE — TELEPHONE ENCOUNTER
Shereen called asking about pick of an Oxygen tank. Per Chart review message was sent to per PCP for the tank . Will continue to follow.

## 2023-07-05 NOTE — TELEPHONE ENCOUNTER
Patient is requesting an order for [employer] to  the oxygen tanks because she no longer uses or need it. Please advise

## 2023-07-06 NOTE — TELEPHONE ENCOUNTER
Per chart, PCP had discussed this with Tamika JARVIS. Taimka JARVIS to schedule patient for appointment to check oxygen.

## 2023-07-12 ENCOUNTER — OFFICE VISIT (OUTPATIENT)
Dept: MEDICAL GROUP | Facility: MEDICAL CENTER | Age: 88
End: 2023-07-12
Payer: MEDICARE

## 2023-07-12 VITALS
DIASTOLIC BLOOD PRESSURE: 60 MMHG | HEIGHT: 62 IN | OXYGEN SATURATION: 95 % | HEART RATE: 88 BPM | TEMPERATURE: 98.7 F | BODY MASS INDEX: 27.22 KG/M2 | SYSTOLIC BLOOD PRESSURE: 100 MMHG | WEIGHT: 147.93 LBS

## 2023-07-12 DIAGNOSIS — E66.9 DIABETES MELLITUS TYPE 2 IN OBESE: ICD-10-CM

## 2023-07-12 DIAGNOSIS — E11.69 DIABETES MELLITUS TYPE 2 IN OBESE: ICD-10-CM

## 2023-07-12 DIAGNOSIS — I48.0 PAF (PAROXYSMAL ATRIAL FIBRILLATION) (HCC): ICD-10-CM

## 2023-07-12 DIAGNOSIS — I10 PRIMARY HYPERTENSION: ICD-10-CM

## 2023-07-12 DIAGNOSIS — D68.69 SECONDARY HYPERCOAGULABLE STATE (HCC): ICD-10-CM

## 2023-07-12 PROCEDURE — 99214 OFFICE O/P EST MOD 30 MIN: CPT | Performed by: FAMILY MEDICINE

## 2023-07-12 PROCEDURE — 3074F SYST BP LT 130 MM HG: CPT | Performed by: FAMILY MEDICINE

## 2023-07-12 PROCEDURE — 3078F DIAST BP <80 MM HG: CPT | Performed by: FAMILY MEDICINE

## 2023-07-12 RX ORDER — AZITHROMYCIN 500 MG/1
TABLET, FILM COATED ORAL
COMMUNITY
End: 2023-11-20

## 2023-07-12 RX ORDER — CEFUROXIME AXETIL 250 MG/1
TABLET ORAL
COMMUNITY
End: 2023-11-20

## 2023-07-12 RX ORDER — TELMISARTAN 40 MG/1
1 TABLET ORAL
COMMUNITY

## 2023-07-12 RX ORDER — AMLODIPINE BESYLATE 5 MG/1
1 TABLET ORAL
COMMUNITY
End: 2023-11-20

## 2023-07-12 RX ORDER — TIZANIDINE 2 MG/1
TABLET ORAL
COMMUNITY

## 2023-07-12 RX ORDER — AZITHROMYCIN 250 MG/1
TABLET, FILM COATED ORAL
COMMUNITY
End: 2023-11-20

## 2023-07-12 RX ORDER — SIMVASTATIN 80 MG
TABLET ORAL
COMMUNITY
End: 2023-10-09

## 2023-07-12 RX ORDER — GABAPENTIN 300 MG/1
CAPSULE ORAL
COMMUNITY
End: 2023-11-20

## 2023-07-12 RX ORDER — POTASSIUM CHLORIDE 20 MEQ/1
1 TABLET, EXTENDED RELEASE ORAL
COMMUNITY

## 2023-07-12 RX ORDER — PREDNISOLONE ACETATE 10 MG/ML
SUSPENSION/ DROPS OPHTHALMIC
COMMUNITY

## 2023-07-12 RX ORDER — CARVEDILOL 25 MG/1
TABLET ORAL
COMMUNITY

## 2023-07-12 RX ORDER — OSELTAMIVIR PHOSPHATE 75 MG/1
CAPSULE ORAL
COMMUNITY

## 2023-07-12 RX ORDER — HYDROCODONE BITARTRATE AND ACETAMINOPHEN 5; 325 MG/1; MG/1
TABLET ORAL
COMMUNITY
End: 2023-11-20

## 2023-07-12 RX ORDER — SULFAMETHOXAZOLE AND TRIMETHOPRIM 800; 160 MG/1; MG/1
TABLET ORAL
COMMUNITY

## 2023-07-12 RX ORDER — TIZANIDINE 4 MG/1
TABLET ORAL
COMMUNITY

## 2023-07-12 RX ORDER — FAMOTIDINE 20 MG/1
TABLET, FILM COATED ORAL
COMMUNITY

## 2023-07-12 RX ORDER — VALACYCLOVIR HYDROCHLORIDE 1 G/1
TABLET, FILM COATED ORAL
COMMUNITY

## 2023-07-12 RX ORDER — CLONIDINE HYDROCHLORIDE 0.1 MG/1
TABLET ORAL
COMMUNITY

## 2023-07-12 RX ORDER — ZOSTER VACCINE RECOMBINANT, ADJUVANTED 50 MCG/0.5
KIT INTRAMUSCULAR
COMMUNITY

## 2023-07-12 RX ORDER — SIMVASTATIN 80 MG
TABLET ORAL
COMMUNITY

## 2023-07-12 RX ORDER — WARFARIN SODIUM 3 MG/1
2 TABLET ORAL
COMMUNITY

## 2023-07-12 RX ORDER — NYSTATIN 100000 [USP'U]/G
POWDER TOPICAL
COMMUNITY

## 2023-07-12 RX ORDER — FLUCONAZOLE 150 MG/1
TABLET ORAL
COMMUNITY
End: 2023-11-20

## 2023-07-12 RX ORDER — ACYCLOVIR 50 MG/G
OINTMENT TOPICAL
COMMUNITY
End: 2023-10-09

## 2023-07-12 RX ORDER — GLIPIZIDE 5 MG/1
TABLET ORAL
COMMUNITY
End: 2023-11-20

## 2023-07-12 RX ORDER — LEVOTHYROXINE SODIUM 0.05 MG/1
1 TABLET ORAL DAILY
COMMUNITY
End: 2023-11-20

## 2023-07-12 RX ORDER — AMLODIPINE BESYLATE 2.5 MG/1
TABLET ORAL
COMMUNITY
End: 2023-11-20

## 2023-07-12 RX ORDER — OFLOXACIN 3 MG/ML
SOLUTION/ DROPS OPHTHALMIC
COMMUNITY

## 2023-07-12 ASSESSMENT — FIBROSIS 4 INDEX: FIB4 SCORE: 2.31

## 2023-07-12 NOTE — PROGRESS NOTES
CC: Diabetes, hypertension, A-fib, secondary hypercoagulable state, hypothyroidism    HPI:   Shereen presents today to discuss the following:    Diabetes mellitus type 2 in obese ([facility])  Recent A1c was 7.0.  Denies polyuria polydipsia.  Currently on glipizide SR 2.5 mg daily, metformin 1500 mg daily, and Glipizide 2.5 mg daily.  No hypoglycemic episodes.    Primary hypertension  Blood pressure has been adequately controlled on amlodipine 7.5 mg daily, carvedilol 12.5 mg twice daily,  spironolactone 25 mg daily.  No side effect     PAF (paroxysmal atrial fibrillation) ([facility])/ Secondary hypercoagulable state([facility])  Denies palpitation, chest pain, and shortness of breath.  Patient is currently on warfarin and carvedilol.  No history of stroke    Acquired hypothyroidism  She has been tolerating Levothyroxine, no palpitation, no cold or heat intolerance, she has been on levothyroxine 50 mcg daily       Patient Active Problem List    Diagnosis Date Noted    Reactive airway disease with wheezing, mild persistent, uncomplicated 05/26/2023    Acute respiratory failure with hypoxia ([facility]) 05/26/2023    Pneumonia 05/26/2023    Strep throat 05/26/2023    Secondary hypercoagulable state ([facility]) 06/09/2022    Acquired hypothyroidism 09/12/2019    Diabetes mellitus type 2 in obese ([facility]) 09/12/2019    Skin abrasion 08/20/2019    Chronic anticoagulation 08/02/2019    Hyponatremia 03/25/2019    Epistaxis 03/25/2019    Advanced directives, counseling/discussion 09/26/2018    Hypertensive urgency 05/11/2017    Diastolic dysfunction 02/02/2016    Tear of lateral cartilage or meniscus of knee, current 05/21/2015    PAF (paroxysmal atrial fibrillation) ([facility]) 01/12/2015    Dyspnea on effort 01/02/2014    Diabetes ([facility]) 12/26/2013    Severe aortic stenosis 07/02/2013    HTN (hypertension) 05/04/2012    Hemorrhagic disorder due to extrinsic circulating anticoagulants ([facility]) 05/02/2012    Cough 05/02/2012    Edema 05/02/2012    Acute, but ill-defined,  cerebrovascular disease 04/30/2012       Current Outpatient Medications   Medication Sig Dispense Refill    simvastatin (ZOCOR) 80 MG tablet       simvastatin (ZOCOR) 80 MG tablet       simvastatin (ZOCOR) 80 MG tablet       simvastatin (ZOCOR) 80 MG tablet       acyclovir (ZOVIRAX) 5 % Ointment APPLY TO THE AFFECTED AREA(S) CHEST , BACK AND AXILA AREA BY TOPICAL ROUTE EVERY 3 HOURS 6 TIMES PER DAY ,      amLODIPine (NORVASC) 2.5 MG Tab       amLODIPine (NORVASC) 5 MG Tab Take 1 Tablet by mouth every day.      azithromycin (ZITHROMAX) 250 MG Tab       azithromycin (ZITHROMAX) 500 MG tablet       cefUROXime (CEFTIN) 250 MG Tab       cloNIDine (CATAPRES) 0.1 MG Tab Take 1 tablet twice a day by oral route as needed.      diclofenac sodium (VOLTAREN) 1 % Gel       famotidine (PEPCID) 20 MG Tab TAKE 1 TABLET BY MOUTH TWICE DAILY**GENERIC FOR PEPCID*      fluconazole (DIFLUCAN) 150 MG tablet       gabapentin (NEURONTIN) 300 MG Cap Take 1 capsule 3 times a day by oral route.      HYDROcodone-acetaminophen (NORCO) 5-325 MG Tab per tablet Take 1 tablet 3 times a day by oral route as needed.      levothyroxine (SYNTHROID) 50 MCG Tab Take 1 Tablet by mouth every day.      metFORMIN (GLUCOPHAGE) 500 MG Tab Take 1 Tablet by mouth 3 times a day.      nystatin (NYSTOP) powder APPLY TO THE AFFECTED AREA(S) BY TOPICAL ROUTE 2 TIMES PER DAY      ofloxacin (OCUFLOX) 0.3 % Solution       oseltamivir (TAMIFLU) 75 MG Cap Take 1 capsule every day by oral route.      potassium chloride SA (KDUR) 20 MEQ Tab CR Take 1 Tablet by mouth every day.      prednisoLONE acetate (PRED FORTE) 1 % Suspension       sulfamethoxazole-trimethoprim (BACTRIM DS) 800-160 MG tablet       telmisartan (MICARDIS) 40 MG Tab Take 1 Tablet by mouth every day.      tizanidine (ZANAFLEX) 2 MG tablet Take 1 tablet every 8 hours by oral route as needed.      tizanidine (ZANAFLEX) 4 MG Tab       valacyclovir (VALTREX) 1 GM Tab Take 1 tablet every 12 hours by oral route.       warfarin (JANTOVEN) 3 MG Tab Take 2 Tablets by mouth every day.      Zoster Vac Recomb Adjuvanted (SHINGRIX) 50 MCG/0.5ML Recon Susp       carvedilol (COREG) 25 MG Tab       glipiZIDE (GLUCOTROL) 5 MG Tab TAKE ONE TABLET BY MOUTH EVERY DAY IF NEEDED      metFORMIN (GLUCOPHAGE) 500 MG Tab TAKE ONE TABLET BY MOUTH THREE TIMES A  Tablet 0    glipiZIDE SR (GLUCOTROL) 2.5 MG TABLET SR 24 HR Take 1 Tablet by mouth every day. 100 Tablet 2    warfarin (COUMADIN) 3 MG Tab Take one-half to one tablet by mouth daily or as directed by anticoagulation clinic 90 Tablet 3    levalbuterol (XOPENEX) 1.25 MG/3ML Nebu Soln Take 3 mL by nebulization every 6 hours as needed for Shortness of Breath (whezing). 24 mL 0    Cholecalciferol (VITAMIN D3) 2000 UNIT Cap Take 2,000 Units by mouth every day.      gabapentin (NEURONTIN) 100 MG Cap Take 100-200 mg by mouth 2 times a day. Take 1 capsule (100 mg) in the morning and 2 capsules (200 mg) in the evening.      levothyroxine (SYNTHROID) 50 MCG Tab Take 50 mcg by mouth every morning on an empty stomach.      metaxalone (SKELAXIN) 800 MG Tab Take 400 mg by mouth 2 times a day as needed for Moderate Pain or Muscle Spasms. 1/2 tablet = 400 mg.      amLODIPine (NORVASC) 5 MG Tab Take 1 Tablet by mouth every day for 200 days. Take 5mg PO daily in AM. 100 Tablet 2    carvedilol (COREG) 12.5 MG Tab Take 1 Tablet by mouth 2 times a day for  Tablet 3    omeprazole (PRILOSEC) 20 MG delayed-release capsule Take 1 Capsule by mouth 2 times a day. 200 Capsule 1    Misc. Devices Misc Oxygen concentrator with humidifier, 2.5-3 L/min 1 Each 0    dorzolamide-timolol (COSOPT) 22.3-6.8 MG/ML Solution Administer 1 Drop into both eyes 2 times a day. Indications: Wide-Angle Glaucoma      Home Care Oxygen Inhale 2-5 L/min as needed for Shortness of Breath (sleep apnea). 2-5 LPM as needed via nasal cannula  Indications: Shortness of breath, sleep apnea       No current facility-administered  "medications for this visit.         Allergies as of 07/12/2023 - Reviewed 07/12/2023   Allergen Reaction Noted    Darvon [propoxyphene hcl] Anaphylaxis 03/18/2008    Iodine Anaphylaxis 05/11/2015    Tetanus antitoxin Anaphylaxis 11/19/2017    Tetracycline Anaphylaxis 08/02/2008    Latex Unspecified 09/19/2013    Penicillins Rash 08/02/2008    Kdc:red dye+acetaminophen+propoxyphene  07/12/2023        ROS: Denies any chest pain, Shortness of breath, Changes bowel or bladder, Lower extremity edema.    Physical Exam:  /60 (BP Location: Right arm, Patient Position: Sitting, BP Cuff Size: Adult)   Pulse 88   Temp 37.1 °C (98.7 °F) (Temporal)   Ht 1.575 m (5' 2\")   Wt 67.1 kg (147 lb 14.9 oz)   LMP 01/01/1985   SpO2 95%   BMI 27.06 kg/m²   Gen.: Well-developed, well-nourished, no apparent distress,pleasant and cooperative with the examination  Skin:  Warm and dry with good turgor. No rashes or suspicious lesions in visible areas  HEENT:Sinuses nontender with palpation, TMs clear, nares patent with pink mucosa and clear rhinorrhea,no septal deviation ,polyps or lesions. lips without lesions, oropharynx clear.  Neck: Trachea midline,no masses or adenopathy. No JVD.  Cor: Regular rate and rhythm without murmur, gallop or rub.  Lungs: Respirations unlabored.Clear to auscultation with equal breath sounds bilaterally. No wheezes, rhonchi.  Extremities: No cyanosis, clubbing or edema.      Assessment and Plan.   87 y.o. female     1. Diabetes mellitus type 2 in obese ([facility])  A1c: 7.0.    Continue metformin 1500 mg daily    2. Primary hypertension  Controlled.  Continue on amlodipine 2.5 mg daily, and carvedilol 12.5 mg twice a day, and spironolactone 25 mg daily    3. PAF (paroxysmal atrial fibrillation) ([facility])  No RVR.    Continue on warfarin and carvedilol    4. Secondary hypercoagulable state ([facility])  Due to A-fib.  Continue current warfarin    5. Acquired hypothyroidism  She has been tolerating Levothyroxine, no " palpitation, no cold or heat intolerance.  Continue on levothyroxine 50 mcg daily

## 2023-07-13 ENCOUNTER — TELEPHONE (OUTPATIENT)
Dept: MEDICAL GROUP | Facility: MEDICAL CENTER | Age: 88
End: 2023-07-13
Payer: MEDICARE

## 2023-07-13 NOTE — LETTER
July 13, 2023        Shereen Dale  [address]  [place] [address]        To whom it may concern,    My patient oxygen saturation has been normal in room air and with exercise.  Currently she does not need to be on oxygen.  Please  your oxygen concentrator.    If you have any questions or concerns, please don't hesitate to call.        Sincerely,        Ganesh Mckeon M.D.    Electronically Signed

## 2023-07-13 NOTE — TELEPHONE ENCOUNTER
You can quite the following:  Patient oxygen saturation has been normal in room air and with exercise.  Currently she does not need to be on oxygen.  Please  your oxygen concentrator.

## 2023-07-13 NOTE — TELEPHONE ENCOUNTER
Called [employer], they do need a letter of discontinuation. Please let me know how you want me to write the letter.   Fax:[phone]

## 2023-07-14 ENCOUNTER — TELEPHONE (OUTPATIENT)
Dept: HEALTH INFORMATION MANAGEMENT | Facility: OTHER | Age: 88
End: 2023-07-14
Payer: MEDICARE

## 2023-07-14 NOTE — TELEPHONE ENCOUNTER
Shereen called this RN and LVM stating that [facility] has still not been by to  her oxygen and they are now claiming she needs a prescription from her provider to do so. Per chart, MATHEUS Lundberg has already faxed out that prescription and patient confirmed [facility] has been in contact with her.

## 2023-07-17 ENCOUNTER — APPOINTMENT (OUTPATIENT)
Dept: MEDICAL GROUP | Facility: MEDICAL CENTER | Age: 88
End: 2023-07-17
Payer: MEDICARE

## 2023-07-17 ENCOUNTER — ANTICOAGULATION VISIT (OUTPATIENT)
Dept: MEDICAL GROUP | Facility: PHYSICIAN GROUP | Age: 88
End: 2023-07-17
Payer: MEDICARE

## 2023-07-17 DIAGNOSIS — Z79.01 CHRONIC ANTICOAGULATION: Primary | ICD-10-CM

## 2023-07-17 DIAGNOSIS — I48.0 PAF (PAROXYSMAL ATRIAL FIBRILLATION) (HCC): ICD-10-CM

## 2023-07-17 DIAGNOSIS — I35.0 SEVERE AORTIC STENOSIS: ICD-10-CM

## 2023-07-17 LAB — INR PPP: 2 (ref 2–3.5)

## 2023-07-17 PROCEDURE — 85610 PROTHROMBIN TIME: CPT | Performed by: INTERNAL MEDICINE

## 2023-07-17 PROCEDURE — 93793 ANTICOAG MGMT PT WARFARIN: CPT | Performed by: INTERNAL MEDICINE

## 2023-07-17 PROCEDURE — 99999 PR NO CHARGE: CPT | Performed by: INTERNAL MEDICINE

## 2023-07-17 NOTE — PROGRESS NOTES
Anticoagulation Summary  As of 2023      INR goal:  2.0-3.0   TTR:  68.2 % (1.1 y)   INR used for dosin.00 (2023)   Warfarin maintenance plan:  3 mg (3 mg x 1) every Mon; 1.5 mg (3 mg x 0.5) all other days   Weekly warfarin total:  12 mg   Plan last modified:  Guru Baldwin, PharmD (2023)   Next INR check:  2023   Target end date:  Indefinite    Indications    Severe aortic stenosis [I35.0]  PAF (paroxysmal atrial fibrillation) (HCC) [I48.0]  Chronic anticoagulation [Z79.01]                 Anticoagulation Episode Summary       INR check location:      Preferred lab:      Send INR reminders to:      Comments:            Anticoagulation Care Providers       Provider Role Specialty Phone number    Ganesh Mckeon M.D. Referring Geriatric Medicine - Family Medicine [phone]    [facility] An[facility]agulation Services Responsible  [phone]          Anticoagulation Patient Findings  Patient Findings       Negatives:  Signs/symptoms of thrombosis, Signs/symptoms of bleeding, Laboratory test error suspected, Change in health, Change in alcohol use, Change in activity, Upcoming invasive procedure, Emergency department visit, Upcoming dental procedure, Missed doses, Extra doses, Change in medications, Change in diet/appetite, Hospital admission, Bruising, Other complaints                  HPI:   Shereen Wattsa seen in clinic today, on anticoagulation therapy with warfarin due to history of PAF.    Patient's previous INR was therapeutic at 2.7 on 23, at which time patient was instructed to continue with current warfarin regimen.  She returns to clinic today to recheck INR to ensure it is therapeutic and thus preventing possible clotting and/or bleeding/bruising complications.    CHADS-VASc = at least 5  (unadjusted ischemic stroke risk/year:  7.2%, which is moderate risk)    Does patient have any changes to current medical/health status since last appt (Y/N):  NO  Does patient have  any signs/symptoms of bleeding and/or thrombosis since the last appt (Y/N):  NO  Does patient have any interval changes to diet or medications since last appt (Y/N):  NO  Are there any complications or cost restrictions with current therapy (Y/N):  NO     Does patient have [facility] PCP? Yes, Ganesh Mckeon M.D. (If not, please document discussion that patient must be seen at [facility])       Vitals:  declined today    There were no vitals filed for this visit.     Asssessment:      INR therapeutic at 2.0, therefore decreasing stroke and/or bleeding risk.   Reason(s) for out of range INR today:  n/a      Pt is not on antiplatelet therapy    Medication reconciliation completed today.    Plan:  Pt is to continue with current warfarin dosing regimen.     Follow up:  Because warfarin is a high risk medication and current CHEST guidelines recommend regular monitoring intervals (few days up to 12 weeks), will have patient return to clinic in 4 weeks to recheck INR.    Brady Piña, PharmD, BCACP

## 2023-07-20 ENCOUNTER — PATIENT OUTREACH (OUTPATIENT)
Dept: HEALTH INFORMATION MANAGEMENT | Facility: OTHER | Age: 88
End: 2023-07-20
Payer: MEDICARE

## 2023-07-20 DIAGNOSIS — E11.9 TYPE 2 DIABETES MELLITUS WITHOUT COMPLICATION, WITHOUT LONG-TERM CURRENT USE OF INSULIN (HCC): ICD-10-CM

## 2023-07-20 DIAGNOSIS — I10 PRIMARY HYPERTENSION: ICD-10-CM

## 2023-07-20 PROCEDURE — 99999 PR NO CHARGE: CPT | Performed by: FAMILY MEDICINE

## 2023-07-20 NOTE — PROGRESS NOTES
Assessment    Reached out to patient for monthly CCM follow up call. Per patient, she is feeling depressed since her  is currently at [facility] rehab recovering from a stroke. Per Shereen, he is still slurring his words but she is able to understand him now. This RN provided emotional support and reminded patient that we are here to support them both. We spoke about doing something she enjoys throughout the day to keep herself busy. Per patient, her kids have been a big help and the DME company finally came out to  her oxygen.    Education    N/A    Plan of Care and Goals    Choose heart healthy, low sodium low carb meal options      Decrease fall risk      Mobility and stability exercises as tolerated      Barriers:     in rehab     Progress:    Not progressing    Next outreach: 08/11/2023 @ 1000

## 2023-08-07 DIAGNOSIS — E66.9 DIABETES MELLITUS TYPE 2 IN OBESE: ICD-10-CM

## 2023-08-07 DIAGNOSIS — E11.69 DIABETES MELLITUS TYPE 2 IN OBESE: ICD-10-CM

## 2023-08-07 RX ORDER — GABAPENTIN 100 MG/1
100 CAPSULE ORAL 2 TIMES DAILY
Qty: 200 CAPSULE | Refills: 3 | Status: SHIPPED | OUTPATIENT
Start: 2023-08-07 | End: 2023-11-20 | Stop reason: SDUPTHER

## 2023-08-07 NOTE — TELEPHONE ENCOUNTER
Received request via: Pharmacy    Was the patient seen in the last year in this department? Yes  7/12/2023  Does the patient have an active prescription (recently filled or refills available) for medication(s) requested? No    Does the patient have residential Plus and need 100 day supply (blood pressure, diabetes and cholesterol meds only)? Yes, quantity updated to 100 days

## 2023-08-11 ENCOUNTER — PATIENT OUTREACH (OUTPATIENT)
Dept: HEALTH INFORMATION MANAGEMENT | Facility: OTHER | Age: 88
End: 2023-08-11
Payer: MEDICARE

## 2023-08-11 DIAGNOSIS — I10 PRIMARY HYPERTENSION: ICD-10-CM

## 2023-08-11 DIAGNOSIS — E11.9 TYPE 2 DIABETES MELLITUS WITHOUT COMPLICATION, WITHOUT LONG-TERM CURRENT USE OF INSULIN (HCC): ICD-10-CM

## 2023-08-11 PROCEDURE — 99490 CHRNC CARE MGMT STAFF 1ST 20: CPT | Performed by: FAMILY MEDICINE

## 2023-08-11 NOTE — PROGRESS NOTES
Assessment    Reached out to patient for monthly CCM follow up call. Per patient, she is doing well. She was resting since she just finished making their bed. Per patient, her children have been taking turns helping both herself and Nicer at home. She has been doing her best to exercise by walking around, but does get fatigued very quickly due to her heart valve. Her sugars were 112 and bp was 133/78 today. Patient's focus right now is her .    Education    N/A    Plan of Care and Goals    Choose heart healthy, low sodium low carb meal options      Decrease fall risk      Mobility and stability exercises as tolerated      Barriers:     had recent stroke     Progress:    Progressing     Next outreach: 09/13/2023 @ 1100

## 2023-08-18 ENCOUNTER — ANTICOAGULATION VISIT (OUTPATIENT)
Dept: MEDICAL GROUP | Facility: PHYSICIAN GROUP | Age: 88
End: 2023-08-18
Payer: MEDICARE

## 2023-08-18 DIAGNOSIS — I48.0 PAF (PAROXYSMAL ATRIAL FIBRILLATION) (HCC): ICD-10-CM

## 2023-08-18 DIAGNOSIS — I35.0 SEVERE AORTIC STENOSIS: ICD-10-CM

## 2023-08-18 DIAGNOSIS — Z79.01 CHRONIC ANTICOAGULATION: Primary | ICD-10-CM

## 2023-08-18 LAB — INR PPP: 2.5 (ref 2–3.5)

## 2023-08-18 PROCEDURE — 93793 ANTICOAG MGMT PT WARFARIN: CPT | Performed by: FAMILY MEDICINE

## 2023-08-18 PROCEDURE — 99999 PR NO CHARGE: CPT | Performed by: FAMILY MEDICINE

## 2023-08-18 PROCEDURE — 85610 PROTHROMBIN TIME: CPT | Performed by: FAMILY MEDICINE

## 2023-08-18 NOTE — PROGRESS NOTES
Anticoagulation Summary  As of 2023      INR goal:  2.0-3.0   TTR:  70.6 % (1.2 y)   INR used for dosin.50 (2023)   Warfarin maintenance plan:  3 mg (3 mg x 1) every Mon; 1.5 mg (3 mg x 0.5) all other days   Weekly warfarin total:  12 mg   Plan last modified:  Guru Baldwin, PharmD (2023)   Next INR check:  9/15/2023   Target end date:  Indefinite    Indications    Severe aortic stenosis [I35.0]  PAF (paroxysmal atrial fibrillation) (HCC) [I48.0]  Chronic anticoagulation [Z79.01]                 Anticoagulation Episode Summary       INR check location:      Preferred lab:      Send INR reminders to:      Comments:            Anticoagulation Care Providers       Provider Role Specialty Phone number    Ganesh Mckeon M.D. Referring Geriatric Medicine - Family Medicine [phone]    [facility] Anticoagulation Services Responsible  [phone]          Anticoagulation Patient Findings  Patient Findings       Positives:  Change in diet/appetite    Negatives:  Signs/symptoms of thrombosis, Signs/symptoms of bleeding, Laboratory test error suspected, Change in health, Change in alcohol use, Change in activity, Upcoming invasive procedure, Emergency department visit, Upcoming dental procedure, Missed doses, Extra doses, Change in medications, Hospital admission, Bruising, Other complaints                  HPI:   Shereen Starken Chito seen in clinic today, on anticoagulation therapy with warfarin due to history of PAF.    Patient's previous INR was therapeutic at 2.0 on 23, at which time patient was instructed to continue with current warfarin regimen.  She returns to clinic today to recheck INR to ensure it is therapeutic and thus preventing possible clotting and/or bleeding/bruising complications.    CHADS-VASc = at least 5  (unadjusted ischemic stroke risk/year:  7.2%, which is moderate risk)    Does patient have any changes to current medical/health status since last appt (Y/N):  NO  Does  patient have any signs/symptoms of bleeding and/or thrombosis since the last appt (Y/N):  NO  Does patient have any interval changes to diet or medications since last appt (Y/N):  A bit more spinach recently  Are there any complications or cost restrictions with current therapy (Y/N):  NO     Does patient have [facility] PCP? Yes, Ganesh Mckeon M.D. (If not, please document discussion that patient must be seen at [facility])       Vitals:  declined today    There were no vitals filed for this visit.     Asssessment:      INR therapeutic at 2.5, therefore decreasing stroke and/or bleeding risk.   Reason(s) for out of range INR today:  n/a      Pt is not on antiplatelet therapy    Medication reconciliation completed today.    Plan:  Pt is to continue with current warfarin dosing regimen.     Follow up:  Because warfarin is a high risk medication and current CHEST guidelines recommend regular monitoring intervals (few days up to 12 weeks), will have patient return to clinic in 4 weeks to recheck INR.    Brady Piña, PharmD, BCACP

## 2023-08-31 NOTE — ED NOTES
----- Message from JOSSIE Scott sent at 8/31/2023  1:21 PM CDT -----  Please let Shaunna know that her chlamydia/gonorrhea test came back invalid.  Inconclusive results can indicate possible low volume or interfering substance present.  The recommendation would be to submit a new sample.  She was leaving for college this week.  Would recommend that she check with her student health clinic for retesting.    ERP at bedside

## 2023-09-13 ENCOUNTER — PATIENT OUTREACH (OUTPATIENT)
Dept: HEALTH INFORMATION MANAGEMENT | Facility: OTHER | Age: 88
End: 2023-09-13
Payer: MEDICARE

## 2023-09-13 DIAGNOSIS — I10 PRIMARY HYPERTENSION: ICD-10-CM

## 2023-09-13 DIAGNOSIS — E11.9 TYPE 2 DIABETES MELLITUS WITHOUT COMPLICATION, WITHOUT LONG-TERM CURRENT USE OF INSULIN (HCC): ICD-10-CM

## 2023-09-13 PROCEDURE — 99999 PR NO CHARGE: CPT | Performed by: FAMILY MEDICINE

## 2023-09-13 NOTE — PROGRESS NOTES
Assessment    Reached out to patient for monthly CCM follow-up call.  Per patient she is doing okay.  She sees the Coumadin clinic this Friday, September 15.  She states her blood pressures have been quite low recently with today being 90/68.  She is not symptomatic with lightheaded or dizziness, but she does not take her blood pressure medication in the morning unless her systolic is above 100.  Per patient the low blood pressure has been consistent.  We will update PCP.    Per patient her blood sugars have been elevated around 1 25-1 30 due to her eating ice cream more frequently.  Patient is aware of what a diabetic diet consists of.  Patient does complain of low blood sugars in the afternoon.  Per patient she recently woke up in a sweat and her blood sugar was in the 80s.  We discussed making sure she eats enough protein in her diet to keep her sugars stabilized.    Education    Adding protein to stabilize sugars    Plan of Care and Goals    Choose heart healthy, low sodium low carb meal options      Decrease fall risk      Mobility and stability exercises as tolerated      Barriers:    Low blood pressure    Progress:    Not progressing    Next outreach: 10/12/2023 @ 1300

## 2023-09-15 ENCOUNTER — ANTICOAGULATION VISIT (OUTPATIENT)
Dept: MEDICAL GROUP | Facility: PHYSICIAN GROUP | Age: 88
End: 2023-09-15
Payer: MEDICARE

## 2023-09-15 DIAGNOSIS — Z79.01 CHRONIC ANTICOAGULATION: Primary | ICD-10-CM

## 2023-09-15 DIAGNOSIS — I35.0 SEVERE AORTIC STENOSIS: ICD-10-CM

## 2023-09-15 DIAGNOSIS — I48.0 PAF (PAROXYSMAL ATRIAL FIBRILLATION) (HCC): ICD-10-CM

## 2023-09-15 LAB — INR PPP: 2.7 (ref 2–3.5)

## 2023-09-15 PROCEDURE — 85610 PROTHROMBIN TIME: CPT | Performed by: FAMILY MEDICINE

## 2023-09-15 PROCEDURE — 99999 PR NO CHARGE: CPT | Performed by: FAMILY MEDICINE

## 2023-09-15 PROCEDURE — 93793 ANTICOAG MGMT PT WARFARIN: CPT | Performed by: FAMILY MEDICINE

## 2023-09-15 NOTE — PROGRESS NOTES
Anticoagulation Summary  As of 9/15/2023      INR goal:  2.0-3.0   TTR:  72.4 % (1.2 y)   INR used for dosin.70 (9/15/2023)   Warfarin maintenance plan:  3 mg (3 mg x 1) every Mon; 1.5 mg (3 mg x 0.5) all other days   Weekly warfarin total:  12 mg   Plan last modified:  Guru Baldwin, PharmD (2023)   Next INR check:  2023   Target end date:  Indefinite    Indications    Severe aortic stenosis [I35.0]  PAF (paroxysmal atrial fibrillation) (HCC) [I48.0]  Chronic anticoagulation [Z79.01]                 Anticoagulation Episode Summary       INR check location:      Preferred lab:      Send INR reminders to:      Comments:            Anticoagulation Care Providers       Provider Role Specialty Phone number    Ganesh Mckeon M.D. Referring Geriatric Medicine - Family Medicine [phone]    [facility] Anticoagulation Services Responsible  [phone]          Anticoagulation Patient Findings  Patient Findings       Negatives:  Signs/symptoms of thrombosis, Signs/symptoms of bleeding, Laboratory test error suspected, Change in health, Change in alcohol use, Change in activity, Upcoming invasive procedure, Emergency department visit, Upcoming dental procedure, Missed doses, Extra doses, Change in medications, Change in diet/appetite, Hospital admission, Bruising, Other complaints                  HPI:   Shereen Dale seen in clinic today, on anticoagulation therapy with warfarin due to history of PAF    Patient's previous INR was therapeutic at 2.5 on , at which time patient was instructed to continue warfarin regimen.  She returns to clinic today to recheck INR to ensure it is therapeutic and thus preventing possible clotting and/or bleeding/bruising complications.    CHADS-VASc = 5  (unadjusted ischemic stroke risk/year:  7.2, which is moderate risk)    Does patient have any changes to current medical/health status since last appt (Y/N):  No  Does patient have any signs/symptoms of  bleeding and/or thrombosis since the last appt (Y/N):  No  Does patient have any interval changes to diet or medications since last appt (Y/N):  No  Are there any complications or cost restrictions with current therapy (Y/N):  No     Does patient have [facility] PCP? Yes, Ganesh Mckeon M.D. (If not, please document discussion that patient must be seen at [facility])     There were no vitals filed for this visit.     Asssessment:      INR therapeutic at 2.7, therefore at decreased risk of bleeding and stroke    Reason(s) for out of range INR today:  n/a      Pt is not on antiplatelet therapy    Medication reconciliation completed today.    Plan:  Pt is to continue with current warfarin dosing regimen.     Follow up:  Because warfarin is a high risk medication and current CHEST guidelines recommend regular monitoring intervals (few days up to 12 weeks), will have patient return to clinic in 2 weeks to recheck INR.    Odette Mathew, Student Pharmacist  Brady Piña, PharmD, BCACP

## 2023-09-27 ENCOUNTER — TELEPHONE (OUTPATIENT)
Dept: HEALTH INFORMATION MANAGEMENT | Facility: OTHER | Age: 88
End: 2023-09-27
Payer: MEDICARE

## 2023-09-27 NOTE — TELEPHONE ENCOUNTER
Patient called stating her children that care for Shereen and Nick are going home to the [facility] for two week in December. At that time, no one will be able to care for Shereen and her , so they will be going to a Respite center for those two weeks. Patient is needing some forms filled out and a TB test done as soon as possible so they can reserve the room together. This RN to send message to PCP to see if an appointment is needed. Last PCP visit was 07/12/2023.

## 2023-09-29 ENCOUNTER — ANTICOAGULATION VISIT (OUTPATIENT)
Dept: MEDICAL GROUP | Facility: PHYSICIAN GROUP | Age: 88
End: 2023-09-29
Payer: MEDICARE

## 2023-09-29 DIAGNOSIS — I35.0 SEVERE AORTIC STENOSIS: ICD-10-CM

## 2023-09-29 DIAGNOSIS — I48.0 PAF (PAROXYSMAL ATRIAL FIBRILLATION) (HCC): ICD-10-CM

## 2023-09-29 DIAGNOSIS — Z79.01 CHRONIC ANTICOAGULATION: Primary | ICD-10-CM

## 2023-09-29 LAB — INR PPP: 2.7 (ref 2–3.5)

## 2023-09-29 PROCEDURE — 99999 PR NO CHARGE: CPT | Performed by: FAMILY MEDICINE

## 2023-09-29 PROCEDURE — 93793 ANTICOAG MGMT PT WARFARIN: CPT | Performed by: FAMILY MEDICINE

## 2023-09-29 PROCEDURE — 85610 PROTHROMBIN TIME: CPT | Performed by: FAMILY MEDICINE
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
## 2023-10-04 RX ORDER — METAXALONE 800 MG/1
TABLET ORAL
Qty: 20 TABLET | Refills: 0 | Status: SHIPPED | OUTPATIENT
Start: 2023-10-04 | End: 2023-11-10

## 2023-10-09 ENCOUNTER — OFFICE VISIT (OUTPATIENT)
Dept: MEDICAL GROUP | Facility: MEDICAL CENTER | Age: 88
End: 2023-10-09
Payer: MEDICARE

## 2023-10-09 ENCOUNTER — HOSPITAL ENCOUNTER (OUTPATIENT)
Dept: LAB | Facility: MEDICAL CENTER | Age: 88
End: 2023-10-09
Attending: FAMILY MEDICINE
Payer: MEDICARE

## 2023-10-09 VITALS
BODY MASS INDEX: 27.99 KG/M2 | DIASTOLIC BLOOD PRESSURE: 60 MMHG | RESPIRATION RATE: 16 BRPM | OXYGEN SATURATION: 94 % | WEIGHT: 152.12 LBS | SYSTOLIC BLOOD PRESSURE: 110 MMHG | HEIGHT: 62 IN | HEART RATE: 86 BPM | TEMPERATURE: 97.8 F

## 2023-10-09 DIAGNOSIS — Z02.2 ENCOUNTER FOR EXAMINATION FOR ADMISSION TO ASSISTED LIVING FACILITY: ICD-10-CM

## 2023-10-09 DIAGNOSIS — I35.0 SEVERE AORTIC STENOSIS: ICD-10-CM

## 2023-10-09 DIAGNOSIS — I10 PRIMARY HYPERTENSION: ICD-10-CM

## 2023-10-09 DIAGNOSIS — E66.9 DIABETES MELLITUS TYPE 2 IN OBESE: ICD-10-CM

## 2023-10-09 DIAGNOSIS — E11.69 DIABETES MELLITUS TYPE 2 IN OBESE: ICD-10-CM

## 2023-10-09 PROBLEM — J96.01 ACUTE RESPIRATORY FAILURE WITH HYPOXIA (HCC): Status: RESOLVED | Noted: 2023-05-26 | Resolved: 2023-10-09

## 2023-10-09 PROCEDURE — 86480 TB TEST CELL IMMUN MEASURE: CPT

## 2023-10-09 PROCEDURE — 3074F SYST BP LT 130 MM HG: CPT | Performed by: FAMILY MEDICINE

## 2023-10-09 PROCEDURE — 3078F DIAST BP <80 MM HG: CPT | Performed by: FAMILY MEDICINE

## 2023-10-09 PROCEDURE — 99214 OFFICE O/P EST MOD 30 MIN: CPT | Performed by: FAMILY MEDICINE

## 2023-10-09 PROCEDURE — 36415 COLL VENOUS BLD VENIPUNCTURE: CPT

## 2023-10-09 ASSESSMENT — FIBROSIS 4 INDEX: FIB4 SCORE: 2.31

## 2023-10-10 LAB
GAMMA INTERFERON BACKGROUND BLD IA-ACNC: 0.1 IU/ML
M TB IFN-G BLD-IMP: NEGATIVE
M TB IFN-G CD4+ BCKGRND COR BLD-ACNC: 0.01 IU/ML
MITOGEN IGNF BCKGRD COR BLD-ACNC: >10 IU/ML
QFT TB2 - NIL TBQ2: 0.01 IU/ML

## 2023-10-10 NOTE — PROGRESS NOTES
CC: Physical and history for assisted living admission (only temporary for few months while his daughter is out)     HPI:   Nick presents today for physical and history for assisted living admission.  Patient has multiple medical problems that has been stable(recent history of severe aortic stenosis refused replacement, able to walk independently with a walker, adequate controlled diabetes and blood pressure.  Patient will be admitted to assisted living temporary for few months because his daughter is struggling to the [facility].  Has been active and independent with most of his ADLs.  No history of dementia.    Patient Active Problem List    Diagnosis Date Noted    Reactive airway disease with wheezing, mild persistent, uncomplicated 05/26/2023    Pneumonia 05/26/2023    Strep throat 05/26/2023    Secondary hypercoagulable state ([facility]) 06/09/2022    Acquired hypothyroidism 09/12/2019    Diabetes mellitus type 2 in obese ([facility]) 09/12/2019    Skin abrasion 08/20/2019    Chronic anticoagulation 08/02/2019    Hyponatremia 03/25/2019    Epistaxis 03/25/2019    Advanced directives, counseling/discussion 09/26/2018    Hypertensive urgency 05/11/2017    Diastolic dysfunction 02/02/2016    Tear of lateral cartilage or meniscus of knee, current 05/21/2015    PAF (paroxysmal atrial fibrillation) ([facility]) 01/12/2015    Dyspnea on effort 01/02/2014    Diabetes ([facility]) 12/26/2013    Severe aortic stenosis 07/02/2013    HTN (hypertension) 05/04/2012    Hemorrhagic disorder due to extrinsic circulating anticoagulants ([facility]) 05/02/2012    Cough 05/02/2012    Edema 05/02/2012    Acute, but ill-defined, cerebrovascular disease 04/30/2012       Current Outpatient Medications   Medication Sig Dispense Refill    metaxalone (SKELAXIN) 800 MG Tab TAKE 0.5 TABLETS BY MOUTH 2 TIMES A DAY 20 Tablet 0    gabapentin (NEURONTIN) 100 MG Cap Take 1 Capsule by mouth 2 times a day. Take 1 capsule PO Daily in the AM, Take 2 capsules daily in the   Capsule 3    simvastatin (ZOCOR) 80 MG tablet       amLODIPine (NORVASC) 2.5 MG Tab       amLODIPine (NORVASC) 5 MG Tab Take 1 Tablet by mouth every day.      azithromycin (ZITHROMAX) 250 MG Tab       azithromycin (ZITHROMAX) 500 MG tablet       cefUROXime (CEFTIN) 250 MG Tab       cloNIDine (CATAPRES) 0.1 MG Tab Take 1 tablet twice a day by oral route as needed.      famotidine (PEPCID) 20 MG Tab TAKE 1 TABLET BY MOUTH TWICE DAILY**GENERIC FOR PEPCID*      fluconazole (DIFLUCAN) 150 MG tablet       gabapentin (NEURONTIN) 300 MG Cap Take 1 capsule 3 times a day by oral route.      HYDROcodone-acetaminophen (NORCO) 5-325 MG Tab per tablet Take 1 tablet 3 times a day by oral route as needed.      levothyroxine (SYNTHROID) 50 MCG Tab Take 1 Tablet by mouth every day.      metFORMIN (GLUCOPHAGE) 500 MG Tab Take 1 Tablet by mouth 3 times a day.      nystatin (NYSTOP) powder APPLY TO THE AFFECTED AREA(S) BY TOPICAL ROUTE 2 TIMES PER DAY      ofloxacin (OCUFLOX) 0.3 % Solution       oseltamivir (TAMIFLU) 75 MG Cap Take 1 capsule every day by oral route.      potassium chloride SA (KDUR) 20 MEQ Tab CR Take 1 Tablet by mouth every day.      prednisoLONE acetate (PRED FORTE) 1 % Suspension       sulfamethoxazole-trimethoprim (BACTRIM DS) 800-160 MG tablet       telmisartan (MICARDIS) 40 MG Tab Take 1 Tablet by mouth every day.      tizanidine (ZANAFLEX) 2 MG tablet Take 1 tablet every 8 hours by oral route as needed.      tizanidine (ZANAFLEX) 4 MG Tab       valacyclovir (VALTREX) 1 GM Tab Take 1 tablet every 12 hours by oral route.      warfarin (JANTOVEN) 3 MG Tab Take 2 Tablets by mouth every day.      Zoster Vac Recomb Adjuvanted (SHINGRIX) 50 MCG/0.5ML Recon Susp       carvedilol (COREG) 25 MG Tab       glipiZIDE (GLUCOTROL) 5 MG Tab TAKE ONE TABLET BY MOUTH EVERY DAY IF NEEDED      metFORMIN (GLUCOPHAGE) 500 MG Tab TAKE ONE TABLET BY MOUTH THREE TIMES A  Tablet 0    glipiZIDE SR (GLUCOTROL) 2.5 MG TABLET SR 24 HR  "Take 1 Tablet by mouth every day. 100 Tablet 2    warfarin (COUMADIN) 3 MG Tab Take one-half to one tablet by mouth daily or as directed by anticoagulation clinic 90 Tablet 3    levalbuterol (XOPENEX) 1.25 MG/3ML Nebu Soln Take 3 mL by nebulization every 6 hours as needed for Shortness of Breath (whezing). 24 mL 0    Cholecalciferol (VITAMIN D3) 2000 UNIT Cap Take 2,000 Units by mouth every day.      gabapentin (NEURONTIN) 100 MG Cap Take 100-200 mg by mouth 2 times a day. Take 1 capsule (100 mg) in the morning and 2 capsules (200 mg) in the evening.      levothyroxine (SYNTHROID) 50 MCG Tab Take 50 mcg by mouth every morning on an empty stomach.      amLODIPine (NORVASC) 5 MG Tab Take 1 Tablet by mouth every day for 200 days. Take 5mg PO daily in AM. 100 Tablet 2    carvedilol (COREG) 12.5 MG Tab Take 1 Tablet by mouth 2 times a day for  Tablet 3    omeprazole (PRILOSEC) 20 MG delayed-release capsule Take 1 Capsule by mouth 2 times a day. 200 Capsule 1    Misc. Devices Misc Oxygen concentrator with humidifier, 2.5-3 L/min 1 Each 0    dorzolamide-timolol (COSOPT) 22.3-6.8 MG/ML Solution Administer 1 Drop into both eyes 2 times a day. Indications: Wide-Angle Glaucoma       No current facility-administered medications for this visit.         Allergies as of 10/09/2023 - Reviewed 07/12/2023   Allergen Reaction Noted    Darvon [propoxyphene hcl] Anaphylaxis 03/18/2008    Iodine Anaphylaxis 05/11/2015    Tetanus antitoxin Anaphylaxis 11/19/2017    Tetracycline Anaphylaxis 08/02/2008    Latex Unspecified 09/19/2013    Penicillins Rash 08/02/2008    Kdc:red dye+acetaminophen+propoxyphene  07/12/2023        ROS: Denies any chest pain, Shortness of breath, Changes bowel or bladder, Lower extremity edema.    Physical Exam:  /60 (BP Location: Right arm, Patient Position: Sitting, BP Cuff Size: Adult)   Pulse 86   Temp 36.6 °C (97.8 °F) (Temporal)   Resp 16   Ht 1.575 m (5' 2\")   Wt 69 kg (152 lb 1.9 oz)   LMP " 01/01/1985   SpO2 94%   BMI 27.82 kg/m²   Gen.: Well-developed, well-nourished, no apparent distress,pleasant and cooperative with the examination  Skin:  Warm and dry with good turgor. No rashes or suspicious lesions in visible areas  HEENT:Sinuses nontender with palpation, TMs clear, nares patent with pink mucosa and clear rhinorrhea,no septal deviation ,polyps or lesions. lips without lesions, oropharynx clear.  Neck: Trachea midline,no masses or adenopathy. No JVD.  Cor: Regular rate and rhythm without murmur, gallop or rub.  Lungs: Respirations unlabored.Clear to auscultation with equal breath sounds bilaterally. No wheezes, rhonchi.  Extremities: No cyanosis, clubbing or edema.          Assessment and Plan.   87 y.o. female     1. Encounter for examination for admission to assisted living facility    - Quantiferon Gold TB (PPD); Future    2. Diabetes mellitus type 2 in obese ([facility])  Blood glucose has been controlled.  Last A1c was 6.8.  Denies polyuria polydipsia.  Currently on glipizide SR 2.5 mg daily, metformin 1500 mg daily.  No hypoglycemic episodes.     3. Primary hypertension  Blood pressure has been adequately controlled on amlodipine 2.5 mg daily, carvedilol 12.5 mg twice daily,  spironolactone 25 mg daily.  No side effect     4. Severe aortic stenosis  Symptomatic, has been having shortness of breath with exertion.  Refused replacement    5. PAF (paroxysmal atrial fibrillation) ([facility])/ Secondary hypercoagulable state([facility])  Denies palpitation, chest pain, and shortness of breath.  Patient is currently on warfarin and carvedilol.  No history of stroke

## 2023-10-12 ENCOUNTER — PATIENT OUTREACH (OUTPATIENT)
Dept: HEALTH INFORMATION MANAGEMENT | Facility: OTHER | Age: 88
End: 2023-10-12
Payer: MEDICARE

## 2023-10-12 DIAGNOSIS — I10 PRIMARY HYPERTENSION: ICD-10-CM

## 2023-10-12 DIAGNOSIS — E11.9 TYPE 2 DIABETES MELLITUS WITHOUT COMPLICATION, WITHOUT LONG-TERM CURRENT USE OF INSULIN (HCC): ICD-10-CM

## 2023-10-12 PROCEDURE — 99490 CHRNC CARE MGMT STAFF 1ST 20: CPT | Performed by: FAMILY MEDICINE

## 2023-10-12 NOTE — PROGRESS NOTES
Assessment    Reach out to patient for monthly CCM follow-up call.  Per patient, she is doing well.  She states she continues to be easily fatigued due to her heart valve, so this RN encouraged patient to rest when needed.  She was recently seen by Dr. Obregon to have paperwork filled out for assisted  living.  Her and her  will be spending a couple of weeks there while their children travel to the [facility].  She states her blood sugars have been a little elevated since she has been eating ice cream.  Once again, patient is aware of the complications of elevated blood sugar.  Her previous A1c was 6.8%.  She states her blood pressure is stable now and is not dropping as low.  Patient had no other needs or questions at the end of phone call.    Education    Resting when needed    Plan of Care and Goals    Choose heart healthy, low sodium low carb meal options      Decrease fall risk      Mobility and stability exercises as tolerated         Barriers:    Patient not interested in adhering to a strict diabetic diet at this time    Progress:    At baseline    Next outreach: 11/06/2023 @ 1600

## 2023-10-25 ENCOUNTER — ANTICOAGULATION VISIT (OUTPATIENT)
Dept: MEDICAL GROUP | Facility: PHYSICIAN GROUP | Age: 88
End: 2023-10-25
Payer: MEDICARE

## 2023-10-25 DIAGNOSIS — I48.0 PAF (PAROXYSMAL ATRIAL FIBRILLATION) (HCC): ICD-10-CM

## 2023-10-25 DIAGNOSIS — I35.0 SEVERE AORTIC STENOSIS: ICD-10-CM

## 2023-10-25 DIAGNOSIS — Z79.01 CHRONIC ANTICOAGULATION: Primary | ICD-10-CM

## 2023-10-25 LAB — INR PPP: 3.3 (ref 2–3.5)

## 2023-10-25 PROCEDURE — 99211 OFF/OP EST MAY X REQ PHY/QHP: CPT | Performed by: INTERNAL MEDICINE

## 2023-10-25 PROCEDURE — 85610 PROTHROMBIN TIME: CPT | Performed by: INTERNAL MEDICINE

## 2023-10-25 NOTE — PROGRESS NOTES
Anticoagulation Summary  As of 10/25/2023      INR goal:  2.0-3.0   TTR:  72.0 % (1.3 y)   INR used for dosing:  3.30 (10/25/2023)   Warfarin maintenance plan:  1.5 mg (3 mg x 0.5) every day   Weekly warfarin total:  10.5 mg   Plan last modified:  Brady Piña, PharmD (10/25/2023)   Next INR check:  11/17/2023   Target end date:  Indefinite    Indications    Severe aortic stenosis [I35.0]  PAF (paroxysmal atrial fibrillation) (HCC) [I48.0]  Chronic anticoagulation [Z79.01]                 Anticoagulation Episode Summary       INR check location:      Preferred lab:      Send INR reminders to:      Comments:            Anticoagulation Care Providers       Provider Role Specialty Phone number    Ganesh Mckeon M.D. Referring Geriatric Medicine - Family Medicine [phone]    [facility] Responsible  [phone]          Anticoagulation Patient Findings  Patient Findings       Positives:  Signs/symptoms of bleeding (occasional bloody nose, nothing lasts longer than 5 minutes), Upcoming dental procedure (Noninvasive dental procedure to denture replacement), Change in diet/appetite    Negatives:  Signs/symptoms of thrombosis, Laboratory test error suspected, Change in health, Change in alcohol use, Change in activity, Upcoming invasive procedure, Emergency department visit, Missed doses, Extra doses, Change in medications, Hospital admission, Bruising, Other complaints                  HPI:   Shereen Dale seen in clinic today, on anticoagulation therapy with warfarin due to history of PAF     Patient's previous INR was therapeutic at 2.70 on 09/29/23, at which time patient was instructed to continue warfarin regimen.  She returns to clinic today to recheck INR to ensure it is therapeutic and thus preventing possible clotting and/or bleeding/bruising complications.     CHADS-VASc = 5  (unadjusted ischemic stroke risk/year:  7.2, which is moderate risk)     Does patient have any  changes to current medical/health status since last appt (Y/N):  No  Does patient have any signs/symptoms of bleeding and/or thrombosis since the last appt (Y/N):  No  Does patient have any interval changes to diet or medications since last appt (Y/N):  No  Are there any complications or cost restrictions with current therapy (Y/N):  No      Does patient have [facility] PCP? Yes, Ganesh Mckeon M.D. (If not, please document discussion that patient must be seen at [facility])       Vitals:  declined    There were no vitals filed for this visit.     Asssessment:      INR supra therapeutic at 3.30, therefore Bleed risk, and thrombsis    Reason(s) for out of range INR today:  Dose to high      Pt is not on antiplatelet therapy    Medication reconciliation completed today.    Plan:  Patient to hold today's warfarin and decrease weekly warfarin as detailed above. Patient given warfarin dosing calendar for on hand reference.      Follow up:  Because warfarin is a high risk medication and current CHEST guidelines recommend regular monitoring intervals (few days up to 12 weeks), will have patient return to clinic in 3.5 weeks per patient preference to recheck INR.    Seng Stark, Pharmacy Intern    Brady Piña, PharmD

## 2023-10-29 NOTE — TELEPHONE ENCOUNTER
Called pt to FU. Notes that she just rechecked her BP and it is down to 127/73, pt took AM BP meds at 0800. Reassured her to make sure she is staying hydrated due to heat. Educated her on BP ranges and pt is appreciative of information. She is to FU if BP becomes elevated or if she develops any dizziness, headache, or lightheadedness. Pt states understanding and denies further needs.     ----- Message from Rianna Finn sent at 7/17/2018  8:23 AM PDT -----  Regarding: b/p 127/102 irregular h/r  Contact: [phone]  GAVI/say    Pt calling to report b/p just now is 127/102, h/r 66 and irregular, pt is concerned.  Please call Shereen vieira to advise.  [phone]     No

## 2023-11-06 ENCOUNTER — PATIENT OUTREACH (OUTPATIENT)
Dept: HEALTH INFORMATION MANAGEMENT | Facility: OTHER | Age: 88
End: 2023-11-06
Payer: MEDICARE

## 2023-11-06 DIAGNOSIS — E11.9 TYPE 2 DIABETES MELLITUS WITHOUT COMPLICATION, WITHOUT LONG-TERM CURRENT USE OF INSULIN (HCC): ICD-10-CM

## 2023-11-06 DIAGNOSIS — I10 PRIMARY HYPERTENSION: ICD-10-CM

## 2023-11-06 PROCEDURE — 99999 PR NO CHARGE: CPT | Performed by: FAMILY MEDICINE

## 2023-11-06 NOTE — PROGRESS NOTES
11/06/2023:     1439: Attempt x1 to reach patient for monthly CCM follow up call. No answer. LVM with contact information.      11/14/2023:     Assessment    Reach out to patient for last CCM follow-up call.  Patient has been quite stable throughout this program.  Patient's sugars are stable below 130 and her blood pressure is on the lower end of normal.  Patient informed that our community health worker Ervin will be reaching out the next few months for monthly phone calls and if patient has any questions she can always call this RN.    Education    Graduation    Plan of Care and Goals    Completed    Barriers:    None at this time    Progress:    Graduated

## 2023-11-10 RX ORDER — METAXALONE 800 MG/1
TABLET ORAL
Qty: 20 TABLET | Refills: 0 | Status: SHIPPED | OUTPATIENT
Start: 2023-11-10 | End: 2023-12-19

## 2023-11-15 ENCOUNTER — PATIENT OUTREACH (OUTPATIENT)
Dept: HEALTH INFORMATION MANAGEMENT | Facility: OTHER | Age: 88
End: 2023-11-15
Payer: MEDICARE

## 2023-11-15 NOTE — PROGRESS NOTES
Community Health Worker Follow-Up    Reason for outreach: CHW called the pt as requested by the CCM nurse for follow up with GCM.    CHW Interventions: CHW and the pt discussed the GCM program abnd the pt and her  will be enrolled with this CHW for follow up.    Specific Resources Provided:  Housing/Shelter: n./a  Transportation: n/a  Food: n/a  Financial: n/a  Social Supports: n/a  Other: n/a    Plan: CHW will follow up in 3 weeks.

## 2023-11-17 ENCOUNTER — ANTICOAGULATION VISIT (OUTPATIENT)
Dept: MEDICAL GROUP | Facility: PHYSICIAN GROUP | Age: 88
End: 2023-11-17
Payer: MEDICARE

## 2023-11-17 DIAGNOSIS — I35.0 SEVERE AORTIC STENOSIS: ICD-10-CM

## 2023-11-17 DIAGNOSIS — Z79.01 CHRONIC ANTICOAGULATION: Primary | ICD-10-CM

## 2023-11-17 DIAGNOSIS — I48.0 PAF (PAROXYSMAL ATRIAL FIBRILLATION) (HCC): ICD-10-CM

## 2023-11-17 LAB — INR PPP: 1.6 (ref 2–3.5)

## 2023-11-17 PROCEDURE — 85610 PROTHROMBIN TIME: CPT | Performed by: FAMILY MEDICINE

## 2023-11-17 PROCEDURE — 99211 OFF/OP EST MAY X REQ PHY/QHP: CPT | Performed by: FAMILY MEDICINE

## 2023-11-17 NOTE — PROGRESS NOTES
Anticoagulation Summary  As of 2023      INR goal:  2.0-3.0   TTR:  71.4 % (1.4 y)   INR used for dosin.60 (2023)   Warfarin maintenance plan:  3 mg (3 mg x 1) every Fri; 1.5 mg (3 mg x 0.5) all other days   Weekly warfarin total:  12 mg   Plan last modified:  Brady Piña, PharmD (2023)   Next INR check:  12/15/2023   Target end date:  Indefinite    Indications    Severe aortic stenosis [I35.0]  PAF (paroxysmal atrial fibrillation) (HCC) [I48.0]  Chronic anticoagulation [Z79.01]                 Anticoagulation Episode Summary       INR check location:      Preferred lab:      Send INR reminders to:      Comments:            Anticoagulation Care Providers       Provider Role Specialty Phone number    Ganesh Mckeon M.D. Referring Geriatric Medicine - Family Medicine [phone]    [facility] Responsible  [phone]          Anticoagulation Patient Findings  Patient Findings       Negatives:  Signs/symptoms of thrombosis, Signs/symptoms of bleeding, Laboratory test error suspected, Change in health, Change in alcohol use, Change in activity, Upcoming invasive procedure, Emergency department visit, Upcoming dental procedure, Missed doses, Extra doses, Change in medications, Change in diet/appetite, Hospital admission, Bruising, Other complaints                  HPI:   Shereen Starken Chito seen in clinic today, on anticoagulation therapy with warfarin due to history of PAF     Patient's previous INR was supratherapeutic at 3.3 on 10-25-23, at which time patient was instructed to decrease weekly warfarin regimen.  She returns to clinic today to recheck INR to ensure it is therapeutic and thus preventing possible clotting and/or bleeding/bruising complications.     CHADS-VASc = 5  (unadjusted ischemic stroke risk/year:  7.2, which is moderate risk)     Does patient have any changes to current medical/health status since last appt (Y/N):  No  Does patient have any  signs/symptoms of bleeding and/or thrombosis since the last appt (Y/N):  No  Does patient have any interval changes to diet or medications since last appt (Y/N):  No  Are there any complications or cost restrictions with current therapy (Y/N):  No     Does patient have [facility] PCP? Yes, Ganesh Mckeon M.D. (If not, please document discussion that patient must be seen at [facility])       Vitals:  declined today    There were no vitals filed for this visit.     Asssessment:      INR subtherapeutic at 1.6, therefore increasing stroke risk.   Reason(s) for out of range INR today:  dose too low      Pt is not on antiplatelet therapy    Medication reconciliation completed today.    Plan:  Instructed patient to increase weekly warfarin regimen as detailed above.     Follow up:  Because warfarin is a high risk medication and current CHEST guidelines recommend regular monitoring intervals (few days up to 12 weeks), will have patient return to clinic in 4 weeks to recheck INR.    Brady Piña, PharmD, BCACP

## 2023-11-20 ENCOUNTER — OFFICE VISIT (OUTPATIENT)
Dept: MEDICAL GROUP | Facility: MEDICAL CENTER | Age: 88
End: 2023-11-20
Payer: MEDICARE

## 2023-11-20 VITALS
BODY MASS INDEX: 27.6 KG/M2 | HEART RATE: 79 BPM | TEMPERATURE: 97.7 F | SYSTOLIC BLOOD PRESSURE: 112 MMHG | RESPIRATION RATE: 20 BRPM | WEIGHT: 150 LBS | DIASTOLIC BLOOD PRESSURE: 80 MMHG | HEIGHT: 62 IN | OXYGEN SATURATION: 96 %

## 2023-11-20 DIAGNOSIS — E11.69 DIABETES MELLITUS TYPE 2 IN OBESE: ICD-10-CM

## 2023-11-20 DIAGNOSIS — I48.0 PAF (PAROXYSMAL ATRIAL FIBRILLATION) (HCC): ICD-10-CM

## 2023-11-20 DIAGNOSIS — D68.69 SECONDARY HYPERCOAGULABLE STATE (HCC): ICD-10-CM

## 2023-11-20 DIAGNOSIS — E66.9 DIABETES MELLITUS TYPE 2 IN OBESE: ICD-10-CM

## 2023-11-20 DIAGNOSIS — I35.0 SEVERE AORTIC STENOSIS: ICD-10-CM

## 2023-11-20 DIAGNOSIS — I10 PRIMARY HYPERTENSION: ICD-10-CM

## 2023-11-20 LAB
HBA1C MFR BLD: 6.7 % (ref ?–5.8)
POCT INT CON NEG: NEGATIVE
POCT INT CON POS: POSITIVE

## 2023-11-20 PROCEDURE — 3074F SYST BP LT 130 MM HG: CPT | Performed by: FAMILY MEDICINE

## 2023-11-20 PROCEDURE — 3079F DIAST BP 80-89 MM HG: CPT | Performed by: FAMILY MEDICINE

## 2023-11-20 PROCEDURE — 83036 HEMOGLOBIN GLYCOSYLATED A1C: CPT | Performed by: FAMILY MEDICINE

## 2023-11-20 PROCEDURE — 99214 OFFICE O/P EST MOD 30 MIN: CPT | Performed by: FAMILY MEDICINE

## 2023-11-20 RX ORDER — GABAPENTIN 100 MG/1
100 CAPSULE ORAL 2 TIMES DAILY
Qty: 360 CAPSULE | Refills: 3 | Status: SHIPPED | OUTPATIENT
Start: 2023-11-20

## 2023-11-20 RX ORDER — GABAPENTIN 100 MG/1
CAPSULE ORAL
Qty: 360 CAPSULE | Refills: 2 | Status: CANCELLED | OUTPATIENT
Start: 2023-11-20

## 2023-11-20 ASSESSMENT — FIBROSIS 4 INDEX: FIB4 SCORE: 2.31

## 2023-11-20 NOTE — PROGRESS NOTES
CC: Severe aortic stenosis, diabetes, hypertension, A-fib, secondary hypercoagulable state, hypothyroidism    HPI:   Shereen presents today to discuss the following:    Severe aortic stenosis  Patient has been having shortness of breath with exertion.  However she denies any syncopal episodes or dizziness.  She is a candidate for TAVR procedure as per cardiology, however patient refused to have the procedure.      Diabetes mellitus type 2 in obese ([facility])  A1c today 6.7, it was 7.0 last visit.  Denies polyuria polydipsia.  Currently on metformin 1000 mg daily, and Glipizide 2.5 mg daily.  Reported hypoglycemic episodes.    Primary hypertension  Blood pressure has been adequately controlled on amlodipine 7.5 mg daily, carvedilol 12.5 mg twice daily,  spironolactone 25 mg daily.  No side effect     PAF (paroxysmal atrial fibrillation) ([facility])/ Secondary hypercoagulable state([facility])  Denies palpitation, chest pain, and shortness of breath.  Patient is currently on warfarin and carvedilol.  No history of stroke     Acquired hypothyroidism  She has been tolerating Levothyroxine, no palpitation, no cold or heat intolerance, she has been on levothyroxine 50 mcg daily         Patient Active Problem List    Diagnosis Date Noted    Reactive airway disease with wheezing, mild persistent, uncomplicated 05/26/2023    Pneumonia 05/26/2023    Strep throat 05/26/2023    Secondary hypercoagulable state ([facility]) 06/09/2022    Acquired hypothyroidism 09/12/2019    Diabetes mellitus type 2 in obese ([facility]) 09/12/2019    Skin abrasion 08/20/2019    Chronic anticoagulation 08/02/2019    Hyponatremia 03/25/2019    Epistaxis 03/25/2019    Advanced directives, counseling/discussion 09/26/2018    Hypertensive urgency 05/11/2017    Diastolic dysfunction 02/02/2016    Tear of lateral cartilage or meniscus of knee, current 05/21/2015    PAF (paroxysmal atrial fibrillation) ([facility]) 01/12/2015    Dyspnea on effort 01/02/2014    Diabetes ([facility]) 12/26/2013     Severe aortic stenosis 07/02/2013    HTN (hypertension) 05/04/2012    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 Take 1 capsule PO Daily in the AM, Take 2 capsules daily in the  Capsule 3    amLODIPine (NORVASC) 2.5 MG Tab       amLODIPine (NORVASC) 5 MG Tab Take 1 Tablet by mouth every day.      famotidine (PEPCID) 20 MG Tab TAKE 1 TABLET BY MOUTH TWICE DAILY**GENERIC FOR PEPCID*      levothyroxine (SYNTHROID) 50 MCG Tab Take 1 Tablet by mouth every day.      metFORMIN (GLUCOPHAGE) 500 MG Tab Take 1 Tablet by mouth 3 times a day.      carvedilol (COREG) 25 MG Tab       glipiZIDE (GLUCOTROL) 5 MG Tab TAKE ONE TABLET BY MOUTH EVERY DAY IF NEEDED      metFORMIN (GLUCOPHAGE) 500 MG Tab TAKE ONE TABLET BY MOUTH THREE TIMES A  Tablet 0    glipiZIDE SR (GLUCOTROL) 2.5 MG TABLET SR 24 HR Take 1 Tablet by mouth every day. 100 Tablet 2    Cholecalciferol (VITAMIN D3) 2000 UNIT Cap Take 2,000 Units by mouth every day.      gabapentin (NEURONTIN) 100 MG Cap Take 100-200 mg by mouth 2 times a day. Take 1 capsule (100 mg) in the morning and 2 capsules (200 mg) in the evening.      levothyroxine (SYNTHROID) 50 MCG Tab Take 50 mcg by mouth every morning on an empty stomach.      amLODIPine (NORVASC) 5 MG Tab Take 1 Tablet by mouth every day for 200 days. Take 5mg PO daily in AM. 100 Tablet 2    carvedilol (COREG) 12.5 MG Tab Take 1 Tablet by mouth 2 times a day for  Tablet 3    omeprazole (PRILOSEC) 20 MG delayed-release capsule Take 1 Capsule by mouth 2 times a day. 200 Capsule 1    Misc. Devices Misc Oxygen concentrator with humidifier,  2.5-3 L/min 1 Each 0    dorzolamide-timolol (COSOPT) 22.3-6.8 MG/ML Solution Administer 1 Drop into both eyes 2 times a day. Indications: Wide-Angle Glaucoma      simvastatin (ZOCOR) 80 MG tablet       azithromycin (ZITHROMAX) 250 MG Tab  (Patient not taking: Reported on 11/20/2023)      azithromycin (ZITHROMAX) 500 MG tablet  (Patient not taking: Reported on 11/20/2023)      cefUROXime (CEFTIN) 250 MG Tab  (Patient not taking: Reported on 11/20/2023)      cloNIDine (CATAPRES) 0.1 MG Tab Take 1 tablet twice a day by oral route as needed. (Patient not taking: Reported on 11/20/2023)      fluconazole (DIFLUCAN) 150 MG tablet  (Patient not taking: Reported on 11/20/2023)      gabapentin (NEURONTIN) 300 MG Cap Take 1 capsule 3 times a day by oral route. (Patient not taking: Reported on 11/20/2023)      HYDROcodone-acetaminophen (NORCO) 5-325 MG Tab per tablet Take 1 tablet 3 times a day by oral route as needed. (Patient not taking: Reported on 11/20/2023)      nystatin (NYSTOP) powder APPLY TO THE AFFECTED AREA(S) BY TOPICAL ROUTE 2 TIMES PER DAY (Patient not taking: Reported on 11/20/2023)      ofloxacin (OCUFLOX) 0.3 % Solution  (Patient not taking: Reported on 11/20/2023)      oseltamivir (TAMIFLU) 75 MG Cap Take 1 capsule every day by oral route. (Patient not taking: Reported on 11/20/2023)      potassium chloride SA (KDUR) 20 MEQ Tab CR Take 1 Tablet by mouth every day. (Patient not taking: Reported on 11/20/2023)      prednisoLONE acetate (PRED FORTE) 1 % Suspension  (Patient not taking: Reported on 11/20/2023)      sulfamethoxazole-trimethoprim (BACTRIM DS) 800-160 MG tablet  (Patient not taking: Reported on 11/20/2023)      telmisartan (MICARDIS) 40 MG Tab Take 1 Tablet by mouth every day. (Patient not taking: Reported on 11/20/2023)      tizanidine (ZANAFLEX) 2 MG tablet Take 1 tablet every 8 hours by oral route as needed. (Patient not taking: Reported on 11/20/2023)      tizanidine (ZANAFLEX) 4 MG Tab   "(Patient not taking: Reported on 11/20/2023)      valacyclovir (VALTREX) 1 GM Tab Take 1 tablet every 12 hours by oral route. (Patient not taking: Reported on 11/20/2023)      warfarin (JANTOVEN) 3 MG Tab Take 2 Tablets by mouth every day. (Patient not taking: Reported on 11/20/2023)      Zoster Vac Recomb Adjuvanted (SHINGRIX) 50 MCG/0.5ML Recon Susp  (Patient not taking: Reported on 11/20/2023)      warfarin (COUMADIN) 3 MG Tab Take one-half to one tablet by mouth daily or as directed by anticoagulation clinic (Patient not taking: Reported on 11/20/2023) 90 Tablet 3    levalbuterol (XOPENEX) 1.25 MG/3ML Nebu Soln Take 3 mL by nebulization every 6 hours as needed for Shortness of Breath (whezing). (Patient not taking: Reported on 11/20/2023) 24 mL 0     No current facility-administered medications for this visit.         Allergies as of 11/20/2023 - Reviewed 11/20/2023   Allergen Reaction Noted    Darvon [propoxyphene hcl] Anaphylaxis 03/18/2008    Iodine Anaphylaxis 05/11/2015    Tetanus antitoxin Anaphylaxis 11/19/2017    Tetracycline Anaphylaxis 08/02/2008    Latex Unspecified 09/19/2013    Penicillins Rash 08/02/2008    Kdc:red dye+acetaminophen+propoxyphene  07/12/2023        ROS: Denies any chest pain, Shortness of breath, Changes bowel or bladder, Lower extremity edema.    Physical Exam:  /80   Pulse 79   Temp 36.5 °C (97.7 °F) (Temporal)   Resp 20   Ht 1.575 m (5' 2\")   Wt 68 kg (150 lb)   LMP 01/01/1985   SpO2 96%   BMI 27.44 kg/m²   Gen.: Well-developed, well-nourished, no apparent distress,pleasant and cooperative with the examination  Skin:  Warm and dry with good turgor. No rashes or suspicious lesions in visible areas  HEENT:Sinuses nontender with palpation, TMs clear, nares patent with pink mucosa and clear rhinorrhea,no septal deviation ,polyps or lesions. lips without lesions, oropharynx clear.  Neck: Trachea midline,no masses or adenopathy. No JVD.  Cor: Regular rate and rhythm " without murmur, gallop or rub.  Lungs: Respirations unlabored.Clear to auscultation with equal breath sounds bilaterally. No wheezes, rhonchi.  Extremities: No cyanosis, clubbing or edema.    Monofilament foot exam: Normal sensation bilaterally, no macerations or ulcers, normal peripheral pulses.    Assessment and Plan.   87 y.o. female     1. Severe aortic stenosis  Patient has been having shortness of breath with exertion.  However she denies any syncopal episodes or dizziness.  She is a candidate for TAVR procedure as per cardiology, however patient refused to have the procedure.      2. Diabetes mellitus type 2 in obese ([facility])  A1c today is 6.7%.  Reported hypoglycemic episodes.  Advised to increase metformin to 100 stop 1500 mg daily, and Glipizide 2.5 mg .    3. Primary hypertension  Controlled.  Continue on amlodipine 7.5 mg daily, carvedilol 12.5 mg twice daily,  spironolactone 25 mg daily.     4. PAF (paroxysmal atrial fibrillation) ([facility])  Stable.  No RVR  Continue carvedilol 25 mg twice a day, and warfarin    5. Secondary hypercoagulable state ([facility])  Due to A-fib.  Continue on warfarin    6. Acquired hypothyroidism  She has been tolerating Levothyroxine, no palpitation, no cold or heat intolerance,  Continue on levothyroxine 50 mcg daily

## 2023-12-08 ENCOUNTER — PATIENT OUTREACH (OUTPATIENT)
Dept: HEALTH INFORMATION MANAGEMENT | Facility: OTHER | Age: 88
End: 2023-12-08
Payer: MEDICARE

## 2023-12-08 NOTE — PROGRESS NOTES
Community Health Worker Follow-Up    Reason for outreach: CHW called the pt to follow up on GCM.    CHW Interventions: Pt stated she is doing well. Pt stated she has no needs at this time. Pt always kind and thanked me for the call.    Specific Resources Provided:  Housing/Shelter: n/a  Transportation: n/a  Food: n/a  Financial: n/a  Social Supports: n/a  Other: n/a    Plan: CHW will follow up in one month and discuss discharge.

## 2023-12-15 ENCOUNTER — ANTICOAGULATION VISIT (OUTPATIENT)
Dept: MEDICAL GROUP | Facility: PHYSICIAN GROUP | Age: 88
End: 2023-12-15
Payer: MEDICARE

## 2023-12-15 DIAGNOSIS — I48.0 PAF (PAROXYSMAL ATRIAL FIBRILLATION) (HCC): ICD-10-CM

## 2023-12-15 DIAGNOSIS — Z79.01 CHRONIC ANTICOAGULATION: Primary | ICD-10-CM

## 2023-12-15 DIAGNOSIS — I35.0 SEVERE AORTIC STENOSIS: ICD-10-CM

## 2023-12-15 LAB — INR PPP: 2.5 (ref 2–3.5)

## 2023-12-15 PROCEDURE — 99999 PR NO CHARGE: CPT | Performed by: INTERNAL MEDICINE

## 2023-12-15 PROCEDURE — 85610 PROTHROMBIN TIME: CPT | Performed by: INTERNAL MEDICINE

## 2023-12-15 PROCEDURE — 93793 ANTICOAG MGMT PT WARFARIN: CPT | Performed by: INTERNAL MEDICINE

## 2023-12-15 NOTE — PROGRESS NOTES
Anticoagulation Summary  As of 12/15/2023      INR goal:  2.0-3.0   TTR:  70.6 % (1.5 y)   INR used for dosin.50 (12/15/2023)   Warfarin maintenance plan:  3 mg (3 mg x 1) every Fri; 1.5 mg (3 mg x 0.5) all other days   Weekly warfarin total:  12 mg   Plan last modified:  Brady Piña, PharmD (2023)   Next INR check:  2024   Target end date:  Indefinite    Indications    Severe aortic stenosis [I35.0]  PAF (paroxysmal atrial fibrillation) (HCC) [I48.0]  Chronic anticoagulation [Z79.01]                 Anticoagulation Episode Summary       INR check location:      Preferred lab:      Send INR reminders to:      Comments:            Anticoagulation Care Providers       Provider Role Specialty Phone number    Ganesh Mckeon M.D. Referring Geriatric Medicine - Family Medicine [phone]    [facility] Responsible  [phone]          Anticoagulation Patient Findings  Patient Findings       Positives:  Signs/symptoms of bleeding    Negatives:  Signs/symptoms of thrombosis, Laboratory test error suspected, Change in health, Change in alcohol use, Change in activity, Upcoming invasive procedure, Emergency department visit, Upcoming dental procedure, Missed doses, Extra doses, Change in medications, Change in diet/appetite, Hospital admission, Bruising, Other complaints                  HPI:   Shereen Wattsa seen in clinic today, on anticoagulation therapy with warfarin due to history of AF.    Patient's previous INR was subtherapeutic at 1.6 on 23, at which time patient was instructed to increase weekly warfarin regimen.  She returns to clinic today to recheck INR to ensure it is therapeutic and thus preventing possible clotting and/or bleeding/bruising complications.    CHADS-VASc = at least 5  (unadjusted ischemic stroke risk/year:  7.2%, which is moderate risk)    Does patient have any changes to current medical/health status since last appt (Y/N):  NO  Does  patient have any signs/symptoms of bleeding and/or thrombosis since the last appt (Y/N):  minor bout of epistaxis last week.  Does patient have any interval changes to diet or medications since last appt (Y/N):  NO  Are there any complications or cost restrictions with current therapy (Y/N):  NO     Does patient have [facility] PCP? Yes, Ganesh Mckeon M.D. (If not, please document discussion that patient must be seen at [facility])       Vitals:  declined today    There were no vitals filed for this visit.     Asssessment:      INR therapeutic at 2.1, therefore decreasing stroke and/or bleeding risk.   Reason(s) for out of range INR today:  n/a      Pt is not on antiplatelet therapy    Medication reconciliation completed today.    Plan:  Pt is to continue with current warfarin dosing regimen.     Follow up:  Because warfarin is a high risk medication and current CHEST guidelines recommend regular monitoring intervals (few days up to 12 weeks), will have patient return to clinic in 4 weeks to recheck INR.    Brady Piña, PharmD, BCACP

## 2023-12-19 RX ORDER — METAXALONE 800 MG/1
400 TABLET ORAL 2 TIMES DAILY
Qty: 20 TABLET | Refills: 0 | Status: SHIPPED | OUTPATIENT
Start: 2023-12-19 | End: 2024-01-08

## 2024-01-01 ENCOUNTER — HOSPITAL ENCOUNTER (OUTPATIENT)
Facility: MEDICAL CENTER | Age: 89
End: 2024-12-03
Attending: STUDENT IN AN ORGANIZED HEALTH CARE EDUCATION/TRAINING PROGRAM | Admitting: STUDENT IN AN ORGANIZED HEALTH CARE EDUCATION/TRAINING PROGRAM

## 2024-01-01 ENCOUNTER — TELEPHONE (OUTPATIENT)
Dept: HEALTH INFORMATION MANAGEMENT | Facility: OTHER | Age: 89
End: 2024-01-01

## 2024-01-01 ENCOUNTER — APPOINTMENT (OUTPATIENT)
Dept: RADIOLOGY | Facility: MEDICAL CENTER | Age: 89
End: 2024-01-01
Attending: STUDENT IN AN ORGANIZED HEALTH CARE EDUCATION/TRAINING PROGRAM

## 2024-01-01 VITALS
TEMPERATURE: 97.1 F | WEIGHT: 190 LBS | BODY MASS INDEX: 31.65 KG/M2 | HEIGHT: 65 IN | OXYGEN SATURATION: 26 % | HEART RATE: 78 BPM | SYSTOLIC BLOOD PRESSURE: 90 MMHG | DIASTOLIC BLOOD PRESSURE: 54 MMHG | RESPIRATION RATE: 18 BRPM

## 2024-01-01 DIAGNOSIS — I46.9 CARDIAC ARREST (HCC): ICD-10-CM

## 2024-01-01 LAB
ALBUMIN SERPL BCP-MCNC: 4 G/DL (ref 3.2–4.9)
ALBUMIN/GLOB SERPL: 1.7 G/DL
ALP SERPL-CCNC: 52 U/L (ref 30–99)
ALT SERPL-CCNC: 83 U/L (ref 2–50)
AMPHET UR QL SCN: NEGATIVE
ANION GAP SERPL CALC-SCNC: 14 MMOL/L (ref 7–16)
APPEARANCE UR: CLEAR
APTT PPP: 34.5 SEC (ref 24.7–36)
AST SERPL-CCNC: 115 U/L (ref 12–45)
BACTERIA #/AREA URNS HPF: ABNORMAL /HPF
BARBITURATES UR QL SCN: NEGATIVE
BASOPHILS # BLD AUTO: 0 % (ref 0–1.8)
BASOPHILS # BLD: 0 K/UL (ref 0–0.12)
BENZODIAZ UR QL SCN: NEGATIVE
BILIRUB SERPL-MCNC: 0.5 MG/DL (ref 0.1–1.5)
BILIRUB UR QL STRIP.AUTO: NEGATIVE
BUN SERPL-MCNC: 13 MG/DL (ref 8–22)
BURR CELLS BLD QL SMEAR: NORMAL
BZE UR QL SCN: NEGATIVE
CALCIUM ALBUM COR SERPL-MCNC: 9 MG/DL (ref 8.5–10.5)
CALCIUM SERPL-MCNC: 9 MG/DL (ref 8.5–10.5)
CANNABINOIDS UR QL SCN: NEGATIVE
CASTS URNS QL MICRO: ABNORMAL /LPF (ref 0–2)
CHLORIDE SERPL-SCNC: 93 MMOL/L (ref 96–112)
CO2 SERPL-SCNC: 18 MMOL/L (ref 20–33)
COLOR UR: YELLOW
CREAT SERPL-MCNC: 0.9 MG/DL (ref 0.5–1.4)
EKG IMPRESSION: NORMAL
EOSINOPHIL # BLD AUTO: 0 K/UL (ref 0–0.51)
EOSINOPHIL NFR BLD: 0 % (ref 0–6.9)
EPITHELIAL CELLS 1715: ABNORMAL /HPF (ref 0–5)
ERYTHROCYTE [DISTWIDTH] IN BLOOD BY AUTOMATED COUNT: 42.6 FL (ref 35.9–50)
FENTANYL UR QL: NEGATIVE
GFR SERPLBLD CREATININE-BSD FMLA CKD-EPI: 62 ML/MIN/1.73 M 2
GLOBULIN SER CALC-MCNC: 2.4 G/DL (ref 1.9–3.5)
GLUCOSE SERPL-MCNC: 231 MG/DL (ref 65–99)
GLUCOSE UR STRIP.AUTO-MCNC: 500 MG/DL
HCT VFR BLD AUTO: 37.4 % (ref 37–47)
HGB BLD-MCNC: 12.4 G/DL (ref 12–16)
INR PPP: 2.05 (ref 0.87–1.13)
KETONES UR STRIP.AUTO-MCNC: NEGATIVE MG/DL
LEUKOCYTE ESTERASE UR QL STRIP.AUTO: NEGATIVE
LIPASE SERPL-CCNC: 41 U/L (ref 11–82)
LYMPHOCYTES # BLD AUTO: 6.14 K/UL (ref 1–4.8)
LYMPHOCYTES NFR BLD: 46.5 % (ref 22–41)
MANUAL DIFF BLD: NORMAL
MCH RBC QN AUTO: 29.8 PG (ref 27–33)
MCHC RBC AUTO-ENTMCNC: 33.2 G/DL (ref 32.2–35.5)
MCV RBC AUTO: 89.9 FL (ref 81.4–97.8)
METHADONE UR QL SCN: NEGATIVE
MICRO URNS: ABNORMAL
MONOCYTES # BLD AUTO: 0.91 K/UL (ref 0–0.85)
MONOCYTES NFR BLD AUTO: 6.9 % (ref 0–13.4)
MORPHOLOGY BLD-IMP: NORMAL
MYELOCYTES NFR BLD MANUAL: 0.9 %
NEUTROPHILS # BLD AUTO: 6.03 K/UL (ref 1.82–7.42)
NEUTROPHILS NFR BLD: 45.7 % (ref 44–72)
NITRITE UR QL STRIP.AUTO: NEGATIVE
NRBC # BLD AUTO: 0 K/UL
NRBC BLD-RTO: 0 /100 WBC (ref 0–0.2)
NT-PROBNP SERPL IA-MCNC: 5111 PG/ML (ref 0–125)
OPIATES UR QL SCN: NEGATIVE
OXYCODONE UR QL SCN: NEGATIVE
PCP UR QL SCN: NEGATIVE
PH UR STRIP.AUTO: 7 [PH] (ref 5–8)
PLATELET # BLD AUTO: 178 K/UL (ref 164–446)
PLATELET BLD QL SMEAR: NORMAL
PMV BLD AUTO: 9.5 FL (ref 9–12.9)
POIKILOCYTOSIS BLD QL SMEAR: NORMAL
POTASSIUM SERPL-SCNC: 5 MMOL/L (ref 3.6–5.5)
PROPOXYPH UR QL SCN: NEGATIVE
PROT SERPL-MCNC: 6.4 G/DL (ref 6–8.2)
PROT UR QL STRIP: 300 MG/DL
PROTHROMBIN TIME: 23.3 SEC (ref 12–14.6)
RBC # BLD AUTO: 4.16 M/UL (ref 4.2–5.4)
RBC # URNS HPF: ABNORMAL /HPF (ref 0–2)
RBC BLD AUTO: PRESENT
RBC UR QL AUTO: ABNORMAL
SODIUM SERPL-SCNC: 125 MMOL/L (ref 135–145)
SP GR UR STRIP.AUTO: 1.01
TROPONIN T SERPL-MCNC: 16 NG/L (ref 6–19)
UROBILINOGEN UR STRIP.AUTO-MCNC: 0.2 EU/DL
WBC # BLD AUTO: 13.2 K/UL (ref 4.8–10.8)
WBC #/AREA URNS HPF: ABNORMAL /HPF

## 2024-01-01 PROCEDURE — 85730 THROMBOPLASTIN TIME PARTIAL: CPT

## 2024-01-01 PROCEDURE — 99285 EMERGENCY DEPT VISIT HI MDM: CPT

## 2024-01-01 PROCEDURE — 85007 BL SMEAR W/DIFF WBC COUNT: CPT

## 2024-01-01 PROCEDURE — 51702 INSERT TEMP BLADDER CATH: CPT

## 2024-01-01 PROCEDURE — 93005 ELECTROCARDIOGRAM TRACING: CPT | Mod: TC | Performed by: STUDENT IN AN ORGANIZED HEALTH CARE EDUCATION/TRAINING PROGRAM

## 2024-01-01 PROCEDURE — 81001 URINALYSIS AUTO W/SCOPE: CPT | Mod: XU

## 2024-01-01 PROCEDURE — G0378 HOSPITAL OBSERVATION PER HR: HCPCS

## 2024-01-01 PROCEDURE — 85027 COMPLETE CBC AUTOMATED: CPT

## 2024-01-01 PROCEDURE — 700111 HCHG RX REV CODE 636 W/ 250 OVERRIDE (IP): Performed by: STUDENT IN AN ORGANIZED HEALTH CARE EDUCATION/TRAINING PROGRAM

## 2024-01-01 PROCEDURE — 96375 TX/PRO/DX INJ NEW DRUG ADDON: CPT | Mod: XU

## 2024-01-01 PROCEDURE — 83690 ASSAY OF LIPASE: CPT

## 2024-01-01 PROCEDURE — 303105 HCHG CATHETER EXTRA

## 2024-01-01 PROCEDURE — 700111 HCHG RX REV CODE 636 W/ 250 OVERRIDE (IP): Mod: JZ

## 2024-01-01 PROCEDURE — 80307 DRUG TEST PRSMV CHEM ANLYZR: CPT

## 2024-01-01 PROCEDURE — 71045 X-RAY EXAM CHEST 1 VIEW: CPT

## 2024-01-01 PROCEDURE — A9270 NON-COVERED ITEM OR SERVICE: HCPCS | Performed by: STUDENT IN AN ORGANIZED HEALTH CARE EDUCATION/TRAINING PROGRAM

## 2024-01-01 PROCEDURE — 99223 1ST HOSP IP/OBS HIGH 75: CPT | Mod: 25 | Performed by: STUDENT IN AN ORGANIZED HEALTH CARE EDUCATION/TRAINING PROGRAM

## 2024-01-01 PROCEDURE — 700105 HCHG RX REV CODE 258: Performed by: STUDENT IN AN ORGANIZED HEALTH CARE EDUCATION/TRAINING PROGRAM

## 2024-01-01 PROCEDURE — 96365 THER/PROPH/DIAG IV INF INIT: CPT | Mod: XU

## 2024-01-01 PROCEDURE — 99498 ADVNCD CARE PLAN ADDL 30 MIN: CPT | Performed by: STUDENT IN AN ORGANIZED HEALTH CARE EDUCATION/TRAINING PROGRAM

## 2024-01-01 PROCEDURE — 99254 IP/OBS CNSLTJ NEW/EST MOD 60: CPT | Performed by: NURSE PRACTITIONER

## 2024-01-01 PROCEDURE — 94002 VENT MGMT INPAT INIT DAY: CPT | Mod: XU

## 2024-01-01 PROCEDURE — 84484 ASSAY OF TROPONIN QUANT: CPT

## 2024-01-01 PROCEDURE — 700111 HCHG RX REV CODE 636 W/ 250 OVERRIDE (IP): Mod: JZ | Performed by: NURSE PRACTITIONER

## 2024-01-01 PROCEDURE — 70450 CT HEAD/BRAIN W/O DYE: CPT

## 2024-01-01 PROCEDURE — 83880 ASSAY OF NATRIURETIC PEPTIDE: CPT

## 2024-01-01 PROCEDURE — 700102 HCHG RX REV CODE 250 W/ 637 OVERRIDE(OP): Performed by: STUDENT IN AN ORGANIZED HEALTH CARE EDUCATION/TRAINING PROGRAM

## 2024-01-01 PROCEDURE — 96367 TX/PROPH/DG ADDL SEQ IV INF: CPT | Mod: XU

## 2024-01-01 PROCEDURE — 85610 PROTHROMBIN TIME: CPT

## 2024-01-01 PROCEDURE — 99497 ADVNCD CARE PLAN 30 MIN: CPT | Mod: 25 | Performed by: STUDENT IN AN ORGANIZED HEALTH CARE EDUCATION/TRAINING PROGRAM

## 2024-01-01 PROCEDURE — 96376 TX/PRO/DX INJ SAME DRUG ADON: CPT | Mod: XU

## 2024-01-01 PROCEDURE — 80053 COMPREHEN METABOLIC PANEL: CPT

## 2024-01-01 PROCEDURE — 36415 COLL VENOUS BLD VENIPUNCTURE: CPT

## 2024-01-01 PROCEDURE — 700101 HCHG RX REV CODE 250: Performed by: STUDENT IN AN ORGANIZED HEALTH CARE EDUCATION/TRAINING PROGRAM

## 2024-01-01 RX ORDER — LORAZEPAM 2 MG/ML
1 INJECTION INTRAMUSCULAR
Status: DISCONTINUED | OUTPATIENT
Start: 2024-01-01 | End: 2024-12-04 | Stop reason: HOSPADM

## 2024-01-01 RX ORDER — ONDANSETRON 2 MG/ML
8 INJECTION INTRAMUSCULAR; INTRAVENOUS EVERY 8 HOURS PRN
Status: DISCONTINUED | OUTPATIENT
Start: 2024-01-01 | End: 2024-12-04 | Stop reason: HOSPADM

## 2024-01-01 RX ORDER — CALCIUM CHLORIDE 100 MG/ML
1 INJECTION INTRAVENOUS; INTRAVENTRICULAR ONCE
Status: COMPLETED | OUTPATIENT
Start: 2024-01-01 | End: 2024-01-01

## 2024-01-01 RX ORDER — MORPHINE SULFATE 4 MG/ML
4 INJECTION INTRAVENOUS EVERY 4 HOURS
Status: DISCONTINUED | OUTPATIENT
Start: 2024-01-01 | End: 2024-12-04 | Stop reason: HOSPADM

## 2024-01-01 RX ORDER — ONDANSETRON 4 MG/1
8 TABLET, ORALLY DISINTEGRATING ORAL EVERY 8 HOURS PRN
Status: DISCONTINUED | OUTPATIENT
Start: 2024-01-01 | End: 2024-12-04 | Stop reason: HOSPADM

## 2024-01-01 RX ORDER — GLYCOPYRROLATE 1 MG/1
1 TABLET ORAL 3 TIMES DAILY PRN
Status: DISCONTINUED | OUTPATIENT
Start: 2024-01-01 | End: 2024-12-04 | Stop reason: HOSPADM

## 2024-01-01 RX ORDER — HYDROMORPHONE HYDROCHLORIDE 2 MG/ML
4 INJECTION, SOLUTION INTRAMUSCULAR; INTRAVENOUS; SUBCUTANEOUS
Status: DISCONTINUED | OUTPATIENT
Start: 2024-01-01 | End: 2024-01-01

## 2024-01-01 RX ORDER — GLYCOPYRROLATE 0.2 MG/ML
0.2 INJECTION INTRAMUSCULAR; INTRAVENOUS 3 TIMES DAILY PRN
Status: DISCONTINUED | OUTPATIENT
Start: 2024-01-01 | End: 2024-12-04 | Stop reason: HOSPADM

## 2024-01-01 RX ORDER — ATROPINE SULFATE 10 MG/ML
2 SOLUTION/ DROPS OPHTHALMIC EVERY 4 HOURS PRN
Status: DISCONTINUED | OUTPATIENT
Start: 2024-01-01 | End: 2024-12-04 | Stop reason: HOSPADM

## 2024-01-01 RX ORDER — CALCIUM CHLORIDE 100 MG/ML
INJECTION INTRAVENOUS; INTRAVENTRICULAR
Status: COMPLETED
Start: 2024-01-01 | End: 2024-01-01

## 2024-01-01 RX ORDER — ACETAMINOPHEN 325 MG/1
650 TABLET ORAL EVERY 4 HOURS PRN
Status: DISCONTINUED | OUTPATIENT
Start: 2024-01-01 | End: 2024-12-04 | Stop reason: HOSPADM

## 2024-01-01 RX ORDER — LIDOCAINE HYDROCHLORIDE 20 MG/ML
5 SOLUTION OROPHARYNGEAL EVERY 4 HOURS PRN
Status: DISCONTINUED | OUTPATIENT
Start: 2024-01-01 | End: 2024-12-04 | Stop reason: HOSPADM

## 2024-01-01 RX ORDER — ATROPINE SULFATE 10 MG/ML
2 SOLUTION/ DROPS OPHTHALMIC EVERY 4 HOURS PRN
Status: DISCONTINUED | OUTPATIENT
Start: 2024-01-01 | End: 2024-01-01

## 2024-01-01 RX ORDER — ACETAMINOPHEN 650 MG/1
650 SUPPOSITORY RECTAL EVERY 4 HOURS PRN
Status: DISCONTINUED | OUTPATIENT
Start: 2024-01-01 | End: 2024-12-04 | Stop reason: HOSPADM

## 2024-01-01 RX ORDER — LORAZEPAM 2 MG/ML
1 CONCENTRATE ORAL
Status: DISCONTINUED | OUTPATIENT
Start: 2024-01-01 | End: 2024-12-04 | Stop reason: HOSPADM

## 2024-01-01 RX ORDER — SCOPOLAMINE 1 MG/3D
1 PATCH, EXTENDED RELEASE TRANSDERMAL
Status: DISCONTINUED | OUTPATIENT
Start: 2024-01-01 | End: 2024-12-04 | Stop reason: HOSPADM

## 2024-01-01 RX ORDER — DOCUSATE SODIUM 100 MG/1
100 CAPSULE, LIQUID FILLED ORAL EVERY 12 HOURS
Status: DISCONTINUED | OUTPATIENT
Start: 2024-01-01 | End: 2024-12-04 | Stop reason: HOSPADM

## 2024-01-01 RX ADMIN — MORPHINE SULFATE 5 MG: 10 INJECTION, SOLUTION INTRAMUSCULAR; INTRAVENOUS at 04:41

## 2024-01-01 RX ADMIN — CALCIUM CHLORIDE 1 G: 100 INJECTION, SOLUTION INTRAVENOUS at 02:43

## 2024-01-01 RX ADMIN — MORPHINE SULFATE 4 MG: 4 INJECTION, SOLUTION INTRAMUSCULAR; INTRAVENOUS at 18:27

## 2024-01-01 RX ADMIN — MORPHINE SULFATE 4 MG: 4 INJECTION, SOLUTION INTRAMUSCULAR; INTRAVENOUS at 14:04

## 2024-01-01 RX ADMIN — SODIUM BICARBONATE 50 MEQ: 84 INJECTION INTRAVENOUS at 02:42

## 2024-01-01 RX ADMIN — Medication 50 MEQ: at 02:42

## 2024-01-01 RX ADMIN — MINERAL OIL, PETROLATUM 1 APPLICATION: 425; 568 OINTMENT OPHTHALMIC at 18:28

## 2024-01-01 RX ADMIN — MINERAL OIL, PETROLATUM 1 APPLICATION: 425; 568 OINTMENT OPHTHALMIC at 06:40

## 2024-01-01 RX ADMIN — SCOPOLAMINE 1 PATCH: 1.5 PATCH, EXTENDED RELEASE TRANSDERMAL at 07:39

## 2024-01-01 RX ADMIN — ATROPINE SULFATE 2 DROP: 10 SOLUTION/ DROPS OPHTHALMIC at 18:29

## 2024-01-01 RX ADMIN — CALCIUM CHLORIDE 1 G: 100 INJECTION INTRAVENOUS; INTRAVENTRICULAR at 02:43

## 2024-01-01 RX ADMIN — MIDAZOLAM HYDROCHLORIDE 5 MG/HR: 5 INJECTION, SOLUTION INTRAMUSCULAR; INTRAVENOUS at 04:36

## 2024-01-01 RX ADMIN — MORPHINE SULFATE 4 MG: 4 INJECTION, SOLUTION INTRAMUSCULAR; INTRAVENOUS at 10:55

## 2024-01-01 RX ADMIN — PROPOFOL 10 MCG/KG/MIN: 10 INJECTION, EMULSION INTRAVENOUS at 02:51

## 2024-01-01 ASSESSMENT — PAIN DESCRIPTION - PAIN TYPE: TYPE: ACUTE PAIN

## 2024-01-05 ENCOUNTER — PATIENT OUTREACH (OUTPATIENT)
Dept: HEALTH INFORMATION MANAGEMENT | Facility: OTHER | Age: 89
End: 2024-01-05
Payer: MEDICARE

## 2024-01-05 NOTE — PROGRESS NOTES
Community Health Worker Follow-Up    Reason for outreach: CHW called the pt to follow up.    CHW Interventions: Pts  stated they are okay and waiting for her kids to come home from [place]. They are in a care facility until tomorrow when they get back. CHW discussed releasing the pt next month from the PCM program. Pt was okay with this and was very nice. Pt stated besides waiting on her children they are okay    Specific Resources Provided:  Housing/Shelter: n/a  Transportation: n/a  Food: n/a  Financial: n/a  Social Supports: n/a  Other: n/a    Plan: CHW will follow up in 1 month and release from [facility].

## 2024-01-08 RX ORDER — METAXALONE 800 MG/1
400 TABLET ORAL 2 TIMES DAILY
Qty: 20 TABLET | Refills: 0 | Status: SHIPPED | OUTPATIENT
Start: 2024-01-08 | End: 2024-01-22 | Stop reason: SDUPTHER

## 2024-01-08 RX ORDER — CARVEDILOL 12.5 MG/1
TABLET ORAL
Qty: 200 TABLET | Refills: 3 | Status: SHIPPED | OUTPATIENT
Start: 2024-01-08

## 2024-01-12 ENCOUNTER — ANTICOAGULATION VISIT (OUTPATIENT)
Dept: MEDICAL GROUP | Facility: PHYSICIAN GROUP | Age: 89
End: 2024-01-12
Payer: MEDICARE

## 2024-01-12 VITALS — HEART RATE: 74 BPM | OXYGEN SATURATION: 91 % | BODY MASS INDEX: 27.43 KG/M2 | RESPIRATION RATE: 15 BRPM | HEIGHT: 62 IN

## 2024-01-12 DIAGNOSIS — I35.0 SEVERE AORTIC STENOSIS: ICD-10-CM

## 2024-01-12 DIAGNOSIS — Z79.01 CHRONIC ANTICOAGULATION: Primary | ICD-10-CM

## 2024-01-12 DIAGNOSIS — I48.0 PAF (PAROXYSMAL ATRIAL FIBRILLATION) (HCC): ICD-10-CM

## 2024-01-12 LAB — INR PPP: 1.9 (ref 2–3.5)

## 2024-01-12 PROCEDURE — 85610 PROTHROMBIN TIME: CPT | Performed by: FAMILY MEDICINE

## 2024-01-12 PROCEDURE — 99211 OFF/OP EST MAY X REQ PHY/QHP: CPT | Performed by: FAMILY MEDICINE

## 2024-01-12 NOTE — PROGRESS NOTES
Anticoagulation Summary  As of 2024      INR goal:  2.0-3.0   TTR:  71.2 % (1.6 y)   INR used for dosin.90 (2024)   Warfarin maintenance plan:  3 mg (3 mg x 1) every Fri; 1.5 mg (3 mg x 0.5) all other days   Weekly warfarin total:  12 mg   Plan last modified:  Brady Piña, PharmD (2023)   Next INR check:  2024   Target end date:  Indefinite    Indications    Severe aortic stenosis [I35.0]  PAF (paroxysmal atrial fibrillation) ([facility]) [I48.0]  Chronic anticoagulation [Z79.01]                 Anticoagulation Episode Summary       INR check location:      Preferred lab:      Send INR reminders to:      Comments:            Anticoagulation Care Providers       Provider Role Specialty Phone number    Ganesh Mckeon M.D. Referring Geriatric Medicine - Family Medicine [phone]    [facility] Responsible  [phone]          Anticoagulation Patient Findings  Patient Findings       Positives:  Signs/symptoms of bleeding    Negatives:  Signs/symptoms of thrombosis, Laboratory test error suspected, Change in health, Change in alcohol use, Change in activity, Upcoming invasive procedure, Emergency department visit, Upcoming dental procedure, Missed doses, Extra doses, Change in medications, Change in diet/appetite, Hospital admission, Bruising, Other complaints                  HPI:   Shereen Wattsa seen in clinic today, on anticoagulation therapy with warfarin due to history of PAF.    Patient's previous INR was therapeutic at 2.5 on 12-15-23, at which time patient was instructed to continue with current warfarin regimen.  She returns to clinic today to recheck INR to ensure it is therapeutic and thus preventing possible clotting and/or bleeding/bruising complications.    CHADS-VASc = at least 5  (unadjusted ischemic stroke risk/year:  7.2%, which is moderate risk)    Does patient have any changes to current medical/health status since last appt (Y/N):  NO  Does  "patient have any signs/symptoms of bleeding and/or thrombosis since the last appt (Y/N):  NO  Does patient have any interval changes to diet or medications since last appt (Y/N):  mild epistaxis on two occasions since last visit.  Are there any complications or cost restrictions with current therapy (Y/N):  NO     Does patient have [facility] PCP? Yes, Ganesh Mckeon M.D. (If not, please document discussion that patient must be seen at [facility])       Vitals:      Vitals:    01/12/24 1028   Pulse: 74   Resp: 15   SpO2: 91%   Height: 1.575 m (5' 2.01\")        Asssessment:      INR slightly subtherapeutic at 1.9, therefore increasing stroke risk   Reason(s) for out of range INR today:  etiology unknown      Pt is not on antiplatelet therapy    Medication reconciliation completed today.    Plan:  Pt is to continue with current warfarin dosing regimen as INR within 0.1 of goal and patient is concerned about further bleeding risk.     Follow up:  Because warfarin is a high risk medication and current CHEST guidelines recommend regular monitoring intervals (few days up to 12 weeks), will have patient return to clinic in 5 weeks to recheck INR (advised closer f/u, patient prefers this time frame).    Brady Piña, PharmD, BCACP    "

## 2024-01-19 RX ORDER — AMLODIPINE BESYLATE 5 MG/1
5 TABLET ORAL DAILY
Qty: 100 TABLET | Refills: 2 | Status: SHIPPED | OUTPATIENT
Start: 2024-01-19 | End: 2024-08-06

## 2024-01-19 NOTE — TELEPHONE ENCOUNTER
Received request via: Pharmacy    Was the patient seen in the last year in this department? Yes    Does the patient have an active prescription (recently filled or refills available) for medication(s) requested? No    Pharmacy Name: [facility]    Does the patient have [facility] and need 100 day supply (blood pressure, diabetes and cholesterol meds only)? Yes, quantity updated to 100 days

## 2024-01-22 RX ORDER — METAXALONE 800 MG/1
400 TABLET ORAL 2 TIMES DAILY
Qty: 20 TABLET | Refills: 0 | Status: SHIPPED | OUTPATIENT
Start: 2024-01-22 | End: 2024-02-02 | Stop reason: SDUPTHER

## 2024-01-22 NOTE — TELEPHONE ENCOUNTER
Received request via: Pharmacy    Was the patient seen in the last year in this department? Yes    Does the patient have an active prescription (recently filled or refills available) for medication(s) requested? No    Pharmacy Name: [facility]    Does the patient have group home Plus and need 100 day supply (blood pressure, diabetes and cholesterol meds only)? Medication is not for cholesterol, blood pressure or diabetes   Propranolol Counseling:  I discussed with the patient the risks of propranolol including but not limited to low heart rate, low blood pressure, low blood sugar, restlessness and increased cold sensitivity. They should call the office if they experience any of these side effects.

## 2024-02-02 ENCOUNTER — PATIENT MESSAGE (OUTPATIENT)
Dept: MEDICAL GROUP | Facility: MEDICAL CENTER | Age: 89
End: 2024-02-02
Payer: MEDICARE

## 2024-02-02 RX ORDER — METAXALONE 800 MG/1
400 TABLET ORAL 2 TIMES DAILY
Qty: 20 TABLET | Refills: 0 | Status: SHIPPED | OUTPATIENT
Start: 2024-02-02 | End: 2024-02-08 | Stop reason: SDUPTHER

## 2024-02-08 ENCOUNTER — PATIENT OUTREACH (OUTPATIENT)
Dept: HEALTH INFORMATION MANAGEMENT | Facility: OTHER | Age: 89
End: 2024-02-08
Payer: MEDICARE

## 2024-02-08 RX ORDER — METAXALONE 800 MG/1
400 TABLET ORAL 2 TIMES DAILY
Qty: 100 TABLET | Refills: 2 | Status: SHIPPED | OUTPATIENT
Start: 2024-02-08

## 2024-02-08 NOTE — PROGRESS NOTES
Community Health Worker Follow-Up    Reason for outreach: CHW called the pt and her  for monthly follow up with GCM.    CHW Interventions: CHW and the pt discussed how her and her  was doing and what needs if any they had. Pt stated she sent the provider a message about meds to her provider and having issues with the new SCP system.    Specific Resources Provided:  Housing/Shelter: n/a  Transportation: n/a  Food: n/a  Financial: n/a  Social Supports: n/a  Other: n/a    Plan: CHW will not close out yet and follow up with the MA about the med issue

## 2024-02-16 ENCOUNTER — ANTICOAGULATION VISIT (OUTPATIENT)
Dept: MEDICAL GROUP | Facility: PHYSICIAN GROUP | Age: 89
End: 2024-02-16
Payer: MEDICARE

## 2024-02-16 DIAGNOSIS — Z79.01 CHRONIC ANTICOAGULATION: ICD-10-CM

## 2024-02-16 DIAGNOSIS — I35.0 SEVERE AORTIC STENOSIS: ICD-10-CM

## 2024-02-16 DIAGNOSIS — I48.0 PAF (PAROXYSMAL ATRIAL FIBRILLATION) (HCC): ICD-10-CM

## 2024-02-16 LAB — INR PPP: 1.8 (ref 2–3.5)

## 2024-02-16 PROCEDURE — 85610 PROTHROMBIN TIME: CPT | Performed by: FAMILY MEDICINE

## 2024-02-16 PROCEDURE — 99999 PR NO CHARGE: CPT | Performed by: FAMILY MEDICINE

## 2024-02-16 PROCEDURE — 93793 ANTICOAG MGMT PT WARFARIN: CPT | Performed by: FAMILY MEDICINE

## 2024-02-22 RX ORDER — LEVOTHYROXINE SODIUM 0.05 MG/1
TABLET ORAL
Qty: 100 TABLET | Refills: 2 | Status: SHIPPED | OUTPATIENT
Start: 2024-02-22

## 2024-02-22 NOTE — TELEPHONE ENCOUNTER
Received request via: Pharmacy    Was the patient seen in the last year in this department? Yes    Does the patient have an active prescription (recently filled or refills available) for medication(s) requested? No    Pharmacy Name: [id number] - [place], [place] - [address].     Does the patient have [facility] and need 100 day supply (blood pressure, diabetes and cholesterol meds only)? Medication is not for cholesterol, blood pressure or diabetes

## 2024-03-15 ENCOUNTER — ANTICOAGULATION VISIT (OUTPATIENT)
Dept: MEDICAL GROUP | Facility: PHYSICIAN GROUP | Age: 89
End: 2024-03-15
Payer: MEDICARE

## 2024-03-15 VITALS — RESPIRATION RATE: 15 BRPM | OXYGEN SATURATION: 93 % | BODY MASS INDEX: 27.43 KG/M2 | HEIGHT: 62 IN | HEART RATE: 71 BPM

## 2024-03-15 DIAGNOSIS — I48.0 PAF (PAROXYSMAL ATRIAL FIBRILLATION) (HCC): ICD-10-CM

## 2024-03-15 DIAGNOSIS — Z79.01 CHRONIC ANTICOAGULATION: Primary | ICD-10-CM

## 2024-03-15 DIAGNOSIS — I35.0 SEVERE AORTIC STENOSIS: ICD-10-CM

## 2024-03-15 LAB — INR PPP: 2.7 (ref 2–3.5)

## 2024-03-15 PROCEDURE — 93793 ANTICOAG MGMT PT WARFARIN: CPT | Performed by: FAMILY MEDICINE

## 2024-03-15 PROCEDURE — 99999 PR NO CHARGE: CPT | Performed by: FAMILY MEDICINE

## 2024-03-15 PROCEDURE — 85610 PROTHROMBIN TIME: CPT | Performed by: FAMILY MEDICINE

## 2024-03-15 NOTE — PROGRESS NOTES
Anticoagulation Summary  As of 3/15/2024      INR goal:  2.0-3.0   TTR:  67.6% (1.7 y)   INR used for dosin.70 (3/15/2024)   Warfarin maintenance plan:  3 mg (3 mg x 1) every Fri; 1.5 mg (3 mg x 0.5) all other days   Weekly warfarin total:  12 mg   Plan last modified:  Chris Lopes PharmD (2024)   Next INR check:  2024   Target end date:  Indefinite    Indications    Severe aortic stenosis [I35.0]  PAF (paroxysmal atrial fibrillation) (HCC) [I48.0]  Chronic anticoagulation [Z79.01]                 Anticoagulation Episode Summary       INR check location:      Preferred lab:      Send INR reminders to:      Comments:            Anticoagulation Care Providers       Provider Role Specialty Phone number    Ganesh Mkceon M.D. Referring Geriatric Medicine - Family Medicine [phone]    [facility] Responsible  [phone]          Anticoagulation Patient Findings  Patient Findings       Negatives:  Signs/symptoms of thrombosis, Signs/symptoms of bleeding, Laboratory test error suspected, Change in health, Change in alcohol use, Change in activity, Upcoming invasive procedure, Emergency department visit, Upcoming dental procedure, Missed doses, Extra doses, Change in medications, Change in diet/appetite, Hospital admission, Bruising, Other complaints                  HPI:   Shereen Wattsa seen in clinic today, on anticoagulation therapy with warfarin due to history of PAF.    Patient's previous INR was subtherapeutic at 1.8 on 24, at which time patient was instructed to continue with current warfarin regimen.  She returns to clinic today to recheck INR to ensure it is therapeutic and thus preventing possible clotting and/or bleeding/bruising complications.    CHADS-VASc = at least 5  (unadjusted ischemic stroke risk/year:  7.2%, which is moderate risk)    Does patient have any changes to current medical/health status since last appt (Y/N):  NO  Does patient have  "any signs/symptoms of bleeding and/or thrombosis since the last appt (Y/N):  NO  Does patient have any interval changes to diet or medications since last appt (Y/N):  NO  Are there any complications or cost restrictions with current therapy (Y/N):  NO     Does patient have [facility] PCP? Yes, Ganesh Mckeon M.D. (If not, please document discussion that patient must be seen at [facility])       Vitals:      Vitals:    03/15/24 1020   Pulse: 71   Resp: 15   SpO2: 93%   Height: 1.575 m (5' 2.01\")        Asssessment:      INR therapeutic at 2.7, therefore decreasing stroke and/or bleeding risk.   Reason(s) for out of range INR today:  n/a      Pt is not on antiplatelet therapy    Medication reconciliation completed today.    Plan:  Pt is to continue with current warfarin dosing regimen.     Follow up:  Because warfarin is a high risk medication and current CHEST guidelines recommend regular monitoring intervals (few days up to 12 weeks), will have patient return to clinic in 4 weeks to recheck INR.    Brady Piña, PharmD, BCACP    "

## 2024-03-19 ENCOUNTER — OFFICE VISIT (OUTPATIENT)
Dept: MEDICAL GROUP | Facility: MEDICAL CENTER | Age: 89
End: 2024-03-19
Payer: MEDICARE

## 2024-03-19 VITALS
HEART RATE: 84 BPM | WEIGHT: 153 LBS | HEIGHT: 62 IN | BODY MASS INDEX: 28.16 KG/M2 | RESPIRATION RATE: 16 BRPM | OXYGEN SATURATION: 96 % | DIASTOLIC BLOOD PRESSURE: 70 MMHG | TEMPERATURE: 97.9 F | SYSTOLIC BLOOD PRESSURE: 120 MMHG

## 2024-03-19 DIAGNOSIS — E11.9 TYPE 2 DIABETES MELLITUS WITHOUT COMPLICATION, WITHOUT LONG-TERM CURRENT USE OF INSULIN (HCC): ICD-10-CM

## 2024-03-19 DIAGNOSIS — I10 PRIMARY HYPERTENSION: ICD-10-CM

## 2024-03-19 DIAGNOSIS — I35.0 SEVERE AORTIC STENOSIS: ICD-10-CM

## 2024-03-19 DIAGNOSIS — E11.69 DIABETES MELLITUS TYPE 2 IN OBESE: ICD-10-CM

## 2024-03-19 DIAGNOSIS — E03.9 ACQUIRED HYPOTHYROIDISM: ICD-10-CM

## 2024-03-19 DIAGNOSIS — I48.0 PAF (PAROXYSMAL ATRIAL FIBRILLATION) (HCC): ICD-10-CM

## 2024-03-19 DIAGNOSIS — E66.9 DIABETES MELLITUS TYPE 2 IN OBESE: ICD-10-CM

## 2024-03-19 DIAGNOSIS — D68.69 SECONDARY HYPERCOAGULABLE STATE (HCC): ICD-10-CM

## 2024-03-19 PROCEDURE — 3074F SYST BP LT 130 MM HG: CPT | Performed by: FAMILY MEDICINE

## 2024-03-19 PROCEDURE — 99214 OFFICE O/P EST MOD 30 MIN: CPT | Performed by: FAMILY MEDICINE

## 2024-03-19 PROCEDURE — 3078F DIAST BP <80 MM HG: CPT | Performed by: FAMILY MEDICINE

## 2024-03-19 ASSESSMENT — FIBROSIS 4 INDEX: FIB4 SCORE: 2.34

## 2024-03-19 ASSESSMENT — PATIENT HEALTH QUESTIONNAIRE - PHQ9: CLINICAL INTERPRETATION OF PHQ2 SCORE: 0

## 2024-03-19 NOTE — PROGRESS NOTES
CC: Diabetes, hypertension, A-fib, severe aortic stenosis, hypothyroidism    HPI:   Shereen presents today to discuss the following    Type 2 diabetes mellitus without complication, without long-term current use of insulin ([facility])  Blood glucose has been adequate controlled controlled.  Most recent A1c was 6.7.  Currently on metformin 1500 mg daily.  No side effects    Primary hypertension  Blood pressure is controlled on amlodipine 5 mg daily, carvedilol 12.5 mg twice a day, telmisartan 40 mg daily.    PAF (paroxysmal atrial fibrillation) ([facility])/  Secondary hypercoagulable state ([facility])  Denies palpitation, chest pain, however she has been having intermittent shortness of breath probably due to severe aortic stenosis.  Patient is currently on carvedilol 12.5 mg twice a day, and warfarin    Severe aortic stenosis  Patient refused TAVR procedure.  She has been symptomatic(shortness of breath intermittent dizziness)    Acquired hypothyroidism  She has been tolerating Levothyroxine, no palpitation, no cold or heat intolerance, she has been on levothyroxine 50 mcg daily          Patient Active Problem List    Diagnosis Date Noted    Reactive airway disease with wheezing, mild persistent, uncomplicated 05/26/2023    Pneumonia 05/26/2023    Strep throat 05/26/2023    Secondary hypercoagulable state ([facility]) 06/09/2022    Acquired hypothyroidism 09/12/2019    Diabetes mellitus type 2 in obese ([facility]) 09/12/2019    Skin abrasion 08/20/2019    Chronic anticoagulation 08/02/2019    Hyponatremia 03/25/2019    Epistaxis 03/25/2019    Advanced directives, counseling/discussion 09/26/2018    Hypertensive urgency 05/11/2017    Diastolic dysfunction 02/02/2016    Tear of lateral cartilage or meniscus of knee, current 05/21/2015    PAF (paroxysmal atrial fibrillation) ([facility]) 01/12/2015    Dyspnea on effort 01/02/2014    Diabetes ([facility]) 12/26/2013    Severe aortic stenosis 07/02/2013    HTN (hypertension) 05/04/2012    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 100 Tablet 2    amLODIPine (NORVASC) 5 MG Tab Take 1 Tablet by mouth every day for 200 days. Take 5mg PO daily in AM. 100 Tablet 2    carvedilol (COREG) 12.5 MG Tab TAKE ONE TABLET BY MOUTH TWICE A DAY FOR BLOOD PRESSURE 200 Tablet 3    metFORMIN (GLUCOPHAGE) 500 MG Tab TAKE ONE TABLET BY MOUTH THREE TIMES A  Tablet 0    gabapentin (NEURONTIN) 100 MG Cap Take 1 Capsule by mouth 2 times a day. Take 1 capsule PO Daily in the AM,one in the afternoon, and Take 2 capsules daily in the  Capsule 3    famotidine (PEPCID) 20 MG Tab TAKE 1 TABLET BY MOUTH TWICE DAILY**GENERIC FOR PEPCID*      warfarin (JANTOVEN) 3 MG Tab Take 2 Tablets by mouth every day.      carvedilol (COREG) 25 MG Tab       warfarin (COUMADIN) 3 MG Tab Take one-half to one tablet by mouth daily or as directed by anticoagulation clinic 90 Tablet 3    Cholecalciferol (VITAMIN D3) 2000 UNIT Cap Take 2,000 Units by mouth every day.      omeprazole (PRILOSEC) 20 MG delayed-release capsule Take 1 Capsule by mouth 2 times a day. 200 Capsule 1    Misc. Devices Misc Oxygen concentrator with humidifier, 2.5-3 L/min 1 Each 0    dorzolamide-timolol (COSOPT) 22.3-6.8 MG/ML Solution Administer 1 Drop into both eyes 2 times a day. Indications: Wide-Angle Glaucoma      simvastatin (ZOCOR) 80 MG tablet       cloNIDine (CATAPRES) 0.1 MG Tab Take 1 tablet twice a day by oral route as needed. (Patient not taking: Reported on 3/19/2024)      nystatin (NYSTOP) powder APPLY TO THE AFFECTED AREA(S) BY TOPICAL ROUTE 2 TIMES PER DAY (Patient not  "taking: Reported on 11/20/2023)      ofloxacin (OCUFLOX) 0.3 % Solution  (Patient not taking: Reported on 11/20/2023)      oseltamivir (TAMIFLU) 75 MG Cap Take 1 capsule every day by oral route. (Patient not taking: Reported on 11/20/2023)      potassium chloride SA (KDUR) 20 MEQ Tab CR Take 1 Tablet by mouth every day. (Patient not taking: Reported on 11/20/2023)      prednisoLONE acetate (PRED FORTE) 1 % Suspension  (Patient not taking: Reported on 11/20/2023)      sulfamethoxazole-trimethoprim (BACTRIM DS) 800-160 MG tablet  (Patient not taking: Reported on 11/20/2023)      telmisartan (MICARDIS) 40 MG Tab Take 1 Tablet by mouth every day. (Patient not taking: Reported on 3/19/2024)      tizanidine (ZANAFLEX) 2 MG tablet Take 1 tablet every 8 hours by oral route as needed. (Patient not taking: Reported on 11/20/2023)      tizanidine (ZANAFLEX) 4 MG Tab  (Patient not taking: Reported on 11/20/2023)      valacyclovir (VALTREX) 1 GM Tab Take 1 tablet every 12 hours by oral route. (Patient not taking: Reported on 11/20/2023)      Zoster Vac Recomb Adjuvanted (SHINGRIX) 50 MCG/0.5ML Recon Susp  (Patient not taking: Reported on 11/20/2023)       No current facility-administered medications for this visit.         Allergies as of 03/19/2024 - Reviewed 03/19/2024   Allergen Reaction Noted    Darvon [propoxyphene hcl] Anaphylaxis 03/18/2008    Iodine Anaphylaxis 05/11/2015    Tetanus antitoxin Anaphylaxis 11/19/2017    Tetracycline Anaphylaxis 08/02/2008    Latex Unspecified 09/19/2013    Penicillins Rash 08/02/2008    Kdc:red dye+acetaminophen+propoxyphene  07/12/2023        ROS: Denies any chest pain, Shortness of breath, Changes bowel or bladder, Lower extremity edema.    Physical Exam:  /70   Pulse 84   Temp 36.6 °C (97.9 °F) (Temporal)   Resp 16   Ht 1.575 m (5' 2\")   Wt 69.4 kg (153 lb)   LMP 01/01/1985   SpO2 96%   BMI 27.98 kg/m²   Gen.: Well-developed, well-nourished, no apparent distress,pleasant " and cooperative with the examination  Skin:  Warm and dry with good turgor. No rashes or suspicious lesions in visible areas  HEENT:Sinuses nontender with palpation, TMs clear, nares patent with pink mucosa and clear rhinorrhea,no septal deviation ,polyps or lesions. lips without lesions, oropharynx clear.  Neck: Trachea midline,no masses or adenopathy. No JVD.  Cor: Regular rate and rhythm with systolic murmur, gallop or rub.  Lungs: Respirations unlabored.Clear to auscultation with equal breath sounds bilaterally. No wheezes, rhonchi.  Extremities: No cyanosis, clubbing or edema.          Assessment and Plan.   88 y.o. female     1.  Type 2 diabetes mellitus without complication, without long-term current use of insulin (HCC)  Controlled.  Most recent A1c was 6.7  Continue on continue on metformin 1500 mg daily.    - CBC WITH DIFFERENTIAL; Future  - Comp Metabolic Panel; Future  - TSH+FREE T4  - Lipid Profile; Future  - HEMOGLOBIN A1C; Future  - MICROALBUMIN CREAT RATIO URINE; Future    2. Primary hypertension  Controlled.  Continue on amlodipine 5 mg daily, carvedilol 12.5 mg twice a day, telmisartan 40 mg daily.    - CBC WITH DIFFERENTIAL; Future  - Comp Metabolic Panel; Future  - TSH+FREE T4  - Lipid Profile; Future  - MICROALBUMIN CREAT RATIO URINE; Future    3. PAF (paroxysmal atrial fibrillation) (HCC)  No RVR.  Continue on carvedilol 12.5 mg twice a day, and warfarin    4. Secondary hypercoagulable state (HCC)  Due to A-fib.  Continue on warfarin    5. Severe aortic stenosis  Patient refused TAVR procedure.  She has been symptomatic    6. Acquired hypothyroidism  She has been tolerating Levothyroxine, no palpitation, no cold or heat intolerance  Continue levothyroxine 50 mcg daily    - TSH+FREE T4

## 2024-03-28 RX ORDER — OMEPRAZOLE 20 MG/1
20 CAPSULE, DELAYED RELEASE ORAL 2 TIMES DAILY
Qty: 200 CAPSULE | Refills: 1 | Status: SHIPPED | OUTPATIENT
Start: 2024-03-28

## 2024-04-12 ENCOUNTER — ANTICOAGULATION VISIT (OUTPATIENT)
Dept: MEDICAL GROUP | Facility: PHYSICIAN GROUP | Age: 89
End: 2024-04-12
Payer: MEDICARE

## 2024-04-12 DIAGNOSIS — Z79.01 CHRONIC ANTICOAGULATION: Primary | ICD-10-CM

## 2024-04-12 DIAGNOSIS — I48.0 PAF (PAROXYSMAL ATRIAL FIBRILLATION) (HCC): ICD-10-CM

## 2024-04-12 DIAGNOSIS — I35.0 SEVERE AORTIC STENOSIS: ICD-10-CM

## 2024-04-12 LAB — INR PPP: 2.4 (ref 2–3.5)

## 2024-04-12 PROCEDURE — 85610 PROTHROMBIN TIME: CPT | Performed by: FAMILY MEDICINE

## 2024-04-12 PROCEDURE — 99999 PR NO CHARGE: CPT | Performed by: FAMILY MEDICINE

## 2024-04-12 PROCEDURE — 93793 ANTICOAG MGMT PT WARFARIN: CPT | Performed by: FAMILY MEDICINE

## 2024-04-12 NOTE — PROGRESS NOTES
Anticoagulation Summary  As of 2024      INR goal:  2.0-3.0   TTR:  69.0% (1.8 y)   INR used for dosin.40 (2024)   Warfarin maintenance plan:  3 mg (3 mg x 1) every Fri; 1.5 mg (3 mg x 0.5) all other days   Weekly warfarin total:  12 mg   Plan last modified:  Chris Lopes PharmD (2024)   Next INR check:  5/10/2024   Target end date:  Indefinite    Indications    Severe aortic stenosis [I35.0]  PAF (paroxysmal atrial fibrillation) (HCC) [I48.0]  Chronic anticoagulation [Z79.01]                 Anticoagulation Episode Summary       INR check location:      Preferred lab:      Send INR reminders to:      Comments:            Anticoagulation Care Providers       Provider Role Specialty Phone number    Ganesh Mckeon M.D. Referring Geriatric Medicine - Family Medicine [phone]    [facility] Responsible  [phone]          Anticoagulation Patient Findings  Patient Findings       Negatives:  Signs/symptoms of thrombosis, Signs/symptoms of bleeding, Laboratory test error suspected, Change in health, Change in alcohol use, Change in activity, Upcoming invasive procedure, Emergency department visit, Upcoming dental procedure, Missed doses, Extra doses, Change in medications, Change in diet/appetite, Hospital admission, Bruising, Other complaints                  HPI:   Shereen Wattsa seen in clinic today, on anticoagulation therapy with warfarin due to history of PAF.    Patient's previous INR was therapeutic at 2.7 on 3-15-24, at which time patient was instructed to continue with current warfarin regimen.  She returns to clinic today to recheck INR to ensure it is therapeutic and thus preventing possible clotting and/or bleeding/bruising complications.    CHADS-VASc = at least 5  (unadjusted ischemic stroke risk/year:  7.2%, which is moderate risk)    Does patient have any changes to current medical/health status since last appt (Y/N):  NO  Does patient have any  signs/symptoms of bleeding and/or thrombosis since the last appt (Y/N):  NO  Does patient have any interval changes to diet or medications since last appt (Y/N):  NO  Are there any complications or cost restrictions with current therapy (Y/N):  NO     Does patient have [facility] PCP? Yes, Ganesh Mckeon M.D. (If not, please document discussion that patient must be seen at [facility])       Vitals:  declined by patient    There were no vitals filed for this visit.     Asssessment:      INR therapeutic at 2.4, therefore decreasing stroke and/or bleeding risk.   Reason(s) for out of range INR today:  n/a      Pt is not on antiplatelet therapy    Medication reconciliation completed today.    Plan:  Pt is to continue with current warfarin dosing regimen.     Follow up:  Because warfarin is a high risk medication and current CHEST guidelines recommend regular monitoring intervals (few days up to 12 weeks), will have patient return to clinic in 4 weeks to recheck INR.    Brady Piña, PharmD, BCACP

## 2024-04-15 RX ORDER — AMLODIPINE BESYLATE 5 MG/1
5 TABLET ORAL DAILY
Qty: 100 TABLET | Refills: 2 | Status: SHIPPED | OUTPATIENT
Start: 2024-04-15 | End: 2024-04-17 | Stop reason: SDUPTHER

## 2024-04-17 ENCOUNTER — PATIENT MESSAGE (OUTPATIENT)
Dept: MEDICAL GROUP | Facility: MEDICAL CENTER | Age: 89
End: 2024-04-17
Payer: MEDICARE

## 2024-04-17 RX ORDER — AMLODIPINE BESYLATE 5 MG/1
5 TABLET ORAL DAILY
Qty: 100 TABLET | Refills: 2 | Status: SHIPPED | OUTPATIENT
Start: 2024-04-17 | End: 2025-02-11

## 2024-04-24 RX ORDER — AMLODIPINE BESYLATE 2.5 MG/1
2.5 TABLET ORAL DAILY
Qty: 90 TABLET | Refills: 2 | Status: SHIPPED | OUTPATIENT
Start: 2024-04-24

## 2024-05-08 RX ORDER — GLIPIZIDE 2.5 MG/1
2.5 TABLET, EXTENDED RELEASE ORAL
Qty: 100 TABLET | Refills: 0 | Status: SHIPPED | OUTPATIENT
Start: 2024-05-08

## 2024-05-08 NOTE — TELEPHONE ENCOUNTER
Received request via: Pharmacy    Was the patient seen in the last year in this department? Yes    Does the patient have an active prescription (recently filled or refills available) for medication(s) requested? No    Ph[facility] Name: [facility]    Does the patient have [facility] Plus and need 100 day supply (blood pressure, diabetes and cholesterol meds only)? Yes, quantity updated to 100 days

## 2024-05-10 ENCOUNTER — ANTICOAGULATION VISIT (OUTPATIENT)
Dept: MEDICAL GROUP | Facility: PHYSICIAN GROUP | Age: 89
End: 2024-05-10
Payer: MEDICARE

## 2024-05-10 DIAGNOSIS — I35.0 SEVERE AORTIC STENOSIS: ICD-10-CM

## 2024-05-10 DIAGNOSIS — I48.0 PAF (PAROXYSMAL ATRIAL FIBRILLATION) (HCC): ICD-10-CM

## 2024-05-10 DIAGNOSIS — Z79.01 CHRONIC ANTICOAGULATION: Primary | ICD-10-CM

## 2024-05-10 LAB — INR PPP: 2.5 (ref 2–3.5)

## 2024-05-10 PROCEDURE — 93793 ANTICOAG MGMT PT WARFARIN: CPT | Performed by: FAMILY MEDICINE

## 2024-05-10 PROCEDURE — 99999 PR NO CHARGE: CPT | Performed by: FAMILY MEDICINE

## 2024-05-10 PROCEDURE — 85610 PROTHROMBIN TIME: CPT | Performed by: FAMILY MEDICINE

## 2024-05-10 NOTE — PROGRESS NOTES
Anticoagulation Summary  As of 5/10/2024      INR goal:  2.0-3.0   TTR:  70.2% (1.9 y)   INR used for dosin.50 (5/10/2024)   Warfarin maintenance plan:  3 mg (3 mg x 1) every Fri; 1.5 mg (3 mg x 0.5) all other days   Weekly warfarin total:  12 mg   Plan last modified:  Chris Lopes PharmD (2024)   Next INR check:  2024   Target end date:  Indefinite    Indications    Severe aortic stenosis [I35.0]  PAF (paroxysmal atrial fibrillation) ([facility]) [I48.0]  Chronic anticoagulation [Z79.01]                 Anticoagulation Episode Summary       INR check location:      Preferred lab:      Send INR reminders to:      Comments:            Anticoagulation Care Providers       Provider Role Specialty Phone number    Ganesh Mckeon M.D. Referring Geriatric Medicine - Family Medicine [phone]    [facility] Responsible  [phone]          Anticoagulation Patient Findings  Patient Findings       Positives:  Bruising    Negatives:  Signs/symptoms of thrombosis, Signs/symptoms of bleeding, Laboratory test error suspected, Change in health, Change in alcohol use, Change in activity, Upcoming invasive procedure, Emergency department visit, Upcoming dental procedure, Missed doses, Extra doses, Change in medications, Change in diet/appetite, Hospital admission, Other complaints                  HPI:   Shereen Wattsa seen in clinic today, on anticoagulation therapy with warfarin due to history of PAF.    Patient's previous INR was therapeutic at 2.4 on 24, at which time patient was instructed to continue with current warfarin regimen.  She returns to clinic today to recheck INR to ensure it is therapeutic and thus preventing possible clotting and/or bleeding/bruising complications.    CHADS-VASc = at least 5  (unadjusted ischemic stroke risk/year:  7.2%, which is moderate risk)    Does patient have any changes to current medical/health status since last appt (Y/N):  NO  Does  patient have any signs/symptoms of bleeding and/or thrombosis since the last appt (Y/N):  some unexplained bruises on legs since last visit.  Does patient have any interval changes to diet or medications since last appt (Y/N):  NO  Are there any complications or cost restrictions with current therapy (Y/N):  NO     Does patient have [facility] PCP? Yes, Ganesh Mckeon M.D. (If not, please document discussion that patient must be seen at [facility])       Vitals:      There were no vitals filed for this visit.     Asssessment:      INR therapeutic at 2.5, therefore decreasing stroke and/or bleeding risk.   Reason(s) for out of range INR today:  n/a      Pt is not on antiplatelet therapy    Medication reconciliation completed today.    Plan:  Pt is to continue with current warfarin dosing regimen.     Follow up:  Because warfarin is a high risk medication and current CHEST guidelines recommend regular monitoring intervals (few days up to 12 weeks), will have patient return to clinic in 5 weeks to recheck INR.    Brady Piña, PharmD, BCACP

## 2024-06-06 ENCOUNTER — HOSPITAL ENCOUNTER (OUTPATIENT)
Dept: LAB | Facility: MEDICAL CENTER | Age: 89
End: 2024-06-06
Attending: FAMILY MEDICINE
Payer: MEDICARE

## 2024-06-06 DIAGNOSIS — E66.9 TYPE 2 DIABETES MELLITUS WITH OBESITY (HCC): ICD-10-CM

## 2024-06-06 DIAGNOSIS — E11.69 TYPE 2 DIABETES MELLITUS WITH OBESITY (HCC): ICD-10-CM

## 2024-06-06 DIAGNOSIS — I10 PRIMARY HYPERTENSION: ICD-10-CM

## 2024-06-06 LAB
BASOPHILS # BLD AUTO: 1.3 % (ref 0–1.8)
BASOPHILS # BLD: 0.1 K/UL (ref 0–0.12)
EOSINOPHIL # BLD AUTO: 0.26 K/UL (ref 0–0.51)
EOSINOPHIL NFR BLD: 3.3 % (ref 0–6.9)
ERYTHROCYTE [DISTWIDTH] IN BLOOD BY AUTOMATED COUNT: 42.3 FL (ref 35.9–50)
EST. AVERAGE GLUCOSE BLD GHB EST-MCNC: 163 MG/DL
HBA1C MFR BLD: 7.3 % (ref 4–5.6)
HCT VFR BLD AUTO: 38.7 % (ref 37–47)
HGB BLD-MCNC: 13.6 G/DL (ref 12–16)
IMM GRANULOCYTES # BLD AUTO: 0.03 K/UL (ref 0–0.11)
IMM GRANULOCYTES NFR BLD AUTO: 0.4 % (ref 0–0.9)
LYMPHOCYTES # BLD AUTO: 2.32 K/UL (ref 1–4.8)
LYMPHOCYTES NFR BLD: 29.8 % (ref 22–41)
MCH RBC QN AUTO: 30 PG (ref 27–33)
MCHC RBC AUTO-ENTMCNC: 35.1 G/DL (ref 32.2–35.5)
MCV RBC AUTO: 85.4 FL (ref 81.4–97.8)
MONOCYTES # BLD AUTO: 0.66 K/UL (ref 0–0.85)
MONOCYTES NFR BLD AUTO: 8.5 % (ref 0–13.4)
NEUTROPHILS # BLD AUTO: 4.41 K/UL (ref 1.82–7.42)
NEUTROPHILS NFR BLD: 56.7 % (ref 44–72)
NRBC # BLD AUTO: 0 K/UL
NRBC BLD-RTO: 0 /100 WBC (ref 0–0.2)
PLATELET # BLD AUTO: 195 K/UL (ref 164–446)
PMV BLD AUTO: 9.9 FL (ref 9–12.9)
RBC # BLD AUTO: 4.53 M/UL (ref 4.2–5.4)
WBC # BLD AUTO: 7.8 K/UL (ref 4.8–10.8)

## 2024-06-06 PROCEDURE — 84443 ASSAY THYROID STIM HORMONE: CPT

## 2024-06-06 PROCEDURE — 36415 COLL VENOUS BLD VENIPUNCTURE: CPT

## 2024-06-06 PROCEDURE — 85025 COMPLETE CBC W/AUTO DIFF WBC: CPT

## 2024-06-06 PROCEDURE — 80061 LIPID PANEL: CPT

## 2024-06-06 PROCEDURE — 82570 ASSAY OF URINE CREATININE: CPT

## 2024-06-06 PROCEDURE — 82043 UR ALBUMIN QUANTITATIVE: CPT

## 2024-06-06 PROCEDURE — 80053 COMPREHEN METABOLIC PANEL: CPT

## 2024-06-06 PROCEDURE — 83036 HEMOGLOBIN GLYCOSYLATED A1C: CPT

## 2024-06-06 PROCEDURE — 84439 ASSAY OF FREE THYROXINE: CPT

## 2024-06-07 LAB
ALBUMIN SERPL BCP-MCNC: 4.4 G/DL (ref 3.2–4.9)
ALBUMIN/GLOB SERPL: 1.6 G/DL
ALP SERPL-CCNC: 47 U/L (ref 30–99)
ALT SERPL-CCNC: 21 U/L (ref 2–50)
ANION GAP SERPL CALC-SCNC: 14 MMOL/L (ref 7–16)
AST SERPL-CCNC: 27 U/L (ref 12–45)
BILIRUB SERPL-MCNC: 0.5 MG/DL (ref 0.1–1.5)
BUN SERPL-MCNC: 13 MG/DL (ref 8–22)
CALCIUM ALBUM COR SERPL-MCNC: 9 MG/DL (ref 8.5–10.5)
CALCIUM SERPL-MCNC: 9.3 MG/DL (ref 8.5–10.5)
CHLORIDE SERPL-SCNC: 101 MMOL/L (ref 96–112)
CHOLEST SERPL-MCNC: 180 MG/DL (ref 100–199)
CO2 SERPL-SCNC: 21 MMOL/L (ref 20–33)
CREAT SERPL-MCNC: 0.82 MG/DL (ref 0.5–1.4)
CREAT UR-MCNC: 133.15 MG/DL
FASTING STATUS PATIENT QL REPORTED: NORMAL
GFR SERPLBLD CREATININE-BSD FMLA CKD-EPI: 69 ML/MIN/1.73 M 2
GLOBULIN SER CALC-MCNC: 2.8 G/DL (ref 1.9–3.5)
GLUCOSE SERPL-MCNC: 151 MG/DL (ref 65–99)
HDLC SERPL-MCNC: 58 MG/DL
LDLC SERPL CALC-MCNC: 97 MG/DL
MICROALBUMIN UR-MCNC: 1.9 MG/DL
MICROALBUMIN/CREAT UR: 14 MG/G (ref 0–30)
POTASSIUM SERPL-SCNC: 4.7 MMOL/L (ref 3.6–5.5)
PROT SERPL-MCNC: 7.2 G/DL (ref 6–8.2)
SODIUM SERPL-SCNC: 136 MMOL/L (ref 135–145)
T4 FREE SERPL-MCNC: 1.44 NG/DL (ref 0.93–1.7)
TRIGL SERPL-MCNC: 126 MG/DL (ref 0–149)
TSH SERPL DL<=0.005 MIU/L-ACNC: 2.91 UIU/ML (ref 0.38–5.33)

## 2024-06-14 ENCOUNTER — ANTICOAGULATION VISIT (OUTPATIENT)
Dept: MEDICAL GROUP | Facility: PHYSICIAN GROUP | Age: 89
End: 2024-06-14
Payer: MEDICARE

## 2024-06-14 DIAGNOSIS — I35.0 SEVERE AORTIC STENOSIS: ICD-10-CM

## 2024-06-14 DIAGNOSIS — Z79.01 CHRONIC ANTICOAGULATION: Primary | ICD-10-CM

## 2024-06-14 DIAGNOSIS — I48.0 PAF (PAROXYSMAL ATRIAL FIBRILLATION) (HCC): ICD-10-CM

## 2024-06-14 LAB — INR PPP: 2.3 (ref 2–3.5)

## 2024-06-14 PROCEDURE — 99999 PR NO CHARGE: CPT | Performed by: FAMILY MEDICINE

## 2024-06-14 PROCEDURE — 85610 PROTHROMBIN TIME: CPT | Performed by: FAMILY MEDICINE

## 2024-06-14 PROCEDURE — 93793 ANTICOAG MGMT PT WARFARIN: CPT | Performed by: FAMILY MEDICINE

## 2024-06-14 NOTE — PROGRESS NOTES
Anticoagulation Summary  As of 2024      INR goal:  2.0-3.0   TTR:  71.7% (2 y)   INR used for dosin.30 (2024)   Warfarin maintenance plan:  3 mg (3 mg x 1) every Fri; 1.5 mg (3 mg x 0.5) all other days   Weekly warfarin total:  12 mg   Plan last modified:  Alexis NagyD (2024)   Next INR check:  2024   Target end date:  Indefinite    Indications    Severe aortic stenosis [I35.0]  PAF (paroxysmal atrial fibrillation) (HCC) [I48.0]  Chronic anticoagulation [Z79.01]                 Anticoagulation Episode Summary       INR check location:      Preferred lab:      Send INR reminders to:      Comments:            Anticoagulation Care Providers       Provider Role Specialty Phone number    Ganesh Mckeon M.D. Referring Geriatric Medicine - Family Medicine [phone]    [facility] Responsible  [phone]          Anticoagulation Patient Findings  Patient Findings       Negatives:  Signs/symptoms of thrombosis, Signs/symptoms of bleeding, Laboratory test error suspected, Change in health, Change in alcohol use, Change in activity, Upcoming invasive procedure, Emergency department visit, Upcoming dental procedure, Missed doses, Extra doses, Change in medications, Change in diet/appetite, Hospital admission, Bruising, Other complaints                  HPI:   Shereen Wattsa seen in clinic today, on anticoagulation therapy with warfarin due to history of PAF.    Patient's previous INR was therapeutic at 2.5 on 5-10-24, at which time patient was instructed to continue with current warfarin regimen.  She returns to clinic today to recheck INR to ensure it is therapeutic and thus preventing possible clotting and/or bleeding/bruising complications.    CHADS-VASc = at least 5  (unadjusted ischemic stroke risk/year:  7.2%, which is moderate risk)    Does patient have any changes to current medical/health status since last appt (Y/N):  NO  Does patient have any  signs/symptoms of bleeding and/or thrombosis since the last appt (Y/N):  NO  Does patient have any interval changes to diet or medications since last appt (Y/N):  NO  Are there any complications or cost restrictions with current therapy (Y/N):  NO     Does patient have [facility] PCP? Yes, Ganesh Mckeon M.D. (If not, please document discussion that patient must be seen at [facility])       Vitals:  declined by patient today    There were no vitals filed for this visit.     Asssessment:      INR therapeutic at 2.3, therefore decreasing stroke and/or bleeding risk   Reason(s) for out of range INR today:  n/a      Pt is not on antiplatelet therapy    Medication reconciliation completed today.    Plan:  Pt is to continue with current warfarin dosing regimen.     Follow up:  Because warfarin is a high risk medication and current CHEST guidelines recommend regular monitoring intervals (few days up to 12 weeks), will have patient return to clinic in 4 weeks to recheck INR.    Brady Piña, PharmD, BCACP

## 2024-06-19 ENCOUNTER — OFFICE VISIT (OUTPATIENT)
Dept: MEDICAL GROUP | Facility: MEDICAL CENTER | Age: 89
End: 2024-06-19
Payer: MEDICARE

## 2024-06-19 VITALS
HEIGHT: 62 IN | RESPIRATION RATE: 16 BRPM | BODY MASS INDEX: 28.97 KG/M2 | OXYGEN SATURATION: 94 % | SYSTOLIC BLOOD PRESSURE: 102 MMHG | TEMPERATURE: 97.8 F | DIASTOLIC BLOOD PRESSURE: 60 MMHG | WEIGHT: 157.41 LBS | HEART RATE: 78 BPM

## 2024-06-19 DIAGNOSIS — L30.4 INTERTRIGO: ICD-10-CM

## 2024-06-19 DIAGNOSIS — E11.9 TYPE 2 DIABETES MELLITUS WITHOUT COMPLICATION, WITHOUT LONG-TERM CURRENT USE OF INSULIN (HCC): ICD-10-CM

## 2024-06-19 DIAGNOSIS — I35.0 SEVERE AORTIC STENOSIS: ICD-10-CM

## 2024-06-19 DIAGNOSIS — I10 PRIMARY HYPERTENSION: ICD-10-CM

## 2024-06-19 PROCEDURE — 99214 OFFICE O/P EST MOD 30 MIN: CPT | Performed by: FAMILY MEDICINE

## 2024-06-19 PROCEDURE — 3074F SYST BP LT 130 MM HG: CPT | Performed by: FAMILY MEDICINE

## 2024-06-19 PROCEDURE — 3078F DIAST BP <80 MM HG: CPT | Performed by: FAMILY MEDICINE

## 2024-06-19 RX ORDER — AMOXICILLIN 500 MG/1
CAPSULE ORAL
COMMUNITY

## 2024-06-19 RX ORDER — NYSTATIN 100000 [USP'U]/G
1 POWDER TOPICAL 3 TIMES DAILY
Qty: 60 G | Refills: 2 | Status: SHIPPED | OUTPATIENT
Start: 2024-06-19

## 2024-06-19 RX ORDER — LIDOCAINE AND PRILOCAINE 25; 25 MG/G; MG/G
CREAM TOPICAL
COMMUNITY

## 2024-06-19 ASSESSMENT — FIBROSIS 4 INDEX: FIB4 SCORE: 2.63

## 2024-06-19 NOTE — PROGRESS NOTES
CC: Diabetes, hypertension, severe aortic stenosis, intertrigo    HPI:   Shereen presents today to discuss the following:    Type 2 diabetes mellitus without complication, without long-term current use of insulin ([facility])  Patient's A1c went up  from 6.7% to 7.3%.  Patient stated that she decreased her metformin to 1000 mg a day and she has not been taking the glipizide.    Primary hypertension  Patient blood pressure has been adequate controlled on amlodipine 7.5 mg daily, carvedilol 12.5 mg twice a day, clonidine as needed    Severe aortic stenosis  Patient has severe aortic stenosis, however she refused aortic valve replacement(TAVR procedure) suggested by cardiologist.  However she has been symptomatic.  She has been having shortness of breath, and sometimes dizziness.  Denies syncopal episodes.    She has been having skin irritation in the genital area, however denies any vaginal discharge or irritation.  Nystatin powder has been working, requested refill.      Patient Active Problem List    Diagnosis Date Noted    Reactive airway disease with wheezing, mild persistent, uncomplicated 05/26/2023    Pneumonia 05/26/2023    Strep throat 05/26/2023    Secondary hypercoagulable state ([facility]) 06/09/2022    Acquired hypothyroidism 09/12/2019    Skin abrasion 08/20/2019    Chronic anticoagulation 08/02/2019    Hyponatremia 03/25/2019    Epistaxis 03/25/2019    Advanced directives, counseling/discussion 09/26/2018    Hypertensive urgency 05/11/2017    Diastolic dysfunction 02/02/2016    Tear of lateral cartilage or meniscus of knee, current 05/21/2015    PAF (paroxysmal atrial fibrillation) ([facility]) 01/12/2015    Dyspnea on effort 01/02/2014    Diabetes ([facility]) 12/26/2013    Severe aortic stenosis 07/02/2013    HTN (hypertension) 05/04/2012    Cough 05/02/2012    Edema 05/02/2012    Acute, but ill-defined, cerebrovascular disease 04/30/2012       Current Outpatient Medications   Medication Sig Dispense Refill    lidocaine-prilocaine  (EMLA) 2.5-2.5 % Cream       nystatin (MYCOSTATIN) powder Apply 1 g topically 3 times a day. 60 g 2    glipiZIDE ER (GLUCOTROL XL) 2.5 MG TABLET SR 24 HR TAKE ONE TABLET BY MOUTH EVERY  Tablet 0    amLODIPine (NORVASC) 2.5 MG Tab Take 1 Tablet by mouth every day. 90 Tablet 2    amLODIPine (NORVASC) 5 MG Tab Take 1 Tablet by mouth every day for 300 days. Take 5mg PO daily in AM. 100 Tablet 2    metFORMIN (GLUCOPHAGE) 500 MG Tab TAKE ONE TABLET BY MOUTH THREE TIMES A  Tablet 0    omeprazole (PRILOSEC) 20 MG delayed-release capsule TAKE ONE CAPSULE BY MOUTH TWICE A  Capsule 1    levothyroxine (SYNTHROID) 50 MCG Tab TAKE ONE TABLET BY MOUTH EVERY DAY IN THE MORNING FOR UNDERACTIVE THYROID 100 Tablet 2    metaxalone (SKELAXIN) 800 MG Tab Take 0.5 Tablets by mouth 2 times a day. 100 Tablet 2    carvedilol (COREG) 12.5 MG Tab TAKE ONE TABLET BY MOUTH TWICE A DAY FOR BLOOD PRESSURE 200 Tablet 3    gabapentin (NEURONTIN) 100 MG Cap Take 1 Capsule by mouth 2 times a day. Take 1 capsule PO Daily in the AM,one in the afternoon, and Take 2 capsules daily in the  Capsule 3    cloNIDine (CATAPRES) 0.1 MG Tab       nystatin (NYSTOP) powder       ofloxacin (OCUFLOX) 0.3 % Solution       warfarin (JANTOVEN) 3 MG Tab Take 2 Tablets by mouth every day.      Zoster Vac Recomb Adjuvanted (SHINGRIX) 50 MCG/0.5ML Recon Susp       carvedilol (COREG) 25 MG Tab       Cholecalciferol (VITAMIN D3) 2000 UNIT Cap Take 2,000 Units by mouth every day.      Misc. Devices Misc Oxygen concentrator with humidifier, 2.5-3 L/min 1 Each 0    dorzolamide-timolol (COSOPT) 22.3-6.8 MG/ML Solution Administer 1 Drop into both eyes 2 times a day. Indications: Wide-Angle Glaucoma      amoxicillin (AMOXIL) 500 MG Cap  (Patient not taking: Reported on 6/19/2024)      potassium chloride SA (KDUR) 20 MEQ Tab CR Take 1 Tablet by mouth every day. (Patient not taking: Reported on 11/20/2023)      prednisoLONE acetate (PRED FORTE) 1 %  "Suspension  (Patient not taking: Reported on 11/20/2023)      sulfamethoxazole-trimethoprim (BACTRIM DS) 800-160 MG tablet  (Patient not taking: Reported on 11/20/2023)      telmisartan (MICARDIS) 40 MG Tab Take 1 Tablet by mouth every day. (Patient not taking: Reported on 3/19/2024)      tizanidine (ZANAFLEX) 2 MG tablet Take 1 tablet every 8 hours by oral route as needed. (Patient not taking: Reported on 11/20/2023)      tizanidine (ZANAFLEX) 4 MG Tab  (Patient not taking: Reported on 11/20/2023)      valacyclovir (VALTREX) 1 GM Tab Take 1 tablet every 12 hours by oral route. (Patient not taking: Reported on 11/20/2023)      warfarin (COUMADIN) 3 MG Tab Take one-half to one tablet by mouth daily or as directed by anticoagulation clinic (Patient not taking: Reported on 6/19/2024) 90 Tablet 3     No current facility-administered medications for this visit.         Allergies as of 06/19/2024 - Reviewed 03/19/2024   Allergen Reaction Noted    Darvon [propoxyphene hcl] Anaphylaxis 03/18/2008    Iodine Anaphylaxis 05/11/2015    Tetanus antitoxin Anaphylaxis 11/19/2017    Latex Unspecified 09/19/2013    Penicillins Rash 08/02/2008    Kdc:red dye+acetaminophen+propoxyphene  07/12/2023    Lidocaine  03/14/2008    Tetracycline  06/19/2024        ROS: Denies any chest pain, Shortness of breath, Changes bowel or bladder, Lower extremity edema.    Physical Exam:  /60 (BP Location: Left arm, Patient Position: Sitting, BP Cuff Size: Adult)   Pulse 78   Temp 36.6 °C (97.8 °F) (Temporal)   Resp 16   Ht 1.575 m (5' 2\")   Wt 71.4 kg (157 lb 6.5 oz)   LMP 01/01/1985   SpO2 94%   BMI 28.79 kg/m²   Gen.: Well-developed, well-nourished, no apparent distress,pleasant and cooperative with the examination  Skin:  Warm and dry with good turgor. No rashes or suspicious lesions in visible areas  HEENT:Sinuses nontender with palpation, TMs clear, nares patent with pink mucosa and clear rhinorrhea,no septal deviation ,polyps or " lesions. lips without lesions, oropharynx clear.  Neck: Trachea midline,no masses or adenopathy. No JVD.  Cor: Regular rate and rhythm without murmur, gallop or rub.  Lungs: Respirations unlabored.Clear to auscultation with equal breath sounds bilaterally. No wheezes, rhonchi.  Extremities: No cyanosis, clubbing or edema.          Assessment and Plan.   88 y.o. female     1. Type 2 diabetes mellitus without complication, without long-term current use of insulin (HCC)  When she went up from 6.7% to 7.3%.  Advised to go back to metformin 1500 mg daily, and glipizide ER 2.5 mg daily.  Return to the clinic in 3 months to repeat the A1c    2. Primary hypertension  Controlled.    Continue on amlodipine 7.5 mg daily    3. Severe aortic stenosis  Patient refused aortic valve replacement  She has been symptomatic(shortness of breath, and sometimes dizziness)    4. 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  - nystatin (MYCOSTATIN) powder; Apply 1 g topically 3 times a day.  Dispense: 60 g; Refill: 2

## 2024-07-12 ENCOUNTER — ANTICOAGULATION VISIT (OUTPATIENT)
Dept: MEDICAL GROUP | Facility: PHYSICIAN GROUP | Age: 89
End: 2024-07-12
Payer: MEDICARE

## 2024-07-12 VITALS — WEIGHT: 157.41 LBS | RESPIRATION RATE: 14 BRPM | HEIGHT: 62 IN | BODY MASS INDEX: 28.97 KG/M2

## 2024-07-12 DIAGNOSIS — Z79.01 CHRONIC ANTICOAGULATION: Primary | ICD-10-CM

## 2024-07-12 DIAGNOSIS — I35.0 SEVERE AORTIC STENOSIS: ICD-10-CM

## 2024-07-12 DIAGNOSIS — I48.0 PAF (PAROXYSMAL ATRIAL FIBRILLATION) (HCC): ICD-10-CM

## 2024-07-12 LAB — INR PPP: 1.8 (ref 2–3.5)

## 2024-07-12 PROCEDURE — 99211 OFF/OP EST MAY X REQ PHY/QHP: CPT | Performed by: FAMILY MEDICINE

## 2024-07-12 PROCEDURE — 85610 PROTHROMBIN TIME: CPT | Performed by: FAMILY MEDICINE

## 2024-07-12 ASSESSMENT — FIBROSIS 4 INDEX: FIB4 SCORE: 2.63

## 2024-08-16 ENCOUNTER — ANTICOAGULATION VISIT (OUTPATIENT)
Dept: MEDICAL GROUP | Facility: PHYSICIAN GROUP | Age: 89
End: 2024-08-16
Payer: MEDICARE

## 2024-08-16 DIAGNOSIS — Z79.01 CHRONIC ANTICOAGULATION: Primary | ICD-10-CM

## 2024-08-16 DIAGNOSIS — I48.0 PAF (PAROXYSMAL ATRIAL FIBRILLATION) (HCC): ICD-10-CM

## 2024-08-16 DIAGNOSIS — I35.0 SEVERE AORTIC STENOSIS: ICD-10-CM

## 2024-08-16 LAB — INR PPP: 2.1 (ref 2–3.5)

## 2024-08-16 PROCEDURE — 99999 PR NO CHARGE: CPT

## 2024-08-16 PROCEDURE — 85610 PROTHROMBIN TIME: CPT

## 2024-08-16 PROCEDURE — 93793 ANTICOAG MGMT PT WARFARIN: CPT

## 2024-08-16 NOTE — PROGRESS NOTES
Anticoagulation Summary  As of 2024      INR goal:  2.0-3.0   TTR:  69.5% (2.2 y)   INR used for dosin.10 (2024)   Warfarin maintenance plan:  3 mg (3 mg x 1) every Fri; 1.5 mg (3 mg x 0.5) all other days   Weekly warfarin total:  12 mg   Plan last modified:  Chris Lopes PharmD (2024)   Next INR check:  2024   Target end date:  Indefinite    Indications    Severe aortic stenosis [I35.0]  PAF (paroxysmal atrial fibrillation) (HCC) [I48.0]  Chronic anticoagulation [Z79.01]                 Anticoagulation Episode Summary       INR check location:  --    Preferred lab:  --    Send INR reminders to:  --    Comments:  --          Anticoagulation Care Providers       Provider Role Specialty Phone number    Ganesh Mckeon M.D. Referring Geriatric Medicine - Family Medicine [phone]    [facility] Anticoagulation Services Responsible  [phone]          Anticoagulation Patient Findings  Patient Findings       Negatives:  Signs/symptoms of thrombosis, Signs/symptoms of bleeding, Laboratory test error suspected, Change in health, Change in alcohol use, Change in activity, Upcoming invasive procedure, Emergency department visit, Upcoming dental procedure, Missed doses, Extra doses, Change in medications, Change in diet/appetite, Hospital admission, Bruising, Other complaints                  HPI:   Shereen Wattsa seen in clinic today, on anticoagulation therapy with warfarin due to history of PAF.    Patient's previous INR was subtherapeutic at 1.8 on 24, at which time patient was instructed to continue with current warfarin regimen.  She returns to clinic today to recheck INR to ensure it is therapeutic and thus preventing possible clotting and/or bleeding/bruising complications.    CHADS-VASc = at least 5  (unadjusted ischemic stroke risk/year:  7.2%, which is moderate risk)    Does patient have any changes to current medical/health status since last appt (Y/N):  NO  Does  patient have any signs/symptoms of bleeding and/or thrombosis since the last appt (Y/N):  NO  Does patient have any interval changes to diet or medications since last appt (Y/N):  NO  Are there any complications or cost restrictions with current therapy (Y/N):  NO     Does patient have [facility] PCP? Yes, Ganesh Mckeon M.D. (If not, please document discussion that patient must be seen at [facility])       Vitals:      There were no vitals filed for this visit.     Asssessment:      INR therapeutic at 2.1, therefore decreasing stroke and/or bleeding risk.   Reason(s) for out of range INR today:  n/a      Pt is not on antiplatelet therapy    Medication reconciliation completed today.    Plan:  Pt is to continue with current warfarin dosing regimen.     Follow up:  Because warfarin is a high risk medication and current CHEST guidelines recommend regular monitoring intervals (few days up to 12 weeks), will have patient return to clinic in 5 weeks to recheck INR.    Brady Piña, PharmD, BCACP

## 2024-09-04 DIAGNOSIS — E66.9 TYPE 2 DIABETES MELLITUS WITH OBESITY (HCC): ICD-10-CM

## 2024-09-04 DIAGNOSIS — E11.69 TYPE 2 DIABETES MELLITUS WITH OBESITY (HCC): ICD-10-CM

## 2024-09-04 RX ORDER — GABAPENTIN 100 MG/1
100 CAPSULE ORAL 2 TIMES DAILY
Qty: 360 CAPSULE | Refills: 3 | Status: SHIPPED | OUTPATIENT
Start: 2024-09-04

## 2024-09-04 RX ORDER — GLIPIZIDE 2.5 MG/1
2.5 TABLET, EXTENDED RELEASE ORAL
Qty: 100 TABLET | Refills: 0 | Status: SHIPPED | OUTPATIENT
Start: 2024-09-04

## 2024-09-04 NOTE — TELEPHONE ENCOUNTER
Received request via: Pharmacy    Was the patient seen in the last year in this department? Yes    Does the patient have an active prescription (recently filled or refills available) for medication(s) requested? No    Pharmacy Name: [facility] [address] [place] [address], [place] [id number] [facility]     Does the patient have care home Plus and need 100-day supply? (This applies to ALL medications) Yes, quantity updated to 100 days

## 2024-09-20 ENCOUNTER — ANTICOAGULATION VISIT (OUTPATIENT)
Dept: MEDICAL GROUP | Facility: PHYSICIAN GROUP | Age: 89
End: 2024-09-20
Payer: MEDICARE

## 2024-09-20 VITALS — RESPIRATION RATE: 14 BRPM | WEIGHT: 157 LBS | BODY MASS INDEX: 29.64 KG/M2 | HEIGHT: 61 IN

## 2024-09-20 DIAGNOSIS — Z79.01 CHRONIC ANTICOAGULATION: ICD-10-CM

## 2024-09-20 DIAGNOSIS — I48.0 PAF (PAROXYSMAL ATRIAL FIBRILLATION) (HCC): ICD-10-CM

## 2024-09-20 DIAGNOSIS — I35.0 SEVERE AORTIC STENOSIS: ICD-10-CM

## 2024-09-20 LAB — INR PPP: 2.5 (ref 2–3.5)

## 2024-09-20 PROCEDURE — 99999 PR NO CHARGE: CPT | Performed by: FAMILY MEDICINE

## 2024-09-20 PROCEDURE — 93793 ANTICOAG MGMT PT WARFARIN: CPT | Performed by: FAMILY MEDICINE

## 2024-09-20 PROCEDURE — 85610 PROTHROMBIN TIME: CPT | Performed by: FAMILY MEDICINE

## 2024-09-20 ASSESSMENT — FIBROSIS 4 INDEX: FIB4 SCORE: 2.63

## 2024-09-20 NOTE — PROGRESS NOTES
OP Anticoagulation Service Note    Date: 2024    Anticoagulation Summary  As of 2024      INR goal:  2.0-3.0   TTR:  70.8% (2.2 y)   INR used for dosin.50 (2024)   Warfarin maintenance plan:  3 mg (3 mg x 1) every Fri; 1.5 mg (3 mg x 0.5) all other days   Weekly warfarin total:  12 mg   Plan last modified:  Chris Lopes, PharmD (2024)   Next INR check:  10/18/2024   Target end date:  Indefinite    Indications    Severe aortic stenosis [I35.0]  PAF (paroxysmal atrial fibrillation) (HCC) [I48.0]  Chronic anticoagulation [Z79.01]                 Anticoagulation Episode Summary       INR check location:  --    Preferred lab:  --    Send INR reminders to:  --    Comments:  --          Anticoagulation Care Providers       Provider Role Specialty Phone number    Ganesh Mckeon M.D. Referring Geriatric Medicine - Family Medicine [phone]    [facility] Anticoagulation Services Responsible  [phone]          Anticoagulation Patient Findings  Patient Findings       Positives:  Signs/symptoms of bleeding (One short episode of epistaxis since last visit), Bruising    Negatives:  Signs/symptoms of thrombosis, Laboratory test error suspected, Change in health, Change in alcohol use, Change in activity, Upcoming invasive procedure, Emergency department visit, Upcoming dental procedure, Missed doses, Extra doses, Change in medications, Change in diet/appetite, Hospital admission, Other complaints            HPI:   Shereen Dale is in the Anticoagulation Clinic today for an INR check on their anticoagulation therapy.     The reason for today's visit is to prevent morbidity and mortality from a blood clot and/or stroke and to reduce the risk of bleeding while on an anticoagulant.     PCP:  Ganesh Mckeon M.D.  [address] [facility] [address]  [facility] [address] [phone]    3 vitals included with today's appt-unless patient declined:  (BP, HR, weight, ht, RR)   Vitals:    24 1013  "  Resp: 14   Weight: 71.2 kg (157 lb)   Height: 1.549 m (5' 1\")       Verified current warfarin dosing schedule.    Medications reconciled: Yes  Pt is not on antiplatelet therapy    Assessment:   INR is therapeutic    Plan:  Instructed pt to continue on with current regimen.    Follow up:  Follow up appointment in 4 week(s) per pt.       Other info:  Pt educated to contact our clinic with any changes in medications or s/s of bleeding or thrombosis.  Education was provided today regarding tips to reduce their bleed risk and dietary constraints while on an anticoagulant.    National Recommendations:  The CHEST guidelines recommends frequent INR monitoring at regular intervals (a few days up to a max of 12 weeks) to ensure patients are on the proper dose of warfarin, and patients are not having any complications from therapy.  INRs can dramatically change over a short time period due to diet, medications, and medical conditions.     Chris Lopes, PharmD, BCACP    [facility]  Phone: [phone], Fax: [phone]    "

## 2024-10-03 ENCOUNTER — TELEPHONE (OUTPATIENT)
Dept: HEALTH INFORMATION MANAGEMENT | Facility: OTHER | Age: 89
End: 2024-10-03
Payer: MEDICARE

## 2024-10-16 ENCOUNTER — OFFICE VISIT (OUTPATIENT)
Dept: MEDICAL GROUP | Facility: MEDICAL CENTER | Age: 89
End: 2024-10-16
Payer: MEDICARE

## 2024-10-16 VITALS
BODY MASS INDEX: 27.79 KG/M2 | DIASTOLIC BLOOD PRESSURE: 70 MMHG | HEART RATE: 78 BPM | OXYGEN SATURATION: 96 % | SYSTOLIC BLOOD PRESSURE: 128 MMHG | HEIGHT: 62 IN | WEIGHT: 151 LBS | RESPIRATION RATE: 14 BRPM | TEMPERATURE: 98 F

## 2024-10-16 DIAGNOSIS — E66.9 TYPE 2 DIABETES MELLITUS WITH OBESITY (HCC): ICD-10-CM

## 2024-10-16 DIAGNOSIS — E03.9 ACQUIRED HYPOTHYROIDISM: ICD-10-CM

## 2024-10-16 DIAGNOSIS — Z76.0 MEDICATION REFILL: ICD-10-CM

## 2024-10-16 DIAGNOSIS — I10 PRIMARY HYPERTENSION: ICD-10-CM

## 2024-10-16 DIAGNOSIS — D68.69 SECONDARY HYPERCOAGULABLE STATE (HCC): ICD-10-CM

## 2024-10-16 DIAGNOSIS — E11.69 TYPE 2 DIABETES MELLITUS WITH OBESITY (HCC): ICD-10-CM

## 2024-10-16 DIAGNOSIS — I35.0 SEVERE AORTIC STENOSIS: ICD-10-CM

## 2024-10-16 DIAGNOSIS — I48.0 PAF (PAROXYSMAL ATRIAL FIBRILLATION) (HCC): ICD-10-CM

## 2024-10-16 LAB
HBA1C MFR BLD: 6.7 % (ref ?–5.8)
POCT INT CON NEG: NEGATIVE
POCT INT CON POS: POSITIVE

## 2024-10-16 PROCEDURE — 3074F SYST BP LT 130 MM HG: CPT | Performed by: FAMILY MEDICINE

## 2024-10-16 PROCEDURE — 99214 OFFICE O/P EST MOD 30 MIN: CPT | Performed by: FAMILY MEDICINE

## 2024-10-16 PROCEDURE — 3078F DIAST BP <80 MM HG: CPT | Performed by: FAMILY MEDICINE

## 2024-10-16 PROCEDURE — 83036 HEMOGLOBIN GLYCOSYLATED A1C: CPT | Performed by: FAMILY MEDICINE

## 2024-10-16 RX ORDER — METAXALONE 800 MG/1
400 TABLET ORAL 2 TIMES DAILY
Qty: 100 TABLET | Refills: 2 | Status: SHIPPED | OUTPATIENT
Start: 2024-10-16

## 2024-10-16 RX ORDER — AMLODIPINE BESYLATE 2.5 MG/1
2.5 TABLET ORAL DAILY
Qty: 90 TABLET | Refills: 2 | Status: SHIPPED | OUTPATIENT
Start: 2024-10-16

## 2024-10-16 RX ORDER — AMLODIPINE BESYLATE 5 MG/1
5 TABLET ORAL DAILY
Qty: 100 TABLET | Refills: 2 | Status: SHIPPED | OUTPATIENT
Start: 2024-10-16 | End: 2025-08-12

## 2024-10-16 RX ORDER — GABAPENTIN 100 MG/1
100 CAPSULE ORAL 2 TIMES DAILY
Qty: 360 CAPSULE | Refills: 3 | Status: SHIPPED | OUTPATIENT
Start: 2024-10-16

## 2024-10-16 RX ORDER — GLIPIZIDE 2.5 MG/1
2.5 TABLET, EXTENDED RELEASE ORAL
Qty: 100 TABLET | Refills: 0 | Status: SHIPPED | OUTPATIENT
Start: 2024-10-16

## 2024-10-16 RX ORDER — WARFARIN SODIUM 3 MG/1
6 TABLET ORAL
Qty: 90 TABLET | Refills: 0 | Status: SHIPPED | OUTPATIENT
Start: 2024-10-16

## 2024-10-16 ASSESSMENT — FIBROSIS 4 INDEX: FIB4 SCORE: 2.63

## 2024-10-18 ENCOUNTER — ANTICOAGULATION VISIT (OUTPATIENT)
Dept: MEDICAL GROUP | Facility: PHYSICIAN GROUP | Age: 89
End: 2024-10-18
Payer: MEDICARE

## 2024-10-18 DIAGNOSIS — I35.0 SEVERE AORTIC STENOSIS: ICD-10-CM

## 2024-10-18 DIAGNOSIS — Z79.01 CHRONIC ANTICOAGULATION: Primary | ICD-10-CM

## 2024-10-18 DIAGNOSIS — I48.0 PAF (PAROXYSMAL ATRIAL FIBRILLATION) (HCC): ICD-10-CM

## 2024-10-18 LAB — INR PPP: 3.3 (ref 2–3.5)

## 2024-10-18 PROCEDURE — 85610 PROTHROMBIN TIME: CPT | Performed by: FAMILY MEDICINE

## 2024-10-18 PROCEDURE — 99211 OFF/OP EST MAY X REQ PHY/QHP: CPT | Performed by: FAMILY MEDICINE

## 2024-10-27 ENCOUNTER — APPOINTMENT (OUTPATIENT)
Dept: RADIOLOGY | Facility: MEDICAL CENTER | Age: 89
End: 2024-10-27
Attending: EMERGENCY MEDICINE
Payer: MEDICARE

## 2024-10-27 ENCOUNTER — HOSPITAL ENCOUNTER (EMERGENCY)
Facility: MEDICAL CENTER | Age: 89
End: 2024-10-28
Attending: EMERGENCY MEDICINE
Payer: MEDICARE

## 2024-10-27 DIAGNOSIS — J18.9 PNEUMONIA OF BOTH LOWER LOBES DUE TO INFECTIOUS ORGANISM: ICD-10-CM

## 2024-10-27 DIAGNOSIS — R05.3 CHRONIC COUGH: ICD-10-CM

## 2024-10-27 DIAGNOSIS — R79.89 ELEVATED BRAIN NATRIURETIC PEPTIDE (BNP) LEVEL: ICD-10-CM

## 2024-10-27 LAB
ALBUMIN SERPL BCP-MCNC: 4.4 G/DL (ref 3.2–4.9)
ALBUMIN/GLOB SERPL: 1.7 G/DL
ALP SERPL-CCNC: 49 U/L (ref 30–99)
ALT SERPL-CCNC: 22 U/L (ref 2–50)
ANION GAP SERPL CALC-SCNC: 13 MMOL/L (ref 7–16)
AST SERPL-CCNC: 21 U/L (ref 12–45)
BASOPHILS # BLD AUTO: 0.6 % (ref 0–1.8)
BASOPHILS # BLD: 0.08 K/UL (ref 0–0.12)
BILIRUB SERPL-MCNC: 0.3 MG/DL (ref 0.1–1.5)
BUN SERPL-MCNC: 9 MG/DL (ref 8–22)
CALCIUM ALBUM COR SERPL-MCNC: 9.1 MG/DL (ref 8.5–10.5)
CALCIUM SERPL-MCNC: 9.4 MG/DL (ref 8.5–10.5)
CHLORIDE SERPL-SCNC: 94 MMOL/L (ref 96–112)
CO2 SERPL-SCNC: 22 MMOL/L (ref 20–33)
CREAT SERPL-MCNC: 0.78 MG/DL (ref 0.5–1.4)
EKG IMPRESSION: NORMAL
EOSINOPHIL # BLD AUTO: 0.29 K/UL (ref 0–0.51)
EOSINOPHIL NFR BLD: 2.4 % (ref 0–6.9)
ERYTHROCYTE [DISTWIDTH] IN BLOOD BY AUTOMATED COUNT: 41.3 FL (ref 35.9–50)
GFR SERPLBLD CREATININE-BSD FMLA CKD-EPI: 73 ML/MIN/1.73 M 2
GLOBULIN SER CALC-MCNC: 2.6 G/DL (ref 1.9–3.5)
GLUCOSE SERPL-MCNC: 115 MG/DL (ref 65–99)
HCT VFR BLD AUTO: 38 % (ref 37–47)
HGB BLD-MCNC: 12.9 G/DL (ref 12–16)
IMM GRANULOCYTES # BLD AUTO: 0.05 K/UL (ref 0–0.11)
IMM GRANULOCYTES NFR BLD AUTO: 0.4 % (ref 0–0.9)
LYMPHOCYTES # BLD AUTO: 2.5 K/UL (ref 1–4.8)
LYMPHOCYTES NFR BLD: 20.3 % (ref 22–41)
MCH RBC QN AUTO: 29.4 PG (ref 27–33)
MCHC RBC AUTO-ENTMCNC: 33.9 G/DL (ref 32.2–35.5)
MCV RBC AUTO: 86.6 FL (ref 81.4–97.8)
MONOCYTES # BLD AUTO: 0.89 K/UL (ref 0–0.85)
MONOCYTES NFR BLD AUTO: 7.2 % (ref 0–13.4)
NEUTROPHILS # BLD AUTO: 8.5 K/UL (ref 1.82–7.42)
NEUTROPHILS NFR BLD: 69.1 % (ref 44–72)
NRBC # BLD AUTO: 0 K/UL
NRBC BLD-RTO: 0 /100 WBC (ref 0–0.2)
NT-PROBNP SERPL IA-MCNC: 2175 PG/ML (ref 0–125)
PLATELET # BLD AUTO: 205 K/UL (ref 164–446)
PMV BLD AUTO: 9.1 FL (ref 9–12.9)
POTASSIUM SERPL-SCNC: 4.6 MMOL/L (ref 3.6–5.5)
PROT SERPL-MCNC: 7 G/DL (ref 6–8.2)
RBC # BLD AUTO: 4.39 M/UL (ref 4.2–5.4)
SODIUM SERPL-SCNC: 129 MMOL/L (ref 135–145)
TROPONIN T SERPL-MCNC: 13 NG/L (ref 6–19)
WBC # BLD AUTO: 12.3 K/UL (ref 4.8–10.8)

## 2024-10-27 PROCEDURE — 85025 COMPLETE CBC W/AUTO DIFF WBC: CPT

## 2024-10-27 PROCEDURE — 84484 ASSAY OF TROPONIN QUANT: CPT

## 2024-10-27 PROCEDURE — 93005 ELECTROCARDIOGRAM TRACING: CPT | Performed by: EMERGENCY MEDICINE

## 2024-10-27 PROCEDURE — 83880 ASSAY OF NATRIURETIC PEPTIDE: CPT

## 2024-10-27 PROCEDURE — 36415 COLL VENOUS BLD VENIPUNCTURE: CPT

## 2024-10-27 PROCEDURE — 71045 X-RAY EXAM CHEST 1 VIEW: CPT

## 2024-10-27 PROCEDURE — 96374 THER/PROPH/DIAG INJ IV PUSH: CPT

## 2024-10-27 PROCEDURE — 93005 ELECTROCARDIOGRAM TRACING: CPT

## 2024-10-27 PROCEDURE — 80053 COMPREHEN METABOLIC PANEL: CPT

## 2024-10-27 PROCEDURE — 94760 N-INVAS EAR/PLS OXIMETRY 1: CPT

## 2024-10-27 PROCEDURE — 96375 TX/PRO/DX INJ NEW DRUG ADDON: CPT

## 2024-10-27 PROCEDURE — 99285 EMERGENCY DEPT VISIT HI MDM: CPT

## 2024-10-27 RX ORDER — DOXYCYCLINE 100 MG/1
100 TABLET ORAL ONCE
Status: DISCONTINUED | OUTPATIENT
Start: 2024-10-28 | End: 2024-10-28

## 2024-10-27 RX ORDER — IPRATROPIUM BROMIDE AND ALBUTEROL SULFATE 2.5; .5 MG/3ML; MG/3ML
3 SOLUTION RESPIRATORY (INHALATION) ONCE
Status: COMPLETED | OUTPATIENT
Start: 2024-10-28 | End: 2024-10-28

## 2024-10-27 RX ORDER — BENZONATATE 100 MG/1
200 CAPSULE ORAL ONCE
Status: COMPLETED | OUTPATIENT
Start: 2024-10-28 | End: 2024-10-28

## 2024-10-27 ASSESSMENT — FIBROSIS 4 INDEX: FIB4 SCORE: 2.63

## 2024-10-28 VITALS
BODY MASS INDEX: 27.79 KG/M2 | HEART RATE: 81 BPM | WEIGHT: 151 LBS | DIASTOLIC BLOOD PRESSURE: 72 MMHG | RESPIRATION RATE: 20 BRPM | HEIGHT: 62 IN | SYSTOLIC BLOOD PRESSURE: 123 MMHG | OXYGEN SATURATION: 92 % | TEMPERATURE: 96.7 F

## 2024-10-28 PROCEDURE — A9270 NON-COVERED ITEM OR SERVICE: HCPCS | Performed by: EMERGENCY MEDICINE

## 2024-10-28 PROCEDURE — 700101 HCHG RX REV CODE 250: Performed by: EMERGENCY MEDICINE

## 2024-10-28 PROCEDURE — 700111 HCHG RX REV CODE 636 W/ 250 OVERRIDE (IP): Performed by: EMERGENCY MEDICINE

## 2024-10-28 PROCEDURE — 700102 HCHG RX REV CODE 250 W/ 637 OVERRIDE(OP): Performed by: EMERGENCY MEDICINE

## 2024-10-28 PROCEDURE — 94640 AIRWAY INHALATION TREATMENT: CPT

## 2024-10-28 RX ORDER — FUROSEMIDE 40 MG/1
40 TABLET ORAL DAILY
Qty: 7 TABLET | Refills: 0 | Status: SHIPPED | OUTPATIENT
Start: 2024-10-28 | End: 2024-11-04

## 2024-10-28 RX ORDER — CEFTRIAXONE 2 G/1
2000 INJECTION, POWDER, FOR SOLUTION INTRAMUSCULAR; INTRAVENOUS ONCE
Status: COMPLETED | OUTPATIENT
Start: 2024-10-28 | End: 2024-10-28

## 2024-10-28 RX ORDER — BENZONATATE 100 MG/1
200 CAPSULE ORAL 3 TIMES DAILY PRN
Qty: 90 CAPSULE | Refills: 0 | Status: SHIPPED | OUTPATIENT
Start: 2024-10-28

## 2024-10-28 RX ORDER — CEPHALEXIN 500 MG/1
500 CAPSULE ORAL 4 TIMES DAILY
Qty: 40 CAPSULE | Refills: 0 | Status: ACTIVE | OUTPATIENT
Start: 2024-10-28

## 2024-10-28 RX ORDER — AZITHROMYCIN 250 MG/1
TABLET, FILM COATED ORAL
Qty: 6 TABLET | Refills: 0 | Status: ACTIVE | OUTPATIENT
Start: 2024-10-28 | End: 2024-11-02

## 2024-10-28 RX ORDER — AZITHROMYCIN 250 MG/1
500 TABLET, FILM COATED ORAL ONCE
Status: COMPLETED | OUTPATIENT
Start: 2024-10-28 | End: 2024-10-28

## 2024-10-28 RX ORDER — FUROSEMIDE 10 MG/ML
40 INJECTION INTRAMUSCULAR; INTRAVENOUS ONCE
Status: COMPLETED | OUTPATIENT
Start: 2024-10-28 | End: 2024-10-28

## 2024-10-28 RX ADMIN — BENZONATATE 200 MG: 100 CAPSULE ORAL at 00:13

## 2024-10-28 RX ADMIN — IPRATROPIUM BROMIDE AND ALBUTEROL SULFATE 3 ML: .5; 2.5 SOLUTION RESPIRATORY (INHALATION) at 00:22

## 2024-10-28 RX ADMIN — AZITHROMYCIN DIHYDRATE 500 MG: 250 TABLET ORAL at 00:56

## 2024-10-28 RX ADMIN — CEFTRIAXONE SODIUM 2000 MG: 2 INJECTION, POWDER, FOR SOLUTION INTRAMUSCULAR; INTRAVENOUS at 00:57

## 2024-10-28 RX ADMIN — FUROSEMIDE 40 MG: 10 INJECTION INTRAMUSCULAR; INTRAVENOUS at 00:57

## 2024-11-01 ENCOUNTER — APPOINTMENT (OUTPATIENT)
Dept: MEDICAL GROUP | Facility: MEDICAL CENTER | Age: 89
End: 2024-11-01
Payer: MEDICARE

## 2024-11-01 VITALS
WEIGHT: 151 LBS | BODY MASS INDEX: 27.79 KG/M2 | DIASTOLIC BLOOD PRESSURE: 68 MMHG | HEART RATE: 80 BPM | RESPIRATION RATE: 18 BRPM | OXYGEN SATURATION: 94 % | SYSTOLIC BLOOD PRESSURE: 120 MMHG | HEIGHT: 62 IN | TEMPERATURE: 97.5 F

## 2024-11-01 DIAGNOSIS — J96.11 CHRONIC RESPIRATORY FAILURE WITH HYPOXIA (HCC): ICD-10-CM

## 2024-11-01 DIAGNOSIS — R79.89 ELEVATED BRAIN NATRIURETIC PEPTIDE (BNP) LEVEL: ICD-10-CM

## 2024-11-01 DIAGNOSIS — R25.2 MUSCLE CRAMPS: ICD-10-CM

## 2024-11-01 DIAGNOSIS — I35.0 SEVERE AORTIC STENOSIS: ICD-10-CM

## 2024-11-01 DIAGNOSIS — J18.9 PNEUMONIA DUE TO INFECTIOUS ORGANISM, UNSPECIFIED LATERALITY, UNSPECIFIED PART OF LUNG: ICD-10-CM

## 2024-11-01 PROCEDURE — 3074F SYST BP LT 130 MM HG: CPT | Performed by: NURSE PRACTITIONER

## 2024-11-01 PROCEDURE — 3078F DIAST BP <80 MM HG: CPT | Performed by: NURSE PRACTITIONER

## 2024-11-01 PROCEDURE — 99214 OFFICE O/P EST MOD 30 MIN: CPT | Performed by: NURSE PRACTITIONER

## 2024-11-01 RX ORDER — POTASSIUM CHLORIDE 1500 MG/1
20 TABLET, EXTENDED RELEASE ORAL DAILY
Qty: 10 TABLET | Refills: 0 | Status: SHIPPED | OUTPATIENT
Start: 2024-11-01 | End: 2024-11-11

## 2024-11-01 ASSESSMENT — FIBROSIS 4 INDEX: FIB4 SCORE: 1.9

## 2024-11-01 NOTE — PROGRESS NOTES
Subjective:     Shereen Dale is a 88 y.o. female presents to discuss:   Chief Complaint   Patient presents with    Pneumonia     Seen in ED 10/27     Verbal consent was acquired by the patient to use [employer] ambient listening note generation during this visit Yes   History of Present Illness  The patient is an 88-year-old female who presents for hospital follow-up.  She is accompanied by an adult female.    Recently evaluated in the ER on October 27 for short of breath, increased work of breathing, and cough. Diagnosed with pneumonia.   CXR with bibasilar opacities left greater than right.  -Started on azithromycin and Keflex  Patient reports symptoms improving     BNP >2000  Started on Lasix x 7 days.  Patient reports having muscle cramps and would like to have a potassium supplement.  No leg swelling.  Most recent potassium 4.6.    History of severe aortic stenosis-last ECHO 2019-  -was advised by cardiologist to have the TAVR procedure but she refused.  Currently symptomatic once in a while, (Shortness of breath or dizziness).       ROS: : see above      Current Outpatient Medications:     potassium chloride SA (KDUR) 20 MEQ Tab CR, Take 1 Tablet by mouth every day for 10 days., Disp: 10 Tablet, Rfl: 0    azithromycin (ZITHROMAX) 250 MG Tab, Take 2 Tablets by mouth every day for 1 day, THEN 1 Tablet every day for 4 days., Disp: 6 Tablet, Rfl: 0    benzonatate (TESSALON) 100 MG Cap, Take 2 Capsules by mouth 3 times a day as needed for Cough., Disp: 90 Capsule, Rfl: 0    furosemide (LASIX) 40 MG Tab, Take 1 Tablet by mouth every day for 7 days., Disp: 7 Tablet, Rfl: 0    cephALEXin (KEFLEX) 500 MG Cap, Take 1 Capsule by mouth 4 times a day., Disp: 40 Capsule, Rfl: 0    metaxalone (SKELAXIN) 800 MG Tab, Take 0.5 Tablets by mouth 2 times a day., Disp: 100 Tablet, Rfl: 2    gabapentin (NEURONTIN) 100 MG Cap, Take 1 Capsule by mouth 2 times a day. Take 1 capsule PO Daily in the AM,one in the afternoon, and  Take 2 capsules daily in the PM, Disp: 360 Capsule, Rfl: 3    amLODIPine (NORVASC) 5 MG Tab, Take 1 Tablet by mouth every day for 300 days. Take 5mg PO daily in AM., Disp: 100 Tablet, Rfl: 2    glipiZIDE ER (GLUCOTROL XL) 2.5 MG TABLET SR 24 HR, Take 1 Tablet by mouth every day., Disp: 100 Tablet, Rfl: 0    amLODIPine (NORVASC) 2.5 MG Tab, Take 1 Tablet by mouth every day., Disp: 90 Tablet, Rfl: 2    warfarin (JANTOVEN) 3 MG Tab, Take 2 Tablets by mouth every day., Disp: 90 Tablet, Rfl: 0    metFORMIN (GLUCOPHAGE) 500 MG Tab, Take 1 Tablet by mouth 3 times a day., Disp: 300 Tablet, Rfl: 0    amoxicillin (AMOXIL) 500 MG Cap, , Disp: , Rfl:     lidocaine-prilocaine (EMLA) 2.5-2.5 % Cream, , Disp: , Rfl:     nystatin (MYCOSTATIN) powder, Apply 1 g topically 3 times a day., Disp: 60 g, Rfl: 2    omeprazole (PRILOSEC) 20 MG delayed-release capsule, TAKE ONE CAPSULE BY MOUTH TWICE A DAY, Disp: 200 Capsule, Rfl: 1    levothyroxine (SYNTHROID) 50 MCG Tab, TAKE ONE TABLET BY MOUTH EVERY DAY IN THE MORNING FOR UNDERACTIVE THYROID, Disp: 100 Tablet, Rfl: 2    carvedilol (COREG) 12.5 MG Tab, TAKE ONE TABLET BY MOUTH TWICE A DAY FOR BLOOD PRESSURE, Disp: 200 Tablet, Rfl: 3    cloNIDine (CATAPRES) 0.1 MG Tab, , Disp: , Rfl:     nystatin (NYSTOP) powder, , Disp: , Rfl:     ofloxacin (OCUFLOX) 0.3 % Solution, , Disp: , Rfl:     potassium chloride SA (KDUR) 20 MEQ Tab CR, Take 1 Tablet by mouth every day. (Patient not taking: Reported on 11/20/2023), Disp: , Rfl:     telmisartan (MICARDIS) 40 MG Tab, Take 1 Tablet by mouth every day. (Patient not taking: Reported on 3/19/2024), Disp: , Rfl:     tizanidine (ZANAFLEX) 2 MG tablet, Take 1 tablet every 8 hours by oral route as needed. (Patient not taking: Reported on 11/20/2023), Disp: , Rfl:     tizanidine (ZANAFLEX) 4 MG Tab, , Disp: , Rfl:     valacyclovir (VALTREX) 1 GM Tab, Take 1 tablet every 12 hours by oral route. (Patient not taking: Reported on 11/20/2023), Disp: , Rfl:      "carvedilol (COREG) 25 MG Tab, , Disp: , Rfl:     warfarin (COUMADIN) 3 MG Tab, Take one-half to one tablet by mouth daily or as directed by anticoagulation clinic (Patient not taking: Reported on 6/19/2024), Disp: 90 Tablet, Rfl: 3    Cholecalciferol (VITAMIN D3) 2000 UNIT Cap, Take 2,000 Units by mouth every day., Disp: , Rfl:     Misc. Devices Misc, Oxygen concentrator with humidifier, 2.5-3 L/min, Disp: 1 Each, Rfl: 0    dorzolamide-timolol (COSOPT) 22.3-6.8 MG/ML Solution, Administer 1 Drop into both eyes 2 times a day. Indications: Wide-Angle Glaucoma, Disp: , Rfl:     Allergies   Allergen Reactions    Darvon [Propoxyphene Hcl] Anaphylaxis    Iodine Anaphylaxis     Shock - IV    Other Reaction(s): Not available    Tetanus Antitoxin Anaphylaxis    Latex Unspecified     Swelling = hands with glove use    Other Reaction(s): Not available    Penicillins Rash     \"Rash all over body\"    Other Reaction(s): Not available    Kdc:Red Dye+Acetaminophen+Propoxyphene      Other Reaction(s): Not available    Lidocaine      Other Reaction(s): Not available    Tetracycline      Other Reaction(s): Not available       Objective:     Vitals: /68   Pulse 80   Temp 36.4 °C (97.5 °F)   Resp 18   Ht 1.575 m (5' 2\")   Wt 68.5 kg (151 lb)   LMP 01/01/1985   SpO2 94%   BMI 27.62 kg/m²    General: Alert, pleasant, NAD, using a walker, hard hearing  HEENT: Normocephalic.  Neck supple.   Respiratory: no distress, no audible wheezing, no crackles, diminished throughout and shallow.  Heart: Prominent murmur  Skin: Warm, dry, no rashes.  Extremities: No leg edema. No discoloration  Neurological: No tremors  Psych:  Affect/mood is normal, judgement is good, memory is intact, grooming is appropriate.      Assessment/Plan:      1. Pneumonia due to infectious organism, unspecified laterality, unspecified part of lung  Acute.  Recent admission to ER in October 27, 2024.  Chart reviewed.  Her white blood cell count and BNP were " elevated, indicating a possible infection or inflammation. The chest x-ray revealed atelectasis or infection, more pronounced on the left side. Auscultation of the lungs did not reveal significant crackles or wetness, but the breath sounds were not entirely clear. She was advised to complete her course of Keflex 500 mg q.i.d. and to use her inhaler to facilitate coughing and expectoration of phlegm. A chest x-ray was ordered to be performed in 2 weeks to monitor the resolution of the atelectasis. If her condition does not improve, she should proceed with the chest x-ray.She should continue using her inhaler and practicing deep breathing exercises to improve lung function.- DX-CHEST-2 VIEWS; Future    2. Chronic respiratory failure with hypoxia (HCC)  Chronic.  Continue to use supplemental oxygen at nighttime.    3. Muscle cramps  In the setting of recent initiation of Lasix x 7 days.  Historically patient reports supplemental potassium with concurrent Lasix has been helpful.  Prescription for potassium to be taken once a day while taking Lasix.  - potassium chloride SA (KDUR) 20 MEQ Tab CR; Take 1 Tablet by mouth every day for 10 days.  Dispense: 10 Tablet; Refill: 0    4. Severe aortic stenosis  Known problem.  Occasionally symptomatic.   Patient declined TAVR surgery.    5. Elevated brain natriuretic peptide (BNP) level  >2000 during recent ER evaluation.  Patient was started on a course of Lasix.  No leg swelling in clinic.    Return if symptoms worsen or fail to improve.    {I have placed the above orders and discussed them with an approved delegating provider. The MA is performing the below orders under the direction of Dr. Jos JULES

## 2024-11-04 ENCOUNTER — TELEPHONE (OUTPATIENT)
Dept: HEALTH INFORMATION MANAGEMENT | Facility: OTHER | Age: 89
End: 2024-11-04
Payer: MEDICARE

## 2024-11-04 NOTE — TELEPHONE ENCOUNTER
This patient is reporting the active symptoms listed below and has declined emergency care.   Please follow up with this patient’s PCP (or another provider in the clinic) and contact the patient with further direction.     1. Caller Name: Shereen Dale     Call Back Number: [phone]      2. What are the patient's symptoms (location & severity)? Black stools and dizziness. Generally feel very unwell.    3. Is this a new symptom Yes    4. When did it start? 4 days ago    5. Patient DECLINED the following recommended action per Active Symptom Guide:  Offered to schedule same day appointment or [facility] because she felt too unwell to be seen. Patient advised to call 911 in an emergency.    6. Patient was advised again of recommendation and verbalized understanding.

## 2024-11-04 NOTE — TELEPHONE ENCOUNTER
I called and spoke with the pt.  She stated that she is feeling fine.  I asked her if she was feeling dizzy and she denied feeling dizzy at the time of the call.  She stated she did feel a little dizzy this morning but she believes it was from taking her medication.  She did state that she does have black stool by her pcp told her that she was not bleeding.  I reviewed ER precautions and encouraged to her go to the ER if the dizziness returns or she has any other symptoms or doesn't feel well.  Pt gave verbal understanding.  The pt was in no acute distress at the end of the call.

## 2024-11-15 ENCOUNTER — ANTICOAGULATION VISIT (OUTPATIENT)
Dept: MEDICAL GROUP | Facility: PHYSICIAN GROUP | Age: 89
End: 2024-11-15
Payer: MEDICARE

## 2024-11-15 DIAGNOSIS — I35.0 SEVERE AORTIC STENOSIS: ICD-10-CM

## 2024-11-15 DIAGNOSIS — I48.0 PAF (PAROXYSMAL ATRIAL FIBRILLATION) (HCC): ICD-10-CM

## 2024-11-15 DIAGNOSIS — Z79.01 CHRONIC ANTICOAGULATION: Primary | ICD-10-CM

## 2024-11-15 LAB — INR PPP: 2.5 (ref 2–3.5)

## 2024-11-15 PROCEDURE — 85610 PROTHROMBIN TIME: CPT | Performed by: FAMILY MEDICINE

## 2024-11-15 PROCEDURE — 93793 ANTICOAG MGMT PT WARFARIN: CPT | Performed by: FAMILY MEDICINE

## 2024-11-15 NOTE — PROGRESS NOTES
Anticoagulation Summary  As of 11/15/2024      INR goal:  2.0-3.0   TTR:  70.3% (2.4 y)   INR used for dosin.50 (11/15/2024)   Warfarin maintenance plan:  1.5 mg (3 mg x 0.5) every day   Weekly warfarin total:  10.5 mg   Plan last modified:  Brady Piña, PharmD (11/15/2024)   Next INR check:  2024   Target end date:  Indefinite    Indications    Severe aortic stenosis [I35.0]  PAF (paroxysmal atrial fibrillation) (HCC) [I48.0]  Chronic anticoagulation [Z79.01]                 Anticoagulation Episode Summary       INR check location:  --    Preferred lab:  --    Send INR reminders to:  --    Comments:  --          Anticoagulation Care Providers       Provider Role Specialty Phone number    Ganesh Mckeon M.D. Referring Geriatric Medicine - Family Medicine [phone]    [facility] Anticoagulation Services Responsible  [phone]          Anticoagulation Patient Findings  Patient Findings       Positives:  Change in medications    Negatives:  Signs/symptoms of thrombosis, Signs/symptoms of bleeding, Laboratory test error suspected, Change in health, Change in alcohol use, Change in activity, Upcoming invasive procedure, Emergency department visit, Upcoming dental procedure, Missed doses, Extra doses, Change in diet/appetite, Hospital admission, Bruising, Other complaints                  HPI:   Shereen Wattsa seen in clinic today, on anticoagulation therapy with warfarin due to history of PAF.    Patient's previous INR was supratherapeutic at 3.3 on 10-18-24, at which time patient was instructed to hold one dose, then resume current warfarin regimen.  She returns to clinic today to recheck INR to ensure it is therapeutic and thus preventing possible clotting and/or bleeding/bruising complications.    CHADS-VASc = at least 5  (unadjusted ischemic stroke risk/year:  7.2%, which is moderate risk)    Does patient have any changes to current medical/health status since last appt (Y/N):  NO  Does  patient have any signs/symptoms of bleeding and/or thrombosis since the last appt (Y/N):  NO  Does patient have any interval changes to diet or medications since last appt (Y/N):  course of zithromax, cephalexin last month  Are there any complications or cost restrictions with current therapy (Y/N):  NO     Does patient have [facility] PCP? Yes, Ganesh Mckeon M.D. (If not, please document discussion that patient must be seen at [facility])       Vitals:      There were no vitals filed for this visit.     Asssessment:      INR therapeutic at 2.5, therefore decreasing stroke and/or bleeding risk.   Reason(s) for out of range INR today:  n/a      Pt is not on antiplatelet therapy    Medication reconciliation completed today.    Plan:  Pt is to continue with current warfarin dosing regimen.     Follow up:  Because warfarin is a high risk medication and current CHEST guidelines recommend regular monitoring intervals (few days up to 12 weeks), will have patient return to clinic in 5 weeks to recheck INR.

## 2024-12-03 PROBLEM — I46.9 CARDIAC ARREST (HCC): Status: ACTIVE | Noted: 2024-01-01

## 2024-12-03 PROBLEM — E78.5 HYPERLIPIDEMIA: Status: ACTIVE | Noted: 2024-01-01

## 2024-12-03 PROBLEM — E11.9 DM (DIABETES MELLITUS) (HCC): Status: ACTIVE | Noted: 2024-01-01

## 2024-12-03 PROBLEM — Z71.89 ACP (ADVANCE CARE PLANNING): Status: ACTIVE | Noted: 2024-01-01

## 2024-12-03 PROBLEM — I95.9 HYPOTENSION: Status: ACTIVE | Noted: 2024-01-01

## 2024-12-03 PROBLEM — R57.9 SHOCK (HCC): Status: ACTIVE | Noted: 2024-01-01

## 2024-12-03 PROBLEM — I10 HYPERTENSION: Status: ACTIVE | Noted: 2024-01-01

## 2024-12-03 PROBLEM — I50.9 CHF (CONGESTIVE HEART FAILURE) (HCC): Status: ACTIVE | Noted: 2024-01-01

## 2024-12-03 NOTE — DISCHARGE PLANNING
Medical Social Work    MSW received a call from VERONICA Chance that pt's daughter has arrived.  MSW met with pt's daughter, Francine ([phone]) and updated her briefly.  Pt's daughter states that pt is a DNR.  MSW verified POLST form in pt's chart and ERP spoke with pt's daughter at bedside.  Additional family members will be coming in.  SW will remain available for family support.

## 2024-12-03 NOTE — ASSESSMENT & PLAN NOTE
Goal of care discussed with patient's two daughters at bedside in emergency room.  The daughters agreed that patient would not wish to have further aggressive/heroic life-sustaining measures-no CPR/defibrillation/continue intubation or mechanical ventilation.  The two daughters confirmed that patient already had DNR/DNI POLST, however was already intubated in field.  Patient unable to tolerate propofol drip or Versed drip after being intubated due to hypotension, shock state. The daughters agreed for terminal extubation, agree that their mother would not want additional invasive treatment moving forward.  Daughters agree that patient be admitted for observation for comfort measures only with the understanding that patient will eventually succumb and die.  Goal of care discussed with patient's daughters in details and at length --transition to comfort care measures only.  Diagnosis, prognosis, question and concern addressed. ACP: 40minutes

## 2024-12-03 NOTE — ED NOTES
Checked on patient, pt connected to the monitor vitally unstable. Pt breathing at a rate of 24b/mnt on RA. IV infusion in progress. Family at bedside, denies any need at this time.

## 2024-12-03 NOTE — DISCHARGE PLANNING
Medical Social Work    Additional family members escorted to bedside.  Family is requesting pt be extubated.  ERP and bedside RN updated.  Emotional support provided to pt's family.

## 2024-12-03 NOTE — PROGRESS NOTES
I will be off duty and signed out of voalte at 3pm.  If you have any needs for this patient after 3pm, please reach out to the on call Niesha HAYES.  Thank you!

## 2024-12-03 NOTE — DISCHARGE PLANNING
[address] [facility], [address] [facility] [address] 
[phone] Patient: Merline Maize MRN: [id number] :2016 Visit Information Date & Time Provider Department Dept. Phone Encounter #  
 2018  8:50 AM Jackie Johnson MD [address] [phone] [id number] Your Appointments 2018  9:45 AM  
PHYSICAL PRE OP with Angel Varner MD  
[address] [facility] ([facility] Appt Note: [facility] [address] [facility]  
[phone]  
  
   
 [facility] [address] [address]  
  
    
 2018 11:10 AM  
Follow Up with Jackie Johnson MD  
[address] [facility] ([facility]) Appt Note: 2month follow up with sibling [address] [facility], [address] [place] [address] [address]  
[phone] [facility], [address] [place] [address] [address] [facility] Upcoming Health Maintenance Date Due PEDIATRIC DENTIST REFERRAL 2018 Hepatitis B Peds Age 0-18 (3 of 3 - Primary Series) 2018 Hib Peds Age 0-5 (3 of 4 - Standard Series) 2018 IPV Peds Age 0-18 (3 of 4 - All-IPV Series) 2018 PCV Peds Age 0-5 (3 of 4 - Standard Series) 2018 DTaP/Tdap/Td series (3 - DTaP) 2018 Influenza Peds 6M-8Y (1 of 2) 2018 MCV through Age 25 (1 of 2) 2028 Allergies as of 2018  Review Complete On: 2018 By: Celia Sullivan LPN Severity Noted Reaction Type Reactions Milk  2018    Other (comments) Blood in stool. Current Immunizations  Reviewed on 2018 Name Date [id number] 2018, 2018 Hep B Vaccine 2018 Hep B, Adol/Ped 2018 Pneumococcal Conjugate (PCV-13) 2018, 2018 Rotavirus, Live, Monovalent Vaccine 2018, 2018 Not reviewed this visit Patient is comfort care.   You Were Diagnosed With   
  
 Codes Comments Gastroesophageal reflux disease without esophagitis    -  Primary ICD-10-CM: K21.9 ICD-9-CM: 530.81 Vitals Pulse Temp Resp Height(growth percentile) Weight(growth percentile) HC  
 118 98.6 °F (37 °C) (Axillary) 36 (!) 2' 3.5\" (0.699 m) (69 %, Z= 0.48)* 18 lb 2 oz (8.221 kg) (61 %, Z= 0.29)* 44.2 cm (74 %, Z= 0.64)* BMI Smoking Status 16.85 kg/m2 Never Smoker *Growth percentiles are based on WHO (Girls, 0-2 years) data. BSA Data Body Surface Area  
 0.4 m 2 Preferred Pharmacy Pharmacy Name Phone [facility] [address] [place] [address]. [phone] Your Updated Medication List  
  
   
This list is accurate as of 8/29/18  9:48 AM.  Always use your most recent med list.  
  
  
  
  
 infant formula,lf-iron-dha-pelon 3.1-4.8-10.7 gram/100 kcal Powd Commonly known as:  [address] Take 3 oz by mouth every three (3) hours. omeprazole 2 mg/mL Susp 2 mg/mL oral suspension (compounded) Commonly known as:  PRILOSEC Give 4 ml or 8 mg 30 minutes before first AM feeding  
  
 raNITIdine 15 mg/mL syrup Commonly known as:  ZANTAC Give 1.6 ml 30 minutes before last feeding of the day Prescriptions Sent to Pharmacy Refills  
 omeprazole (PRILOSEC) 2 mg/mL susp 2 mg/mL oral suspension (compounded) 2 Sig: Give 4 ml or 8 mg 30 minutes before first AM feeding Class: Normal  
 Pharmacy: [address] Ph #: [phone]  
 raNITIdine (ZANTAC) 15 mg/mL syrup 2 Sig: Give 1.6 ml 30 minutes before last feeding of the day Class: Normal  
 Pharmacy: [address] Ph #: [phone] Patient Instructions Continue reflux precautions Continue omeprazole but increase to 4 ml or 8 mg once daily in AM 30 minutes before feeding Continue ranitidine but increase to 1.6 ml each PM before feeding Continue Elecare 7 ounces 4 times a day Continue to offer stage 2 baby foods twice daily  and increase volume as tolerated Call with weight at 9 months Return in 2 months Introducing \[facility] [place] [facility]\"" & HEALTH SERVICES! Dear Parent or Guardian, Thank you for requesting a [employer] account for your child. With [employer], you can view your childs hospital or ER discharge instructions, current allergies, immunizations and much more. In order to access your childs information, we require a signed consent on file. Please see the [facility] department or call [phone] for instructions on completing a [employer] Proxy request.   
Additional Information If you have questions, please visit the Frequently Asked Questions section of the [employer] website at [email or web address] Remember, [employer] is NOT to be used for urgent needs. For medical emergencies, dial 911. Now available from your iPhone and Android! Please provide this summary of care documentation to your next provider. Your primary care clinician is listed as Sheila Schaffer. If you have any questions after today's visit, please call [phone].

## 2024-12-03 NOTE — FLOWSHEET NOTE
24 0239   Events/Summary/Plan   Events/Summary/Plan pt arrived intubated from OSH, placed on vent   Skin Integrity Intact   Ventiliation   $ Ventilation - Initial Yes   Vent Clock Initiated Yes   General Vent Information   Vent Mode APVCMV   Vent Alarms   Ventilation Alarms Reviewed / Activated Yes   Upper Pressure Limit Alarm 45   Lower Pressure Limit 17   Low VE Alarm 4   High Respiratory Rate Alarm 40   Low Respiratory Rate Alarm 8   Low VT Alarm 150   Apnea Parameters Checked / Activated Yes   % Leak Alarm off   Vent Settings   FiO2% 100 %   Rate (breaths/min) 24   Vent Temp HME Yes   Vt Target (mL) 400   TI (Seconds) 0.8   PEEP/CPAP 8   Pramp 70   Trigger Type Flow Trigger   Sensitivity Setting 5   Vent Readings   PIP 27    Minute Volume 10.5   Control VTE (exp VT) 358   f Total (Breaths/Min) 28   I:E Ratio 1:2.6   MAP 14   PEEP/CPAP MONITORED 9.1   Static Compliance (ml / cm H2O) 42.7   R exp 0.44

## 2024-12-03 NOTE — H&P
Hospital Medicine History & Physical Note    Date of Service  12/3/2024    Primary Care Physician  No primary care provider on file.    Consultants  Palliative care/hospice    Code Status  Comfort Care/DNR    Chief Complaint  Chief Complaint   Patient presents with    Unresponsive     Post cardiac arrest  CPR for 11 mint  PEA rhythm        History of Presenting Illness  Spread Eighty is a 88 y.o. elderly female with history of CHF, diabetes, hyperlipidemia, hypertension who presented 12/3/2024 with evaluation for cardiac arrest, unresponsiveness.  Patient reported to have episode of unresponsiveness, PEA arrest.  Patient underwent CPR for 11 minutes, received 3 rounds of epinephrine and ROSC achieved.  Patient was intubated in field by EMS, brought to [facility] ER for evaluation.  At [facility] ER, she continues to deteriorate, unable to tolerate propofol drip, Versed drip, further clinical deterioration with hypotension tachycardia shock state.  Two daughters at bedside, ultimately agreeable for comfort measures only.  Patient was terminally extubated for compassionate care.  Admission requested by ERP for observation.    I discussed the plan of care with patient, family, bedside RN, charge RN, and pharmacy.    Review of Systems  Review of Systems   Unable to perform ROS: Acuity of condition   All other systems reviewed and are negative.  Unable to obtain as patient is obtunded    Past Medical History   has no past medical history on file.  CHF, diabetes, hyperlipidemia, hypertension    Surgical History   has no past surgical history on file.     Family History  family history is not on file.   Family history reviewed with patient. There is scant hard to family history that is pertinent to the chief complaint.     Social History   Patient resides with her daughter  Daughter denies patient have a history of EtOH or tobacco abuse    Allergies  Not on File    Medications  None       Physical Exam  Temp:  [36.2 °C (97.1 °F)]  36.2 °C (97.1 °F)  Pulse:  [51-80] 73  Resp:  [16-55] 26  BP: ()/(37-67) 62/41  SpO2:  [39 %-100 %] 42 %  Blood Pressure : (!) 60/40   Temperature: 36.2 °C (97.1 °F)   Pulse: 74   Respiration: (!) 29   Pulse Oximetry: (!) 42 %       Physical Exam  Vitals and nursing note reviewed.   Constitutional:       General: She is not in acute distress.     Appearance: She is ill-appearing.   HENT:      Head: Normocephalic.      Nose: Nose normal.      Mouth/Throat:      Mouth: Mucous membranes are moist.   Eyes:      General: No scleral icterus.     Comments: Pinpoint pupils   Cardiovascular:      Rate and Rhythm: Regular rhythm. Tachycardia present.      Pulses: Normal pulses.      Heart sounds:      No friction rub.   Pulmonary:      Effort: No respiratory distress.      Breath sounds: No stridor. Rales present. No wheezing.   Chest:      Chest wall: No tenderness.   Abdominal:      General: There is distension.      Tenderness: There is no abdominal tenderness. There is no guarding or rebound.   Musculoskeletal:      Cervical back: Neck supple. No tenderness.      Right lower leg: Edema present.      Left lower leg: Edema present.   Skin:     General: Skin is warm and dry.      Capillary Refill: Capillary refill takes less than 2 seconds.   Neurological:      Comments: Pinpoint pupils  Not reactive to external stimuli  Unarousable   Psychiatric:      Comments: Unable to evaluate         Laboratory:  Recent Labs     12/03/24 0240   WBC 13.2*   RBC 4.16*   HEMOGLOBIN 12.4   HEMATOCRIT 37.4   MCV 89.9   MCH 29.8   MCHC 33.2   RDW 42.6   PLATELETCT 178   MPV 9.5     Recent Labs     12/03/24  0240   SODIUM 125*   POTASSIUM 5.0   CHLORIDE 93*   CO2 18*   GLUCOSE 231*   BUN 13   CREATININE 0.90   CALCIUM 9.0     Recent Labs     12/03/24 0240   ALTSGPT 83*   ASTSGOT 115*   ALKPHOSPHAT 52   TBILIRUBIN 0.5   LIPASE 41   GLUCOSE 231*     Recent Labs     12/03/24  0240   APTT 34.5   INR 2.05*     Recent Labs     12/03/24 0240    NTPROBNP 5111*         Recent Labs     12/03/24  0240   TROPONINT 16       Imaging:  CT-HEAD W/O   Final Result         1.  No acute intracranial abnormality is identified, there are nonspecific white matter changes, commonly associated with small vessel ischemic disease.  Associated mild cerebral atrophy is noted.   2.  Left maxillary sinus mucus retention cyst   3.  Atherosclerosis.               DX-CHEST-PORTABLE (1 VIEW)   Final Result         1.  Pulmonary edema and/or infiltrates.   2.  Atherosclerosis          X-Ray:  I have personally reviewed the images and compared with prior images.  EKG:  I have personally reviewed the images and compared with prior images.    Assessment/Plan:  Justification for Admission Status  I anticipate this patient is appropriate for observation status at this time.        * Cardiac arrest (HCC)- (present on admission)  Assessment & Plan  ROSC achieved and intubated by EMS in field  Terminally extubated in ER ~04:30AM    Comfort care    CHF (congestive heart failure) (HCC)  Assessment & Plan  Comfort care    Hypotension  Assessment & Plan  Comfort care    Shock ([facility])  Assessment & Plan  Possible cardiogenic etiology  Comfort care    Hyperlipidemia  Assessment & Plan  Comfort care    Hypertension  Assessment & Plan  Comfort care    DM (diabetes mellitus) ([facility])  Assessment & Plan  Comfort care    ACP (advance care planning)  Assessment & Plan  Goal of care discussed with patient's two daughters at bedside in emergency room.  The daughters agreed that patient would not wish to have further aggressive/heroic life-sustaining measures-no CPR/defibrillation/continue intubation or mechanical ventilation.  The two daughters confirmed that patient already had DNR/DNI POLST, however was already intubated in field.  Patient unable to tolerate propofol drip or Versed drip after being intubated due to hypotension, shock state. The daughters agreed for terminal extubation, agree that their  mother would not want additional invasive treatment moving forward.  Daughters agree that patient be admitted for observation for comfort measures only with the understanding that patient will eventually succumb and die.  Goal of care discussed with patient's daughters in details and at length --transition to comfort care measures only.  Diagnosis, prognosis, question and concern addressed. ACP: 40minutes        VTE prophylaxis: None, comfort care

## 2024-12-03 NOTE — ED NOTES
Bedside report received from off going RN/tech: Roseann, assumed care of patient.  POC discussed with patient. Call light within reach, all needs addressed at this time.       Fall risk interventions in place: Place fall risk sign on patient's door, Keep floor surfaces clean and dry, and Other (comment required) (all applicable per [place] Fall risk assessment)   Continuous monitoring: Cardiac Leads, Pulse Ox, or Blood Pressure  IVF/IV medications: 5 mg/hr of versed  Oxygen: Room Air  Bedside sitter: Not Applicable   Isolation: Not Applicable      Pt comfort care at this time, daughters at bedside with patient.

## 2024-12-03 NOTE — PROGRESS NOTES
[facility] Medicine Daily Progress Note    Date of Service  12/3/2024    Chief Complaint  Shereen Dale is a 88 y.o. female admitted 12/3/2024 with cardiac arrest, unresponsive.    Hospital Course  Vinicius Hathaway is a 88 y.o. elderly female with history of CHF, diabetes, hyperlipidemia, hypertension who presented 12/3/2024 with evaluation for cardiac arrest, unresponsiveness.  Patient reported to have episode of unresponsiveness, PEA arrest.  Patient underwent CPR for 11 minutes, received 3 rounds of epinephrine and ROSC achieved.  Patient was intubated in field by EMS, brought to [facility] ER for evaluation.  At [facility] ER, she continues to deteriorate, unable to tolerate propofol drip, Versed drip, further clinical deterioration with hypotension tachycardia shock state.  Two daughters at bedside, ultimately agreeable for comfort measures only.  Patient was terminally extubated for compassionate care.  Admission requested by ERP for observation.     Interval Problem Update  12/3 Patient appears comfortable.  Daughters at bedside.  All questions answered. They feel she is comfortable. Anticipate patient will  soon, oxygen levels very low.     I have discussed this patient's plan of care and discharge plan at IDT rounds today with Case Management, Nursing, Nursing leadership, and other members of the IDT team.    Consultants/Specialty  none    Code Status  Comfort Care/DNR    Disposition  The patient is medically cleared for discharge to home or a post-acute facility.  Anticipate discharge to: hospice    I have placed the appropriate orders for post-discharge needs.    Review of Systems  Review of Systems   Unable to perform ROS: Medical condition        Physical Exam  Temp:  [36.2 °C (97.1 °F)] 36.2 °C (97.1 °F)  Pulse:  [51-80] 78  Resp:  [12-55] 18  BP: ()/(37-67) 90/54  SpO2:  [26 %-100 %] 26 %    Physical Exam  Constitutional:       General: She is sleeping.      Appearance: She is ill-appearing.    Neurological:      Mental Status: She is unresponsive.         Fluids  No intake or output data in the 24 hours ending 12/03/24 1004     Laboratory  Recent Labs     12/03/24  0240   WBC 13.2*   RBC 4.16*   HEMOGLOBIN 12.4   HEMATOCRIT 37.4   MCV 89.9   MCH 29.8   MCHC 33.2   RDW 42.6   PLATELETCT 178   MPV 9.5     Recent Labs     12/03/24  0240   SODIUM 125*   POTASSIUM 5.0   CHLORIDE 93*   CO2 18*   GLUCOSE 231*   BUN 13   CREATININE 0.90   CALCIUM 9.0     Recent Labs     12/03/24  0240   APTT 34.5   INR 2.05*               Imaging  CT-HEAD W/O   Final Result         1.  No acute intracranial abnormality is identified, there are nonspecific white matter changes, commonly associated with small vessel ischemic disease.  Associated mild cerebral atrophy is noted.   2.  Left maxillary sinus mucus retention cyst   3.  Atherosclerosis.               DX-CHEST-PORTABLE (1 VIEW)   Final Result         1.  Pulmonary edema and/or infiltrates.   2.  Atherosclerosis           Assessment/Plan  * Cardiac arrest (HCC)- (present on admission)  Assessment & Plan  ROSC achieved and intubated by EMS in field  Terminally extubated in ER ~04:30AM    Comfort care    Hypotension  Assessment & Plan  Comfort care    Shock (HCC)  Assessment & Plan  Possible cardiogenic etiology  Comfort care    ACP (advance care planning)  Assessment & Plan  Goal of care discussed with patient's two daughters at bedside in emergency room.  The daughters agreed that patient would not wish to have further aggressive/heroic life-sustaining measures-no CPR/defibrillation/continue intubation or mechanical ventilation.  The two daughters confirmed that patient already had DNR/DNI POLST, however was already intubated in field.  Patient unable to tolerate propofol drip or Versed drip after being intubated due to hypotension, shock state. The daughters agreed for terminal extubation, agree that their mother would not want additional invasive treatment moving  forward.  Daughters agree that patient be admitted for observation for comfort measures only with the understanding that patient will eventually succumb and die.  Goal of care discussed with patient's daughters in details and at length --transition to comfort care measures only.  Diagnosis, prognosis, question and concern addressed. ACP: 40minutes    Hyperlipidemia  Assessment & Plan  Comfort care    Hypertension  Assessment & Plan  Comfort care    DM (diabetes mellitus) ([facility])  Assessment & Plan  Comfort care    CHF (congestive heart failure) ([facility])  Assessment & Plan  Comfort care         VTE prophylaxis: VTE Selection    I have performed a physical exam and reviewed and updated ROS and Plan today (12/3/2024). In review of yesterday's note (12/2/2024), there are no changes except as documented above.

## 2024-12-03 NOTE — CONSULTS
SUMMARY: Multiple family members at bedside, goal to keep patient comfortable to end-of-life.  Agreeable to scheduled morphine in addition to Versed drip.  PC to continue to follow.      MRN: [id number]  Date of palliative consult: December 3, 2024  Reason for consult: Symptom management  Referring provider: Jeff  Location of consult: [place] [id number] observation  Additional consulting services: Hospital medicine    HPI:   Shereen Dale is a 88 y.o. female with a past medical history of CHF, diabetes, hyperlipidemia, hypertension who presented on 12/3/2024 after cardiac arrest.  Patient reported to have episode of unresponsiveness and found to be in PEA arrest.  Patient underwent CPR for 11 minutes and received 3 rounds of epi and ROSC was achieved.  Patient was intubated in the field by EMS brought to [facility] to the ER for evaluation with continued deterioration and inability to titrate propofol drip due to hypotension and tachycardia.  Daughters at bedside ultimately agreed for comfort measures only and patient was compassionately extubated and transferred to the floor for further care.    Significant/pertinent past medical history: Denies alcohol or tobacco abuse.      Interval History: Patient admitted to [facility] observation unit.  Currently on Versed drip at 2 mg/h.  Has received only 1 dose of morphine.  Fingers are cool and dusky, SpO2 inaccurate.    Medication Allergy/Sensitivities:  Not on File    ROS:    Review of Systems   Unable to perform ROS: Acuity of condition       PE:   Recent vital signs  BMI: Body mass index is 31.62 kg/m².    Temp (24hrs), Av.2 °C (97.1 °F), Min:36.2 °C (97.1 °F), Max:36.2 °C (97.1 °F)  Temperature: 36.2 °C (97.1 °F), Monitored Temp: 35.4 °C (95.7 °F)  Pulse  Av.9  Min: 51  Max: 80   Blood Pressure : 90/54       Physical Exam  Vitals and nursing note reviewed.   Constitutional:       General: She is not in acute distress.     Appearance: She is ill-appearing.      Comments:  Unresponsive.   Cardiovascular:      Rate and Rhythm: Tachycardia present.      Heart sounds: Murmur heard.   Pulmonary:      Effort: Tachypnea present.      Breath sounds: Examination of the right-upper field reveals rhonchi. Examination of the left-upper field reveals rhonchi. Examination of the right-middle field reveals rhonchi. Examination of the left-middle field reveals rhonchi. Examination of the right-lower field reveals rhonchi. Examination of the left-lower field reveals rhonchi. Rhonchi present.   Abdominal:      General: Abdomen is protuberant. Bowel sounds are decreased.      Palpations: Abdomen is soft.   Musculoskeletal:      Right lower leg: No edema.      Left lower leg: No edema.      Comments: Diffuse muscle wasting.   Skin:     Capillary Refill: Capillary refill takes more than 3 seconds.      Coloration: Skin is pale and sallow.      Comments: Dusky cool fingertips.   Neurological:      Mental Status: She is unresponsive.       Recent Labs     12/03/24  0240   SODIUM 125*   POTASSIUM 5.0   CHLORIDE 93*   CO2 18*   GLUCOSE 231*   BUN 13   CREATININE 0.90   CALCIUM 9.0     Recent Labs     12/03/24  0240   WBC 13.2*   RBC 4.16*   HEMOGLOBIN 12.4   HEMATOCRIT 37.4   MCV 89.9   MCH 29.8   MCHC 33.2   RDW 42.6   PLATELETCT 178   MPV 9.5       ASSESSMENT/PLAN WITH SHARED DECISION MAKING:   Review  Pertinent imaging reviewed.    PHYSICAL ASPECTS OF CARE  Palliative Performance Scale: 10%    # Pain related to end of life  -schedule IV Morphine 4 mg every four hours; continue prn dosing for s/s pain including air hunger.   # s/p cardiac arrest  # CHF  # Hypotension  # Frailty/debility  # Diabetes type 2    SOCIAL ASPECTS OF CARE  Patient lives with her spouse and daughter.  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    SPIRITUAL ASPECTS OF CARE   Patient is [place], have ordered  visit.     GOALS OF CARE/SERIOUS ILLNESS CONVERSATION/symptom  management  Introduced myself to family members at bedside including daughters, son-in-law, and spouse.. Discussed role of palliative care and reason for consult.  They are agreeable to discuss goals of care.  Education provided regarding end-of-life symptoms and pain associated with same.  Discussed nature of Versed drip versus morphine and efficacy of same for sedation versus pain.  Family is agreeable to scheduling morphine.  They state patient was previously on hospice approximately 4 years ago and did well with morphine.  Ongoing education provided regarding signs and symptoms of pain including increased respiratory rate, diaphoresis, furrowed brow, stiffening of the extremities excetra.  Family verbalizes understanding of same.  Patient has as needed medication as per family's discretion. Provided palliative care contact information and encouraged family to reach out with any questions/needs.        Code Status: DNR/DNI, comfort care    ACP Documents: None     minutes spent discussing advance care planning, this time excludes any other billed services.    Interval diagnostic studies and medical documentation entries pertinent to this case were reviewed independently by me. This patient has at least one acute or chronic illness or injury that poses a threat to life or bodily function. This patient suffers from a high risk of morbidly from additional invasive diagnostic testing or intensive treatment. Discussion of recommendations and coordination of care undertaken with primary provider/treatment team.        Shari Duvall, MSN, APRN, ACNPC-AG.  Palliative Care Nurse Practitioner  [phone] Office  [phone] [facility]

## 2024-12-03 NOTE — ED NOTES
Pt transported upstairs with transport. All belongings collected and chart provided to transport. Pt transported to CDU. Care released.

## 2024-12-03 NOTE — DISCHARGE PLANNING
Medical Social Work    Referral: Post Cardiac Arrest    Intervention: Pt is an 88 year old female brought in by [facility] from home after being found unresponsive by her  in their bed.  Pt is Shereen Dale (: 1935).  Per medics pt's spouse should be on his way.  ER Lobby staff will call when pt's spouse arrives.    Plan: SW will follow pending arrival of family.

## 2024-12-03 NOTE — HOSPITAL COURSE
Vinicius Hathaway is a 88 y.o. elderly female with history of CHF, diabetes, hyperlipidemia, hypertension who presented 12/3/2024 with evaluation for cardiac arrest, unresponsiveness.  Patient reported to have episode of unresponsiveness, PEA arrest.  Patient underwent CPR for 11 minutes, received 3 rounds of epinephrine and ROSC achieved.  Patient was intubated in field by EMS, brought to [facility] ER for evaluation.  At [facility] ER, she continues to deteriorate, unable to tolerate propofol drip, Versed drip, further clinical deterioration with hypotension tachycardia shock state.  Two daughters at bedside, ultimately agreeable for comfort measures only.  Patient was terminally extubated for compassionate care.  Admission requested by ERP for observation.

## 2024-12-03 NOTE — ED TRIAGE NOTES
Chief Complaint   Patient presents with    Unresponsive     Post cardiac arrest  CPR for 11 mint  PEA rhythm      Pt BIBA from home for the above mentioned complaints. Per EMS report pt was on her bed found unresponsive by , called 911. Pt was in PEA rhythm CPR initiated by EMS for about 11 mints, SYED achieved at 0211 am. HR in 70s, b.p in 120s en route by EMS. Pt DNR/DNI stated by daughter.     Medication received: 3 rounds of Epi given, 2 mg Narcan with no response.   OETT : 8.0 tube at 25 at teeth.   B mg/dl

## 2024-12-03 NOTE — DISCHARGE PLANNING
Referral Management Team    Hospice referral received via Epic- RMT will round to obtain choice ASAP  If there are any questions, please contact me directly.      0930 RMT liaison visited with patient and family at bed side to obtain hospice choice.   Per Atiya (daughter)  patient is on comfort care and family don't want hospice.   Patient appears imminent and likely within hours of passing. Confirmed by Marylou Wan that patient can successfully discharge from hospital.    Escalated to hospitalist Chela HAYES.     RMT available if anything changes.

## 2024-12-03 NOTE — RESPIRATORY CARE
S.T.O.P for Intrahospital Transportation Safety for Ventilated Patients     S - Parrish then maneuver.  Were airway secretions cleared? yes    T - Check your tube. Is your airway patent and in correct position? yes    O - Transition from oxygen cylinder to wall oxygen source on return.  Was this accomplished?  yes    P - Transition from ventilator battery power to wall power source on return.  Was this accomplished?  yes

## 2024-12-03 NOTE — ED NOTES
Checked on patient, pt connected to the monitor vitally unstable. Pt breathing at a rate of 23 b/mnt on RA. IV infusion in progress. Family at bedside, denies any need at this time.

## 2024-12-03 NOTE — ED PROVIDER NOTES
"ED Provider Note    CHIEF COMPLAINT  Chief Complaint   Patient presents with    Unresponsive     Post cardiac arrest  CPR for 11 mint  PEA rhythm          HPI/ROS  LIMITATION TO HISTORY   Select: Altered mental status / Confusion  OUTSIDE HISTORIAN(S):  EMS transported the patient post ROSC.  3 rounds of epinephrine were provided.  Patient underwent CPR for 11 minutes.  Patient was PEA the entire time.  Patient was intubated in the field.  Patient  called EMS when patient became unresponsive in bed.    Vinicius Hathaway is a 87 y.o. female who presents with cardiac arrest and unresponsiveness.  EMS reports no response to 2 mg of Narcan.  Family unable to provide collateral due to the emotional situation.    PAST MEDICAL HISTORY       SURGICAL HISTORY  patient denies any surgical history    FAMILY HISTORY  No family history on file.    SOCIAL HISTORY  Social History     Tobacco Use    Smoking status: Not on file    Smokeless tobacco: Not on file   Substance and Sexual Activity    Alcohol use: Not on file    Drug use: Not on file    Sexual activity: Not on file       CURRENT MEDICATIONS  Home Medications    **Home medications have not yet been reviewed for this encounter**         ALLERGIES  Not on File    PHYSICAL EXAM  VITAL SIGNS: BP (!) 60/38   Pulse 73   Temp 36.2 °C (97.1 °F) (Temporal)   Resp (!) 27   Ht 1.651 m (5' 5\")   Wt 86.2 kg (190 lb)   SpO2 (!) 39%   BMI 31.62 kg/m²    Vitals and nursing note reviewed.   Constitutional:       Comments: Patient is lying in bed supine, intubated, unresponsive, intermittent agonal breathing  HENT:      Head: Normocephalic and atraumatic.   Eyes:   3 mm sluggish bilateral pupils  Cardiovascular:      Pulses: Normal pulses.      Comments: HR 78  Pulmonary:   Patient intubated on the mechanical ventilation  Inspiratory rales bilaterally  Abdominal:      Comments: Abdomen is soft, nondistended  Musculoskeletal:         General: No swelling. Normal range of motion.    "   Cervical back: Normal range of motion. No rigidity.   Skin:     General: Skin is warm and dry.      Capillary Refill: Capillary refill takes less than 2 seconds.   Neurological:      Mental Status: GCS 3 T.       EKG/LABS  No ischemia   I have independently interpreted this EKG    RADIOLOGY/PROCEDURES   I have independently interpreted the diagnostic imaging associated with this visit and am waiting the final reading from the radiologist.   My preliminary interpretation is as follows: No intracranial hemorrhage    Radiologist interpretation:  CT-HEAD W/O   Final Result         1.  No acute intracranial abnormality is identified, there are nonspecific white matter changes, commonly associated with small vessel ischemic disease.  Associated mild cerebral atrophy is noted.   2.  Left maxillary sinus mucus retention cyst   3.  Atherosclerosis.               DX-CHEST-PORTABLE (1 VIEW)   Final Result         1.  Pulmonary edema and/or infiltrates.   2.  Atherosclerosis          COURSE & MEDICAL DECISION MAKING    ASSESSMENT, COURSE AND PLAN  Care Narrative: CT head to evaluate for intracranial hemorrhage, large CVA, other acute intracranial process.  Chest x-ray to evaluate for acute cardiopulmonary process such as pneumonia, pulmonary edema, pneumothorax.  EKG demonstrates peaked T waves, no ischemic changes or arrhythmia identified.  CBC to evaluate for acute anemia and leukocytosis.  CMP to evaluate for acute electrolyte abnormality, acute kidney injury, acute liver failure or dysfunction.  Troponin to evaluate for ACS.  Urinalysis to evaluate for infectious process.  Given peak T waves patient has been provided calcium chloride and sodium bicarbonate.  Endotracheal tube confirmation has been achieved with video laryngoscopy.  Disposition pending workup.    Electronically signed by: Stevan Navarro M.D., 12/3/2024 2:48 AM    CT head without intracranial hemorrhage or other acute intracranial process.  Chest  x-ray demonstrates pulmonary edema.  Family has come to the bedside and we have spoken at length about the patient's wishes should she be on a ventilator.  Family is expressed that she would be upset about having been intubated and being on mechanical ventilation and they advocate for extubation.  In keeping with the family and the patient's wishes patient has been extubated.  Patient has been placed on comfort care per family request.  Patient consulted to hospital medicine for comfort care.    This dictation has been created using voice recognition software. I am continuously working with the software to minimize the number of voice recognition errors and I have made every attempt to manually correct the errors within my dictation. However errors  related to this voice recognition software may still exist and should be interpreted within the appropriate context.     Electronically signed by: Stevan Navarro M.D., 12/3/2024 6:27 AM      CRITICAL CARE  The very real possibilty of a deterioration of this patient's condition required the highest level of my preparedness for sudden, emergent intervention.  I provided critical care services, which included medication orders, frequent reevaluations of the patient's condition and response to treatment, ordering and reviewing test results, and discussing the case with various consultants.  The critical care time associated with the care of the patient was 35 minutes. Review chart for interventions. This time is exclusive of any other billable procedures.       FINAL DIAGNOSIS  1. Cardiac arrest (HCC)         Electronically signed by: Stevan Navarro M.D., 12/3/2024 2:44 AM

## 2024-12-04 NOTE — DISCHARGE SUMMARY
Death Summary    Cause of Death  Cardiopulmonary Arrest due to congestive heart failure due to hypertension due to diabetes mellitus.    Comorbid Conditions at the Time of Death  Principal Problem:    Cardiac arrest (HCC) (POA: Yes)  Active Problems:    CHF (congestive heart failure) (HCC) (POA: Unknown)    DM (diabetes mellitus) (HCC) (POA: Unknown)    Hypertension (POA: Unknown)    Hyperlipidemia (POA: Unknown)    ACP (advance care planning) (POA: Unknown)    Shock (HCC) (POA: Unknown)    Hypotension (POA: Unknown)  Resolved Problems:    * No resolved hospital problems. *      History of Presenting Illness and Hospital Course  This is a 88 y.o. female admitted 12/3/2024 with  history of CHF, diabetes, hyperlipidemia, hypertension who presented 12/3/2024 with evaluation for cardiac arrest, unresponsiveness.  Patient reported to have episode of unresponsiveness, PEA arrest.  Patient underwent CPR for 11 minutes, received 3 rounds of epinephrine and ROSC achieved.  Patient was intubated in field by EMS, brought to [facility] ER for evaluation.  At [facility] ER, she continues to deteriorate, unable to tolerate propofol drip, Versed drip, further clinical deterioration with hypotension tachycardia shock state.  Two daughters at bedside, ultimately agreeable for comfort measures only.  Patient was terminally extubated for compassionate care.  She passed away peacefully about 8 hours later.     Death Date: 12/03/24   Death Time: 1943         Pronounced By (RN1): Emily  Pronounced By (RN2): Keerthi

## 2024-12-04 NOTE — PROGRESS NOTES
Patient pronounced  at  with 2 RN. Family at bedside, belongings gathered, questions answered. Notified APRN and charge RN of time of death.

## 2024-12-04 NOTE — CARE PLAN
The patient is Watcher - Medium risk of patient condition declining or worsening    Shift Goals  Clinical Goals: comfort care  Patient Goals: rest, peacefulness,  Family Goals: comfort, visitation    Progress made toward(s) clinical / shift goals:    Problem: Psychosocial - Comfort Care  Goal: Spiritual and cultural needs incorporated into hospitalization  Description: Target End Date:  End of day 1    1.  Encourage verbalization of feelings, concerns, expectations and needs  2.  Collaborate with Case Management/  3.  Collaborate with Pastoral Care to meet spiritual needs  Outcome: Progressing  Goal: Patient and family will demonstrate ability to cope with life altering diagnosis and/or procedure  Description: Target End Date:  1-3 days or as soon as patient condition allows    1. Expect initial shock and disbelief following diagnosis of cancer and traumatizing procedures (disfiguring surgery, colostomy, amputation).  2.  Assess patient and family/caregiver for stage of grief currently being experienced. Explain process as appropriate.  3.  Provide open, nonjudgmental environment. Use therapeutic communication skills of Active-Listening, acknowledgment, and so on.  4.  Encourage verbalization of thoughts or concerns and accept expressions of sadness, anger, rejection. Acknowledge normality of these feelings  5.  Be aware of mood swings, hostility, and other acting-out behavior. Set limits on inappropriate behavior, redirect negative thinking  6.  Be aware of debilitating depression. Ask patient direct questions about state of mind.  7.  Visit frequently and provide physical contact as appropriate, or provide frequent phone support as appropriate for setting. Arrange for care provider and support person to stay with patient as needed  8.  Reinforce teaching regarding disease process and treatments and provide information as appropriate about dying. Be honest; do not give false hope while providing  emotional support  9.  Review past life experiences, role changes, and coping skills. Talk about things that interest the patient  Outcome: Progressing  Goal: Privacy will be maintained for patient and family  Description: Target End Date:  End of day 1    Obtain private room  Outcome: Progressing       Patient is not progressing towards the following goals: